# Patient Record
Sex: FEMALE | Race: WHITE | NOT HISPANIC OR LATINO | Employment: OTHER | URBAN - METROPOLITAN AREA
[De-identification: names, ages, dates, MRNs, and addresses within clinical notes are randomized per-mention and may not be internally consistent; named-entity substitution may affect disease eponyms.]

---

## 2018-02-26 ENCOUNTER — TELEPHONE (OUTPATIENT)
Dept: FAMILY MEDICINE | Facility: CLINIC | Age: 23
End: 2018-02-26

## 2018-02-26 NOTE — TELEPHONE ENCOUNTER
Pt rescheduled from RN to provider schedule as she is a new patient.  Will need TB placed and immunizations reviewed with possible titers drawn.    Ramila CAMPOS RN

## 2018-02-27 ENCOUNTER — OFFICE VISIT (OUTPATIENT)
Dept: FAMILY MEDICINE | Facility: CLINIC | Age: 23
End: 2018-02-27
Payer: COMMERCIAL

## 2018-02-27 VITALS
SYSTOLIC BLOOD PRESSURE: 103 MMHG | HEIGHT: 61 IN | OXYGEN SATURATION: 96 % | BODY MASS INDEX: 19.37 KG/M2 | HEART RATE: 100 BPM | DIASTOLIC BLOOD PRESSURE: 68 MMHG | TEMPERATURE: 98 F | WEIGHT: 102.6 LBS

## 2018-02-27 DIAGNOSIS — Z28.39 DELINQUENT IMMUNIZATION STATUS: ICD-10-CM

## 2018-02-27 DIAGNOSIS — Z11.1 SCREENING EXAMINATION FOR PULMONARY TUBERCULOSIS: Primary | ICD-10-CM

## 2018-02-27 DIAGNOSIS — F33.40 RECURRENT MAJOR DEPRESSIVE DISORDER, IN REMISSION (H): ICD-10-CM

## 2018-02-27 DIAGNOSIS — F41.9 ANXIETY: ICD-10-CM

## 2018-02-27 PROCEDURE — 36415 COLL VENOUS BLD VENIPUNCTURE: CPT | Performed by: NURSE PRACTITIONER

## 2018-02-27 PROCEDURE — 86480 TB TEST CELL IMMUN MEASURE: CPT | Performed by: NURSE PRACTITIONER

## 2018-02-27 PROCEDURE — 99202 OFFICE O/P NEW SF 15 MIN: CPT | Performed by: NURSE PRACTITIONER

## 2018-02-27 PROCEDURE — 86787 VARICELLA-ZOSTER ANTIBODY: CPT | Performed by: NURSE PRACTITIONER

## 2018-02-27 NOTE — MR AVS SNAPSHOT
After Visit Summary   2/27/2018    Rocío Catherine    MRN: [id number]           Patient Information     Date Of Birth          1995        Visit Information        Provider Department      2/27/2018 4:20 PM Yessy Lyn APRN CNP [facility]        Today's Diagnoses     Screening examination for pulmonary tuberculosis    -  1    Delinquent immunization status          Care Instructions    1. Labs today          Thank you for choosing [facility].  You may be receiving a survey in the mail from [employer] [employer] regarding your visit today.  Please take a few minutes to complete and return the survey to let us know how we are doing.      If you have questions or concerns, please contact us via Girls Guide To or you can contact your care team at [phone].    Our Clinic hours are:  Monday 6:40 am  to 7:00 pm  Tuesday -Friday 6:40 am to 5:00 pm    The [place] outpatient lab hours are:  Monday - Friday 6:10 am to 4:45 pm  Saturdays 7:00 am to 11:00 am  Appointments are required, call [phone]    If you have clinical questions after hours or would like to schedule an appointment,  call the clinic at [phone].          Follow-ups after your visit        Your next 10 appointments already scheduled     Feb 27, 2018  4:20 PM CST   SHORT with SNOW Mckeon CNP   [facility]    [address] [address] [phone]              Who to contact     If you have questions or need follow up information about today's clinic visit or your schedule please contact [facility] directly at [phone].  Normal or non-critical lab and imaging results will be communicated to you by MyChart, letter or phone within 4 business days after the clinic has received the results. If you do not hear from us within 7 days, please contact the clinic through [employer] or phone. If you have a critical or abnormal lab result, we will  "notify you by phone as soon as possible.  Submit refill requests through [employer] or call your pharmacy and they will forward the refill request to us. Please allow 3 business days for your refill to be completed.          Additional Information About Your Visit        MD [employer] Information     [employer] lets you send messages to your doctor, view your test results, renew your prescriptions, schedule appointments and more. To sign up, go to [email or web address] . Click on \"Log in\" on the left side of the screen, which will take you to the Welcome page. Then click on \"Sign up Now\" on the right side of the page.     You will be asked to enter the access code listed below, as well as some personal information. Please follow the directions to create your username and password.     Your access code is: [email or web address]  Expires: 2018  4:17 PM     Your access code will  in 90 days. If you need help or a new code, please call your [place] clinic or [phone].        Care EveryWhere ID     This is your Care EveryWhere ID. This could be used by other organizations to access your [place] medical records  [id number]        Your Vitals Were     Pulse Temperature Height Pulse Oximetry BMI (Body Mass Index)       100 98  F (36.7  C) (Tympanic) 5' 1\" (1.549 m) 96% 19.39 kg/m2        Blood Pressure from Last 3 Encounters:   18 103/68    Weight from Last 3 Encounters:   18 102 lb 9.6 oz (46.5 kg)              We Performed the Following     M Tuberculosis by Quantiferon     VARICELLA-ZOSTER IG, IM        Primary Care Provider Office Phone # Fax #    [facility] [phone] [address] [address]        Equal Access to Services     IRVIN CUMMINS : Hadii poncho hobbs Soboubacar, waaxda luqadaha, qaybta kaalmada adeegyada, amrita woods. So [facility] [phone].    ATENCIÓN: Si habla español, tiene a beltrán disposición servicios gratuitos de " asistencia lingüística. Jo al [phone].    We comply with applicable federal civil rights laws and [place] laws. We do not discriminate on the basis of race, color, national origin, age, disability, sex, sexual orientation, or gender identity.            Thank you!     Thank you for choosing [facility]  for your care. Our goal is always to provide you with excellent care. Hearing back from our patients is one way we can continue to improve our services. Please take a few minutes to complete the written survey that you may receive in the mail after your visit with us. Thank you!             Your Updated Medication List - Protect others around you: Learn how to safely use, store and throw away your medicines at www.disposemymeds.org.          This list is accurate as of 2/27/18  4:17 PM.  Always use your most recent med list.                   Brand Name Dispense Instructions for use Diagnosis    sertraline 50 MG tablet    ZOLOFT    30 tablet    Take 1 tablet (50 mg) by mouth daily

## 2018-02-27 NOTE — NURSING NOTE
"Initial /68 (BP Location: Left arm, Patient Position: Chair, Cuff Size: Adult Regular)  Pulse 100  Temp 98  F (36.7  C) (Tympanic)  Ht 5' 1\" (1.549 m)  Wt 102 lb 9.6 oz (46.5 kg)  SpO2 96%  BMI 19.39 kg/m2 Estimated body mass index is 19.39 kg/(m^2) as calculated from the following:    Height as of this encounter: 5' 1\" (1.549 m).    Weight as of this encounter: 102 lb 9.6 oz (46.5 kg). .    Malka Phillips    "

## 2018-02-27 NOTE — PATIENT INSTRUCTIONS
1. Labs today          Thank you for choosing [facility].  You may be receiving a survey in the mail from [employer] regarding your visit today.  Please take a few minutes to complete and return the survey to let us know how we are doing.      If you have questions or concerns, please contact us via [email or web address] or you can contact your care team at [phone].    Our Clinic hours are:  Monday 6:40 am  to 7:00 pm  Tuesday -Friday 6:40 am to 5:00 pm    The [place] outpatient lab hours are:  Monday - Friday 6:10 am to 4:45 pm  Saturdays 7:00 am to 11:00 am  Appointments are required, call [phone]    If you have clinical questions after hours or would like to schedule an appointment,  call the clinic at [phone].

## 2018-02-27 NOTE — PROGRESS NOTES
"  SUBJECTIVE:   Rocío Catherine is a 22 year old female who presents to clinic today for the following health issues:      New Patient/Transfer of Care- [facility]- medical chart was reviewed with patient-  History of depression/anxiety and Factor 5 mutation.   Depression stable on Zoloft.   Patient going to college- needs immunization record updated.     Needs TB testing and Titer to Chicken Pox    Problem list and histories reviewed & adjusted, as indicated.  Additional history: as documented    Patient Active Problem List   Diagnosis     Recurrent major depressive disorder, in remission (H)     Anxiety     Factor 5 Leiden mutation, heterozygous (H)     Past Surgical History:   Procedure Laterality Date     GYN SURGERY      c section     HEAD & NECK SURGERY      hemangioma       Social History   Substance Use Topics     Smoking status: Never Smoker     Smokeless tobacco: Not on file     Alcohol use Not on file     No family history on file.        Reviewed and updated as needed this visit by clinical staff  Allergies  Meds  Problems       Reviewed and updated as needed this visit by Provider  Meds  Problems         ROS:  Constitutional, HEENT, cardiovascular, pulmonary, GI, , musculoskeletal, neuro, skin, endocrine and psych systems are negative, except as otherwise noted.    OBJECTIVE:     /68 (BP Location: Left arm, Patient Position: Chair, Cuff Size: Adult Regular)  Pulse 100  Temp 98  F (36.7  C) (Tympanic)  Ht 5' 1\" (1.549 m)  Wt 102 lb 9.6 oz (46.5 kg)  SpO2 96%  BMI 19.39 kg/m2  Body mass index is 19.39 kg/(m^2).  GENERAL: healthy, alert and no distress  RESP: lungs clear to auscultation - no rales, rhonchi or wheezes  CV: regular rate and rhythm, normal S1 S2, no S3 or S4, no murmur, click or rub, no peripheral edema and peripheral pulses strong  MS: no gross musculoskeletal defects noted, no edema    Diagnostic Test Results:  none     ASSESSMENT/PLAN:       1. Screening examination for " pulmonary tuberculosis    - M Tuberculosis by Quantiferon    2. Delinquent immunization status    - VARICELLA-ZOSTER Virus Antibody IgG      3.  Recurrent major depressive disorder, in remission (H)  Comment: stable  Plan: continue Sertraline     4.  Anxiety  Comment: Stable  Plan: continue Sertraline             SNOW Mckeon [facility]

## 2018-02-28 LAB — VZV IGG SER QL IA: 4.1 AI (ref 0–0.8)

## 2018-03-01 LAB
M TB TUBERC IFN-G BLD QL: NEGATIVE
M TB TUBERC IFN-G/MITOGEN IGNF BLD: 0 IU/ML

## 2018-03-19 ENCOUNTER — MYC MEDICAL ADVICE (OUTPATIENT)
Dept: FAMILY MEDICINE | Facility: CLINIC | Age: 23
End: 2018-03-19

## 2018-03-19 ENCOUNTER — TELEPHONE (OUTPATIENT)
Dept: FAMILY MEDICINE | Facility: CLINIC | Age: 23
End: 2018-03-19

## 2018-03-19 NOTE — TELEPHONE ENCOUNTER
I called patient and advised she needs to be seen to be evaluated and transferred to appointment scheduling. Vidhya Gaytan RN

## 2018-03-19 NOTE — TELEPHONE ENCOUNTER
See My chart message and Please advise as these could certainly be side effects of Sertraline. Vidhya Gaytan RN

## 2018-03-19 NOTE — TELEPHONE ENCOUNTER
She sent a My chart message today regarding same as below and Provider responded:     Me   to Rocío Catherine           3/19/18 2:22 PM   Yessy Rapp CNP, has stated you need to be seen to be evaluated. These certainly can be side effects of the medication and you may need a different medication, but that would be decided when you are seen. Thank you, Vidhya Gaytan RN      This [employer] message has not been read.             3/19/18 2:14 PM   Yessy Lyn APRN CNP routed this conversation to Yessy Das APRN CNP        3/19/18 2:13 PM   Note      Recommend OV-                   3/19/18 1:09 PM   You routed this conversation to Yessy Lyn APRN CNP    Me        3/19/18 1:08 PM   Note      See My chart message and Please advise as these could certainly be side effects of Sertraline. Vidhya Gaytan RN

## 2018-03-19 NOTE — TELEPHONE ENCOUNTER
Reason for Call:  Other Medication Question    Detailed comments: She is taking Sertraline.  She was off the medication for about 3 weeks then went back on about 1 1/2 weeks ago.  Ever since going back on medication she has felt nausea, HA and not eating.  She is wondering if this is caused by the medication?  Please advise.    Phone Number Patient can be reached at: Home number on file [phone] (home)    Best Time: any    Can we leave a detailed message on this number? YES    Call taken on 3/19/2018 at 3:00 PM by Mayelin Blue

## 2018-03-20 ENCOUNTER — OFFICE VISIT (OUTPATIENT)
Dept: FAMILY MEDICINE | Facility: CLINIC | Age: 23
End: 2018-03-20
Payer: COMMERCIAL

## 2018-03-20 VITALS
HEART RATE: 75 BPM | OXYGEN SATURATION: 100 % | BODY MASS INDEX: 19.08 KG/M2 | SYSTOLIC BLOOD PRESSURE: 98 MMHG | TEMPERATURE: 97.3 F | WEIGHT: 101 LBS | DIASTOLIC BLOOD PRESSURE: 71 MMHG

## 2018-03-20 DIAGNOSIS — R51.9 ACUTE NONINTRACTABLE HEADACHE, UNSPECIFIED HEADACHE TYPE: ICD-10-CM

## 2018-03-20 DIAGNOSIS — R11.0 NAUSEA: Primary | ICD-10-CM

## 2018-03-20 DIAGNOSIS — F33.40 RECURRENT MAJOR DEPRESSIVE DISORDER, IN REMISSION (H): ICD-10-CM

## 2018-03-20 DIAGNOSIS — F41.9 ANXIETY: ICD-10-CM

## 2018-03-20 PROCEDURE — 99214 OFFICE O/P EST MOD 30 MIN: CPT | Performed by: NURSE PRACTITIONER

## 2018-03-20 RX ORDER — ONDANSETRON 4 MG/1
4 TABLET, FILM COATED ORAL EVERY 6 HOURS PRN
Qty: 10 TABLET | Refills: 0 | Status: SHIPPED | OUTPATIENT
Start: 2018-03-20 | End: 2018-09-30

## 2018-03-20 ASSESSMENT — ANXIETY QUESTIONNAIRES
6. BECOMING EASILY ANNOYED OR IRRITABLE: SEVERAL DAYS
7. FEELING AFRAID AS IF SOMETHING AWFUL MIGHT HAPPEN: NEARLY EVERY DAY
5. BEING SO RESTLESS THAT IT IS HARD TO SIT STILL: NOT AT ALL
3. WORRYING TOO MUCH ABOUT DIFFERENT THINGS: SEVERAL DAYS
1. FEELING NERVOUS, ANXIOUS, OR ON EDGE: NOT AT ALL
2. NOT BEING ABLE TO STOP OR CONTROL WORRYING: SEVERAL DAYS
GAD7 TOTAL SCORE: 7

## 2018-03-20 ASSESSMENT — PATIENT HEALTH QUESTIONNAIRE - PHQ9: 5. POOR APPETITE OR OVEREATING: SEVERAL DAYS

## 2018-03-20 NOTE — NURSING NOTE
"Initial BP 98/71 (BP Location: Left arm, Patient Position: Chair, Cuff Size: Adult Regular)  Pulse 75  Temp 97.3  F (36.3  C) (Tympanic)  Wt 101 lb (45.8 kg)  SpO2 100%  BMI 19.08 kg/m2 Estimated body mass index is 19.08 kg/(m^2) as calculated from the following:    Height as of 2/27/18: 5' 1\" (1.549 m).    Weight as of this encounter: 101 lb (45.8 kg). .    Malka Phillips    "

## 2018-03-20 NOTE — PROGRESS NOTES
SUBJECTIVE:   Rocío Catherine is a 22 year old female who presents to clinic today for the following health issues:      Medication Followup of Sertraline    Taking Medication as prescribed: yes    Side Effects:  Nausea/ headaches    Medication Helping Symptoms:  Helped SX's       Patient reports that since she restarted sertraline felt headache and nausea. Patient started the medication 3 weeks ago. Nausea and headaches are intermittent.    No vision changes- tylenol or Advil relieves the headache. No history of allergies or sinus congestion. Patient took home pregnancy test which was negative.     -------------------------------------    Problem list and histories reviewed & adjusted, as indicated.  Additional history: as documented    Patient Active Problem List   Diagnosis     Recurrent major depressive disorder, in remission (H)     Anxiety     Factor 5 Leiden mutation, heterozygous (H)     Past Surgical History:   Procedure Laterality Date     GYN SURGERY      c section     HEAD & NECK SURGERY      hemangioma       Social History   Substance Use Topics     Smoking status: Never Smoker     Smokeless tobacco: Not on file     Alcohol use Not on file     No family history on file.        Reviewed and updated as needed this visit by clinical staff       Reviewed and updated as needed this visit by Provider         ROS:  Constitutional, HEENT, cardiovascular, pulmonary, GI, , musculoskeletal, neuro, skin, endocrine and psych systems are negative, except as otherwise noted.    OBJECTIVE:     BP 98/71 (BP Location: Left arm, Patient Position: Chair, Cuff Size: Adult Regular)  Pulse 75  Temp 97.3  F (36.3  C) (Tympanic)  Wt 101 lb (45.8 kg)  SpO2 100%  BMI 19.08 kg/m2  Body mass index is 19.08 kg/(m^2).  GENERAL: healthy, alert and no distress  HEENT: PEARLA   RESP: lungs clear to auscultation - no rales, rhonchi or wheezes  CV: regular rate and rhythm, normal S1 S2, no S3 or S4, no murmur, click or rub, no  peripheral edema and peripheral pulses strong  ABDOMEN: soft, nontender, no hepatosplenomegaly, no masses and bowel sounds normal  MS: no gross musculoskeletal defects noted, no edema  NEURO: normal strength- normal finger-to-nose     Diagnostic Test Results:  none     ASSESSMENT/PLAN:         1. Nausea  Likely due to Sertraline- will stop Sertraline   - ondansetron (ZOFRAN) 4 MG tablet; Take 1 tablet (4 mg) by mouth every 6 hours as needed for nausea  Dispense: 10 tablet; Refill: 0    2. Anxiety  Stable on Sertraline however reports side effects therefore will stop sertraline-consider Citalopram starting if nausea and headache symptoms subside     3. Recurrent major depressive disorder, in remission (H)  Stable on Sertraline however reports side effects therefore will stop sertraline-consider Citalopram starting if nausea and headache symptoms subside     4. Acute non intractable headache  - intermittent headaches resolved with tylenol/NSAIDS-   ? Due to sertraline- will stop medication- will continue to monitor  - avoid NSAIDS due to Leiden mutation - ok to take tylenol         SNOW Mckeon [facility]

## 2018-03-20 NOTE — MR AVS SNAPSHOT
After Visit Summary   3/20/2018    Rocío Catherine    MRN: [id number]           Patient Information     Date Of Birth          1995        Visit Information        Provider Department      3/20/2018 3:40 PM Yessy Lyn APRN CNP [facility]        Care Instructions    1. Stop taking Sertraline              Follow-ups after your visit        Who to contact     If you have questions or need follow up information about today's clinic visit or your schedule please contact [facility] directly at [phone].  Normal or non-critical lab and imaging results will be communicated to you by copygramhart, letter or phone within 4 business days after the clinic has received the results. If you do not hear from us within 7 days, please contact the clinic through [employer] or phone. If you have a critical or abnormal lab result, we will notify you by phone as soon as possible.  Submit refill requests through [employer] or call your pharmacy and they will forward the refill request to us. Please allow 3 business days for your refill to be completed.          Additional Information About Your Visit        MyChart Information     [employer] gives you secure access to your electronic health record. If you see a primary care provider, you can also send messages to your care team and make appointments. If you have questions, please call your primary care clinic.  If you do not have a primary care provider, please call [phone] and they will assist you.        Care EveryWhere ID     This is your Care EveryWhere ID. This could be used by other organizations to access your [place] medical records  [id number]        Your Vitals Were     Pulse Temperature Pulse Oximetry BMI (Body Mass Index)          75 97.3  F (36.3  C) (Tympanic) 100% 19.08 kg/m2         Blood Pressure from Last 3 Encounters:   03/20/18 98/71   02/27/18 103/68    Weight from Last 3 Encounters:   03/20/18 101 lb (45.8 kg)    02/27/18 102 lb 9.6 oz (46.5 kg)              Today, you had the following     No orders found for display       Primary Care Provider Office Phone # Fax #    SNOW Mckeon [facility] [phone] [address] [address]        Equal Access to Services     IRVIN CUMMINS : Hadii aad ku hadasho Soomaali, waaxda luqadaha, qaybta kaalmada adeegyada, waxay idiin hayaan adeeg kharash la'aan . So [facility] [phone].    ATENCIÓN: Si habla español, tiene a beltrán disposición servicios gratuitos de asistencia lingüística. Llame al [phone].    We comply with applicable federal civil rights laws and [place] laws. We do not discriminate on the basis of race, color, national origin, age, disability, sex, sexual orientation, or gender identity.            Thank you!     Thank you for choosing [facility]  for your care. Our goal is always to provide you with excellent care. Hearing back from our patients is one way we can continue to improve our services. Please take a few minutes to complete the written survey that you may receive in the mail after your visit with us. Thank you!             Your Updated Medication List - Protect others around you: Learn how to safely use, store and throw away your medicines at www.disposemymeds.org.          This list is accurate as of 3/20/18  3:58 PM.  Always use your most recent med list.                   Brand Name Dispense Instructions for use Diagnosis    sertraline 50 MG tablet    ZOLOFT    30 tablet    Take 1 tablet (50 mg) by mouth daily

## 2018-03-21 ASSESSMENT — ANXIETY QUESTIONNAIRES: GAD7 TOTAL SCORE: 7

## 2018-03-21 ASSESSMENT — PATIENT HEALTH QUESTIONNAIRE - PHQ9: SUM OF ALL RESPONSES TO PHQ QUESTIONS 1-9: 8

## 2018-03-27 ENCOUNTER — APPOINTMENT (OUTPATIENT)
Dept: GENERAL RADIOLOGY | Facility: CLINIC | Age: 23
End: 2018-03-27
Attending: EMERGENCY MEDICINE
Payer: COMMERCIAL

## 2018-03-27 ENCOUNTER — HOSPITAL ENCOUNTER (EMERGENCY)
Facility: CLINIC | Age: 23
Discharge: HOME | End: 2018-03-27
Attending: EMERGENCY MEDICINE | Admitting: EMERGENCY MEDICINE
Payer: COMMERCIAL

## 2018-03-27 VITALS
RESPIRATION RATE: 20 BRPM | HEART RATE: 108 BPM | SYSTOLIC BLOOD PRESSURE: 101 MMHG | DIASTOLIC BLOOD PRESSURE: 72 MMHG | TEMPERATURE: 98.2 F | OXYGEN SATURATION: 97 %

## 2018-03-27 DIAGNOSIS — R10.13 ABDOMINAL PAIN, EPIGASTRIC: ICD-10-CM

## 2018-03-27 LAB
ALBUMIN SERPL-MCNC: 4.4 G/DL (ref 3.4–5)
ALP SERPL-CCNC: 67 U/L (ref 40–150)
ALT SERPL W P-5'-P-CCNC: 12 U/L (ref 0–50)
ANION GAP SERPL CALCULATED.3IONS-SCNC: 6 MMOL/L (ref 3–14)
AST SERPL W P-5'-P-CCNC: 18 U/L (ref 0–45)
BASOPHILS # BLD AUTO: 0 10E9/L (ref 0–0.2)
BASOPHILS NFR BLD AUTO: 0.3 %
BILIRUB SERPL-MCNC: 0.9 MG/DL (ref 0.2–1.3)
BUN SERPL-MCNC: 14 MG/DL (ref 7–30)
CALCIUM SERPL-MCNC: 9 MG/DL (ref 8.5–10.1)
CHLORIDE SERPL-SCNC: 105 MMOL/L (ref 94–109)
CO2 SERPL-SCNC: 25 MMOL/L (ref 20–32)
CREAT SERPL-MCNC: 0.76 MG/DL (ref 0.52–1.04)
DIFFERENTIAL METHOD BLD: NORMAL
EOSINOPHIL # BLD AUTO: 0.1 10E9/L (ref 0–0.7)
EOSINOPHIL NFR BLD AUTO: 1.1 %
ERYTHROCYTE [DISTWIDTH] IN BLOOD BY AUTOMATED COUNT: 11.4 % (ref 10–15)
GFR SERPL CREATININE-BSD FRML MDRD: >90 ML/MIN/1.7M2
GLUCOSE SERPL-MCNC: 91 MG/DL (ref 70–99)
HCG SERPL QL: NEGATIVE
HCT VFR BLD AUTO: 44.1 % (ref 35–47)
HGB BLD-MCNC: 15.5 G/DL (ref 11.7–15.7)
IMM GRANULOCYTES # BLD: 0 10E9/L (ref 0–0.4)
IMM GRANULOCYTES NFR BLD: 0.1 %
LIPASE SERPL-CCNC: 137 U/L (ref 73–393)
LYMPHOCYTES # BLD AUTO: 2.4 10E9/L (ref 0.8–5.3)
LYMPHOCYTES NFR BLD AUTO: 27.4 %
MCH RBC QN AUTO: 29.9 PG (ref 26.5–33)
MCHC RBC AUTO-ENTMCNC: 35.1 G/DL (ref 31.5–36.5)
MCV RBC AUTO: 85 FL (ref 78–100)
MONOCYTES # BLD AUTO: 0.7 10E9/L (ref 0–1.3)
MONOCYTES NFR BLD AUTO: 8.2 %
NEUTROPHILS # BLD AUTO: 5.5 10E9/L (ref 1.6–8.3)
NEUTROPHILS NFR BLD AUTO: 62.9 %
PLATELET # BLD AUTO: 238 10E9/L (ref 150–450)
POTASSIUM SERPL-SCNC: 4.2 MMOL/L (ref 3.4–5.3)
PROT SERPL-MCNC: 8 G/DL (ref 6.8–8.8)
RBC # BLD AUTO: 5.18 10E12/L (ref 3.8–5.2)
SODIUM SERPL-SCNC: 136 MMOL/L (ref 133–144)
TROPONIN I SERPL-MCNC: <0.015 UG/L (ref 0–0.04)
WBC # BLD AUTO: 8.8 10E9/L (ref 4–11)

## 2018-03-27 PROCEDURE — 25000125 ZZHC RX 250: Performed by: FAMILY MEDICINE

## 2018-03-27 PROCEDURE — 71046 X-RAY EXAM CHEST 2 VIEWS: CPT

## 2018-03-27 PROCEDURE — 96375 TX/PRO/DX INJ NEW DRUG ADDON: CPT | Performed by: EMERGENCY MEDICINE

## 2018-03-27 PROCEDURE — 84703 CHORIONIC GONADOTROPIN ASSAY: CPT | Performed by: EMERGENCY MEDICINE

## 2018-03-27 PROCEDURE — 99285 EMERGENCY DEPT VISIT HI MDM: CPT | Mod: 25 | Performed by: EMERGENCY MEDICINE

## 2018-03-27 PROCEDURE — 96374 THER/PROPH/DIAG INJ IV PUSH: CPT | Performed by: EMERGENCY MEDICINE

## 2018-03-27 PROCEDURE — 93010 ELECTROCARDIOGRAM REPORT: CPT | Mod: Z6 | Performed by: EMERGENCY MEDICINE

## 2018-03-27 PROCEDURE — 83690 ASSAY OF LIPASE: CPT | Performed by: EMERGENCY MEDICINE

## 2018-03-27 PROCEDURE — 85025 COMPLETE CBC W/AUTO DIFF WBC: CPT | Performed by: EMERGENCY MEDICINE

## 2018-03-27 PROCEDURE — 80053 COMPREHEN METABOLIC PANEL: CPT | Performed by: EMERGENCY MEDICINE

## 2018-03-27 PROCEDURE — 25000128 H RX IP 250 OP 636: Performed by: EMERGENCY MEDICINE

## 2018-03-27 PROCEDURE — 84484 ASSAY OF TROPONIN QUANT: CPT | Performed by: EMERGENCY MEDICINE

## 2018-03-27 PROCEDURE — 25000132 ZZH RX MED GY IP 250 OP 250 PS 637: Performed by: FAMILY MEDICINE

## 2018-03-27 PROCEDURE — 96361 HYDRATE IV INFUSION ADD-ON: CPT | Performed by: EMERGENCY MEDICINE

## 2018-03-27 RX ORDER — HYDROMORPHONE HYDROCHLORIDE 1 MG/ML
0.3 INJECTION, SOLUTION INTRAMUSCULAR; INTRAVENOUS; SUBCUTANEOUS ONCE
Status: COMPLETED | OUTPATIENT
Start: 2018-03-27 | End: 2018-03-27

## 2018-03-27 RX ORDER — PANTOPRAZOLE SODIUM 40 MG/1
40 TABLET, DELAYED RELEASE ORAL DAILY
Qty: 30 TABLET | Refills: 0 | Status: SHIPPED | OUTPATIENT
Start: 2018-03-27 | End: 2019-02-07

## 2018-03-27 RX ORDER — ONDANSETRON 2 MG/ML
4 INJECTION INTRAMUSCULAR; INTRAVENOUS ONCE
Status: COMPLETED | OUTPATIENT
Start: 2018-03-27 | End: 2018-03-27

## 2018-03-27 RX ADMIN — HYDROMORPHONE HYDROCHLORIDE 0.3 MG: 1 INJECTION, SOLUTION INTRAMUSCULAR; INTRAVENOUS; SUBCUTANEOUS at 09:59

## 2018-03-27 RX ADMIN — LIDOCAINE HYDROCHLORIDE 30 ML: 20 SOLUTION ORAL; TOPICAL at 09:19

## 2018-03-27 RX ADMIN — ONDANSETRON 4 MG: 2 INJECTION INTRAMUSCULAR; INTRAVENOUS at 09:57

## 2018-03-27 RX ADMIN — SODIUM CHLORIDE 1000 ML: 9 INJECTION, SOLUTION INTRAVENOUS at 09:57

## 2018-03-27 NOTE — ED PROVIDER NOTES
History     Chief Complaint   Patient presents with     Heartburn     HPI  Rocío Catherine is a 22 year old female with a history of factor V Leyden deficiency depression, cholecystectomy and  who presents to emergency department with her mother complaining of epigastric abdominal pain radiating to her back.  Patient states symptoms have been present since .  Pain began as stomachache  she took Tums without relief pain is now been radiating to her back into her chest causing significant nausea and back pain.  She states she took a shower this morning and had severe pain and could not move.  States she has been nauseated for the past 2 weeks and is stopped all her medications.  She does not think she has had a fever denies any chills denies any significant shortness of breath.  Has not had any lower abdominal pain has no urinary symptoms denies any lower back pain.  She has not had any focal numbness weakness any extremity.  She has not had any bowel or bladder dysfunction.  She currently rates her pain a 5 out of 10 and it is a sharp constant pain.    Problem List:    Patient Active Problem List    Diagnosis Date Noted     Recurrent major depressive disorder, in remission (H) 2018     Priority: Medium     Anxiety 2018     Priority: Medium     Factor 5 Leiden mutation, heterozygous (H) 2018     Priority: Medium        Past Medical History:    Past Medical History:   Diagnosis Date     Factor 5 Leiden mutation, heterozygous (H) 2018     Factor V Leiden (H)        Past Surgical History:    Past Surgical History:   Procedure Laterality Date     GYN SURGERY      c section     HEAD & NECK SURGERY      hemangioma       Family History:    No family history on file.    Social History:  Marital Status:   [2]  Social History   Substance Use Topics     Smoking status: Never Smoker     Smokeless tobacco: Not on file     Alcohol use Not on file        Medications:      citalopram (CELEXA)  20 MG tablet   ondansetron (ZOFRAN) 4 MG tablet         Review of Systems   Constitutional: Positive for appetite change. Negative for activity change, chills and fever.   HENT: Negative for congestion and trouble swallowing.    Respiratory: Negative for chest tightness and shortness of breath.    Cardiovascular: Negative for chest pain and palpitations.   Gastrointestinal: Positive for abdominal pain and nausea. Negative for blood in stool, constipation, diarrhea and vomiting.   Genitourinary: Negative for decreased urine volume and dysuria.   Musculoskeletal: Negative for back pain and neck pain.   Skin: Negative for rash.   Neurological: Negative for dizziness, weakness, light-headedness, numbness and headaches.   Psychiatric/Behavioral: Negative for confusion.       Physical Exam   BP: 112/82  Pulse: 108  Temp: 98.2  F (36.8  C)  Resp: 20  SpO2: 100 %      Physical Exam   Constitutional: She is oriented to person, place, and time. She appears well-developed and well-nourished. No distress.   HENT:   Head: Normocephalic.   Mouth/Throat: Oropharynx is clear and moist.   Posterior pharynx no erythema edema   Eyes: Conjunctivae are normal.   Neck: Normal range of motion. Neck supple.   Cardiovascular: Normal rate, regular rhythm, normal heart sounds and intact distal pulses.    No murmur heard.  Pulmonary/Chest: Effort normal and breath sounds normal. She has no wheezes.   Abdominal: Soft.   Nondistended tenderness to palpation in the upper abdomen.  No guarding no rebound bowel sounds slightly hyperactive.   Musculoskeletal: Normal range of motion. She exhibits no tenderness.   Neurological: She is alert and oriented to person, place, and time. She exhibits normal muscle tone.   Skin: Skin is warm and dry. No rash noted.   Psychiatric: She has a normal mood and affect.   Nursing note and vitals reviewed.      ED Course     ED Course     Procedures               EKG Interpretation:      Interpreted by Jas Nolasco  Drage  Rhythm: normal sinus   Rate: Normal  Axis: Normal  Ectopy: none  Conduction: shortened MA interval  ST Segments/ T Waves: No ST-T wave changes  Q Waves: none  Comparison to prior: No old EKG available    Clinical Impression: Normal sinus rhythm with shortened MA interval                Critical Care time:  none                 Results for orders placed or performed during the hospital encounter of 03/27/18   Chest XR,  PA & LAT    Narrative    XR CHEST 2 VW 3/27/2018 10:40 AM    HISTORY: Pain.    COMPARISON: None.      Impression    IMPRESSION: 2 views of the chest show no acute or active  cardiopulmonary disease.     AMMY VELÁSQUEZ MD     Labs Ordered and Resulted from Time of ED Arrival Up to the Time of Departure from the ED   CBC WITH PLATELETS DIFFERENTIAL   COMPREHENSIVE METABOLIC PANEL   TROPONIN I   LIPASE   HCG QUALITATIVE       Medications   lidocaine (viscous) (XYLOCAINE) 2 % 15 mL, alum & mag hydroxide-simethicone (MYLANTA ES/MAALOX  ES) 15 mL GI Cocktail (30 mLs Oral Given 3/27/18 0919)   0.9% sodium chloride BOLUS (0 mLs Intravenous Stopped 3/27/18 1136)   HYDROmorphone (PF) (DILAUDID) injection 0.3 mg (0.3 mg Intravenous Given 3/27/18 0959)   ondansetron (ZOFRAN) injection 4 mg (4 mg Intravenous Given 3/27/18 0957)       Assessments & Plan (with Medical Decision Making) records were reviewed.  Labs were obtained.  Patient was given Zofran and a GI cocktail with moderate improvement of symptoms.  Fluids were given to the patient.  Labs revealed a white count of 8.8 hemoglobin 15.5 platelet count 238.  Comprehensive metabolic panel without abnormality.  Troponin was negative.  Lipase was within normal limits.  Pregnancy test negative.  Due to the pain radiating up into the chest dated chest x-ray was obtained and was unremarkable.  Patient has had her gallbladder removed in the past .  I discussed findings with patient and mother.  Patient now has no significant abdominal pain.  She is feeling  better.  I discussed possible CT scan of the abdomen and pelvis but with normal labs and no pain at this time patient would like to hold off on this.  I am going to start the patient on Protonix and have her follow-up with her primary care.  Patient understands if symptoms worsen or new symptoms develop patient should return for recheck and possible CT scan of the abdomen and pelvis.  Mother and patient are in agreement this plan.     I have reviewed the nursing notes.    I have reviewed the findings, diagnosis, plan and need for follow up with the patient.       Discharge Medication List as of 3/27/2018 12:04 PM      START taking these medications    Details   pantoprazole (PROTONIX) 40 MG EC tablet Take 1 tablet (40 mg) by mouth daily for 30 doses, Disp-30 tablet, R-0, Local Print             Final diagnoses:   Abdominal pain, epigastric       3/27/2018   [facility] EMERGENCY DEPARTMENT     Jas Garcia MD  03/29/18 0895

## 2018-03-27 NOTE — ED AVS SNAPSHOT
[facility] Emergency Department    [address] [address]    Phone:  [phone]    Fax:  [phone]                                       Rocío Catherine   MRN: [id number]    Department:  [facility] Emergency Department   Date of Visit:  3/27/2018           After Visit Summary Signature Page     I have received my discharge instructions, and my questions have been answered. I have discussed any challenges I see with this plan with the nurse or doctor.    ..........................................................................................................................................  Patient/Patient Representative Signature      ..........................................................................................................................................  Patient Representative Print Name and Relationship to Patient    ..................................................               ................................................  Date                                            Time    ..........................................................................................................................................  Reviewed by Signature/Title    ...................................................              ..............................................  Date                                                            Time

## 2018-03-27 NOTE — ED AVS SNAPSHOT
[facility] Emergency Department    [address] [address]    Phone:  [phone]    Fax:  [phone]                                       Rocío Catherine   MRN: [id number]    Department:  [facility] Emergency Department   Date of Visit:  3/27/2018           Patient Information     Date Of Birth          1995        Your diagnoses for this visit were:     Abdominal pain, epigastric        You were seen by Jas Garcia MD.      Follow-up Information     Follow up with Yessy Lyn APRN CNP.    Specialty:  Nurse Practitioner    Why:  Later this week for recheck    Contact information:    [address] [place] [address]  [phone]          Follow up with [facility] Emergency Department.    Specialty:  EMERGENCY MEDICINE    Why:  If symptoms worsen    Contact information:    [address] [place] [address] [phone]    Additional information:    The medical center is located at   [address] [place] [address] (between [address]   [address] in [place], [place] north   of [place]).        Discharge Instructions       Return if symptoms worsen or new symptoms develop.  Follow-up with primary care physician next available.  Drink plenty of fluids.  Take Protonix as directed.  If no improvement of her symptoms or any worsening symptoms please return for recheck.  We discussed possible CT scan of the abdomen and pelvis at this time but decided to hold off on this at this time.  Epigastric Pain (Uncertain Cause)     Epigastric pain can be a sign of disease in the upper abdomen. Common causes include:    Acid reflux (stomach acid flowing up into the esophagus)    Gastritis (irritation of the stomach lining)    Peptic Ulcer Disease    Inflammation of the pancreas    Gallstone    Infection in the gallbladder  Pain may be dull or burning. It may spread upward to the chest or to the back. There may be other symptoms such as belching, bloating, cramps or  hunger pains. There may be weight loss or poor appetite, nausea or vomiting.  Since the diagnosis of your pain is not certain yet, further tests may sometimes be needed. Sometimes the doctor will treat you for the most likely condition to see if there is improvement before doing further tests.  Home care  Medicines    Antacids help neutralize the normal acids in your stomach. Examples are Maalox, Mylanta, Rolaids, and Tums. If you don t like the liquid, you can also try a chewable one. You may find one works better than another for you. Overuse can cause diarrhea or constipation.    Acid blockers (H2 blockers) decrease acid production. Examples are cimetidine (Tagamet), famotidine (Pepcid) and ranitidine (Zantac).    Acid inhibitors (PPIs) decrease acid production in a different way than the blockers. You may find they work better, but can take a little longer to take effect.  Examples are omeprazole (Prilosec), lansoprazole (Prevacid), pantoprazole (Protonix), rabeprazole (Aciphex), and esomeprazole (Nexium).    Take an antacid 30-60 minutes after eating and at bedtime, but not at the same time as an acid blocker.    Try not to take NSAIDs. Aspirin may also cause problems, but if taking it for your heart or other medical reasons, talk to your doctor before stopping it; you do not want to cause a worse problem, like a heart attack or stroke.  Diet    If certain foods seem to cause your spasm, try to avoid them.     Eat slowly and chew food well before swallowing. Symptoms of gastritis can be worsened by certain foods. Limit or avoid fatty, fried, and spicy foods, as well as coffee, chocolate, mint, and foods with high acid content such as tomatoes and citrus fruit and juices (orange, grapefruit, lemon).    Avoid alcohol, caffeine, and tobacco, which can delay healing and worsen your problem.    Try eating smaller meals with snacks in between  Follow-up care  Follow up with your healthcare provider or as  advised.  When to seek medical advice  Call your healthcare provider right away if any of the following occur:    Stomach pain worsens or moves to the right lower part of the abdomen    Chest pain appears, or if it worsens or spreads to the chest, back, neck, shoulder, or arm    Frequent vomiting (can t keep down liquids)    Blood in the stool or vomit (red or black color)    Feeling weak or dizzy, fainting, or having trouble breathing    Fever of 100.4 F (38 C) or higher, or as directed by your healthcare provider    Abdominal swelling  Date Last Reviewed: 9/25/2015 2000-2017 [employer]. [address] [place] [address]. All rights reserved. This information is not intended as a substitute for professional medical care. Always follow your healthcare professional's instructions.          24 Hour Appointment Hotline       To make an appointment at any [facility], call [phone] ([phone]). If you don't have a family doctor or clinic, we will help you find one. [facility] are conveniently located to serve the needs of you and your family.             Review of your medicines      START taking        Dose / Directions Last dose taken    pantoprazole 40 MG EC tablet   Commonly known as:  PROTONIX   Dose:  40 mg   Quantity:  30 tablet        Take 1 tablet (40 mg) by mouth daily for 30 doses   Refills:  0          Our records show that you are taking the medicines listed below. If these are incorrect, please call your family doctor or clinic.        Dose / Directions Last dose taken    citalopram 20 MG tablet   Commonly known as:  celeXA   Quantity:  30 tablet        Take 1/2 tablet (10 mg) for 1-2 weeks, then increase to 1 tablet orally daily   Refills:  1        ondansetron 4 MG tablet   Commonly known as:  ZOFRAN   Dose:  4 mg   Quantity:  10 tablet        Take 1 tablet (4 mg) by mouth every 6 hours as needed for nausea   Refills:  0                Prescriptions were  sent or printed at these locations (1 Prescription)                   [facility] [place] - [address]   [address] [place], [place] [address]    Telephone:  [phone]   Fax:  [phone]   Hours:                  Printed at Department/Unit printer (1 of 1)         pantoprazole (PROTONIX) 40 MG EC tablet                Procedures and tests performed during your visit     CBC with platelets, differential    Chest XR,  PA & LAT    Comprehensive metabolic panel    EKG 12-lead, tracing only    HCG qualitative pregnancy (blood)    Lipase    Troponin I      Orders Needing Specimen Collection     None      Pending Results     No orders found from 3/25/2018 to 3/28/2018.            Pending Culture Results     No orders found from 3/25/2018 to 3/28/2018.            Pending Results Instructions     If you had any lab results that were not finalized at the time of your Discharge, you can call the ED Lab Result RN at [phone]. You will be contacted by this team for any positive Lab results or changes in treatment. The nurses are available 7 days a week from 10A to 6:30P.  You can leave a message 24 hours per day and they will return your call.        Test Results From Your Hospital Stay        3/27/2018 10:11 AM      Component Results     Component Value Ref Range & Units Status    WBC 8.8 4.0 - 11.0 10e9/L Final    RBC Count 5.18 3.8 - 5.2 10e12/L Final    Hemoglobin 15.5 11.7 - 15.7 g/dL Final    Hematocrit 44.1 35.0 - 47.0 % Final    MCV 85 78 - 100 fl Final    MCH 29.9 26.5 - 33.0 pg Final    MCHC 35.1 31.5 - 36.5 g/dL Final    RDW 11.4 10.0 - 15.0 % Final    Platelet Count 238 150 - 450 10e9/L Final    Diff Method Automated Method  Final    % Neutrophils 62.9 % Final    % Lymphocytes 27.4 % Final    % Monocytes 8.2 % Final    % Eosinophils 1.1 % Final    % Basophils 0.3 % Final    % Immature Granulocytes 0.1 % Final    Absolute Neutrophil 5.5 1.6 - 8.3 10e9/L Final    Absolute Lymphocytes  2.4 0.8 - 5.3 10e9/L Final    Absolute Monocytes 0.7 0.0 - 1.3 10e9/L Final    Absolute Eosinophils 0.1 0.0 - 0.7 10e9/L Final    Absolute Basophils 0.0 0.0 - 0.2 10e9/L Final    Abs Immature Granulocytes 0.0 0 - 0.4 10e9/L Final         3/27/2018 10:50 AM      Component Results     Component Value Ref Range & Units Status    Sodium 136 133 - 144 mmol/L Final    Potassium 4.2 3.4 - 5.3 mmol/L Final    Chloride 105 94 - 109 mmol/L Final    Carbon Dioxide 25 20 - 32 mmol/L Final    Anion Gap 6 3 - 14 mmol/L Final    Glucose 91 70 - 99 mg/dL Final    Urea Nitrogen 14 7 - 30 mg/dL Final    Creatinine 0.76 0.52 - 1.04 mg/dL Final    GFR Estimate >90 >60 mL/min/1.7m2 Final    Non  GFR Calc    GFR Estimate If Black >90 >60 mL/min/1.7m2 Final    African American GFR Calc    Calcium 9.0 8.5 - 10.1 mg/dL Final    Bilirubin Total 0.9 0.2 - 1.3 mg/dL Final    Albumin 4.4 3.4 - 5.0 g/dL Final    Protein Total 8.0 6.8 - 8.8 g/dL Final    Alkaline Phosphatase 67 40 - 150 U/L Final    ALT 12 0 - 50 U/L Final    AST 18 0 - 45 U/L Final         3/27/2018 10:50 AM      Component Results     Component Value Ref Range & Units Status    Troponin I ES <0.015 0.000 - 0.045 ug/L Final    The 99th percentile for upper reference range is 0.045 ug/L.  Troponin values   in the range of 0.045 - 0.120 ug/L may be associated with risks of adverse   clinical events.           3/27/2018 10:50 AM      Component Results     Component Value Ref Range & Units Status    Lipase 137 73 - 393 U/L Final         3/27/2018 10:31 AM      Component Results     Component Value Ref Range & Units Status    HCG Qualitative Serum Negative NEG^Negative Final    This test is for screening purposes.  Results should be interpreted along with   the clinical picture.  Confirmation testing is available if warranted by   ordering [id number], HCG Quantitative Pregnancy.           3/27/2018 11:41 AM      Narrative     XR CHEST 2 VW 3/27/2018 10:40 AM    HISTORY:  Pain.    COMPARISON: None.        Impression     IMPRESSION: 2 views of the chest show no acute or active  cardiopulmonary disease.     AMMY VELÁSQUEZ MD                Thank you for choosing [facility]       Thank you for choosing [facility] for your care. Our goal is always to provide you with excellent care. Hearing back from our patients is one way we can continue to improve our services. Please take a few minutes to complete the written survey that you may receive in the mail after you visit with us. Thank you!        [employer] Information     [employer] gives you secure access to your electronic health record. If you see a primary care provider, you can also send messages to your care team and make appointments. If you have questions, please call your primary care clinic.  If you do not have a primary care provider, please call [phone] and they will assist you.        Care EveryWhere ID     This is your Care EveryWhere ID. This could be used by other organizations to access your [facility] medical records  [id number]        Equal Access to Services     IRVIN CUMMINS : Hadii poncho Valdez, rozina reynoso, ermelinda herbert, amrita woods. [facility] [phone].    ATENCIÓN: Si habla español, tiene a beltrán disposición servicios gratuitos de asistencia lingüística. Llame al [phone].    We comply with applicable federal civil rights laws and [place] laws. We do not discriminate on the basis of race, color, national origin, age, disability, sex, sexual orientation, or gender identity.            After Visit Summary       This is your record. Keep this with you and show to your community pharmacist(s) and doctor(s) at your next visit.

## 2018-03-27 NOTE — ED NOTES
"Pt c/o heartburn that radiates to back.  Pt has had nausea for past 2 weeks after stopping anxiety/depression meds.  Pt had heartburn on Sunday and today.  No relief with tums.  Pt has had \"heartburn pain\" before in the past but never radiating to the back.  No other symptoms.  No recent illness or fevers.  "

## 2018-03-27 NOTE — DISCHARGE INSTRUCTIONS
Return if symptoms worsen or new symptoms develop.  Follow-up with primary care physician next available.  Drink plenty of fluids.  Take Protonix as directed.  If no improvement of her symptoms or any worsening symptoms please return for recheck.  We discussed possible CT scan of the abdomen and pelvis at this time but decided to hold off on this at this time.  Epigastric Pain (Uncertain Cause)     Epigastric pain can be a sign of disease in the upper abdomen. Common causes include:    Acid reflux (stomach acid flowing up into the esophagus)    Gastritis (irritation of the stomach lining)    Peptic Ulcer Disease    Inflammation of the pancreas    Gallstone    Infection in the gallbladder  Pain may be dull or burning. It may spread upward to the chest or to the back. There may be other symptoms such as belching, bloating, cramps or hunger pains. There may be weight loss or poor appetite, nausea or vomiting.  Since the diagnosis of your pain is not certain yet, further tests may sometimes be needed. Sometimes the doctor will treat you for the most likely condition to see if there is improvement before doing further tests.  Home care  Medicines    Antacids help neutralize the normal acids in your stomach. Examples are Maalox, Mylanta, Rolaids, and Tums. If you don t like the liquid, you can also try a chewable one. You may find one works better than another for you. Overuse can cause diarrhea or constipation.    Acid blockers (H2 blockers) decrease acid production. Examples are cimetidine (Tagamet), famotidine (Pepcid) and ranitidine (Zantac).    Acid inhibitors (PPIs) decrease acid production in a different way than the blockers. You may find they work better, but can take a little longer to take effect.  Examples are omeprazole (Prilosec), lansoprazole (Prevacid), pantoprazole (Protonix), rabeprazole (Aciphex), and esomeprazole (Nexium).    Take an antacid 30-60 minutes after eating and at bedtime, but not at the  same time as an acid blocker.    Try not to take NSAIDs. Aspirin may also cause problems, but if taking it for your heart or other medical reasons, talk to your doctor before stopping it; you do not want to cause a worse problem, like a heart attack or stroke.  Diet    If certain foods seem to cause your spasm, try to avoid them.     Eat slowly and chew food well before swallowing. Symptoms of gastritis can be worsened by certain foods. Limit or avoid fatty, fried, and spicy foods, as well as coffee, chocolate, mint, and foods with high acid content such as tomatoes and citrus fruit and juices (orange, grapefruit, lemon).    Avoid alcohol, caffeine, and tobacco, which can delay healing and worsen your problem.    Try eating smaller meals with snacks in between  Follow-up care  Follow up with your healthcare provider or as advised.  When to seek medical advice  Call your healthcare provider right away if any of the following occur:    Stomach pain worsens or moves to the right lower part of the abdomen    Chest pain appears, or if it worsens or spreads to the chest, back, neck, shoulder, or arm    Frequent vomiting (can t keep down liquids)    Blood in the stool or vomit (red or black color)    Feeling weak or dizzy, fainting, or having trouble breathing    Fever of 100.4 F (38 C) or higher, or as directed by your healthcare provider    Abdominal swelling  Date Last Reviewed: 9/25/2015 2000-2017 [employer]. [address] [place] [address], [place] [address]. All rights reserved. This information is not intended as a substitute for professional medical care. Always follow your healthcare professional's instructions.

## 2018-03-29 ASSESSMENT — ENCOUNTER SYMPTOMS
BACK PAIN: 0
FEVER: 0
NECK PAIN: 0
LIGHT-HEADEDNESS: 0
TROUBLE SWALLOWING: 0
DIARRHEA: 0
ABDOMINAL PAIN: 1
VOMITING: 0
DIZZINESS: 0
NAUSEA: 1
SHORTNESS OF BREATH: 0
BLOOD IN STOOL: 0
CONSTIPATION: 0
ACTIVITY CHANGE: 0
PALPITATIONS: 0
CHILLS: 0
HEADACHES: 0
CHEST TIGHTNESS: 0
DYSURIA: 0
NUMBNESS: 0
WEAKNESS: 0
CONFUSION: 0
APPETITE CHANGE: 1

## 2018-05-16 ENCOUNTER — TELEPHONE (OUTPATIENT)
Dept: FAMILY MEDICINE | Facility: CLINIC | Age: 23
End: 2018-05-16

## 2018-05-16 ENCOUNTER — OFFICE VISIT (OUTPATIENT)
Dept: FAMILY MEDICINE | Facility: CLINIC | Age: 23
End: 2018-05-16
Payer: COMMERCIAL

## 2018-05-16 VITALS
TEMPERATURE: 99.2 F | BODY MASS INDEX: 18.5 KG/M2 | SYSTOLIC BLOOD PRESSURE: 100 MMHG | WEIGHT: 98 LBS | DIASTOLIC BLOOD PRESSURE: 78 MMHG | HEIGHT: 61 IN | HEART RATE: 84 BPM

## 2018-05-16 DIAGNOSIS — A08.4 VIRAL GASTROENTERITIS: Primary | ICD-10-CM

## 2018-05-16 PROCEDURE — 99213 OFFICE O/P EST LOW 20 MIN: CPT | Performed by: FAMILY MEDICINE

## 2018-05-16 RX ORDER — ONDANSETRON 4 MG/1
4 TABLET, FILM COATED ORAL EVERY 8 HOURS PRN
Qty: 18 TABLET | Refills: 0 | Status: SHIPPED | OUTPATIENT
Start: 2018-05-16 | End: 2018-07-03

## 2018-05-16 NOTE — TELEPHONE ENCOUNTER
Reason for Call:  Nausea, vomiting, and diarrhea     Detailed comments: patient is calling and stating that she has had the nausea, vomiting, and diarrhea for approx 1 week. Other family member had this but only for 1 day. I did ask her if she had eaten any yovanny lettuce, and she said no. Please call to advise.    Phone Number Patient can be reached at: Cell number on file:    Telephone Information:   Mobile [phone]       Best Time: any    Can we leave a detailed message on this number? YES   Jo Ann Grove  Cl[facility] [place] [facility]      Call taken on 5/16/2018 at 9:54 AM by Jo Ann Grove

## 2018-05-16 NOTE — PATIENT INSTRUCTIONS
"      Thank you for choosing [facility].  You may be receiving a survey in the mail from [employer] Guajardo regarding your visit today.  Please take a few minutes to complete and return the survey to let us know how we are doing.      If you have questions or concerns, please contact us via Piiku or you can contact your care team at [phone].    Our Clinic hours are:  Monday 6:40 am  to 7:00 pm  Tuesday -Friday 6:40 am to 5:00 pm    The [place] outpatient lab hours are:  Monday - Friday 6:10 am to 4:45 pm  Saturdays 7:00 am to 11:00 am  Appointments are required, call [phone]    If you have clinical questions after hours or would like to schedule an appointment,  call the clinic at [phone].  Viral Diarrhea (Adult)    Diarrhea caused by a virus is often called viral gastroenteritis. Many people call it the \"stomach flu,\" but it has nothing to do with influenza. The virus that causes diarrhea affects the stomach and intestinal tract and usually lasts from 2 to 7 days. Diarrhea is the passing of loose, watery stools 3 or more times a day.  Symptoms  Along with diarrhea, you may have these symptoms:    Abdominal pain and cramping    Nausea and vomiting    Loss of bowel control    Fever and chills    Bloody stools  The danger from repeated diarrhea is dehydration. Dehydration is the loss of too much water and other fluids from the body without taking in enough to replace what is lost.  Antibiotics are not effective in this illness, but there are a number of things you can do at home that will help.  Home care  Follow these home care measures:    If symptoms are severe, rest at home for the next 24 hours or until you are feeling better.    Wash your hands with soap and water or alcohol-based  to prevent the spread of infection. Wash your hands after touching anyone who is sick.    Wash your hands after using the toilet and before meals. Clean the toilet after each use.  Food preparation:    People " with diarrhea should not prepare food for others. When preparing foods, wash your hands after touching anyone who is sick.    Wash your hands after using cutting boards, countertops, and knives that have been in contact with raw food.    Keep uncooked meats away from cooked and ready-to-eat foods.  Medicines:    You may use acetaminophen or NSAIDS such as ibuprofen or naproxen to control fever unless another medicine was prescribed.  If you have chronic liver or kidney disease or ever had a stomach ulcer or gastrointestinal bleeding, talk with your healthcare provider before using these medicines. Aspirin should never be used in anyone under 18 years of age who is ill with a fever. It may cause severe liver damage. Don't use NSAID medicines if you are already taking one for another condition (like arthritis) or are on aspirin (such as for heart disease or after a stroke).    Anti-diarrhea medicine should be taken for this condition only if advised by your healthcare provider. Sometimes anti-diarrhea medicine can make your condition worse.  Diet:    Water and clear liquids are important so you do not get dehydrated. Drink small amounts at a time, do not guzzle it down. If you are very dehydrated, sports drinks aren't a good choice. They have too much sugar and not enough electrolytes. In this case, commercially available products called oral rehydration solutions are best.    Caffeine, tobacco, and alcohol can make the diarrhea, cramping, and pain worse.    Do not force yourself to eat, especially if you have cramping, vomiting, or diarrhea. Do not eat large amounts at a time, even if you are hungry. It may make you feel worse.    If you eat, avoid fatty, greasy, spicy, or fried foods.    No dairy products, as they can make diarrhea worse.  During the first 24 Hours (the first full day) follow the diet below:    Beverages: Water, clear liquids, soft drinks without caffeine; ginger ale, mineral water (plain or  flavored), decaffeinated tea and coffee.    Soups: Clear broth, consommé and bouillon    Desserts: Plain gelatin, popsicles and fruit juice bars  During the next 24 hours (the second day) you may add the following to the above if you have improved:    Hot cereal, plain toast, bread, rolls, crackers    Plain noodles, rice, mashed potatoes, chicken noodle or rice soup    Unsweetened canned fruit (avoid pineapple), bananas    Limit fat intake to less than 15 grams per day by avoiding margarine, butter, oils, mayonnaise, sauces, gravies, fried foods, peanut butter, meat, poultry and fish.    Limit fiber; avoid raw or cooked vegetables, fresh fruits (except bananas) and bran cereals.    Limit caffeine and chocolate. No spices or seasonings except salt.  During the next 24 hours    Gradually resume a normal diet, as you feel better and your symptoms improve.    If at any time the diarrhea or cramping gets worse, go back to the simpler diet (above) or to clear liquids.  Follow-up care  Follow up with your healthcare provider, or as advised. Call if you are not improving within 24 hours or if the diarrhea lasts more than one week. If a stool (diarrhea) sample was taken, you may call in 2 days (or as directed) for the results.  When to seek medical advice  Call your healthcare provider right away if any of the following occur:    Increasing abdominal pain or constant lower right abdominal pain    Continued vomiting (unable to keep liquids down)    Frequent diarrhea (more than 5 times a day)    Blood in vomit or stool (black or red color)    Reduced oral intake    Dark urine, reduced urine output    Weakness, dizziness    Drowsiness    Fever of 100.4 F (38 C) oral or higher, or as directed by your healthcare provider    New rash  Call 911  Call 911 if any of the following occur:    Trouble breathing    Confused    Severe drowsiness or trouble awakening    Fainting or loss of consciousness    Rapid heart  rate    Seizure    Stiff neck  Date Last Reviewed: 11/16/2015 2000-2017 [employer]. [address] [place] [address], [place] [address]. All rights reserved. This information is not intended as a substitute for professional medical care. Always follow your healthcare professional's instructions.

## 2018-05-16 NOTE — TELEPHONE ENCOUNTER
"Last vomited Monday 5/14  \"but last Wednesday night I was sick at night and felt fine the next morning. \"  \"it has been going around my house. \"    Diarrhea continues, once last week , and has had diarrhea today.   Nausea for a week.     Advised her to be seen, and she agreed.   Has not done a pregnancy test.   \"I don' t think I am but it is possible. \"  Made her an appt this morning.     Fina Boogie RNC        "

## 2018-05-16 NOTE — MR AVS SNAPSHOT
"              After Visit Summary   5/16/2018    Rocío Catherine    MRN: [id number]           Patient Information     Date Of Birth          1995        Visit Information        Provider Department      5/16/2018 11:20 AM Dionicio Liang MD [facility]        Today's Diagnoses     Viral gastroenteritis    -  1      Care Instructions          Thank you for choosing [facility].  You may be receiving a survey in the mail from [facility] regarding your visit today.  Please take a few minutes to complete and return the survey to let us know how we are doing.      If you have questions or concerns, please contact us via H[employer]Thing or you can contact your care team at [phone].    Our Clinic hours are:  Monday 6:40 am  to 7:00 pm  Tuesday -Friday 6:40 am to 5:00 pm    The [place] outpatient lab hours are:  Monday - Friday 6:10 am to 4:45 pm  Saturdays 7:00 am to 11:00 am  Appointments are required, call [phone]    If you have clinical questions after hours or would like to schedule an appointment,  call the clinic at [phone].  Viral Diarrhea (Adult)    Diarrhea caused by a virus is often called viral gastroenteritis. Many people call it the \"stomach flu,\" but it has nothing to do with influenza. The virus that causes diarrhea affects the stomach and intestinal tract and usually lasts from 2 to 7 days. Diarrhea is the passing of loose, watery stools 3 or more times a day.  Symptoms  Along with diarrhea, you may have these symptoms:    Abdominal pain and cramping    Nausea and vomiting    Loss of bowel control    Fever and chills    Bloody stools  The danger from repeated diarrhea is dehydration. Dehydration is the loss of too much water and other fluids from the body without taking in enough to replace what is lost.  Antibiotics are not effective in this illness, but there are a number of things you can do at home that will help.  Home care  Follow these home care measures:    If " symptoms are severe, rest at home for the next 24 hours or until you are feeling better.    Wash your hands with soap and water or alcohol-based  to prevent the spread of infection. Wash your hands after touching anyone who is sick.    Wash your hands after using the toilet and before meals. Clean the toilet after each use.  Food preparation:    People with diarrhea should not prepare food for others. When preparing foods, wash your hands after touching anyone who is sick.    Wash your hands after using cutting boards, countertops, and knives that have been in contact with raw food.    Keep uncooked meats away from cooked and ready-to-eat foods.  Medicines:    You may use acetaminophen or NSAIDS such as ibuprofen or naproxen to control fever unless another medicine was prescribed.  If you have chronic liver or kidney disease or ever had a stomach ulcer or gastrointestinal bleeding, talk with your healthcare provider before using these medicines. Aspirin should never be used in anyone under 18 years of age who is ill with a fever. It may cause severe liver damage. Don't use NSAID medicines if you are already taking one for another condition (like arthritis) or are on aspirin (such as for heart disease or after a stroke).    Anti-diarrhea medicine should be taken for this condition only if advised by your healthcare provider. Sometimes anti-diarrhea medicine can make your condition worse.  Diet:    Water and clear liquids are important so you do not get dehydrated. Drink small amounts at a time, do not guzzle it down. If you are very dehydrated, sports drinks aren't a good choice. They have too much sugar and not enough electrolytes. In this case, commercially available products called oral rehydration solutions are best.    Caffeine, tobacco, and alcohol can make the diarrhea, cramping, and pain worse.    Do not force yourself to eat, especially if you have cramping, vomiting, or diarrhea. Do not eat large  amounts at a time, even if you are hungry. It may make you feel worse.    If you eat, avoid fatty, greasy, spicy, or fried foods.    No dairy products, as they can make diarrhea worse.  During the first 24 Hours (the first full day) follow the diet below:    Beverages: Water, clear liquids, soft drinks without caffeine; ginger ale, mineral water (plain or flavored), decaffeinated tea and coffee.    Soups: Clear broth, consommé and bouillon    Desserts: Plain gelatin, popsicles and fruit juice bars  During the next 24 hours (the second day) you may add the following to the above if you have improved:    Hot cereal, plain toast, bread, rolls, crackers    Plain noodles, rice, mashed potatoes, chicken noodle or rice soup    Unsweetened canned fruit (avoid pineapple), bananas    Limit fat intake to less than 15 grams per day by avoiding margarine, butter, oils, mayonnaise, sauces, gravies, fried foods, peanut butter, meat, poultry and fish.    Limit fiber; avoid raw or cooked vegetables, fresh fruits (except bananas) and bran cereals.    Limit caffeine and chocolate. No spices or seasonings except salt.  During the next 24 hours    Gradually resume a normal diet, as you feel better and your symptoms improve.    If at any time the diarrhea or cramping gets worse, go back to the simpler diet (above) or to clear liquids.  Follow-up care  Follow up with your healthcare provider, or as advised. Call if you are not improving within 24 hours or if the diarrhea lasts more than one week. If a stool (diarrhea) sample was taken, you may call in 2 days (or as directed) for the results.  When to seek medical advice  Call your healthcare provider right away if any of the following occur:    Increasing abdominal pain or constant lower right abdominal pain    Continued vomiting (unable to keep liquids down)    Frequent diarrhea (more than 5 times a day)    Blood in vomit or stool (black or red color)    Reduced oral intake    Dark  urine, reduced urine output    Weakness, dizziness    Drowsiness    Fever of 100.4 F (38 C) oral or higher, or as directed by your healthcare provider    New rash  Call 911  Call 911 if any of the following occur:    Trouble breathing    Confused    Severe drowsiness or trouble awakening    Fainting or loss of consciousness    Rapid heart rate    Seizure    Stiff neck  Date Last Reviewed: 11/16/2015 2000-2017 [employer]. [address] [place] [address], [place] [address]. All rights reserved. This information is not intended as a substitute for professional medical care. Always follow your healthcare professional's instructions.                Follow-ups after your visit        Who to contact     If you have questions or need follow up information about today's clinic visit or your schedule please contact [facility] directly at [phone].  Normal or non-critical lab and imaging results will be communicated to you by Your [employer], letter or phone within 4 business days after the clinic has received the results. If you do not hear from us within 7 days, please contact the clinic through Your [employer] or phone. If you have a critical or abnormal lab result, we will notify you by phone as soon as possible.  Submit refill requests through [email or web address] or call your pharmacy and they will forward the refill request to us. Please allow 3 business days for your refill to be completed.          Additional Information About Your Visit        [email or web address] Information     [email or web address] gives you secure access to your electronic health record. If you see a primary care provider, you can also send messages to your care team and make appointments. If you have questions, please call your primary care clinic.  If you do not have a primary care provider, please call [phone] and they will assist you.        Care EveryWhere ID     This is your Care EveryWhere ID. This could be used by other organizations to access your [place]  "medical records  [id number]        Your Vitals Were     Pulse Temperature Height BMI (Body Mass Index)          84 99.2  F (37.3  C) (Tympanic) 5' 1\" (1.549 m) 18.52 kg/m2         Blood Pressure from Last 3 Encounters:   05/16/18 100/78   03/27/18 101/72   03/20/18 98/71    Weight from Last 3 Encounters:   05/16/18 98 lb (44.5 kg)   03/20/18 101 lb (45.8 kg)   02/27/18 102 lb 9.6 oz (46.5 kg)              Today, you had the following     No orders found for display         Today's Medication Changes          These changes are accurate as of 5/16/18 11:46 AM.  If you have any questions, ask your nurse or doctor.               These medicines have changed or have updated prescriptions.        Dose/Directions    * ondansetron 4 MG tablet   Commonly known as:  ZOFRAN   This may have changed:  Another medication with the same name was added. Make sure you understand how and when to take each.   Used for:  Nausea   Changed by:  Dionicio Liang MD        Dose:  4 mg   Take 1 tablet (4 mg) by mouth every 6 hours as needed for nausea   Quantity:  10 tablet   Refills:  0       * ondansetron 4 MG tablet   Commonly known as:  ZOFRAN   This may have changed:  You were already taking a medication with the same name, and this prescription was added. Make sure you understand how and when to take each.   Used for:  Viral gastroenteritis   Changed by:  Dionicio Liang MD        Dose:  4 mg   Take 1 tablet (4 mg) by mouth every 8 hours as needed   Quantity:  18 tablet   Refills:  0       * Notice:  This list has 2 medication(s) that are the same as other medications prescribed for you. Read the directions carefully, and ask your doctor or other care provider to review them with you.         Where to get your medicines      These medications were sent to [facility] [address] [place] [address]  [address] [facility] [address]     Phone:  [phone]     ondansetron 4 MG tablet          "       Primary Care Provider Office Phone # Fax #    SNOW Mckeon [facility] [phone] [address] [facility] [address]        Equal Access to Services     IRVIN CUMMINS : Hadii aad ku hadmannyo Soomaali, waaxda luqadaha, qaybta kaalmada adeegyada, amrita florenciain hayaaloy mooreguero ozunaedis woods. So [facility] [phone].    ATENCIÓN: Si habla español, tiene a beltrán disposición servicios gratuitos de asistencia lingüística. Llame al [phone].    We comply with applicable federal civil rights laws and [place] laws. We do not discriminate on the basis of race, color, national origin, age, disability, sex, sexual orientation, or gender identity.            Thank you!     Thank you for choosing [facility]  for your care. Our goal is always to provide you with excellent care. Hearing back from our patients is one way we can continue to improve our services. Please take a few minutes to complete the written survey that you may receive in the mail after your visit with us. Thank you!             Your Updated Medication List - Protect others around you: Learn how to safely use, store and throw away your medicines at www.disposemymeds.org.          This list is accurate as of 5/16/18 11:46 AM.  Always use your most recent med list.                   Brand Name Dispense Instructions for use Diagnosis    citalopram 20 MG tablet    celeXA    30 tablet    Take 1/2 tablet (10 mg) for 1-2 weeks, then increase to 1 tablet orally daily    Anxiety, Single current episode of major depressive disorder, unspecified depression episode severity       * ondansetron 4 MG tablet    ZOFRAN    10 tablet    Take 1 tablet (4 mg) by mouth every 6 hours as needed for nausea    Nausea       * ondansetron 4 MG tablet    ZOFRAN    18 tablet    Take 1 tablet (4 mg) by mouth every 8 hours as needed    Viral gastroenteritis       * Notice:  This list has 2 medication(s) that are the same as other medications prescribed  for you. Read the directions carefully, and ask your doctor or other care provider to review them with you.

## 2018-05-16 NOTE — PROGRESS NOTES
SUBJECTIVE:   Rocío Catherine is a 22 year old female who presents to clinic today for the following health issues:      Concern - Patient is vomiting nausea and abdominal pain diarrhea   Onset: off and on for 1 week increased on 513    Description:   Patient started with body ache about 1 week ago now has increased to nausea vomiting. Everyone has had it in the house but only has lasted about 1 day      Intensity: moderate    Progression of Symptoms:  worsening    Accompanying Signs & Symptoms:  None     Previous history of similar problem:   Gallbladder in June unsure if it is the way she eating     Precipitating factors:   Worsened by: any food or liquid     Alleviating factors:  Improved by: lying down not moving     Therapies Tried and outcome: tylenol on 5-14  Patient comes in for nausea and vomiting and diarrhea. Symptoms have been ongoing for about a week. Her household also had similar symptoms.     Problem list and histories reviewed & adjusted, as indicated.  Additional history: as documented    Patient Active Problem List   Diagnosis     Recurrent major depressive disorder, in remission (H)     Anxiety     Factor 5 Leiden mutation, heterozygous (H)     Past Surgical History:   Procedure Laterality Date     GYN SURGERY      c section     HEAD & NECK SURGERY      hemangioma       Social History   Substance Use Topics     Smoking status: Never Smoker     Smokeless tobacco: Never Used     Alcohol use Not on file     History reviewed. No pertinent family history.      Current Outpatient Prescriptions   Medication Sig Dispense Refill     citalopram (CELEXA) 20 MG tablet Take 1/2 tablet (10 mg) for 1-2 weeks, then increase to 1 tablet orally daily 30 tablet 1     ondansetron (ZOFRAN) 4 MG tablet Take 1 tablet (4 mg) by mouth every 8 hours as needed 18 tablet 0     ondansetron (ZOFRAN) 4 MG tablet Take 1 tablet (4 mg) by mouth every 6 hours as needed for nausea (Patient not taking: Reported on 5/16/2018) 10 tablet  "0     No Known Allergies  BP Readings from Last 3 Encounters:   05/16/18 100/78   03/27/18 101/72   03/20/18 98/71    Wt Readings from Last 3 Encounters:   05/16/18 98 lb (44.5 kg)   03/20/18 101 lb (45.8 kg)   02/27/18 102 lb 9.6 oz (46.5 kg)                  Labs reviewed in EPIC    Reviewed and updated as needed this visit by clinical staff  Tobacco  Allergies  Med Hx  Surg Hx  Fam Hx  Soc Hx      Reviewed and updated as needed this visit by Provider         ROS:  Constitutional, HEENT, cardiovascular, pulmonary, gi and gu systems are negative, except as otherwise noted.    OBJECTIVE:     /78 (BP Location: Left arm, Cuff Size: Adult Regular)  Pulse 84  Temp 99.2  F (37.3  C) (Tympanic)  Ht 5' 1\" (1.549 m)  Wt 98 lb (44.5 kg)  BMI 18.52 kg/m2  Body mass index is 18.52 kg/(m^2).  GENERAL: healthy, alert and no distress  EYES: Eyes grossly normal to inspection, PERRL and conjunctivae and sclerae normal  HENT: ear canals and TM's normal, nose and mouth without ulcers or lesions  NECK: no adenopathy, no asymmetry, masses, or scars and thyroid normal to palpation  RESP: lungs clear to auscultation - no rales, rhonchi or wheezes  CV: regular rate and rhythm, normal S1 S2, no S3 or S4, no murmur, click or rub, no peripheral edema and peripheral pulses strong  ABDOMEN: soft, nontender, no hepatosplenomegaly, no masses and bowel sounds normal  MS: no gross musculoskeletal defects noted, no edema    Diagnostic Test Results:  none     ASSESSMENT/PLAN:       1. Viral gastroenteritis  Recommend bland diet and increase in fluids.   - ondansetron (ZOFRAN) 4 MG tablet; Take 1 tablet (4 mg) by mouth every 8 hours as needed  Dispense: 18 tablet; Refill: 0    FUTURE APPOINTMENTS:       - Follow-up visit as needed.  Patient Instructions         Thank you for choosing [facility].  You may be receiving a survey in the mail from [employer] regarding your visit today.  Please take a few minutes to complete and " "return the survey to let us know how we are doing.      If you have questions or concerns, please contact us via [employer] or you can contact your care team at [phone].    Our Clinic hours are:  Monday 6:40 am  to 7:00 pm  Tuesday -Friday 6:40 am to 5:00 pm    The [place] outpatient lab hours are:  Monday - Friday 6:10 am to 4:45 pm  Saturdays 7:00 am to 11:00 am  Appointments are required, call [phone]    If you have clinical questions after hours or would like to schedule an appointment,  call the clinic at [phone].  Viral Diarrhea (Adult)    Diarrhea caused by a virus is often called viral gastroenteritis. Many people call it the \"stomach flu,\" but it has nothing to do with influenza. The virus that causes diarrhea affects the stomach and intestinal tract and usually lasts from 2 to 7 days. Diarrhea is the passing of loose, watery stools 3 or more times a day.  Symptoms  Along with diarrhea, you may have these symptoms:    Abdominal pain and cramping    Nausea and vomiting    Loss of bowel control    Fever and chills    Bloody stools  The danger from repeated diarrhea is dehydration. Dehydration is the loss of too much water and other fluids from the body without taking in enough to replace what is lost.  Antibiotics are not effective in this illness, but there are a number of things you can do at home that will help.  Home care  Follow these home care measures:    If symptoms are severe, rest at home for the next 24 hours or until you are feeling better.    Wash your hands with soap and water or alcohol-based  to prevent the spread of infection. Wash your hands after touching anyone who is sick.    Wash your hands after using the toilet and before meals. Clean the toilet after each use.  Food preparation:    People with diarrhea should not prepare food for others. When preparing foods, wash your hands after touching anyone who is sick.    Wash your hands after using cutting boards, " countertops, and knives that have been in contact with raw food.    Keep uncooked meats away from cooked and ready-to-eat foods.  Medicines:    You may use acetaminophen or NSAIDS such as ibuprofen or naproxen to control fever unless another medicine was prescribed.  If you have chronic liver or kidney disease or ever had a stomach ulcer or gastrointestinal bleeding, talk with your healthcare provider before using these medicines. Aspirin should never be used in anyone under 18 years of age who is ill with a fever. It may cause severe liver damage. Don't use NSAID medicines if you are already taking one for another condition (like arthritis) or are on aspirin (such as for heart disease or after a stroke).    Anti-diarrhea medicine should be taken for this condition only if advised by your healthcare provider. Sometimes anti-diarrhea medicine can make your condition worse.  Diet:    Water and clear liquids are important so you do not get dehydrated. Drink small amounts at a time, do not guzzle it down. If you are very dehydrated, sports drinks aren't a good choice. They have too much sugar and not enough electrolytes. In this case, commercially available products called oral rehydration solutions are best.    Caffeine, tobacco, and alcohol can make the diarrhea, cramping, and pain worse.    Do not force yourself to eat, especially if you have cramping, vomiting, or diarrhea. Do not eat large amounts at a time, even if you are hungry. It may make you feel worse.    If you eat, avoid fatty, greasy, spicy, or fried foods.    No dairy products, as they can make diarrhea worse.  During the first 24 Hours (the first full day) follow the diet below:    Beverages: Water, clear liquids, soft drinks without caffeine; ginger ale, mineral water (plain or flavored), decaffeinated tea and coffee.    Soups: Clear broth, consommé and bouillon    Desserts: Plain gelatin, popsicles and fruit juice bars  During the next 24 hours (the  second day) you may add the following to the above if you have improved:    Hot cereal, plain toast, bread, rolls, crackers    Plain noodles, rice, mashed potatoes, chicken noodle or rice soup    Unsweetened canned fruit (avoid pineapple), bananas    Limit fat intake to less than 15 grams per day by avoiding margarine, butter, oils, mayonnaise, sauces, gravies, fried foods, peanut butter, meat, poultry and fish.    Limit fiber; avoid raw or cooked vegetables, fresh fruits (except bananas) and bran cereals.    Limit caffeine and chocolate. No spices or seasonings except salt.  During the next 24 hours    Gradually resume a normal diet, as you feel better and your symptoms improve.    If at any time the diarrhea or cramping gets worse, go back to the simpler diet (above) or to clear liquids.  Follow-up care  Follow up with your healthcare provider, or as advised. Call if you are not improving within 24 hours or if the diarrhea lasts more than one week. If a stool (diarrhea) sample was taken, you may call in 2 days (or as directed) for the results.  When to seek medical advice  Call your healthcare provider right away if any of the following occur:    Increasing abdominal pain or constant lower right abdominal pain    Continued vomiting (unable to keep liquids down)    Frequent diarrhea (more than 5 times a day)    Blood in vomit or stool (black or red color)    Reduced oral intake    Dark urine, reduced urine output    Weakness, dizziness    Drowsiness    Fever of 100.4 F (38 C) oral or higher, or as directed by your healthcare provider    New rash  Call 911  Call 911 if any of the following occur:    Trouble breathing    Confused    Severe drowsiness or trouble awakening    Fainting or loss of consciousness    Rapid heart rate    Seizure    Stiff neck  Date Last Reviewed: 11/16/2015 2000-2017 [employer]. [address] [place] [address], [place] [address]. All rights reserved. This information is not intended  as a substitute for professional medical care. Always follow your healthcare professional's instructions.            Dionicio Liang MD  [facility]

## 2018-07-03 ENCOUNTER — OFFICE VISIT (OUTPATIENT)
Dept: FAMILY MEDICINE | Facility: CLINIC | Age: 23
End: 2018-07-03
Payer: COMMERCIAL

## 2018-07-03 VITALS
WEIGHT: 99.8 LBS | DIASTOLIC BLOOD PRESSURE: 62 MMHG | HEART RATE: 112 BPM | HEIGHT: 61 IN | OXYGEN SATURATION: 100 % | BODY MASS INDEX: 18.84 KG/M2 | TEMPERATURE: 97.2 F | SYSTOLIC BLOOD PRESSURE: 102 MMHG

## 2018-07-03 DIAGNOSIS — F33.9 EPISODE OF RECURRENT MAJOR DEPRESSIVE DISORDER, UNSPECIFIED DEPRESSION EPISODE SEVERITY (H): Primary | ICD-10-CM

## 2018-07-03 DIAGNOSIS — F41.9 ANXIETY: ICD-10-CM

## 2018-07-03 PROCEDURE — 99214 OFFICE O/P EST MOD 30 MIN: CPT | Performed by: NURSE PRACTITIONER

## 2018-07-03 RX ORDER — BUSPIRONE HYDROCHLORIDE 10 MG/1
10 TABLET ORAL 3 TIMES DAILY
Qty: 90 TABLET | Refills: 1 | Status: SHIPPED | OUTPATIENT
Start: 2018-07-03 | End: 2019-02-07

## 2018-07-03 ASSESSMENT — ANXIETY QUESTIONNAIRES
1. FEELING NERVOUS, ANXIOUS, OR ON EDGE: MORE THAN HALF THE DAYS
2. NOT BEING ABLE TO STOP OR CONTROL WORRYING: MORE THAN HALF THE DAYS
7. FEELING AFRAID AS IF SOMETHING AWFUL MIGHT HAPPEN: MORE THAN HALF THE DAYS
5. BEING SO RESTLESS THAT IT IS HARD TO SIT STILL: NOT AT ALL
IF YOU CHECKED OFF ANY PROBLEMS ON THIS QUESTIONNAIRE, HOW DIFFICULT HAVE THESE PROBLEMS MADE IT FOR YOU TO DO YOUR WORK, TAKE CARE OF THINGS AT HOME, OR GET ALONG WITH OTHER PEOPLE: SOMEWHAT DIFFICULT
GAD7 TOTAL SCORE: 13
6. BECOMING EASILY ANNOYED OR IRRITABLE: NEARLY EVERY DAY
3. WORRYING TOO MUCH ABOUT DIFFERENT THINGS: MORE THAN HALF THE DAYS

## 2018-07-03 ASSESSMENT — PATIENT HEALTH QUESTIONNAIRE - PHQ9: 5. POOR APPETITE OR OVEREATING: MORE THAN HALF THE DAYS

## 2018-07-03 NOTE — MR AVS SNAPSHOT
After Visit Summary   7/3/2018    Rocío Catherine    MRN: [id number]           Patient Information     Date Of Birth          1995        Visit Information        Provider Department      7/3/2018 10:00 AM Yessy Lyn APRN CNP [facility]        Today's Diagnoses     Episode of recurrent major depressive disorder, unspecified depression episode severity (H)    -  1    Anxiety          Care Instructions    1. Start taking Buspar as directed  2. Schedule counseling         Thank you for choosing [facility].  You may be receiving a survey in the mail from [employer] regarding your visit today.  Please take a few minutes to complete and return the survey to let us know how we are doing.      If you have questions or concerns, please contact us via [employer] or you can contact your care team at [phone].    Our Clinic hours are:  Monday 6:40 am  to 7:00 pm  Tuesday -Friday 6:40 am to 5:00 pm    The [place] outpatient lab hours are:  Monday - Friday 6:10 am to 4:45 pm  Saturdays 7:00 am to 11:00 am  Appointments are required, call [phone]    If you have clinical questions after hours or would like to schedule an appointment,  call the clinic at [phone].          Follow-ups after your visit        Who to contact     If you have questions or need follow up information about today's clinic visit or your schedule please contact [facility] directly at [phone].  Normal or non-critical lab and imaging results will be communicated to you by [employer], letter or phone within 4 business days after the clinic has received the results. If you do not hear from us within 7 days, please contact the clinic through [employer] or phone. If you have a critical or abnormal lab result, we will notify you by phone as soon as possible.  Submit refill requests through [employer] or call your pharmacy and they will forward the refill request to us. Please allow 3 business days for  "your refill to be completed.          Additional Information About Your Visit        [employer] Information     [employer] gives you secure access to your electronic health record. If you see a primary care provider, you can also send messages to your care team and make appointments. If you have questions, please call your primary care clinic.  If you do not have a primary care provider, please call [phone] and they will assist you.        Care EveryWhere ID     This is your Care EveryWhere ID. This could be used by other organizations to access your [place] [facility] medical records  [id number]        Your Vitals Were     Pulse Temperature Height Last Period Pulse Oximetry BMI (Body Mass Index)    112 97.2  F (36.2  C) (Tympanic) 5' 1\" (1.549 m) 06/28/2018 (Exact Date) 100% 18.86 kg/m2       Blood Pressure from Last 3 Encounters:   07/03/18 102/62   05/16/18 100/78   03/27/18 101/72    Weight from Last 3 Encounters:   07/03/18 99 lb 12.8 oz (45.3 kg)   05/16/18 98 lb (44.5 kg)   03/20/18 101 lb (45.8 kg)              We Performed the Following     DEPRESSION ACTION PLAN (DAP)          Today's Medication Changes          These changes are accurate as of 7/3/18 10:26 AM.  If you have any questions, ask your nurse or doctor.               Start taking these medicines.        Dose/Directions    busPIRone 10 MG tablet   Commonly known as:  BUSPAR   Used for:  Anxiety   Started by:  Yessy Lyn APRN CNP        Dose:  10 mg   Take 1 tablet (10 mg) by mouth 3 times daily   Quantity:  90 tablet   Refills:  1            Where to get your medicines      These medications were sent to [facility] [address]  [address] [address]     Phone:  [phone]     busPIRone 10 MG tablet                Primary Care Provider Office Phone # Fax #    SNOW Mckeon [phone] [address] [address]        Equal Access to Services     [facility] " GAAR : Hadii aad bruce faby Valdez, waaxda luqadaha, qaybta kayuriyda keon, waxelissa yash hayryan gironelvieedis woods. So [facility] [phone].    ATENCIÓN: Si habla español, tiene a beltrán disposición servicios gratuitos de asistencia lingüística. Llame al [phone].    We comply with applicable federal civil rights laws and [place] laws. We do not discriminate on the basis of race, color, national origin, age, disability, sex, sexual orientation, or gender identity.            Thank you!     Thank you for choosing [facility]  for your care. Our goal is always to provide you with excellent care. Hearing back from our patients is one way we can continue to improve our services. Please take a few minutes to complete the written survey that you may receive in the mail after your visit with us. Thank you!             Your Updated Medication List - Protect others around you: Learn how to safely use, store and throw away your medicines at www.disposemymeds.org.          This list is accurate as of 7/3/18 10:26 AM.  Always use your most recent med list.                   Brand Name Dispense Instructions for use Diagnosis    busPIRone 10 MG tablet    BUSPAR    90 tablet    Take 1 tablet (10 mg) by mouth 3 times daily    Anxiety       citalopram 20 MG tablet    celeXA    30 tablet    Take 1/2 tablet (10 mg) for 1-2 weeks, then increase to 1 tablet orally daily    Anxiety, Single current episode of major depressive disorder, unspecified depression episode severity       ondansetron 4 MG tablet    ZOFRAN    10 tablet    Take 1 tablet (4 mg) by mouth every 6 hours as needed for nausea    Nausea

## 2018-07-03 NOTE — LETTER
My Depression Action Plan  Name: Rocío Catherine   Date of Birth 1995  Date: 7/3/2018    My doctor: Yessy Lyn   My clinic: [facility]  [address] [id number] [phone]          GREEN    ZONE   Good Control    What it looks like:     Things are going generally well. You have normal up s and down s. You may even feel depressed from time to time, but bad moods usually last less than a day.   What you need to do:  1. Continue to care for yourself (see self care plan)  2. Check your depression survival kit and update it as needed  3. Follow your physician s recommendations including any medication.  4. Do not stop taking medication unless you consult with your physician first.           YELLOW         ZONE Getting Worse    What it looks like:     Depression is starting to interfere with your life.     It may be hard to get out of bed; you may be starting to isolate yourself from others.    Symptoms of depression are starting to last most all day and this has happened for several days.     You may have suicidal thoughts but they are not constant.   What you need to do:     1. Call your care team, your response to treatment will improve if you keep your care team informed of your progress. Yellow periods are signs an adjustment may need to be made.     2. Continue your self-care, even if you have to fake it!    3. Talk to someone in your support network    4. Open up your depression survival kit           RED    ZONE Medical Alert - Get Help    What it looks like:     Depression is seriously interfering with your life.     You may experience these or other symptoms: You can t get out of bed most days, can t work or engage in other necessary activities, you have trouble taking care of basic hygiene, or basic responsibilities, thoughts of suicide or death that will not go away, self-injurious behavior.     What you need to do:  1. Call your care team and request  a same-day appointment. If they are not available (weekends or after hours) call your local crisis line, emergency room or 911.            Depression Self Care Plan / Survival Kit    Self-Care for Depression  Here s the deal. Your body and mind are really not as separate as most people think.  What you do and think affects how you feel and how you feel influences what you do and think. This means if you do things that people who feel good do, it will help you feel better.  Sometimes this is all it takes.  There is also a place for medication and therapy depending on how severe your depression is, so be sure to consult with your medical provider and/ or Behavioral Health Consultant if your symptoms are worsening or not improving.     In order to better manage my stress, I will:    Exercise  Get some form of exercise, every day. This will help reduce pain and release endorphins, the  feel good  chemicals in your brain. This is almost as good as taking antidepressants!  This is not the same as joining a gym and then never going! (they count on that by the way ) It can be as simple as just going for a walk or doing some gardening, anything that will get you moving.      Hygiene   Maintain good hygiene (Get out of bed in the morning, Make your bed, Brush your teeth, Take a shower, and Get dressed like you were going to work, even if you are unemployed).  If your clothes don't fit try to get ones that do.    Diet  I will strive to eat foods that are good for me, drink plenty of water, and avoid excessive sugar, caffeine, alcohol, and other mood-altering substances.  Some foods that are helpful in depression are: complex carbohydrates, B vitamins, flaxseed, fish or fish oil, fresh fruits and vegetables.    Psychotherapy  I agree to participate in Individual Therapy (if recommended).    Medication  If prescribed medications, I agree to take them.  Missing doses can result in serious side effects.  I understand that drinking  alcohol, or other illicit drug use, may cause potential side effects.  I will not stop my medication abruptly without first discussing it with my provider.    Staying Connected With Others  I will stay in touch with my friends, family members, and my primary care provider/team.    Use your imagination  Be creative.  We all have a creative side; it doesn t matter if it s oil painting, sand castles, or mud pies! This will also kick up the endorphins.    Witness Beauty  (AKA stop and smell the roses) Take a look outside, even in mid-winter. Notice colors, textures. Watch the squirrels and birds.     Service to others  Be of service to others.  There is always someone else in need.  By helping others we can  get out of ourselves  and remember the really important things.  This also provides opportunities for practicing all the other parts of the program.    Humor  Laugh and be silly!  Adjust your TV habits for less news and crime-drama and more comedy.    Control your stress  Try breathing deep, massage therapy, biofeedback, and meditation. Find time to relax each day.     My support system    Clinic Contact:  Phone number:    Contact 1:  Phone number:    Contact 2:  Phone number:    Christianity/:  Phone number:    Therapist:  Phone number:    Local crisis center:    Phone number:    Other community support:  Phone number:

## 2018-07-03 NOTE — PROGRESS NOTES
SUBJECTIVE:   Rocío Catherine is a 22 year old female who presents to clinic today for the following health issues:      Depression and Anxiety Follow-Up    Status since last visit: Worsened - anxiety is worse-looking for a job- more anxiety  in social situations. She is currently seeing counselor in [place].     Other associated symptoms:None    Complicating factors:     Significant life event: Yes-  Completion of schooling and looking for a job     Current substance abuse: None    PHQ-9 3/20/2018   Total Score 8   Q9: Suicide Ideation Not at all     MORA-7 SCORE 3/20/2018   Total Score 7     In the past two weeks have you had thoughts of suicide or self-harm?  No.    Do you have concerns about your personal safety or the safety of others?   No  PHQ-9  English  PHQ-9   Any Language  MORA-7  Suicide Assessment Five-step Evaluation and Treatment (SAFE-T)    Amount of exercise or physical activity: 6-7 days/week for an average of greater than 60 minutes    Problems taking medications regularly: Yes,  problems remembering to take    Medication side effects: none    Diet: regular (no restrictions)        -------------------------------------    Problem list and histories reviewed & adjusted, as indicated.  Additional history: as documented    Patient Active Problem List   Diagnosis     Recurrent major depressive disorder, in remission (H)     Anxiety     Factor 5 Leiden mutation, heterozygous (H)     Past Surgical History:   Procedure Laterality Date     GYN SURGERY      c section     HEAD & NECK SURGERY      hemangioma       Social History   Substance Use Topics     Smoking status: Never Smoker     Smokeless tobacco: Never Used     Alcohol use Not on file     Family History   Problem Relation Age of Onset     Other - See Comments Father      factor V     Other - See Comments Paternal Grandfather      factor V           Reviewed and updated as needed this visit by clinical staff  Tobacco  Allergies  Meds  Med Hx  Surg  "Hx  Fam Hx  Soc Hx      Reviewed and updated as needed this visit by Provider         ROS:  Constitutional, HEENT, cardiovascular, pulmonary, GI, , musculoskeletal, neuro, skin, endocrine and psych systems are negative, except as otherwise noted.    OBJECTIVE:     /62  Pulse 112  Temp 97.2  F (36.2  C) (Tympanic)  Ht 5' 1\" (1.549 m)  Wt 99 lb 12.8 oz (45.3 kg)  LMP 06/28/2018 (Exact Date)  SpO2 100%  BMI 18.86 kg/m2  Body mass index is 18.86 kg/(m^2).  GENERAL: healthy, alert and no distress  MS: no gross musculoskeletal defects noted, no edema  PSYCH: mentation appears normal, affect normal/bright    Diagnostic Test Results:  none     ASSESSMENT/PLAN:       1. Episode of recurrent major depressive disorder, unspecified depression episode severity (H)  Stable- continue Citalopram at 20 mg daily   - DEPRESSION ACTION PLAN (DAP)    2. Anxiety  Increasing with social situations/job hunting  - patient has appointment with counselor today  - continue Citalopram   - start busPIRone (BUSPAR) 10 MG tablet; Take 1 tablet (10 mg) by mouth 3 times daily  Dispense: 90 tablet; Refill: 1    Follow up in 3 months    SNOW Mckeon [facility]  "

## 2018-07-03 NOTE — PATIENT INSTRUCTIONS
1. Start taking Buspar as directed  2. Schedule counseling         Thank you for choosing [facility].  You may be receiving a survey in the mail from [employer] regarding your visit today.  Please take a few minutes to complete and return the survey to let us know how we are doing.      If you have questions or concerns, please contact us via [employer] or you can contact your care team at [phone].    Our Clinic hours are:  Monday 6:40 am  to 7:00 pm  Tuesday -Friday 6:40 am to 5:00 pm    The [place] outpatient lab hours are:  Monday - Friday 6:10 am to 4:45 pm  Saturdays 7:00 am to 11:00 am  Appointments are required, call [phone]    If you have clinical questions after hours or would like to schedule an appointment,  call the clinic at [phone].

## 2018-07-04 ASSESSMENT — PATIENT HEALTH QUESTIONNAIRE - PHQ9: SUM OF ALL RESPONSES TO PHQ QUESTIONS 1-9: 8

## 2018-07-04 ASSESSMENT — ANXIETY QUESTIONNAIRES: GAD7 TOTAL SCORE: 13

## 2018-07-09 ENCOUNTER — MYC MEDICAL ADVICE (OUTPATIENT)
Dept: FAMILY MEDICINE | Facility: CLINIC | Age: 23
End: 2018-07-09

## 2018-08-21 ENCOUNTER — OFFICE VISIT (OUTPATIENT)
Dept: FAMILY MEDICINE | Facility: CLINIC | Age: 23
End: 2018-08-21
Payer: COMMERCIAL

## 2018-08-21 VITALS
HEIGHT: 61 IN | HEART RATE: 104 BPM | SYSTOLIC BLOOD PRESSURE: 102 MMHG | BODY MASS INDEX: 19.07 KG/M2 | DIASTOLIC BLOOD PRESSURE: 64 MMHG | OXYGEN SATURATION: 99 % | RESPIRATION RATE: 16 BRPM | WEIGHT: 101 LBS | TEMPERATURE: 98.2 F

## 2018-08-21 DIAGNOSIS — J01.00 ACUTE NON-RECURRENT MAXILLARY SINUSITIS: Primary | ICD-10-CM

## 2018-08-21 PROCEDURE — 99213 OFFICE O/P EST LOW 20 MIN: CPT | Performed by: NURSE PRACTITIONER

## 2018-08-21 NOTE — MR AVS SNAPSHOT
After Visit Summary   8/21/2018    Rocío Catherine    MRN: [id number]           Patient Information     Date Of Birth          1995        Visit Information        Provider Department      8/21/2018 11:20 AM Marlene Dewitt APRN CNP [facility]        Today's Diagnoses     Acute non-recurrent maxillary sinusitis    -  1      Care Instructions    Complete antibiotics as prescribed.  Continue Claritin-D daily  Add Flonase daily.            Thank you for choosing [facility].  You may be receiving a survey in the mail from [employer] regarding your visit today.  Please take a few minutes to complete and return the survey to let us know how we are doing.      If you have questions or concerns, please contact us via [employer] or you can contact your care team at [phone].    Our Clinic hours are:  Monday 6:40 am  to 7:00 pm  Tuesday -Friday 6:40 am to 5:00 pm    The [place] outpatient lab hours are:  Monday - Friday 6:10 am to 4:45 pm  Saturdays 7:00 am to 11:00 am  Appointments are required, call [phone]    If you have clinical questions after hours or would like to schedule an appointment,  call the clinic at [phone].            Follow-ups after your visit        Who to contact     If you have questions or need follow up information about today's clinic visit or your schedule please contact [facility] directly at [phone].  Normal or non-critical lab and imaging results will be communicated to you by [email or web address], letter or phone within 4 business days after the clinic has received the results. If you do not hear from us within 7 days, please contact the clinic through [employer] or phone. If you have a critical or abnormal lab result, we will notify you by phone as soon as possible.  Submit refill requests through [employer] or call your pharmacy and they will forward the refill request to us. Please allow 3 business days for your refill to be  "completed.          Additional Information About Your Visit        b3 biohart Information     SnapLayout gives you secure access to your electronic health record. If you see a primary care provider, you can also send messages to your care team and make appointments. If you have questions, please call your primary care clinic.  If you do not have a primary care provider, please call [phone] and they will assist you.        Care EveryWhere ID     This is your Care EveryWhere ID. This could be used by other organizations to access your [facility] medical records  [id number]        Your Vitals Were     Pulse Temperature Respirations Height Last Period Pulse Oximetry    104 98.2  F (36.8  C) (Tympanic) 16 5' 1\" (1.549 m) 07/23/2018 (Approximate) 99%    BMI (Body Mass Index)                   19.08 kg/m2            Blood Pressure from Last 3 Encounters:   08/21/18 102/64   07/03/18 102/62   05/16/18 100/78    Weight from Last 3 Encounters:   08/21/18 101 lb (45.8 kg)   07/03/18 99 lb 12.8 oz (45.3 kg)   05/16/18 98 lb (44.5 kg)              Today, you had the following     No orders found for display         Today's Medication Changes          These changes are accurate as of 8/21/18 11:42 AM.  If you have any questions, ask your nurse or doctor.               Start taking these medicines.        Dose/Directions    amoxicillin-clavulanate 875-125 MG per tablet   Commonly known as:  AUGMENTIN   Used for:  Acute non-recurrent maxillary sinusitis   Started by:  Marlene Dewitt APRN CNP        Dose:  1 tablet   Take 1 tablet by mouth 2 times daily   Quantity:  20 tablet   Refills:  0            Where to get your medicines      These medications were sent to [facility] [place], [place] - [address]  [address] [address]     Phone:  [phone]     amoxicillin-clavulanate 875-125 MG per tablet                Primary Care Provider Office Phone # Fax #    SNOW Mckeon " [phone]       [address] [address]        Equal Access to Services     IRVIN CUMMINS : Hadii aad ku hadalesha Valdez, wamarco ada luelvia, qaybta kaalmada keon, amrita florenciain hayaan oscarguero jimenez laCristoryan woods. So [place] [phone].    ATENCIÓN: Si habla español, tiene a beltrán disposición servicios gratuitos de asistencia lingüística. Llame al [phone].    We comply with applicable federal civil rights laws and [place] laws. We do not discriminate on the basis of race, color, national origin, age, disability, sex, sexual orientation, or gender identity.            Thank you!     Thank you for choosing [facility]  for your care. Our goal is always to provide you with excellent care. Hearing back from our patients is one way we can continue to improve our services. Please take a few minutes to complete the written survey that you may receive in the mail after your visit with us. Thank you!             Your Updated Medication List - Protect others around you: Learn how to safely use, store and throw away your medicines at www.disposemymeds.org.          This list is accurate as of 8/21/18 11:42 AM.  Always use your most recent med list.                   Brand Name Dispense Instructions for use Diagnosis    amoxicillin-clavulanate 875-125 MG per tablet    AUGMENTIN    20 tablet    Take 1 tablet by mouth 2 times daily    Acute non-recurrent maxillary sinusitis       busPIRone 10 MG tablet    BUSPAR    90 tablet    Take 1 tablet (10 mg) by mouth 3 times daily    Anxiety       citalopram 20 MG tablet    celeXA    30 tablet    Take 1/2 tablet (10 mg) for 1-2 weeks, then increase to 1 tablet orally daily    Anxiety, Single current episode of major depressive disorder, unspecified depression episode severity       ondansetron 4 MG tablet    ZOFRAN    10 tablet    Take 1 tablet (4 mg) by mouth every 6 hours as needed for nausea    Nausea

## 2018-08-21 NOTE — PROGRESS NOTES
"  SUBJECTIVE:   Rocío Catherine is a 22 year old female who presents to clinic today for the following health issues:    ENT Symptoms             Symptoms: cc Present Absent Comment   Fever/Chills   x    Fatigue  x     Muscle Aches   x    Eye Irritation  x     Sneezing  x     Nasal Scooter/Drg  x     Sinus Pressure/Pain  x     Loss of smell   x    Dental pain  x     Sore Throat   x    Swollen Glands   x    Ear Pain/Fullness  x     Cough   x    Wheeze   x    Chest Pain   x    Shortness of breath   x    Rash   x    Other  x  headache     Symptom duration:  1+ months   Symptom severity:  moderate   Treatments tried:  clariton   Contacts:  no known/ Pt. Has Hx of sinus problems     S/p sinus surgery on the left 2015        Problem list and histories reviewed & adjusted, as indicated.  Additional history: as documented    Reviewed and updated as needed this visit by clinical staff       Reviewed and updated as needed this visit by Provider         ROS:  Constitutional, HEENT, cardiovascular, pulmonary, gi and gu systems are negative, except as otherwise noted.    OBJECTIVE:     /64  Pulse 104  Temp 98.2  F (36.8  C) (Tympanic)  Resp 16  Ht 5' 1\" (1.549 m)  Wt 101 lb (45.8 kg)  LMP 07/23/2018 (Approximate)  SpO2 99%  BMI 19.08 kg/m2  Body mass index is 19.08 kg/(m^2).  GENERAL: healthy, alert and no distress  HENT: ear canals and TM's normal, nose and mouth without ulcers or lesions  NECK: no adenopathy, no asymmetry, masses, or scars and thyroid normal to palpation  RESP: lungs clear to auscultation - no rales, rhonchi or wheezes  CV: regular rate and rhythm, normal S1 S2, no S3 or S4, no murmur, click or rub, no peripheral edema and peripheral pulses strong      ASSESSMENT/PLAN:       ICD-10-CM    1. Acute non-recurrent maxillary sinusitis J01.00 amoxicillin-clavulanate (AUGMENTIN) 875-125 MG per tablet       Patient Instructions   Complete antibiotics as prescribed.  Continue Claritin-D daily  Add Flonase " daily.      The risks, benefits and treatment options of prescribed medications or other treatments have been discussed with the patient. The patient verbalized their understanding and should call or follow up if no improvement or if they develop further problems.    SNOW Rousseau [facility]

## 2018-08-21 NOTE — NURSING NOTE
"Initial /64  Pulse 104  Temp 98.2  F (36.8  C) (Tympanic)  Resp 16  Ht 5' 1\" (1.549 m)  Wt 101 lb (45.8 kg)  LMP 07/23/2018 (Approximate)  SpO2 99%  BMI 19.08 kg/m2 Estimated body mass index is 19.08 kg/(m^2) as calculated from the following:    Height as of this encounter: 5' 1\" (1.549 m).    Weight as of this encounter: 101 lb (45.8 kg). .      "

## 2018-08-21 NOTE — PATIENT INSTRUCTIONS
Complete antibiotics as prescribed.  Continue Claritin-D daily  Add Flonase daily.            Thank you for choosing [facility].  You may be receiving a survey in the mail from [facility] regarding your visit today.  Please take a few minutes to complete and return the survey to let us know how we are doing.      If you have questions or concerns, please contact us via [employer] or you can contact your care team at [phone].    Our Clinic hours are:  Monday 6:40 am  to 7:00 pm  Tuesday -Friday 6:40 am to 5:00 pm    The [place] outpatient lab hours are:  Monday - Friday 6:10 am to 4:45 pm  Saturdays 7:00 am to 11:00 am  Appointments are required, call [phone]    If you have clinical questions after hours or would like to schedule an appointment,  call the clinic at [phone].

## 2018-09-26 ENCOUNTER — OFFICE VISIT (OUTPATIENT)
Dept: FAMILY MEDICINE | Facility: CLINIC | Age: 23
End: 2018-09-26
Payer: COMMERCIAL

## 2018-09-26 VITALS
BODY MASS INDEX: 18.69 KG/M2 | OXYGEN SATURATION: 100 % | RESPIRATION RATE: 18 BRPM | SYSTOLIC BLOOD PRESSURE: 102 MMHG | WEIGHT: 99 LBS | DIASTOLIC BLOOD PRESSURE: 60 MMHG | HEIGHT: 61 IN | HEART RATE: 100 BPM

## 2018-09-26 DIAGNOSIS — Z23 NEED FOR PROPHYLACTIC VACCINATION AND INOCULATION AGAINST INFLUENZA: ICD-10-CM

## 2018-09-26 DIAGNOSIS — M54.9 UPPER BACK PAIN ON RIGHT SIDE: ICD-10-CM

## 2018-09-26 DIAGNOSIS — M54.2 CERVICALGIA: Primary | ICD-10-CM

## 2018-09-26 PROCEDURE — 90471 IMMUNIZATION ADMIN: CPT | Performed by: FAMILY MEDICINE

## 2018-09-26 PROCEDURE — 90686 IIV4 VACC NO PRSV 0.5 ML IM: CPT | Performed by: FAMILY MEDICINE

## 2018-09-26 PROCEDURE — 99213 OFFICE O/P EST LOW 20 MIN: CPT | Mod: 25 | Performed by: FAMILY MEDICINE

## 2018-09-26 RX ORDER — METHYLPREDNISOLONE 4 MG
TABLET, DOSE PACK ORAL
Qty: 21 TABLET | Refills: 0 | Status: SHIPPED | OUTPATIENT
Start: 2018-09-26 | End: 2019-02-07

## 2018-09-26 RX ORDER — CYCLOBENZAPRINE HCL 5 MG
5 TABLET ORAL
Qty: 42 TABLET | Refills: 0 | Status: SHIPPED | OUTPATIENT
Start: 2018-09-26 | End: 2019-02-07

## 2018-09-26 NOTE — MR AVS SNAPSHOT
After Visit Summary   9/26/2018    Rocío Catherine    MRN: [id number]           Patient Information     Date Of Birth          1995        Visit Information        Provider Department      9/26/2018 7:40 AM Dionicio Liang MD [facility]        Today's Diagnoses     Need for prophylactic vaccination and inoculation against influenza    -  1    Cervicalgia        Upper back pain on right side          Care Instructions          Thank you for choosing [facility].  You may be receiving a survey in the mail from [facility] regarding your visit today.  Please take a few minutes to complete and return the survey to let us know how we are doing.      If you have questions or concerns, please contact us via e(ye)BRAIN or you can contact your care team at [phone].    Our Clinic hours are:  Monday 6:40 am  to 7:00 pm  Tuesday -Friday 6:40 am to 5:00 pm    The [place] outpatient lab hours are:  Monday - Friday 6:10 am to 4:45 pm  Saturdays 7:00 am to 11:00 am  Appointments are required, call [phone]    If you have clinical questions after hours or would like to schedule an appointment,  call the clinic at [phone].  Reach and Hold Exercise       Do this exercise on your hands and knees. Keep your knees under your hips and your hands under your shoulders. Keep your spine in a neutral position (not arched or sagging). Keep your ears in line with your shoulders. Hold for a few seconds before starting the exercise:  1. Tighten your belly muscles and raise one arm straight in front of you, palm down. Hold for 5 seconds, then lower. Repeat 5 times.  2. Do the exercise again, this time lifting your arm to the side. Repeat 5 times.  3. Do the exercise again, this time lifting your arm backward, palm up. Repeat 5 times.  Switch sides and do each exercise with the other arm.  Date Last Reviewed: 11/1/2017 2000-2017 [employer]. [address], [place]  [address]. All rights reserved. This information is not intended as a substitute for professional medical care. Always follow your healthcare professional's instructions.        Shoulder and Upper Back Stretch  To start, stand tall with your ears, shoulders, and hips in line. Your feet should be slightly apart, positioned just under your hips. Focus your eyes directly in front of you.  this position for a few seconds before starting your exercise. This helps increase your awareness of proper posture.          Reach overhead and slightly back with both arms. Keep your shoulders and neck aligned and your elbows behind your shoulders:    With your palms facing the ceiling, turn your fingers inward.    Take a deep breath. Breathe out, and lower your elbows toward your buttocks. Hold for 5 seconds, then return to starting position.    Repeat 3 times.  Date Last Reviewed: 11/1/2017 2000-2017 [employer]. [address] [place] [address]. All rights reserved. This information is not intended as a substitute for professional medical care. Always follow your healthcare professional's instructions.                Follow-ups after your visit        Additional Services     PHYSICAL THERAPY REFERRAL       If you have not heard from the scheduling office within 2 business days, please call [phone] for all locations, with the exception of [place], please call [phone] and [facility], please call [phone].    Please be aware that coverage of these services is subject to the terms and limitations of your health insurance plan.  Call member services at your health plan with any benefit or coverage questions.                  Future tests that were ordered for you today     Open Future Orders        Priority Expected Expires Ordered    PHYSICAL THERAPY REFERRAL Routine  9/26/2019 9/26/2018            Who to contact     If you have questions or need follow up information about today's clinic  "visit or your schedule please contact [facility] directly at [phone].  Normal or non-critical lab and imaging results will be communicated to you by MyChart, letter or phone within 4 business days after the clinic has received the results. If you do not hear from us within 7 days, please contact the clinic through [employer] or phone. If you have a critical or abnormal lab result, we will notify you by phone as soon as possible.  Submit refill requests through [employer] or call your pharmacy and they will forward the refill request to us. Please allow 3 business days for your refill to be completed.          Additional Information About Your Visit        DocRunharApartment Adda Information     [employer]ym gives you secure access to your electronic health record. If you see a primary care provider, you can also send messages to your care team and make appointments. If you have questions, please call your primary care clinic.  If you do not have a primary care provider, please call [phone] and they will assist you.        Care EveryWhere ID     This is your Care EveryWhere ID. This could be used by other organizations to access your [place] medical records  [id number]        Your Vitals Were     Pulse Respirations Height Pulse Oximetry BMI (Body Mass Index)       100 18 5' 1\" (1.549 m) 100% 18.71 kg/m2        Blood Pressure from Last 3 Encounters:   09/26/18 102/60   08/21/18 102/64   07/03/18 102/62    Weight from Last 3 Encounters:   09/26/18 99 lb (44.9 kg)   08/21/18 101 lb (45.8 kg)   07/03/18 99 lb 12.8 oz (45.3 kg)              We Performed the Following     FLU VACCINE, SPLIT VIRUS, IM (QUADRIVALENT) [94262]- >3 YRS     Vaccine Administration, Initial [56390]          Today's Medication Changes          These changes are accurate as of 9/26/18  8:09 AM.  If you have any questions, ask your nurse or doctor.               Start taking these medicines.        Dose/Directions    cyclobenzaprine 5 MG tablet "   Commonly known as:  FLEXERIL   Used for:  Cervicalgia, Upper back pain on right side   Started by:  Dionicio Liang MD        Dose:  5 mg   Take 1 tablet (5 mg) by mouth nightly as needed for muscle spasms   Quantity:  42 tablet   Refills:  0       methylPREDNISolone 4 MG tablet   Commonly known as:  MEDROL DOSEPAK   Used for:  Cervicalgia, Upper back pain on right side   Started by:  Dionicio Liang MD        Follow package instructions   Quantity:  21 tablet   Refills:  0         Stop taking these medicines if you haven't already. Please contact your care team if you have questions.     amoxicillin-clavulanate 875-125 MG per tablet   Commonly known as:  AUGMENTIN   Stopped by:  Dionicio Liang MD                Where to get your medicines      These medications were sent to [facility] [address]  [address] [address]     Phone:  [phone]     cyclobenzaprine 5 MG tablet    methylPREDNISolone 4 MG tablet                Primary Care Provider Office Phone # Fax #    Yessy Ferrellt, APRN [facility] [phone]       [address] [address]        Equal Access to Services     IRVIN CUMMINS AH: Hadii poncho barrera hadasho Soomaali, waaxda luqadaha, qaybta kaalmada adeegyada, amrita woods. So [facility] [phone].    ATENCIÓN: Si habla español, tiene a beltrán disposición servicios gratuitos de asistencia lingüística. Jo al [phone].    We comply with applicable federal civil rights laws and [place] laws. We do not discriminate on the basis of race, color, national origin, age, disability, sex, sexual orientation, or gender identity.            Thank you!     Thank you for choosing [facility]  for your care. Our goal is always to provide you with excellent care. Hearing back from our patients is one way we can continue to improve our services. Please take a few minutes to  complete the written survey that you may receive in the mail after your visit with us. Thank you!             Your Updated Medication List - Protect others around you: Learn how to safely use, store and throw away your medicines at www.disposemymeds.org.          This list is accurate as of 9/26/18  8:09 AM.  Always use your most recent med list.                   Brand Name Dispense Instructions for use Diagnosis    busPIRone 10 MG tablet    BUSPAR    90 tablet    Take 1 tablet (10 mg) by mouth 3 times daily    Anxiety       citalopram 20 MG tablet    celeXA    30 tablet    Take 1/2 tablet (10 mg) for 1-2 weeks, then increase to 1 tablet orally daily    Anxiety, Single current episode of major depressive disorder, unspecified depression episode severity       cyclobenzaprine 5 MG tablet    FLEXERIL    42 tablet    Take 1 tablet (5 mg) by mouth nightly as needed for muscle spasms    Cervicalgia, Upper back pain on right side       methylPREDNISolone 4 MG tablet    MEDROL DOSEPAK    21 tablet    Follow package instructions    Cervicalgia, Upper back pain on right side       ondansetron 4 MG tablet    ZOFRAN    10 tablet    Take 1 tablet (4 mg) by mouth every 6 hours as needed for nausea    Nausea

## 2018-09-26 NOTE — PATIENT INSTRUCTIONS
Thank you for choosing [facility].  You may be receiving a survey in the mail from Pinky Guajardo regarding your visit today.  Please take a few minutes to complete and return the survey to let us know how we are doing.      If you have questions or concerns, please contact us via [email or web address] or you can contact your care team at [phone].    Our Clinic hours are:  Monday 6:40 am  to 7:00 pm  Tuesday -Friday 6:40 am to 5:00 pm    The [place] outpatient lab hours are:  Monday - Friday 6:10 am to 4:45 pm  Saturdays 7:00 am to 11:00 am  Appointments are required, call [phone]    If you have clinical questions after hours or would like to schedule an appointment,  call the clinic at [phone].  Reach and Hold Exercise       Do this exercise on your hands and knees. Keep your knees under your hips and your hands under your shoulders. Keep your spine in a neutral position (not arched or sagging). Keep your ears in line with your shoulders. Hold for a few seconds before starting the exercise:  1. Tighten your belly muscles and raise one arm straight in front of you, palm down. Hold for 5 seconds, then lower. Repeat 5 times.  2. Do the exercise again, this time lifting your arm to the side. Repeat 5 times.  3. Do the exercise again, this time lifting your arm backward, palm up. Repeat 5 times.  Switch sides and do each exercise with the other arm.  Date Last Reviewed: 11/1/2017 2000-2017 [employer]. [address] [place] [address], [place] [address]. All rights reserved. This information is not intended as a substitute for professional medical care. Always follow your healthcare professional's instructions.        Shoulder and Upper Back Stretch  To start, stand tall with your ears, shoulders, and hips in line. Your feet should be slightly apart, positioned just under your hips. Focus your eyes directly in front of you.  this position for a few seconds before starting your exercise. This  helps increase your awareness of proper posture.          Reach overhead and slightly back with both arms. Keep your shoulders and neck aligned and your elbows behind your shoulders:    With your palms facing the ceiling, turn your fingers inward.    Take a deep breath. Breathe out, and lower your elbows toward your buttocks. Hold for 5 seconds, then return to starting position.    Repeat 3 times.  Date Last Reviewed: 11/1/2017 2000-2017 [employer]. [address] [place] [address]. All rights reserved. This information is not intended as a substitute for professional medical care. Always follow your healthcare professional's instructions.

## 2018-09-26 NOTE — PROGRESS NOTES
SUBJECTIVE:   Rocío Catherine is a 22 year old female who presents to clinic today for the following health issues:      Joint Pain/  Chief Complaint   Patient presents with     Neck Pain     back and arm pain R side          Onset: 1 mo ago     Description:   Location: right shoulder and neck and arm   Character: Sharp arm and neck  Burning in back     Intensity: moderate, severe    Progression of Symptoms: worse    Accompanying Signs & Symptoms:  Other symptoms: radiation of pain to R leg , numbness and tingling in R foot     History:   Previous similar pain: no       Precipitating factors:   Trauma or overuse: YES- patient has twins 28 pounds carries both of them     Alleviating factors:  Improved by:  Ibuprofen only slight improvement     Therapies Tried and outcome: same       Patient is a 22 yr old female here for pain in her neck and upper back pain has been ongoing for a couple of weeks and appears to be worsening. She is having some numbness and tingling in her hands. Patient reports that she has twin boys and carrying them also causes more strain. No recent trauma.        Problem list and histories reviewed & adjusted, as indicated.  Additional history: as documented    Patient Active Problem List   Diagnosis     Recurrent major depressive disorder, in remission (H)     Anxiety     Factor 5 Leiden mutation, heterozygous (H)     Past Surgical History:   Procedure Laterality Date     GYN SURGERY      c section     HEAD & NECK SURGERY      hemangioma       Social History   Substance Use Topics     Smoking status: Never Smoker     Smokeless tobacco: Never Used     Alcohol use Not on file     Family History   Problem Relation Age of Onset     Other - See Comments Father      factor V     Other - See Comments Paternal Grandfather      factor V         Current Outpatient Prescriptions   Medication Sig Dispense Refill     busPIRone (BUSPAR) 10 MG tablet Take 1 tablet (10 mg) by mouth 3 times daily 90 tablet 1      "citalopram (CELEXA) 20 MG tablet Take 1/2 tablet (10 mg) for 1-2 weeks, then increase to 1 tablet orally daily 30 tablet 1     cyclobenzaprine (FLEXERIL) 5 MG tablet Take 1 tablet (5 mg) by mouth nightly as needed for muscle spasms 42 tablet 0     methylPREDNISolone (MEDROL DOSEPAK) 4 MG tablet Follow package instructions 21 tablet 0     ondansetron (ZOFRAN) 4 MG tablet Take 1 tablet (4 mg) by mouth every 6 hours as needed for nausea 10 tablet 0     No Known Allergies  BP Readings from Last 3 Encounters:   09/26/18 102/60   08/21/18 102/64   07/03/18 102/62    Wt Readings from Last 3 Encounters:   09/26/18 99 lb (44.9 kg)   08/21/18 101 lb (45.8 kg)   07/03/18 99 lb 12.8 oz (45.3 kg)                  Labs reviewed in EPIC    Reviewed and updated as needed this visit by clinical staff  Tobacco  Allergies  Meds  Med Hx  Surg Hx  Fam Hx  Soc Hx      Reviewed and updated as needed this visit by Provider         ROS:  Constitutional, HEENT, cardiovascular, pulmonary, gi and gu systems are negative, except as otherwise noted.    OBJECTIVE:     /60  Pulse 100  Resp 18  Ht 5' 1\" (1.549 m)  Wt 99 lb (44.9 kg)  SpO2 100%  BMI 18.71 kg/m2  Body mass index is 18.71 kg/(m^2).  GENERAL: healthy, alert and no distress  NECK: no adenopathy, no asymmetry, masses, or scars and thyroid normal to palpation  RESP: lungs clear to auscultation - no rales, rhonchi or wheezes  CV: regular rate and rhythm, normal S1 S2, no S3 or S4, no murmur, click or rub, no peripheral edema and peripheral pulses strong  ABDOMEN: soft, nontender, no hepatosplenomegaly, no masses and bowel sounds normal  MS: decreased range of motion neck  and tenderness to palpation neck muscles and upper back     Diagnostic Test Results:  none     ASSESSMENT/PLAN:   1. Cervicalgia  Patient asked to take medication as prescribed and also make an appointment for physical therapy   - methylPREDNISolone (MEDROL DOSEPAK) 4 MG tablet; Follow package " instructions  Dispense: 21 tablet; Refill: 0  - cyclobenzaprine (FLEXERIL) 5 MG tablet; Take 1 tablet (5 mg) by mouth nightly as needed for muscle spasms  Dispense: 42 tablet; Refill: 0  - PHYSICAL THERAPY REFERRAL; Future    2. Upper back pain on right side  - methylPREDNISolone (MEDROL DOSEPAK) 4 MG tablet; Follow package instructions  Dispense: 21 tablet; Refill: 0  - cyclobenzaprine (FLEXERIL) 5 MG tablet; Take 1 tablet (5 mg) by mouth nightly as needed for muscle spasms  Dispense: 42 tablet; Refill: 0  - PHYSICAL THERAPY REFERRAL; Future    3. Need for prophylactic vaccination and inoculation against influenza  - FLU VACCINE, SPLIT VIRUS, IM (QUADRIVALENT) [28972]- >3 YRS  - Vaccine Administration, Initial [84916]    FUTURE APPOINTMENTS:       - Follow-up visit as needed    Dionicio Liang MD  [facility]    Injectable Influenza Immunization Documentation    1.  Is the person to be vaccinated sick today?   No    2. Does the person to be vaccinated have an allergy to a component   of the vaccine?   No  Egg Allergy Algorithm Link    3. Has the person to be vaccinated ever had a serious reaction   to influenza vaccine in the past?   No    4. Has the person to be vaccinated ever had Guillain-Barré syndrome?   No    Form completed by

## 2018-09-30 ENCOUNTER — APPOINTMENT (OUTPATIENT)
Dept: GENERAL RADIOLOGY | Facility: CLINIC | Age: 23
End: 2018-09-30
Attending: EMERGENCY MEDICINE
Payer: COMMERCIAL

## 2018-09-30 ENCOUNTER — HOSPITAL ENCOUNTER (EMERGENCY)
Facility: CLINIC | Age: 23
Discharge: HOME | End: 2018-09-30
Attending: EMERGENCY MEDICINE | Admitting: EMERGENCY MEDICINE
Payer: COMMERCIAL

## 2018-09-30 VITALS
HEART RATE: 144 BPM | DIASTOLIC BLOOD PRESSURE: 73 MMHG | TEMPERATURE: 97.7 F | HEIGHT: 61 IN | WEIGHT: 100 LBS | OXYGEN SATURATION: 100 % | BODY MASS INDEX: 18.88 KG/M2 | SYSTOLIC BLOOD PRESSURE: 111 MMHG | RESPIRATION RATE: 21 BRPM

## 2018-09-30 DIAGNOSIS — R07.89 OTHER CHEST PAIN: ICD-10-CM

## 2018-09-30 DIAGNOSIS — S46.811A STRAIN OF TRAPEZIUS MUSCLE, RIGHT, INITIAL ENCOUNTER: ICD-10-CM

## 2018-09-30 DIAGNOSIS — R07.9 CHEST PAIN, UNSPECIFIED TYPE: ICD-10-CM

## 2018-09-30 LAB
ALBUMIN SERPL-MCNC: 4.1 G/DL (ref 3.4–5)
ALP SERPL-CCNC: 60 U/L (ref 40–150)
ALT SERPL W P-5'-P-CCNC: 11 U/L (ref 0–50)
ANION GAP SERPL CALCULATED.3IONS-SCNC: 8 MMOL/L (ref 3–14)
AST SERPL W P-5'-P-CCNC: 17 U/L (ref 0–45)
BASOPHILS # BLD AUTO: 0 10E9/L (ref 0–0.2)
BASOPHILS NFR BLD AUTO: 0.3 %
BILIRUB SERPL-MCNC: 0.4 MG/DL (ref 0.2–1.3)
BUN SERPL-MCNC: 18 MG/DL (ref 7–30)
CALCIUM SERPL-MCNC: 8.8 MG/DL (ref 8.5–10.1)
CHLORIDE SERPL-SCNC: 106 MMOL/L (ref 94–109)
CO2 SERPL-SCNC: 27 MMOL/L (ref 20–32)
CREAT SERPL-MCNC: 0.77 MG/DL (ref 0.52–1.04)
D DIMER PPP FEU-MCNC: <0.3 UG/ML FEU (ref 0–0.5)
DIFFERENTIAL METHOD BLD: NORMAL
EOSINOPHIL # BLD AUTO: 0.1 10E9/L (ref 0–0.7)
EOSINOPHIL NFR BLD AUTO: 1.1 %
ERYTHROCYTE [DISTWIDTH] IN BLOOD BY AUTOMATED COUNT: 10.9 % (ref 10–15)
GFR SERPL CREATININE-BSD FRML MDRD: >90 ML/MIN/1.7M2
GLUCOSE SERPL-MCNC: 124 MG/DL (ref 70–99)
HCT VFR BLD AUTO: 43.2 % (ref 35–47)
HGB BLD-MCNC: 14.6 G/DL (ref 11.7–15.7)
IMM GRANULOCYTES # BLD: 0 10E9/L (ref 0–0.4)
IMM GRANULOCYTES NFR BLD: 0.3 %
LYMPHOCYTES # BLD AUTO: 3.7 10E9/L (ref 0.8–5.3)
LYMPHOCYTES NFR BLD AUTO: 37.8 %
MCH RBC QN AUTO: 30.1 PG (ref 26.5–33)
MCHC RBC AUTO-ENTMCNC: 33.8 G/DL (ref 31.5–36.5)
MCV RBC AUTO: 89 FL (ref 78–100)
MONOCYTES # BLD AUTO: 0.9 10E9/L (ref 0–1.3)
MONOCYTES NFR BLD AUTO: 9.1 %
NEUTROPHILS # BLD AUTO: 5 10E9/L (ref 1.6–8.3)
NEUTROPHILS NFR BLD AUTO: 51.4 %
NRBC # BLD AUTO: 0 10*3/UL
NRBC BLD AUTO-RTO: 0 /100
PLATELET # BLD AUTO: 227 10E9/L (ref 150–450)
POTASSIUM SERPL-SCNC: 3.5 MMOL/L (ref 3.4–5.3)
PROT SERPL-MCNC: 7.7 G/DL (ref 6.8–8.8)
RBC # BLD AUTO: 4.85 10E12/L (ref 3.8–5.2)
SODIUM SERPL-SCNC: 141 MMOL/L (ref 133–144)
TROPONIN I SERPL-MCNC: <0.015 UG/L (ref 0–0.04)
TSH SERPL DL<=0.005 MIU/L-ACNC: 2.46 MU/L (ref 0.4–4)
WBC # BLD AUTO: 9.7 10E9/L (ref 4–11)

## 2018-09-30 PROCEDURE — 99285 EMERGENCY DEPT VISIT HI MDM: CPT | Mod: 25

## 2018-09-30 PROCEDURE — 80053 COMPREHEN METABOLIC PANEL: CPT | Performed by: EMERGENCY MEDICINE

## 2018-09-30 PROCEDURE — 93010 ELECTROCARDIOGRAM REPORT: CPT | Mod: Z6 | Performed by: EMERGENCY MEDICINE

## 2018-09-30 PROCEDURE — 71046 X-RAY EXAM CHEST 2 VIEWS: CPT

## 2018-09-30 PROCEDURE — 85025 COMPLETE CBC W/AUTO DIFF WBC: CPT | Performed by: EMERGENCY MEDICINE

## 2018-09-30 PROCEDURE — 99285 EMERGENCY DEPT VISIT HI MDM: CPT | Mod: 25 | Performed by: EMERGENCY MEDICINE

## 2018-09-30 PROCEDURE — 85379 FIBRIN DEGRADATION QUANT: CPT | Performed by: EMERGENCY MEDICINE

## 2018-09-30 PROCEDURE — 84484 ASSAY OF TROPONIN QUANT: CPT | Performed by: EMERGENCY MEDICINE

## 2018-09-30 PROCEDURE — 93005 ELECTROCARDIOGRAM TRACING: CPT

## 2018-09-30 PROCEDURE — 84443 ASSAY THYROID STIM HORMONE: CPT | Performed by: EMERGENCY MEDICINE

## 2018-09-30 RX ORDER — ACETAMINOPHEN 325 MG/1
975 TABLET ORAL EVERY 8 HOURS PRN
COMMUNITY
End: 2019-02-07

## 2018-09-30 NOTE — ED NOTES
"Patient was seen and treated for back strain about 4 days ago. No known injury, does carry around twin boys that each weight 26-28 pounds. Was started on muscle relaxants and prednisone. Chest pressure rating \"9/10\", racing heart and striking pain down left arm once last night have started. Has a history \"fluid around heart when pregnant with twins 2 years ago.\" Had follow up echo which patient reported was normal. Denies family history of heart disease. Otherwise healthy.   "

## 2018-09-30 NOTE — DISCHARGE INSTRUCTIONS
*CHEST PAIN, UNCERTAIN CAUSE    Based on your exam today, the exact cause of your chest pain is not certain. Your condition does not seem serious at this time, and your pain does not appear to be coming from your heart. However, sometimes the signs of a serious problem take more time to appear. Therefore, watch for the warning signs listed below.  HOME CARE:    1. Rest today and avoid strenuous activity.  2. Take any prescribed medicine as directed.  FOLLOW UP with your doctor in 1-3 days.   GET PROMPT MEDICAL ATTENTION if any of the following occur:    A change in the type of pain: if it feels different, becomes more severe, lasts longer, or begins to spread into your shoulder, arm, neck, jaw or back    Shortness of breath or increased pain with breathing    Weakness, dizziness, or fainting    Cough with blood or dark colored sputum (phlegm)    Fever over 101  F (38.3  C)    Swelling, pain or redness in one leg    6788-8003 [employer]. [address] [place] [address]. All rights reserved. This information is not intended as a substitute for professional medical care. Always follow your healthcare professional's instructions.  This information has been modified by your health care provider with permission from the publisher.

## 2018-09-30 NOTE — ED AVS SNAPSHOT
[facility] Emergency Department    [address] [address]    Phone:  [phone]    Fax:  [phone]                                       Rocío Catherine   MRN: [id number]    Department:  [facility] Emergency Department   Date of Visit:  9/30/2018           After Visit Summary Signature Page     I have received my discharge instructions, and my questions have been answered. I have discussed any challenges I see with this plan with the nurse or doctor.    ..........................................................................................................................................  Patient/Patient Representative Signature      ..........................................................................................................................................  Patient Representative Print Name and Relationship to Patient    ..................................................               ................................................  Date                                   Time    ..........................................................................................................................................  Reviewed by Signature/Title    ...................................................              ..............................................  Date                                               Time          22EPIC Rev 08/18

## 2018-09-30 NOTE — ED PROVIDER NOTES
History     Chief Complaint   Patient presents with     Chest Pain     since thursday mid sternal chest pain.      NEDA Catherine is a 22 year old female who presents with substernal chest discomfort described as a pressure or tightness, began 4 days ago without inciting trauma or strain.  She was seen same day in clinic for right shoulder upper back and neck pain, diagnosed with myofascial source, prescribed muscle relaxer and prednisone.  She is also noted over the past for 5 days her heart has been pounding, it gets worse with activity, she denies associated shortness of air or cough, she has had no fever, denies history of DVT/PE or risk factor for same, although she is heterozygous for factor V Leiden mutation.  She has history of what sounds like pericardial effusion during her pregnancy 2 years ago, etiology remains undetermined.  She has had some nausea without vomiting.  Denies abdominal pain, diarrhea, black or bloody stool, urinary symptoms.    Problem List:    Patient Active Problem List    Diagnosis Date Noted     Recurrent major depressive disorder, in remission (H) 02/27/2018     Priority: Medium     Anxiety 02/27/2018     Priority: Medium     Factor 5 Leiden mutation, heterozygous (H) 02/27/2018     Priority: Medium        Past Medical History:    Past Medical History:   Diagnosis Date     Factor 5 Leiden mutation, heterozygous (H) 2/27/2018     Factor V Leiden (H)        Past Surgical History:    Past Surgical History:   Procedure Laterality Date     GYN SURGERY      c section     HEAD & NECK SURGERY      hemangioma       Family History:    Family History   Problem Relation Age of Onset     Other - See Comments Father      factor V     Other - See Comments Paternal Grandfather      factor V       Social History:  Marital Status:   [2]  Social History   Substance Use Topics     Smoking status: Never Smoker     Smokeless tobacco: Never Used     Alcohol use Not on file        Medications:   "    acetaminophen (TYLENOL) 325 MG tablet   methylPREDNISolone (MEDROL DOSEPAK) 4 MG tablet   busPIRone (BUSPAR) 10 MG tablet   citalopram (CELEXA) 20 MG tablet   cyclobenzaprine (FLEXERIL) 5 MG tablet         Review of Systems  All other systems reviewed and are negative.    Physical Exam   BP: (!) 129/91  Pulse: 144  Heart Rate: 114  Temp: 97.7  F (36.5  C)  Resp: 20  Height: 154.9 cm (5' 1\")  Weight: 45.4 kg (100 lb)  SpO2: 100 %      Physical Exam  Nontoxic appearing no respiratory distress alert and oriented ×3  Head atraumatic normocephalic  Conjunctiva noninjected, oropharynx moist without lesions or erythema  No cervical adenopathy   Neck supple full active painless range of motion  Lungs clear to auscultation  Heart regular no murmur  Abdomen soft nontender bowel sounds positive no masses or HSM  Strength and sensation grossly intact throughout the extremities, gait and station normal  Speech is fluent, good eye contact, thought processes are rational  Lower extremities without swelling, redness or tenderness  Pedal pulses symmetrical and strong    ED Course     ED Course     Procedures  EKG time 1010, symptoms chest pressure, sinus tachycardia rate 108, axis intervals within normal, no acute ST-T wave changes, unchanged from previous, read by Dr. Efraín Davis       Critical Care time:  none               Results for orders placed or performed during the hospital encounter of 09/30/18 (from the past 24 hour(s))   CBC with platelets differential   Result Value Ref Range    WBC 9.7 4.0 - 11.0 10e9/L    RBC Count 4.85 3.8 - 5.2 10e12/L    Hemoglobin 14.6 11.7 - 15.7 g/dL    Hematocrit 43.2 35.0 - 47.0 %    MCV 89 78 - 100 fl    MCH 30.1 26.5 - 33.0 pg    MCHC 33.8 31.5 - 36.5 g/dL    RDW 10.9 10.0 - 15.0 %    Platelet Count 227 150 - 450 10e9/L    Diff Method Automated Method     % Neutrophils 51.4 %    % Lymphocytes 37.8 %    % Monocytes 9.1 %    % Eosinophils 1.1 %    % Basophils 0.3 %    % Immature " Granulocytes 0.3 %    Nucleated RBCs 0 0 /100    Absolute Neutrophil 5.0 1.6 - 8.3 10e9/L    Absolute Lymphocytes 3.7 0.8 - 5.3 10e9/L    Absolute Monocytes 0.9 0.0 - 1.3 10e9/L    Absolute Eosinophils 0.1 0.0 - 0.7 10e9/L    Absolute Basophils 0.0 0.0 - 0.2 10e9/L    Abs Immature Granulocytes 0.0 0 - 0.4 10e9/L    Absolute Nucleated RBC 0.0    Comprehensive metabolic panel   Result Value Ref Range    Sodium 141 133 - 144 mmol/L    Potassium 3.5 3.4 - 5.3 mmol/L    Chloride 106 94 - 109 mmol/L    Carbon Dioxide 27 20 - 32 mmol/L    Anion Gap 8 3 - 14 mmol/L    Glucose 124 (H) 70 - 99 mg/dL    Urea Nitrogen 18 7 - 30 mg/dL    Creatinine 0.77 0.52 - 1.04 mg/dL    GFR Estimate >90 >60 mL/min/1.7m2    GFR Estimate If Black >90 >60 mL/min/1.7m2    Calcium 8.8 8.5 - 10.1 mg/dL    Bilirubin Total 0.4 0.2 - 1.3 mg/dL    Albumin 4.1 3.4 - 5.0 g/dL    Protein Total 7.7 6.8 - 8.8 g/dL    Alkaline Phosphatase 60 40 - 150 U/L    ALT 11 0 - 50 U/L    AST 17 0 - 45 U/L   D dimer quantitative   Result Value Ref Range    D Dimer <0.3 0.0 - 0.50 ug/ml FEU   Troponin I   Result Value Ref Range    Troponin I ES <0.015 0.000 - 0.045 ug/L   TSH with free T4 reflex   Result Value Ref Range    TSH 2.46 0.40 - 4.00 mU/L   XR Chest 2 Views    Narrative    CHEST TWO VIEWS September 30, 2018 12:10 PM     HISTORY: Substernal chest pressure and palpitations/tachycardia.     COMPARISON: Two view chest x-ray 3/27/2016.    FINDINGS: The cardiac silhouette, pulmonary vasculature, lungs and  pleural spaces are within normal limits.      Impression    IMPRESSION: Clear lungs. The cardiac silhouette, pulmonary  vasculature, lungs and pleural spaces are within normal limits.    HEATHER ZAMORA MD       Medications - No data to display    Assessments & Plan (with Medical Decision Making)  22-year-old female presents with neck right shoulder pain substernal pain.  Usual differential considered including but not limited to acute coronary syndrome, pulmonary  embolism, pneumothorax, thoracic aortic dissection, pneumonia versus other.  She is caring around 25 pound plus twin babies which is likely exacerbating her right shoulder and neck pain.  She is currently on prednisone question causing some tachycardia, her heart rate initially 140s, 110s during stay.   ECG unremarkable no acute ischemic change, troponin, d-dimer, TSH, CBC, CMP all within normal limits chest x-ray by my read as well as radiology is negative for acute finding.    Etiology of discomfort remains undetermined, may represent radiation from right neck/shoulder, may be anxiety component, does not appear to represent GI.  Watchful waiting appropriate.  Plenty fluids.  Tylenol for discomfort, discussed stretching and strengthening trapezius.  Return here for fever, shortness of air or any other concern     I have reviewed the nursing notes.    I have reviewed the findings, diagnosis, plan and need for follow up with the patient.       Discharge Medication List as of 9/30/2018  1:17 PM          Final diagnoses:   Chest pain, unspecified type   Strain of trapezius muscle, right, initial encounter       9/30/2018   [facility] EMERGENCY DEPARTMENT     Efraín Davis MD  09/30/18 1600

## 2018-09-30 NOTE — ED AVS SNAPSHOT
[facility] Emergency Department    [address] [facility] [address]    Phone:  [phone]    Fax:  [phone]                                       Rocío Catherine   MRN: [id number]    Department:  [facility] Emergency Department   Date of Visit:  9/30/2018           Patient Information     Date Of Birth          1995        Your diagnoses for this visit were:     Chest pain, unspecified type     Strain of trapezius muscle, right, initial encounter        You were seen by Efraín Davis MD.      Follow-up Information     Follow up with Yessy yLn APRN CNP.    Specialty:  Nurse Practitioner    Contact information:    [address] [facility] [id number] [phone]          Discharge Instructions          *CHEST PAIN, UNCERTAIN CAUSE    Based on your exam today, the exact cause of your chest pain is not certain. Your condition does not seem serious at this time, and your pain does not appear to be coming from your heart. However, sometimes the signs of a serious problem take more time to appear. Therefore, watch for the warning signs listed below.  HOME CARE:    1. Rest today and avoid strenuous activity.  2. Take any prescribed medicine as directed.  FOLLOW UP with your doctor in 1-3 days.   GET PROMPT MEDICAL ATTENTION if any of the following occur:    A change in the type of pain: if it feels different, becomes more severe, lasts longer, or begins to spread into your shoulder, arm, neck, jaw or back    Shortness of breath or increased pain with breathing    Weakness, dizziness, or fainting    Cough with blood or dark colored sputum (phlegm)    Fever over 101  F (38.3  C)    Swelling, pain or redness in one leg    7478-7007 [employer]. [address] [place] [id number]. All rights reserved. This information is not intended as a substitute for professional medical care. Always follow your healthcare professional's instructions.  This information has been  modified by your health care provider with permission from the publisher.      24 Hour Appointment Hotline       To make an appointment at any [facility], call [phone] ([phone]). If you don't have a family doctor or clinic, we will help you find one. [facility] are conveniently located to serve the needs of you and your family.             Review of your medicines      Our records show that you are taking the medicines listed below. If these are incorrect, please call your family doctor or clinic.        Dose / Directions Last dose taken    acetaminophen 325 MG tablet   Commonly known as:  TYLENOL   Dose:  975 mg        Take 975 mg by mouth every 8 hours as needed for mild pain   Refills:  0        busPIRone 10 MG tablet   Commonly known as:  BUSPAR   Dose:  10 mg   Quantity:  90 tablet        Take 1 tablet (10 mg) by mouth 3 times daily   Refills:  1        citalopram 20 MG tablet   Commonly known as:  celeXA   Quantity:  30 tablet        Take 1/2 tablet (10 mg) for 1-2 weeks, then increase to 1 tablet orally daily   Refills:  1        cyclobenzaprine 5 MG tablet   Commonly known as:  FLEXERIL   Dose:  5 mg   Quantity:  42 tablet        Take 1 tablet (5 mg) by mouth nightly as needed for muscle spasms   Refills:  0        methylPREDNISolone 4 MG tablet   Commonly known as:  MEDROL DOSEPAK   Quantity:  21 tablet        Follow package instructions   Refills:  0                Procedures and tests performed during your visit     CBC with platelets differential    Comprehensive metabolic panel    D dimer quantitative    EKG 12-lead, tracing only    Peripheral IV catheter    TSH with free T4 reflex    Troponin I    XR Chest 2 Views      Orders Needing Specimen Collection     None      Pending Results     No orders found from 9/28/2018 to 10/1/2018.            Pending Culture Results     No orders found from 9/28/2018 to 10/1/2018.            Pending Results Instructions     If you had any lab  results that were not finalized at the time of your Discharge, you can call the ED Lab Result RN at [phone]. You will be contacted by this team for any positive Lab results or changes in treatment. The nurses are available 7 days a week from 10A to 6:30P.  You can leave a message 24 hours per day and they will return your call.        Test Results From Your Hospital Stay        9/30/2018 10:39 AM      Component Results     Component Value Ref Range & Units Status    WBC 9.7 4.0 - 11.0 10e9/L Final    RBC Count 4.85 3.8 - 5.2 10e12/L Final    Hemoglobin 14.6 11.7 - 15.7 g/dL Final    Hematocrit 43.2 35.0 - 47.0 % Final    MCV 89 78 - 100 fl Final    MCH 30.1 26.5 - 33.0 pg Final    MCHC 33.8 31.5 - 36.5 g/dL Final    RDW 10.9 10.0 - 15.0 % Final    Platelet Count 227 150 - 450 10e9/L Final    Diff Method Automated Method  Final    % Neutrophils 51.4 % Final    % Lymphocytes 37.8 % Final    % Monocytes 9.1 % Final    % Eosinophils 1.1 % Final    % Basophils 0.3 % Final    % Immature Granulocytes 0.3 % Final    Nucleated RBCs 0 0 /100 Final    Absolute Neutrophil 5.0 1.6 - 8.3 10e9/L Final    Absolute Lymphocytes 3.7 0.8 - 5.3 10e9/L Final    Absolute Monocytes 0.9 0.0 - 1.3 10e9/L Final    Absolute Eosinophils 0.1 0.0 - 0.7 10e9/L Final    Absolute Basophils 0.0 0.0 - 0.2 10e9/L Final    Abs Immature Granulocytes 0.0 0 - 0.4 10e9/L Final    Absolute Nucleated RBC 0.0  Final         9/30/2018 10:54 AM      Component Results     Component Value Ref Range & Units Status    Sodium 141 133 - 144 mmol/L Final    Potassium 3.5 3.4 - 5.3 mmol/L Final    Chloride 106 94 - 109 mmol/L Final    Carbon Dioxide 27 20 - 32 mmol/L Final    Anion Gap 8 3 - 14 mmol/L Final    Glucose 124 (H) 70 - 99 mg/dL Final    Urea Nitrogen 18 7 - 30 mg/dL Final    Creatinine 0.77 0.52 - 1.04 mg/dL Final    GFR Estimate >90 >60 mL/min/1.7m2 Final    Non  GFR Calc    GFR Estimate If Black >90 >60 mL/min/1.7m2 Final    African  American GFR Calc    Calcium 8.8 8.5 - 10.1 mg/dL Final    Bilirubin Total 0.4 0.2 - 1.3 mg/dL Final    Albumin 4.1 3.4 - 5.0 g/dL Final    Protein Total 7.7 6.8 - 8.8 g/dL Final    Alkaline Phosphatase 60 40 - 150 U/L Final    ALT 11 0 - 50 U/L Final    AST 17 0 - 45 U/L Final         9/30/2018 11:13 AM      Component Results     Component Value Ref Range & Units Status    D Dimer <0.3 0.0 - 0.50 ug/ml FEU Final    This D-dimer assay is intended for use in conjunction with a clinical pretest   probability assessment model to exclude pulmonary embolism (PE) and deep   venous thrombosis (DVT) in outpatients suspected of PE or DVT. The cut-off   value is 0.5 ug/mL FEU.           9/30/2018 11:16 AM      Component Results     Component Value Ref Range & Units Status    Troponin I ES <0.015 0.000 - 0.045 ug/L Final    The 99th percentile for upper reference range is 0.045 ug/L.  Troponin values   in the range of 0.045 - 0.120 ug/L may be associated with risks of adverse   clinical events.           9/30/2018 12:34 PM      Narrative     CHEST TWO VIEWS September 30, 2018 12:10 PM     HISTORY: Substernal chest pressure and palpitations/tachycardia.     COMPARISON: Two view chest x-ray 3/27/2016.    FINDINGS: The cardiac silhouette, pulmonary vasculature, lungs and  pleural spaces are within normal limits.        Impression     IMPRESSION: Clear lungs. The cardiac silhouette, pulmonary  vasculature, lungs and pleural spaces are within normal limits.    HEATHER ZAMORA MD         9/30/2018 12:52 PM      Component Results     Component Value Ref Range & Units Status    TSH 2.46 0.40 - 4.00 mU/L Final                Thank you for choosing [facility]       Thank you for choosing [facility] for your care. Our goal is always to provide you with excellent care. Hearing back from our patients is one way we can continue to improve our services. Please take a few minutes to complete the written survey that you may receive in the mail  after you visit with us. Thank you!        [facility] Information     [facility] gives you secure access to your electronic health record. If you see a primary care provider, you can also send messages to your care team and make appointments. If you have questions, please call your primary care clinic.  If you do not have a primary care provider, please call [phone] and they will assist you.        Care EveryWhere ID     This is your Care EveryWhere ID. This could be used by other organizations to access your [facility] medical records  [id number]        Equal Access to Services     IRVIN CUMMINS : Reyes Valdez, rozina reynoso, ermelinda herbert, amrita mackenzie . So [facility] [phone].    ATENCIÓN: Si habla español, tiene a beltrán disposición servicios gratuitos de asistencia lingüística. Aileename al [phone].    We comply with applicable federal civil rights laws and [place] laws. We do not discriminate on the basis of race, color, national origin, age, disability, sex, sexual orientation, or gender identity.            After Visit Summary       This is your record. Keep this with you and show to your community pharmacist(s) and doctor(s) at your next visit.

## 2018-09-30 NOTE — ED NOTES
Patient is back from imaging. Still having chest discomfort, unchanged from arrival. Patient does not appear to be in distress. Monitors applied. Call light within reach.

## 2019-01-05 ENCOUNTER — NURSE TRIAGE (OUTPATIENT)
Dept: NURSING | Facility: CLINIC | Age: 24
End: 2019-01-05

## 2019-01-05 NOTE — TELEPHONE ENCOUNTER
Since 31st a head cold started and yesterday could barely get out of bed and is very tired.  Denies cough.  Slight runny nose.  Headache on and off and pain in upper back in right shoulder and down arm.  Rocío has not been eating and is nauseated and exhausted.  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      Reason for Disposition    Nausea persists > 1 week    Additional Information    Negative: Shock suspected (e.g., cold/pale/clammy skin, too weak to stand, low BP, rapid pulse)    Negative: Sounds like a life-threatening emergency to the triager    Negative: Unable to walk, or can only walk with assistance (e.g., requires support)    Negative: Difficulty breathing    Negative: [1] Insulin-dependent diabetes (Type I) AND [2] glucose > 400 mg/dl (22 mmol/l)    Negative: [1] Drinking very little AND [2] dehydration suspected (e.g., no urine > 12 hours, very dry mouth, very lightheaded)    Negative: Patient sounds very sick or weak to the triager    Negative: Fever > 104 F (40 C)    Negative: [1] Fever > 101 F (38.3 C) AND [2] age > 60    Negative: [1] Fever > 101 F (38.3 C) AND [2] bedridden (e.g., nursing home patient, CVA, chronic illness, recovering from surgery)    Negative: [1] Fever > 100.5 F (38.1 C) AND [2] diabetes mellitus or weak immune system (e.g., HIV positive, cancer chemo, splenectomy, organ transplant, chronic steroids)    Negative: Taking any of the following medications: digoxin (Lanoxin), lithium, theophylline, phenytoin (Dilantin)    Negative: Yellowish color of the skin or white of the eye (i.e., jaundice)    Negative: Fever present > 3 days (72 hours)    Negative: Receiving cancer chemotherapy medication    Negative: Taking prescription medication that could cause nausea (e.g., narcotics/opiates, antibiotics, OCPs, many others)    Protocols used: NAUSEA-ADULT-

## 2019-01-13 ENCOUNTER — MYC MEDICAL ADVICE (OUTPATIENT)
Dept: FAMILY MEDICINE | Facility: CLINIC | Age: 24
End: 2019-01-13

## 2019-01-14 ENCOUNTER — MYC MEDICAL ADVICE (OUTPATIENT)
Dept: FAMILY MEDICINE | Facility: CLINIC | Age: 24
End: 2019-01-14

## 2019-01-14 NOTE — TELEPHONE ENCOUNTER
It is recommended that patient taper off of these medications during pregnancy . She can schedule an OV to discuss if she needs to

## 2019-01-14 NOTE — TELEPHONE ENCOUNTER
Mary,    Patient sent my chart message about her anxiety medications and if she can continue to take them as she is trying to get pregnant.  Patient is on buspar, celexa. Please advise. Michelle MEANS RN

## 2019-02-06 ENCOUNTER — HOSPITAL ENCOUNTER (EMERGENCY)
Facility: CLINIC | Age: 24
Discharge: HOME | End: 2019-02-07
Attending: FAMILY MEDICINE | Admitting: FAMILY MEDICINE
Payer: COMMERCIAL

## 2019-02-06 ENCOUNTER — NURSE TRIAGE (OUTPATIENT)
Dept: NURSING | Facility: CLINIC | Age: 24
End: 2019-02-06

## 2019-02-06 DIAGNOSIS — G43.809 OTHER MIGRAINE WITHOUT STATUS MIGRAINOSUS, NOT INTRACTABLE: ICD-10-CM

## 2019-02-06 DIAGNOSIS — G44.209 TENSION HEADACHE: ICD-10-CM

## 2019-02-06 DIAGNOSIS — D68.51 FACTOR 5 LEIDEN MUTATION, HETEROZYGOUS (H): ICD-10-CM

## 2019-02-06 LAB — HCG SERPL QL: NEGATIVE

## 2019-02-06 PROCEDURE — 96374 THER/PROPH/DIAG INJ IV PUSH: CPT

## 2019-02-06 PROCEDURE — 84703 CHORIONIC GONADOTROPIN ASSAY: CPT | Performed by: FAMILY MEDICINE

## 2019-02-06 PROCEDURE — 96376 TX/PRO/DX INJ SAME DRUG ADON: CPT

## 2019-02-06 PROCEDURE — 25000131 ZZH RX MED GY IP 250 OP 636 PS 637: Performed by: FAMILY MEDICINE

## 2019-02-06 PROCEDURE — 25000128 H RX IP 250 OP 636: Performed by: FAMILY MEDICINE

## 2019-02-06 PROCEDURE — 99284 EMERGENCY DEPT VISIT MOD MDM: CPT | Mod: Z6 | Performed by: FAMILY MEDICINE

## 2019-02-06 PROCEDURE — 96361 HYDRATE IV INFUSION ADD-ON: CPT

## 2019-02-06 PROCEDURE — 96375 TX/PRO/DX INJ NEW DRUG ADDON: CPT

## 2019-02-06 PROCEDURE — 99285 EMERGENCY DEPT VISIT HI MDM: CPT | Mod: 25

## 2019-02-06 RX ORDER — METOCLOPRAMIDE HYDROCHLORIDE 5 MG/ML
10 INJECTION INTRAMUSCULAR; INTRAVENOUS ONCE
Status: COMPLETED | OUTPATIENT
Start: 2019-02-06 | End: 2019-02-06

## 2019-02-06 RX ORDER — DEXAMETHASONE SODIUM PHOSPHATE 10 MG/ML
10 INJECTION, SOLUTION INTRAMUSCULAR; INTRAVENOUS ONCE
Status: COMPLETED | OUTPATIENT
Start: 2019-02-06 | End: 2019-02-06

## 2019-02-06 RX ORDER — KETOROLAC TROMETHAMINE 15 MG/ML
15 INJECTION, SOLUTION INTRAMUSCULAR; INTRAVENOUS ONCE
Status: COMPLETED | OUTPATIENT
Start: 2019-02-06 | End: 2019-02-06

## 2019-02-06 RX ORDER — DIPHENHYDRAMINE HYDROCHLORIDE 50 MG/ML
25 INJECTION INTRAMUSCULAR; INTRAVENOUS ONCE
Status: COMPLETED | OUTPATIENT
Start: 2019-02-06 | End: 2019-02-06

## 2019-02-06 RX ORDER — ONDANSETRON 4 MG/1
4 TABLET, ORALLY DISINTEGRATING ORAL ONCE
Status: COMPLETED | OUTPATIENT
Start: 2019-02-06 | End: 2019-02-06

## 2019-02-06 RX ORDER — SODIUM CHLORIDE, SODIUM LACTATE, POTASSIUM CHLORIDE, CALCIUM CHLORIDE 600; 310; 30; 20 MG/100ML; MG/100ML; MG/100ML; MG/100ML
1000 INJECTION, SOLUTION INTRAVENOUS CONTINUOUS
Status: DISCONTINUED | OUTPATIENT
Start: 2019-02-06 | End: 2019-02-07 | Stop reason: HOSPADM

## 2019-02-06 RX ADMIN — DEXAMETHASONE SODIUM PHOSPHATE 10 MG: 10 INJECTION, SOLUTION INTRAMUSCULAR; INTRAVENOUS at 22:25

## 2019-02-06 RX ADMIN — KETOROLAC TROMETHAMINE 15 MG: 15 INJECTION, SOLUTION INTRAMUSCULAR; INTRAVENOUS at 22:29

## 2019-02-06 RX ADMIN — KETOROLAC TROMETHAMINE 15 MG: 15 INJECTION, SOLUTION INTRAMUSCULAR; INTRAVENOUS at 23:52

## 2019-02-06 RX ADMIN — METOCLOPRAMIDE 10 MG: 5 INJECTION, SOLUTION INTRAMUSCULAR; INTRAVENOUS at 22:26

## 2019-02-06 RX ADMIN — DIPHENHYDRAMINE HYDROCHLORIDE 25 MG: 50 INJECTION, SOLUTION INTRAMUSCULAR; INTRAVENOUS at 22:25

## 2019-02-06 RX ADMIN — SODIUM CHLORIDE, POTASSIUM CHLORIDE, SODIUM LACTATE AND CALCIUM CHLORIDE 1000 ML: 600; 310; 30; 20 INJECTION, SOLUTION INTRAVENOUS at 22:28

## 2019-02-06 RX ADMIN — ONDANSETRON 4 MG: 4 TABLET, ORALLY DISINTEGRATING ORAL at 20:14

## 2019-02-06 ASSESSMENT — ENCOUNTER SYMPTOMS
COUGH: 0
FREQUENCY: 0
PHOTOPHOBIA: 1
SORE THROAT: 0
CHILLS: 0
WHEEZING: 0
ABDOMINAL PAIN: 0
CONSTIPATION: 0
BLOOD IN STOOL: 0
SINUS PRESSURE: 0
NAUSEA: 1
FEVER: 0
PALPITATIONS: 0
SHORTNESS OF BREATH: 0
DIAPHORESIS: 0
DYSURIA: 0
VOMITING: 0
DIARRHEA: 0
HEADACHES: 1

## 2019-02-06 ASSESSMENT — MIFFLIN-ST. JEOR: SCORE: 1154.58

## 2019-02-06 NOTE — ED AVS SNAPSHOT
[facility] Emergency Department  [address] [facility] [address]  Phone:  [phone]  Fax:  [phone]                                    Rocío Catherine   MRN: [id number]    Department:  [facility] Emergency Department   Date of Visit:  2/6/2019           After Visit Summary Signature Page    I have received my discharge instructions, and my questions have been answered. I have discussed any challenges I see with this plan with the nurse or doctor.    ..........................................................................................................................................  Patient/Patient Representative Signature      ..........................................................................................................................................  Patient Representative Print Name and Relationship to Patient    ..................................................               ................................................  Date                                   Time    ..........................................................................................................................................  Reviewed by Signature/Title    ...................................................              ..............................................  Date                                               Time          22EPIC Rev 08/18

## 2019-02-07 ENCOUNTER — APPOINTMENT (OUTPATIENT)
Dept: MRI IMAGING | Facility: CLINIC | Age: 24
End: 2019-02-07
Attending: EMERGENCY MEDICINE
Payer: COMMERCIAL

## 2019-02-07 ENCOUNTER — TELEPHONE (OUTPATIENT)
Dept: FAMILY MEDICINE | Facility: CLINIC | Age: 24
End: 2019-02-07

## 2019-02-07 ENCOUNTER — HOSPITAL ENCOUNTER (OUTPATIENT)
Facility: CLINIC | Age: 24
Setting detail: OBSERVATION
LOS: 1 days | Discharge: HOME | End: 2019-02-08
Attending: EMERGENCY MEDICINE
Payer: COMMERCIAL

## 2019-02-07 ENCOUNTER — APPOINTMENT (OUTPATIENT)
Dept: CT IMAGING | Facility: CLINIC | Age: 24
End: 2019-02-07
Attending: EMERGENCY MEDICINE
Payer: COMMERCIAL

## 2019-02-07 VITALS
RESPIRATION RATE: 16 BRPM | HEIGHT: 61 IN | DIASTOLIC BLOOD PRESSURE: 64 MMHG | BODY MASS INDEX: 19.45 KG/M2 | SYSTOLIC BLOOD PRESSURE: 98 MMHG | TEMPERATURE: 97.9 F | HEART RATE: 75 BPM | WEIGHT: 103 LBS | OXYGEN SATURATION: 98 %

## 2019-02-07 DIAGNOSIS — R00.0 SINUS TACHYCARDIA: ICD-10-CM

## 2019-02-07 DIAGNOSIS — R51.9 NONINTRACTABLE HEADACHE, UNSPECIFIED CHRONICITY PATTERN, UNSPECIFIED HEADACHE TYPE: ICD-10-CM

## 2019-02-07 PROBLEM — F33.40 RECURRENT MAJOR DEPRESSIVE DISORDER, IN REMISSION (H): Status: ACTIVE | Noted: 2018-02-27

## 2019-02-07 PROBLEM — F41.9 ANXIETY: Status: ACTIVE | Noted: 2018-02-27

## 2019-02-07 PROBLEM — D68.51 FACTOR 5 LEIDEN MUTATION, HETEROZYGOUS (H): Status: ACTIVE | Noted: 2018-02-27

## 2019-02-07 LAB
ALBUMIN UR-MCNC: NEGATIVE MG/DL
ANION GAP SERPL CALCULATED.3IONS-SCNC: 9 MMOL/L (ref 3–14)
APPEARANCE CSF: CLEAR
APPEARANCE UR: ABNORMAL
BACTERIA #/AREA URNS HPF: ABNORMAL /HPF
BASOPHILS # BLD AUTO: 0 10E9/L (ref 0–0.2)
BASOPHILS NFR BLD AUTO: 0.2 %
BILIRUB UR QL STRIP: NEGATIVE
BUN SERPL-MCNC: 17 MG/DL (ref 7–30)
CALCIUM SERPL-MCNC: 8.7 MG/DL (ref 8.5–10.1)
CHLORIDE SERPL-SCNC: 107 MMOL/L (ref 94–109)
CO2 SERPL-SCNC: 24 MMOL/L (ref 20–32)
COLOR CSF: COLORLESS
COLOR UR AUTO: YELLOW
CREAT SERPL-MCNC: 0.72 MG/DL (ref 0.52–1.04)
DIFFERENTIAL METHOD BLD: ABNORMAL
EOSINOPHIL # BLD AUTO: 0 10E9/L (ref 0–0.7)
EOSINOPHIL NFR BLD AUTO: 0 %
ERYTHROCYTE [DISTWIDTH] IN BLOOD BY AUTOMATED COUNT: 11.1 % (ref 10–15)
FLUAV+FLUBV AG SPEC QL: NEGATIVE
FLUAV+FLUBV AG SPEC QL: NEGATIVE
GFR SERPL CREATININE-BSD FRML MDRD: >90 ML/MIN/{1.73_M2}
GLUCOSE CSF-MCNC: 73 MG/DL (ref 40–70)
GLUCOSE SERPL-MCNC: 113 MG/DL (ref 70–99)
GLUCOSE UR STRIP-MCNC: NEGATIVE MG/DL
GRAM STN SPEC: NORMAL
HCT VFR BLD AUTO: 40.6 % (ref 35–47)
HGB BLD-MCNC: 13.9 G/DL (ref 11.7–15.7)
HGB UR QL STRIP: NEGATIVE
IMM GRANULOCYTES # BLD: 0.1 10E9/L (ref 0–0.4)
IMM GRANULOCYTES NFR BLD: 0.3 %
KETONES UR STRIP-MCNC: 20 MG/DL
LEUKOCYTE ESTERASE UR QL STRIP: NEGATIVE
LYMPHOCYTES # BLD AUTO: 1.6 10E9/L (ref 0.8–5.3)
LYMPHOCYTES NFR BLD AUTO: 11.2 %
MCH RBC QN AUTO: 30.4 PG (ref 26.5–33)
MCHC RBC AUTO-ENTMCNC: 34.2 G/DL (ref 31.5–36.5)
MCV RBC AUTO: 89 FL (ref 78–100)
MONOCYTES # BLD AUTO: 1 10E9/L (ref 0–1.3)
MONOCYTES NFR BLD AUTO: 6.5 %
MUCOUS THREADS #/AREA URNS LPF: PRESENT /LPF
NEUTROPHILS # BLD AUTO: 11.9 10E9/L (ref 1.6–8.3)
NEUTROPHILS NFR BLD AUTO: 81.8 %
NITRATE UR QL: NEGATIVE
NRBC # BLD AUTO: 0 10*3/UL
NRBC BLD AUTO-RTO: 0 /100
PH UR STRIP: 6 PH (ref 5–7)
PLATELET # BLD AUTO: 248 10E9/L (ref 150–450)
POTASSIUM SERPL-SCNC: 4 MMOL/L (ref 3.4–5.3)
PROT CSF-MCNC: 24 MG/DL (ref 15–60)
RBC # BLD AUTO: 4.57 10E12/L (ref 3.8–5.2)
RBC # CSF MANUAL: 0 /UL (ref 0–2)
RBC #/AREA URNS AUTO: 1 /HPF (ref 0–2)
SODIUM SERPL-SCNC: 140 MMOL/L (ref 133–144)
SOURCE: ABNORMAL
SP GR UR STRIP: 1.02 (ref 1–1.03)
SPECIMEN SOURCE: NORMAL
SPECIMEN SOURCE: NORMAL
SPECIMEN VOL CSF: 1 ML
SQUAMOUS #/AREA URNS AUTO: 7 /HPF (ref 0–1)
TSH SERPL DL<=0.005 MIU/L-ACNC: 0.6 MU/L (ref 0.4–4)
TUBE # CSF: 4 #
UROBILINOGEN UR STRIP-MCNC: 0 MG/DL (ref 0–2)
WBC # BLD AUTO: 14.6 10E9/L (ref 4–11)
WBC # CSF MANUAL: 0 /UL (ref 0–5)
WBC #/AREA URNS AUTO: 1 /HPF (ref 0–5)

## 2019-02-07 PROCEDURE — 87015 SPECIMEN INFECT AGNT CONCNTJ: CPT | Performed by: EMERGENCY MEDICINE

## 2019-02-07 PROCEDURE — 70553 MRI BRAIN STEM W/O & W/DYE: CPT

## 2019-02-07 PROCEDURE — 25000128 H RX IP 250 OP 636: Performed by: EMERGENCY MEDICINE

## 2019-02-07 PROCEDURE — 84157 ASSAY OF PROTEIN OTHER: CPT | Performed by: EMERGENCY MEDICINE

## 2019-02-07 PROCEDURE — 96376 TX/PRO/DX INJ SAME DRUG ADON: CPT

## 2019-02-07 PROCEDURE — 99285 EMERGENCY DEPT VISIT HI MDM: CPT | Mod: 25 | Performed by: EMERGENCY MEDICINE

## 2019-02-07 PROCEDURE — A9585 GADOBUTROL INJECTION: HCPCS | Performed by: EMERGENCY MEDICINE

## 2019-02-07 PROCEDURE — 87804 INFLUENZA ASSAY W/OPTIC: CPT | Performed by: EMERGENCY MEDICINE

## 2019-02-07 PROCEDURE — 96374 THER/PROPH/DIAG INJ IV PUSH: CPT

## 2019-02-07 PROCEDURE — 86140 C-REACTIVE PROTEIN: CPT | Performed by: PHYSICIAN ASSISTANT

## 2019-02-07 PROCEDURE — 99285 EMERGENCY DEPT VISIT HI MDM: CPT | Mod: 25

## 2019-02-07 PROCEDURE — 80048 BASIC METABOLIC PNL TOTAL CA: CPT | Performed by: EMERGENCY MEDICINE

## 2019-02-07 PROCEDURE — 84443 ASSAY THYROID STIM HORMONE: CPT | Performed by: EMERGENCY MEDICINE

## 2019-02-07 PROCEDURE — 85025 COMPLETE CBC W/AUTO DIFF WBC: CPT | Performed by: EMERGENCY MEDICINE

## 2019-02-07 PROCEDURE — 36415 COLL VENOUS BLD VENIPUNCTURE: CPT

## 2019-02-07 PROCEDURE — 96361 HYDRATE IV INFUSION ADD-ON: CPT

## 2019-02-07 PROCEDURE — 25000125 ZZHC RX 250: Performed by: EMERGENCY MEDICINE

## 2019-02-07 PROCEDURE — 71260 CT THORAX DX C+: CPT

## 2019-02-07 PROCEDURE — 87205 SMEAR GRAM STAIN: CPT | Performed by: EMERGENCY MEDICINE

## 2019-02-07 PROCEDURE — 25000125 ZZHC RX 250

## 2019-02-07 PROCEDURE — 96375 TX/PRO/DX INJ NEW DRUG ADDON: CPT

## 2019-02-07 PROCEDURE — 25000132 ZZH RX MED GY IP 250 OP 250 PS 637: Performed by: EMERGENCY MEDICINE

## 2019-02-07 PROCEDURE — 89050 BODY FLUID CELL COUNT: CPT | Performed by: EMERGENCY MEDICINE

## 2019-02-07 PROCEDURE — 81001 URINALYSIS AUTO W/SCOPE: CPT | Performed by: EMERGENCY MEDICINE

## 2019-02-07 PROCEDURE — 83605 ASSAY OF LACTIC ACID: CPT

## 2019-02-07 PROCEDURE — 62270 DX LMBR SPI PNXR: CPT

## 2019-02-07 PROCEDURE — 84484 ASSAY OF TROPONIN QUANT: CPT | Performed by: PHYSICIAN ASSISTANT

## 2019-02-07 PROCEDURE — 70546 MR ANGIOGRAPH HEAD W/O&W/DYE: CPT

## 2019-02-07 PROCEDURE — 99220 ZZC INITIAL OBSERVATION CARE,LEVL III: CPT | Performed by: PHYSICIAN ASSISTANT

## 2019-02-07 PROCEDURE — 82945 GLUCOSE OTHER FLUID: CPT | Performed by: EMERGENCY MEDICINE

## 2019-02-07 PROCEDURE — 25500064 ZZH RX 255 OP 636: Performed by: EMERGENCY MEDICINE

## 2019-02-07 PROCEDURE — 87070 CULTURE OTHR SPECIMN AEROBIC: CPT | Performed by: EMERGENCY MEDICINE

## 2019-02-07 RX ORDER — PROCHLORPERAZINE 25 MG
25 SUPPOSITORY, RECTAL RECTAL EVERY 12 HOURS PRN
Status: DISCONTINUED | OUTPATIENT
Start: 2019-02-07 | End: 2019-02-08 | Stop reason: HOSPADM

## 2019-02-07 RX ORDER — LIDOCAINE HYDROCHLORIDE 10 MG/ML
INJECTION, SOLUTION EPIDURAL; INFILTRATION; INTRACAUDAL; PERINEURAL
Status: COMPLETED
Start: 2019-02-07 | End: 2019-02-07

## 2019-02-07 RX ORDER — NALOXONE HYDROCHLORIDE 0.4 MG/ML
.1-.4 INJECTION, SOLUTION INTRAMUSCULAR; INTRAVENOUS; SUBCUTANEOUS
Status: DISCONTINUED | OUTPATIENT
Start: 2019-02-07 | End: 2019-02-08

## 2019-02-07 RX ORDER — HYDROMORPHONE HCL/0.9% NACL/PF 0.2MG/0.2
0.2 SYRINGE (ML) INTRAVENOUS
Status: DISCONTINUED | OUTPATIENT
Start: 2019-02-07 | End: 2019-02-08

## 2019-02-07 RX ORDER — ONDANSETRON 2 MG/ML
4 INJECTION INTRAMUSCULAR; INTRAVENOUS EVERY 6 HOURS PRN
Status: DISCONTINUED | OUTPATIENT
Start: 2019-02-07 | End: 2019-02-08 | Stop reason: HOSPADM

## 2019-02-07 RX ORDER — NALOXONE HYDROCHLORIDE 0.4 MG/ML
.1-.4 INJECTION, SOLUTION INTRAMUSCULAR; INTRAVENOUS; SUBCUTANEOUS
Status: DISCONTINUED | OUTPATIENT
Start: 2019-02-07 | End: 2019-02-08 | Stop reason: HOSPADM

## 2019-02-07 RX ORDER — ONDANSETRON 2 MG/ML
4 INJECTION INTRAMUSCULAR; INTRAVENOUS ONCE
Status: COMPLETED | OUTPATIENT
Start: 2019-02-07 | End: 2019-02-07

## 2019-02-07 RX ORDER — GADOBUTROL 604.72 MG/ML
5 INJECTION INTRAVENOUS ONCE
Status: COMPLETED | OUTPATIENT
Start: 2019-02-07 | End: 2019-02-07

## 2019-02-07 RX ORDER — SODIUM CHLORIDE 9 MG/ML
INJECTION, SOLUTION INTRAVENOUS CONTINUOUS
Status: DISCONTINUED | OUTPATIENT
Start: 2019-02-07 | End: 2019-02-07

## 2019-02-07 RX ORDER — ACETAMINOPHEN 325 MG/1
650 TABLET ORAL EVERY 4 HOURS PRN
Status: DISCONTINUED | OUTPATIENT
Start: 2019-02-07 | End: 2019-02-08 | Stop reason: HOSPADM

## 2019-02-07 RX ORDER — ACETAMINOPHEN 500 MG
1000 TABLET ORAL ONCE
Status: COMPLETED | OUTPATIENT
Start: 2019-02-07 | End: 2019-02-07

## 2019-02-07 RX ORDER — SODIUM CHLORIDE, SODIUM LACTATE, POTASSIUM CHLORIDE, CALCIUM CHLORIDE 600; 310; 30; 20 MG/100ML; MG/100ML; MG/100ML; MG/100ML
INJECTION, SOLUTION INTRAVENOUS CONTINUOUS
Status: DISCONTINUED | OUTPATIENT
Start: 2019-02-08 | End: 2019-02-08

## 2019-02-07 RX ORDER — IOPAMIDOL 755 MG/ML
58 INJECTION, SOLUTION INTRAVASCULAR ONCE
Status: COMPLETED | OUTPATIENT
Start: 2019-02-07 | End: 2019-02-07

## 2019-02-07 RX ORDER — IBUPROFEN 200 MG
400 TABLET ORAL EVERY 6 HOURS PRN
Status: ON HOLD | COMMUNITY
End: 2019-02-08

## 2019-02-07 RX ORDER — OXYCODONE HYDROCHLORIDE 5 MG/1
5-10 TABLET ORAL
Status: DISCONTINUED | OUTPATIENT
Start: 2019-02-07 | End: 2019-02-08

## 2019-02-07 RX ORDER — HYDROMORPHONE HYDROCHLORIDE 1 MG/ML
0.5 INJECTION, SOLUTION INTRAMUSCULAR; INTRAVENOUS; SUBCUTANEOUS ONCE
Status: COMPLETED | OUTPATIENT
Start: 2019-02-07 | End: 2019-02-07

## 2019-02-07 RX ORDER — KETOROLAC TROMETHAMINE 15 MG/ML
15 INJECTION, SOLUTION INTRAMUSCULAR; INTRAVENOUS ONCE
Status: COMPLETED | OUTPATIENT
Start: 2019-02-07 | End: 2019-02-07

## 2019-02-07 RX ORDER — AMOXICILLIN 250 MG
2 CAPSULE ORAL 2 TIMES DAILY PRN
Status: DISCONTINUED | OUTPATIENT
Start: 2019-02-07 | End: 2019-02-08 | Stop reason: HOSPADM

## 2019-02-07 RX ORDER — PROCHLORPERAZINE MALEATE 10 MG
10 TABLET ORAL EVERY 6 HOURS PRN
Status: DISCONTINUED | OUTPATIENT
Start: 2019-02-07 | End: 2019-02-08 | Stop reason: HOSPADM

## 2019-02-07 RX ORDER — IBUPROFEN 600 MG/1
600 TABLET, FILM COATED ORAL EVERY 6 HOURS PRN
Status: DISCONTINUED | OUTPATIENT
Start: 2019-02-07 | End: 2019-02-08 | Stop reason: HOSPADM

## 2019-02-07 RX ORDER — AMOXICILLIN 250 MG
1 CAPSULE ORAL 2 TIMES DAILY PRN
Status: DISCONTINUED | OUTPATIENT
Start: 2019-02-07 | End: 2019-02-08 | Stop reason: HOSPADM

## 2019-02-07 RX ORDER — ONDANSETRON 4 MG/1
4 TABLET, ORALLY DISINTEGRATING ORAL EVERY 6 HOURS PRN
Status: DISCONTINUED | OUTPATIENT
Start: 2019-02-07 | End: 2019-02-08 | Stop reason: HOSPADM

## 2019-02-07 RX ADMIN — SODIUM CHLORIDE 89 ML: 9 INJECTION, SOLUTION INTRAVENOUS at 22:05

## 2019-02-07 RX ADMIN — ONDANSETRON 4 MG: 2 INJECTION INTRAMUSCULAR; INTRAVENOUS at 14:22

## 2019-02-07 RX ADMIN — KETOROLAC TROMETHAMINE 15 MG: 15 INJECTION, SOLUTION INTRAMUSCULAR; INTRAVENOUS at 14:24

## 2019-02-07 RX ADMIN — ONDANSETRON 4 MG: 2 INJECTION INTRAMUSCULAR; INTRAVENOUS at 22:46

## 2019-02-07 RX ADMIN — GADOBUTROL 5 ML: 604.72 INJECTION INTRAVENOUS at 15:30

## 2019-02-07 RX ADMIN — Medication 0.5 MG: at 20:27

## 2019-02-07 RX ADMIN — SODIUM CHLORIDE 1000 ML: 9 INJECTION, SOLUTION INTRAVENOUS at 17:47

## 2019-02-07 RX ADMIN — LIDOCAINE HYDROCHLORIDE 50 MG: 10 INJECTION, SOLUTION EPIDURAL; INFILTRATION; INTRACAUDAL; PERINEURAL at 18:30

## 2019-02-07 RX ADMIN — SODIUM CHLORIDE: 9 INJECTION, SOLUTION INTRAVENOUS at 22:46

## 2019-02-07 RX ADMIN — ACETAMINOPHEN 1000 MG: 500 TABLET, FILM COATED ORAL at 17:47

## 2019-02-07 RX ADMIN — IOPAMIDOL 58 ML: 755 INJECTION, SOLUTION INTRAVENOUS at 22:05

## 2019-02-07 RX ADMIN — SODIUM CHLORIDE 1000 ML: 9 INJECTION, SOLUTION INTRAVENOUS at 19:45

## 2019-02-07 ASSESSMENT — MIFFLIN-ST. JEOR
SCORE: 1154.58
SCORE: 1171.38

## 2019-02-07 NOTE — ED PROVIDER NOTES
History     Chief Complaint   Patient presents with     Headache     HPI  Rocío Catherine is a 23 year old female who presents with a history of factor V Leiden heterozygous, no major history of headaches in the past but over the last week she has had initially some neck spasm developing a circumferential headache and sometimes periocular headache and now with nausea vomiting and light sensitivity.  She also had a head injury that occurred after the onset of her headache about -on Saturday when she struck the frontal forehead when they were checking of a camper van.  There is no major injury with this she had no loss of consciousness.  She is not on anticoagulants and has no bleeding disorders.  She had no drainage from the ears or nose.  No changes in speech or swallowing or weakness.    Headache persisted with moderate intensity throughout the last week.  No associated changes in speech or swallowing or weakness.    Regarding her factor V Leiden she is a heterozygote and has never had a clot related to this.  She has no significant sinus related pain in the region of her cavernous sinus.  We did discuss the potential for cavernous sinus thrombosis and venous thrombosis intracranially.    She denies fever or neck stiffness.  She is tried home ibuprofen but it does become less effective she was using it quite frequently.    Allergies:  No Known Allergies    Problem List:    Patient Active Problem List    Diagnosis Date Noted     Recurrent major depressive disorder, in remission (H) 02/27/2018     Priority: Medium     Anxiety 02/27/2018     Priority: Medium     Factor 5 Leiden mutation, heterozygous (H) 02/27/2018     Priority: Medium        Past Medical History:    Past Medical History:   Diagnosis Date     Factor 5 Leiden mutation, heterozygous (H) 2/27/2018     Factor V Leiden (H)        Past Surgical History:    Past Surgical History:   Procedure Laterality Date     GYN SURGERY      c section     HEAD & NECK SURGERY  "     hemangioma       Family History:    Family History   Problem Relation Age of Onset     Other - See Comments Father         factor V     Other - See Comments Paternal Grandfather         factor V       Social History:  Marital Status:   [2]  Social History     Tobacco Use     Smoking status: Never Smoker     Smokeless tobacco: Never Used   Substance Use Topics     Alcohol use: Not on file     Drug use: Not on file        Medications:      acetaminophen (TYLENOL) 325 MG tablet   busPIRone (BUSPAR) 10 MG tablet   citalopram (CELEXA) 20 MG tablet   cyclobenzaprine (FLEXERIL) 5 MG tablet   methylPREDNISolone (MEDROL DOSEPAK) 4 MG tablet         Review of Systems   Constitutional: Negative for chills, diaphoresis and fever.   HENT: Negative for ear pain, sinus pressure and sore throat.    Eyes: Positive for photophobia. Negative for visual disturbance.   Respiratory: Negative for cough, shortness of breath and wheezing.    Cardiovascular: Negative for chest pain and palpitations.   Gastrointestinal: Positive for nausea. Negative for abdominal pain, blood in stool, constipation, diarrhea and vomiting.   Genitourinary: Negative for dysuria, frequency and urgency.   Skin: Negative for rash.   Neurological: Positive for headaches.   All other systems reviewed and are negative.      Physical Exam   BP: (!) 137/93  Pulse: 101  Temp: 97.9  F (36.6  C)  Resp: 18  Height: 154.9 cm (5' 1\")  Weight: 46.7 kg (103 lb)  SpO2: 100 %      Physical Exam   Constitutional: She appears distressed.   HENT:   Head: Atraumatic.   Mouth/Throat: Oropharynx is clear and moist.   Eyes: Conjunctivae and EOM are normal. Pupils are equal, round, and reactive to light.   Neck: Neck supple.   Cardiovascular: Normal rate, regular rhythm, normal heart sounds and intact distal pulses. Exam reveals no friction rub.   No murmur heard.  Pulmonary/Chest: Effort normal and breath sounds normal. No stridor. No respiratory distress. She has no " wheezes.   Abdominal: Soft. Bowel sounds are normal. She exhibits no distension and no mass. There is no tenderness. There is no guarding.   Musculoskeletal: She exhibits no edema.   Neurological: No cranial nerve deficit or sensory deficit. She exhibits normal muscle tone. Coordination normal.   Skin: No rash noted. She is not diaphoretic. No pallor.       ED Course        Procedures               Critical Care time:  none               No results found for this or any previous visit (from the past 24 hour(s)).    Medications   lactated ringers BOLUS 1,000 mL (not administered)     Followed by   lactated ringers infusion (not administered)   ketorolac (TORADOL) injection 15 mg (not administered)   dexamethasone PF (DECADRON) injection 10 mg (not administered)   metoclopramide (REGLAN) injection 10 mg (not administered)   diphenhydrAMINE (BENADRYL) injection 25 mg (not administered)   ondansetron (ZOFRAN-ODT) ODT tab 4 mg (4 mg Oral Given 2/6/19 2014)       Assessments & Plan (with Medical Decision Making)     MDM: Rocío Catherine is a 23 year old female who presented with a history of factor V Leiden and no major history of headaches although she had them on and off in the past but for the last week has had a headache generating from the occiput and generalizing and tightening around the scalp and now with nausea and light sensitivity and with symptoms that have been persistent despite frequent ibuprofen at home.  Her presenting vital signs are reassuring and she was afebrile with a supple neck.  No signs of head trauma and her headache could not been thunderclap.  She had no weakness in the extremities normal cranial nerve exam sensation.  Her coordination was also intact as was her speech and normal orientation.  She responded well to headache management as above and headache could resolve at the point of her discharge.  We had discussed the use of Decadron to help prevent rebound headaches as her headaches have gone  on for the last week and she agrees to this.  She had no serious red flags on the exam or history but does have a factor V Leiden - heterozygous.  We did discuss the risk of thrombosis.  She has no obvious associated fever, eye changes, diplopia to suggest cavernous sinus thrombosis.  She is however at risk for venous sinus thrombosis, albeit relatively low risk.  I did not see indications for head imaging for venogram tonight but I did give her precautions that for persistent or progressive headache it should be considered.  Precautions are given for return.  Home management as discussed.    I have reviewed the nursing notes.    I have reviewed the findings, diagnosis, plan and need for follow up with the patient.           Final diagnoses:   Tension headache - maintain neck range of motion. consider massage,   Other migraine without status migrainosus, not intractable - I suspect you had a tension headache that generalized to migraine.  The decadron given today should help prevent recurrence.  tylenol is less likely to cause rebound headaches, although ibuprofen is more effective.   Factor 5 Leiden mutation, heterozygous (H) - if headaches persist or worsen, return and consider ct or mri venogram to eval for venous thrombosis.       2/6/2019   [facility] EMERGENCY DEPARTMENT     Efraín Tripp MD  02/07/19 [id number]

## 2019-02-07 NOTE — TELEPHONE ENCOUNTER
Reason for call:  Patient reporting a symptom Tension Headache    Symptom or request: Took one dose of the antibiotic at 11:30pm last night DrElvia Said it would help with head ach pt. Said she was able to sleep but woke up with a really back head ache again. Pt. Is stating her calf are hurting.    Duration (how long have symptoms been present): went to ED 2/6/19    Have you been treated for this before? Yes    Phone Number patient can be reached at:  Home number on file [phone] (home)    Best Time:  any    Can we leave a detailed message on this number:  YES    Call taken on 2/7/2019 at 10:07 AM by Mahi Guan

## 2019-02-07 NOTE — TELEPHONE ENCOUNTER
Rocío Catherine is a 23 year old female who calls with persisting headache that is no better than when she left the ED yesterday and now she has bilateral calf pain and tingling.  Calves feel numb.  Hurts to stand.        NURSING ASSESSMENT:  Description:  Per above.  Calf symptoms are new.  Steroids are not reducing the headache.  Onset/duration:  One week, seen in ED yesterday  Precip. factors:  Factor 5 Leiden  Associated symptoms:  None new with exception of bilateral calf symptoms  Denies breathing changes, denies shortness of breath, denies palpitations.  Improves/worsens symptoms:  Worsened calf pain with standing.  Pain scale (0-10)   5/10  LMP/preg/breast feeding:  NS  Last exam/Treatment:  Yesterday in ED  Allergies: No Known Allergies    MEDICATIONS:   Taking medication(s) as prescribed? Yes  Taking over the counter medication(s?) No  Any medication side effects? No significant side effects    Any barriers to taking medication(s) as prescribed?  No  Medication(s) improving/managing symptoms?  No  Medication reconciliation completed: Yes      NURSING PLAN: Nursing advice to patient return to ED with new symptoms of bilateral calf pain, tingling, and numbness in pt with factor 5 Leiden    RECOMMENDED DISPOSITION:  To ED,  Will comply with recommendation: Yes  If further questions/concerns or if symptoms do not improve, worsen or new symptoms develop, call your PCP or [facility] as soon as possible.      Guideline used:  Telephone Triage Protocols for Nurses, Fifth Edition, Grcae Brewer and ED note from yesterday advising return to ED if additional symptoms.    Leti Fernando RN

## 2019-02-07 NOTE — ED PROVIDER NOTES
History     Chief Complaint   Patient presents with     Headache     ha for 1 week seen last night, HA persists     HPI  Rocío Catherine is a 23 year old female who presents with global headache that began approximately 10 days ago without inciting trauma.  Periorbital and frontal, radiating from occiput.  No significant headache history.  No immediate family members with chronic headache/migraine.  Heterozygous for factor V Leiden mutation, no prior DVT or other clots.  No current antiplatelet or anticoagulation therapy.  LMP was at the end of last month on time.  Denies recent URI, fever, sore throat, cough, chest pain, shortness of air, abdominal pain.  She does have nausea without vomiting.  One diarrhea bowel movement last week otherwise formed brown stools.  Denies urinary symptoms.  Seen here yesterday described headache at that time is 10/10, better after meds, severity of 3/10 today.  Has not taken any OTC or prescription pain meds today.  Developed some incidental calf pain bilaterally over the past day, this is after a slip and fall yesterday without significant complaint of injury.    Allergies:  No Known Allergies    Problem List:    Patient Active Problem List    Diagnosis Date Noted     Recurrent major depressive disorder, in remission (H) 02/27/2018     Priority: Medium     Anxiety 02/27/2018     Priority: Medium     Factor 5 Leiden mutation, heterozygous (H) 02/27/2018     Priority: Medium        Past Medical History:    Past Medical History:   Diagnosis Date     Factor 5 Leiden mutation, heterozygous (H) 2/27/2018     Factor V Leiden (H)        Past Surgical History:    Past Surgical History:   Procedure Laterality Date     GYN SURGERY      c section     HEAD & NECK SURGERY      hemangioma       Family History:    Family History   Problem Relation Age of Onset     Other - See Comments Father         factor V     Other - See Comments Paternal Grandfather         factor V       Social History:  Marital  "Status:   [2]  Social History     Tobacco Use     Smoking status: Never Smoker     Smokeless tobacco: Never Used   Substance Use Topics     Alcohol use: Not on file     Drug use: Not on file        Medications:      ibuprofen (ADVIL/MOTRIN) 200 MG tablet         Review of Systems  All other systems reviewed and are negative.    Physical Exam   BP: 124/83  Pulse: 100  Heart Rate: 98  Temp: 98  F (36.7  C)  Resp: 16  Height: 154.9 cm (5' 1\")  Weight: 46.7 kg (103 lb)  SpO2: 100 %      Physical Exam  Nontoxic-appearing no respiratory distress alert and oriented x3.    Head atraumatic normocephalic    Cranial nerves; vision baseline fields intact, PERRL, EOMI, facial sensation intact to light touch, facial muscle tone intact and symmetrical, hearing grossly intact,swallowing without difficulty, voice baseline and normal, SCM  strength intact, tongue protrudes midline.  Palatal elevation symmetric    TM's unremarkable, EACs clear, oropharynx moist without lesions or erythema.    Neck supple full active painless range of motion    Lungs clear to auscultation no rales rhonchi or wheezes    Heart regular no murmur S3 or rub    Abdomen soft nontender bowel sounds positive no masses or HSM    Strength and sensation intact throughout the extremities, skin clear from rash or lesion.        ED Course        Procedures               Critical Care time:  none               Results for orders placed or performed during the hospital encounter of 02/07/19 (from the past 24 hour(s))   CBC with platelets, differential   Result Value Ref Range    WBC 14.6 (H) 4.0 - 11.0 10e9/L    RBC Count 4.57 3.8 - 5.2 10e12/L    Hemoglobin 13.9 11.7 - 15.7 g/dL    Hematocrit 40.6 35.0 - 47.0 %    MCV 89 78 - 100 fl    MCH 30.4 26.5 - 33.0 pg    MCHC 34.2 31.5 - 36.5 g/dL    RDW 11.1 10.0 - 15.0 %    Platelet Count 248 150 - 450 10e9/L    Diff Method Automated Method     % Neutrophils 81.8 %    % Lymphocytes 11.2 %    % Monocytes 6.5 %    % " Eosinophils 0.0 %    % Basophils 0.2 %    % Immature Granulocytes 0.3 %    Nucleated RBCs 0 0 /100    Absolute Neutrophil 11.9 (H) 1.6 - 8.3 10e9/L    Absolute Lymphocytes 1.6 0.8 - 5.3 10e9/L    Absolute Monocytes 1.0 0.0 - 1.3 10e9/L    Absolute Eosinophils 0.0 0.0 - 0.7 10e9/L    Absolute Basophils 0.0 0.0 - 0.2 10e9/L    Abs Immature Granulocytes 0.1 0 - 0.4 10e9/L    Absolute Nucleated RBC 0.0    Basic metabolic panel   Result Value Ref Range    Sodium 140 133 - 144 mmol/L    Potassium 4.0 3.4 - 5.3 mmol/L    Chloride 107 94 - 109 mmol/L    Carbon Dioxide 24 20 - 32 mmol/L    Anion Gap 9 3 - 14 mmol/L    Glucose 113 (H) 70 - 99 mg/dL    Urea Nitrogen 17 7 - 30 mg/dL    Creatinine 0.72 0.52 - 1.04 mg/dL    GFR Estimate >90 >60 mL/min/[1.73_m2]    GFR Estimate If Black >90 >60 mL/min/[1.73_m2]    Calcium 8.7 8.5 - 10.1 mg/dL   TSH with free T4 reflex   Result Value Ref Range    TSH 0.60 0.40 - 4.00 mU/L   MRV Brain wo & w Contrast    Narrative    MR VENOGRAM OF THE HEAD WITHOUT AND WITH CONTRAST  2/7/2019 3:30 PM     HISTORY: Dural venous sinus thrombosis suspected.    TECHNIQUE: 2D TOF and 2D phase contrast MR venogram without contrast  material. 3D TOF MR venogram with 5 mL Gadavist.    COMPARISON: None.    FINDINGS:  The superior sagittal sinus, internal cerebral veins, vein  of Ike, straight sinus, transverse sinuses, sigmoid sinuses, and  jugular bulbs are patent. Left transverse sinus is dominant. No  evidence of dural venous sinus thrombosis.      Impression    IMPRESSION: Normal MR venogram of the head. No evidence of dural  venous sinus thrombosis.    JESUS RAMSEY MD   MR Brain w/o & w Contrast    Narrative    MRI BRAIN WITHOUT AND WITH CONTRAST  2/7/2019 3:30 PM    HISTORY:  Headache, acute, severe, worst headache of life.     TECHNIQUE:  Multiplanar, multisequence MRI of the brain without and  with 5 mL Gadavist.    COMPARISON: None.    FINDINGS:  The cerebral hemispheres, brainstem, and  cerebellum  demonstrate normal morphology and signal. No evidence of ischemia,  hemorrhage, mass, mass effect, or hydrocephalus. No abnormal diffusion  restriction or susceptibility-related signal loss. The visualized  calvarium, skull base, and extra cranial soft tissues are  unremarkable. There is mild left anterior ethmoid and left maxillary  sinus mucosal thickening.      Impression    IMPRESSION:  Unremarkable MRI of the head with and without contrast.    JESUS RAMSEY MD   Gram stain   Result Value Ref Range    Specimen Description Cerebrospinal fluid     Gram Stain No organisms seen     Gram Stain       Gram stain result is preliminary and awaits review of Microbiology Staff.   Glucose CSF:   Result Value Ref Range    Glucose CSF 73 (H) 40 - 70 mg/dL   Protein total CSF:   Result Value Ref Range    Protein Total CSF 24 15 - 60 mg/dL   Cell count with differential CSF:   Result Value Ref Range    WBC CSF 0 0 - 5 /uL    RBC CSF 0 0 - 2 /uL    Tube Number 4 #    Volume 1 mL    Color CSF Colorless CLRL^Colorless    Appearance CSF Clear CLER^Clear   Influenza A/B antigen   Result Value Ref Range    Influenza A/B Agn Specimen Nasopharyngeal     Influenza A Negative NEG^Negative    Influenza B Negative NEG^Negative   UA with Microscopic   Result Value Ref Range    Color Urine Yellow     Appearance Urine Slightly Cloudy     Glucose Urine Negative NEG^Negative mg/dL    Bilirubin Urine Negative NEG^Negative    Ketones Urine 20 (A) NEG^Negative mg/dL    Specific Gravity Urine 1.019 1.003 - 1.035    Blood Urine Negative NEG^Negative    pH Urine 6.0 5.0 - 7.0 pH    Protein Albumin Urine Negative NEG^Negative mg/dL    Urobilinogen mg/dL 0.0 0.0 - 2.0 mg/dL    Nitrite Urine Negative NEG^Negative    Leukocyte Esterase Urine Negative NEG^Negative    Source Midstream Urine     WBC Urine 1 0 - 5 /HPF    RBC Urine 1 0 - 2 /HPF    Bacteria Urine Few (A) NEG^Negative /HPF    Squamous Epithelial /HPF Urine 7 (H) 0 - 1 /HPF     Mucous Urine Present (A) NEG^Negative /LPF   Chest CT - IV contrast only - PE protocol    Narrative    CT CHEST PULMONARY EMBOLISM WITH CONTRAST   2/7/2019 10:12 PM     HISTORY: Tachycardia, periscapular pain, factor V Leiden mutation  heterozygous.    TECHNIQUE:  58 mL Isovue-370. Radiation dose for this scan was reduced  using automated exposure control, adjustment of the mA and/or kV  according to patient size, or iterative reconstruction technique.    COMPARISON: None.    FINDINGS:  No CT evidence of pulmonary embolism or acute thoracic  aortic abnormality. No pneumothorax. No pleural or pericardial  effusion. No pulmonary nodule or mass. No thoracic or axillary  adenopathy. Visualized bones are unremarkable.      Impression    IMPRESSION: Normal CT of the chest. No evidence of pulmonary aneurysm.    JAVIER GARDUNO MD       Medications   ketorolac (TORADOL) injection 15 mg (15 mg Intravenous Given 2/7/19 1424)   ondansetron (ZOFRAN) injection 4 mg (4 mg Intravenous Given 2/7/19 1422)   gadobutrol (GADAVIST) injection 5 mL (5 mLs Intravenous Given 2/7/19 1530)   0.9% sodium chloride BOLUS (0 mLs Intravenous Stopped 2/7/19 1943)   acetaminophen (TYLENOL) tablet 1,000 mg (1,000 mg Oral Given 2/7/19 1747)   lidocaine (PF) (XYLOCAINE) 1 % injection (50 mg  Given 2/7/19 1830)   0.9% sodium chloride BOLUS (0 mLs Intravenous Stopped 2/7/19 2031)   HYDROmorphone (PF) (DILAUDID) injection 0.5 mg (0.5 mg Intravenous Given 2/7/19 2027)   iopamidol (ISOVUE-370) solution 58 mL (58 mLs Intravenous Given 2/7/19 2205)   Saline Flush (89 mLs Intravenous Given 2/7/19 2205)       Patient is reevaluated at 1800, headache is worsening, she has baseline sinus tachycardia on monitor at   100-120, when patient gets up from supine to sitting her heart rate goes to 150-170, she denies associated near syncope.  Her blood pressure remains 110/70 despite heart rate of 170.  Temp 99.2.    Given ongoing headache which is actually worse on  reevaluation, recommend LP to evaluate for possible meningitis, occult subarachnoid hemorrhage.  Risk benefits reviewed, consent written form    [facility] Procedure Note          Lumbar Puncture:      Time: 6:30 PM  Performed by: Efraín Davis MD  Authorized by: Efraín Davis MD    Indications: headache    Consent given by: Patient who states understanding of the procedure being performed after discussing the risks, benefits and alternatives.    Prior to the start of the procedure and with procedural staff participation, I verbally confirmed the patient s identity using two indicators, relevant allergies, that the procedure was appropriate and matched the consent or emergent situation, and that the correct equipment/implants were available. Immediately prior to starting the procedure I conducted the Time Out with the procedural staff and re-confirmed the patient s name, procedure, and site/side. (The Joint Commission universal protocol was followed.) No    Under sterile conditions the patient was positioned Sitting, bent forward. Betadine solution and sterile drapes were utilized.  Local anesthetic at the site: 2 ml of lidocaine 1% without epinephrine from the LP tray  A 22 G Pencil point spinal needle was inserted at the L 3-4 interspace.  Opening Pressurewas not checked.  A total of 6mL of clear and colorless spinal fluid was obtained and sent to the laboratory.   After the needle was removed, a bandaid and pressure were applied and the patient was instructed to stay horizontal until the results were back.    Complications:  None    Patient tolerance: Patient tolerated the procedure well with no immediate complications.    Patient complaining of low back pain at site of lumbar puncture, no swelling, or redness or bleeding.  1 dose Dilaudid 0.5 mg with good relief of discomfort.      Patient reevaluated after first liter normal saline, heart rate remains 140-150 going supine to sitting  Reevaluated at  2045, heart rate supine 110s, heart rate standing goes up to the mid 120s.  Blood pressure remains 110s.    Reevaluated at 2130, continues to have resting tachycardia in the 110-120 complaining of left periscapular pain described as sharp, began approximately 10 minutes after lumbar puncture.  CT scan chest with IV contrast ordered to evaluate for possible pulmonary embolism versus pneumonia.  Regardless of CT chest result will admit patient to telemetry for ongoing cardiac monitoring, and IV fluid.      Assessments & Plan (with Medical Decision Making)  Otherwise healthy 23-year-old female presents with global headache approximately 10 days.  No focal neurologic complaint or finding on exam.  Seen here yesterday, note reviewed in epic.  Heterozygous for factor V Leiden mutation.  Given ongoing headache, MR venogram and MRI brain obtained, both unremarkable per radiology read.  Mild elevation white count nonspecific.  No fever or other infectious complaint or finding.  Etiology of headache remains undetermined.  Lumbar puncture accomplished, significant only for mild elevation of glucose at 73.  Positional tachycardia is less significant after 2 L crystalloid.  Complaining of left parascapular pain as noted above, CT scan chest with IV contrast no evidence for pneumothorax, pneumonia, pulmonary embolism, pericardial effusion, cardiomegaly, read as normal by radiology.    Patient's resting tachycardia rate is dropped more toward the 110 range.  She continues to have elevation of heart rate to the 130 range went up and feels dizzy.  Patient will be admitted to telemetry, ongoing IV fluid, echocardiogram.  Reviewed with Yessy DOVER accepts patient for admission to hospitalist service.     I have reviewed the nursing notes.    I have reviewed the findings, diagnosis, plan and need for follow up with the patient.          Medication List      There are no discharge medications for this visit.         Final  diagnoses:   Nonintractable headache, unspecified chronicity pattern, unspecified headache type   Sinus tachycardia       2/7/2019   [facility] EMERGENCY DEPARTMENT     Efraín Davis MD  02/07/19 0828

## 2019-02-07 NOTE — ED NOTES
Pt was seen last night for HA, states pain is 4-5, feels the same severity as when she left the ED last night. No APAP or ibuprofen taken today, some photophobia. Had some calf pain this AM, none now. Some nausea, no vomiting, no fevers.

## 2019-02-07 NOTE — DISCHARGE INSTRUCTIONS
ICD-10-CM    1. Tension headache G44.209     maintain neck range of motion. consider massage,   2. Other migraine without status migrainosus, not intractable G43.809     I suspect you had a tension headache that generalized to migraine.  The decadron given today should help prevent recurrence.  tylenol is less likely to cause rebound headaches, although ibuprofen is more effective.   3. Factor 5 Leiden mutation, heterozygous (H) D68.51     if headaches persist or worsen, return and consider ct or mri venogram to eval for venous thrombosis.

## 2019-02-07 NOTE — ED NOTES
Headache and nausea for approximately 1 week.  Patient took Ibuprofen at 1810.  Guzman Post RN on 2/6/2019 at 8:47 PM

## 2019-02-07 NOTE — TELEPHONE ENCOUNTER
"\"I've been having a horrible headache on and off for the past week or so\". Caller is rating pain at 9/10.    Reason for Disposition    [1] SEVERE headache (e.g., excruciating) AND [2] \"worst headache\" of life    Additional Information    Negative: Difficult to awaken or acting confused  (e.g., disoriented, slurred speech)    Negative: [1] Weakness of the face, arm or leg on one side of the body AND [2] new onset    Negative: [1] Numbness of the face, arm or leg on one side of the body AND [2] new onset    Negative: [1] Loss of speech or garbled speech AND [2] new onset    Negative: Passed out (i.e., lost consciousness, collapsed and was not responding)    Negative: Sounds like a life-threatening emergency to the triager    Negative: Followed a head injury within last 3 days    Negative: Pregnant    Negative: Traumatic Brain Injury (TBI) is suspected    Negative: Unable to walk, or can only walk with assistance (e.g., requires support)    Negative: Stiff neck (can't touch chin to chest)    Negative: Severe pain in one eye    Negative: [1] Other family members (or roommates) with headaches AND [2] possibility of carbon monoxide exposure    Protocols used: HEADACHE-ADULT-    Sonia Donato RN  [facility]      "

## 2019-02-08 ENCOUNTER — APPOINTMENT (OUTPATIENT)
Dept: CARDIOLOGY | Facility: CLINIC | Age: 24
End: 2019-02-08
Attending: PHYSICIAN ASSISTANT
Payer: COMMERCIAL

## 2019-02-08 VITALS
BODY MASS INDEX: 20.15 KG/M2 | HEIGHT: 61 IN | OXYGEN SATURATION: 100 % | HEART RATE: 90 BPM | SYSTOLIC BLOOD PRESSURE: 97 MMHG | RESPIRATION RATE: 16 BRPM | DIASTOLIC BLOOD PRESSURE: 58 MMHG | TEMPERATURE: 97.5 F | WEIGHT: 106.7 LBS

## 2019-02-08 PROBLEM — I34.0 NON-RHEUMATIC MITRAL REGURGITATION: Status: ACTIVE | Noted: 2019-02-08

## 2019-02-08 PROBLEM — R00.0 SINUS TACHYCARDIA: Status: ACTIVE | Noted: 2019-02-08

## 2019-02-08 LAB
ANION GAP SERPL CALCULATED.3IONS-SCNC: 4 MMOL/L (ref 3–14)
BUN SERPL-MCNC: 15 MG/DL (ref 7–30)
CALCIUM SERPL-MCNC: 7.4 MG/DL (ref 8.5–10.1)
CHLORIDE SERPL-SCNC: 111 MMOL/L (ref 94–109)
CO2 SERPL-SCNC: 25 MMOL/L (ref 20–32)
CREAT SERPL-MCNC: 0.73 MG/DL (ref 0.52–1.04)
CRP SERPL-MCNC: <2.9 MG/L (ref 0–8)
ERYTHROCYTE [DISTWIDTH] IN BLOOD BY AUTOMATED COUNT: 11.3 % (ref 10–15)
GFR SERPL CREATININE-BSD FRML MDRD: >90 ML/MIN/{1.73_M2}
GLUCOSE SERPL-MCNC: 92 MG/DL (ref 70–99)
HCT VFR BLD AUTO: 33.2 % (ref 35–47)
HGB BLD-MCNC: 10.9 G/DL (ref 11.7–15.7)
LACTATE BLD-SCNC: 0.8 MMOL/L (ref 0.7–2)
MCH RBC QN AUTO: 29.9 PG (ref 26.5–33)
MCHC RBC AUTO-ENTMCNC: 32.8 G/DL (ref 31.5–36.5)
MCV RBC AUTO: 91 FL (ref 78–100)
PLATELET # BLD AUTO: 158 10E9/L (ref 150–450)
POTASSIUM SERPL-SCNC: 3.6 MMOL/L (ref 3.4–5.3)
RBC # BLD AUTO: 3.64 10E12/L (ref 3.8–5.2)
SODIUM SERPL-SCNC: 140 MMOL/L (ref 133–144)
TROPONIN I SERPL-MCNC: <0.015 UG/L (ref 0–0.04)
TROPONIN I SERPL-MCNC: <0.015 UG/L (ref 0–0.04)
WBC # BLD AUTO: 9.2 10E9/L (ref 4–11)

## 2019-02-08 PROCEDURE — 99217 ZZC OBSERVATION CARE DISCHARGE: CPT | Performed by: INTERNAL MEDICINE

## 2019-02-08 PROCEDURE — 80048 BASIC METABOLIC PNL TOTAL CA: CPT | Performed by: PHYSICIAN ASSISTANT

## 2019-02-08 PROCEDURE — 85027 COMPLETE CBC AUTOMATED: CPT | Performed by: PHYSICIAN ASSISTANT

## 2019-02-08 PROCEDURE — 25000132 ZZH RX MED GY IP 250 OP 250 PS 637: Performed by: PHYSICIAN ASSISTANT

## 2019-02-08 PROCEDURE — 96376 TX/PRO/DX INJ SAME DRUG ADON: CPT

## 2019-02-08 PROCEDURE — 93306 TTE W/DOPPLER COMPLETE: CPT

## 2019-02-08 PROCEDURE — 96372 THER/PROPH/DIAG INJ SC/IM: CPT

## 2019-02-08 PROCEDURE — 96361 HYDRATE IV INFUSION ADD-ON: CPT

## 2019-02-08 PROCEDURE — G0378 HOSPITAL OBSERVATION PER HR: HCPCS

## 2019-02-08 PROCEDURE — 36415 COLL VENOUS BLD VENIPUNCTURE: CPT | Performed by: PHYSICIAN ASSISTANT

## 2019-02-08 PROCEDURE — 25000128 H RX IP 250 OP 636: Performed by: INTERNAL MEDICINE

## 2019-02-08 PROCEDURE — 96375 TX/PRO/DX INJ NEW DRUG ADDON: CPT | Mod: 59

## 2019-02-08 PROCEDURE — 25000128 H RX IP 250 OP 636: Performed by: PHYSICIAN ASSISTANT

## 2019-02-08 PROCEDURE — 93306 TTE W/DOPPLER COMPLETE: CPT | Mod: 26 | Performed by: INTERNAL MEDICINE

## 2019-02-08 RX ORDER — IBUPROFEN 200 MG
400 TABLET ORAL EVERY 4 HOURS PRN
Qty: 100 TABLET | COMMUNITY
Start: 2019-02-08 | End: 2019-06-11

## 2019-02-08 RX ORDER — KETOROLAC TROMETHAMINE 30 MG/ML
30 INJECTION, SOLUTION INTRAMUSCULAR; INTRAVENOUS EVERY 6 HOURS PRN
Status: DISCONTINUED | OUTPATIENT
Start: 2019-02-08 | End: 2019-02-08 | Stop reason: HOSPADM

## 2019-02-08 RX ORDER — PROCHLORPERAZINE MALEATE 10 MG
10 TABLET ORAL EVERY 6 HOURS PRN
Qty: 10 TABLET | Refills: 0 | Status: SHIPPED | OUTPATIENT
Start: 2019-02-08 | End: 2019-04-25

## 2019-02-08 RX ORDER — SUMATRIPTAN 6 MG/.5ML
6 INJECTION, SOLUTION SUBCUTANEOUS ONCE
Status: COMPLETED | OUTPATIENT
Start: 2019-02-08 | End: 2019-02-08

## 2019-02-08 RX ADMIN — PROCHLORPERAZINE EDISYLATE 10 MG: 5 INJECTION INTRAMUSCULAR; INTRAVENOUS at 14:03

## 2019-02-08 RX ADMIN — OXYCODONE HYDROCHLORIDE 5 MG: 5 TABLET ORAL at 06:27

## 2019-02-08 RX ADMIN — OXYCODONE HYDROCHLORIDE 5 MG: 5 TABLET ORAL at 02:41

## 2019-02-08 RX ADMIN — SODIUM CHLORIDE, POTASSIUM CHLORIDE, SODIUM LACTATE AND CALCIUM CHLORIDE: 600; 310; 30; 20 INJECTION, SOLUTION INTRAVENOUS at 08:47

## 2019-02-08 RX ADMIN — SUMATRIPTAN SUCCINATE 6 MG: 6 INJECTION SUBCUTANEOUS at 11:58

## 2019-02-08 RX ADMIN — ONDANSETRON 4 MG: 2 INJECTION INTRAMUSCULAR; INTRAVENOUS at 12:15

## 2019-02-08 RX ADMIN — OXYCODONE HYDROCHLORIDE 5 MG: 5 TABLET ORAL at 08:47

## 2019-02-08 RX ADMIN — ACETAMINOPHEN 650 MG: 325 TABLET, FILM COATED ORAL at 08:47

## 2019-02-08 RX ADMIN — SODIUM CHLORIDE, POTASSIUM CHLORIDE, SODIUM LACTATE AND CALCIUM CHLORIDE: 600; 310; 30; 20 INJECTION, SOLUTION INTRAVENOUS at 00:29

## 2019-02-08 RX ADMIN — ACETAMINOPHEN 650 MG: 325 TABLET, FILM COATED ORAL at 00:29

## 2019-02-08 NOTE — PLAN OF CARE
"Dilaudid given in ED offered relief for migraine for a few hours. Headache returned, pain reported as 3/10. Oxycodone 5 mg given for left shoulder/arm pain and headache. Fluids infusing at 125 mL/hr. No nausea reported since arriving to floor. Sepsis protocol triggered, lactic drawn. Lactic: 0.8. /70 (BP Location: Right arm)   Pulse 110   Temp 98.1  F (36.7  C) (Oral)   Resp 14   Ht 1.549 m (5' 1\")   Wt 48.4 kg (106 lb 11.2 oz)   SpO2 100%   BMI 20.16 kg/m      "

## 2019-02-08 NOTE — PROGRESS NOTES
[facility] Medicine Progress Note  Date of Service: 02/08/2019     Assessment & Plan   Rcoío Catherine is a 23 year old female admitted on 2/7/2019. She has history of factor V Leiden deficiency. She presents with persistent headache over the past 10-14 days.    Headache    No prior headache history. 2 weeks of constant throbbing/aching frontal headache wrapping around to neck. Initially managed with acetaminophen/ibuprofen (400 mg twice daily) as outpatient. Worsened in past 2 days with associated nausea and mild photophobia. Presented to ED 2/6 for 12/10 pain, received IV steroids, benadryl and nausea medciations with some improvement. Returned the following morning with pain 9/10. Work up in ED included, normal MR/MRV brain, normal LP with pending culture/gram stain, negative flu swab. WBC 14.6. CRP , 2.9. Afebrile. Labs otherwise normal. No focal neurological deficits. Concerning etiologies ruled out with LP and neuroimaging. Appears to be consistent with tension-type headache vs migraine. Not really consistent with cluster HA.    After one night, not much improvement though narcotics help. Has been using oxycodone and some acetaminophen since one IV dose of hydromorphone briefly took it away. Has not tried sumatriptan. Has not had high-dose NSAID.    - trial subcutaneous sumatriptan    - if not effective, trial Toradol 30 mg IV  ADDENDUM: subcutaneous sumatriptan was painful injection, did reduce HA from 8 to 4 but with marked increase in nausea. Zofran given with some nausea improvement but not resolved.    - compazine 10 mg IV x 1 to see if helps with HA and nausea   - IV toradol if still HA after compazine  Discussed with RN.     Sinus Tachycardia, resolved    Initially 130s in ED and reportedly up to 170s with activity. Improved to 110s after fluids. ECG shows sinus tachycardia with nonspecific ST depression in V4-6. CT PE study negative. TSH normal. UA negative. Patient  "does report poor appetite and fluid intake recently. Possibly related to volume depletion. Had mild hypertension on admission 139/93. No adrenal nodules on CT. Does not have a pattern of paroxysmal symptoms to suggest catecholamine secreting tumor.    - if paroxysms of tachycardia, headache +/- sweating or hypertension, then would consider 24 hour urine collection for metanephrines and catecholamines    Upper thoracic/chest pain    Sharp pain starting near left scapula, wrapping around to anterior chest and down left arm. Started about 10 minutes following LP. Improved somewhat after IV dilaudid then returned. Not reproducible with palpation. Never has had this type of pain. Unclear etiology, differential includes pericarditis, musculoskeletal, pleurisy vs other. Low suspicion for ACS.     Echo shows no etiology for the pain. Resolving.    Trace mitral regurgitation    Seen incidentally on echocardiogram. Mild mitral valve thickening. Reading cardiologist recommended follow up echo in 2-3 years to reassess MR/MV.     Leukocytosis  WBC 14.6. Afebrile. No localizing infectious symptoms. Recent exposure to RSV in household, no respiratory symptoms. Resolved. Likely stress reaction.     Factor V Leiden Deficiency  No history of clots.       Diet: Regular adult diet. Stop IVF.   DVT Prophylaxis: Low Risk/Ambulatory with no VTE prophylaxis indicated  Gomez Catheter: not present  Code Status: Full code    Discussion: Needs better symptom control. Trial sumatriptan subcutaneous, possibly higher dose  IV Toradol and if improved then discharge home on oral medications. Would not continue narcotics.     Disposition: Anticipate discharge this afternoon     Attestation:  Total time: 45 minutes    Kemal Brasher MD  Hospital Medicine        Interval History   Headache a little better with oxycodone but still in pain. No nausea currently but no headache. Headache \"went away\" for short while with IV hydromorphone given once. Only 400 " mg ibuprofen twice daily plus acetaminophen at home. Has not tried imatrex. No prior headache history. Left sided chest shooting pain after LP which radiated to left upper arm is dissipating. No provocative features. No weakness. Some photophobia today. Feels right eye a little blurry.     Physical Exam   Temp:  [97.5  F (36.4  C)-98.1  F (36.7  C)] 97.5  F (36.4  C)  Pulse:  [] 90  Heart Rate:  [] 94  Resp:  [10-38] 16  BP: ()/(58-83) 97/58  SpO2:  [96 %-100 %] 100 %    Weights:   Vitals:    02/07/19 1352 02/07/19 2349   Weight: 46.7 kg (103 lb) 48.4 kg (106 lb 11.2 oz)    Body mass index is 20.16 kg/m .    Constitutional: alert and oriented, appears mild-moderately uncomfortable and tired, nontoxic, appropriate, cooperative  HEENT: eyes mild injected but otherwise clear, pupils symmetric  CV: Regular, no murmur  Respiratory: CTA bilaterally  GI: Soft, non-tender, bowel sounds active  Skin: Warm and dry  Musculoskeletal: good ROM left shoulder, some muscle tightness and tenderness posterior neck bilaterally and upper back consistent with tension    Data   Recent Labs   Lab 02/08/19  0508 02/07/19  2359 02/07/19  1421   WBC 9.2  --  14.6*   HGB 10.9*  --  13.9   MCV 91  --  89     --  248     --  140   POTASSIUM 3.6  --  4.0   CHLORIDE 111*  --  107   CO2 25  --  24   BUN 15  --  17   CR 0.73  --  0.72   ANIONGAP 4  --  9   LANA 7.4*  --  8.7   GLC 92  --  113*   TROPI  --  <0.015 <0.015       Recent Labs   Lab 02/08/19  0508 02/07/19  1421   GLC 92 113*        Unresulted Labs Ordered in the Past 30 Days of this Admission     Date and Time Order Name Status Description    2/7/2019 1741 CSF Culture Aerobic Bacterial Preliminary            Imaging:   Recent Results (from the past 24 hour(s))   MRV Brain wo & w Contrast    Narrative    MR VENOGRAM OF THE HEAD WITHOUT AND WITH CONTRAST  2/7/2019 3:30 PM     HISTORY: Dural venous sinus thrombosis suspected.    TECHNIQUE: 2D TOF and 2D phase  contrast MR venogram without contrast  material. 3D TOF MR venogram with 5 mL Gadavist.    COMPARISON: None.    FINDINGS:  The superior sagittal sinus, internal cerebral veins, vein  of Ike, straight sinus, transverse sinuses, sigmoid sinuses, and  jugular bulbs are patent. Left transverse sinus is dominant. No  evidence of dural venous sinus thrombosis.      Impression    IMPRESSION: Normal MR venogram of the head. No evidence of dural  venous sinus thrombosis.    JESUS RAMSEY MD   MR Brain w/o & w Contrast    Narrative    MRI BRAIN WITHOUT AND WITH CONTRAST  2/7/2019 3:30 PM    HISTORY:  Headache, acute, severe, worst headache of life.     TECHNIQUE:  Multiplanar, multisequence MRI of the brain without and  with 5 mL Gadavist.    COMPARISON: None.    FINDINGS:  The cerebral hemispheres, brainstem, and cerebellum  demonstrate normal morphology and signal. No evidence of ischemia,  hemorrhage, mass, mass effect, or hydrocephalus. No abnormal diffusion  restriction or susceptibility-related signal loss. The visualized  calvarium, skull base, and extra cranial soft tissues are  unremarkable. There is mild left anterior ethmoid and left maxillary  sinus mucosal thickening.      Impression    IMPRESSION:  Unremarkable MRI of the head with and without contrast.    JESUS RAMSEY MD   Chest CT - IV contrast only - PE protocol    Narrative    CT CHEST PULMONARY EMBOLISM WITH CONTRAST   2/7/2019 10:12 PM     HISTORY: Tachycardia, periscapular pain, factor V Leiden mutation  heterozygous.    TECHNIQUE:  58 mL Isovue-370. Radiation dose for this scan was reduced  using automated exposure control, adjustment of the mA and/or kV  according to patient size, or iterative reconstruction technique.    COMPARISON: None.    FINDINGS:  No CT evidence of pulmonary embolism or acute thoracic  aortic abnormality. No pneumothorax. No pleural or pericardial  effusion. No pulmonary nodule or mass. No thoracic or axillary  adenopathy.  Visualized bones are unremarkable.      Impression    IMPRESSION: Normal CT of the chest. No evidence of pulmonary aneurysm.    JAVIER GARDUNO MD      Echo - Interpretation Summary  Left ventricular systolic function is normal.  The visual ejection fraction is estimated at 60-65%.  The mitral valve leaflets are mildly thickened.  Redundant elongated chordae are noted.  There is trace mitral regurgitation.  Suggest fu echo in 2-3 years to reassess mitral regurgitation and mitral valve  if clinically appropriate. The study was technically adequate. There is no  comparison study available.    I reviewed all new labs and imaging results over the last 24 hours. I personally reviewed the abdominal CT image(s) showing normal adrenal glands bilaterally and discussed with radiologist.    Medications   Reviewed prn meds given      Kemal Brasher MD  [facility] Medicine

## 2019-02-08 NOTE — PLAN OF CARE
[facility] DISCHARGE NOTE    Patient discharged to home at 3:18 PM via wheel chair. Accompanied by  and staff. Discharge instructions reviewed with patient, opportunity offered to ask questions. Prescriptions sent to patients preferred pharmacy. All belongings sent with patient.    Laverne Slater

## 2019-02-08 NOTE — PROGRESS NOTES
"[facility] ADMISSION NOTE    Patient admitted to room 2308 at approximately 2345 via cart from emergency room. Patient was accompanied by transport tech and mother.     Verbal SBAR report received from HILARIO Pederson prior to patient arrival.     Patient ambulated to bed with stand-by assist. Patient alert and oriented X 3. Pain is controlled with current analgesics.  Medication(s) being used: acetaminophen and narcotic analgesics including hydromorphone (Dilaudid) and oxycodone. 0-10 Pain Scale: 3. Admission vital signs: Blood pressure 111/70, pulse 110, temperature 98.1  F (36.7  C), temperature source Oral, resp. rate 14, height 1.549 m (5' 1\"), weight 48.4 kg (106 lb 11.2 oz), SpO2 100 %. Patient was oriented to plan of care, call light, bed controls, tv, telephone, bathroom and visiting hours.     Risk Assessment    The following safety risks were identified during admission: none. Yellow risk band applied: NO.     Skin Initial Assessment    This writer admitted this patient and completed a full skin assessment and Ollie score in the Adult PCS flowsheet. Appropriate interventions initiated as needed.     Secondary skin check completed by HILARIO Mayberry.    Ollie Risk Assessment  Sensory Perception: 4-->no impairment  Moisture: 4-->rarely moist  Activity: 4-->walks frequently  Mobility: 4-->no limitation  Nutrition: 3-->adequate  Friction and Shear: 3-->no apparent problem  Ollie Score: 22    Margie Edgar    "

## 2019-02-08 NOTE — ED NOTES
"Patient has  Jena to Observation  order. Patient has been given the Observation brochure -  What does Observation mean to me.\"  Patient has been given the opportunity to ask questions about observation status and their plan of care.      Jeni Silva  "

## 2019-02-08 NOTE — DISCHARGE SUMMARY
[facility]  Discharge Summary  Hospital Medicine       Date of Admission:  2/7/2019  Date of Discharge:  2/8/2019  Discharging Provider: Kemal Brasher MD      Identification and Chief Compaint: Rocío Catherine is a 23 year old female who presented on 2/7/2019 with complaint of persistent headache over 10-14 days.    Discharge Diagnoses     Headache, possible migraine    Sinus tachycardia    Musculoskeletal chest pain    Mitral regurgitation, trace    Leucocytosis, reactive    Factor V Leiden deficiency     Follow-ups Needed After Discharge   Follow-up Appointments     Follow-up and recommended labs and tests       Follow up with primary care provider, Yessy Lyn, if having recurring symptoms.  The following labs/tests are recommended: follow up echocardiogram in 2-3 years to evaluate mitral valve.               Unresulted Labs Ordered in the Past 30 Days of this Admission     Date and Time Order Name Status Description    2/7/2019 1741 CSF Culture Aerobic Bacterial Preliminary       These results will be followed up by Dr. Brasher    Hospital Course      Rocío Catherine is a 23 year old female admitted on 2/7/2019. She has history of factor V Leiden deficiency. She presents with persistent headache over the past 10-14 days.    Headache    No prior headache history. 2 weeks of constant throbbing/aching frontal headache wrapping around to neck. Initially managed with acetaminophen/ibuprofen (400 mg twice daily) as outpatient. Worsened in past 2 days with associated nausea and mild photophobia. Presented to ED 2/6 for 12/10 pain, received IV steroids, benadryl and nausea medciations with some improvement. Returned the following morning with pain 9/10. Work up in ED included, normal MR/MRV brain, normal LP with pending culture/gram stain, negative flu swab. WBC 14.6. CRP , 2.9. Afebrile. Labs otherwise normal. No focal neurological deficits. Concerning etiologies ruled out with LP and  neuroimaging. Appears to be consistent with tension-type headache vs migraine. Not really consistent with cluster HA.    After one night, not much improvement though narcotics help. Has been using oxycodone and some acetaminophen since one IV dose of hydromorphone briefly took it away. Had not tried sumatriptan.     Received sumatriptan 6 mg subcutaneous and c/o severe burning at injection site and then severe nausea. Headache did improve from 8/10 to 4/10. Patient received another dose of ondansetron and nausea improved partially though still some nausea and headache. Then given compazine 10 mg IV and had complete resolution of headache and nausea. Felt a little tired but otherwise felt well enough for discharge home.     Possible migraine given partial response to sumatriptan and resolution with compazine. Recommended to use OTC ibuprofen 400 mg every 4 hours as needed for mild to moderate headache, and I verbally suggested she can try 600 mg every 6 hours or 800 mg every 8 hours if she wanted to. Would try oral compazine for nausea associated with headache or for moderate-severe headache.     Sinus Tachycardia, resolved    Initially 130s in ED and reportedly up to 170s with activity. Improved to 110s after fluids. ECG shows sinus tachycardia with nonspecific ST depression in V4-6. CT PE study negative. TSH normal. UA negative. Patient does report poor appetite and fluid intake recently. Possibly related to volume depletion. Had mild hypertension on admission 139/93. No adrenal nodules on CT. Does not have a pattern of paroxysmal symptoms to suggest catecholamine secreting tumor. If paroxysms of tachycardia, headache +/- sweating or hypertension, then would consider 24 hour urine collection for metanephrines and catecholamines, but very low suspicion at this point.     Upper thoracic/chest pain    Sharp pain starting near left scapula, wrapping around to anterior chest and down left arm. Started about 10 minutes  following LP. Improved somewhat after IV dilaudid then returned. Not reproducible with palpation. Never has had this type of pain. Unclear etiology, differential includes pericarditis, musculoskeletal, pleurisy vs other. Low suspicion for ACS.     Echo shows no etiology for the pain. Resolving. May be musculoskeletal. Does not need further follow up unless having ongoing episodes.     Trace mitral regurgitation    Seen incidentally on echocardiogram. Mild mitral valve thickening. Reading cardiologist recommended follow up echo in 2-3 years to reassess MR/MV.     Leukocytosis, stress reaction  WBC 14.6. Afebrile. No localizing infectious symptoms. Recent exposure to RSV in household, no respiratory symptoms. Resolved. Likely stress reaction.     Factor V Leiden Deficiency  No history of clots.    Consultations This Hospital Stay   None    Code Status   Full Code    The discharge plan was discussed with the patient, and she expressed understanding.     Time Spent on this Encounter   Total time on this discharge was > 60 minutes over 3 visits today.       Kemal Brasher MD  [facility]  ______________________________________________________________________    Physical Exam   Vital Signs: Temp: 97.5  F (36.4  C) Temp src: Oral BP: 97/58 Pulse: 90 Heart Rate: 94 Resp: 16 SpO2: 100 % O2 Device: None (Room air)    Weight: 106 lbs 11.24 oz  Constitutional: alert and oriented, tired but otherwise nontoxic and in NAD  See progress note for my exam earlier today.        Primary Care Physician   Yessy Cassidy Eboni  [address] [facility] [id number]     Discharge Disposition   Discharged to home  Condition at discharge: Good    Significant Results and Procedures   Results for orders placed or performed during the hospital encounter of 02/07/19   MR Brain w/o & w Contrast    Narrative    MRI BRAIN WITHOUT AND WITH CONTRAST  2/7/2019 3:30 PM    HISTORY:  Headache, acute, severe, worst headache of  life.     TECHNIQUE:  Multiplanar, multisequence MRI of the brain without and  with 5 mL Gadavist.    COMPARISON: None.    FINDINGS:  The cerebral hemispheres, brainstem, and cerebellum  demonstrate normal morphology and signal. No evidence of ischemia,  hemorrhage, mass, mass effect, or hydrocephalus. No abnormal diffusion  restriction or susceptibility-related signal loss. The visualized  calvarium, skull base, and extra cranial soft tissues are  unremarkable. There is mild left anterior ethmoid and left maxillary  sinus mucosal thickening.      Impression    IMPRESSION:  Unremarkable MRI of the head with and without contrast.    JESUS RAMSEY MD   MRV Brain wo & w Contrast    Narrative    MR VENOGRAM OF THE HEAD WITHOUT AND WITH CONTRAST  2/7/2019 3:30 PM     HISTORY: Dural venous sinus thrombosis suspected.    TECHNIQUE: 2D TOF and 2D phase contrast MR venogram without contrast  material. 3D TOF MR venogram with 5 mL Gadavist.    COMPARISON: None.    FINDINGS:  The superior sagittal sinus, internal cerebral veins, vein  of Ike, straight sinus, transverse sinuses, sigmoid sinuses, and  jugular bulbs are patent. Left transverse sinus is dominant. No  evidence of dural venous sinus thrombosis.      Impression    IMPRESSION: Normal MR venogram of the head. No evidence of dural  venous sinus thrombosis.    JESUS RAMSEY MD   Chest CT - IV contrast only - PE protocol    Narrative    CT CHEST PULMONARY EMBOLISM WITH CONTRAST   2/7/2019 10:12 PM     HISTORY: Tachycardia, periscapular pain, factor V Leiden mutation  heterozygous.    TECHNIQUE:  58 mL Isovue-370. Radiation dose for this scan was reduced  using automated exposure control, adjustment of the mA and/or kV  according to patient size, or iterative reconstruction technique.    COMPARISON: None.    FINDINGS:  No CT evidence of pulmonary embolism or acute thoracic  aortic abnormality. No pneumothorax. No pleural or pericardial  effusion. No pulmonary nodule  or mass. No thoracic or axillary  adenopathy. Visualized bones are unremarkable.      Impression    IMPRESSION: Normal CT of the chest. No evidence of pulmonary aneurysm.    JAVIER GARDUNO MD     Procedures    None    Echo - Interpretation Summary  Left ventricular systolic function is normal.  The visual ejection fraction is estimated at 60-65%.  The mitral valve leaflets are mildly thickened.  Redundant elongated chordae are noted.  There is trace mitral regurgitation.  Suggest fu echo in 2-3 years to reassess mitral regurgitation and mitral valve if clinically appropriate. The study was technically adequate.   There is no comparison study available.    Discharge Orders      Reason for your hospital stay    Headache, possible migraine     Follow-up and recommended labs and tests     Follow up with primary care provider, Yessy Lyn, if having recurring symptoms.  The following labs/tests are recommended: follow up echocardiogram in 2-3 years to evaluate mitral valve.     Activity    Your activity upon discharge: activity as tolerated     Diet    Follow this diet upon discharge:       Regular Diet Adult     Discharge Medications   Current Discharge Medication List      START taking these medications    Details   prochlorperazine (COMPAZINE) 10 MG tablet Take 1 tablet (10 mg) by mouth every 6 hours as needed for nausea or other (moderate to severe headache)  Qty: 10 tablet, Refills: 0    Associated Diagnoses: Nonintractable headache, unspecified chronicity pattern, unspecified headache type         CONTINUE these medications which have CHANGED    Details   ibuprofen (ADVIL/MOTRIN) 200 MG tablet Take 2 tablets (400 mg) by mouth every 4 hours as needed for mild pain or moderate pain  Qty: 100 tablet    Associated Diagnoses: Nonintractable headache, unspecified chronicity pattern, unspecified headache type           Allergies   No Known Allergies

## 2019-02-08 NOTE — H&P
[facility]    History and Physical - Hospitalist Service       Date of Admission:  2/7/2019    Assessment & Plan   Rocío Catherine is a 23 year old female admitted on 2/7/2019. She has history of factor V Leiden deficiency. She presents with persistent headache over the past 10 days.    Headache  2 weeks of constant throbbing/aching frontal headache wrapping around to neck. Initially managed with acetaminophen/ibuprofen as outpatient. Worsened in past 2 days with associated nausea and mild photophobia. Presented to ED yesterday for 12/10 pain, received IV steroids, benadryl and nausea medciations with some improvement. Returned this morning at 9/10. Work up in ED included, normal MR/MRV brain, normal LP with pending culture/gram stain, negative flu swab. WBC 14.6. Afebrile. Labs otherwise normal. No focal neurological deficits. Concerning etiologies ruled out with LP and neuroimaging. Appears to be consistent with tension-type headache with some possible component of migraine. Not really consistent with cluster HA.  - pain management: acetaminophen, ibuprofen prn, oxycodone prn, trial of sumatriptan prn  - order CRP  - continue to monitor    Sinus Tachycardia  Initially 130s in ED and reportedly up to 170s with activity. Improved to 110s after fluids. ECG shows sinus tachycardia with nonspecific ST depression in V4-6. CT PE study negative. TSH normal. UA negative. Patient does report poor appetite and fluid intake recently. Possibly related to volume depletion.  - order telemetry  - order echo  - continue IVF: LR at 125 ml/hr    Upper thoracic/chest pain  Sharp pain starting near left scapula, wrapping around to anterior chest and down left arm. Started about 10 minutes following LP. Improved somewhat after IV dilaudid then returned. Not reproducible with palpation. Never has had this type of pain. Unclear etiology, differential includes pericarditis, musculoskeletal, pleurisy vs other. Low  "suspicion for ACS.  - repeat ECG   - order troponin  - echo as above  - pain management as above, IV dilaudid for breakthrough pain    Leukocytosis  WBC 14.6. Afebrile. No localizing infectious symptoms. Recent exposure to RSV in household, no respiratory symptoms.   - AM CBC    Factor V Leiden Deficiency  No history of clots.       Diet: Regular adult diet  DVT Prophylaxis: Low Risk/Ambulatory with no VTE prophylaxis indicated  Gomez Catheter: not present  Code Status: Full code    Disposition Plan   Expected discharge: Today, recommended to prior living arrangement once work up complete and vital signs normalizing.  Entered: Yessy Fox PA-C 02/07/2019, 11:26 PM     The patient's care was discussed with the Patient. Supervising physician Dr. Leonardo Alfred, further evaluation to be performed by daytime rounding team.    Yessy Fox PA-C  [facility]  ______________________________________________________________________    Chief Complaint   Headache    History is obtained from the patient    History of Present Illness   Rocío Catherine is a 23 year old female who presents with 2 weeks of headache.    2 weeks ago developed a \"regular headache\", she had been using acetaminophen initially then added ibuprofen which somewhat helped her pain. Yesterday presented to the ED because the headache worsened to a \"12/10\" with photophobia. She received Toradol, benadryl, decadron and anti-emetics in ED which helped her symptoms. Located in her frontal lobe, wraps around to the back of her neck. Described as a throbbing pain, improves to an ache with acetaminophen/ibuprofen. Constant, never fully goes away. She does not usually get headaches, when she does they improve with acetaminophen. She has associated nausea without emesis. No fever or chills. This morning woke up and her headache returned, rated a 9/10. Improved in ED after Toradol and Dilaudid to a 2/10 currently.    10 minutes " following getting an LP in the ED she developed a sharp pain starting in near her shoulder blade which wraps around to the front of her chest and radiates down her left arm. Improved after dilaudid though returned shortly after. Has never had this type of pain before. At times she feels like her heart is racing. She did get a CT PE study in the ED which did not show any acute findings.    She has not had an localizing illness recently.She does have exposure to RSV in the household, her twin boys just got over this. She has not had any cough, congestion, rhinorrhea, shortness of breath.     Patient denies myalgias, lightheadedness, dizziness, abdominal pain, diarrhea, changes in urination (dysuria, changes in frequency/urgency), rash or wounds.    Review of Systems    The 10 point Review of Systems is negative other than noted in the HPI or here.     Past Medical History    I have reviewed this patient's medical history and updated it with pertinent information if needed.   Past Medical History:   Diagnosis Date     Factor 5 Leiden mutation, heterozygous (H) 2/27/2018     Factor V Leiden (H)      Past Surgical History   I have reviewed this patient's surgical history and updated it with pertinent information if needed.  Past Surgical History:   Procedure Laterality Date     GYN SURGERY      c section     HEAD & NECK SURGERY      hemangioma       Social History   I have reviewed this patient's social history and updated it with pertinent information if needed.  Social History     Tobacco Use     Smoking status: Never Smoker     Smokeless tobacco: Never Used   Substance Use Topics     Alcohol use: Yes     Comment: rare     Drug use: No     Family History   I have reviewed this patient's family history and updated it with pertinent information if needed.   Family History   Problem Relation Age of Onset     Other - See Comments Father         factor V     Other - See Comments Paternal Grandfather         factor V      Prior to Admission Medications   Prior to Admission Medications   Prescriptions Last Dose Informant Patient Reported? Taking?   ibuprofen (ADVIL/MOTRIN) 200 MG tablet 2/6/2019 at hs  Yes Yes   Sig: Take 400 mg by mouth every 6 hours as needed for mild pain      Facility-Administered Medications: None     Allergies   No Known Allergies    Physical Exam   Vital Signs: Temp: 98  F (36.7  C) Temp src: Oral BP: 109/78 Pulse: 113 Heart Rate: 112 Resp: 14 SpO2: 96 % O2 Device: None (Room air)    Weight: 103 lbs 0 oz    Constitutional: awake, alert, cooperative, no apparent distress, and appears stated age  Eyes: Lids and lashes normal, pupils equal, round and reactive to light, extra ocular muscles intact, sclera clear, conjunctiva normal  ENT: Normocephalic, without obvious abnormality, atraumatic, external ears without lesions, oral pharynx with moist mucous membranes, tonsils without erythema or exudates, gums normal and good dentition.  Hematologic / Lymphatic: no cervical lymphadenopathy and no supraclavicular lymphadenopathy  Respiratory: No increased work of breathing, good air exchange, clear to auscultation bilaterally, no crackles or wheezing  Cardiovascular: Normal apical impulse, fast rate and rhythm, normal S1 and S2, no S3 or S4, and no murmur noted  GI: normal bowel sounds, soft, non-distended, non-tender   Genitounirinary: Deferred  Skin: normal skin color, texture, turgor  Musculoskeletal: Normal bulk and tone. Moves all 4 extremities appropriately.   Neurologic: Awake, alert, oriented to name, place and time.  Cranial nerves II-XII are grossly intact.  Motor is 5 out of 5 bilaterally.  Normal finger to nose.  Sensory is intact.  Neuropsychiatric:  normal, calm and normal eye contact    Data   Data reviewed today: I reviewed all medications, new labs and imaging results over the last 24 hours. I personally reviewed the EKG tracing showing NSR with non-specific ST depression in V4-6..    Recent Labs    Lab 02/07/19  1421   WBC 14.6*   HGB 13.9   MCV 89         POTASSIUM 4.0   CHLORIDE 107   CO2 24   BUN 17   CR 0.72   ANIONGAP 9   LANA 8.7   *     Recent Results (from the past 24 hour(s))   MRV Brain wo & w Contrast    Narrative    MR VENOGRAM OF THE HEAD WITHOUT AND WITH CONTRAST  2/7/2019 3:30 PM     HISTORY: Dural venous sinus thrombosis suspected.    TECHNIQUE: 2D TOF and 2D phase contrast MR venogram without contrast  material. 3D TOF MR venogram with 5 mL Gadavist.    COMPARISON: None.    FINDINGS:  The superior sagittal sinus, internal cerebral veins, vein  of Ike, straight sinus, transverse sinuses, sigmoid sinuses, and  jugular bulbs are patent. Left transverse sinus is dominant. No  evidence of dural venous sinus thrombosis.      Impression    IMPRESSION: Normal MR venogram of the head. No evidence of dural  venous sinus thrombosis.    JESUS RAMSEY MD   MR Brain w/o & w Contrast    Narrative    MRI BRAIN WITHOUT AND WITH CONTRAST  2/7/2019 3:30 PM    HISTORY:  Headache, acute, severe, worst headache of life.     TECHNIQUE:  Multiplanar, multisequence MRI of the brain without and  with 5 mL Gadavist.    COMPARISON: None.    FINDINGS:  The cerebral hemispheres, brainstem, and cerebellum  demonstrate normal morphology and signal. No evidence of ischemia,  hemorrhage, mass, mass effect, or hydrocephalus. No abnormal diffusion  restriction or susceptibility-related signal loss. The visualized  calvarium, skull base, and extra cranial soft tissues are  unremarkable. There is mild left anterior ethmoid and left maxillary  sinus mucosal thickening.      Impression    IMPRESSION:  Unremarkable MRI of the head with and without contrast.    JESUS RAMSEY MD   Chest CT - IV contrast only - PE protocol    Narrative    CT CHEST PULMONARY EMBOLISM WITH CONTRAST   2/7/2019 10:12 PM     HISTORY: Tachycardia, periscapular pain, factor V Leiden mutation  heterozygous.    TECHNIQUE:  58 mL  Isovue-370. Radiation dose for this scan was reduced  using automated exposure control, adjustment of the mA and/or kV  according to patient size, or iterative reconstruction technique.    COMPARISON: None.    FINDINGS:  No CT evidence of pulmonary embolism or acute thoracic  aortic abnormality. No pneumothorax. No pleural or pericardial  effusion. No pulmonary nodule or mass. No thoracic or axillary  adenopathy. Visualized bones are unremarkable.      Impression    IMPRESSION: Normal CT of the chest. No evidence of pulmonary aneurysm.    JAVIER GARDUNO MD

## 2019-02-09 ENCOUNTER — HOSPITAL ENCOUNTER (EMERGENCY)
Facility: CLINIC | Age: 24
Discharge: HOME | End: 2019-02-09
Attending: FAMILY MEDICINE | Admitting: FAMILY MEDICINE
Payer: COMMERCIAL

## 2019-02-09 ENCOUNTER — NURSE TRIAGE (OUTPATIENT)
Dept: NURSING | Facility: CLINIC | Age: 24
End: 2019-02-09

## 2019-02-09 VITALS
RESPIRATION RATE: 16 BRPM | DIASTOLIC BLOOD PRESSURE: 78 MMHG | BODY MASS INDEX: 19.45 KG/M2 | HEART RATE: 105 BPM | OXYGEN SATURATION: 97 % | TEMPERATURE: 99.2 F | WEIGHT: 103 LBS | SYSTOLIC BLOOD PRESSURE: 118 MMHG | HEIGHT: 61 IN

## 2019-02-09 DIAGNOSIS — G43.901 STATUS MIGRAINOSUS: ICD-10-CM

## 2019-02-09 LAB
ALBUMIN SERPL-MCNC: 4.4 G/DL (ref 3.4–5)
ALBUMIN UR-MCNC: NEGATIVE MG/DL
ALP SERPL-CCNC: 54 U/L (ref 40–150)
ALT SERPL W P-5'-P-CCNC: 25 U/L (ref 0–50)
ANION GAP SERPL CALCULATED.3IONS-SCNC: 7 MMOL/L (ref 3–14)
APPEARANCE UR: CLEAR
APTT PPP: 29 SEC (ref 22–37)
AST SERPL W P-5'-P-CCNC: 21 U/L (ref 0–45)
BACTERIA #/AREA URNS HPF: ABNORMAL /HPF
BILIRUB SERPL-MCNC: 1 MG/DL (ref 0.2–1.3)
BILIRUB UR QL STRIP: NEGATIVE
BUN SERPL-MCNC: 8 MG/DL (ref 7–30)
CALCIUM SERPL-MCNC: 9.5 MG/DL (ref 8.5–10.1)
CHLORIDE SERPL-SCNC: 105 MMOL/L (ref 94–109)
CO2 SERPL-SCNC: 28 MMOL/L (ref 20–32)
COLOR UR AUTO: ABNORMAL
CREAT SERPL-MCNC: 0.67 MG/DL (ref 0.52–1.04)
CRP SERPL-MCNC: <2.9 MG/L (ref 0–8)
ERYTHROCYTE [SEDIMENTATION RATE] IN BLOOD BY WESTERGREN METHOD: 5 MM/H (ref 0–20)
GFR SERPL CREATININE-BSD FRML MDRD: >90 ML/MIN/{1.73_M2}
GLUCOSE SERPL-MCNC: 123 MG/DL (ref 70–99)
GLUCOSE UR STRIP-MCNC: NEGATIVE MG/DL
HGB UR QL STRIP: NEGATIVE
INR PPP: 0.99 (ref 0.86–1.14)
KETONES UR STRIP-MCNC: 10 MG/DL
LEUKOCYTE ESTERASE UR QL STRIP: NEGATIVE
MAGNESIUM SERPL-MCNC: 1.8 MG/DL (ref 1.6–2.3)
NITRATE UR QL: NEGATIVE
PH UR STRIP: 7.5 PH (ref 5–7)
POTASSIUM SERPL-SCNC: 3.5 MMOL/L (ref 3.4–5.3)
PROT SERPL-MCNC: 7.8 G/DL (ref 6.8–8.8)
RBC #/AREA URNS AUTO: <1 /HPF (ref 0–2)
SODIUM SERPL-SCNC: 140 MMOL/L (ref 133–144)
SOURCE: ABNORMAL
SP GR UR STRIP: 1.01 (ref 1–1.03)
SQUAMOUS #/AREA URNS AUTO: 1 /HPF (ref 0–1)
TSH SERPL DL<=0.005 MIU/L-ACNC: 0.9 MU/L (ref 0.4–4)
UROBILINOGEN UR STRIP-MCNC: NORMAL MG/DL (ref 0–2)
WBC #/AREA URNS AUTO: <1 /HPF (ref 0–5)

## 2019-02-09 PROCEDURE — 25000128 H RX IP 250 OP 636: Performed by: FAMILY MEDICINE

## 2019-02-09 PROCEDURE — 83735 ASSAY OF MAGNESIUM: CPT | Performed by: FAMILY MEDICINE

## 2019-02-09 PROCEDURE — 85610 PROTHROMBIN TIME: CPT | Performed by: FAMILY MEDICINE

## 2019-02-09 PROCEDURE — 96375 TX/PRO/DX INJ NEW DRUG ADDON: CPT | Performed by: FAMILY MEDICINE

## 2019-02-09 PROCEDURE — 96361 HYDRATE IV INFUSION ADD-ON: CPT | Performed by: FAMILY MEDICINE

## 2019-02-09 PROCEDURE — 80053 COMPREHEN METABOLIC PANEL: CPT | Performed by: FAMILY MEDICINE

## 2019-02-09 PROCEDURE — 86140 C-REACTIVE PROTEIN: CPT | Performed by: FAMILY MEDICINE

## 2019-02-09 PROCEDURE — 99285 EMERGENCY DEPT VISIT HI MDM: CPT | Mod: 25 | Performed by: FAMILY MEDICINE

## 2019-02-09 PROCEDURE — 25000125 ZZHC RX 250: Performed by: FAMILY MEDICINE

## 2019-02-09 PROCEDURE — 96376 TX/PRO/DX INJ SAME DRUG ADON: CPT | Performed by: FAMILY MEDICINE

## 2019-02-09 PROCEDURE — 85025 COMPLETE CBC W/AUTO DIFF WBC: CPT | Performed by: FAMILY MEDICINE

## 2019-02-09 PROCEDURE — 85730 THROMBOPLASTIN TIME PARTIAL: CPT | Performed by: FAMILY MEDICINE

## 2019-02-09 PROCEDURE — 84443 ASSAY THYROID STIM HORMONE: CPT | Performed by: FAMILY MEDICINE

## 2019-02-09 PROCEDURE — 99284 EMERGENCY DEPT VISIT MOD MDM: CPT | Mod: Z6 | Performed by: FAMILY MEDICINE

## 2019-02-09 PROCEDURE — 81001 URINALYSIS AUTO W/SCOPE: CPT | Performed by: FAMILY MEDICINE

## 2019-02-09 PROCEDURE — 85652 RBC SED RATE AUTOMATED: CPT | Performed by: FAMILY MEDICINE

## 2019-02-09 PROCEDURE — 25000132 ZZH RX MED GY IP 250 OP 250 PS 637: Performed by: FAMILY MEDICINE

## 2019-02-09 PROCEDURE — 96365 THER/PROPH/DIAG IV INF INIT: CPT | Performed by: FAMILY MEDICINE

## 2019-02-09 RX ORDER — HYDROMORPHONE HYDROCHLORIDE 1 MG/ML
0.3 INJECTION, SOLUTION INTRAMUSCULAR; INTRAVENOUS; SUBCUTANEOUS ONCE
Status: COMPLETED | OUTPATIENT
Start: 2019-02-09 | End: 2019-02-09

## 2019-02-09 RX ORDER — PREDNISONE 20 MG/1
TABLET ORAL
Qty: 10 TABLET | Refills: 0 | Status: SHIPPED | OUTPATIENT
Start: 2019-02-09 | End: 2019-02-22

## 2019-02-09 RX ORDER — SODIUM CHLORIDE 9 MG/ML
1000 INJECTION, SOLUTION INTRAVENOUS CONTINUOUS
Status: DISCONTINUED | OUTPATIENT
Start: 2019-02-09 | End: 2019-02-10 | Stop reason: HOSPADM

## 2019-02-09 RX ORDER — KETOROLAC TROMETHAMINE 15 MG/ML
15 INJECTION, SOLUTION INTRAMUSCULAR; INTRAVENOUS ONCE
Status: COMPLETED | OUTPATIENT
Start: 2019-02-09 | End: 2019-02-09

## 2019-02-09 RX ORDER — PREDNISONE 20 MG/1
40 TABLET ORAL ONCE
Status: COMPLETED | OUTPATIENT
Start: 2019-02-09 | End: 2019-02-09

## 2019-02-09 RX ORDER — METOCLOPRAMIDE HYDROCHLORIDE 5 MG/ML
5 INJECTION INTRAMUSCULAR; INTRAVENOUS ONCE
Status: COMPLETED | OUTPATIENT
Start: 2019-02-09 | End: 2019-02-09

## 2019-02-09 RX ORDER — DIPHENHYDRAMINE HYDROCHLORIDE 50 MG/ML
25 INJECTION INTRAMUSCULAR; INTRAVENOUS ONCE
Status: COMPLETED | OUTPATIENT
Start: 2019-02-09 | End: 2019-02-09

## 2019-02-09 RX ORDER — LIDOCAINE 40 MG/G
CREAM TOPICAL
Status: DISCONTINUED | OUTPATIENT
Start: 2019-02-09 | End: 2019-02-10 | Stop reason: HOSPADM

## 2019-02-09 RX ADMIN — DIPHENHYDRAMINE HYDROCHLORIDE 25 MG: 50 INJECTION INTRAMUSCULAR; INTRAVENOUS at 17:58

## 2019-02-09 RX ADMIN — SODIUM CHLORIDE 1000 ML: 9 INJECTION, SOLUTION INTRAVENOUS at 15:55

## 2019-02-09 RX ADMIN — METOCLOPRAMIDE 5 MG: 5 INJECTION, SOLUTION INTRAMUSCULAR; INTRAVENOUS at 17:58

## 2019-02-09 RX ADMIN — SODIUM CHLORIDE 1000 MG: 9 INJECTION, SOLUTION INTRAVENOUS at 19:45

## 2019-02-09 RX ADMIN — Medication 0.3 MG: at 15:56

## 2019-02-09 RX ADMIN — SODIUM CHLORIDE 1000 ML: 9 INJECTION, SOLUTION INTRAVENOUS at 17:55

## 2019-02-09 RX ADMIN — KETOROLAC TROMETHAMINE 15 MG: 15 INJECTION, SOLUTION INTRAMUSCULAR; INTRAVENOUS at 17:54

## 2019-02-09 RX ADMIN — AMITRIPTYLINE HYDROCHLORIDE 25 MG: 25 TABLET, FILM COATED ORAL at 22:09

## 2019-02-09 RX ADMIN — PREDNISONE 40 MG: 20 TABLET ORAL at 22:02

## 2019-02-09 RX ADMIN — METOCLOPRAMIDE 5 MG: 5 INJECTION, SOLUTION INTRAMUSCULAR; INTRAVENOUS at 15:55

## 2019-02-09 ASSESSMENT — ENCOUNTER SYMPTOMS
DYSURIA: 0
HEADACHES: 1
SHORTNESS OF BREATH: 0
BACK PAIN: 0
COLOR CHANGE: 0
SPEECH DIFFICULTY: 0
NAUSEA: 1
PALPITATIONS: 1
CONFUSION: 0
EYE REDNESS: 0
RHINORRHEA: 0
SEIZURES: 0
FACIAL SWELLING: 0
FATIGUE: 1
DIARRHEA: 0
DYSPHORIC MOOD: 1
DIFFICULTY URINATING: 0
SINUS PAIN: 0
ARTHRALGIAS: 0
ACTIVITY CHANGE: 0
ABDOMINAL PAIN: 0
TROUBLE SWALLOWING: 0
SINUS PRESSURE: 0
NUMBNESS: 0
VOMITING: 0
WEAKNESS: 0
DECREASED CONCENTRATION: 1
FEVER: 0
JOINT SWELLING: 0
NECK STIFFNESS: 0
BRUISES/BLEEDS EASILY: 0
APPETITE CHANGE: 1
NERVOUS/ANXIOUS: 1
CHILLS: 0

## 2019-02-09 ASSESSMENT — MIFFLIN-ST. JEOR: SCORE: 1154.58

## 2019-02-09 NOTE — TELEPHONE ENCOUNTER
Patient has had a migraine for 10-14 days, has been to the ER twice and was just sent home last night from her last visit. She states she has only had relief once while in the ER this last time, was unsure what med it was that helped. She was discharged on Compazine, took a dose 8:30 pm last night and 6:45 AM this morning with no improvement. Pain has been at 9 or 10 out of 10 on the pain scale and she now also has a new symptom of blurred vision in her right eye, which is also noted as a possible side effect of the compazine. Denies weakness or numbness on one side, confusion, speech changes, or passing out. Denies stiff neck, any impact to her walking.    Protocol and care advice reviewed.  Recommended ED based on protocol and that patient is doing worse than when she left the ER, she will see if she can get someone to be with her boys and possibly go to the [facility] ED to be seen.  Caller states understanding of the recommended disposition.   Advised to call back if further questions or concerns.    Reason for Disposition    Loss of vision or double vision (Exception: same as prior migraines)    Additional Information    Negative: [1] Weakness of the face, arm or leg on one side of the body AND [2] new onset    Negative: [1] Numbness of the face, arm or leg on one side of the body AND [2] new onset    Negative: Difficult to awaken or acting confused  (e.g., disoriented, slurred speech)    Negative: [1] Loss of speech or garbled speech AND [2] new onset    Negative: Passed out (i.e., lost consciousness, collapsed and was not responding)    Negative: Sounds like a life-threatening emergency to the triager    Negative: Unable to walk, or can only walk with assistance (e.g., requires support)    Negative: Stiff neck (can't touch chin to chest)    Negative: Severe pain in one eye    Negative: [1] Other family members (or roommates) with headaches AND [2] possibility of carbon monoxide exposure    Negative: [1] SEVERE  "headache (e.g., excruciating) AND [2] \"worst headache\" of life    Negative: [1] SEVERE headache AND [2] sudden-onset (i.e., reaching maximum intensity within seconds)    Negative: [1] SEVERE headache AND [2] fever    Protocols used: HEADACHE-ADULT-AH      "

## 2019-02-09 NOTE — ED PROVIDER NOTES
[place] EMERGENCY DEPARTMENT ([facility])  2/09/19    History     Chief Complaint   Patient presents with     Headache     The history is provided by the patient and medical records.     Rocío Catherine is a 23 year old female with a history of Factor V Leiden who presents to the Emergency Department for evaluation of a headache. Patient was recently seen and evaluated at [facility] ED on 2/6 and 2/7. She was admitted on 2/7 and had a MRV of the head with and without contrast, as well as MR brain with and without contrast; please see impression below. Patient was discharged with Compazine 10 mg by mouth every 6 hours.    Here, patient reports that she has had an ongoing headache for the last 14 days (1/26). She notes that she hit her head last Friday (2/1) and her headache began gradually worsening on Wednesday (2/6). She describes the headache as a constant throbbing/ache and states that is located in the front, going up to the top of the head and down to the occiput. She has had some double vision, as well as blurriness. Additionally, she has felt nauseous but denies vomiting.    Patient does not have a history of severe headaches. She states that previous headaches have been fairly minor and typically improve with Tylenol. Her current headache has not been improved with Tylenol or Compazine. She took Compazine at 2030 last night (2/8), as well as this morning without relief of symptoms. Patient denies fevers or chills. She has not recently traveled and does not have a history of DVT/PE.    MRI BRAIN WITHOUT AND WITH CONTRAST  2/7/2019 3:30 PM  IMPRESSION:  Unremarkable MRI of the head with and without contrast.    MR VENOGRAM OF THE HEAD WITHOUT AND WITH CONTRAST  2/7/2019 3:30 PM   IMPRESSION: Normal MR venogram of the head. No evidence of dural  venous sinus thrombosis.    Patient does note with increasing pain her heart rate goes up also.  She would like some pain currently.    Current  Facility-Administered Medications   Medication     lidocaine (LMX4) cream     lidocaine 1 % 0.1-1 mL     sodium chloride (PF) 0.9% PF flush 3 mL     sodium chloride (PF) 0.9% PF flush 3 mL     sodium chloride 0.9% infusion     Current Outpatient Medications   Medication     amitriptyline (ELAVIL) 25 MG tablet     predniSONE (DELTASONE) 20 MG tablet     prochlorperazine (COMPAZINE) 10 MG tablet     ibuprofen (ADVIL/MOTRIN) 200 MG tablet     Past Medical History:   Diagnosis Date     Factor 5 Leiden mutation, heterozygous (H) 2/27/2018     Factor V Leiden (H)      Non-rheumatic mitral regurgitation, trace 2/8/2019    Seen on echocardiogram 2/8/2019. Recommended for follow up echo in 2-3 years.        Past Surgical History:   Procedure Laterality Date     GYN SURGERY      c section     HEAD & NECK SURGERY      hemangioma       Family History   Problem Relation Age of Onset     Other - See Comments Father         factor V     Other - See Comments Paternal Grandfather         factor V       Social History     Tobacco Use     Smoking status: Never Smoker     Smokeless tobacco: Never Used   Substance Use Topics     Alcohol use: Yes     Comment: rare     No Known Allergies    I have reviewed the Medications, Allergies, Past Medical and Surgical History, and Social History in the Epic system.    Review of Systems   Constitutional: Positive for appetite change and fatigue. Negative for activity change, chills and fever.        Patient describes some ongoing nausea   HENT: Negative for congestion, ear pain, facial swelling, hearing loss, nosebleeds, rhinorrhea, sinus pressure, sinus pain, tinnitus and trouble swallowing.    Eyes: Positive for visual disturbance (nonspecific blurred vision). Negative for redness.   Respiratory: Negative for shortness of breath.    Cardiovascular: Positive for palpitations (hr increase). Negative for chest pain and leg swelling.   Gastrointestinal: Positive for nausea. Negative for abdominal  "pain, diarrhea and vomiting.   Genitourinary: Negative for difficulty urinating, dysuria and vaginal bleeding.   Musculoskeletal: Negative for arthralgias, back pain, gait problem, joint swelling and neck stiffness.   Skin: Negative for color change and rash.   Allergic/Immunologic: Negative for immunocompromised state.   Neurological: Positive for headaches. Negative for seizures, speech difficulty, weakness and numbness.   Hematological: Does not bruise/bleed easily.        Factor V Leiden deficiency without hx of clot   Psychiatric/Behavioral: Positive for decreased concentration and dysphoric mood. Negative for confusion. The patient is nervous/anxious.    All other systems reviewed and are negative.      Physical Exam   BP: 120/72  Pulse: 112  Heart Rate: 110  Temp: 99.2  F (37.3  C)  Resp: 16  Height: 154.9 cm (5' 1\")  Weight: 46.7 kg (103 lb)  SpO2: 100 %      Physical Exam   Constitutional: She is oriented to person, place, and time. She appears well-developed and well-nourished. She appears distressed.   Patient is still alert and oriented nontoxic here does interact.  Mother present also.  Is not actively retching does not appear to be markedly photophobic.   HENT:   Head: Normocephalic and atraumatic.   Eyes: Conjunctivae and EOM are normal. Pupils are equal, round, and reactive to light. No scleral icterus.   Neck: Normal range of motion. Neck supple.   Cardiovascular:   Sinus tachycardia 120s by EKG monitor   Pulmonary/Chest: No stridor. No respiratory distress. She has no wheezes.   Abdominal: She exhibits no distension and no mass. There is no tenderness. There is no guarding.   Musculoskeletal: She exhibits no edema, tenderness or deformity.   Neurological: She is alert and oriented to person, place, and time.   No acute gross neurological focal findings seen   Skin: Skin is warm and dry. Capillary refill takes less than 2 seconds. No rash noted. She is not diaphoretic. No erythema. No pallor. "   Psychiatric:   Mild anxiousness with flattened affect because of ongoing headache which is abnormal for the last 2 weeks   Nursing note and vitals reviewed.      ED Course   3:37 PM  The patient was seen and examined by Jesse Cantu MD in Room [facility].       Patient was evaluated in the ER laboratory testings were drawn.  Patient given liter normal saline bolus along with this initially 5 mg of Reglan IV and 3 mg of Dilaudid IV    Consultation with neurology who did see the patient here in the ER.  They reviewed the extensive workup including LP MRA MRV that was done the last few days at [facility].  At this point this discussed with attending staff and other examination feel more likely his symptoms are consistent still with status migraine.  Patient here in the ER will then be treated with another liter of fluid along with this placed on oxygen.  Patient did receive 15 mg of Toradol IV along with 5 mg of Reglan and 25 mg of Benadryl IV.    Headache was slightly improved.  Therefore recognitions per neurology would be to then treat with Depacon 1 g IV which she currently is receiving.  Some improvement is noted.    Patient feeling better. OK home.  Neuro recommends amitriptyline 25mg at hs   Pred 40mg daily for 5 days.  Patient given initial doses in the ER.    Follow up with neuro headache Dr. Oneill or Tonia.    Patient discharge with mom.      Procedures             Critical Care time:  none             Labs Ordered and Resulted from Time of ED Arrival Up to the Time of Departure from the ED   COMPREHENSIVE METABOLIC PANEL - Abnormal; Notable for the following components:       Result Value    Glucose 123 (*)     All other components within normal limits   ROUTINE UA WITH MICROSCOPIC REFLEX TO CULTURE - Abnormal; Notable for the following components:    Ketones Urine 10 (*)     pH Urine 7.5 (*)     Bacteria Urine Few (*)     All other components within normal limits   CBC WITH PLATELETS DIFFERENTIAL    ERYTHROCYTE SEDIMENTATION RATE AUTO   CRP INFLAMMATION   PARTIAL THROMBOPLASTIN TIME   INR   MAGNESIUM   TSH   PULSE OXIMETRY NURSING   PERIPHERAL IV CATHETER   CARDIAC CONTINUOUS MONITORING     Results for orders placed or performed during the hospital encounter of 02/09/19   CBC with platelets differential   Result Value Ref Range    WBC 10.4 4.0 - 11.0 10e9/L    RBC Count 4.83 3.8 - 5.2 10e12/L    Hemoglobin 14.6 11.7 - 15.7 g/dL    Hematocrit 42.6 35.0 - 47.0 %    MCV 88 78 - 100 fl    MCH 30.2 26.5 - 33.0 pg    MCHC 34.3 31.5 - 36.5 g/dL    RDW 11.2 10.0 - 15.0 %    Platelet Count 229 150 - 450 10e9/L    Diff Method Automated Method     % Neutrophils 74.9 %    % Lymphocytes 18.6 %    % Monocytes 5.0 %    % Eosinophils 0.7 %    % Basophils 0.5 %    % Immature Granulocytes 0.3 %    Nucleated RBCs 0 0 /100    Absolute Neutrophil 7.8 1.6 - 8.3 10e9/L    Absolute Lymphocytes 1.9 0.8 - 5.3 10e9/L    Absolute Monocytes 0.5 0.0 - 1.3 10e9/L    Absolute Eosinophils 0.1 0.0 - 0.7 10e9/L    Absolute Basophils 0.1 0.0 - 0.2 10e9/L    Abs Immature Granulocytes 0.0 0 - 0.4 10e9/L    Absolute Nucleated RBC 0.0     Platelet Estimate Confirming automated cell count    Erythrocyte sedimentation rate auto   Result Value Ref Range    Sed Rate 5 0 - 20 mm/h   CRP inflammation   Result Value Ref Range    CRP Inflammation <2.9 0.0 - 8.0 mg/L   Partial thromboplastin time   Result Value Ref Range    PTT 29 22 - 37 sec   INR   Result Value Ref Range    INR 0.99 0.86 - 1.14   Comprehensive metabolic panel   Result Value Ref Range    Sodium 140 133 - 144 mmol/L    Potassium 3.5 3.4 - 5.3 mmol/L    Chloride 105 94 - 109 mmol/L    Carbon Dioxide 28 20 - 32 mmol/L    Anion Gap 7 3 - 14 mmol/L    Glucose 123 (H) 70 - 99 mg/dL    Urea Nitrogen 8 7 - 30 mg/dL    Creatinine 0.67 0.52 - 1.04 mg/dL    GFR Estimate >90 >60 mL/min/[1.73_m2]    GFR Estimate If Black >90 >60 mL/min/[1.73_m2]    Calcium 9.5 8.5 - 10.1 mg/dL    Bilirubin Total 1.0  0.2 - 1.3 mg/dL    Albumin 4.4 3.4 - 5.0 g/dL    Protein Total 7.8 6.8 - 8.8 g/dL    Alkaline Phosphatase 54 40 - 150 U/L    ALT 25 0 - 50 U/L    AST 21 0 - 45 U/L   Magnesium   Result Value Ref Range    Magnesium 1.8 1.6 - 2.3 mg/dL   TSH   Result Value Ref Range    TSH 0.90 0.40 - 4.00 mU/L   UA with Microscopic reflex to Culture   Result Value Ref Range    Color Urine Light Yellow     Appearance Urine Clear     Glucose Urine Negative NEG^Negative mg/dL    Bilirubin Urine Negative NEG^Negative    Ketones Urine 10 (A) NEG^Negative mg/dL    Specific Gravity Urine 1.006 1.003 - 1.035    Blood Urine Negative NEG^Negative    pH Urine 7.5 (H) 5.0 - 7.0 pH    Protein Albumin Urine Negative NEG^Negative mg/dL    Urobilinogen mg/dL Normal 0.0 - 2.0 mg/dL    Nitrite Urine Negative NEG^Negative    Leukocyte Esterase Urine Negative NEG^Negative    Source Midstream Urine     WBC Urine <1 0 - 5 /HPF    RBC Urine <1 0 - 2 /HPF    Bacteria Urine Few (A) NEG^Negative /HPF    Squamous Epithelial /HPF Urine 1 0 - 1 /HPF         Consults  Neurology: Responded (02/09/19 0823)    Assessments & Plan (with Medical Decision Making)  23-year-old female who is not pregnant presents with 2-week headache.  Patient does not have history of this normally.  She was evaluated at [facility] yesterday after 1 day stay.  Patient extensive workup including that of MRA and MRV which were negative.  Spinal tap reported to be negative also.  Patient treated with different medications sent out on Compazine.  Patient notes headache is recurred and worse.  She presents the ER neurologically intact otherwise no fevers etc.  She has no nuchal rigidity no rash etc.  Patient was evaluated here in the ER by neurology also.  Recognitions this point we did do IV fluids for the patient along with this she did receive Reglan Toradol Benadryl oxygen slight improvement of headache patient then received 1 g of Depacon IV.  She is feeling better after  this.  Recommendations per neurology are to send the patient home taking amitriptyline 25 mg at bedtime prophylactically and then also 40 mg of prednisone for 5 days.  We did give her her first dose of these 2 medications in the ER prior to be discharged.  Patient to follow-up with either  or  who are neurology headache specialist.  Patient to return if any concerns is comfortable discharged with mother.    Should be noted also lab called and said he found one blast on patient's  CBC. Will send to pathology for review. Patient to follow up with pmd to check on final recommendations. Patient and mother informed.         I have reviewed the nursing notes.    I have reviewed the findings, diagnosis, plan and need for follow up with the patient.       Medication List      Started    amitriptyline 25 MG tablet  Commonly known as:  ELAVIL  25 mg, Oral, AT BEDTIME     predniSONE 20 MG tablet  Commonly known as:  DELTASONE  Take two tablets (= 40mg) each day for 5 (five) days            Final diagnoses:   Status migrainosus   IAnne, am serving as a trained medical scribe to document services personally performed by Jesse Cantu MD, based on the provider's statements to me.   IJesse MD, was physically present and have reviewed and verified the accuracy of this note documented by Anne Allen.    2/9/2019   [facility], [place], EMERGENCY DEPARTMENT     Jesse Cantu MD  02/09/19 2221

## 2019-02-09 NOTE — ED AVS SNAPSHOT
[facility], [place], Emergency Department  [address] [place] [id number]  Phone:  [phone]                                    Rocío Catherine   MRN: [id number]    Department:  [facility], Emergency Department   Date of Visit:  2/9/2019           After Visit Summary Signature Page    I have received my discharge instructions, and my questions have been answered. I have discussed any challenges I see with this plan with the nurse or doctor.    ..........................................................................................................................................  Patient/Patient Representative Signature      ..........................................................................................................................................  Patient Representative Print Name and Relationship to Patient    ..................................................               ................................................  Date                                   Time    ..........................................................................................................................................  Reviewed by Signature/Title    ...................................................              ..............................................  Date                                               Time          22EPIC Rev 08/18

## 2019-02-09 NOTE — CONSULTS
[facility]  Neurology  Consultation    Patient Name:  Rocío Catherine  MRN:  [id number]    :  1995  Date of Service:  2019  Primary care provider:  Yessy Lyn      Neurology consultation service was asked to see Rocío Catherine by Dr. Cantu to evaluate for headache.    History of Present Illness:   Ms Rocío Catherine is a 23 year old female with a PMHx of Factor V Leiden who presented to [facility] ED on 2018 for evaluation of headache. Rocío reports that she has had a progressive gradual onset of headache since approximately  - . She describes it as both a tension and sharp headache that is bitemporal and non-radiating to her neck or around her eyes. It is a 10/10 in severity and has been constant since it's onset. She had hit her head on  and the headache became much more severe on . It is associated with nausea but not vomiting as well as photophobia and phonophobia. It is also worsened when she lies on her side. It is not worse in the morning.      She denied any recent illnesses.  She denied recent history of fever, visual disturbances, numbness and tingling, weakness, abdominal pain, dysuria, or chest pain.    Of note, she presented to [facility] on 12/10 pain and received IV steroids, Benadryl, and nausea medications with some improvement.  She returned the following morning with 9 out of 10 pain.  She had workup in the ED which included a normal MRI/MRV brain, and normal lumbar puncture including Gram stain and culture, negative flu swab.  She had a white cell count of 14.6 and a CRP of 2.5.  She was afebrile and her labs were otherwise normal.  She had no focal neurological deficits.  Concerning etiologies were ruled out with an LP and neuroimaging and she was admitted under observation with an impression that her headaches were consistent with tension type headaches versus migraines.    She reported that during her observation stay she did not  "have much improvement of her headaches but reported that one IV dose of hydromorphone helped.  She received sumatriptan 6 mg subcutaneous and following that, complained of severe burning at the injection site and then severe nausea.  Her headache did improve from an 8 out of 10 to 4 out of 10.  She received another dose of Zofran and the nausea improved partially.  She was then given Compazine IV and had complete resolution of the headache and nausea.  She was then discharged home with recommendations to use ibuprofen 400 mg every 4 hours and oral Compazine for nausea with moderate to severe headache.    ROS  A 10-point ROS was performed as per HPI.      PMH  Past Medical History:   Diagnosis Date     Factor 5 Leiden mutation, heterozygous (H) 2/27/2018     Factor V Leiden (H)      Non-rheumatic mitral regurgitation, trace 2/8/2019    Seen on echocardiogram 2/8/2019. Recommended for follow up echo in 2-3 years.      Past Surgical History:   Procedure Laterality Date     GYN SURGERY      c section     HEAD & NECK SURGERY      hemangioma       Medications   -Ibuprofen 400 mg every 4 hours as needed  - Compazine 10 mg every 6 hours as needed  - Not on any oral contraceptive pills    Allergies  No Known Allergies    Social History  Used to work as at [employer].  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  Has 2 twin boys at home.  Denies any smoking, alcohol use, or illicit substance use.    Family History    Positive family history of migraines in father side of the family.    Physical Examination   Vitals: /84   Pulse 112   Temp 99.2  F (37.3  C) (Oral)   Resp 16   Ht 1.549 m (5' 1\")   Wt 46.7 kg (103 lb)   SpO2 100%   BMI 19.46 kg/m     On examination the patient was lying comfortably in bed in a darkened room and appeared uncomfortable and slightly anxious.  She is thin built.  Her cardiac examination revealed no abnormalities with a regular rate and rhythm and no murmurs rubs or gallops.  Her chest was clear to " auscultation bilaterally.  Her abdomen was soft and nondistended.     Her neurological examination is largely unremarkable.  She was alert and attentive speech was fluent and non-dysarthric.  Her pupils are equal and reactive to light.  I could not appreciate any evidence of papilledema on funduscopic exam.  Her eyes were conjugate and her face was symmetric she did note some decreased sensation in the V1 to V3 distributions on the right.  Shoulder shrug was strong and tongue and uvula were midline.  Tone was normal and she had intact strength in all 4 extremities.  No atrophies or twitches were appreciated.  Her reflexes were quite brisk at the biceps, brachioradialis, patellar's, and ankles bilaterally.  However Jv's sign was negative and toes were downgoing.  She did have 1 beat of clonus bilaterally.  She had intact sensation to light touch and pinprick in all 4 extremities.  And finger-nose-finger as well as heel-to-shin tests were intact.  I did not assess her gait.    Investigations   MRI BRAIN WITHOUT AND WITH CONTRAST  2/7/2019 3:30 PM  IMPRESSION:  Unremarkable MRI of the head with and without contrast.    MR VENOGRAM OF THE HEAD WITHOUT AND WITH CONTRAST 2/7/2019 3:30 PM   IMPRESSION: Normal MR venogram of the head. No evidence of dural venous sinus thrombosis.    Her CSF from [facility] showed a glucose level of 73, protein of 24, 0 white cells and 0 red cells, and a negative Gram stain and negative culture.  No documentation of opening pressure.    I reviewed her labs from today which include a routine UA, TSH, comprehensive metabolic panel, ESR, CRP, and a CBC which were all largely unremarkable.    Impression and recommendations  Ms. Rocío Catherine is a 23-year-old female with a history of factor V Leiden.  Over the past week this has been her third visit to the emergency department due to headache.  She had had an extensive evaluation at [facility] on 2/7/2019 including an MRI, MRV as  well as a lumbar puncture all of which were entirely within normal limits.  This would rule out concerning causes of headache such as a cerebral venous sinus thrombosis, intracerebral hemorrhage, subarachnoid hemorrhage.  In addition it is unlikely to represent an intracranial idiopathic hypertension given her thin built and no evidence of papilledema on funduscopy.  Given the duration of her symptoms and that she had had an MRV 2 days ago we did not think it was necessary to repeat an MRV.  Given the above workup, a nonfocal neurological exam, as well as response to sumatriptan suspect that this likely represents new onset migraines/status migrainosus, suspect a tension type headache would have resolved by now.  She has no autonomic features suggestive of a trigeminal autonomic cephalgia.    -Recommend IV fluids as well as antiemetics (Reglan or Compazine)  - Recommend IV Toradol 15 mg  - If no response to the above regimen can try 1 g of valproate IV  -If she does not respond to  Toradol or valproate can consider chlorpromazine 12.5 mg  (up to 3 doses 20 minutes apart) with premedication with benztropine 1 mg; alternatively can try 10 mg of IM Zyprexa    -Discharge with 40 mg of prednisone daily for a course of 5 days  -Start on amitriptyline 25 mg at bedtime  -We will need follow-up with a headache specialist (please schedule appointment at [facility] with either Dr. Stacy Oneill or Dr. Concepcion Randall)    Thank you for involving neurology in the care of Rocío Catherine.  Please do not hesitate to call with questions/concerns.    Patient discussed via phone with Dr. Vaughan.    Ashanti Gil  Neurology Resident, PGY2  Pager: [phone]    Attending physician: I discussed the care of this patient with the resident by telephone and developed the care plan as above collaboratively. Patient not seen or examined by me on this date.    Zachery Vaughan MD   of Neurology

## 2019-02-09 NOTE — ED TRIAGE NOTES
"Pt comes in with 2 weeks of headaches. Was seen at [facility] ED this past Wednesday and Thursday, admitted overnight on Thursday. Per pt, MRI of head was normal and cardiac workup revealed \"inappropriate tachycardia\" and a \"leaky valve\". Pt alert and oriented. Describes pain as pounding and dull. Some blurry vision, some n/v  "

## 2019-02-10 NOTE — DISCHARGE INSTRUCTIONS
"Home.  You were seen by neurology in the ER who recommended treating with medications in the ER.  Also to take then the prednisone and amitriptyline as directed starting tomorrow as you had the first dose today in the ER.  Call your MD to check on final comment on your blood count as they noted \"1\" immature cell. Unclear if that is significant but they send to the pathologist to make comments.  Return if any concerns.  Rest and fluids.  Take your compazine at home also.  Make a follow up appointment with [facility] neurology headache specialists either : Dr. Oneill or Dr. Randall.  Call for appointment.      "

## 2019-02-11 ENCOUNTER — TELEPHONE (OUTPATIENT)
Dept: FAMILY MEDICINE | Facility: CLINIC | Age: 24
End: 2019-02-11

## 2019-02-11 LAB
BASOPHILS # BLD AUTO: 0.1 10E9/L (ref 0–0.2)
BASOPHILS NFR BLD AUTO: 0.5 %
DIFFERENTIAL METHOD BLD: NORMAL
EOSINOPHIL # BLD AUTO: 0.1 10E9/L (ref 0–0.7)
EOSINOPHIL NFR BLD AUTO: 0.7 %
ERYTHROCYTE [DISTWIDTH] IN BLOOD BY AUTOMATED COUNT: 11.2 % (ref 10–15)
HCT VFR BLD AUTO: 42.6 % (ref 35–47)
HGB BLD-MCNC: 14.6 G/DL (ref 11.7–15.7)
IMM GRANULOCYTES # BLD: 0 10E9/L (ref 0–0.4)
IMM GRANULOCYTES NFR BLD: 0.3 %
LYMPHOCYTES # BLD AUTO: 1.9 10E9/L (ref 0.8–5.3)
LYMPHOCYTES NFR BLD AUTO: 18.6 %
MCH RBC QN AUTO: 30.2 PG (ref 26.5–33)
MCHC RBC AUTO-ENTMCNC: 34.3 G/DL (ref 31.5–36.5)
MCV RBC AUTO: 88 FL (ref 78–100)
MONOCYTES # BLD AUTO: 0.5 10E9/L (ref 0–1.3)
MONOCYTES NFR BLD AUTO: 5 %
NEUTROPHILS # BLD AUTO: 7.8 10E9/L (ref 1.6–8.3)
NEUTROPHILS NFR BLD AUTO: 74.9 %
NRBC # BLD AUTO: 0 10*3/UL
NRBC BLD AUTO-RTO: 0 /100
PLATELET # BLD AUTO: 229 10E9/L (ref 150–450)
PLATELET # BLD EST: NORMAL 10*3/UL
RBC # BLD AUTO: 4.83 10E12/L (ref 3.8–5.2)
WBC # BLD AUTO: 10.4 10E9/L (ref 4–11)

## 2019-02-12 LAB
BACTERIA SPEC CULT: NO GROWTH
SPECIMEN SOURCE: NORMAL

## 2019-02-12 NOTE — TELEPHONE ENCOUNTER
ED / Discharge Outreach Protocol    Patient Contact    Attempt # 1    Was call answered?  No.  Left message on voicemail with information to call me back.    Pt was treated for status migraine headache at the [facility] ED on 2/9/19   She was discharged with instructions to follow up with neurology headache specialists, Dr Oneill or Dr Randall.  Pathology lab pending, sent to [facility].  One trophoblast was noted.  Pt to follow up with PCP in one week also.    Leti Fernando RN

## 2019-02-13 ENCOUNTER — OFFICE VISIT (OUTPATIENT)
Dept: NEUROLOGY | Facility: CLINIC | Age: 24
End: 2019-02-13
Payer: COMMERCIAL

## 2019-02-13 VITALS
HEART RATE: 129 BPM | HEIGHT: 61 IN | BODY MASS INDEX: 19.8 KG/M2 | DIASTOLIC BLOOD PRESSURE: 77 MMHG | SYSTOLIC BLOOD PRESSURE: 113 MMHG | WEIGHT: 104.9 LBS | TEMPERATURE: 97.5 F | OXYGEN SATURATION: 99 % | RESPIRATION RATE: 16 BRPM

## 2019-02-13 DIAGNOSIS — G43.719 INTRACTABLE CHRONIC MIGRAINE WITHOUT AURA AND WITHOUT STATUS MIGRAINOSUS: Primary | ICD-10-CM

## 2019-02-13 PROBLEM — K80.00 CALCULUS OF GALLBLADDER WITH ACUTE CHOLECYSTITIS WITHOUT OBSTRUCTION: Status: ACTIVE | Noted: 2017-05-31

## 2019-02-13 RX ORDER — SUMATRIPTAN 100 MG/1
100 TABLET, FILM COATED ORAL
Qty: 18 TABLET | Refills: 3 | Status: SHIPPED | OUTPATIENT
Start: 2019-02-13 | End: 2019-07-03

## 2019-02-13 RX ORDER — NAPROXEN 500 MG/1
500 TABLET ORAL 2 TIMES DAILY PRN
Qty: 28 TABLET | Refills: 3 | Status: SHIPPED | OUTPATIENT
Start: 2019-02-13 | End: 2019-06-11

## 2019-02-13 RX ORDER — PROMETHAZINE HYDROCHLORIDE 25 MG/1
25 TABLET ORAL EVERY 6 HOURS PRN
Qty: 28 TABLET | Refills: 3 | Status: SHIPPED | OUTPATIENT
Start: 2019-02-13 | End: 2019-06-11

## 2019-02-13 ASSESSMENT — PATIENT HEALTH QUESTIONNAIRE - PHQ9: SUM OF ALL RESPONSES TO PHQ QUESTIONS 1-9: 0

## 2019-02-13 ASSESSMENT — PAIN SCALES - GENERAL: PAINLEVEL: MILD PAIN (2)

## 2019-02-13 ASSESSMENT — MIFFLIN-ST. JEOR: SCORE: 1163.2

## 2019-02-13 NOTE — NURSING NOTE
Chief Complaint   Patient presents with     Headache     [facility], ED F/U HEADACHES     Eduardo Lopez, EMT

## 2019-02-13 NOTE — TELEPHONE ENCOUNTER
ED / Discharge Outreach Protocol    Patient Contact    Attempt # 2    Was call answered?  No.  Left message on voicemail with information to call me back.Michelle MEANS RN

## 2019-02-13 NOTE — PROGRESS NOTES
[facility]   DEPARTMENT OF NEUROLOGY      Service Date: 02/13/2019      REASON FOR REFERRAL:  We were asked to see Ms. Rocío Catherine for evaluation of her headaches following an emergency room visit.     CHIEF COMPLAINT:  Headache.      HISTORY OF PRESENT ILLNESS:  Rocío Catherine is a very pleasant 23-year-old female with past medical history significant for depression, anxiety and factor V Leiden who presents to the Neurology Clinic for evaluation of her headaches.  The patient states that for most of her life, she would get approximately 1-2 headaches per month.  These were very mild to moderate in nature.  They would respond extremely well to Tylenol and be completely resolved within 1-2 hours.  They never significantly affected her life.  They then changed on approximately 01/23/2019.  She at that point started to get a daily morning headache.  She recalls having to take at least 2-3 Advil several times per day in order to control her headaches.  The location was on the top middle of her head and went down into her forehead.  She could then feel it into the top of her neck as well.  These were daily and constant in nature.  The pain was pounding in quality.  The severity was generally a 5-6/10; however, it did get up to 12/10, causing her to present to the emergency room.  There were no visual auras associated with this.  She did have blurry vision during one of her migrainous episodes in the ED.  She does have associated symptoms of nausea and some mild photophobia.  There were no triggers at the onset of this new daily headache.  There does not seem to be relationship to activity.        The patient presented to the ED and the concern was for a venous thrombosis given a history of factor V Leiden. An MRI/MRV was completed and showed no evidence of intracranial pathology. The focus was then shifted to symptomatic control. She was given several medications while in the emergency room. She specifically  remembers the compazine not helping; however, her headache did marli with the other medications.  She was discharged on a steroid burst and was started on amitriptyline at 25 mg each day at bedtime on 02/09/2018.  She has not experienced significant side effects on the amitriptyline and has been tolerating this medication quite well.        She is able to sleep approximately 8-9 hours per night and feels rested upon awakening.  She does endorse having anxiety.  She has 2 twin boys who are young, they were sick with RSV at the beginning of January, so she did have more stress related to this.  She denies any head trauma in the past, though she recalls looking for a camper with her  on 02/01 and ended up hitting her head on a fifth wheel hitch.  She had a minor cut on the top of her head but did not experience any change in her headaches and this clearly was 1-1/2 weeks after the onset of her new daily headaches.  She does tell us that she is currently in the process of trying to get pregnant though she is not currently pregnant.      PAST MEDICAL HISTORY:  Significant for factor V Leiden.      PAST SURGICAL HISTORY:  She did have sinus surgery in 2015.      SOCIAL HISTORY:  The patient currently resides in [place], [place].  She rarely uses alcohol.  She denies any other drug use.  She denies smoking.      PHYSICAL EXAMINATION:   VITAL SIGNS:  Blood pressure is 113/77, her pulse is 129.  Her temperature is 36.4.  Her BMI is 19.82.   GENERAL:  The patient is sitting in a chair in no acute distress.   HEENT:  Normocephalic, atraumatic.   CHEST:  Not in respiratory distress.   ABDOMEN:  Nondistended.   EXTREMITIES:  No edema noted bilaterally.   SKIN:  No abnormal bruising noted.   NEUROLOGIC:   MENTAL STATUS:  The patient is alert and oriented to person, place, time and situation.  Language is fluent with intact comprehension.     CRANIAL NERVES:  Extraocular motions are intact.  Pupils are equal, round,  reactive to light.  Full visual fields.  No facial asymmetry.  Facial sensation is intact.  Tongue protrudes at the midline.   MOTOR:  The patient has 5/5 strength throughout bilateral upper and lower extremities.  No abnormal posture tone abnormality or atrophy were noted.   REFLEXES:  The patient has 2+ and symmetric reflexes at the brachioradialis, biceps, triceps, patella and Achilles bilaterally.   SENSORY:  The patient has intact sensation to light touch and vibration throughout all 4 extremities.   COORDINATION:  The patient has intact finger-nose-finger and heel-to-shin.   GAIT:  Normal with stride length turn and arm swing.      INVESTIGATIONS:  Reviewed her MRI and MRV completed on 02/07/2019 during today's office visit.  These were both normal.      ASSESSMENT AND PLAN:  Ms. Rocío Catherine is a very pleasant 23-year-old female with past medical history significant for factor V Leiden, depression and anxiety who presents to Neurology Clinic for evaluation of her headaches.  Her headaches did change significantly on 01/23/2019 and have been almost constant in nature since this time.  She does not currently qualify for a diagnosis of a new daily persistent headache with chronic migraine without aura phenotype due to the duration being less than 3 months.  However, if this continues for 3 months, she would qualify for this diagnosis on ICHD-3.  Over the last several days, the patient has been doing better from a headache standpoint than the prior 2 weeks.    For prevention of headaches we discussed the following:  -Continue amitriptyline 25mg at bedtime that was started in the ED on 2/09/2019. Patient was tachycardic prior to the initiation of amitriptyline and continues to be tachycardic in clinic today. Therefore, we will defer increasing the medication at this time.  -Agents to consider if she were to fail amitriptyline would be propranolol; however, the patient does have a history of depression and is  currently off of her depression medications.  Another agent would be topiramate, though the patient is attempting to get pregnant, so we would avoid that medication in at least the near future.  Verapamil may be a reasonable next option to consider.  If she fails amitriptyline and verapamil, Botox can be considered if she were not pregnant.     For acute treatment of headache, we discussed a treatment strategy as described below:  -For acute treatment of mild headache, she may continue ibuprofen, tylenol. These medications not to exceed more than 14 days/month to avoid medication overuse.   - For acute treatment of moderate to severe headache, I recommend sumatriptan 100 mg taken at the onset of headache with a repeat dose in 2 hours if needed.  This should not exceed more than 9 days/month to avoid medication overuse.  -For nausea, or as a rescue medicine for headache, I recommend Promethazine 10 mg.  This should not exceed more than 9 days/month to avoid medication side effects.    The patient is currently considering pregnancy. If she does get pregnant, she should stop these medications and a follow up visit can be made to discuss headache treatment options during pregnancy, if needed.  At the current doses, these medications could be stopped immediately upon finding out she was pregnant.      The patient was seen and discussed with Dr. Concepcion Randall.      Yinka Menendez MD  Neurology Resident, PGY-3       I was present with the resident who participated in the service and in the documentation of the note. I have verified the history and personally performed the physical exam and medical decision making. I agree with the assessment and plan of care as documented in the note.     Concepcion Randall MD      D: 2019   T: 2019   MT: FERNANDO      Name:     AIDA WONG   MRN:      [id number]        Account:      [id number]   :      1995           Service Date: 2019      Document: [id number]

## 2019-02-13 NOTE — LETTER
2/13/2019       RE: Rocío Catherine  [address]  [place] [address]     Dear Colleague,    Thank you for referring your patient, Rocío Catherine, to the [facility] NEUROLOGY at [facility]. Please see a copy of my visit note below.    [facility]   DEPARTMENT OF NEUROLOGY      Service Date: 02/13/2019      REASON FOR REFERRAL:  We were asked to see Ms. Rocío Catherine for evaluation of her headaches following an emergency room visit.     CHIEF COMPLAINT:  Headache.      HISTORY OF PRESENT ILLNESS:  Rocío Catherine is a very pleasant 23-year-old female with past medical history significant for depression, anxiety and factor V Leiden who presents to the Neurology Clinic for evaluation of her headaches.  The patient states that for most of her life, she would get approximately 1-2 headaches per month.  These were very mild to moderate in nature.  They would respond extremely well to Tylenol and be completely resolved within 1-2 hours.  They never significantly affected her life.  They then changed on approximately 01/23/2019.  She at that point started to get a daily morning headache.  She recalls having to take at least 2-3 Advil several times per day in order to control her headaches.  The location was on the top middle of her head and went down into her forehead.  She could then feel it into the top of her neck as well.  These were daily and constant in nature.  The pain was pounding in quality.  The severity was generally a 5-6/10; however, it did get up to 12/10, causing her to present to the emergency room.  There were no visual auras associated with this.  She did have blurry vision during one of her migrainous episodes in the ED.  She does have associated symptoms of nausea and some mild photophobia.  There were no triggers at the onset of this new daily headache.  There does not seem to be relationship to activity.        The patient presented to the ED and the concern was for a  venous thrombosis given a history of factor V Leiden. An MRI/MRV was completed and showed no evidence of intracranial pathology. The focus was then shifted to symptomatic control. She was given several medications while in the emergency room. She specifically remembers the compazine not helping; however, her headache did marli with the other medications.  She was discharged on a steroid burst and was started on amitriptyline at 25 mg each day at bedtime on 02/09/2018.  She has not experienced significant side effects on the amitriptyline and has been tolerating this medication quite well.        She is able to sleep approximately 8-9 hours per night and feels rested upon awakening.  She does endorse having anxiety.  She has 2 twin boys who are young, they were sick with RSV at the beginning of January, so she did have more stress related to this.  She denies any head trauma in the past, though she recalls looking for a camper with her  on 02/01 and ended up hitting her head on a fifth wheel hitch.  She had a minor cut on the top of her head but did not experience any change in her headaches and this clearly was 1-1/2 weeks after the onset of her new daily headaches.  She does tell us that she is currently in the process of trying to get pregnant though she is not currently pregnant.      PAST MEDICAL HISTORY:  Significant for factor V Leiden.      PAST SURGICAL HISTORY:  She did have sinus surgery in 2015.      SOCIAL HISTORY:  The patient currently resides in [place], [place].  She rarely uses alcohol.  She denies any other drug use.  She denies smoking.      PHYSICAL EXAMINATION:   VITAL SIGNS:  Blood pressure is 113/77, her pulse is 129.  Her temperature is 36.4.  Her BMI is 19.82.   GENERAL:  The patient is sitting in a chair in no acute distress.   HEENT:  Normocephalic, atraumatic.   CHEST:  Not in respiratory distress.   ABDOMEN:  Nondistended.   EXTREMITIES:  No edema noted bilaterally.   SKIN:  No  abnormal bruising noted.   NEUROLOGIC:   MENTAL STATUS:  The patient is alert and oriented to person, place, time and situation.  Language is fluent with intact comprehension.     CRANIAL NERVES:  Extraocular motions are intact.  Pupils are equal, round, reactive to light.  Full visual fields.  No facial asymmetry.  Facial sensation is intact.  Tongue protrudes at the midline.   MOTOR:  The patient has 5/5 strength throughout bilateral upper and lower extremities.  No abnormal posture tone abnormality or atrophy were noted.   REFLEXES:  The patient has 2+ and symmetric reflexes at the brachioradialis, biceps, triceps, patella and Achilles bilaterally.   SENSORY:  The patient has intact sensation to light touch and vibration throughout all 4 extremities.   COORDINATION:  The patient has intact finger-nose-finger and heel-to-shin.   GAIT:  Normal with stride length turn and arm swing.      INVESTIGATIONS:  Reviewed her MRI and MRV completed on 02/07/2019 during today's office visit.  These were both normal.      ASSESSMENT AND PLAN:  Ms. Rocío Catherine is a very pleasant 23-year-old female with past medical history significant for factor V Leiden, depression and anxiety who presents to Neurology Clinic for evaluation of her headaches.  Her headaches did change significantly on 01/23/2019 and have been almost constant in nature since this time.  She does not currently qualify for a diagnosis of a new daily persistent headache with chronic migraine without aura phenotype due to the duration being less than 3 months.  However, if this continues for 3 months, she would qualify for this diagnosis on ICHD-3.  Over the last several days, the patient has been doing better from a headache standpoint than the prior 2 weeks.    For prevention of headaches we discussed the following:  -Continue amitriptyline 25mg at bedtime that was started in the ED on 2/09/2019. Patient was tachycardic prior to the initiation of amitriptyline and  continues to be tachycardic in clinic today. Therefore, we will defer increasing the medication at this time.  -Agents to consider if she were to fail amitriptyline would be propranolol; however, the patient does have a history of depression and is currently off of her depression medications.  Another agent would be topiramate, though the patient is attempting to get pregnant, so we would avoid that medication in at least the near future.  Verapamil may be a reasonable next option to consider.  If she fails amitriptyline and verapamil, Botox can be considered if she were not pregnant.     For acute treatment of headache, we discussed a treatment strategy as described below:  -For acute treatment of mild headache, she may continue ibuprofen, tylenol. These medications not to exceed more than 14 days/month to avoid medication overuse.   - For acute treatment of moderate to severe headache, I recommend sumatriptan 100 mg taken at the onset of headache with a repeat dose in 2 hours if needed.  This should not exceed more than 9 days/month to avoid medication overuse.  -For nausea, or as a rescue medicine for headache, I recommend Promethazine 10 mg.  This should not exceed more than 9 days/month to avoid medication side effects.    The patient is currently considering pregnancy. If she does get pregnant, she should stop these medications and a follow up visit can be made to discuss headache treatment options during pregnancy, if needed.  At the current doses, these medications could be stopped immediately upon finding out she was pregnant.      The patient was seen and discussed with Dr. Concepcion Randall.      Yinka Menendez MD  Neurology Resident, PGY-3     I was present with the resident who participated in the service and in the documentation of the note. I have verified the history and personally performed the physical exam and medical decision making. I agree with the assessment and plan of care as documented in the  note.     Concepcion Randall MD    D: 2019   T: 2019   MT: FERNANDO      Name:     AIDA WONG   MRN:      [id number]        Account:      [id number]   :      1995           Service Date: 2019      Document: [id number]

## 2019-02-14 NOTE — TELEPHONE ENCOUNTER
ED / Discharge Outreach Protocol    Patient Contact    Attempt # 3    Was call answered?  No.  Left message on voicemail with information to call me back.  Leti Fernando RN

## 2019-02-22 ENCOUNTER — TELEPHONE (OUTPATIENT)
Dept: FAMILY MEDICINE | Facility: CLINIC | Age: 24
End: 2019-02-22

## 2019-02-22 ENCOUNTER — HOSPITAL ENCOUNTER (EMERGENCY)
Facility: CLINIC | Age: 24
Discharge: HOME | End: 2019-02-22
Attending: FAMILY MEDICINE | Admitting: FAMILY MEDICINE
Payer: COMMERCIAL

## 2019-02-22 VITALS
DIASTOLIC BLOOD PRESSURE: 82 MMHG | HEART RATE: 101 BPM | WEIGHT: 103 LBS | HEIGHT: 61 IN | SYSTOLIC BLOOD PRESSURE: 115 MMHG | OXYGEN SATURATION: 99 % | RESPIRATION RATE: 16 BRPM | BODY MASS INDEX: 19.45 KG/M2 | TEMPERATURE: 98.2 F

## 2019-02-22 DIAGNOSIS — R00.2 PALPITATIONS: ICD-10-CM

## 2019-02-22 DIAGNOSIS — H93.11 TINNITUS, RIGHT: ICD-10-CM

## 2019-02-22 DIAGNOSIS — R20.2 NUMBNESS AND TINGLING IN LEFT ARM: ICD-10-CM

## 2019-02-22 DIAGNOSIS — G43.809 OTHER MIGRAINE WITHOUT STATUS MIGRAINOSUS, NOT INTRACTABLE: ICD-10-CM

## 2019-02-22 DIAGNOSIS — R20.0 NUMBNESS AND TINGLING IN LEFT ARM: ICD-10-CM

## 2019-02-22 DIAGNOSIS — R51.9 CHRONIC DAILY HEADACHE: ICD-10-CM

## 2019-02-22 DIAGNOSIS — M54.12 CERVICAL RADICULOPATHY: ICD-10-CM

## 2019-02-22 LAB
ANION GAP SERPL CALCULATED.3IONS-SCNC: 6 MMOL/L (ref 3–14)
BASOPHILS # BLD AUTO: 0.1 10E9/L (ref 0–0.2)
BASOPHILS NFR BLD AUTO: 0.7 %
BUN SERPL-MCNC: 14 MG/DL (ref 7–30)
CALCIUM SERPL-MCNC: 8.9 MG/DL (ref 8.5–10.1)
CHLORIDE SERPL-SCNC: 107 MMOL/L (ref 94–109)
CO2 SERPL-SCNC: 27 MMOL/L (ref 20–32)
CREAT SERPL-MCNC: 0.78 MG/DL (ref 0.52–1.04)
D DIMER PPP FEU-MCNC: 0.4 UG/ML FEU (ref 0–0.5)
DIFFERENTIAL METHOD BLD: NORMAL
EOSINOPHIL # BLD AUTO: 0.4 10E9/L (ref 0–0.7)
EOSINOPHIL NFR BLD AUTO: 3.6 %
ERYTHROCYTE [DISTWIDTH] IN BLOOD BY AUTOMATED COUNT: 11.4 % (ref 10–15)
GFR SERPL CREATININE-BSD FRML MDRD: >90 ML/MIN/{1.73_M2}
GLUCOSE SERPL-MCNC: 108 MG/DL (ref 70–99)
HCT VFR BLD AUTO: 42.2 % (ref 35–47)
HGB BLD-MCNC: 14.4 G/DL (ref 11.7–15.7)
IMM GRANULOCYTES # BLD: 0 10E9/L (ref 0–0.4)
IMM GRANULOCYTES NFR BLD: 0.4 %
LYMPHOCYTES # BLD AUTO: 2.5 10E9/L (ref 0.8–5.3)
LYMPHOCYTES NFR BLD AUTO: 24.4 %
MCH RBC QN AUTO: 30.4 PG (ref 26.5–33)
MCHC RBC AUTO-ENTMCNC: 34.1 G/DL (ref 31.5–36.5)
MCV RBC AUTO: 89 FL (ref 78–100)
MONOCYTES # BLD AUTO: 0.8 10E9/L (ref 0–1.3)
MONOCYTES NFR BLD AUTO: 8 %
NEUTROPHILS # BLD AUTO: 6.6 10E9/L (ref 1.6–8.3)
NEUTROPHILS NFR BLD AUTO: 62.9 %
NRBC # BLD AUTO: 0 10*3/UL
NRBC BLD AUTO-RTO: 0 /100
PLATELET # BLD AUTO: 238 10E9/L (ref 150–450)
POTASSIUM SERPL-SCNC: 4.2 MMOL/L (ref 3.4–5.3)
RBC # BLD AUTO: 4.74 10E12/L (ref 3.8–5.2)
SODIUM SERPL-SCNC: 140 MMOL/L (ref 133–144)
WBC # BLD AUTO: 10.4 10E9/L (ref 4–11)

## 2019-02-22 PROCEDURE — 99284 EMERGENCY DEPT VISIT MOD MDM: CPT | Mod: 25

## 2019-02-22 PROCEDURE — 99284 EMERGENCY DEPT VISIT MOD MDM: CPT | Mod: 25 | Performed by: FAMILY MEDICINE

## 2019-02-22 PROCEDURE — 80048 BASIC METABOLIC PNL TOTAL CA: CPT | Performed by: FAMILY MEDICINE

## 2019-02-22 PROCEDURE — 93010 ELECTROCARDIOGRAM REPORT: CPT | Mod: Z6 | Performed by: FAMILY MEDICINE

## 2019-02-22 PROCEDURE — 85025 COMPLETE CBC W/AUTO DIFF WBC: CPT | Performed by: FAMILY MEDICINE

## 2019-02-22 PROCEDURE — 85379 FIBRIN DEGRADATION QUANT: CPT | Performed by: FAMILY MEDICINE

## 2019-02-22 PROCEDURE — 93005 ELECTROCARDIOGRAM TRACING: CPT

## 2019-02-22 ASSESSMENT — ENCOUNTER SYMPTOMS
ABDOMINAL PAIN: 0
PALPITATIONS: 0
SHORTNESS OF BREATH: 0
WEAKNESS: 0
DIARRHEA: 0
DIAPHORESIS: 0
SINUS PRESSURE: 0
NAUSEA: 0
DYSURIA: 0
CONSTIPATION: 0
VOMITING: 0
FEVER: 0
BLOOD IN STOOL: 0
COUGH: 0
HEADACHES: 1
WHEEZING: 0
FREQUENCY: 0
NUMBNESS: 1
SORE THROAT: 0
CHILLS: 0
TREMORS: 0

## 2019-02-22 ASSESSMENT — MIFFLIN-ST. JEOR: SCORE: 1154.58

## 2019-02-22 NOTE — ED PROVIDER NOTES
History     Chief Complaint   Patient presents with     Palpitations     Numbness     HPI  Rocío Catherine is a 23 year old female who presents with a recent complicated history of factor V Leiden heteerozygous persistent migraines, tension headaches, chronic daily headaches with multiple recent evaluations in mid February in the last 2 weeks resulting in initial migraine management here in the emergency department but then with return visits resulting in MRI, MRA and MRV due to her persistent symptoms and evaluation for venous thrombosis and a history of factor V Leiden.  Also had a persistent sinus tachycardia and that resulted in a observation stay on telemetry with echocardiogram being performed and demonstrated a mitral valve regurgitation that is trace and no mitral valve regurg and no other changes on echo.  She was then discharged home and then later seen at [facility] for persistent headaches and set up with neurology who is since seen the patient and started her on regular use of Naprosyn, Imitrex, amitriptyline and was on a steroid taper.  Was also prescribed Phenergan but has not used that yet.  He has since completed the steroid taper but now has a sense of palpitations after being off the steroids.  She also has had tinnitus related likely to the Naprosyn use.  No hearing loss and no vertigo.  Finally she has had onset today at approximately 1600 hrs. with a sense of numbness and paresthesias at the region of the left jaw and neck.  This is associated with a midline neck pain - lower neck near C7 that radiates into the right arm with sense of numbness/paresthesias  isolated to the arm.  She notes that neck pain came on over the summer months off and on and was prescribed muscle relaxants without much relief.  At that time she had no left arm symptoms but had right shoulder pain that had radiated from the neck.  No new trauma to the area.  Associated weakness, changes in speech or swallowing,  incoordination.  No associated fever, upper respiratory symptoms.      Allergies:  No Known Allergies    Problem List:    Patient Active Problem List    Diagnosis Date Noted     Sinus tachycardia 02/08/2019     Priority: Medium     Non-rheumatic mitral regurgitation, trace 02/08/2019     Priority: Medium     Seen on echocardiogram 2/8/2019. Recommended for follow up echo in 2-3 years.        Headache 02/07/2019     Priority: Medium     Recurrent major depressive disorder, in remission (H) 02/27/2018     Priority: Medium     Anxiety 02/27/2018     Priority: Medium     Factor 5 Leiden mutation, heterozygous (H) 02/27/2018     Priority: Medium     Calculus of gallbladder with acute cholecystitis without obstruction 05/31/2017     Priority: Medium     Overview:   Added automatically from request for surgery [id number]       Anemia 08/28/2016     Priority: Medium     S/P dilatation and curettage 09/03/2015     Priority: Medium     Supervision of high-risk pregnancy 08/04/2015     Priority: Medium     Overview:   SO Carter  They have been together for 3 years  Rare alcohol use , but nothing since found out pregnant. Discussed no amount of alcohol is safe in pregnancy.  Rare smoking. Discussed no amount is safe in pregnancy.  Declined MBP   HX od depression PHQ 9 =4  Follow up with MD    Last Assessment & Plan:   Birth control options, rukhsana, Mirena, Copper IUD, or minipills       Family history of clotting disorder 02/12/2015     Priority: Medium     Overview:   Family history of clotting disorder father DVT +factor V        Dysthymia 01/09/2013     Priority: Medium        Past Medical History:    Past Medical History:   Diagnosis Date     Factor 5 Leiden mutation, heterozygous (H) 2/27/2018     Factor V Leiden (H)      Non-rheumatic mitral regurgitation, trace 2/8/2019       Past Surgical History:    Past Surgical History:   Procedure Laterality Date     GYN SURGERY      c section     HEAD & NECK SURGERY      hemangioma  "      Family History:    Family History   Problem Relation Age of Onset     Other - See Comments Father         factor V     Other - See Comments Paternal Grandfather         factor V       Social History:  Marital Status:   [2]  Social History     Tobacco Use     Smoking status: Never Smoker     Smokeless tobacco: Never Used   Substance Use Topics     Alcohol use: Yes     Comment: rare     Drug use: No        Medications:      amitriptyline (ELAVIL) 25 MG tablet   ibuprofen (ADVIL/MOTRIN) 200 MG tablet   naproxen (NAPROSYN) 500 MG tablet   prochlorperazine (COMPAZINE) 10 MG tablet   SUMAtriptan (IMITREX) 100 MG tablet         Review of Systems   Constitutional: Negative for chills, diaphoresis and fever.   HENT: Negative for ear pain, sinus pressure and sore throat.    Eyes: Negative for visual disturbance.   Respiratory: Negative for cough, shortness of breath and wheezing.    Cardiovascular: Negative for chest pain and palpitations.   Gastrointestinal: Negative for abdominal pain, blood in stool, constipation, diarrhea, nausea and vomiting.   Genitourinary: Negative for dysuria, frequency and urgency.   Skin: Negative for rash.   Neurological: Positive for numbness and headaches. Negative for tremors and weakness.   All other systems reviewed and are negative.      Physical Exam   BP: 132/86  Pulse: 93  Temp: 98.5  F (36.9  C)  Resp: 18  Height: 154.9 cm (5' 1\")  Weight: 46.7 kg (103 lb)  SpO2: 100 %      Physical Exam   Constitutional: She appears distressed.   HENT:   Head: Atraumatic.   Right Ear: External ear normal.   Left Ear: External ear normal.   Mouth/Throat: Oropharynx is clear and moist.   Eyes: Conjunctivae and EOM are normal. Pupils are equal, round, and reactive to light.   Neck: Normal range of motion. Neck supple.   Cardiovascular: Normal rate, regular rhythm and normal heart sounds. Exam reveals no friction rub.   No murmur heard.  Pulmonary/Chest: Effort normal and breath sounds normal. " No stridor. No respiratory distress. She has no wheezes. She has no rales.   Abdominal: Soft. Bowel sounds are normal. She exhibits no distension. There is no tenderness. There is no guarding.   Musculoskeletal: She exhibits no edema.   Neurological: She is alert. No cranial nerve deficit or sensory deficit. She exhibits normal muscle tone. Coordination normal.   Skin: No rash noted. She is not diaphoretic. No pallor.     There is midline tenderness to palpation over the C7-T1 region without deformity.  Spurling's test is positive to reproduce her arm symptoms on the left side.  Normal radial pulses.    Normal motor function .  DTRs in bilateral upper and lower extremities are symmetric.    ED Course        Procedures                  EKG Interpretation:      Interpreted by Efraín Tripp MD  EKG done at 1723 hrs. demonstrates a sinus rhythm at 98 bpm with a normal axis and no ST change.  Isolated T wave inversion in lead V2.  Normal R progression.  No Q waves.  Normal intervals.  Normal conduction.  No ectopy.  Impression sinus rhythm 98 bpm with T wave inversion in V2 alone, and compared to EKG done February 7 this represents no significant change.         Critical Care time:  none               Results for orders placed or performed during the hospital encounter of 02/22/19 (from the past 24 hour(s))   Basic metabolic panel   Result Value Ref Range    Sodium 140 133 - 144 mmol/L    Potassium 4.2 3.4 - 5.3 mmol/L    Chloride 107 94 - 109 mmol/L    Carbon Dioxide 27 20 - 32 mmol/L    Anion Gap 6 3 - 14 mmol/L    Glucose 108 (H) 70 - 99 mg/dL    Urea Nitrogen 14 7 - 30 mg/dL    Creatinine 0.78 0.52 - 1.04 mg/dL    GFR Estimate >90 >60 mL/min/[1.73_m2]    GFR Estimate If Black >90 >60 mL/min/[1.73_m2]    Calcium 8.9 8.5 - 10.1 mg/dL   CBC with platelets, differential   Result Value Ref Range    WBC 10.4 4.0 - 11.0 10e9/L    RBC Count 4.74 3.8 - 5.2 10e12/L    Hemoglobin 14.4 11.7 - 15.7 g/dL    Hematocrit 42.2 35.0  - 47.0 %    MCV 89 78 - 100 fl    MCH 30.4 26.5 - 33.0 pg    MCHC 34.1 31.5 - 36.5 g/dL    RDW 11.4 10.0 - 15.0 %    Platelet Count 238 150 - 450 10e9/L    Diff Method Automated Method     % Neutrophils 62.9 %    % Lymphocytes 24.4 %    % Monocytes 8.0 %    % Eosinophils 3.6 %    % Basophils 0.7 %    % Immature Granulocytes 0.4 %    Nucleated RBCs 0 0 /100    Absolute Neutrophil 6.6 1.6 - 8.3 10e9/L    Absolute Lymphocytes 2.5 0.8 - 5.3 10e9/L    Absolute Monocytes 0.8 0.0 - 1.3 10e9/L    Absolute Eosinophils 0.4 0.0 - 0.7 10e9/L    Absolute Basophils 0.1 0.0 - 0.2 10e9/L    Abs Immature Granulocytes 0.0 0 - 0.4 10e9/L    Absolute Nucleated RBC 0.0    D dimer quantitative   Result Value Ref Range    D Dimer 0.4 0.0 - 0.50 ug/ml FEU       Medications - No data to display    Assessments & Plan (with Medical Decision Making)     MDM: Rocío Catherine is a 23 year old female who presents with a recent complicated history of headaches that of fit with migraines, tension headaches, chronic daily headaches, and possible medication overuse headaches, with at least 3-4 hospital evaluations in the last couple of weeks.  She was initially treated with standard migraine medications, along with Decadron to prevent rebound, but due to her underlying factor V Leiden heterozygous status, she underwent MRI and MRV last week with findings that were reassuring.  She later was seen at the [place] for headaches and was set up later for migraine management and neurology, and was started on amitriptyline for prophylaxis, instead of propranolol due to her underlying depression, and was also started on Imitrex and promethazine, as well as Naprosyn.    She also had a sinus tachycardia last week that had no obvious underlying etiology including on telemetry monitoring.  She had an echocardiogram that showed a trace mitral regurgitation, but no mitral valve prolapse.  She is been experiencing palpitations on and off especially since she has been  off a prednisone taper that was started last week.    Today she returns with atypical symptoms over the left face, with a sense of paresthesias primarily localized to the left jaw and over the neck as well as posterior neck with these radiating into the left arm but not following a particular dermatomal nor specific nerve distribution.  She has been having ongoing neck pain with right arm symptoms since this past summer without trauma.    She had presented with only an hour of symptoms of these paresthesias, and had no associated other neurologic changes.  There is no new weakness changes in speech or swallowing, or incoordination.   She had no significant risk factors including no oral contraceptives, neck manipulation or head injury.  Her neurologic exam was reassuring and some of her symptoms could be reproduced with Spurling's test, compressing the cervical vertebrae and reproducing some of her symptoms in her jaw and her arm.    Given the somewhat atypical distribution into the jawline region I did speak with Dr. Lin in Stroke neuro [facility].  My suspicion for CVA was low, and she just had CNS imaging last week, and most of her symptoms could be explained by cervical disc disease, and I did not suspect benefit from CNS imaging.  He agreed that her symptoms could be explained by alternative diagnoses and that head imaging was not required.  I did discuss this with the patient and her  regarding the differential diagnosis which could certainly include a RT hemispheric CVA, but that alternative diagnoses are much more likely including cervical disc.  She is nontoxic and afebrile and I do not suspect CNS infection or disciitis.      She also had approximately 3 days of tinnitus in the right ear.  I do suspect this is due to the NSAIDs she is using and we discussed trying to limit these and see if this improves.  She has no associated changes on ear exam, and no hearing loss or vertigo.    We discussed  management of what I suspect may be triggering both the tension headaches, possibly migraine headaches and certainly now the arm symptoms on the left side which would be a cervical disc.  This does not appear to follow a single vertebral level as her arm symptoms would be most likely C6-7 region where she is most tender at the neck would involve higher levels.  We discussed MR imaging outpatient as well as physical therapy and spine and I have written consults for these consultants.    I given the patient precautions for return including for new or changing neurologic symptoms, fever, progressive headache.    We also discussed a medication strategy that could avoid the chronic daily headaches/analgesic overuse headaches.      I have reviewed the nursing notes.    I have reviewed the findings, diagnosis, plan and need for follow up with the patient.           Final diagnoses:   Palpitations - continue the monitor zio   Tinnitus, right - decrease use of naproxen . follow-up if this persists, jer with vertigo or hearing change   Chronic daily headache   Other migraine without status migrainosus, not intractable - continue imitrex for moderate to sever headache with light sensitivity and nauasea/.  follow-up clinic.   Cervical radiculopathy - I suspect this is the cause of your symptoms today. The jaw symptoms are less typical, but the neck and the arm numbness are likely related all to cervical disc.  given recent steroids willk not restart them,.  however would recommend spine follow-up and physical therapy ?traction.   Numbness and tingling in left arm, lower face, neck. - I suspect radiculopathy.  given the recent head imaging, I strongly doubt central cause.  return jer for any numbness inner thighs, foot drop, incontinence urine, stool, weakness, other neurologic changes       2/22/2019   [facility] EMERGENCY DEPARTMENT     Efraín Tripp MD  02/23/19 0052

## 2019-02-22 NOTE — ED TRIAGE NOTES
Pt has migraines, was on prednisone, now off it and has had palpitations since quitting. Pt also c/o left face numbness that started after taking her imitrex this afternoon. Pt states had an abnormal echo a few weeks ago.

## 2019-02-22 NOTE — TELEPHONE ENCOUNTER
Reason for Call:  Other     Detailed comments: Pt tood a Imiitrex and wanting to know the side effects.  Her jaw is sore, left side of face numb. Please advise    Phone Number Patient can be reached at: Home number on file [phone] (home)    Best Time: any    Can we leave a detailed message on this number? YES    Call taken on 2/22/2019 at 3:56 PM by Michelle Valdez

## 2019-02-22 NOTE — ED NOTES
States was here with a migraine. Went to the  of  and saw a nerologist. Put on some meds that she has taken for 1.5 weeks  Now having numbness to left side of face and arm. Denies chest pain

## 2019-02-22 NOTE — TELEPHONE ENCOUNTER
S-(situation): Patient states she started to have symptoms of fast heart rate, numbness on the face and hand.    B-(background): Patient has taken the medication in the past.    A-(assessment): Patient states she noticed in the last one hour she developed left sided numbness on her face and hand.  Patient states it feels like her heart is racing.  Patient states she took the Imitrex about 2 hours ago and now has jaw pain with the other symptoms. Patient does not have any difficulty speaking.      R-(recommendations): Advised patient to go to ER for immediate evaluation. Patient states she has an  that can take her.      Demetria COOPER RN

## 2019-02-23 NOTE — DISCHARGE INSTRUCTIONS
ICD-10-CM    1. Palpitations R00.2 D dimer quantitative    continue the monitor zio   2. Tinnitus, right H93.11     decrease use of naproxen . follow-up if this persists, jer with vertigo or hearing change   3. Chronic daily headache R51    4. Other migraine without status migrainosus, not intractable G43.809     continue imitrex for moderate to sever headache with light sensitivity and nauasea/.  follow-up clinic.   5. Cervical radiculopathy M54.12     I suspect this is the cause of your symptoms today. The jaw symptoms are less typical, but the neck and the arm numbness are likely related all to cervical disc.  given recent steroids willk not restart them,.  however would recommend spine follow-up and physical therapy ?traction.   6. Numbness and tingling in left arm, lower face, neck. R20.0     R20.2     I suspect radiculopathy.  given the recent head imaging, I strongly doubt central cause.  return jer for any numbness inner thighs, foot drop, incontinence urine, stool, weakness, other neurologic changes

## 2019-03-01 ENCOUNTER — TRANSFERRED RECORDS (OUTPATIENT)
Dept: HEALTH INFORMATION MANAGEMENT | Facility: CLINIC | Age: 24
End: 2019-03-01

## 2019-03-05 ENCOUNTER — HOSPITAL ENCOUNTER (OUTPATIENT)
Dept: MRI IMAGING | Facility: CLINIC | Age: 24
Discharge: HOME | End: 2019-03-05
Attending: NURSE PRACTITIONER | Admitting: NURSE PRACTITIONER
Payer: COMMERCIAL

## 2019-03-05 DIAGNOSIS — M54.2 NECK PAIN: ICD-10-CM

## 2019-03-05 DIAGNOSIS — M54.10 RADICULOPATHY: ICD-10-CM

## 2019-03-05 PROCEDURE — 72141 MRI NECK SPINE W/O DYE: CPT

## 2019-03-11 ENCOUNTER — TRANSFERRED RECORDS (OUTPATIENT)
Dept: HEALTH INFORMATION MANAGEMENT | Facility: CLINIC | Age: 24
End: 2019-03-11

## 2019-04-08 ENCOUNTER — OFFICE VISIT (OUTPATIENT)
Dept: NEUROLOGY | Facility: CLINIC | Age: 24
End: 2019-04-08
Payer: COMMERCIAL

## 2019-04-08 VITALS
BODY MASS INDEX: 20.37 KG/M2 | HEIGHT: 61 IN | OXYGEN SATURATION: 100 % | HEART RATE: 115 BPM | TEMPERATURE: 97.9 F | SYSTOLIC BLOOD PRESSURE: 116 MMHG | WEIGHT: 107.9 LBS | DIASTOLIC BLOOD PRESSURE: 78 MMHG

## 2019-04-08 DIAGNOSIS — G43.719 INTRACTABLE CHRONIC MIGRAINE WITHOUT AURA AND WITHOUT STATUS MIGRAINOSUS: ICD-10-CM

## 2019-04-08 DIAGNOSIS — M54.2 NECK PAIN: Primary | ICD-10-CM

## 2019-04-08 ASSESSMENT — MIFFLIN-ST. JEOR: SCORE: 1176.81

## 2019-04-08 ASSESSMENT — ENCOUNTER SYMPTOMS
HYPERTENSION: 0
HEADACHES: 1
LEG PAIN: 0
SLEEP DISTURBANCES DUE TO BREATHING: 0
NECK PAIN: 1
LIGHT-HEADEDNESS: 0
SPEECH CHANGE: 0
EXERCISE INTOLERANCE: 0
PALPITATIONS: 1
DISTURBANCES IN COORDINATION: 0
SYNCOPE: 0
ARTHRALGIAS: 0
PARALYSIS: 0
MUSCLE CRAMPS: 0
WEAKNESS: 0
LOSS OF CONSCIOUSNESS: 0
HYPOTENSION: 0
TINGLING: 0
MYALGIAS: 0
ORTHOPNEA: 0
JOINT SWELLING: 0
MUSCLE WEAKNESS: 0
SEIZURES: 0
NUMBNESS: 1
MEMORY LOSS: 0
TREMORS: 0
BACK PAIN: 1
DIZZINESS: 0

## 2019-04-08 ASSESSMENT — PAIN SCALES - GENERAL: PAINLEVEL: MILD PAIN (2)

## 2019-04-08 NOTE — PROGRESS NOTES
"[facility]  Neurology Progress Note    Subjective:    Ms. Catherine returns to the neurology clinic for follow-up of new daily persistent headache with chronic migraine without aura phenotype.  Since I last saw her, she reports that her headaches have been generally doing well, with about 1 more mild headache per week, which is easily treated with acetaminophen.  Over the last 1 week, however, her headaches have increased in frequency to every other day, which is atypical for her lately.  She reports that sumatriptan 100 mg is helpful when she needs it.  She will typically start with acetaminophen and move onto naproxen or sumatriptan if it gets more severe.    For headache prevention, she continues to take amitriptyline 25 mg nightly.  Her pulse continues to be elevated somewhat, so we have elected not to increase the dose.  If her current headache frequency does not improve, we previously discussed topiramate or verapamil as other option she could try.    She did have one ER visits since her last appointment, for left arm numbness, which was thought to be attributed to her cervical spine.  She underwent an x-ray as well as a MRI of the cervical spine, which was unrevealing for structural causes of her symptoms.  She does tell me that she frequently has neck pain, which she attributes to lifting up her to 2-1/2-year-old children, as when she is on vacation without them, this pain goes away.    Objective:    Vitals: /78 (BP Location: Left arm, Patient Position: Sitting, Cuff Size: Adult Regular)   Pulse 115   Temp 97.9  F (36.6  C) (Oral)   Ht 1.549 m (5' 1\")   Wt 48.9 kg (107 lb 14.4 oz)   SpO2 100%   BMI 20.39 kg/m    General: Cooperative, NAD  Head: Atraumatic  Neck: Normal range of motion  Cardiac: no lower extremity edema  Neurologic:  Mental Status: Fully alert, attentive and oriented. Speech clear and fluent.   Cranial Nerves: Facial movements " symmetric.   Motor: No abnormal movements.  Moving all extremities.    Station/Gait: Normal casual gait.     Pertinent Investigations:    MRI of the cervical spine -2 minimal disc bulges in the cervical spine, without abnormal cord signal or signs of compression.    Assessment/Plan:   Rocío Catherine is a 23-year-old woman who returns to clinic for follow-up of new daily persistent headache with chronic migraine without aura.  Her headaches have remained well controlled with amitriptyline 25 mg nightly, outside of the last 1 week in which they have increased to every other day.    Her symptomatic treatment plan includes acetaminophen, naproxen, sumatriptan as needed, which remain effective.  For prevention, she will continue amitriptyline 25 mg nightly, and contact me if she would desire to switch to verapamil.  If she calls to switch, I would put through a prescription for 80 mg daily for 1 week, titrating up by 80 mg each week to a goal dose of 80 mg 2 or 3 times per day.  Potential side effects were discussed today.    For her neck pain, we discussed other conservative treatment measures that she could try, including physical therapy.  She would like to pursue this, and I placed a referral for her today.  She would like to participate in physical therapy at the [facility] location.  If further treatment for this is needed, I will refer her on to our physical medicine and rehabilitation colleagues for consideration of trigger point injections or other treatments that may be helpful for her.    I will plan to see her back in 3 months to monitor her progress.    Concepcion Randall MD  Neurology   Pager [phone]

## 2019-04-08 NOTE — NURSING NOTE
Chief Complaint   Patient presents with     RECHECK     [facility] RETURN 2 MONTH F/U HEADACHE       Mansi Freire LPN

## 2019-04-08 NOTE — LETTER
"4/8/2019       RE: Rocío Catherine  [address]  [facility] [address]     Dear Colleague,    Thank you for referring your patient, Rocío Catherine, to the [facility] NEUROLOGY at [facility]. Please see a copy of my visit note below.    [facility]    Clinics and [facility]  Neurology Progress Note    Subjective:    Ms. Catherine returns to the neurology clinic for follow-up of new daily persistent headache with chronic migraine without aura phenotype.  Since I last saw her, she reports that her headaches have been generally doing well, with about 1 more mild headache per week, which is easily treated with acetaminophen.  Over the last 1 week, however, her headaches have increased in frequency to every other day, which is atypical for her lately.  She reports that sumatriptan 100 mg is helpful when she needs it.  She will typically start with acetaminophen and move onto naproxen or sumatriptan if it gets more severe.    For headache prevention, she continues to take amitriptyline 25 mg nightly.  Her pulse continues to be elevated somewhat, so we have elected not to increase the dose.  If her current headache frequency does not improve, we previously discussed topiramate or verapamil as other option she could try.    She did have one ER visits since her last appointment, for left arm numbness, which was thought to be attributed to her cervical spine.  She underwent an x-ray as well as a MRI of the cervical spine, which was unrevealing for structural causes of her symptoms.  She does tell me that she frequently has neck pain, which she attributes to lifting up her to 2-1/2-year-old children, as when she is on vacation without them, this pain goes away.    Objective:    Vitals: /78 (BP Location: Left arm, Patient Position: Sitting, Cuff Size: Adult Regular)   Pulse 115   Temp 97.9  F (36.6  C) (Oral)   Ht 1.549 m (5' 1\")   Wt 48.9 kg (107 lb 14.4 oz)   " SpO2 100%   BMI 20.39 kg/m     General: Cooperative, NAD  Head: Atraumatic  Neck: Normal range of motion  Cardiac: no lower extremity edema  Neurologic:  Mental Status: Fully alert, attentive and oriented. Speech clear and fluent.   Cranial Nerves: Facial movements symmetric.   Motor: No abnormal movements.  Moving all extremities.    Station/Gait: Normal casual gait.     Pertinent Investigations:    MRI of the cervical spine -2 minimal disc bulges in the cervical spine, without abnormal cord signal or signs of compression.    Assessment/Plan:   Rocío Catherine is a 23-year-old woman who returns to clinic for follow-up of new daily persistent headache with chronic migraine without aura.  Her headaches have remained well controlled with amitriptyline 25 mg nightly, outside of the last 1 week in which they have increased to every other day.    Her symptomatic treatment plan includes acetaminophen, naproxen, sumatriptan as needed, which remain effective.  For prevention, she will continue amitriptyline 25 mg nightly, and contact me if she would desire to switch to verapamil.  If she calls to switch, I would put through a prescription for 80 mg daily for 1 week, titrating up by 80 mg each week to a goal dose of 80 mg 2 or 3 times per day.  Potential side effects were discussed today.    For her neck pain, we discussed other conservative treatment measures that she could try, including physical therapy.  She would like to pursue this, and I placed a referral for her today.  She would like to participate in physical therapy at the [facility] location.  If further treatment for this is needed, I will refer her on to our physical medicine and rehabilitation colleagues for consideration of trigger point injections or other treatments that may be helpful for her.    I will plan to see her back in 3 months to monitor her progress.    Concepcion Randall MD  Neurology   Pager [phone]

## 2019-04-23 ENCOUNTER — MYC MEDICAL ADVICE (OUTPATIENT)
Dept: FAMILY MEDICINE | Facility: CLINIC | Age: 24
End: 2019-04-23

## 2019-04-23 NOTE — TELEPHONE ENCOUNTER
Tried to call her to discuss/ triage, and got voicemail.   Left message on answering machine for patient to call back or check her mychart.   Sending mychart note now.   Fina Boogie RNC

## 2019-04-25 ENCOUNTER — OFFICE VISIT (OUTPATIENT)
Dept: FAMILY MEDICINE | Facility: CLINIC | Age: 24
End: 2019-04-25
Payer: COMMERCIAL

## 2019-04-25 VITALS
HEART RATE: 94 BPM | TEMPERATURE: 97.1 F | RESPIRATION RATE: 13 BRPM | DIASTOLIC BLOOD PRESSURE: 64 MMHG | BODY MASS INDEX: 20.01 KG/M2 | WEIGHT: 106 LBS | HEIGHT: 61 IN | OXYGEN SATURATION: 96 % | SYSTOLIC BLOOD PRESSURE: 110 MMHG

## 2019-04-25 DIAGNOSIS — N92.6 MISSED PERIOD: Primary | ICD-10-CM

## 2019-04-25 DIAGNOSIS — R11.0 NAUSEA: ICD-10-CM

## 2019-04-25 LAB — HCG UR QL: NEGATIVE

## 2019-04-25 PROCEDURE — 81025 URINE PREGNANCY TEST: CPT | Performed by: NURSE PRACTITIONER

## 2019-04-25 PROCEDURE — 99213 OFFICE O/P EST LOW 20 MIN: CPT | Performed by: NURSE PRACTITIONER

## 2019-04-25 RX ORDER — ONDANSETRON 4 MG/1
4 TABLET, FILM COATED ORAL EVERY 8 HOURS PRN
Qty: 18 TABLET | Refills: 0 | Status: SHIPPED | OUTPATIENT
Start: 2019-04-25 | End: 2019-07-03

## 2019-04-25 ASSESSMENT — MIFFLIN-ST. JEOR: SCORE: 1168.19

## 2019-04-25 NOTE — PROGRESS NOTES
"  SUBJECTIVE:   Rocío Catherine is a 23 year old female who presents to clinic today for the following   health issues:      Concern - Nausea for 6 days, patient reports a \"light period 4/20/19\" that lasted for one day. Took UPT one day ago and it was negative. Denies fever, diarrhea X 3 - no blood or mucus in the stool. No vomiting. No fatigue. Denies urinary symptoms. Patient is trying to get pregnant. Reports heartburn that has been new for her. History of cholecystectomy     Onset: 6 days    Description:   Nausea    Intensity: mild, worse in the evenings    Progression of Symptoms:  intermittent    Accompanying Signs & Symptoms:  Had episodes of diarrhea for 3 days. Reports normal stools for the last 2 days    Previous history of similar problem:   none    Precipitating factors:   Worsened by: nothing    Alleviating factors:  Improved by: nothing    Therapies Tried and outcome: none    -------------------------------------    Additional history: as documented    Reviewed  and updated as needed this visit by clinical staff  Meds         Reviewed and updated as needed this visit by Provider         Patient Active Problem List   Diagnosis     Recurrent major depressive disorder, in remission (H)     Anxiety     Factor 5 Leiden mutation, heterozygous (H)     Headache     Sinus tachycardia     Non-rheumatic mitral regurgitation, trace     Anemia     Calculus of gallbladder with acute cholecystitis without obstruction     Dysthymia     Family history of clotting disorder     S/P dilatation and curettage     Supervision of high-risk pregnancy     Past Surgical History:   Procedure Laterality Date     GYN SURGERY      c section     HEAD & NECK SURGERY      hemangioma       Social History     Tobacco Use     Smoking status: Never Smoker     Smokeless tobacco: Never Used   Substance Use Topics     Alcohol use: Yes     Comment: rare     Family History   Problem Relation Age of Onset     Other - See Comments Father         " "factor V     Other - See Comments Paternal Grandfather         factor V           ROS:  Constitutional, HEENT, cardiovascular, pulmonary, GI, , musculoskeletal, neuro, skin, endocrine and psych systems are negative, except as otherwise noted.    OBJECTIVE:     /64 (BP Location: Left arm)   Pulse 94   Temp 97.1  F (36.2  C) (Tympanic)   Resp 13   Ht 1.549 m (5' 1\")   Wt 48.1 kg (106 lb)   SpO2 96%   BMI 20.03 kg/m    Body mass index is 20.03 kg/m .  GENERAL: healthy, alert and no distress  RESP: lungs clear to auscultation - no rales, rhonchi or wheezes  CV: regular rate and rhythm, normal S1 S2, no S3 or S4, no murmur, click or rub, no peripheral edema and peripheral pulses strong  ABDOMEN: soft, nontender, no hepatosplenomegaly, no masses and bowel sounds normal  MS: no gross musculoskeletal defects noted, no edema    Diagnostic Test Results:  none     ASSESSMENT/PLAN:       1. Nausea  ? Due to reflux symptoms vs pregnancy vs viral   - HCG Qual, Urine ([id number])- Negative   - ondansetron (ZOFRAN) 4 MG tablet; Take 1 tablet (4 mg) by mouth every 8 hours as needed for nausea  Dispense: 18 tablet; Refill: 0  - urine HCG negative so encouraged patient to try Omeprazole 20 mg daily   - discussed BRAT diet     2. Missed period    - HCG Qual, Urine ([id number])        NSOW Mckeon [facility] PR[facility]      "

## 2019-04-25 NOTE — PATIENT INSTRUCTIONS
Thank you for choosing [facility].  You may be receiving an email and/or telephone survey request from [facility] Customer Experience regarding your visit today.  Please take a few minutes to respond to the survey to let us know how we are doing.      If you have questions or concerns, please contact us via [employer] or you can contact your care team at [phone].    Our Clinic hours are:  Monday 6:40 am  to 7:00 pm  Tuesday -Friday 6:40 am to 5:00 pm    The [place] outpatient lab hours are:  Monday - Friday 6:10 am to 4:45 pm  Saturdays 7:00 am to 11:00 am  Appointments are required, call [phone]    If you have clinical questions after hours or would like to schedule an appointment,  call the clinic at [phone].

## 2019-05-03 ENCOUNTER — HEALTH MAINTENANCE LETTER (OUTPATIENT)
Age: 24
End: 2019-05-03

## 2019-06-11 ENCOUNTER — OFFICE VISIT (OUTPATIENT)
Dept: FAMILY MEDICINE | Facility: CLINIC | Age: 24
End: 2019-06-11
Payer: COMMERCIAL

## 2019-06-11 VITALS
WEIGHT: 106.4 LBS | TEMPERATURE: 98.4 F | BODY MASS INDEX: 20.1 KG/M2 | HEART RATE: 134 BPM | DIASTOLIC BLOOD PRESSURE: 64 MMHG | OXYGEN SATURATION: 99 % | RESPIRATION RATE: 16 BRPM | SYSTOLIC BLOOD PRESSURE: 112 MMHG

## 2019-06-11 DIAGNOSIS — R10.9 RIGHT FLANK PAIN: ICD-10-CM

## 2019-06-11 DIAGNOSIS — G43.709 CHRONIC MIGRAINE WITHOUT AURA WITHOUT STATUS MIGRAINOSUS, NOT INTRACTABLE: ICD-10-CM

## 2019-06-11 DIAGNOSIS — R10.2 PELVIC PAIN IN FEMALE: ICD-10-CM

## 2019-06-11 DIAGNOSIS — M62.838 NECK MUSCLE SPASM: Primary | ICD-10-CM

## 2019-06-11 PROCEDURE — 99214 OFFICE O/P EST MOD 30 MIN: CPT | Performed by: FAMILY MEDICINE

## 2019-06-11 NOTE — PROGRESS NOTES
Subjective     Rocío Catherine is a 23 year old female who presents to clinic today for the following health issues:    HPI   Right side pain. Possible pinched nerve.     Patient has been in before. She went in for her migraines and found out in February that two vertebras in her neck are very close together and there could possibly be a pinched nerve. Patient's pain started in her right should and upper right rib area and under right breast. Patient states over time the pain has started going throughout her right side. Now she has pain in her right lower quadrant that goes to her right lower back. Patient states her rt shoulder/ breast pain is an achy pain.   Feels sore to move the arm/shoulder up or rotating it backwards.    In the past was seen and tried muscle relaxers without relief.  Tried naproxen in the past but only short use due to Factor V, uses tylenol for headache.   Saw spine specialist with two disks close but not pinching or causing nerve pain.   Saw neurology for migraines headache, taking amitriptyline daily  And if needed imitrex.    Her RLQ is a sharp pain off and on on week ago and the rt lower back is always achy now. Patient has hx of ovarian cysts.  Certain movements do bring this pain on.  Now sitting more for work than in the past.     Patient's last menstrual period was 05/24/2019 (exact date).    BP Readings from Last 3 Encounters:   06/11/19 112/64   04/25/19 110/64   04/08/19 116/78    Wt Readings from Last 3 Encounters:   06/11/19 48.3 kg (106 lb 6.4 oz)   04/25/19 48.1 kg (106 lb)   04/08/19 48.9 kg (107 lb 14.4 oz)                 Reviewed and updated as needed this visit by Provider         Review of Systems   ROS COMP: Constitutional, HEENT, cardiovascular, pulmonary, gi and gu systems are negative, except as otherwise noted.      Objective    /64   Pulse 134   Temp 98.4  F (36.9  C) (Tympanic)   Resp 16   Wt 48.3 kg (106 lb 6.4 oz)   LMP 05/24/2019 (Exact Date)   SpO2 99%    BMI 20.10 kg/m    Body mass index is 20.1 kg/m .  Physical Exam   GENERAL APPEARANCE: healthy, alert and no distress  NECK: no adenopathy, no asymmetry, masses, or scars and thyroid normal to palpation  ABDOMEN: soft, nontender, without hepatosplenomegaly or masses, bowel sounds normal and lower right quadrant a little tender.  No rebound or guarding.  MS: Tight tender right SCM and trapezius. Normal shoulder Range of motion and neck Range of motion with tight slow movement of left lear to shoulder.  Normal strength and sensation down the right arm.  CHEST: slight tender costochondral upper chest on right.  Lower lateral rib tenderness with pressure.    Diagnostic Test Results:  Labs reviewed in Epic        Assessment & Plan     Rocío was seen today for flank pain.    Diagnoses and all orders for this visit:    Neck muscle spasm: discussed heat, TENS, Physical therapy, plan Physical therapy  -tylenol if needed.  -     PHYSICAL THERAPY REFERRAL; Future    Chronic migraine without aura without status migrainosus, not intractable  -     PHYSICAL THERAPY REFERRAL; Future    Right flank pain  -     PHYSICAL THERAPY REFERRAL; Future    Pain in joint, pelvic region and thigh, right: likely ovulation pain or ovarian cyst, if not improving then plan US of right ovary.  -     US Pelvic Limited; Future           Patient Instructions   If the pain continues in the RLQ/groin then plan ultrasound to check [phone]    Physical therapy will call you to schedule.  Home TENS unit (often times they try these out in Physical therapy). They are sold over the counter like Icy Hot brand or on Amazon.  The muscles like the SCM and trapezius are tight and Physical therapy and TENS will help loosen these muscles and help increase strength of other muscles to take the pressure off the tight muscle. I'm hopeful this will all help with the chronic headaches too.    Heating pad to help muscle relax.          Return if symptoms worsen or  fail to improve.    Harish French MD  [facility]

## 2019-06-11 NOTE — PATIENT INSTRUCTIONS
If the pain continues in the RLQ/groin then plan ultrasound to check [phone]    Physical therapy will call you to schedule.  Home TENS unit (often times they try these out in Physical therapy). They are sold over the counter like Icy Hot brand or on [employer].  The muscles like the SCM and trapezius are tight and Physical therapy and TENS will help loosen these muscles and help increase strength of other muscles to take the pressure off the tight muscle. I'm hopeful this will all help with the chronic headaches too.    Heating pad to help muscle relax.

## 2019-06-20 ENCOUNTER — HOSPITAL ENCOUNTER (OUTPATIENT)
Dept: ULTRASOUND IMAGING | Facility: CLINIC | Age: 24
Discharge: HOME | End: 2019-06-20
Attending: FAMILY MEDICINE | Admitting: FAMILY MEDICINE
Payer: COMMERCIAL

## 2019-06-20 DIAGNOSIS — R10.2 PELVIC PAIN IN FEMALE: ICD-10-CM

## 2019-06-20 PROCEDURE — 76830 TRANSVAGINAL US NON-OB: CPT

## 2019-06-21 ENCOUNTER — MYC MEDICAL ADVICE (OUTPATIENT)
Dept: FAMILY MEDICINE | Facility: CLINIC | Age: 24
End: 2019-06-21

## 2019-06-21 NOTE — TELEPHONE ENCOUNTER
Patient returns our call and the results are read to her again.  It is her choice of f/u with Yessy Lyn or OB/GYN.  Patient chooses to keep appt with Mary on Tuesday. Michelle MEANS RN

## 2019-06-24 ENCOUNTER — HOSPITAL ENCOUNTER (OUTPATIENT)
Dept: PHYSICAL THERAPY | Facility: CLINIC | Age: 24
Setting detail: THERAPIES SERIES
End: 2019-06-24
Attending: FAMILY MEDICINE
Payer: COMMERCIAL

## 2019-06-24 DIAGNOSIS — G43.709 CHRONIC MIGRAINE WITHOUT AURA WITHOUT STATUS MIGRAINOSUS, NOT INTRACTABLE: ICD-10-CM

## 2019-06-24 DIAGNOSIS — M62.838 NECK MUSCLE SPASM: ICD-10-CM

## 2019-06-24 DIAGNOSIS — R10.9 RIGHT FLANK PAIN: ICD-10-CM

## 2019-06-24 PROCEDURE — 97162 PT EVAL MOD COMPLEX 30 MIN: CPT | Mod: GP | Performed by: PHYSICAL THERAPIST

## 2019-06-24 PROCEDURE — 97012 MECHANICAL TRACTION THERAPY: CPT | Mod: 59,GP | Performed by: PHYSICAL THERAPIST

## 2019-06-24 PROCEDURE — 97140 MANUAL THERAPY 1/> REGIONS: CPT | Mod: GP | Performed by: PHYSICAL THERAPIST

## 2019-06-24 NOTE — PROGRESS NOTES
06/24/19 1000   General Information   Type of Visit Initial OP Ortho PT Evaluation   Start of Care Date 06/24/19   Referring Physician Harish French    Patient/Family Goals Statement Decrease HA's,  2 1/12 year olds w/o Pain. Reach w/o Pain to a higher shelf.    Orders Evaluate and Treat   Date of Order 06/11/19   Certification Required? No   Medical Diagnosis neck Mm spasms, Chronic Migraine w/o aura, R Flank Pain   Surgical/Medical history reviewed   (Mental illness, Neck tumor R, Sinus Surgery, Fx L Wrist )   Precautions/Limitations no known precautions/limitations   Special Instructions Suggested ergonomic assessment, was using a postural correction brace which helped and suggested that she get another since she lost it.    General Information Comments HA meds from neurologists.    Body Part(s)   Body Part(s) Cervical Spine   Presentation and Etiology   Pertinent history of current problem (include personal factors and/or comorbidities that impact the POC) Started last summer, was having pain in upper back, lower neck into R arm and across chest. Tried Mm relaxors which didn't help. Went into ER in  Feb 2019 d/t HA's. They thought the MRI showed a disc that wasn't pushing on anything.    Impairments A. Pain;N. Headaches   Functional Limitations perform desired leisure / sports activities   Symptom Location P/Pressure in mid neck that contributes to the HA's, Get Pain into R arm once/while dull achey to R elbow. Having some Pain across R chest.  R Hand dominant.    How/Where did it occur From insidious onset   Onset date of current episode/exacerbation 06/11/19  (MD Order visit)   Chronicity Chronic   Pain rating (0-10 point scale)   (5/10 )   Pain quality C. Aching;G. Cramping   Frequency of pain/symptoms B. Intermittent   Pain/symptoms are: The same all the time   Pain/symptoms exacerbated by C. Lifting;G. Certain positions   Pain/symptoms eased by A. Sitting;D. Nothing   Progression of symptoms since  "onset: Unchanged   Current / Previous Interventions   Diagnostic Tests: MRI   MRI Results Results   MRI results minimal Disc bulge from C4 to C6.    Prior Level of Function   Functional Level Prior Comment Independent w/ ADL's    Current Level of Function   Current Community Support Family/friend caregiver   Patient role/employment history A. Employed   Employment Comments Health Unit Coordinator   Living environment [facility]   Fall Risk Screen   Fall screen completed by PT   Have you fallen 2 or more times in the past year? No   Have you fallen and had an injury in the past year? No   Timed Up and Go score (seconds) Walks quickly into dept and no balance issues.    Is patient a fall risk? No   Abuse Screen (yes response referral indicated)   Feels Unsafe at Home or Work/School no   Feels Threatened by Someone no   Does Anyone Try to Keep You From Having Contact with Others or Doing Things Outside Your Home? no   Physical Signs of Abuse Present no   System Outcome Measures   Outcome Measures   (SPADI 16.15)   Functional Scales   Functional Scales OPTIMAL   Optimal (1=able to do without difficulty, 2=able to do with little difficulty, 3=able to do with moderate difficulty, 4=able to do with much difficulty, 5=unable to do, 9=NA) Activity 1;Activity 2;Activity 3   Activity 1 comment HA's once week w/ pain 8-9/10    Activity 2 comment Sometimes pain w/ picking up kids.    Activity 3 comment Reach into cupboard 3/10 and 3/10 pain.   Cervical Spine   Observation No acute distress.    Integumentary  1\" Horiz scar in upper cervical spine.    Posture Head forward, sway back.    Cervical Flexion ROM 1 1/4\" Stretch between shoulder blades    Cervical Extension ROM 90% w/ R neck soreness.     Cervical Right Side Bending ROM 35 w/ pull L UTrap   Cervical Left Side Bending ROM 35 w/ tightness and pain.    Cervical Right Rotation ROM 63   Cervical Left Rotation ROM 64    Shoulder AROM Screen Flex and ABD gets P on top of " shoulder, IR felt tighter than L.    Shoulder Abd (C5) Strength 5- B    Shoulder ER (C5, C6) Strength 5 B   Shoulder IR (C5, C6) Strength 5 B    Elbow Flexion (C5, C6) Strength 5B   Wrist Extension (C6) Strength R 5-, L 5    Wrist Flexion (C7) Strength 5 B   Thumb Abd (C8) Strength 5B    5th Finger Add (T1) Strength 5 B    Upper Trapezius Flexibility Normal B.    Levator Scapula Flexibility R Levator tight    Scalene Flexibility R Tighter than left.    Cervical Distraction Test Presssure w/ FLex get Pressure in R neck.    Cervical Rotation/Lateral Flexion Test R 1st Rib tight.    Neer Impingement Test Post Shoulder Pain w/ Flex and Abd    Elder Impingement Test Post shoulder pain.    Palpation L Lev, R Mid Paraspinals, Tender R Supra and Infraspinatus.    Dermatome/Sensory Testing R Thumb area   Biceps Reflexes Normal    Brachioradialis Reflexes Normal    Triceps Reflexes Normal    Planned Therapy Interventions   Planned Therapy Interventions Comment Develop HEP, STM, S&S, Jt mobs, MET   Planned Modality Interventions   Planned Modality Interventions Comments C.Tx, US. Iontophoresis for shoulder.    Clinical Impression   Criteria for Skilled Therapeutic Interventions Met yes, treatment indicated   PT Diagnosis Possible TOS, MF Pain, Disc Bulge. R Shoulder impingement.    Influenced by the following impairments Pain, HA's    Functional limitations due to impairments Picking up kids, WHITTEN's once/week.    Clinical Presentation Evolving/Changing   Clinical Presentation Rationale SPADI, Hx of small disc bulge, Neck tumor surgery, Sinus surgery, SPADI. MF Palpation.    Clinical Decision Making (Complexity) Moderate complexity   Therapy Frequency 2 times/Week   Predicted Duration of Therapy Intervention (days/wks) 5 weeks, decreasing frequency as appropriate.    Risk & Benefits of therapy have been explained Yes   Patient, Family & other staff in agreement with plan of care Yes   Clinical Impression Comments Possible TOS,  MF Pain, Disc Bulge. R Shoulder impingement.    Education Assessment   Preferred Learning Style Listening;Demonstration;Pictures/video   Barriers to Learning No barriers   ORTHO GOALS   PT Ortho Eval Goals 1;2;3;4   Ortho Goal 1   Goal Identifier 1   Goal Description STG: Pt will be able to  her children once/ while w/o pain.    Target Date 07/29/19   Ortho Goal 2   Goal Identifier 2   Goal Description STG: Pt will be able to report HA's only once/ every couple weeks.    Target Date 07/29/19   Ortho Goal 3   Goal Identifier 3   Goal Description STG: Pt will be able to Reach into cupboard 1/10 and no pain.    Target Date 07/29/19   Ortho Goal 4   Goal Identifier 4   Goal Description LTG: Pt will be able to demonstrate a HEP and self cares she can use to manage her neck and Ha's.    Target Date 08/05/19   Total Evaluation Time   PT Emeterio, Moderate Complexity Minutes (73644) 35   Kristie Patel, PT, MTC (#[id number])  [facility]           [phone]  Fax          [phone]  Appt #      [phone]

## 2019-06-25 ENCOUNTER — OFFICE VISIT (OUTPATIENT)
Dept: FAMILY MEDICINE | Facility: CLINIC | Age: 24
End: 2019-06-25
Payer: COMMERCIAL

## 2019-06-25 VITALS
WEIGHT: 108 LBS | BODY MASS INDEX: 20.41 KG/M2 | DIASTOLIC BLOOD PRESSURE: 66 MMHG | OXYGEN SATURATION: 100 % | TEMPERATURE: 99.2 F | HEART RATE: 137 BPM | SYSTOLIC BLOOD PRESSURE: 110 MMHG

## 2019-06-25 DIAGNOSIS — N83.201 CYST OF RIGHT OVARY: ICD-10-CM

## 2019-06-25 DIAGNOSIS — K59.00 CONSTIPATION, UNSPECIFIED CONSTIPATION TYPE: ICD-10-CM

## 2019-06-25 DIAGNOSIS — R10.31 RLQ ABDOMINAL PAIN: Primary | ICD-10-CM

## 2019-06-25 PROCEDURE — 99214 OFFICE O/P EST MOD 30 MIN: CPT | Performed by: NURSE PRACTITIONER

## 2019-06-25 NOTE — PROGRESS NOTES
Subjective     Rocío Catherine is a 23 year old female who presents to clinic today for the following health issues:    HPI   Follow up on Pelvic US done on 19 for pelvic pain. (Saw SATINDER French). Patient states that she still has intermittent pain RLQ. Previous history of  2.5 yrs ago. States that pain started a couple of weeks ago. Certain positions are painful. No history of hernia. No bulges noted. + nausea - intermittent. LMP- 19. Last MB 2 days ago. History of constipation- uses Miralax prn.       Pelvic ultrasound on 19:    The uterus measured 7.4 x 4.6 x 5.1 cm with an endometrial thickness  of 1.8 cm. There may be a 0.4 cm echogenic focus within the  endometrium. The myometrium is grossly unremarkable. Within the right  ovary there was a 2.1 cm complex region. The left ovary appeared  within normal limits. There was no free pelvic fluid.                                                                      IMPRESSION:    1. Prominent 1.8 cm endometrium with a possible endometrial 0.4 cm  echogenic focus.  2. Right ovarian 2.1 cm complex cyst, most likely a collapsing or  hemorrhagic cyst.     -------------------------------------    Patient Active Problem List   Diagnosis     Recurrent major depressive disorder, in remission (H)     Anxiety     Factor 5 Leiden mutation, heterozygous (H)     Headache     Sinus tachycardia     Non-rheumatic mitral regurgitation, trace     Anemia     Calculus of gallbladder with acute cholecystitis without obstruction     Dysthymia     Family history of clotting disorder     S/P dilatation and curettage     Supervision of high-risk pregnancy     Chronic migraine without aura without status migrainosus, not intractable     Past Surgical History:   Procedure Laterality Date     GYN SURGERY      c section     HEAD & NECK SURGERY      hemangioma       Social History     Tobacco Use     Smoking status: Never Smoker     Smokeless tobacco: Never Used   Substance Use  Topics     Alcohol use: Yes     Comment: rare     Family History   Problem Relation Age of Onset     Other - See Comments Father         factor V     Other - See Comments Paternal Grandfather         factor V           -------------------------------------  Reviewed and updated as needed this visit by Provider         Review of Systems   ROS COMP: Constitutional, HEENT, cardiovascular, pulmonary, GI, , musculoskeletal, neuro, skin, endocrine and psych systems are negative, except as otherwise noted.      Objective    There were no vitals taken for this visit.  There is no height or weight on file to calculate BMI.  Physical Exam   GENERAL: healthy, alert and no distress  RESP: lungs clear to auscultation - no rales, rhonchi or wheezes  CV: regular rate and rhythm, normal S1 S2, no S3 or S4, no murmur, click or rub, no peripheral edema and peripheral pulses strong  ABDOMEN: soft, nontender, no hepatosplenomegaly, no masses and bowel sounds normal  MS: no gross musculoskeletal defects noted, no edema    Diagnostic Test Results:  Labs reviewed in Epic        Assessment & Plan     1. RLQ abdominal pain  Non tender on exam- reviewed pelvic ultrasound- ? Pain related to ovarian cyst vs appendix vs hernia. Pelvic ultrasound showed cyst.   - CT Abdomen Pelvis w Contrast; Future- schedule if pain worsens or does not improve.     2. Constipation, unspecified constipation type  Recommend Colace or miralax daily    3. Cyst of right ovary  Recommend NSAIDS- should resolve on own  Recommend follow up with GYN prn             No follow-ups on file.    SNOW Mckeon CNP  [facility]

## 2019-06-25 NOTE — PATIENT INSTRUCTIONS
Thank you for choosing [facility].  You may be receiving an email and/or telephone survey request from [facility] Customer Experience regarding your visit today.  Please take a few minutes to respond to the survey to let us know how we are doing.      If you have questions or concerns, please contact us via [employer] or you can contact your care team at [phone].    Our Clinic hours are:  Monday 6:40 am  to 7:00 pm  Tuesday -Friday 6:40 am to 5:00 pm    The [place] outpatient lab hours are:  Monday - Friday 6:10 am to 4:45 pm  Saturdays 7:00 am to 11:00 am  Appointments are required, call [phone]    If you have clinical questions after hours or would like to schedule an appointment,  call the clinic at [phone].

## 2019-06-28 ENCOUNTER — TELEPHONE (OUTPATIENT)
Dept: FAMILY MEDICINE | Facility: CLINIC | Age: 24
End: 2019-06-28

## 2019-06-28 ENCOUNTER — NURSE TRIAGE (OUTPATIENT)
Dept: NURSING | Facility: CLINIC | Age: 24
End: 2019-06-28

## 2019-06-28 ENCOUNTER — MYC MEDICAL ADVICE (OUTPATIENT)
Dept: FAMILY MEDICINE | Facility: CLINIC | Age: 24
End: 2019-06-28

## 2019-06-28 DIAGNOSIS — N92.6 MENSTRUAL PERIOD LATE: Primary | ICD-10-CM

## 2019-06-28 NOTE — TELEPHONE ENCOUNTER
Clinic Action Needed:  Yes, callback  FNA Triage Call  Presenting Problem:    Rocío is calling about ultrasound and now is five days late on menstruation.  Rocío is wondering if their are any signs of pregnancy on the ultrasound.  Rocío is requesting to speak with MARSHALL Lyn.  Please phone Rocío on Monday, July 1st at [phone].      Routed to:  RN Pool    Please be sure to close this encounter once this patient's issue/question has been addressed.    Jo Ann Gomez RN/[facility]

## 2019-06-28 NOTE — TELEPHONE ENCOUNTER
Rocío is calling about ultrasound and now is five days late on menstruation.  Rocío is wondering if their are any signs of pregnancy on the ultrasound.  Rocío is requesting to speak with MARSHALL Lyn.  Please phone Rocío on Monday, July 1st at [phone].

## 2019-07-01 ENCOUNTER — MYC MEDICAL ADVICE (OUTPATIENT)
Dept: FAMILY MEDICINE | Facility: CLINIC | Age: 24
End: 2019-07-01

## 2019-07-01 NOTE — TELEPHONE ENCOUNTER
Yessy Lyn,   Please see note below, patient is requesting to speak to you, please advise.    HILARIO Antonio

## 2019-07-01 NOTE — TELEPHONE ENCOUNTER
Order placed for HCG quantitative per written order below from provider.     Home pregnancy test positive today. Does patient still need to come in for a blood test?

## 2019-07-02 ENCOUNTER — MYC MEDICAL ADVICE (OUTPATIENT)
Dept: FAMILY MEDICINE | Facility: CLINIC | Age: 24
End: 2019-07-02

## 2019-07-02 DIAGNOSIS — R11.0 NAUSEA: Primary | ICD-10-CM

## 2019-07-02 RX ORDER — ONDANSETRON 4 MG/1
4 TABLET, FILM COATED ORAL EVERY 8 HOURS PRN
Qty: 20 TABLET | Refills: 0 | Status: SHIPPED | OUTPATIENT
Start: 2019-07-02 | End: 2019-07-23

## 2019-07-03 ENCOUNTER — APPOINTMENT (OUTPATIENT)
Dept: OBGYN | Facility: CLINIC | Age: 24
End: 2019-07-03
Payer: COMMERCIAL

## 2019-07-03 ENCOUNTER — PRENATAL OFFICE VISIT (OUTPATIENT)
Dept: OBGYN | Facility: CLINIC | Age: 24
End: 2019-07-03
Payer: COMMERCIAL

## 2019-07-03 DIAGNOSIS — Z34.81 PRENATAL CARE, SUBSEQUENT PREGNANCY IN FIRST TRIMESTER: ICD-10-CM

## 2019-07-03 PROBLEM — Z34.80 PRENATAL CARE, SUBSEQUENT PREGNANCY: Status: ACTIVE | Noted: 2019-07-03

## 2019-07-03 PROCEDURE — 99207 ZZC NO BILLABLE SERVICE THIS VISIT: CPT | Performed by: OBSTETRICS & GYNECOLOGY

## 2019-07-09 ENCOUNTER — TELEPHONE (OUTPATIENT)
Dept: OBGYN | Facility: CLINIC | Age: 24
End: 2019-07-09

## 2019-07-09 NOTE — TELEPHONE ENCOUNTER
Return call to pt.  Unable to reach.  Left message for pt to return call to clinic.    Claritin is safe in pregnancy.  Use per package directions.    Judith Whalen   [facility]  RN

## 2019-07-09 NOTE — TELEPHONE ENCOUNTER
Reason for call:  Patient reporting a symptom    Symptom or request: Pt calling  10:45.   Wants to know if Claritin is safe to take in pregnancy    Duration (how long have symptoms been present):     Have you been treated for this before? No    Additional comments:     Phone Number patient can be reached at:  Home number on file [phone] (home)    Best Time:  any    Can we leave a detailed message on this number:  YES    Call taken on 7/9/2019 at 3:00 PM by Alie Barahona

## 2019-07-23 ENCOUNTER — PRENATAL OFFICE VISIT (OUTPATIENT)
Dept: OBGYN | Facility: CLINIC | Age: 24
End: 2019-07-23
Payer: COMMERCIAL

## 2019-07-23 VITALS
SYSTOLIC BLOOD PRESSURE: 124 MMHG | TEMPERATURE: 97.8 F | WEIGHT: 109 LBS | DIASTOLIC BLOOD PRESSURE: 83 MMHG | BODY MASS INDEX: 20.6 KG/M2 | HEART RATE: 106 BPM

## 2019-07-23 DIAGNOSIS — Z34.81 PRENATAL CARE, SUBSEQUENT PREGNANCY IN FIRST TRIMESTER: Primary | ICD-10-CM

## 2019-07-23 DIAGNOSIS — R11.0 NAUSEA: ICD-10-CM

## 2019-07-23 LAB
ABO + RH BLD: NORMAL
ABO + RH BLD: NORMAL
ALBUMIN UR-MCNC: NEGATIVE MG/DL
APPEARANCE UR: CLEAR
BILIRUB UR QL STRIP: NEGATIVE
BLD GP AB SCN SERPL QL: NORMAL
BLOOD BANK CMNT PATIENT-IMP: NORMAL
COLOR UR AUTO: YELLOW
ERYTHROCYTE [DISTWIDTH] IN BLOOD BY AUTOMATED COUNT: 11.7 % (ref 10–15)
GLUCOSE UR STRIP-MCNC: NEGATIVE MG/DL
HBV SURFACE AG SERPL QL IA: NONREACTIVE
HCT VFR BLD AUTO: 39 % (ref 35–47)
HGB BLD-MCNC: 13.5 G/DL (ref 11.7–15.7)
HGB UR QL STRIP: NEGATIVE
HIV 1+2 AB+HIV1 P24 AG SERPL QL IA: NONREACTIVE
KETONES UR STRIP-MCNC: NEGATIVE MG/DL
LEUKOCYTE ESTERASE UR QL STRIP: NEGATIVE
MCH RBC QN AUTO: 30.5 PG (ref 26.5–33)
MCHC RBC AUTO-ENTMCNC: 34.6 G/DL (ref 31.5–36.5)
MCV RBC AUTO: 88 FL (ref 78–100)
NITRATE UR QL: NEGATIVE
PH UR STRIP: 5.5 PH (ref 5–7)
PLATELET # BLD AUTO: 213 10E9/L (ref 150–450)
RBC # BLD AUTO: 4.43 10E12/L (ref 3.8–5.2)
SOURCE: NORMAL
SP GR UR STRIP: <=1.005 (ref 1–1.03)
SPECIMEN EXP DATE BLD: NORMAL
UROBILINOGEN UR STRIP-ACNC: 0.2 EU/DL (ref 0.2–1)
WBC # BLD AUTO: 13.5 10E9/L (ref 4–11)

## 2019-07-23 PROCEDURE — 87491 CHLMYD TRACH DNA AMP PROBE: CPT | Performed by: OBSTETRICS & GYNECOLOGY

## 2019-07-23 PROCEDURE — 86901 BLOOD TYPING SEROLOGIC RH(D): CPT | Performed by: OBSTETRICS & GYNECOLOGY

## 2019-07-23 PROCEDURE — 36415 COLL VENOUS BLD VENIPUNCTURE: CPT | Performed by: OBSTETRICS & GYNECOLOGY

## 2019-07-23 PROCEDURE — 87591 N.GONORRHOEAE DNA AMP PROB: CPT | Performed by: OBSTETRICS & GYNECOLOGY

## 2019-07-23 PROCEDURE — G0145 SCR C/V CYTO,THINLAYER,RESCR: HCPCS | Performed by: OBSTETRICS & GYNECOLOGY

## 2019-07-23 PROCEDURE — 86850 RBC ANTIBODY SCREEN: CPT | Performed by: OBSTETRICS & GYNECOLOGY

## 2019-07-23 PROCEDURE — 86780 TREPONEMA PALLIDUM: CPT | Performed by: OBSTETRICS & GYNECOLOGY

## 2019-07-23 PROCEDURE — 87086 URINE CULTURE/COLONY COUNT: CPT | Performed by: OBSTETRICS & GYNECOLOGY

## 2019-07-23 PROCEDURE — 86900 BLOOD TYPING SEROLOGIC ABO: CPT | Performed by: OBSTETRICS & GYNECOLOGY

## 2019-07-23 PROCEDURE — 87389 HIV-1 AG W/HIV-1&-2 AB AG IA: CPT | Performed by: OBSTETRICS & GYNECOLOGY

## 2019-07-23 PROCEDURE — 85027 COMPLETE CBC AUTOMATED: CPT | Performed by: OBSTETRICS & GYNECOLOGY

## 2019-07-23 PROCEDURE — 87340 HEPATITIS B SURFACE AG IA: CPT | Performed by: OBSTETRICS & GYNECOLOGY

## 2019-07-23 PROCEDURE — 76817 TRANSVAGINAL US OBSTETRIC: CPT | Performed by: OBSTETRICS & GYNECOLOGY

## 2019-07-23 PROCEDURE — 99207 ZZC FIRST OB VISIT: CPT | Performed by: OBSTETRICS & GYNECOLOGY

## 2019-07-23 PROCEDURE — 86762 RUBELLA ANTIBODY: CPT | Performed by: OBSTETRICS & GYNECOLOGY

## 2019-07-23 PROCEDURE — 81003 URINALYSIS AUTO W/O SCOPE: CPT | Performed by: OBSTETRICS & GYNECOLOGY

## 2019-07-23 RX ORDER — PRENATAL VIT/IRON FUM/FOLIC AC 27MG-0.8MG
1 TABLET ORAL DAILY
COMMUNITY
End: 2020-05-19

## 2019-07-23 RX ORDER — ONDANSETRON 4 MG/1
4 TABLET, FILM COATED ORAL EVERY 8 HOURS PRN
Qty: 30 TABLET | Refills: 1 | Status: SHIPPED | OUTPATIENT
Start: 2019-07-23 | End: 2019-08-16

## 2019-07-23 RX ORDER — ONDANSETRON 4 MG/1
4 TABLET, FILM COATED ORAL EVERY 8 HOURS PRN
Qty: 20 TABLET | Refills: 0 | Status: SHIPPED | OUTPATIENT
Start: 2019-07-23 | End: 2019-07-23

## 2019-07-23 NOTE — NURSING NOTE
"Initial BP (!) 150/95 (BP Location: Left arm, Patient Position: Chair, Cuff Size: Adult Regular)   Pulse 112   Temp 97.8  F (36.6  C) (Tympanic)   Wt 49.4 kg (109 lb)   LMP 05/24/2019 (Exact Date)   BMI 20.60 kg/m   Estimated body mass index is 20.6 kg/m  as calculated from the following:    Height as of 4/25/19: 1.549 m (5' 1\").    Weight as of this encounter: 49.4 kg (109 lb). .    Gabrielle Chaves    "

## 2019-07-23 NOTE — PROGRESS NOTES
Rocío is a 23 year old  @  8w4d Patient's last menstrual period was 2019 (exact date).  Here for new ob visit.      Several complaints:   1) Reports nausea but well controlled on Zofran PO; requests refill  2) Migraine HA: acknowledges a hx of chronic migraine headaches that she was diagnosed with in 2019; reports Tylenol 1000 mg PO dose relieves the headaches  3) Reports palpitaiton that occur occasionally. Had echocardiogram done in 2019 which showed trace mitral regurgitation but otherwise normal ejection fraction.   4) Constipation     Denies vaginal bleeding and cramping.       ROS: Ten point review of systems was reviewed and negative except the above.  OBhx: C-sec x 1 (twin gestation; mono-di twins); postpartum course complicated by anemia that required blood transfusion.   Gyne: Denies hx of STIs and abnormal Pap smears; last Pap NIL as of 2016    Past Medical History:   Diagnosis Date     Chickenpox      Factor 5 Leiden mutation, heterozygous (H) 2018     Factor V Leiden (H)      History of frequent urinary tract infections      Non-rheumatic mitral regurgitation, trace 2019    Seen on echocardiogram 2019. Recommended for follow up echo in 2-3 years.      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 Recommended for follow up echo in 2-3 years.        Headache 2019     Priority: Medium      Recurrent major depressive disorder, in remission (H) 02/27/2018     Priority: Medium     Anxiety 02/27/2018     Priority: Medium     Factor 5 Leiden mutation, heterozygous (H) 02/27/2018     Priority: Medium     Calculus of gallbladder with acute cholecystitis without obstruction 05/31/2017     Priority: Medium     Overview:   Added automatically from request for surgery [id number]       Anemia 08/28/2016     Priority: Medium     S/P dilatation and curettage 09/03/2015     Priority: Medium     Supervision of high-risk pregnancy 08/04/2015     Priority: Medium     Overview:   SO Carter  They have been together for 3 years  Rare alcohol use , but nothing since found out pregnant. Discussed no amount of alcohol is safe in pregnancy.  Rare smoking. Discussed no amount is safe in pregnancy.  Declined MBP   HX od depression PHQ 9 =4  Follow up with MD    Last Assessment & Plan:   Birth control options, rukhsana, Mirena, Copper IUD, or minipills       Family history of clotting disorder 02/12/2015     Priority: Medium     Overview:   Family history of clotting disorder father DVT +factor V        Dysthymia 01/09/2013     Priority: Medium      No Known Allergies    Current Outpatient Medications on File Prior to Visit:  ondansetron (ZOFRAN) 4 MG tablet Take 1 tablet (4 mg) by mouth every 8 hours as needed for nausea     No current facility-administered medications on file prior to visit.     Past Medical History of Father of Baby:   No significant medical history    Physical Exam: BP (!) 150/95 (BP Location: Left arm, Patient Position: Chair, Cuff Size: Adult Regular)   Pulse 112   Temp 97.8  F (36.6  C) (Tympanic)   Wt 49.4 kg (109 lb)   LMP 05/24/2019 (Exact Date)   BMI 20.60 kg/m     /83 (BP Location: Left arm, Patient Position: Chair, Cuff Size: Adult Regular)   Pulse 106   Temp 97.8  F (36.6  C) (Tympanic)   Wt 49.4 kg (109 lb)   LMP 05/24/2019 (Exact Date)   BMI 20.60 kg/m    General: Well developed, well  nourished female  Skin: No lesions, Warm, moist and No cyanosis or pallor  HEENT: Atraumatic, normocephalic  Neck: Supple,no adenopathy,thyroid normal  Chest: Clear to auscultation  Heart: Regular rate, rhythm  Breasts: deferred   Abdomen: Soft, flat, non-tender   Extremities: No cyanosis, clubbing, warm and well perfused and No edema  Neurological: Mental Status Normal and Station and Gait Normal   Perineum: Normal   Vulva: Normal genitalia and Bartholin's, Urethra, Cheval's normal  Vagina: Normal mucosa, no discharge  Cervix: Nulliparous, closed, mobile,  no discharge  Uterus: 8 weeks, Normal shape, position and consistency   Adnexa: No masses, nodularity, tenderness  Rectum: deferred   Bony Pelvis: Adequate     Transvaginal ultrasound was performed.  A single live intrauterine pregnancy was seen.  CRL = 2.15 cm consistent with 9 weeks, 0 days.  Fetal heart motion was visualized at 186 bpm                EDC by LMP: 2020   EDC by sono:  2020  Final EDC: 2020    A/P 23 year old  at  8w4d by LMP c/w 9w0d US    1. Discussed physician coverage, tertiary support, diet, exercise, weight gain, schedule of visits, routine and indicated ultrasounds, and childbirth education.    2. Options for  testing for chromosomal and birth defects were discussed with the patient including nuchal lucency/blood marker testing in the first trimester and quad screening and/or Level 2 ultrasound in the second trimester.  We discussed that these are screening tests and not diagnostic tests and that false positives and negatives are a distinct possibility.  We discussed that follow up diagnostic testing would include chorionic villus sampling or amniocentesis depending on gestational age. Couple declines.     3. Factor V Leiden heterozygote  -- low risk thrombophilia with family (first degree relative) who had a VTE (father had a VTE); according to ACOG guideline, surveillance without anti-coagulation or  prophylactic anticoagulation during the antepartum period recommended and prophylactic anticoagulation is recommended in the postpartum period.   -- patient reports no anticoagulation given in her last pregnancy during the antepartum period and so elects to defer prophylactic anticoagulation for the antepartum period of this pregnancy    4. Hx of   -- operative report reviewed (low transverse  section confirmed)   -- discussed TOLAC vs repeat  section with associated benefits and risks for both options; patient elects for repeat  section     5. Prenatal labs, pap, GC, Chlamydia    6. Prenatal Vitamins encouraged. Zofran refilled for control of nausea. Encouraged Tylenol PO use for management of headaches. Will continue to monitor palpitations; if it persists then consideration for cardiology referral     7. RTC in 4 weeks. SAB precautions reviewed    Yolande Benson MD  [facility]

## 2019-07-24 LAB
BACTERIA SPEC CULT: NO GROWTH
C TRACH DNA SPEC QL NAA+PROBE: NEGATIVE
Lab: NORMAL
N GONORRHOEA DNA SPEC QL NAA+PROBE: NEGATIVE
RUBV IGG SERPL IA-ACNC: 27 IU/ML
SPECIMEN SOURCE: NORMAL
T PALLIDUM AB SER QL: NONREACTIVE

## 2019-07-25 LAB
COPATH REPORT: NORMAL
PAP: NORMAL

## 2019-07-25 NOTE — RESULT ENCOUNTER NOTE
Normal new OB prenatal labs. Pap smear pending.   Will review at next follow-up visit.     Yolande Benson MD  [facility]

## 2019-08-02 ENCOUNTER — OFFICE VISIT (OUTPATIENT)
Dept: FAMILY MEDICINE | Facility: CLINIC | Age: 24
End: 2019-08-02
Payer: COMMERCIAL

## 2019-08-02 VITALS
SYSTOLIC BLOOD PRESSURE: 108 MMHG | HEIGHT: 61 IN | WEIGHT: 108.9 LBS | HEART RATE: 103 BPM | DIASTOLIC BLOOD PRESSURE: 60 MMHG | OXYGEN SATURATION: 100 % | TEMPERATURE: 98.8 F | BODY MASS INDEX: 20.56 KG/M2

## 2019-08-02 DIAGNOSIS — J01.00 ACUTE NON-RECURRENT MAXILLARY SINUSITIS: Primary | ICD-10-CM

## 2019-08-02 PROCEDURE — 99213 OFFICE O/P EST LOW 20 MIN: CPT | Performed by: NURSE PRACTITIONER

## 2019-08-02 RX ORDER — AMOXICILLIN 875 MG
875 TABLET ORAL 2 TIMES DAILY
Qty: 14 TABLET | Refills: 0 | Status: SHIPPED | OUTPATIENT
Start: 2019-08-02 | End: 2019-08-19

## 2019-08-02 ASSESSMENT — MIFFLIN-ST. JEOR: SCORE: 1181.35

## 2019-08-02 NOTE — PATIENT INSTRUCTIONS
Thank you for choosing [facility].  You may be receiving an email and/or telephone survey request from [employer] includes the [facility] Customer Experience regarding your visit today.  Please take a few minutes to respond to the survey to let us know how we are doing.      If you have questions or concerns, please contact us via [employer] or you can contact your care team at [phone].    Our Clinic hours are:  Monday 6:40 am  to 7:00 pm  Tuesday -Friday 6:40 am to 5:00 pm    The [place] outpatient lab hours are:  Monday - Friday 6:10 am to 4:45 pm  Saturdays 7:00 am to 11:00 am  Appointments are required, call [phone]    If you have clinical questions after hours or would like to schedule an appointment,  call the clinic at [phone].

## 2019-08-02 NOTE — PROGRESS NOTES
"Subjective     Rocío Catherine is a 23 year old female who presents to clinic today for the following health issues:    HPI   ENT Symptoms             Symptoms: cc Present Absent Comment   Fever/Chills  x  One day two weeks ago   Fatigue       Muscle Aches       Eye Irritation       Sneezing       Nasal Scooter/Drg x x     Sinus Pressure/Pain x x     Loss of smell   x    Dental pain   x    Sore Throat   x    Swollen Glands       Ear Pain/Fullness   x    Cough   x    Wheeze   x    Chest Pain   x    Shortness of breath   x    Rash       Other  x  headache     Symptom duration:  since June on and off  The past 10 days have been bad  Has history of sinus problems- had surgery to clear out her [id number]   Symptom severity:  moderate   Treatments tried:  claritin - no help   Contacts:  unknown     10 weeks pregnant                Reviewed and updated as needed this visit by Provider  Tobacco  Allergies  Meds  Problems  Med Hx  Surg Hx  Fam Hx         Review of Systems   ROS COMP: Constitutional, HEENT, cardiovascular, pulmonary, gi and gu systems are negative, except as otherwise noted.      Objective    /60 (BP Location: Right arm)   Pulse 103   Temp 98.8  F (37.1  C) (Tympanic)   Ht 1.549 m (5' 1\")   Wt 49.4 kg (108 lb 14.4 oz)   LMP 05/24/2019 (Exact Date)   SpO2 100%   BMI 20.58 kg/m    Body mass index is 20.58 kg/m .  Physical Exam   GENERAL: healthy, alert and no distress  HENT: ear canals and TM's normal, nose and mouth without ulcers or lesions  NECK: no adenopathy, no asymmetry, masses, or scars and thyroid normal to palpation  RESP: lungs clear to auscultation - no rales, rhonchi or wheezes  CV: regular rate and rhythm, normal S1 S2, no S3 or S4, no murmur, click or rub, no peripheral edema and peripheral pulses strong            Assessment & Plan       ICD-10-CM    1. Acute non-recurrent maxillary sinusitis J01.00 amoxicillin (AMOXIL) 875 MG tablet BID for 7 days     Handout given for medications " that are safe to take in pregnancy.        Return in about 2 weeks (around 8/16/2019), or if symptoms worsen or fail to improve.    SNOW Ramos CNP  [facility]

## 2019-08-17 ENCOUNTER — TELEPHONE (OUTPATIENT)
Dept: FAMILY MEDICINE | Facility: CLINIC | Age: 24
End: 2019-08-17

## 2019-08-17 DIAGNOSIS — J01.00 ACUTE NON-RECURRENT MAXILLARY SINUSITIS: ICD-10-CM

## 2019-08-17 DIAGNOSIS — R11.0 NAUSEA: ICD-10-CM

## 2019-08-17 RX ORDER — ONDANSETRON 4 MG/1
4 TABLET, FILM COATED ORAL EVERY 6 HOURS PRN
Qty: 30 TABLET | Refills: 3 | Status: CANCELLED | OUTPATIENT
Start: 2019-08-17

## 2019-08-19 ENCOUNTER — PRENATAL OFFICE VISIT (OUTPATIENT)
Dept: OBGYN | Facility: CLINIC | Age: 24
End: 2019-08-19
Payer: COMMERCIAL

## 2019-08-19 VITALS
TEMPERATURE: 97.5 F | WEIGHT: 110 LBS | DIASTOLIC BLOOD PRESSURE: 72 MMHG | HEART RATE: 111 BPM | BODY MASS INDEX: 20.78 KG/M2 | SYSTOLIC BLOOD PRESSURE: 124 MMHG

## 2019-08-19 DIAGNOSIS — Z34.81 PRENATAL CARE, SUBSEQUENT PREGNANCY IN FIRST TRIMESTER: Primary | ICD-10-CM

## 2019-08-19 PROCEDURE — 99207 ZZC PRENATAL VISIT: CPT | Performed by: OBSTETRICS & GYNECOLOGY

## 2019-08-19 NOTE — NURSING NOTE
"Initial /72 (BP Location: Right arm, Patient Position: Chair, Cuff Size: Adult Regular)   Pulse 111   Temp 97.5  F (36.4  C) (Tympanic)   Wt 49.9 kg (110 lb)   LMP 05/24/2019 (Exact Date)   Breastfeeding? No   BMI 20.78 kg/m   Estimated body mass index is 20.78 kg/m  as calculated from the following:    Height as of 8/2/19: 1.549 m (5' 1\").    Weight as of this encounter: 49.9 kg (110 lb). .    Gabrielle Chaves    "

## 2019-08-19 NOTE — TELEPHONE ENCOUNTER
"Requested Prescriptions   Pending Prescriptions Disp Refills     ondansetron (ZOFRAN) 4 MG tablet 30 tablet 3     Sig: Take 1 tablet (4 mg) by mouth every 6 hours as needed for nausea   Last Written Prescription Date:  8/16/19  Last Fill Quantity: 30 tab,  # refills: 3   Last office visit: 8/2/2019 with prescribing provider:  TYRONE Dewitt   Future Office Visit:   Next 5 appointments (look out 90 days)    Aug 19, 2019  8:30 AM CDT  ESTABLISHED PR[facility] Yolande Benson MD  [facility] [address] [address]  [phone]   Sep 17, 2019  9:00 AM CDT  ESTABLISHED PRENATAL with Yolande Benson MD  [facility] [address] [address]  [phone]              Antivertigo/Antiemetic Agents Passed - 8/17/2019  9:15 AM        Passed - Recent (12 mo) or future (30 days) visit within the authorizing provider's specialty     Patient had office visit in the last 12 months or has a visit in the next 30 days with authorizing provider or within the authorizing provider's specialty.  See \"Patient Info\" tab in inbasket, or \"Choose Columns\" in Meds & Orders section of the refill encounter.              Passed - Medication is active on med list        Passed - Patient is 18 years of age or older          "

## 2019-09-17 ENCOUNTER — AMBULATORY - HEALTHEAST (OUTPATIENT)
Dept: MATERNAL FETAL MEDICINE | Facility: HOSPITAL | Age: 24
End: 2019-09-17

## 2019-09-17 ENCOUNTER — PRENATAL OFFICE VISIT (OUTPATIENT)
Dept: OBGYN | Facility: CLINIC | Age: 24
End: 2019-09-17
Payer: COMMERCIAL

## 2019-09-17 VITALS
DIASTOLIC BLOOD PRESSURE: 70 MMHG | TEMPERATURE: 98.4 F | HEIGHT: 61 IN | HEART RATE: 114 BPM | WEIGHT: 112 LBS | SYSTOLIC BLOOD PRESSURE: 112 MMHG | RESPIRATION RATE: 16 BRPM | BODY MASS INDEX: 21.14 KG/M2

## 2019-09-17 DIAGNOSIS — I34.0 NON-RHEUMATIC MITRAL REGURGITATION: ICD-10-CM

## 2019-09-17 DIAGNOSIS — Z34.82 PRENATAL CARE, SUBSEQUENT PREGNANCY IN SECOND TRIMESTER: Primary | ICD-10-CM

## 2019-09-17 DIAGNOSIS — Z23 NEED FOR PROPHYLACTIC VACCINATION AND INOCULATION AGAINST INFLUENZA: ICD-10-CM

## 2019-09-17 DIAGNOSIS — O26.90 PREGNANCY, ANTEPARTUM, COMPLICATIONS: ICD-10-CM

## 2019-09-17 PROCEDURE — 90686 IIV4 VACC NO PRSV 0.5 ML IM: CPT | Performed by: OBSTETRICS & GYNECOLOGY

## 2019-09-17 PROCEDURE — 90471 IMMUNIZATION ADMIN: CPT | Performed by: OBSTETRICS & GYNECOLOGY

## 2019-09-17 PROCEDURE — 99207 ZZC PRENATAL VISIT: CPT | Performed by: OBSTETRICS & GYNECOLOGY

## 2019-09-17 ASSESSMENT — MIFFLIN-ST. JEOR: SCORE: 1195.41

## 2019-09-17 NOTE — NURSING NOTE
"Initial /70 (BP Location: Left arm, Patient Position: Chair, Cuff Size: Adult Small)   Pulse 114   Temp 98.4  F (36.9  C) (Tympanic)   Resp 16   Ht 1.549 m (5' 1\")   Wt 50.8 kg (112 lb)   LMP 05/24/2019 (Exact Date)   BMI 21.16 kg/m   Estimated body mass index is 21.16 kg/m  as calculated from the following:    Height as of this encounter: 1.549 m (5' 1\").    Weight as of this encounter: 50.8 kg (112 lb). .      "

## 2019-09-17 NOTE — PROGRESS NOTES
"Doing well.   Reports already experiencing low pelvic pains and low back pains; she also points out a history of pubic bone separation and is concerned that this would recur in this pregnancy.   Patient is also wondering if it okay to take Sudafed for URI symptoms.   Denies VB, cramping.   /70 (BP Location: Left arm, Patient Position: Chair, Cuff Size: Adult Small)   Pulse 114   Temp 98.4  F (36.9  C) (Tympanic)   Resp 16   Ht 1.549 m (5' 1\")   Wt 50.8 kg (112 lb)   LMP 2019 (Exact Date)   BMI 21.16 kg/m    General Appearance: NAD  Abdomen: Gravid, NT  Refer to flow sheet above.   A/P: 23 year old  at 16w4d  -- discussed conservative intervention such as maternity belt to address likely MSK/pregnancy burden  -- recommended patient to avoid Sudafed and discussed other OTC medications.   -- given hx of mitral valve regurgitation; recommend cardiology consultation to provide any additional recommendations related to her pregnancy  -- ordered MFM fetal anatomy US given maternal cardiac condition  -- SAB precautions reviewed  RTC in 4 weeks      Yolande Benson MD  [facility]            "

## 2019-09-23 ENCOUNTER — MYC MEDICAL ADVICE (OUTPATIENT)
Dept: OBGYN | Facility: CLINIC | Age: 24
End: 2019-09-23

## 2019-09-24 ENCOUNTER — PRENATAL OFFICE VISIT (OUTPATIENT)
Dept: OBGYN | Facility: CLINIC | Age: 24
End: 2019-09-24
Payer: COMMERCIAL

## 2019-09-24 VITALS
WEIGHT: 114 LBS | TEMPERATURE: 96.4 F | SYSTOLIC BLOOD PRESSURE: 109 MMHG | HEART RATE: 88 BPM | BODY MASS INDEX: 21.54 KG/M2 | DIASTOLIC BLOOD PRESSURE: 74 MMHG

## 2019-09-24 DIAGNOSIS — Z34.82 PRENATAL CARE, SUBSEQUENT PREGNANCY IN SECOND TRIMESTER: Primary | ICD-10-CM

## 2019-09-24 PROCEDURE — 99207 ZZC PRENATAL VISIT: CPT | Performed by: OBSTETRICS & GYNECOLOGY

## 2019-09-24 NOTE — TELEPHONE ENCOUNTER
Patient being seen in clinic today 9/24/19 with Dr. Lanier at 11:15am will discuss at visit.  Maddi Bourne CMA

## 2019-09-24 NOTE — NURSING NOTE
"Initial /74 (BP Location: Right arm, Patient Position: Chair, Cuff Size: Adult Regular)   Pulse 88   Temp 96.4  F (35.8  C) (Tympanic)   Wt 51.7 kg (114 lb)   LMP 05/24/2019 (Exact Date)   Breastfeeding? No   BMI 21.54 kg/m   Estimated body mass index is 21.54 kg/m  as calculated from the following:    Height as of 9/17/19: 1.549 m (5' 1\").    Weight as of this encounter: 51.7 kg (114 lb). .      Gabrielle Chaves MA    "

## 2019-09-24 NOTE — PROGRESS NOTES
Doing well.   Concerns about lack of fetal movement for the last 2 days and came in to make sure that everything was okay.   Denies VB, cramping.   /74 (BP Location: Right arm, Patient Position: Chair, Cuff Size: Adult Regular)   Pulse 88   Temp 96.4  F (35.8  C) (Tympanic)   Wt 51.7 kg (114 lb)   LMP 2019 (Exact Date)   Breastfeeding? No   BMI 21.54 kg/m    General Appearance: NAD  Abdomen: Gravid, NT   Refer to flow sheet above.   A/P: 23 year old  at 17w4d  -- reassurance provided  -- SAB precautions reviewed  RTC in 4 weeks    Yolande Benson MD  [facility]

## 2019-10-02 ENCOUNTER — AMBULATORY - HEALTHEAST (OUTPATIENT)
Dept: MATERNAL FETAL MEDICINE | Facility: HOSPITAL | Age: 24
End: 2019-10-02

## 2019-10-07 ENCOUNTER — RECORDS - HEALTHEAST (OUTPATIENT)
Dept: ULTRASOUND IMAGING | Facility: HOSPITAL | Age: 24
End: 2019-10-07

## 2019-10-07 ENCOUNTER — RECORDS - HEALTHEAST (OUTPATIENT)
Dept: ADMINISTRATIVE | Facility: OTHER | Age: 24
End: 2019-10-07

## 2019-10-07 ENCOUNTER — OFFICE VISIT - HEALTHEAST (OUTPATIENT)
Dept: MATERNAL FETAL MEDICINE | Facility: HOSPITAL | Age: 24
End: 2019-10-07

## 2019-10-07 DIAGNOSIS — O26.90 PREGNANCY RELATED CONDITIONS, UNSPECIFIED, UNSPECIFIED TRIMESTER: ICD-10-CM

## 2019-10-07 DIAGNOSIS — Z36.89 ENCOUNTER FOR FETAL ANATOMIC SURVEY: ICD-10-CM

## 2019-10-09 ENCOUNTER — MYC MEDICAL ADVICE (OUTPATIENT)
Dept: OBGYN | Facility: CLINIC | Age: 24
End: 2019-10-09

## 2019-10-10 ENCOUNTER — APPOINTMENT (OUTPATIENT)
Dept: ULTRASOUND IMAGING | Facility: CLINIC | Age: 24
End: 2019-10-10
Attending: NURSE PRACTITIONER
Payer: COMMERCIAL

## 2019-10-10 ENCOUNTER — HOSPITAL ENCOUNTER (EMERGENCY)
Facility: CLINIC | Age: 24
Discharge: HOME | End: 2019-10-10
Attending: NURSE PRACTITIONER | Admitting: NURSE PRACTITIONER
Payer: COMMERCIAL

## 2019-10-10 ENCOUNTER — APPOINTMENT (OUTPATIENT)
Dept: CT IMAGING | Facility: CLINIC | Age: 24
End: 2019-10-10
Attending: NURSE PRACTITIONER
Payer: COMMERCIAL

## 2019-10-10 VITALS
TEMPERATURE: 98 F | OXYGEN SATURATION: 98 % | WEIGHT: 115 LBS | BODY MASS INDEX: 21.73 KG/M2 | SYSTOLIC BLOOD PRESSURE: 116 MMHG | DIASTOLIC BLOOD PRESSURE: 66 MMHG | HEART RATE: 99 BPM | RESPIRATION RATE: 14 BRPM

## 2019-10-10 DIAGNOSIS — R06.02 SHORTNESS OF BREATH: ICD-10-CM

## 2019-10-10 DIAGNOSIS — R07.9 CHEST PAIN: ICD-10-CM

## 2019-10-10 LAB
ALBUMIN UR-MCNC: NEGATIVE MG/DL
AMORPH CRY #/AREA URNS HPF: ABNORMAL /HPF
ANION GAP SERPL CALCULATED.3IONS-SCNC: 9 MMOL/L (ref 3–14)
APPEARANCE UR: ABNORMAL
BASOPHILS # BLD AUTO: 0.1 10E9/L (ref 0–0.2)
BASOPHILS NFR BLD AUTO: 0.3 %
BILIRUB UR QL STRIP: NEGATIVE
BUN SERPL-MCNC: 9 MG/DL (ref 7–30)
CALCIUM SERPL-MCNC: 8.9 MG/DL (ref 8.5–10.1)
CHLORIDE SERPL-SCNC: 107 MMOL/L (ref 94–109)
CO2 SERPL-SCNC: 22 MMOL/L (ref 20–32)
COLOR UR AUTO: YELLOW
CREAT SERPL-MCNC: 0.54 MG/DL (ref 0.52–1.04)
D DIMER PPP FEU-MCNC: 1.7 UG/ML FEU (ref 0–0.5)
DIFFERENTIAL METHOD BLD: ABNORMAL
EOSINOPHIL # BLD AUTO: 0.3 10E9/L (ref 0–0.7)
EOSINOPHIL NFR BLD AUTO: 1.9 %
ERYTHROCYTE [DISTWIDTH] IN BLOOD BY AUTOMATED COUNT: 12.6 % (ref 10–15)
GFR SERPL CREATININE-BSD FRML MDRD: >90 ML/MIN/{1.73_M2}
GLUCOSE SERPL-MCNC: 92 MG/DL (ref 70–99)
GLUCOSE UR STRIP-MCNC: NEGATIVE MG/DL
HCT VFR BLD AUTO: 37.6 % (ref 35–47)
HGB BLD-MCNC: 12.6 G/DL (ref 11.7–15.7)
HGB UR QL STRIP: NEGATIVE
IMM GRANULOCYTES # BLD: 0.1 10E9/L (ref 0–0.4)
IMM GRANULOCYTES NFR BLD: 0.6 %
KETONES UR STRIP-MCNC: 20 MG/DL
LEUKOCYTE ESTERASE UR QL STRIP: NEGATIVE
LYMPHOCYTES # BLD AUTO: 2.1 10E9/L (ref 0.8–5.3)
LYMPHOCYTES NFR BLD AUTO: 14.6 %
MCH RBC QN AUTO: 30.7 PG (ref 26.5–33)
MCHC RBC AUTO-ENTMCNC: 33.5 G/DL (ref 31.5–36.5)
MCV RBC AUTO: 92 FL (ref 78–100)
MONOCYTES # BLD AUTO: 0.9 10E9/L (ref 0–1.3)
MONOCYTES NFR BLD AUTO: 6.2 %
MUCOUS THREADS #/AREA URNS LPF: PRESENT /LPF
NEUTROPHILS # BLD AUTO: 10.9 10E9/L (ref 1.6–8.3)
NEUTROPHILS NFR BLD AUTO: 76.4 %
NITRATE UR QL: NEGATIVE
NRBC # BLD AUTO: 0 10*3/UL
NRBC BLD AUTO-RTO: 0 /100
NT-PROBNP SERPL-MCNC: 159 PG/ML (ref 0–450)
PH UR STRIP: 7 PH (ref 5–7)
PLATELET # BLD AUTO: 219 10E9/L (ref 150–450)
POTASSIUM SERPL-SCNC: 3.8 MMOL/L (ref 3.4–5.3)
RBC # BLD AUTO: 4.11 10E12/L (ref 3.8–5.2)
RBC #/AREA URNS AUTO: 0 /HPF (ref 0–2)
SODIUM SERPL-SCNC: 138 MMOL/L (ref 133–144)
SOURCE: ABNORMAL
SP GR UR STRIP: 1.01 (ref 1–1.03)
SQUAMOUS #/AREA URNS AUTO: <1 /HPF (ref 0–1)
TROPONIN I SERPL-MCNC: <0.015 UG/L (ref 0–0.04)
UROBILINOGEN UR STRIP-MCNC: 0 MG/DL (ref 0–2)
WBC # BLD AUTO: 14.3 10E9/L (ref 4–11)
WBC #/AREA URNS AUTO: 1 /HPF (ref 0–5)

## 2019-10-10 PROCEDURE — 99285 EMERGENCY DEPT VISIT HI MDM: CPT | Mod: 25

## 2019-10-10 PROCEDURE — 85379 FIBRIN DEGRADATION QUANT: CPT | Performed by: NURSE PRACTITIONER

## 2019-10-10 PROCEDURE — 83880 ASSAY OF NATRIURETIC PEPTIDE: CPT | Performed by: NURSE PRACTITIONER

## 2019-10-10 PROCEDURE — 85025 COMPLETE CBC W/AUTO DIFF WBC: CPT | Performed by: EMERGENCY MEDICINE

## 2019-10-10 PROCEDURE — 84484 ASSAY OF TROPONIN QUANT: CPT | Performed by: EMERGENCY MEDICINE

## 2019-10-10 PROCEDURE — 71275 CT ANGIOGRAPHY CHEST: CPT

## 2019-10-10 PROCEDURE — 93010 ELECTROCARDIOGRAM REPORT: CPT | Mod: Z6 | Performed by: EMERGENCY MEDICINE

## 2019-10-10 PROCEDURE — 81001 URINALYSIS AUTO W/SCOPE: CPT | Performed by: EMERGENCY MEDICINE

## 2019-10-10 PROCEDURE — 25000125 ZZHC RX 250: Performed by: NURSE PRACTITIONER

## 2019-10-10 PROCEDURE — 25000128 H RX IP 250 OP 636: Performed by: NURSE PRACTITIONER

## 2019-10-10 PROCEDURE — 93971 EXTREMITY STUDY: CPT

## 2019-10-10 PROCEDURE — 80048 BASIC METABOLIC PNL TOTAL CA: CPT | Performed by: EMERGENCY MEDICINE

## 2019-10-10 PROCEDURE — 93005 ELECTROCARDIOGRAM TRACING: CPT

## 2019-10-10 PROCEDURE — 81003 URINALYSIS AUTO W/O SCOPE: CPT | Performed by: EMERGENCY MEDICINE

## 2019-10-10 PROCEDURE — 99285 EMERGENCY DEPT VISIT HI MDM: CPT | Mod: 25 | Performed by: NURSE PRACTITIONER

## 2019-10-10 RX ORDER — IOPAMIDOL 755 MG/ML
63 INJECTION, SOLUTION INTRAVASCULAR ONCE
Status: COMPLETED | OUTPATIENT
Start: 2019-10-10 | End: 2019-10-10

## 2019-10-10 RX ADMIN — IOPAMIDOL 63 ML: 755 INJECTION, SOLUTION INTRAVENOUS at 17:59

## 2019-10-10 RX ADMIN — SODIUM CHLORIDE 92 ML: 9 INJECTION, SOLUTION INTRAVENOUS at 17:59

## 2019-10-10 NOTE — DISCHARGE INSTRUCTIONS
Follow-up with cardiology as scheduled.  Follow-up with OB/GYN as planned.  Return to the emergency department for fevers, increased pain, increased difficulty breathing, vomiting, or worse in any way.

## 2019-10-10 NOTE — ED PROVIDER NOTES
History     Chief Complaint   Patient presents with     Chest Pain     CP and SOA for 3 days constant for last 24 hours. 20 weeks preg. Pt states with her last pregnancy she had a pericardial effusion, factor 5     Shortness of Breath     HPI  Rocío Catherine is a 23 year old female, 20 weeks pregnant with history Factor V mutation (heterozygous) and migraines who presents to the emergency department for evaluation of chest pain and shortness of breath.  Symptoms started 3 days ago, but worse in the last 24 hours.  Reports chest feels tight and like she cannot get a deep breath.  Chest pain located in the right side and into her right upper back.  Denies fever or chills.  Denies cough or congestion.  Denies nausea or vomiting.  Patient reports a history of pericardial effusion during her prior pregnancy, which resolved.  Patient had a echocardiogram done 2/2019 for shortness of breath which revealed normal EF, normal systolic function, and mild mitral valve regurgitation.  Patient received anticoagulation with Lovenox after her last delivery.  Patient has no prior history of blood clots.    Allergies:  No Known Allergies    Problem List:    Patient Active Problem List    Diagnosis Date Noted     Prenatal care, subsequent pregnancy 07/03/2019     Priority: Medium     Chronic migraine without aura without status migrainosus, not intractable 06/18/2019     Priority: Medium     Sinus tachycardia 02/08/2019     Priority: Medium     Non-rheumatic mitral regurgitation, trace 02/08/2019     Priority: Medium     Seen on echocardiogram 2/8/2019. Recommended for follow up echo in 2-3 years.        Headache 02/07/2019     Priority: Medium     Recurrent major depressive disorder, in remission (H) 02/27/2018     Priority: Medium     Anxiety 02/27/2018     Priority: Medium     Factor 5 Leiden mutation, heterozygous (H) 02/27/2018     Priority: Medium     Calculus of gallbladder with acute cholecystitis without obstruction 05/31/2017      Priority: Medium     Overview:   Added automatically from request for surgery [id number]       Anemia 08/28/2016     Priority: Medium     S/P dilatation and curettage 09/03/2015     Priority: Medium     Supervision of high-risk pregnancy 08/04/2015     Priority: Medium     Overview:   ABILIO Machado  They have been together for 3 years  Rare alcohol use , but nothing since found out pregnant. Discussed no amount of alcohol is safe in pregnancy.  Rare smoking. Discussed no amount is safe in pregnancy.  Declined MBP   HX od depression PHQ 9 =4  Follow up with MD    Last Assessment & Plan:   Birth control options, rukhsana, Mirena, Copper IUD, or minipills       Family history of clotting disorder 02/12/2015     Priority: Medium     Overview:   Family history of clotting disorder father DVT +factor V        Dysthymia 01/09/2013     Priority: Medium        Past Medical History:    Past Medical History:   Diagnosis Date     Chickenpox      Factor 5 Leiden mutation, heterozygous (H) 2/27/2018     Factor V Leiden (H)      History of frequent urinary tract infections      Non-rheumatic mitral regurgitation, trace 2/8/2019     Postpartum depression        Past Surgical History:    Past Surgical History:   Procedure Laterality Date     GYN SURGERY      c section     HEAD & NECK SURGERY      hemangioma on neck at age 2     LAPAROSCOPIC CHOLECYSTECTOMY         Family History:    Family History   Problem Relation Age of Onset     Stomach Problem Mother         ulcer     Bleeding Disorder Father         factor V     Ovarian Cancer Maternal Grandmother      Thyroid Cancer Paternal Grandmother      Alzheimer Disease Paternal Grandfather      Bleeding Disorder Paternal Grandfather         factor V       Social History:  Marital Status:   [2]  Social History     Tobacco Use     Smoking status: Never Smoker     Smokeless tobacco: Never Used   Substance Use Topics     Alcohol use: Yes     Comment:  rare-quit with pregnancy     Drug use:  No        Medications:    ondansetron (ZOFRAN) 4 MG tablet  Prenatal Vit-Fe Fumarate-FA (PRENATAL MULTIVITAMIN W/IRON) 27-0.8 MG tablet          Review of Systems   Constitutional: Negative for appetite change, chills, fatigue and fever.   HENT: Negative for congestion.    Respiratory: Positive for chest tightness and shortness of breath. Negative for cough.    Cardiovascular: Positive for chest pain. Negative for leg swelling.   Gastrointestinal: Negative for abdominal pain, nausea and vomiting.   Genitourinary: Negative.    Musculoskeletal: Positive for back pain.   Skin: Negative for rash.   Neurological: Negative for dizziness, light-headedness and headaches.   All other systems reviewed and are negative.      Physical Exam   BP: 133/79  Pulse: 86  Heart Rate: 84  Temp: 98  F (36.7  C)  Resp: 16  Weight: 52.2 kg (115 lb)  SpO2: 100 %      Physical Exam  Constitutional:       General: She is not in acute distress.     Appearance: She is well-developed. She is not ill-appearing.   HENT:      Head: Normocephalic and atraumatic.      Right Ear: External ear normal.      Left Ear: External ear normal.      Nose: Nose normal.      Mouth/Throat:      Pharynx: No oropharyngeal exudate.   Eyes:      Conjunctiva/sclera: Conjunctivae normal.   Cardiovascular:      Rate and Rhythm: Normal rate and regular rhythm.      Heart sounds: Normal heart sounds. No murmur.   Pulmonary:      Effort: Pulmonary effort is normal. No tachypnea or respiratory distress.      Breath sounds: Normal breath sounds.   Abdominal:      General: Bowel sounds are normal. There is no distension.      Palpations: Abdomen is soft.      Tenderness: There is no tenderness.   Musculoskeletal: Normal range of motion.      Right lower leg: She exhibits no tenderness. No edema.   Skin:     General: Skin is warm and dry.      Findings: No rash.   Neurological:      Mental Status: She is alert and oriented to person, place, and time.         ED Course         Procedures               EKG Interpretation:      Interpreted by Dr. Efraín Davis  Time reviewed: 1445  Symptoms at time of EKG: chest pain   Rhythm: normal sinus   Rate: normal  Axis: normal  Ectopy: none  Conduction: normal  ST Segments/ T Waves: No ST-T wave changes  Q Waves: none  Comparison to prior: Unchanged from 2/22/2019    Clinical Impression: normal EKG         Results for orders placed or performed during the hospital encounter of 10/10/19 (from the past 24 hour(s))   CBC with platelets, differential   Result Value Ref Range    WBC 14.3 (H) 4.0 - 11.0 10e9/L    RBC Count 4.11 3.8 - 5.2 10e12/L    Hemoglobin 12.6 11.7 - 15.7 g/dL    Hematocrit 37.6 35.0 - 47.0 %    MCV 92 78 - 100 fl    MCH 30.7 26.5 - 33.0 pg    MCHC 33.5 31.5 - 36.5 g/dL    RDW 12.6 10.0 - 15.0 %    Platelet Count 219 150 - 450 10e9/L    Diff Method Automated Method     % Neutrophils 76.4 %    % Lymphocytes 14.6 %    % Monocytes 6.2 %    % Eosinophils 1.9 %    % Basophils 0.3 %    % Immature Granulocytes 0.6 %    Nucleated RBCs 0 0 /100    Absolute Neutrophil 10.9 (H) 1.6 - 8.3 10e9/L    Absolute Lymphocytes 2.1 0.8 - 5.3 10e9/L    Absolute Monocytes 0.9 0.0 - 1.3 10e9/L    Absolute Eosinophils 0.3 0.0 - 0.7 10e9/L    Absolute Basophils 0.1 0.0 - 0.2 10e9/L    Abs Immature Granulocytes 0.1 0 - 0.4 10e9/L    Absolute Nucleated RBC 0.0    Basic metabolic panel   Result Value Ref Range    Sodium 138 133 - 144 mmol/L    Potassium 3.8 3.4 - 5.3 mmol/L    Chloride 107 94 - 109 mmol/L    Carbon Dioxide 22 20 - 32 mmol/L    Anion Gap 9 3 - 14 mmol/L    Glucose 92 70 - 99 mg/dL    Urea Nitrogen 9 7 - 30 mg/dL    Creatinine 0.54 0.52 - 1.04 mg/dL    GFR Estimate >90 >60 mL/min/[1.73_m2]    GFR Estimate If Black >90 >60 mL/min/[1.73_m2]    Calcium 8.9 8.5 - 10.1 mg/dL   Troponin I   Result Value Ref Range    Troponin I ES <0.015 0.000 - 0.045 ug/L   UA with Microscopic   Result Value Ref Range    Color Urine Yellow     Appearance Urine Slightly  Cloudy     Glucose Urine Negative NEG^Negative mg/dL    Bilirubin Urine Negative NEG^Negative    Ketones Urine 20 (A) NEG^Negative mg/dL    Specific Gravity Urine 1.013 1.003 - 1.035    Blood Urine Negative NEG^Negative    pH Urine 7.0 5.0 - 7.0 pH    Protein Albumin Urine Negative NEG^Negative mg/dL    Urobilinogen mg/dL 0.0 0.0 - 2.0 mg/dL    Nitrite Urine Negative NEG^Negative    Leukocyte Esterase Urine Negative NEG^Negative    Source Midstream Urine     WBC Urine 1 0 - 5 /HPF    RBC Urine 0 0 - 2 /HPF    Squamous Epithelial /HPF Urine <1 0 - 1 /HPF    Mucous Urine Present (A) NEG^Negative /LPF    Amorphous Crystals Few (A) NEG^Negative /HPF   D dimer quantitative   Result Value Ref Range    D Dimer 1.7 (H) 0.0 - 0.50 ug/ml FEU   Nt probnp inpatient (BNP)   Result Value Ref Range    N-Terminal Pro BNP Inpatient 159 0 - 450 pg/mL   US Lower Ext Venous Duplex Limited Bilat    Narrative    ULTRASOUND LOWER EXTREMITY VENOUS DUPLEX LIMITED BILATERAL    10/10/2019 4:46 PM     HISTORY: Short of breath, Factor V, 20 weeks pregnant. Rule out DVT.    COMPARISON: None available    FINDINGS: Gray-scale, color and Doppler spectral analysis ultrasound  was performed of the bilateral lower extremities. Compression and  augmentation imaging was performed.    There is no evidence for deep venous thrombosis. Left peroneal vein  limited in evaluation within the mid calf.      Impression    IMPRESSION: No evidence for DVT within the bilateral lower  extremities.    ANGEL LOW MD   CT Chest Pulmonary Embolism w Contrast    Narrative    CT CHEST PULMONARY EMBOLISM WITH CONTRAST  10/10/2019 6:07 PM    HISTORY: Short of breath. Elevated D-dimer.    TECHNIQUE: Scans obtained from the apices through the diaphragm with  IV contrast. 63 ml Iso ermias 370 IV injected. Radiation dose for this  scan was reduced using automated exposure control, adjustment of the  mA and/or kV according to patient size, or iterative  reconstruction  technique.    COMPARISON:  2/7/2019    FINDINGS:  Vascular: No acute thoracic aortic abnormality. No evidence for  pulmonary embolism.    Lungs and pleura: No pleural effusion or pneumothorax is seen. Mild  dependent subsegmental atelectasis seen in the lower lobes. No  suspicious pulmonary nodule or mass is present.    Lymph nodes: No enlarged lymph nodes.    Additional findings: The visualized portions of the thyroid gland are  unremarkable. Heart size is normal. No pericardial effusion is seen.  Visualized portions of the upper abdomen are unremarkable. No  suspicious osseous lesions seen.      Impression    IMPRESSION: No pulmonary embolism seen to the subsegmental level.    ANGEL LOW MD       Medications   iopamidol (ISOVUE-370) solution 63 mL (63 mLs Intravenous Given 10/10/19 1759)   sodium chloride 0.9 % bag 500mL for CT scan flush use (92 mLs As instructed Given 10/10/19 1759)       Assessments & Plan (with Medical Decision Making)   23 year old female, 20 weeks pregnant with history Factor V mutation (heterozygous) and migraines who presents to the emergency department for evaluation of chest pain and shortness of breath.   On exam patient appears in no acute distress.  Alert and oriented.  Pleasant.  Lung sounds CTA.  No tachypnea.  No tachycardia.  Normotensive.  No hypoxia.  No significant tenderness with palpation to the chest wall or abdomen.  WBC is elevated at 14.3 which can be normal in pregnancy.  Electrolytes are normal.  Kidney function tests are normal.  LFTs are normal.  UA reveals 20 ketones but no evidence of infection, and negative for protein.  BNP is normal, and exam reveals no evidence for heart failure or fluid overload.  Troponin is negative.  EKG reveals normal sinus rhythm.  I have low suspicion for ACS or cardiac cause for her symptoms.  D-dimer is elevated at 1.7.  The normal D-dimer parameters for second trimester pregnancy is <0.75.  I have low suspicion for  PE; however, given her elevated d-dimer, symptoms, and history of factor V this is not been completely ruled out.  I discussed with patient the lab and urine findings.  We discussed possibility of PE and proceeding to CT.  She is in her second trimester and it is relatively safe to proceed with IV contrast.  Patient was agreeable.  Chest CT reveals no evidence for pulmonary embolism. Mild dependent subsegmental atelectasis in the lower lobes. Heart size is normal.  No pericardial effusion is seen.  The visualized portions of the upper abdomen were unremarkable.  No other abnormalities.  I discussed the CT findings with patient and reassurance was provided. I have low suspicion that her history of mild mitral valve regurgitation is causing her symptoms but I have considered this. Patient has a follow-up with Cardiology at the end of the month.   Plan as follows:  Follow-up with cardiology as scheduled.  Follow-up with OB/GYN as planned.  Return to the emergency department for fevers, increased pain, increased difficulty breathing, vomiting, or worse in any way.    I have reviewed the nursing notes.    I have reviewed the findings, diagnosis, plan and need for follow up with the patient.      Discharge Medication List as of 10/10/2019  6:23 PM          Final diagnoses:   Shortness of breath   Chest pain       10/10/2019   [facility] EMERGENCY DEPARTMENT     Isatu Stanton, SNOW CNP  10/11/19 0002

## 2019-10-10 NOTE — ED NOTES
"Pt here with chest pain and shortness of breath, started a few days ago and getting worse. Chest pain is right sided and radiating to her back, worse with activity. Pt is 20 weeks pregnant, had a pericardial effusion with her last pregnancy 3 years ago that resolved. Recently diagnosed with \"a leaky valve\". Hx of factor V.   "

## 2019-10-10 NOTE — ED AVS SNAPSHOT
[facility] Emergency Department  [address] [address]  Phone:  [phone]  Fax:  [phone]                                    Rocío Catherine   MRN: [id number]    Department:  [facility] Emergency Department   Date of Visit:  10/10/2019           After Visit Summary Signature Page    I have received my discharge instructions, and my questions have been answered. I have discussed any challenges I see with this plan with the nurse or doctor.    ..........................................................................................................................................  Patient/Patient Representative Signature      ..........................................................................................................................................  Patient Representative Print Name and Relationship to Patient    ..................................................               ................................................  Date                                   Time    ..........................................................................................................................................  Reviewed by Signature/Title    ...................................................              ..............................................  Date                                               Time          22EPIC Rev 08/18

## 2019-10-11 ASSESSMENT — ENCOUNTER SYMPTOMS
BACK PAIN: 1
VOMITING: 0
FEVER: 0
ABDOMINAL PAIN: 0
CHEST TIGHTNESS: 1
DIZZINESS: 0
NAUSEA: 0
SHORTNESS OF BREATH: 1
LIGHT-HEADEDNESS: 0
COUGH: 0
CHILLS: 0
APPETITE CHANGE: 0
HEADACHES: 0
FATIGUE: 0

## 2019-10-14 ENCOUNTER — HOSPITAL ENCOUNTER (OUTPATIENT)
Dept: ULTRASOUND IMAGING | Facility: CLINIC | Age: 24
Discharge: HOME | End: 2019-10-14
Attending: OBSTETRICS & GYNECOLOGY | Admitting: OBSTETRICS & GYNECOLOGY
Payer: COMMERCIAL

## 2019-10-14 ENCOUNTER — PRENATAL OFFICE VISIT (OUTPATIENT)
Dept: OBGYN | Facility: CLINIC | Age: 24
End: 2019-10-14
Payer: COMMERCIAL

## 2019-10-14 VITALS
DIASTOLIC BLOOD PRESSURE: 65 MMHG | OXYGEN SATURATION: 98 % | TEMPERATURE: 96.5 F | HEART RATE: 88 BPM | WEIGHT: 119 LBS | BODY MASS INDEX: 22.48 KG/M2 | SYSTOLIC BLOOD PRESSURE: 118 MMHG

## 2019-10-14 DIAGNOSIS — Z34.82 PRENATAL CARE, SUBSEQUENT PREGNANCY IN SECOND TRIMESTER: Primary | ICD-10-CM

## 2019-10-14 DIAGNOSIS — Z34.82 PRENATAL CARE, SUBSEQUENT PREGNANCY IN SECOND TRIMESTER: ICD-10-CM

## 2019-10-14 PROCEDURE — 99207 ZZC PRENATAL VISIT: CPT | Performed by: OBSTETRICS & GYNECOLOGY

## 2019-10-14 PROCEDURE — 76805 OB US >/= 14 WKS SNGL FETUS: CPT

## 2019-10-14 NOTE — RESULT ENCOUNTER NOTE
Reviewed normal fetal anatomy US findings with patient today at her prenatal clinic appointment.     Yolande Benson MD  [facility]

## 2019-10-14 NOTE — NURSING NOTE
"Initial /65 (BP Location: Left arm, Patient Position: Chair, Cuff Size: Adult Regular)   Pulse 88   Temp 96.5  F (35.8  C) (Tympanic)   Wt 54 kg (119 lb)   LMP 05/24/2019 (Exact Date)   SpO2 98%   Breastfeeding? No   BMI 22.48 kg/m   Estimated body mass index is 22.48 kg/m  as calculated from the following:    Height as of 9/17/19: 1.549 m (5' 1\").    Weight as of this encounter: 54 kg (119 lb). .    Gabrielle Chaves MA    "

## 2019-10-14 NOTE — PROGRESS NOTES
Doing well.   She had called about shortness of breath and chest pain and was advised to seek emergent medical attention given her history of factor V leiden heterozygote status and hx of mitral valve regurgitation on a recent echocardiogram. She's also had a history of pericardial effusion with her last pregnancy. ER evaluation ruled her out for DVT, pulmonary emboli and pericardial effusion on respective imaging modalities.   Today, she reports that her symptoms remain the same particularly when she is seated and resting for awhile is when the symptoms occur. She states that symptoms are chest pressure and shortness of breath. She does also acknowledge that the symptoms are present if she over exerts herself and are more severe than her symptoms at rest.    She just had a fetal anatomy US done today.   Denies VB, contractions, LOF  +FM  /65 (BP Location: Left arm, Patient Position: Chair, Cuff Size: Adult Regular)   Pulse 88   Temp 96.5  F (35.8  C) (Tympanic)   Wt 54 kg (119 lb)   LMP 2019 (Exact Date)   SpO2 98%   Breastfeeding? No   BMI 22.48 kg/m    General Appearance: NAD  Abdomen: Gravid, NT  Refer to flow sheet above.   A/P: 23 year old  at 20w3d  -- reviewed her normal fetal anatomy US that was done today  -- hx of mitral valve regurgitation on recent maternal echo, hx of pericardial effusion with her last pregnancy: has Cardiology consult upcoming; s/p MFM consultation and they have provided no further recommendations  -- factor V leiden heterozygote without personal hx of VTE but 1st degree relative with VTE: according to ACOG recommendations, anticoagulation not necessary antepartum, but ppx anticoagulation indicated in the postpartum period  -- hx of  section: plan for repeat  section at 39w  -- PTL precautions reviewed  RTC in 4 weeks    Yolande Benson MD  [facility]

## 2019-10-21 ENCOUNTER — MYC MEDICAL ADVICE (OUTPATIENT)
Dept: OBGYN | Facility: CLINIC | Age: 24
End: 2019-10-21

## 2019-10-30 ENCOUNTER — OFFICE VISIT (OUTPATIENT)
Dept: CARDIOLOGY | Facility: CLINIC | Age: 24
End: 2019-10-30
Attending: OBSTETRICS & GYNECOLOGY
Payer: COMMERCIAL

## 2019-10-30 VITALS
OXYGEN SATURATION: 100 % | WEIGHT: 123 LBS | SYSTOLIC BLOOD PRESSURE: 103 MMHG | DIASTOLIC BLOOD PRESSURE: 58 MMHG | HEART RATE: 96 BPM | BODY MASS INDEX: 23.24 KG/M2

## 2019-10-30 DIAGNOSIS — I34.0 NON-RHEUMATIC MITRAL REGURGITATION: ICD-10-CM

## 2019-10-30 DIAGNOSIS — Z34.82 PRENATAL CARE, SUBSEQUENT PREGNANCY IN SECOND TRIMESTER: ICD-10-CM

## 2019-10-30 DIAGNOSIS — R00.2 PALPITATIONS: Primary | ICD-10-CM

## 2019-10-30 PROCEDURE — 99204 OFFICE O/P NEW MOD 45 MIN: CPT | Performed by: INTERNAL MEDICINE

## 2019-10-30 NOTE — PROGRESS NOTES
CARDIOLOGY CONSULTATION:    Please see dictated note    PAST MEDICAL HISTORY:  Past Medical History:   Diagnosis Date     Chickenpox      Factor 5 Leiden mutation, heterozygous (H) 2/27/2018     Factor V Leiden (H)      History of frequent urinary tract infections      Non-rheumatic mitral regurgitation, trace 2/8/2019    Seen on echocardiogram 2/8/2019. Recommended for follow up echo in 2-3 years.      Postpartum depression     mild       CURRENT MEDICATIONS:  Current Outpatient Medications   Medication Sig Dispense Refill     ondansetron (ZOFRAN) 4 MG tablet Take 1 tablet (4 mg) by mouth every 6 hours as needed for nausea 30 tablet 3     Prenatal Vit-Fe Fumarate-FA (PRENATAL MULTIVITAMIN W/IRON) 27-0.8 MG tablet Take 1 tablet by mouth daily         PAST SURGICAL HISTORY:  Past Surgical History:   Procedure Laterality Date     GYN SURGERY      c section     HEAD & NECK SURGERY      hemangioma on neck at age 2     LAPAROSCOPIC CHOLECYSTECTOMY         ALLERGIES  Patient has no known allergies.    FAMILY HX:  Family History   Problem Relation Age of Onset     Stomach Problem Mother         ulcer     Bleeding Disorder Father         factor V     Ovarian Cancer Maternal Grandmother      Thyroid Cancer Paternal Grandmother      Alzheimer Disease Paternal Grandfather      Bleeding Disorder Paternal Grandfather         factor V       SOCIAL HX:  Social History     Socioeconomic History     Marital status:      Spouse name: None     Number of children: None     Years of education: None     Highest education level: None   Occupational History     None   Social Needs     Financial resource strain: None     Food insecurity:     Worry: None     Inability: None     Transportation needs:     Medical: None     Non-medical: None   Tobacco Use     Smoking status: Never Smoker     Smokeless tobacco: Never Used   Substance and Sexual Activity     Alcohol use: Yes     Comment:  rare-quit with pregnancy     Drug use: No      Sexual activity: Yes     Partners: Male     Birth control/protection: Condom   Lifestyle     Physical activity:     Days per week: None     Minutes per session: None     Stress: None   Relationships     Social connections:     Talks on phone: None     Gets together: None     Attends Bahai service: None     Active member of club or organization: None     Attends meetings of clubs or organizations: None     Relationship status: None     Intimate partner violence:     Fear of current or ex partner: None     Emotionally abused: None     Physically abused: None     Forced sexual activity: None   Other Topics Concern     Parent/sibling w/ CABG, MI or angioplasty before 65F 55M? No   Social History Narrative    Lives in [place] with her , twin boys, parents and brother.       ROS:  Constitutional: No fever, chills, or sweats. No weight gain/loss.   ENT: No visual disturbance, ear ache, epistaxis, sore throat.   Allergies/Immunologic: Negative.   Respiratory: No cough, hemoptysis.   Cardiovascular: As per HPI.   GI: No nausea, vomiting, hematemesis, melena, or hematochezia.   : No urinary frequency, dysuria, or hematuria.   Integument: Negative.   Psychiatric: Negative.   Neuro: Negative.   Endocrinology: Negative.   Musculoskeletal: No myalgia.    VITAL SIGNS:  /58   Pulse 96   Wt 55.8 kg (123 lb)   LMP 05/24/2019 (Exact Date)   SpO2 100%   BMI 23.24 kg/m    Body mass index is 23.24 kg/m .  Wt Readings from Last 2 Encounters:   10/30/19 55.8 kg (123 lb)   10/14/19 54 kg (119 lb)       PHYSICAL EXAM  Rocío Catherine IS A 23 year old female.in no acute distress.  HEENT: Unremarkable.  Neck: JVP normal.  Carotids +4/4 bilaterally without bruits.  Lungs: CTA.  Cor: RRR. Normal S1 and S2.  No murmur, rub, or gallop.  PMI in Lf 5th ICS.  Abd: Soft, nontender, nondistended.  NABS.  No pulsatile mass.  Extremities: No C/C/E.  Pulses +4/4 symmetric in upper and lower extremities.  Neuro: Grossly  intact.    LABS    Lab Results   Component Value Date    WBC 14.3 10/10/2019     Lab Results   Component Value Date    RBC 4.11 10/10/2019     Lab Results   Component Value Date    HGB 12.6 10/10/2019     Lab Results   Component Value Date    HCT 37.6 10/10/2019     No components found for: MCT  Lab Results   Component Value Date    MCV 92 10/10/2019     Lab Results   Component Value Date    MCH 30.7 10/10/2019     Lab Results   Component Value Date    MCHC 33.5 10/10/2019     Lab Results   Component Value Date    RDW 12.6 10/10/2019     Lab Results   Component Value Date     10/10/2019      Recent Labs   Lab Test 10/10/19  1437 02/22/19  1750    140   POTASSIUM 3.8 4.2   CHLORIDE 107 107   CO2 22 27   ANIONGAP 9 6   GLC 92 108*   BUN 9 14   CR 0.54 0.78   LANA 8.9 8.9     ESDRAS Mesa MD

## 2019-10-30 NOTE — LETTER
10/30/2019      Yessy Lyn, APRN CNP  [address] [facility] [address]      RE: Rocío Catherine       Dear Colleague,    I had the pleasure of seeing Rocío Catherine in the [facility].    Service Date: 10/30/2019      HISTORY OF PRESENT ILLNESS:  Ms. Catherine is a pleasant, 23-year-old woman whom I am seeing today for palpitations.  She is currently 22 weeks 5 days pregnant with her second pregnancy, but third child.  Her first 2 were identical twins with that pregnancy complicated by a reported small pericardial effusion, and she did end up with a C section, but not for cardiovascular reasons.  One of her twin boys was found to have an interruption of his esophagus and required surgery.  There is no family history of congenital heart disease or early coronary artery disease.      Ms. Catherine has been noting palpitations on this pregnancy more so than she had in the past.  They do not last more than a couple minutes at a time.  She reports her heart rate is over 100 when they occur, and she will have a pressure in her chest.  She has not had a monitor.  They are happening daily at this time.  She was recently seen in the emergency department.  I did do a CT scan in the setting of heterozygous factor V Leiden, and that was negative for any PE.  Her last echo was in February of this year.  Her ejection fraction was normal at 60%-65%.  Her mitral valve was functioning well with trace MR.  There was reported thickening and maybe abnormal attachment of her mitral valve, although I have reviewed those images personally, and I do not see anything that is significantly concerning.  She had a normal RVSP.  No evidence of pulmonary hypertension.  With her history of the small pericardial effusion, she has been sent to Cardiology with these new symptoms.  She is otherwise doing well.  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   No other concerning symptoms.  Her blood pressure has been well controlled.  She has no history of arrhythmia in the past.  EKG shows sinus rhythm, rate of 86 beats per minute and no concerning issues.      IMPRESSION, REPORT, PLAN:   1.  Palpitations.   2.  Twenty-two weeks 5 days pregnant with her second pregnancy, third child, with due date 2020 and planned C section 1 week prior.     3.  History of a small pericardial effusion with her first pregnancy.   4.  Factor V Leiden heterozygous with no history of blood clot.      DISCUSSION:  It was a pleasure being involved in the care of Ms. Catherine.  I discussed her history, I reviewed her echo from February, and I have also reviewed a report from 2016 at the outside facility, where they reported a small pericardial effusion.  I could not find any other echoes in the system.  We discussed that with her palpitations, we should do a Holter monitor.  Since she is having daily symptoms, we can do a 48 hour to try to capture what these are.  We discussed that it is normal for the heart rate to increase with pregnancy, but we also want to make sure that she does not have any arrhythmias as well.  We will also repeat an echo given the history of pericardial effusion with her first pregnancy, which was of unclear etiology, and then plan to see her back and make further recommendations at that time.  At this point, I do not hear any concerning murmurs on examination, and I would anticipate she would be able to deliver here at [facility] as she wants, but again we will make certain depending on the additional testing as outlined.        It was a pleasure to see her today.  Please do not hesitate to contact me with any questions or concerns.         MICHELLE WONG MD             D: 10/30/2019   T: 10/30/2019   MT: rom      Name:     AIDA CATHERINE   MRN:      [id number]        Account:      [id number]   :      1995           Service Date: 10/30/2019       Document: [id number]         Outpatient Encounter Medications as of 10/30/2019   Medication Sig Dispense Refill     ondansetron (ZOFRAN) 4 MG tablet Take 1 tablet (4 mg) by mouth every 6 hours as needed for nausea 30 tablet 3     Prenatal Vit-Fe Fumarate-FA (PRENATAL MULTIVITAMIN W/IRON) 27-0.8 MG tablet Take 1 tablet by mouth daily       No facility-administered encounter medications on file as of 10/30/2019.        Again, thank you for allowing me to participate in the care of your patient.      Sincerely,    Jose D Mesa MD     [facility]    cc:   Yolande Benson MD  [address] [place] [address]

## 2019-10-30 NOTE — PROGRESS NOTES
Service Date: 10/30/2019      HISTORY OF PRESENT ILLNESS:  Ms. Catherine is a pleasant, 23-year-old woman whom I am seeing today for palpitations.  She is currently 22 weeks 5 days pregnant with her second pregnancy, but third child.  Her first 2 were identical twins with that pregnancy complicated by a reported small pericardial effusion, and she did end up with a C section, but not for cardiovascular reasons.  One of her twin boys was found to have an interruption of his esophagus and required surgery.  There is no family history of congenital heart disease or early coronary artery disease.      Ms. Catherine has been noting palpitations on this pregnancy more so than she had in the past.  They do not last more than a couple minutes at a time.  She reports her heart rate is over 100 when they occur, and she will have a pressure in her chest.  She has not had a monitor.  They are happening daily at this time.  She was recently seen in the emergency department.  I did do a CT scan in the setting of heterozygous factor V Leiden, and that was negative for any PE.  Her last echo was in February of this year.  Her ejection fraction was normal at 60%-65%.  Her mitral valve was functioning well with trace MR.  There was reported thickening and maybe abnormal attachment of her mitral valve, although I have reviewed those images personally, and I do not see anything that is significantly concerning.  She had a normal RVSP.  No evidence of pulmonary hypertension.  With her history of the small pericardial effusion, she has been sent to Cardiology with these new symptoms.  She is otherwise doing well.  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  No other concerning symptoms.  Her blood pressure has been well controlled.  She has no history of arrhythmia in the past.  EKG shows sinus rhythm, rate of 86 beats per minute and no concerning issues.       IMPRESSION, REPORT, PLAN:   1.  Palpitations.   2.  Twenty-two weeks 5 days pregnant with her second pregnancy, third child, with due date 2020 and planned C section 1 week prior.     3.  History of a small pericardial effusion with her first pregnancy.   4.  Factor V Leiden heterozygous with no history of blood clot.      DISCUSSION:  It was a pleasure being involved in the care of Ms. Catherine.  I discussed her history, I reviewed her echo from February, and I have also reviewed a report from  at the outside facility, where they reported a small pericardial effusion.  I could not find any other echoes in the system.  We discussed that with her palpitations, we should do a Holter monitor.  Since she is having daily symptoms, we can do a 48 hour to try to capture what these are.  We discussed that it is normal for the heart rate to increase with pregnancy, but we also want to make sure that she does not have any arrhythmias as well.  We will also repeat an echo given the history of pericardial effusion with her first pregnancy, which was of unclear etiology, and then plan to see her back and make further recommendations at that time.  At this point, I do not hear any concerning murmurs on examination, and I would anticipate she would be able to deliver here at [facility] as she wants, but again we will make certain depending on the additional testing as outlined.        It was a pleasure to see her today.  Please do not hesitate to contact me with any questions or concerns.         MICHELLE WONG MD             D: 10/30/2019   T: 10/30/2019   MT: rom      Name:     AIDA CATHERINE   MRN:      [id number]        Account:      [id number]   :      1995           Service Date: 10/30/2019      Document: [id number]

## 2019-10-30 NOTE — LETTER
10/30/2019    Yessy Lyn, APRN CNP  [address] [facility] [address]    RE: Rocío Catherine       Dear Colleague,    I had the pleasure of seeing Rocío Catherine in the [facility].    CARDIOLOGY CONSULTATION:    Please see dictated note    PAST MEDICAL HISTORY:  Past Medical History:   Diagnosis Date     Chickenpox      Factor 5 Leiden mutation, heterozygous (H) 2/27/2018     Factor V Leiden (H)      History of frequent urinary tract infections      Non-rheumatic mitral regurgitation, trace 2/8/2019    Seen on echocardiogram 2/8/2019. Recommended for follow up echo in 2-3 years.      Postpartum depression     mild       CURRENT MEDICATIONS:  Current Outpatient Medications   Medication Sig Dispense Refill     ondansetron (ZOFRAN) 4 MG tablet Take 1 tablet (4 mg) by mouth every 6 hours as needed for nausea 30 tablet 3     Prenatal Vit-Fe Fumarate-FA (PRENATAL MULTIVITAMIN W/IRON) 27-0.8 MG tablet Take 1 tablet by mouth daily         PAST SURGICAL HISTORY:  Past Surgical History:   Procedure Laterality Date     GYN SURGERY      c section     HEAD & NECK SURGERY      hemangioma on neck at age 2     LAPAROSCOPIC CHOLECYSTECTOMY         ALLERGIES  Patient has no known allergies.    FAMILY HX:  Family History   Problem Relation Age of Onset     Stomach Problem Mother         ulcer     Bleeding Disorder Father         factor V     Ovarian Cancer Maternal Grandmother      Thyroid Cancer Paternal Grandmother      Alzheimer Disease Paternal Grandfather      Bleeding Disorder Paternal Grandfather         factor V       SOCIAL HX:  Social History     Socioeconomic History     Marital status:      Spouse name: None     Number of children: None     Years of education: None     Highest education level: None   Occupational History     None   Social Needs     Financial resource strain: None     Food insecurity:     Worry: None     Inability: None     Transportation needs:     Medical: None      Non-medical: None   Tobacco Use     Smoking status: Never Smoker     Smokeless tobacco: Never Used   Substance and Sexual Activity     Alcohol use: Yes     Comment:  rare-quit with pregnancy     Drug use: No     Sexual activity: Yes     Partners: Male     Birth control/protection: Condom   Lifestyle     Physical activity:     Days per week: None     Minutes per session: None     Stress: None   Relationships     Social connections:     Talks on phone: None     Gets together: None     Attends Mandaen service: None     Active member of club or organization: None     Attends meetings of clubs or organizations: None     Relationship status: None     Intimate partner violence:     Fear of current or ex partner: None     Emotionally abused: None     Physically abused: None     Forced sexual activity: None   Other Topics Concern     Parent/sibling w/ CABG, MI or angioplasty before 65F 55M? No   Social History Narrative    Lives in [place] with her , twin boys, parents and brother.       ROS:  Constitutional: No fever, chills, or sweats. No weight gain/loss.   ENT: No visual disturbance, ear ache, epistaxis, sore throat.   Allergies/Immunologic: Negative.   Respiratory: No cough, hemoptysis.   Cardiovascular: As per HPI.   GI: No nausea, vomiting, hematemesis, melena, or hematochezia.   : No urinary frequency, dysuria, or hematuria.   Integument: Negative.   Psychiatric: Negative.   Neuro: Negative.   Endocrinology: Negative.   Musculoskeletal: No myalgia.    VITAL SIGNS:  /58   Pulse 96   Wt 55.8 kg (123 lb)   LMP 05/24/2019 (Exact Date)   SpO2 100%   BMI 23.24 kg/m     Body mass index is 23.24 kg/m .  Wt Readings from Last 2 Encounters:   10/30/19 55.8 kg (123 lb)   10/14/19 54 kg (119 lb)       PHYSICAL EXAM  Rocío Catherine IS A 23 year old female.in no acute distress.  HEENT: Unremarkable.  Neck: JVP normal.  Carotids +4/4 bilaterally without bruits.  Lungs: CTA.  Cor: RRR. Normal S1 and S2.  No murmur,  rub, or gallop.  PMI in Lf 5th ICS.  Abd: Soft, nontender, nondistended.  NABS.  No pulsatile mass.  Extremities: No C/C/E.  Pulses +4/4 symmetric in upper and lower extremities.  Neuro: Grossly intact.    LABS    Lab Results   Component Value Date    WBC 14.3 10/10/2019     Lab Results   Component Value Date    RBC 4.11 10/10/2019     Lab Results   Component Value Date    HGB 12.6 10/10/2019     Lab Results   Component Value Date    HCT 37.6 10/10/2019     No components found for: MCT  Lab Results   Component Value Date    MCV 92 10/10/2019     Lab Results   Component Value Date    MCH 30.7 10/10/2019     Lab Results   Component Value Date    MCHC 33.5 10/10/2019     Lab Results   Component Value Date    RDW 12.6 10/10/2019     Lab Results   Component Value Date     10/10/2019      Recent Labs   Lab Test 10/10/19  1437 02/22/19  1750    140   POTASSIUM 3.8 4.2   CHLORIDE 107 107   CO2 22 27   ANIONGAP 9 6   GLC 92 108*   BUN 9 14   CR 0.54 0.78   LANA 8.9 8.9     ESDRAS Mesa MD     Service Date: 10/30/2019      HISTORY OF PRESENT ILLNESS:  Ms. Catherine is a pleasant, 23-year-old woman whom I am seeing today for palpitations.  She is currently 22 weeks 5 days pregnant with her second pregnancy, but third child.  Her first 2 were identical twins with that pregnancy complicated by a reported small pericardial effusion, and she did end up with a C section, but not for cardiovascular reasons.  One of her twin boys was found to have an interruption of his esophagus and required surgery.  There is no family history of congenital heart disease or early coronary artery disease.      Ms. Catherine has been noting palpitations on this pregnancy more so than she had in the past.  They do not last more than a couple minutes at a time.  She reports her heart rate is over 100 when they occur, and she will have a pressure in her chest.  She has not had a monitor.  They are happening daily at this time.  She was recently  seen in the emergency department.  I did do a CT scan in the setting of heterozygous factor V Leiden, and that was negative for any PE.  Her last echo was in February of this year.  Her ejection fraction was normal at 60%-65%.  Her mitral valve was functioning well with trace MR.  There was reported thickening and maybe abnormal attachment of her mitral valve, although I have reviewed those images personally, and I do not see anything that is significantly concerning.  She had a normal RVSP.  No evidence of pulmonary hypertension.  With her history of the small pericardial effusion, she has been sent to Cardiology with these new symptoms.  She is otherwise doing well.  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  No other concerning symptoms.  Her blood pressure has been well controlled.  She has no history of arrhythmia in the past.  EKG shows sinus rhythm, rate of 86 beats per minute and no concerning issues.      IMPRESSION, REPORT, PLAN:   1.  Palpitations.   2.  Twenty-two weeks 5 days pregnant with her second pregnancy, third child, with due date 02/28/2020 and planned C section 1 week prior.     3.  History of a small pericardial effusion with her first pregnancy.   4.  Factor V Leiden heterozygous with no history of blood clot.      DISCUSSION:  It was a pleasure being involved in the care of Ms. Catherine.  I discussed her history, I reviewed her echo from February, and I have also reviewed a report from 2016 at the outside facility, where they reported a small pericardial effusion.  I could not find any other echoes in the system.  We discussed that with her palpitations, we should do a Holter monitor.  Since she is having daily symptoms, we can do a 48 hour to try to capture what these are.  We discussed that it is normal for the heart rate to increase with pregnancy, but we also want to make sure that she does not have any  arrhythmias as well.  We will also repeat an echo given the history of pericardial effusion with her first pregnancy, which was of unclear etiology, and then plan to see her back and make further recommendations at that time.  At this point, I do not hear any concerning murmurs on examination, and I would anticipate she would be able to deliver here at [facility] as she wants, but again we will make certain depending on the additional testing as outlined.        It was a pleasure to see her today.  Please do not hesitate to contact me with any questions or concerns.         JOSE D MESA MD             D: 10/30/2019   T: 10/30/2019   MT: rom      Name:     AIDA WONG   MRN:      [id number]        Account:      [id number]   :      1995           Service Date: 10/30/2019      Document: [id number]       Thank you for allowing me to participate in the care of your patient.      Sincerely,     Jose D Mesa MD     [facility]    cc:   Yolande Benson MD  [address] [place] [address]

## 2019-10-30 NOTE — PATIENT INSTRUCTIONS
"[facility]~[address]~[place], [place]~[address]  Thank you for your  Heart Care visit today. If you have questions regarding your visit, please contact your cardiology RN, Mayelin Headley, at [phone].    Please arrive 15 minutes early to all cardiology appointments.    To schedule a future appointment, we kindly ask that you call cardiology scheduling at [phone] three months prior to requested revisit date.  [facility] cardiology clinic is staffed with \"Advance Practice Providers\". These are our cardiology Physician Assistants and Nurse Practitioners.   Please call cardiology scheduling if you feel you need clinical evaluation with them at any time for any cardiac reason.     Reminder:  For your safety, we ask that you bring in your current medication(s) or an updated list of your medications with you to EACH office visit. Include the medication name, dose of pill on bottle and how you are taking it. Include over-the-counter medications or supplements. Your provider will review this at each visit and plan your care based on your current information.   ~~~~~~~~~~~~~~~~~~~~~~~~~~~~~~~~~~~~~~~  \"[facility]\" [place] telephone numbers for reference:  Cardiology Scheduling~[phone]  Diagnostic Imaging Scheduling~[phone]  Lab Scheduling~[phone]  Anticoagulation Clinic~[phone]  Cardiac Rehabilitation~[phone]  CORE Clinic RN's~[phone] (at [facility])  Congential Team [phone] (at [facility])  Cardiology Clinic RN's~[phone] (Mayelin Headley, RN)  ~~~~~~~~~~~~~~~~~~~~~~~~~~~~~~~~~~~~~~~~        "

## 2019-11-01 ENCOUNTER — PRENATAL OFFICE VISIT (OUTPATIENT)
Dept: OBGYN | Facility: CLINIC | Age: 24
End: 2019-11-01
Payer: COMMERCIAL

## 2019-11-01 VITALS
TEMPERATURE: 96.6 F | HEART RATE: 88 BPM | WEIGHT: 123 LBS | SYSTOLIC BLOOD PRESSURE: 109 MMHG | DIASTOLIC BLOOD PRESSURE: 69 MMHG | BODY MASS INDEX: 23.24 KG/M2

## 2019-11-01 DIAGNOSIS — G43.909 MIGRAINE WITHOUT STATUS MIGRAINOSUS, NOT INTRACTABLE, UNSPECIFIED MIGRAINE TYPE: ICD-10-CM

## 2019-11-01 DIAGNOSIS — Z34.82 PRENATAL CARE, SUBSEQUENT PREGNANCY IN SECOND TRIMESTER: Primary | ICD-10-CM

## 2019-11-01 LAB — GLUCOSE SERPL-MCNC: 113 MG/DL (ref 70–99)

## 2019-11-01 PROCEDURE — 36415 COLL VENOUS BLD VENIPUNCTURE: CPT | Performed by: OBSTETRICS & GYNECOLOGY

## 2019-11-01 PROCEDURE — 82947 ASSAY GLUCOSE BLOOD QUANT: CPT | Performed by: OBSTETRICS & GYNECOLOGY

## 2019-11-01 PROCEDURE — 99207 ZZC PRENATAL VISIT: CPT | Performed by: OBSTETRICS & GYNECOLOGY

## 2019-11-01 RX ORDER — METOCLOPRAMIDE 10 MG/1
10 TABLET ORAL
Qty: 60 TABLET | Refills: 0 | Status: SHIPPED | OUTPATIENT
Start: 2019-11-01 | End: 2019-12-10

## 2019-11-01 NOTE — NURSING NOTE
"Initial /69 (BP Location: Right arm, Patient Position: Chair, Cuff Size: Adult Regular)   Pulse 88   Temp 96.6  F (35.9  C) (Tympanic)   Wt 55.8 kg (123 lb)   LMP 05/24/2019 (Exact Date)   Breastfeeding? No   BMI 23.24 kg/m   Estimated body mass index is 23.24 kg/m  as calculated from the following:    Height as of 9/17/19: 1.549 m (5' 1\").    Weight as of this encounter: 55.8 kg (123 lb). .    Gabrielle Chaves MA    "

## 2019-11-06 ENCOUNTER — HOSPITAL ENCOUNTER (OUTPATIENT)
Dept: CARDIOLOGY | Facility: CLINIC | Age: 24
Discharge: HOME | End: 2019-11-06
Attending: INTERNAL MEDICINE | Admitting: INTERNAL MEDICINE
Payer: COMMERCIAL

## 2019-11-06 DIAGNOSIS — I34.0 NON-RHEUMATIC MITRAL REGURGITATION: ICD-10-CM

## 2019-11-06 DIAGNOSIS — Z34.82 PRENATAL CARE, SUBSEQUENT PREGNANCY IN SECOND TRIMESTER: ICD-10-CM

## 2019-11-06 PROCEDURE — 93227 XTRNL ECG REC<48 HR R&I: CPT | Performed by: INTERNAL MEDICINE

## 2019-11-06 PROCEDURE — 93226 XTRNL ECG REC<48 HR SCAN A/R: CPT

## 2019-11-11 ENCOUNTER — HOSPITAL ENCOUNTER (OUTPATIENT)
Dept: CARDIOLOGY | Facility: CLINIC | Age: 24
Discharge: HOME | End: 2019-11-11
Attending: INTERNAL MEDICINE | Admitting: INTERNAL MEDICINE
Payer: COMMERCIAL

## 2019-11-11 ENCOUNTER — PRENATAL OFFICE VISIT (OUTPATIENT)
Dept: OBGYN | Facility: CLINIC | Age: 24
End: 2019-11-11
Payer: COMMERCIAL

## 2019-11-11 VITALS
TEMPERATURE: 95.1 F | SYSTOLIC BLOOD PRESSURE: 106 MMHG | HEART RATE: 90 BPM | WEIGHT: 125 LBS | DIASTOLIC BLOOD PRESSURE: 68 MMHG | BODY MASS INDEX: 23.62 KG/M2

## 2019-11-11 DIAGNOSIS — Z34.82 PRENATAL CARE, SUBSEQUENT PREGNANCY IN SECOND TRIMESTER: ICD-10-CM

## 2019-11-11 DIAGNOSIS — Z34.82 PRENATAL CARE, SUBSEQUENT PREGNANCY IN SECOND TRIMESTER: Primary | ICD-10-CM

## 2019-11-11 DIAGNOSIS — I34.0 NON-RHEUMATIC MITRAL REGURGITATION: ICD-10-CM

## 2019-11-11 LAB
ERYTHROCYTE [DISTWIDTH] IN BLOOD BY AUTOMATED COUNT: 11.8 % (ref 10–15)
GLUCOSE 1H P 50 G GLC PO SERPL-MCNC: 122 MG/DL (ref 60–129)
HCT VFR BLD AUTO: 34.5 % (ref 35–47)
HGB BLD-MCNC: 11.4 G/DL (ref 11.7–15.7)
MCH RBC QN AUTO: 30.7 PG (ref 26.5–33)
MCHC RBC AUTO-ENTMCNC: 33 G/DL (ref 31.5–36.5)
MCV RBC AUTO: 93 FL (ref 78–100)
PLATELET # BLD AUTO: 230 10E9/L (ref 150–450)
RBC # BLD AUTO: 3.71 10E12/L (ref 3.8–5.2)
WBC # BLD AUTO: 11.5 10E9/L (ref 4–11)

## 2019-11-11 PROCEDURE — 93306 TTE W/DOPPLER COMPLETE: CPT

## 2019-11-11 PROCEDURE — 82950 GLUCOSE TEST: CPT | Performed by: OBSTETRICS & GYNECOLOGY

## 2019-11-11 PROCEDURE — 86780 TREPONEMA PALLIDUM: CPT | Performed by: OBSTETRICS & GYNECOLOGY

## 2019-11-11 PROCEDURE — 36415 COLL VENOUS BLD VENIPUNCTURE: CPT | Performed by: OBSTETRICS & GYNECOLOGY

## 2019-11-11 PROCEDURE — 99207 ZZC PRENATAL VISIT: CPT | Performed by: OBSTETRICS & GYNECOLOGY

## 2019-11-11 PROCEDURE — 85027 COMPLETE CBC AUTOMATED: CPT | Performed by: OBSTETRICS & GYNECOLOGY

## 2019-11-11 PROCEDURE — 93306 TTE W/DOPPLER COMPLETE: CPT | Mod: 26 | Performed by: INTERNAL MEDICINE

## 2019-11-11 NOTE — PROGRESS NOTES
Doing well.   No complaints, today  Denies VB, ctx, LOF. +FM  /68 (BP Location: Right arm, Patient Position: Chair, Cuff Size: Adult Regular)   Pulse 90   Temp 95.1  F (35.1  C) (Tympanic)   Wt 56.7 kg (125 lb)   LMP 2019 (Exact Date)   Breastfeeding? No   BMI 23.62 kg/m    General Appearance: NAD  Abdomen: Gravid, NT  Refer to flow sheet above.   A/P: 23 year old  at 24w3d  -- 1 hr GCT, CBC and syphilis testing today  -- PTL precautions reviewed  RTC in 4 weeks    Yolande Benson MD  [facility]

## 2019-11-11 NOTE — NURSING NOTE
"Initial /68 (BP Location: Right arm, Patient Position: Chair, Cuff Size: Adult Regular)   Pulse 90   Temp 95.1  F (35.1  C) (Tympanic)   Wt 56.7 kg (125 lb)   LMP 05/24/2019 (Exact Date)   Breastfeeding? No   BMI 23.62 kg/m   Estimated body mass index is 23.62 kg/m  as calculated from the following:    Height as of 9/17/19: 1.549 m (5' 1\").    Weight as of this encounter: 56.7 kg (125 lb). .    Gabrielle Chaves MA    "

## 2019-11-11 NOTE — RESULT ENCOUNTER NOTE
Results noted: principle rhythm sinus  with avg HR 95; 1 PVC; 6 isolated SVEs and 1 couplet; patient logged 8 events during the recording period indicating symptoms of heart racing and chest discomfort--rhythm at these times was sinus with HR ranging  bpm without associated ectopy. To be discussed at  with Lu Jaime NP on 11/27/19.

## 2019-11-12 LAB — T PALLIDUM AB SER QL: NONREACTIVE

## 2019-11-12 NOTE — RESULT ENCOUNTER NOTE
Results noted: EF 60-65%; no significant valve disease; no significant change from previous per reader. To be discussed at OV with Lu Jaime NP on 11/27/19. Pt notified of results via her [employer] account

## 2019-11-22 ENCOUNTER — MYC MEDICAL ADVICE (OUTPATIENT)
Dept: OBGYN | Facility: CLINIC | Age: 24
End: 2019-11-22

## 2019-11-22 NOTE — LETTER
[facility]  [address]  [facility] [address]  Phone: [phone]      December 2, 2019      Rocío Catherine  [address] [address]        To whom it may concern:    Rocío Catherine is a patient that sees us for her prenatal/pregnancy care.   Due to increasing physical burdens related to her pregnancy as she is advancing in gestational age, please permit her not to work 8 hour shifts for the duration of the pregnancy. Also, since she is entering her last trimester in which her physical burdens such as unrelenting pelvic pain that is intolerable, could her last shift be on December 27th 2019.     Thank you for your understanding and consideration.     Sincerely,        Yolande Benson MD  [facility]

## 2019-11-27 ENCOUNTER — OFFICE VISIT (OUTPATIENT)
Dept: CARDIOLOGY | Facility: CLINIC | Age: 24
End: 2019-11-27
Attending: INTERNAL MEDICINE
Payer: COMMERCIAL

## 2019-11-27 VITALS
SYSTOLIC BLOOD PRESSURE: 103 MMHG | BODY MASS INDEX: 23.81 KG/M2 | DIASTOLIC BLOOD PRESSURE: 63 MMHG | OXYGEN SATURATION: 100 % | HEART RATE: 93 BPM | WEIGHT: 126 LBS

## 2019-11-27 DIAGNOSIS — M54.50 LOW BACK PAIN DURING PREGNANCY IN SECOND TRIMESTER: Primary | ICD-10-CM

## 2019-11-27 DIAGNOSIS — O26.892 LOW BACK PAIN DURING PREGNANCY IN SECOND TRIMESTER: Primary | ICD-10-CM

## 2019-11-27 DIAGNOSIS — R00.2 PALPITATIONS: Primary | ICD-10-CM

## 2019-11-27 PROCEDURE — 99212 OFFICE O/P EST SF 10 MIN: CPT | Performed by: NURSE PRACTITIONER

## 2019-11-27 RX ORDER — CYCLOBENZAPRINE HCL 5 MG
5-10 TABLET ORAL 3 TIMES DAILY PRN
Qty: 30 TABLET | Refills: 1 | Status: ON HOLD | OUTPATIENT
Start: 2019-11-27 | End: 2020-02-22

## 2019-11-27 NOTE — LETTER
11/27/2019    Yessy Lyn, APRN CNP  [address] [facility] [address]    RE: Rocío Catherine       Dear Colleague,    I had the pleasure of seeing Rocío Catherine in the [facility].    Cardiology Clinic Progress Note  Rocío Catherine MRN# [id number]   YOB: 1995 Age: 24 year old      Reason For Visit: Follow-up testing    Primary Cardiologist:   Dr. Mesa           History of Presenting Illness:      Rocío Catherine is a pleasant 24 year old patient with a past cardiac history significant for palpitations and prior pericardial effusion. Past medical history significant for Factor V Leiden with no history of blood clot.    Pt was last seen by Dr. Mesa in October 2019.  She was 22 weeks and 5 days pregnant with her second pregnancy but third child.  Her first pregnancy was identical twin and was complicated by a small pericardial effusion.  She reported palpitations not lasting more than couple minutes, but heart rates 100s when they occurred.  They were occurring daily.  Echocardiogram from February 2019 showed EF 60 to 65% with trace MR.  The report noted thickening with possible abnormal attachment of her mitral valve, but when Dr. Mesa reviewed the images this was not noted to be of any significant concern.  She reported no prior arrhythmias and blood pressures were well controlled.  EKG showed sinus rhythm with ventricular rate 86.    Pt presents today for testing follow-up.  48-hour Holter monitor 11/6/2019 showing sinus rhythm with average heart rate 95, minimum 61, maximum 151, 1 PVC, rare PACs and 8 events with symptoms of heart racing and chest discomfort correlated with sinus rhythm with heart rates between 84 and 112 bpm, not associated with ectopy.  Echocardiogram 11/11/2019 showing EF 60 to 65%, RV normal size and function, no significant valvular abnormalities, compared to February 2019 there are no significant changes.  These results were reviewed with her  today.    Since she was last seen, her palpitations have been stable. I reviewed lifestyle modification with decreasing caffeine and stress.  She currently drinks 1 cup of coffee per day.  She also reports some lightheadedness with soft blood pressures.  I encouraged her to stay well-hydrated. Patient reports no chest pain, shortness of breath, PND, orthopnea, presyncope, syncope, edema, heart racing.    Current Cardiac Medications   none                   Assessment and Plan:       Plan  1. Work on decreasing caffeine and stress     Follow-up:  See cardiology as needed      1. Palpitations    Holter monitor showing symptoms correlating with sinus rhythm    d/t pregnancy     lifestyle modification      2. H/o pericardial effusion    Small effusion noted with 1st pregnancy (twins)    Unknown etiology    No effusion noted this pregnancy          Thank you for allowing me to participate in this delightful patient's care.      This note was completed in part using Dragon voice recognition software. Although reviewed after completion, some word and grammatical errors may occur.    Lu Hess, APRN CNP, APRN, CNP           Data:   All laboratory data reviewed        HPI and Plan:   See dictation    No orders of the defined types were placed in this encounter.      No orders of the defined types were placed in this encounter.      There are no discontinued medications.      Encounter Diagnosis   Name Primary?     Palpitations Yes       CURRENT MEDICATIONS:  Current Outpatient Medications   Medication Sig Dispense Refill     metoclopramide (REGLAN) 10 MG tablet Take 1 tablet (10 mg) by mouth 4 times daily (before meals and nightly) 60 tablet 0     ondansetron (ZOFRAN) 4 MG tablet Take 1 tablet (4 mg) by mouth every 6 hours as needed for nausea 30 tablet 3     Prenatal Vit-Fe Fumarate-FA (PRENATAL MULTIVITAMIN W/IRON) 27-0.8 MG tablet Take 1 tablet by mouth daily       cyclobenzaprine (FLEXERIL) 5 MG  tablet Take 1-2 tablets (5-10 mg) by mouth 3 times daily as needed for muscle spasms (Patient not taking: Reported on 11/27/2019) 30 tablet 1       ALLERGIES   No Known Allergies    PAST MEDICAL HISTORY:  Past Medical History:   Diagnosis Date     Chickenpox      Factor 5 Leiden mutation, heterozygous (H) 2/27/2018     Factor V Leiden (H)      History of frequent urinary tract infections      Non-rheumatic mitral regurgitation, trace 2/8/2019    Seen on echocardiogram 2/8/2019. Recommended for follow up echo in 2-3 years.      Postpartum depression     mild       PAST SURGICAL HISTORY:  Past Surgical History:   Procedure Laterality Date     GYN SURGERY      c section     HEAD & NECK SURGERY      hemangioma on neck at age 2     LAPAROSCOPIC CHOLECYSTECTOMY         FAMILY HISTORY:  Family History   Problem Relation Age of Onset     Stomach Problem Mother         ulcer     Bleeding Disorder Father         factor V     Ovarian Cancer Maternal Grandmother      Thyroid Cancer Paternal Grandmother      Alzheimer Disease Paternal Grandfather      Bleeding Disorder Paternal Grandfather         factor V       SOCIAL HISTORY:  Social History     Socioeconomic History     Marital status:      Spouse name: None     Number of children: None     Years of education: None     Highest education level: None   Occupational History     None   Social Needs     Financial resource strain: None     Food insecurity:     Worry: None     Inability: None     Transportation needs:     Medical: None     Non-medical: None   Tobacco Use     Smoking status: Never Smoker     Smokeless tobacco: Never Used   Substance and Sexual Activity     Alcohol use: Yes     Comment:  rare-quit with pregnancy     Drug use: No     Sexual activity: Yes     Partners: Male     Birth control/protection: Condom   Lifestyle     Physical activity:     Days per week: None     Minutes per session: None     Stress: None   Relationships     Social connections:     Talks  on phone: None     Gets together: None     Attends Ep[employer] service: None     Active member of club or organization: None     Attends meetings of clubs or organizations: None     Relationship status: None     Intimate partner violence:     Fear of current or ex partner: None     Emotionally abused: None     Physically abused: None     Forced sexual activity: None   Other Topics Concern     Parent/sibling w/ CABG, MI or angioplasty before 65F 55M? No   Social History Narrative    Lives in [place] with her , twin boys, parents and brother.       Review of Systems:  Skin:  Negative       Eyes:  Negative      ENT:  Negative      Respiratory:  Positive for shortness of breath     Cardiovascular:    chest pain;Positive for;palpitations;fatigue;lightheadedness;dizziness    Gastroenterology: Positive for heartburn    Genitourinary:  Negative      Musculoskeletal:  Negative      Neurologic:  Positive for headaches;numbness or tingling of hands;numbness or tingling of feet    Psychiatric:  Positive for anxiety;depression    Heme/Lymph/Imm:  Negative      Endocrine:  Negative        Physical Exam:  Vitals: /63 (BP Location: Right arm, Patient Position: Sitting, Cuff Size: Adult Regular)   Pulse 93   Wt 57.2 kg (126 lb)   LMP 05/24/2019 (Exact Date)   SpO2 100%   BMI 23.81 kg/m       Constitutional:  cooperative, alert and oriented, well developed, well nourished, in no acute distress        Skin:  warm and dry to the touch          Head:  normocephalic        Eyes:  sclera white        Lymph:      ENT:  no pallor or cyanosis        Neck:  no stiffness        Respiratory:  clear to auscultation;normal symmetry         Cardiac: regular rhythm;normal S1 and S2                pulses full and equal                                        GI:      pregnant     Extremities and Muscular Skeletal:  no edema              Neurological:  no gross motor deficits;affect appropriate        Psych:  Alert and Oriented x 3             Thank you for allowing me to participate in the care of your patient.    Sincerely,     SNOW Chao [facility]

## 2019-11-27 NOTE — PATIENT INSTRUCTIONS
Medication Changes:  None     Recommendations:  1. Heart testing looks great  2. Work on decreasing caffeine and stress     Follow-up:  See cardiology as needed     Cardiology Scheduling~[phone]  Cardiology Clinic RNs~[phone] (Rita Headley RN and Mayelin Carter RN)

## 2019-11-27 NOTE — PROGRESS NOTES
Cardiology Clinic Progress Note  Rocío Catherine MRN# [id number]   YOB: 1995 Age: 24 year old      Reason For Visit: Follow-up testing    Primary Cardiologist:   Dr. Mesa           History of Presenting Illness:      Rocío Catherine is a pleasant 24 year old patient with a past cardiac history significant for palpitations and prior pericardial effusion. Past medical history significant for Factor V Leiden with no history of blood clot.    Pt was last seen by Dr. Mesa in October 2019.  She was 22 weeks and 5 days pregnant with her second pregnancy but third child.  Her first pregnancy was identical twin and was complicated by a small pericardial effusion.  She reported palpitations not lasting more than couple minutes, but heart rates 100s when they occurred.  They were occurring daily.  Echocardiogram from February 2019 showed EF 60 to 65% with trace MR.  The report noted thickening with possible abnormal attachment of her mitral valve, but when Dr. Mesa reviewed the images this was not noted to be of any significant concern.  She reported no prior arrhythmias and blood pressures were well controlled.  EKG showed sinus rhythm with ventricular rate 86.    Pt presents today for testing follow-up.  48-hour Holter monitor 11/6/2019 showing sinus rhythm with average heart rate 95, minimum 61, maximum 151, 1 PVC, rare PACs and 8 events with symptoms of heart racing and chest discomfort correlated with sinus rhythm with heart rates between 84 and 112 bpm, not associated with ectopy.  Echocardiogram 11/11/2019 showing EF 60 to 65%, RV normal size and function, no significant valvular abnormalities, compared to February 2019 there are no significant changes.  These results were reviewed with her today.    Since she was last seen, her palpitations have been stable. I reviewed lifestyle modification with decreasing caffeine and stress.  She currently drinks 1 cup of coffee per day.  She also reports some  lightheadedness with soft blood pressures.  I encouraged her to stay well-hydrated. Patient reports no chest pain, shortness of breath, PND, orthopnea, presyncope, syncope, edema, heart racing.    Current Cardiac Medications   none                   Assessment and Plan:       Plan  1. Work on decreasing caffeine and stress     Follow-up:  See cardiology as needed      1. Palpitations    Holter monitor showing symptoms correlating with sinus rhythm    d/t pregnancy     lifestyle modification      2. H/o pericardial effusion    Small effusion noted with 1st pregnancy (twins)    Unknown etiology    No effusion noted this pregnancy          Thank you for allowing me to participate in this delightful patient's care.      This note was completed in part using Dragon voice recognition software. Although reviewed after completion, some word and grammatical errors may occur.    Lu Hess, APRN CNP, APRN, CNP           Data:   All laboratory data reviewed        HPI and Plan:   See dictation    No orders of the defined types were placed in this encounter.      No orders of the defined types were placed in this encounter.      There are no discontinued medications.      Encounter Diagnosis   Name Primary?     Palpitations Yes       CURRENT MEDICATIONS:  Current Outpatient Medications   Medication Sig Dispense Refill     metoclopramide (REGLAN) 10 MG tablet Take 1 tablet (10 mg) by mouth 4 times daily (before meals and nightly) 60 tablet 0     ondansetron (ZOFRAN) 4 MG tablet Take 1 tablet (4 mg) by mouth every 6 hours as needed for nausea 30 tablet 3     Prenatal Vit-Fe Fumarate-FA (PRENATAL MULTIVITAMIN W/IRON) 27-0.8 MG tablet Take 1 tablet by mouth daily       cyclobenzaprine (FLEXERIL) 5 MG tablet Take 1-2 tablets (5-10 mg) by mouth 3 times daily as needed for muscle spasms (Patient not taking: Reported on 11/27/2019) 30 tablet 1       ALLERGIES   No Known Allergies    PAST MEDICAL HISTORY:  Past  Medical History:   Diagnosis Date     Chickenpox      Factor 5 Leiden mutation, heterozygous (H) 2/27/2018     Factor V Leiden (H)      History of frequent urinary tract infections      Non-rheumatic mitral regurgitation, trace 2/8/2019    Seen on echocardiogram 2/8/2019. Recommended for follow up echo in 2-3 years.      Postpartum depression     mild       PAST SURGICAL HISTORY:  Past Surgical History:   Procedure Laterality Date     GYN SURGERY      c section     HEAD & NECK SURGERY      hemangioma on neck at age 2     LAPAROSCOPIC CHOLECYSTECTOMY         FAMILY HISTORY:  Family History   Problem Relation Age of Onset     Stomach Problem Mother         ulcer     Bleeding Disorder Father         factor V     Ovarian Cancer Maternal Grandmother      Thyroid Cancer Paternal Grandmother      Alzheimer Disease Paternal Grandfather      Bleeding Disorder Paternal Grandfather         factor V       SOCIAL HISTORY:  Social History     Socioeconomic History     Marital status:      Spouse name: None     Number of children: None     Years of education: None     Highest education level: None   Occupational History     None   Social Needs     Financial resource strain: None     Food insecurity:     Worry: None     Inability: None     Transportation needs:     Medical: None     Non-medical: None   Tobacco Use     Smoking status: Never Smoker     Smokeless tobacco: Never Used   Substance and Sexual Activity     Alcohol use: Yes     Comment:  rare-quit with pregnancy     Drug use: No     Sexual activity: Yes     Partners: Male     Birth control/protection: Condom   Lifestyle     Physical activity:     Days per week: None     Minutes per session: None     Stress: None   Relationships     Social connections:     Talks on phone: None     Gets together: None     Attends Druze service: None     Active member of club or organization: None     Attends meetings of clubs or organizations: None     Relationship status: None      Intimate partner violence:     Fear of current or ex partner: None     Emotionally abused: None     Physically abused: None     Forced sexual activity: None   Other Topics Concern     Parent/sibling w/ CABG, MI or angioplasty before 65F 55M? No   Social History Narrative    Lives in [place] with her , twin boys, parents and brother.       Review of Systems:  Skin:  Negative       Eyes:  Negative      ENT:  Negative      Respiratory:  Positive for shortness of breath     Cardiovascular:    chest pain;Positive for;palpitations;fatigue;lightheadedness;dizziness    Gastroenterology: Positive for heartburn    Genitourinary:  Negative      Musculoskeletal:  Negative      Neurologic:  Positive for headaches;numbness or tingling of hands;numbness or tingling of feet    Psychiatric:  Positive for anxiety;depression    Heme/Lymph/Imm:  Negative      Endocrine:  Negative        Physical Exam:  Vitals: /63 (BP Location: Right arm, Patient Position: Sitting, Cuff Size: Adult Regular)   Pulse 93   Wt 57.2 kg (126 lb)   LMP 05/24/2019 (Exact Date)   SpO2 100%   BMI 23.81 kg/m      Constitutional:  cooperative, alert and oriented, well developed, well nourished, in no acute distress        Skin:  warm and dry to the touch          Head:  normocephalic        Eyes:  sclera white        Lymph:      ENT:  no pallor or cyanosis        Neck:  no stiffness        Respiratory:  clear to auscultation;normal symmetry         Cardiac: regular rhythm;normal S1 and S2                pulses full and equal                                        GI:      pregnant     Extremities and Muscular Skeletal:  no edema              Neurological:  no gross motor deficits;affect appropriate        Psych:  Alert and Oriented x 3        CC  Jose D Mesa MD  [id number]  [place] [id number]

## 2019-11-29 NOTE — ADDENDUM NOTE
Encounter addended by: Kristie Patel, PT on: 11/29/2019 12:24 PM   Actions taken: Clinical Note Signed, Episode resolved

## 2019-11-29 NOTE — PROGRESS NOTES
"Outpatient Physical Therapy Discharge Note     Patient: Rocío Catherine  : 1995    Beginning/End Dates of Reporting Period:  19 to 19 when initially seen. Discharge written on 2019. Total # of Rx sessions: 1    Referring Provider: Harish French     Therapy Diagnosis: Neck Mm spasms, Chronic Migraines w/ Aura, R Flank Pain.      Client Self Report: Pain/pressure inmid neck that contributes to the HA's, Get Pain in R arm once/ while-dull achey to R elbow. having some pain across R chest. P Scale: 5/10     Objective Measurements:  Seen 1x only, not reassessed.   Objective Measure: SPADI   Details: 16.15    Objective Measure: CROM   Details: Flex 1 /\" w/ stretch between sh blades, 90% w/ R neck soreneess, SB R 35 w/L Trap pull, SB L 35 w/ tightness and pain. Rot R 63, L 64  .   Objective Measure: MMT  Details: 5- B Sh Abd, Wrist Ext R5-,     Objective Measure: Mechanical   Details: R 1st Rib elevated, R Lev and Scalene tighter.R SCM larger.     Objective Measure: Spec tests:   Details: Impingement NEER's, K-H Positive,      Goals:  Goal Identifier 1   Goal Description STG: Pt will be able to  her children once/ while w/o pain.    Target Date 19   Date Met      Progress:     Goal Identifier 2   Goal Description STG: Pt will be able to report HA's only once/ every couple weeks.    Target Date 19   Date Met      Progress:     Goal Identifier 3   Goal Description STG: Pt will be able to Reach into cupboard 1/10 and no pain.    Target Date 19   Date Met      Progress:     Goal Identifier 4   Goal Description LTG: Pt will be able to demonstrate a HEP and self cares she can use to manage her neck and Ha's.    Target Date 19   Date Met      Progress:     Progress Toward Goals:   Not assessed this period.    Plan:  Discharge from therapy.    Discharge:    Reason for Discharge: Patient has failed to schedule further appointments.    Equipment Issued:      Discharge Plan: Patient " to continue home program.  Pt to follow up w/MD as appropriate.   Kristie Patel, PT, [id number])  [facility]           [phone]  Fax          [phone]  Appt #      [phone]

## 2019-12-02 ENCOUNTER — PRENATAL OFFICE VISIT (OUTPATIENT)
Dept: OBGYN | Facility: CLINIC | Age: 24
End: 2019-12-02
Payer: COMMERCIAL

## 2019-12-02 VITALS
HEART RATE: 93 BPM | TEMPERATURE: 97.6 F | SYSTOLIC BLOOD PRESSURE: 131 MMHG | BODY MASS INDEX: 24.37 KG/M2 | DIASTOLIC BLOOD PRESSURE: 82 MMHG | WEIGHT: 129 LBS

## 2019-12-02 DIAGNOSIS — Z34.82 PRENATAL CARE, SUBSEQUENT PREGNANCY IN SECOND TRIMESTER: Primary | ICD-10-CM

## 2019-12-02 LAB
ALBUMIN UR-MCNC: NEGATIVE MG/DL
APPEARANCE UR: CLEAR
BILIRUB UR QL STRIP: NEGATIVE
COLOR UR AUTO: YELLOW
GLUCOSE UR STRIP-MCNC: NEGATIVE MG/DL
HGB UR QL STRIP: NEGATIVE
KETONES UR STRIP-MCNC: NEGATIVE MG/DL
LEUKOCYTE ESTERASE UR QL STRIP: NEGATIVE
NITRATE UR QL: NEGATIVE
PH UR STRIP: 8 PH (ref 5–7)
SOURCE: ABNORMAL
SP GR UR STRIP: 1.01 (ref 1–1.03)
UROBILINOGEN UR STRIP-ACNC: 0.2 EU/DL (ref 0.2–1)

## 2019-12-02 PROCEDURE — 99207 ZZC PRENATAL VISIT: CPT | Performed by: OBSTETRICS & GYNECOLOGY

## 2019-12-02 PROCEDURE — 87086 URINE CULTURE/COLONY COUNT: CPT | Performed by: OBSTETRICS & GYNECOLOGY

## 2019-12-02 PROCEDURE — 81003 URINALYSIS AUTO W/O SCOPE: CPT | Performed by: OBSTETRICS & GYNECOLOGY

## 2019-12-02 NOTE — NURSING NOTE
"Initial /82 (BP Location: Left arm, Patient Position: Chair, Cuff Size: Adult Regular)   Pulse 93   Temp 97.6  F (36.4  C) (Tympanic)   Wt 58.5 kg (129 lb)   LMP 05/24/2019 (Exact Date)   Breastfeeding No   BMI 24.37 kg/m   Estimated body mass index is 24.37 kg/m  as calculated from the following:    Height as of 9/17/19: 1.549 m (5' 1\").    Weight as of this encounter: 58.5 kg (129 lb). .    Gabrielle Chaves MA    "

## 2019-12-02 NOTE — PROGRESS NOTES
Doing well.   States that her pelvic pain extends to the inside of her left groin which makes it difficult to be in a seated position for long periods of time. She has to adjust her seating position to shift most of her weight of her body to her right buttocks. She also can not stay standing for long periods of time.   She would like to be checked for a UTI although she feels that her above symptoms are not likely the result of a UTI but nevertheless she would still like to be checked for it.   She denies dysuria.   Denies VB, ctx, LOF. +FM  /82 (BP Location: Left arm, Patient Position: Chair, Cuff Size: Adult Regular)   Pulse 93   Temp 97.6  F (36.4  C) (Tympanic)   Wt 58.5 kg (129 lb)   LMP 2019 (Exact Date)   Breastfeeding No   BMI 24.37 kg/m    General Appearance: NAD  Abdomen: Gravid, NT  Refer to flow sheet above.   A/P: 24 year old  at 27w3d  -- UA, urine culture collected to assess for UTI  -- concerns above addressed, likely MSK/pregnancy burden related symptoms; letter already generated for her this morning regarding limitation on her work duties  -- PTL precautions reviewed  RTC in 2 weeks    Yolande Benson MD  [facility]

## 2019-12-02 NOTE — TELEPHONE ENCOUNTER
LM to see where we should send letter for pt.  Await return call.    -Alie CHIN Jun  [facility]

## 2019-12-03 ENCOUNTER — ALLIED HEALTH/NURSE VISIT (OUTPATIENT)
Dept: OBGYN | Facility: CLINIC | Age: 24
End: 2019-12-03
Payer: COMMERCIAL

## 2019-12-03 DIAGNOSIS — O36.8120 DECREASED FETAL MOVEMENTS IN SECOND TRIMESTER, SINGLE OR UNSPECIFIED FETUS: Primary | ICD-10-CM

## 2019-12-03 LAB
BACTERIA SPEC CULT: NORMAL
Lab: NORMAL
SPECIMEN SOURCE: NORMAL

## 2019-12-03 PROCEDURE — 59025 FETAL NON-STRESS TEST: CPT

## 2019-12-03 NOTE — PROGRESS NOTES
Seen for decreased fetal movement.   Reactive NST with appropriate 10 by 10 accelerations for her gestational age.     Yolande Benson MD  [facility]

## 2019-12-03 NOTE — NURSING NOTE
Patient was in for non stress test for decreased fetal movement.  reviewed NST and it was reactive patient will keep appointment next week 12/10 in clinic.   Patient is having some pain on lower left side she will use tylenol and warm compresses and address this next week if not doing better. Ana Rios, CMA

## 2019-12-10 ENCOUNTER — PRENATAL OFFICE VISIT (OUTPATIENT)
Dept: OBGYN | Facility: CLINIC | Age: 24
End: 2019-12-10
Payer: COMMERCIAL

## 2019-12-10 VITALS
SYSTOLIC BLOOD PRESSURE: 132 MMHG | HEART RATE: 99 BPM | BODY MASS INDEX: 24.94 KG/M2 | WEIGHT: 132 LBS | DIASTOLIC BLOOD PRESSURE: 79 MMHG | TEMPERATURE: 98 F

## 2019-12-10 DIAGNOSIS — Z34.83 PRENATAL CARE, SUBSEQUENT PREGNANCY IN THIRD TRIMESTER: Primary | ICD-10-CM

## 2019-12-10 DIAGNOSIS — Q89.9 CONGENITAL ANOMALY: ICD-10-CM

## 2019-12-10 PROCEDURE — 99207 ZZC PRENATAL VISIT: CPT | Performed by: OBSTETRICS & GYNECOLOGY

## 2019-12-10 PROCEDURE — 90471 IMMUNIZATION ADMIN: CPT | Performed by: OBSTETRICS & GYNECOLOGY

## 2019-12-10 PROCEDURE — 90715 TDAP VACCINE 7 YRS/> IM: CPT | Performed by: OBSTETRICS & GYNECOLOGY

## 2019-12-10 NOTE — PROGRESS NOTES
Doing well.   Did not know why she did not mention it earlier in the pregnancy, but she states that her son (one of her twin boys) at 2 days old was diagnosed with esophageal atresia and T-fistula that required repair; this condition was not known about while she was pregnant, but in retrospective it explained why that child had polyhydramnios.  She went to the chiropractor this past week and plans to follow-up for further cares to see if it will improve her hip and pubic bone pain.    Denies VB, ctx, LOF. +FM  /79 (BP Location: Right arm, Patient Position: Chair, Cuff Size: Adult Regular)   Pulse 99   Temp 98  F (36.7  C) (Tympanic)   Wt 59.9 kg (132 lb)   LMP 2019 (Exact Date)   Breastfeeding No   BMI 24.94 kg/m    General Appearance: NAD  Abdomen: Gravid, NT  Refer to flow sheet above.   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

## 2019-12-10 NOTE — NURSING NOTE
"Initial /79 (BP Location: Right arm, Patient Position: Chair, Cuff Size: Adult Regular)   Pulse 99   Temp 98  F (36.7  C) (Tympanic)   Wt 59.9 kg (132 lb)   LMP 05/24/2019 (Exact Date)   Breastfeeding No   BMI 24.94 kg/m   Estimated body mass index is 24.94 kg/m  as calculated from the following:    Height as of 9/17/19: 1.549 m (5' 1\").    Weight as of this encounter: 59.9 kg (132 lb). .    Gabrielle Chaves MA    "

## 2019-12-13 ENCOUNTER — AMBULATORY - HEALTHEAST (OUTPATIENT)
Dept: MATERNAL FETAL MEDICINE | Facility: HOSPITAL | Age: 24
End: 2019-12-13

## 2019-12-13 DIAGNOSIS — O26.90 PREGNANCY, ANTEPARTUM, COMPLICATIONS: ICD-10-CM

## 2019-12-19 ENCOUNTER — RECORDS - HEALTHEAST (OUTPATIENT)
Dept: ULTRASOUND IMAGING | Facility: HOSPITAL | Age: 24
End: 2019-12-19

## 2019-12-19 ENCOUNTER — RECORDS - HEALTHEAST (OUTPATIENT)
Dept: ADMINISTRATIVE | Facility: OTHER | Age: 24
End: 2019-12-19

## 2019-12-19 ENCOUNTER — OFFICE VISIT - HEALTHEAST (OUTPATIENT)
Dept: MATERNAL FETAL MEDICINE | Facility: HOSPITAL | Age: 24
End: 2019-12-19

## 2019-12-19 DIAGNOSIS — O35.8XX0 FAMILY OR MATERNAL HISTORIC RISK OF CONGENITAL ANOMALY, ANTEPARTUM, SINGLE OR UNSPECIFIED FETUS: ICD-10-CM

## 2019-12-19 DIAGNOSIS — O26.90 PREGNANCY RELATED CONDITIONS, UNSPECIFIED, UNSPECIFIED TRIMESTER: ICD-10-CM

## 2019-12-23 ENCOUNTER — PRENATAL OFFICE VISIT (OUTPATIENT)
Dept: OBGYN | Facility: CLINIC | Age: 24
End: 2019-12-23
Payer: COMMERCIAL

## 2019-12-23 VITALS
DIASTOLIC BLOOD PRESSURE: 67 MMHG | BODY MASS INDEX: 25.32 KG/M2 | HEART RATE: 117 BPM | WEIGHT: 134 LBS | TEMPERATURE: 96.3 F | SYSTOLIC BLOOD PRESSURE: 100 MMHG

## 2019-12-23 DIAGNOSIS — Z34.83 PRENATAL CARE, SUBSEQUENT PREGNANCY IN THIRD TRIMESTER: Primary | ICD-10-CM

## 2019-12-23 PROBLEM — R51.9 HEADACHE: Status: RESOLVED | Noted: 2019-02-07 | Resolved: 2019-12-23

## 2019-12-23 PROCEDURE — 99207 ZZC PRENATAL VISIT: CPT | Performed by: OBSTETRICS & GYNECOLOGY

## 2019-12-23 NOTE — NURSING NOTE
"Initial /67 (BP Location: Right arm, Patient Position: Chair, Cuff Size: Adult Regular)   Pulse 117   Temp 96.3  F (35.7  C) (Tympanic)   Wt 60.8 kg (134 lb)   LMP 05/24/2019 (Exact Date)   Breastfeeding No   BMI 25.32 kg/m   Estimated body mass index is 25.32 kg/m  as calculated from the following:    Height as of 9/17/19: 1.549 m (5' 1\").    Weight as of this encounter: 60.8 kg (134 lb). .    Gabrielle Chaves MA    "

## 2019-12-23 NOTE — PROGRESS NOTES
Doing well.   Has paperwork on behalf of her  that needs to be filled for his time off of work after baby is born.   Reports buttocks pressure when she walks  Denies VB, ctx, LOF. +FM  /67 (BP Location: Right arm, Patient Position: Chair, Cuff Size: Adult Regular)   Pulse 117   Temp 96.3  F (35.7  C) (Tympanic)   Wt 60.8 kg (134 lb)   LMP 2019 (Exact Date)   Breastfeeding No   BMI 25.32 kg/m    General Appearance: NAD  Abdomen: Gravid, NT  Refer to flow sheet above.   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

## 2019-12-28 ENCOUNTER — HOSPITAL ENCOUNTER (OUTPATIENT)
Facility: CLINIC | Age: 24
Discharge: HOME | End: 2019-12-28
Attending: OBSTETRICS & GYNECOLOGY | Admitting: OBSTETRICS & GYNECOLOGY
Payer: COMMERCIAL

## 2019-12-28 VITALS
HEART RATE: 78 BPM | RESPIRATION RATE: 16 BRPM | SYSTOLIC BLOOD PRESSURE: 112 MMHG | TEMPERATURE: 98 F | DIASTOLIC BLOOD PRESSURE: 69 MMHG

## 2019-12-28 LAB
ALBUMIN UR-MCNC: NEGATIVE MG/DL
APPEARANCE UR: CLEAR
BILIRUB UR QL STRIP: NEGATIVE
COLOR UR AUTO: COLORLESS
FIBRONECTIN FETAL VAG QL: NEGATIVE
GLUCOSE UR STRIP-MCNC: NEGATIVE MG/DL
HGB UR QL STRIP: NEGATIVE
KETONES UR STRIP-MCNC: NEGATIVE MG/DL
LEUKOCYTE ESTERASE UR QL STRIP: NEGATIVE
NITRATE UR QL: NEGATIVE
PH UR STRIP: 7 PH (ref 5–7)
RBC #/AREA URNS AUTO: <1 /HPF (ref 0–2)
SOURCE: ABNORMAL
SP GR UR STRIP: 1 (ref 1–1.03)
SPECIMEN SOURCE: NORMAL
SQUAMOUS #/AREA URNS AUTO: <1 /HPF (ref 0–1)
UROBILINOGEN UR STRIP-MCNC: 0 MG/DL (ref 0–2)
WBC #/AREA URNS AUTO: <1 /HPF (ref 0–5)
WET PREP SPEC: NORMAL

## 2019-12-28 PROCEDURE — 59025 FETAL NON-STRESS TEST: CPT

## 2019-12-28 PROCEDURE — 59025 FETAL NON-STRESS TEST: CPT | Mod: 26 | Performed by: OBSTETRICS & GYNECOLOGY

## 2019-12-28 PROCEDURE — 87210 SMEAR WET MOUNT SALINE/INK: CPT | Performed by: OBSTETRICS & GYNECOLOGY

## 2019-12-28 PROCEDURE — 82731 ASSAY OF FETAL FIBRONECTIN: CPT | Performed by: OBSTETRICS & GYNECOLOGY

## 2019-12-28 PROCEDURE — G0463 HOSPITAL OUTPT CLINIC VISIT: HCPCS

## 2019-12-28 PROCEDURE — 81001 URINALYSIS AUTO W/SCOPE: CPT | Performed by: OBSTETRICS & GYNECOLOGY

## 2019-12-28 NOTE — PROGRESS NOTES
at 31w1d to [facility] after calling with report of low backache and occasional cramping.  UA obtained, EFM applied. POC reviewed with patient. , cat. 1.  Mild irregular ctx noted monitor, patient reports mild cramping.  PO fluids encouraged.

## 2019-12-28 NOTE — DISCHARGE INSTRUCTIONS
Discharge Instructions for Undelivered Patients  [facility] [phone]    Diet:    Drink 8 to 12 glasses of water every day.    You may eat meals and snacks as before    Activity:    If you are experiencing  labor, rest the pelvic area. No sex. Do not stimulate breasts or nipples.    Rest often as needed.    Count fetal kicks every day. (See handout.)    Call your doctor if your baby is moving less than usual.    Medicines:    My care team has reviewed my medicines with me.    My care team has given me a list of my medicines.    My care team has prescribed a new medicine. They have either sent it home with me or ordered it from the pharmacy.    Call your provider if you notice:    Swelling in your face or increased swelling in your hands or legs.    Headaches that are not relieved by Tylenol (acetaminophen).    Changes in your vision (blurring; seeing spots or stars).    Nausea (sick to your stomach) and vomiting (throwing up).    Weight gain of 5 pounds per week.    Heartburn that doesn't go away.    Signs of bladder infection: pain when you urinate (use the toilet), needing to go more often or more urgently.    The bag of medina (membranes) breaks, or you notice leaking in your underwear.    Bright red blood in your underwear.    Abdominal (lower belly) or stomach pain.    For first baby: Contractions (tightenings) less than 5 minutes apart for one hour or more.    For Second (plus) baby: Contractions (tightenings) less than 10 minutes apart and getting stronger.    Increase or change in vaginal discharge (note the color and amount).    Other: Call [facility] with changes or concerns over the weekend.  Follow up with your provider as acheduled.

## 2019-12-28 NOTE — PROGRESS NOTES
UA, wet prep and FFN negative.  SVE closed.  Patient monitored, drank 4 glasses water and ate lunch. , cat 1.  Uterine activity on monitor mild irritability, patient still feeling occasional mild cramping.  Repeat SVE no change. Order received for discharge.  Discharge instructions and warning signs reviewed with verbalized understanding, copy given.  Patient discharge per w/chair to door.

## 2020-01-04 ENCOUNTER — HOSPITAL ENCOUNTER (OUTPATIENT)
Facility: CLINIC | Age: 25
Discharge: HOME | End: 2020-01-04
Attending: OBSTETRICS & GYNECOLOGY | Admitting: OBSTETRICS & GYNECOLOGY
Payer: COMMERCIAL

## 2020-01-04 ENCOUNTER — TELEPHONE (OUTPATIENT)
Dept: OBGYN | Facility: CLINIC | Age: 25
End: 2020-01-04

## 2020-01-04 VITALS
DIASTOLIC BLOOD PRESSURE: 71 MMHG | RESPIRATION RATE: 18 BRPM | BODY MASS INDEX: 25.32 KG/M2 | WEIGHT: 134 LBS | HEART RATE: 90 BPM | SYSTOLIC BLOOD PRESSURE: 125 MMHG | TEMPERATURE: 97.4 F

## 2020-01-04 PROBLEM — K80.00 CALCULUS OF GALLBLADDER WITH ACUTE CHOLECYSTITIS WITHOUT OBSTRUCTION: Status: RESOLVED | Noted: 2017-05-31 | Resolved: 2020-01-04

## 2020-01-04 PROBLEM — Z34.80 PRENATAL CARE, SUBSEQUENT PREGNANCY: Status: RESOLVED | Noted: 2019-07-03 | Resolved: 2020-01-04

## 2020-01-04 PROBLEM — Z36.89 ENCOUNTER FOR TRIAGE IN PREGNANT PATIENT: Status: ACTIVE | Noted: 2019-12-28

## 2020-01-04 LAB
ALBUMIN UR-MCNC: NEGATIVE MG/DL
APPEARANCE UR: CLEAR
BILIRUB UR QL STRIP: NEGATIVE
COLOR UR AUTO: COLORLESS
FIBRONECTIN FETAL VAG QL: NEGATIVE
GLUCOSE UR STRIP-MCNC: NEGATIVE MG/DL
HGB UR QL STRIP: NEGATIVE
KETONES UR STRIP-MCNC: NEGATIVE MG/DL
LEUKOCYTE ESTERASE UR QL STRIP: NEGATIVE
NITRATE UR QL: NEGATIVE
PH UR STRIP: 7 PH (ref 5–7)
RBC #/AREA URNS AUTO: <1 /HPF (ref 0–2)
RUPTURE OF FETAL MEMBRANES BY ROM PLUS: NEGATIVE
SOURCE: ABNORMAL
SP GR UR STRIP: 1 (ref 1–1.03)
SQUAMOUS #/AREA URNS AUTO: <1 /HPF (ref 0–1)
UROBILINOGEN UR STRIP-MCNC: 0 MG/DL (ref 0–2)
WBC #/AREA URNS AUTO: 0 /HPF (ref 0–5)

## 2020-01-04 PROCEDURE — 59025 FETAL NON-STRESS TEST: CPT | Mod: 26 | Performed by: OBSTETRICS & GYNECOLOGY

## 2020-01-04 PROCEDURE — 81001 URINALYSIS AUTO W/SCOPE: CPT | Performed by: OBSTETRICS & GYNECOLOGY

## 2020-01-04 PROCEDURE — 84112 EVAL AMNIOTIC FLUID PROTEIN: CPT | Performed by: OBSTETRICS & GYNECOLOGY

## 2020-01-04 PROCEDURE — 82731 ASSAY OF FETAL FIBRONECTIN: CPT | Performed by: OBSTETRICS & GYNECOLOGY

## 2020-01-04 PROCEDURE — G0463 HOSPITAL OUTPT CLINIC VISIT: HCPCS | Mod: 25

## 2020-01-04 PROCEDURE — 59025 FETAL NON-STRESS TEST: CPT

## 2020-01-05 NOTE — PROGRESS NOTES
cramping, backache and vaginal dischargeSATINDER Munoz:Patient presents due to  cramping, backache and vaginal discharge.  B:32w1d   Allergies: Patient has no known allergies.  A:A:moderate variablility, + accels, no decels, Category I  Occasional contractions with irritability noted  Visualized closed with spec exam    Dr. SATINDER Dee in department and orders received.  Plan includes; Encourage po fluids and Labs . Reviewed with patient and she agrees with plan.        Prenatal Breastfeeding Education Toolkit provided for patient to review,helping her to make an informed decision on a feeding choice for her baby. Patient accepted. Questions answered.    Oriented to room and call light.

## 2020-01-05 NOTE — PROVIDER NOTIFICATION
01/04/20 2147   Provider Notification   Provider Name/Title Dr. Dee   Method of Notification In Department   Request Evaluate - Remote   Notification Reason Labor Status;Uterine Activity;Lab/Diagnostic Study   Dr. Dee updated on on neg FFN, Negative ROM, spec exam of closed cervix and UA results.  OK to discharge home

## 2020-01-05 NOTE — DISCHARGE INSTRUCTIONS
Discharge Instructions for Undelivered Patients  [facility] [phone]    Diet:    Drink 8 to 12 glasses of water every day.    You may eat meals and snacks as before    Activity:    Rest often as needed.    Count fetal kicks every day. (See handout.)    Call your doctor if your baby is moving less than usual.    Medicines:    My care team has reviewed my medicines with me.    My care team has given me a list of my medicines.    My care team has prescribed a new medicine. They have either sent it home with me or ordered it from the pharmacy.    Call your provider if you notice:    Swelling in your face or increased swelling in your hands or legs.    Headaches that are not relieved by Tylenol (acetaminophen).    Changes in your vision (blurring; seeing spots or stars).    Nausea (sick to your stomach) and vomiting (throwing up).    Weight gain of 5 pounds per week.    Heartburn that doesn't go away.    Signs of bladder infection: pain when you urinate (use the toilet), needing to go more often or more urgently.    The bag of medina (membranes) breaks, or you notice leaking in your underwear.    Bright red blood in your underwear.    Abdominal (lower belly) or stomach pain.    For Second (plus) baby: Contractions (tightenings) less than 10 minutes apart and getting stronger.    Increase or change in vaginal discharge (note the color and amount).    Other: keep next appointment  Follow up with your provider as acheduled.

## 2020-01-05 NOTE — PROGRESS NOTES
S: Discharge from triage  A: A:moderate variablility, + accels, no decels, Category I    Strip reviewed by Becky RHOADES RN.  not examined, closed on spec exam  Admission on 01/04/2020   Component Date Value     Color Urine 01/04/2020 Colorless      Appearance Urine 01/04/2020 Clear      Glucose Urine 01/04/2020 Negative      Bilirubin Urine 01/04/2020 Negative      Ketones Urine 01/04/2020 Negative      Specific Gravity Urine 01/04/2020 1.002*     Blood Urine 01/04/2020 Negative      pH Urine 01/04/2020 7.0      Protein Albumin Urine 01/04/2020 Negative      Urobilinogen mg/dL 01/04/2020 0.0      Nitrite Urine 01/04/2020 Negative      Leukocyte Esterase Urine 01/04/2020 Negative      Source 01/04/2020 Midstream Urine      WBC Urine 01/04/2020 0      RBC Urine 01/04/2020 <1      Squamous Epithelial /HPF* 01/04/2020 <1      Fetal Fibronectin 01/04/2020 Negative      Rupture of Fetal Membran* 01/04/2020 Negative      Dr. SATINDER Dee informed of above and discharge order received.   R: Plan includes: Labor instuctions given Fetal kick count instructions given.  Patient instructed to report any recurrence of above concerns to her primary care provider during clinic hours or [facility] at any other time. Patient verbalized understanding of After Visit Summary, education and agreement with plan. Agrees to call for any problems, questions or concerns.  Discharged undelivered via ambulatory  in stable condition with all belongings. Accompanied by her mother .

## 2020-01-06 ENCOUNTER — PREP FOR PROCEDURE (OUTPATIENT)
Dept: OBGYN | Facility: CLINIC | Age: 25
End: 2020-01-06

## 2020-01-06 ENCOUNTER — TELEPHONE (OUTPATIENT)
Dept: OBGYN | Facility: CLINIC | Age: 25
End: 2020-01-06

## 2020-01-06 ENCOUNTER — PRENATAL OFFICE VISIT (OUTPATIENT)
Dept: OBGYN | Facility: CLINIC | Age: 25
End: 2020-01-06
Payer: COMMERCIAL

## 2020-01-06 VITALS
DIASTOLIC BLOOD PRESSURE: 66 MMHG | TEMPERATURE: 96.6 F | HEART RATE: 97 BPM | WEIGHT: 137 LBS | BODY MASS INDEX: 25.89 KG/M2 | SYSTOLIC BLOOD PRESSURE: 110 MMHG

## 2020-01-06 DIAGNOSIS — Z98.891 HISTORY OF CESAREAN DELIVERY: Primary | ICD-10-CM

## 2020-01-06 DIAGNOSIS — Z34.93 PRENATAL CARE, THIRD TRIMESTER: Primary | ICD-10-CM

## 2020-01-06 PROCEDURE — 99207 ZZC PRENATAL VISIT: CPT | Performed by: OBSTETRICS & GYNECOLOGY

## 2020-01-06 RX ORDER — CEFAZOLIN SODIUM 2 G/100ML
2 INJECTION, SOLUTION INTRAVENOUS
Status: CANCELLED | OUTPATIENT
Start: 2020-01-06

## 2020-01-06 RX ORDER — ACETAMINOPHEN 325 MG/1
975 TABLET ORAL ONCE
Status: CANCELLED | OUTPATIENT
Start: 2020-01-06 | End: 2020-01-06

## 2020-01-06 RX ORDER — CEFAZOLIN SODIUM 1 G/50ML
1 INJECTION, SOLUTION INTRAVENOUS SEE ADMIN INSTRUCTIONS
Status: CANCELLED | OUTPATIENT
Start: 2020-01-06

## 2020-01-06 RX ORDER — CITRIC ACID/SODIUM CITRATE 334-500MG
30 SOLUTION, ORAL ORAL
Status: CANCELLED | OUTPATIENT
Start: 2020-01-06

## 2020-01-06 RX ORDER — SODIUM CHLORIDE, SODIUM LACTATE, POTASSIUM CHLORIDE, CALCIUM CHLORIDE 600; 310; 30; 20 MG/100ML; MG/100ML; MG/100ML; MG/100ML
INJECTION, SOLUTION INTRAVENOUS CONTINUOUS
Status: CANCELLED | OUTPATIENT
Start: 2020-01-06

## 2020-01-06 NOTE — TELEPHONE ENCOUNTER
"Rocío Florin  [id number]    You are now scheduled for surgery at [facility].  Below are the details for your surgery.  Please read the \"Preparing for Your Surgery\" instructions and let us know if you have any questions.    Type of surgery:  SECTION (N/A)   Surgeon:  Grace Dee MD  Location of surgery: [facility] OR    Date of surgery: 20    Time: 7:30am   Arrival Time: 6:00am    Time can change, to be confirmed a couple of days prior by pre-op surgery nurse.    Pre-Op Appt Date: Patient to schedule with a PCP or Family Practice Provider within 30 days to the surgery.  Post-Op Appt Date: To be determined by provider     Packet sent out: Yes  At next appt.  Pre-cert/Authorization completed:  TBD by Financial Securing Office.   MA Sterilization/Hysterectomy Acknowledgment Consent signed: Not Applicable    [facility]  [phone]    Fax: [phone]  Same Day Surgery [phone]  Fax: [phone]  [facility] [phone]      "

## 2020-01-06 NOTE — PROGRESS NOTES
PNC:    S: Doing well. +FM, No LOF, VB, ctx.     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 Dee for     Lu Santillan MD   2020 8:50 AM

## 2020-01-06 NOTE — NURSING NOTE
"Initial /66 (BP Location: Right arm, Patient Position: Chair, Cuff Size: Adult Regular)   Pulse 97   Temp 96.6  F (35.9  C) (Tympanic)   Wt 62.1 kg (137 lb)   LMP 05/24/2019 (Exact Date)   Breastfeeding No   BMI 25.89 kg/m   Estimated body mass index is 25.89 kg/m  as calculated from the following:    Height as of 9/17/19: 1.549 m (5' 1\").    Weight as of this encounter: 62.1 kg (137 lb). .    Gabrielle Chaves MA    "

## 2020-01-10 ENCOUNTER — NURSE TRIAGE (OUTPATIENT)
Dept: NURSING | Facility: CLINIC | Age: 25
End: 2020-01-10

## 2020-01-11 NOTE — TELEPHONE ENCOUNTER
Rocío's mother in law works at a  and one of the children in her  was diagnosed with Fifth's Disease today. Rocío herself was not in direct contact with the child and was not in the  setting. She last saw her mother in law on Wednesday. Her mother in law is not exhibiting any symptoms. Rocío plans to send message to provider to notify them of possible exposure. Advised Rocío to call back if her mother in law exhibits symptoms. Advised to call FNA back with any futher questions or concerns.    Vanessa Vail, RN, BSN  [facility]    Reason for Disposition    Health Information question, no triage required and triager able to answer question    Additional Information    Negative: [1] Caller is not with the adult (patient) AND [2] reporting urgent symptoms    Negative: Lab result questions    Negative: Medication questions    Negative: Caller can't be reached by phone    Negative: Caller has already spoken to PCP or another triager    Negative: RN needs further essential information from caller in order to complete triage    Negative: Requesting regular office appointment    Negative: [1] Caller requesting NON-URGENT health information AND [2] PCP's office is the best resource    Protocols used: INFORMATION ONLY CALL-A-

## 2020-01-14 DIAGNOSIS — Z20.828 PARVOVIRUS EXPOSURE: ICD-10-CM

## 2020-01-14 DIAGNOSIS — Z20.828 PARVOVIRUS EXPOSURE: Primary | ICD-10-CM

## 2020-01-14 PROCEDURE — 86747 PARVOVIRUS ANTIBODY: CPT | Mod: 90 | Performed by: OBSTETRICS & GYNECOLOGY

## 2020-01-14 PROCEDURE — 36415 COLL VENOUS BLD VENIPUNCTURE: CPT | Performed by: OBSTETRICS & GYNECOLOGY

## 2020-01-14 PROCEDURE — 99000 SPECIMEN HANDLING OFFICE-LAB: CPT | Performed by: OBSTETRICS & GYNECOLOGY

## 2020-01-14 PROCEDURE — 86747 PARVOVIRUS ANTIBODY: CPT | Mod: 59 | Performed by: OBSTETRICS & GYNECOLOGY

## 2020-01-15 LAB
B19V IGG SER IA-ACNC: 0.31 IV
B19V IGM SER IA-ACNC: 0.18 IV

## 2020-01-16 ENCOUNTER — NURSE TRIAGE (OUTPATIENT)
Dept: NURSING | Facility: CLINIC | Age: 25
End: 2020-01-16

## 2020-01-16 NOTE — TELEPHONE ENCOUNTER
"Patient calling stating she received a My Chart message on a \"positive\" for parvovirus.  Reviewed results with patient dated 1/14/20 Parvovirus B19 value .31. Negative- no significant level of detectable parvovirus.     Patient reviewed her My Chart during call and found she had read the interpretive result for other values.    Caller verbalized understanding. Denies further questions.    Stacy Paiz RN  [facility]      Reason for Disposition    Health Information question, no triage required and triager able to answer question    Additional Information    Negative: [1] Caller is not with the adult (patient) AND [2] reporting urgent symptoms    Negative: Lab result questions    Negative: Medication questions    Negative: Caller can't be reached by phone    Negative: Caller has already spoken to PCP or another triager    Negative: RN needs further essential information from caller in order to complete triage    Negative: Requesting regular office appointment    Negative: [1] Caller requesting NON-URGENT health information AND [2] PCP's office is the best resource    Protocols used: INFORMATION ONLY CALL-A-      "

## 2020-01-20 ENCOUNTER — PRENATAL OFFICE VISIT (OUTPATIENT)
Dept: OBGYN | Facility: CLINIC | Age: 25
End: 2020-01-20
Payer: COMMERCIAL

## 2020-01-20 VITALS
DIASTOLIC BLOOD PRESSURE: 84 MMHG | TEMPERATURE: 96.6 F | WEIGHT: 144 LBS | SYSTOLIC BLOOD PRESSURE: 127 MMHG | HEART RATE: 115 BPM | BODY MASS INDEX: 27.21 KG/M2

## 2020-01-20 DIAGNOSIS — Z34.93 PRENATAL CARE, THIRD TRIMESTER: Primary | ICD-10-CM

## 2020-01-20 DIAGNOSIS — Z20.828 PARVOVIRUS EXPOSURE: ICD-10-CM

## 2020-01-20 DIAGNOSIS — O99.013 ANEMIA DURING PREGNANCY IN THIRD TRIMESTER: Primary | ICD-10-CM

## 2020-01-20 LAB — HGB BLD-MCNC: 10.3 G/DL (ref 11.7–15.7)

## 2020-01-20 PROCEDURE — 87798 DETECT AGENT NOS DNA AMP: CPT | Mod: 90 | Performed by: OBSTETRICS & GYNECOLOGY

## 2020-01-20 PROCEDURE — 36415 COLL VENOUS BLD VENIPUNCTURE: CPT | Performed by: OBSTETRICS & GYNECOLOGY

## 2020-01-20 PROCEDURE — 99207 ZZC PRENATAL VISIT: CPT | Performed by: OBSTETRICS & GYNECOLOGY

## 2020-01-20 PROCEDURE — 85018 HEMOGLOBIN: CPT | Performed by: OBSTETRICS & GYNECOLOGY

## 2020-01-20 PROCEDURE — 99000 SPECIMEN HANDLING OFFICE-LAB: CPT | Performed by: OBSTETRICS & GYNECOLOGY

## 2020-01-20 RX ORDER — FERROUS SULFATE 325(65) MG
325 TABLET, DELAYED RELEASE (ENTERIC COATED) ORAL DAILY
Qty: 60 TABLET | Refills: 2 | Status: ON HOLD | OUTPATIENT
Start: 2020-01-20 | End: 2020-02-24

## 2020-01-20 NOTE — PROGRESS NOTES
PNC:     S: Doing well. +FM, No LOF, VB, ctx. Has been feeling more fatigued, wonders if she is anemic. Recent exposure to parvovirus. Mother in law has been well but she was exposed at work.     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 Per UpToDate, recommendation for DNA testing given IgG neg, ord'd today     Repeat CS scheduled with Dr. Dee for  at 0730     Lu Santillan MD   2020 10:06 AM

## 2020-01-20 NOTE — NURSING NOTE
"Initial /84 (BP Location: Left arm, Patient Position: Chair, Cuff Size: Adult Regular)   Pulse 115   Temp 96.6  F (35.9  C) (Tympanic)   Wt 65.3 kg (144 lb)   LMP 05/24/2019 (Exact Date)   Breastfeeding No   BMI 27.21 kg/m   Estimated body mass index is 27.21 kg/m  as calculated from the following:    Height as of 9/17/19: 1.549 m (5' 1\").    Weight as of this encounter: 65.3 kg (144 lb). .    Gabrielle Chaves MA    "

## 2020-01-22 ENCOUNTER — MYC MEDICAL ADVICE (OUTPATIENT)
Dept: OBGYN | Facility: CLINIC | Age: 25
End: 2020-01-22

## 2020-01-22 ENCOUNTER — HOSPITAL ENCOUNTER (OUTPATIENT)
Facility: CLINIC | Age: 25
LOS: 1 days | Discharge: HOME | End: 2020-01-22
Attending: OBSTETRICS & GYNECOLOGY | Admitting: OBSTETRICS & GYNECOLOGY
Payer: COMMERCIAL

## 2020-01-22 VITALS — SYSTOLIC BLOOD PRESSURE: 117 MMHG | TEMPERATURE: 99.4 F | DIASTOLIC BLOOD PRESSURE: 69 MMHG

## 2020-01-22 DIAGNOSIS — Z98.891 HISTORY OF CESAREAN DELIVERY: ICD-10-CM

## 2020-01-22 PROBLEM — Z34.80 SUPERVISION OF OTHER NORMAL PREGNANCY: Status: ACTIVE | Noted: 2020-01-22

## 2020-01-22 LAB
ALBUMIN UR-MCNC: NEGATIVE MG/DL
APPEARANCE UR: CLEAR
B19V DNA SER QL NAA+PROBE: NOT DETECTED
BILIRUB UR QL STRIP: NEGATIVE
COLOR UR AUTO: YELLOW
GLUCOSE UR STRIP-MCNC: NEGATIVE MG/DL
HGB UR QL STRIP: NEGATIVE
KETONES UR STRIP-MCNC: NEGATIVE MG/DL
LEUKOCYTE ESTERASE UR QL STRIP: ABNORMAL
MUCOUS THREADS #/AREA URNS LPF: PRESENT /LPF
NITRATE UR QL: NEGATIVE
PH UR STRIP: 8 PH (ref 5–7)
RBC #/AREA URNS AUTO: <1 /HPF (ref 0–2)
SOURCE: ABNORMAL
SP GR UR STRIP: 1.01 (ref 1–1.03)
SPECIMEN SOURCE: NORMAL
SQUAMOUS #/AREA URNS AUTO: 6 /HPF (ref 0–1)
UROBILINOGEN UR STRIP-MCNC: 0 MG/DL (ref 0–2)
WBC #/AREA URNS AUTO: 1 /HPF (ref 0–5)

## 2020-01-22 PROCEDURE — G0463 HOSPITAL OUTPT CLINIC VISIT: HCPCS

## 2020-01-22 PROCEDURE — G0463 HOSPITAL OUTPT CLINIC VISIT: HCPCS | Mod: 25

## 2020-01-22 PROCEDURE — 81001 URINALYSIS AUTO W/SCOPE: CPT | Performed by: OBSTETRICS & GYNECOLOGY

## 2020-01-22 PROCEDURE — 59025 FETAL NON-STRESS TEST: CPT

## 2020-01-22 PROCEDURE — 40000809 ZZH STATISTIC NO DOCUMENTATION TO SUPPORT CHARGE

## 2020-01-22 NOTE — TELEPHONE ENCOUNTER
Call to patient to further triage.  Unable to reach.  Left message for patient to return call to clinic.    Will also send a MyChart message.    Judith Whalen   [facility]  RN

## 2020-01-22 NOTE — DISCHARGE INSTRUCTIONS
If you have any questions or concerns after returning home, call your regular doctor or [facility] at [phone].        Important things to know after discharge:    Drink 8-10 glasses of juice or water each day.    May take bath or shower.    Rest on your side--left side is best.    Eat lightly--soups, Jell-O, etc.  No spicy foods.    Activity:--walking is good during early labor but rest when you are tired.    CALL YOUR DOCTOR IF:    Your bag of medina (membranes) break or you notice leaking in your underwear.     Bright red blood leaking from your vagina.    For first baby: contractions (tightenings) less than 5 minutes apart for one hour or more.    For second (plus) baby: contractions (tightenings) less than 10 minutes apart and getting stronger.    Other:     Follow up with your DOCTOR: as scheduled    I have read these instructions and I understand the above information.

## 2020-01-22 NOTE — PROGRESS NOTES
"Patient arrived at 1435 following a triage phone call to clinic RN.  She was advise to come to [facility] for evaluation of decreased fetal movement.  Placed on fetal monitor for NST.  Initial temp. Was 99.1.  Patient denies recent or current illness for self and family.  Patient reports a 4 day history of decreased fetal movement.  This became \"significant\" about 24 hours ago. Fetal movement heard on monitor but patient states she does not feel.  Imaging shows that placenta is anterior.  Will complete NST and get UA results and notify MD.  "

## 2020-01-22 NOTE — TELEPHONE ENCOUNTER
"Patient calling back.    Patient reports she has noticed decreased fetal movement since last night. Patient also experiencing some cramping that is new. Patient denies bleeding or leaking of fluid. Patient reports she ate lunch at 1245 and has only felt the baby 2 times since.. patient has been lying down on the couch with her boys watching a movie. Patient also reports that 3 year old was lying next to her and \" threw his head back right into my belly.\" patient reports this happened 5 minutes ago and area on belly is now very tender.     Patient advised to [facility] ([phone]) for further evaluation of decreased fetal movement. Peggy RICH notified of patient arrival with ETA of 15 min,    [facility] in agreement and [facility]    Judith Whalen   [facility]  RN      "

## 2020-01-22 NOTE — PROGRESS NOTES
NST is reactive.  Reviewed strip with Mayra Retana RN.  Patient has been feeling frequent fetal movement since being placed on monitor.

## 2020-01-25 ENCOUNTER — APPOINTMENT (OUTPATIENT)
Dept: CT IMAGING | Facility: CLINIC | Age: 25
End: 2020-01-25
Attending: FAMILY MEDICINE
Payer: COMMERCIAL

## 2020-01-25 ENCOUNTER — NURSE TRIAGE (OUTPATIENT)
Dept: NURSING | Facility: CLINIC | Age: 25
End: 2020-01-25

## 2020-01-25 ENCOUNTER — HOSPITAL ENCOUNTER (EMERGENCY)
Facility: CLINIC | Age: 25
Discharge: HOME | End: 2020-01-25
Attending: FAMILY MEDICINE | Admitting: FAMILY MEDICINE
Payer: COMMERCIAL

## 2020-01-25 VITALS
RESPIRATION RATE: 22 BRPM | TEMPERATURE: 98.5 F | OXYGEN SATURATION: 100 % | SYSTOLIC BLOOD PRESSURE: 105 MMHG | HEART RATE: 84 BPM | DIASTOLIC BLOOD PRESSURE: 67 MMHG

## 2020-01-25 DIAGNOSIS — O99.013 ANEMIA DURING PREGNANCY IN THIRD TRIMESTER: ICD-10-CM

## 2020-01-25 DIAGNOSIS — R06.02 SOB (SHORTNESS OF BREATH): ICD-10-CM

## 2020-01-25 LAB
ALBUMIN SERPL-MCNC: 2.8 G/DL (ref 3.4–5)
ALBUMIN UR-MCNC: NEGATIVE MG/DL
ALP SERPL-CCNC: 95 U/L (ref 40–150)
ALT SERPL W P-5'-P-CCNC: 13 U/L (ref 0–50)
ANION GAP SERPL CALCULATED.3IONS-SCNC: 8 MMOL/L (ref 3–14)
APPEARANCE UR: CLEAR
AST SERPL W P-5'-P-CCNC: 13 U/L (ref 0–45)
BASOPHILS # BLD AUTO: 0 10E9/L (ref 0–0.2)
BASOPHILS NFR BLD AUTO: 0.3 %
BILIRUB SERPL-MCNC: 0.3 MG/DL (ref 0.2–1.3)
BILIRUB UR QL STRIP: NEGATIVE
BUN SERPL-MCNC: 8 MG/DL (ref 7–30)
CALCIUM SERPL-MCNC: 8.6 MG/DL (ref 8.5–10.1)
CHLORIDE SERPL-SCNC: 109 MMOL/L (ref 94–109)
CO2 SERPL-SCNC: 22 MMOL/L (ref 20–32)
COLOR UR AUTO: ABNORMAL
CREAT SERPL-MCNC: 0.6 MG/DL (ref 0.52–1.04)
D DIMER PPP FEU-MCNC: 2.4 UG/ML FEU (ref 0–0.5)
DIFFERENTIAL METHOD BLD: ABNORMAL
EOSINOPHIL # BLD AUTO: 0.1 10E9/L (ref 0–0.7)
EOSINOPHIL NFR BLD AUTO: 1 %
ERYTHROCYTE [DISTWIDTH] IN BLOOD BY AUTOMATED COUNT: 12.9 % (ref 10–15)
GFR SERPL CREATININE-BSD FRML MDRD: >90 ML/MIN/{1.73_M2}
GLUCOSE SERPL-MCNC: 88 MG/DL (ref 70–99)
GLUCOSE UR STRIP-MCNC: NEGATIVE MG/DL
HCT VFR BLD AUTO: 34.1 % (ref 35–47)
HGB BLD-MCNC: 10.6 G/DL (ref 11.7–15.7)
HGB UR QL STRIP: NEGATIVE
IMM GRANULOCYTES # BLD: 0.2 10E9/L (ref 0–0.4)
IMM GRANULOCYTES NFR BLD: 1.5 %
KETONES UR STRIP-MCNC: NEGATIVE MG/DL
LEUKOCYTE ESTERASE UR QL STRIP: NEGATIVE
LYMPHOCYTES # BLD AUTO: 1.7 10E9/L (ref 0.8–5.3)
LYMPHOCYTES NFR BLD AUTO: 14.2 %
MCH RBC QN AUTO: 27.5 PG (ref 26.5–33)
MCHC RBC AUTO-ENTMCNC: 31.1 G/DL (ref 31.5–36.5)
MCV RBC AUTO: 88 FL (ref 78–100)
MONOCYTES # BLD AUTO: 1.1 10E9/L (ref 0–1.3)
MONOCYTES NFR BLD AUTO: 9.1 %
NEUTROPHILS # BLD AUTO: 8.8 10E9/L (ref 1.6–8.3)
NEUTROPHILS NFR BLD AUTO: 73.9 %
NITRATE UR QL: NEGATIVE
NRBC # BLD AUTO: 0 10*3/UL
NRBC BLD AUTO-RTO: 0 /100
PH UR STRIP: 7 PH (ref 5–7)
PLATELET # BLD AUTO: 249 10E9/L (ref 150–450)
POTASSIUM SERPL-SCNC: 3.6 MMOL/L (ref 3.4–5.3)
PROT SERPL-MCNC: 7 G/DL (ref 6.8–8.8)
RBC # BLD AUTO: 3.86 10E12/L (ref 3.8–5.2)
SODIUM SERPL-SCNC: 139 MMOL/L (ref 133–144)
SOURCE: ABNORMAL
SP GR UR STRIP: 1 (ref 1–1.03)
UROBILINOGEN UR STRIP-MCNC: 0 MG/DL (ref 0–2)
WBC # BLD AUTO: 12 10E9/L (ref 4–11)

## 2020-01-25 PROCEDURE — 99285 EMERGENCY DEPT VISIT HI MDM: CPT | Mod: 25 | Performed by: FAMILY MEDICINE

## 2020-01-25 PROCEDURE — 25000128 H RX IP 250 OP 636: Performed by: FAMILY MEDICINE

## 2020-01-25 PROCEDURE — 81003 URINALYSIS AUTO W/O SCOPE: CPT | Performed by: FAMILY MEDICINE

## 2020-01-25 PROCEDURE — 99284 EMERGENCY DEPT VISIT MOD MDM: CPT | Mod: Z6 | Performed by: FAMILY MEDICINE

## 2020-01-25 PROCEDURE — 80053 COMPREHEN METABOLIC PANEL: CPT | Performed by: FAMILY MEDICINE

## 2020-01-25 PROCEDURE — 85025 COMPLETE CBC W/AUTO DIFF WBC: CPT | Performed by: FAMILY MEDICINE

## 2020-01-25 PROCEDURE — 25000125 ZZHC RX 250: Performed by: FAMILY MEDICINE

## 2020-01-25 PROCEDURE — 71275 CT ANGIOGRAPHY CHEST: CPT

## 2020-01-25 PROCEDURE — 85379 FIBRIN DEGRADATION QUANT: CPT | Performed by: FAMILY MEDICINE

## 2020-01-25 RX ORDER — IOPAMIDOL 755 MG/ML
67 INJECTION, SOLUTION INTRAVASCULAR ONCE
Status: COMPLETED | OUTPATIENT
Start: 2020-01-25 | End: 2020-01-25

## 2020-01-25 RX ADMIN — SODIUM CHLORIDE 95 ML: 9 INJECTION, SOLUTION INTRAVENOUS at 13:29

## 2020-01-25 RX ADMIN — IOPAMIDOL 67 ML: 755 INJECTION, SOLUTION INTRAVENOUS at 13:29

## 2020-01-25 ASSESSMENT — ENCOUNTER SYMPTOMS
COUGH: 0
CHEST TIGHTNESS: 1
NAUSEA: 0
HEADACHES: 0
SHORTNESS OF BREATH: 1
SORE THROAT: 0
FEVER: 0
RHINORRHEA: 0
CHILLS: 0
PALPITATIONS: 1
VOMITING: 0
DIARRHEA: 0

## 2020-01-25 NOTE — ED AVS SNAPSHOT
[facility] Emergency Department  [address] [address]  Phone:  [phone]  Fax:  [phone]                                    Rocío Catherine   MRN: [id number]    Department:  [facility] Emergency Department   Date of Visit:  1/25/2020           After Visit Summary Signature Page    I have received my discharge instructions, and my questions have been answered. I have discussed any challenges I see with this plan with the nurse or doctor.    ..........................................................................................................................................  Patient/Patient Representative Signature      ..........................................................................................................................................  Patient Representative Print Name and Relationship to Patient    ..................................................               ................................................  Date                                   Time    ..........................................................................................................................................  Reviewed by Signature/Title    ...................................................              ..............................................  Date                                               Time          22EPIC Rev 08/18

## 2020-01-25 NOTE — ED TRIAGE NOTES
Pt here with shortness of breath that started on Thursday. Pt is 35 weeks pregnant and just started iron pills on Tuesday due to low Hgb. Pt called her OBGYN and they called the L&D and they told her to be seen down in the ED.

## 2020-01-25 NOTE — ED NOTES
Shortness of breath at rest and with activity and weakness that started on Thursday.  Patient just started taking iron pills on Tuesday for low iron.  Patient is 35 weeks.  Patient denies cramping.  Patient normally has some cramping every day.

## 2020-01-25 NOTE — PROGRESS NOTES
Was asked to monitor FHT's for Rocío who is 35 weeks  here for SOB and tachycardia.  Placed on EFM.  Baseline 140.  Several accelerations to 160+.  No decels seen.  Category 1.  Reactive and verified by Leti DEWITT RN.  Pt reports occasional contraction.  Had 1 contraction and some uterine irritability.  Denies pain.  No LOF  Or VB.  +FM.  Reported to Guzman RICH

## 2020-01-25 NOTE — ED PROVIDER NOTES
History     Chief Complaint   Patient presents with     Shortness of Breath     HPI  Rocío Catherine is a 24 year old female who presents with some shortness of breath.  She is currently at 35 weeks gestation.  She was started on iron 3 days ago and the following day is when her symptoms began.  She describes feeling somewhat short of breath at rest.  This will happen intermittently.  She has some associated tachycardia with this.  And again this is an intermittent issue.  It does not seem to be triggered by exertion.  Of note is that she has history of high risk pregnancy.  Her last pregnancy 3-1/2 years ago was a twin gestation.  She also discovered that she has factor V Leiden deficiency.  And has been on Lovenox postoperatively.  She is never had any DVTs or PEs that she is aware of.  She was seen here in October for similar complaint and at that time underwent work-up including a CT angiogram of the chest and bilateral lower extremity ultrasounds.  This was primarily triggered by elevated d-dimer.  She also notes that with her last pregnancy she ended up having some kind of pericardial effusion.    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 Recommended for follow up echo in 2-3 years.        Recurrent major depressive disorder, in remission (H) 2018     Priority: Medium     Anxiety 2018     Priority: Medium     Factor 5 Leiden mutation, heterozygous (H) 2018     Priority: Medium     Anemia 2016     Priority: Medium     Supervision of high-risk pregnancy 2015     Priority: Medium     Overview:   ABILIO Machado.  They have been together for 3 years  Hx : plan repeat  Fhx DVT: plan PP lovenox       Family history of clotting disorder 2015     Priority: Medium     Overview:   Family history of clotting disorder father DVT +factor V        Dysthymia 2013     Priority: Medium        Past Medical History:    Past Medical History:   Diagnosis Date     Calculus of gallbladder with acute cholecystitis without obstruction 2017     Chickenpox      Factor 5 Leiden mutation, heterozygous (H) 2018     Factor V Leiden (H)      History of frequent urinary tract infections      Non-rheumatic mitral regurgitation, trace 2019     Postpartum depression        Past Surgical History:    Past Surgical History:   Procedure Laterality Date     GYN SURGERY      c section     HEAD & NECK SURGERY      hemangioma on neck at age 2     LAPAROSCOPIC CHOLECYSTECTOMY         Family History:    Family History   Problem Relation Age of Onset     Stomach Problem Mother         ulcer     Bleeding Disorder Father         factor V     Ovarian Cancer Maternal Grandmother      Thyroid Cancer Paternal Grandmother      Alzheimer Disease Paternal Grandfather      Bleeding Disorder Paternal Grandfather         factor V       Social History:  Marital Status:   [2]  Social History     Tobacco Use     Smoking status: Never Smoker     Smokeless tobacco: Never Used   Substance Use Topics     Alcohol use: Yes     Comment:  rare-quit with pregnancy     Drug use: No        Medications:    cyclobenzaprine (FLEXERIL) 5 MG  tablet  ferrous sulfate (FE TABS) 325 (65 Fe) MG EC tablet  ondansetron (ZOFRAN) 4 MG tablet  Prenatal Vit-Fe Fumarate-FA (PRENATAL MULTIVITAMIN W/IRON) 27-0.8 MG tablet          Review of Systems   Constitutional: Negative for chills and fever.   HENT: Negative for congestion, ear pain, rhinorrhea and sore throat.    Respiratory: Positive for chest tightness and shortness of breath. Negative for cough.    Cardiovascular: Positive for palpitations. Negative for chest pain and leg swelling.   Gastrointestinal: Negative for diarrhea, nausea and vomiting.   Neurological: Negative for headaches.       Physical Exam   BP: 116/75  Heart Rate: 109  Temp: 98.5  F (36.9  C)  Resp: 22  SpO2: 100 %      Physical Exam  Vitals signs and nursing note reviewed.   Constitutional:       General: She is not in acute distress.     Appearance: She is well-developed. She is not diaphoretic.   HENT:      Head: Normocephalic and atraumatic.      Right Ear: Tympanic membrane and external ear normal.      Left Ear: Tympanic membrane and external ear normal.   Eyes:      Pupils: Pupils are equal, round, and reactive to light.   Neck:      Musculoskeletal: Normal range of motion and neck supple.   Cardiovascular:      Rate and Rhythm: Normal rate and regular rhythm.      Heart sounds: No murmur. No friction rub.   Pulmonary:      Effort: Pulmonary effort is normal. No respiratory distress.      Breath sounds: Normal breath sounds. No decreased breath sounds or wheezing.   Abdominal:      Comments: gravid   Musculoskeletal: Normal range of motion.      Right lower leg: She exhibits no tenderness. No edema.      Left lower leg: She exhibits no tenderness. No edema.   Lymphadenopathy:      Cervical: No cervical adenopathy.   Skin:     General: Skin is warm and dry.   Neurological:      Mental Status: She is alert.      Cranial Nerves: No cranial nerve deficit.         ED Course        Procedures               Critical Care time:  none                Results for orders placed or performed during the hospital encounter of 01/25/20 (from the past 24 hour(s))   CBC with platelets, differential   Result Value Ref Range    WBC 12.0 (H) 4.0 - 11.0 10e9/L    RBC Count 3.86 3.8 - 5.2 10e12/L    Hemoglobin 10.6 (L) 11.7 - 15.7 g/dL    Hematocrit 34.1 (L) 35.0 - 47.0 %    MCV 88 78 - 100 fl    MCH 27.5 26.5 - 33.0 pg    MCHC 31.1 (L) 31.5 - 36.5 g/dL    RDW 12.9 10.0 - 15.0 %    Platelet Count 249 150 - 450 10e9/L    Diff Method Automated Method     % Neutrophils 73.9 %    % Lymphocytes 14.2 %    % Monocytes 9.1 %    % Eosinophils 1.0 %    % Basophils 0.3 %    % Immature Granulocytes 1.5 %    Nucleated RBCs 0 0 /100    Absolute Neutrophil 8.8 (H) 1.6 - 8.3 10e9/L    Absolute Lymphocytes 1.7 0.8 - 5.3 10e9/L    Absolute Monocytes 1.1 0.0 - 1.3 10e9/L    Absolute Eosinophils 0.1 0.0 - 0.7 10e9/L    Absolute Basophils 0.0 0.0 - 0.2 10e9/L    Abs Immature Granulocytes 0.2 0 - 0.4 10e9/L    Absolute Nucleated RBC 0.0    Comprehensive metabolic panel   Result Value Ref Range    Sodium 139 133 - 144 mmol/L    Potassium 3.6 3.4 - 5.3 mmol/L    Chloride 109 94 - 109 mmol/L    Carbon Dioxide 22 20 - 32 mmol/L    Anion Gap 8 3 - 14 mmol/L    Glucose 88 70 - 99 mg/dL    Urea Nitrogen 8 7 - 30 mg/dL    Creatinine 0.60 0.52 - 1.04 mg/dL    GFR Estimate >90 >60 mL/min/[1.73_m2]    GFR Estimate If Black >90 >60 mL/min/[1.73_m2]    Calcium 8.6 8.5 - 10.1 mg/dL    Bilirubin Total 0.3 0.2 - 1.3 mg/dL    Albumin 2.8 (L) 3.4 - 5.0 g/dL    Protein Total 7.0 6.8 - 8.8 g/dL    Alkaline Phosphatase 95 40 - 150 U/L    ALT 13 0 - 50 U/L    AST 13 0 - 45 U/L   UA reflex to Microscopic   Result Value Ref Range    Color Urine Straw     Appearance Urine Clear     Glucose Urine Negative NEG^Negative mg/dL    Bilirubin Urine Negative NEG^Negative    Ketones Urine Negative NEG^Negative mg/dL    Specific Gravity Urine 1.002 (L) 1.003 - 1.035    Blood Urine Negative NEG^Negative    pH Urine 7.0 5.0 -  7.0 pH    Protein Albumin Urine Negative NEG^Negative mg/dL    Urobilinogen mg/dL 0.0 0.0 - 2.0 mg/dL    Nitrite Urine Negative NEG^Negative    Leukocyte Esterase Urine Negative NEG^Negative    Source Midstream Urine    D dimer quantitative   Result Value Ref Range    D Dimer 2.4 (H) 0.0 - 0.50 ug/ml FEU   CT Chest Pulmonary Embolism w Contrast    Narrative    CT CHEST PULMONARY EMBOLISM WITH CONTRAST 1/25/2020 1:30 PM     HISTORY: Dyspnea with elevated D-dimer.     COMPARISON: CT chest 10/10/2019.    TECHNIQUE: Thin section axial images are performed from the thoracic  inlet to the lung bases utilizing 67 mL of Isovue 370 IV contrast  without adverse event. Coronal reformatted images are also generated.  Radiation dose for this scan was reduced using automated exposure  control, adjustment of the mA and/or kV according to patient size, or  iterative reconstruction technique.    FINDINGS:     Chest: The lungs are clear. No infiltrate, consolidation or mass.  Airways appear patent. No pleural or pericardial fluid. Heart is  normal in size. Esophagus is unremarkable. Thyroid gland appears  normal in size. No enlarged lymph nodes. No evidence of pulmonary  embolism. Thoracic aorta is unremarkable. Limited images upper abdomen  demonstrate cholecystectomy changes.      Impression    IMPRESSION:  1. No evidence of pulmonary embolism. Thoracic aorta is unremarkable.  2. Lungs are clear. No enlarged lymph nodes.    DELON WATTS MD       Medications   iopamidol (ISOVUE-370) solution 67 mL (67 mLs Intravenous Given 1/25/20 1329)   sodium chloride 0.9 % bag 500mL for CT scan flush use (95 mLs Intravenous Given 1/25/20 1329)       Assessments & Plan (with Medical Decision Making)     I have reviewed the nursing notes.    I have reviewed the findings, diagnosis, plan and need for follow up with the patient.      Findings as noted above.  Given that she has no evidence of PE and that the timing of her symptoms were proximate to  starting a new tablet of iron which she is never taken before it is plausible that she was having some type of reaction to that particular formulation.    I think she is safe to be discharged home.  She will hold off on taking those iron tablets until she feels normal again and at that time we will consider whether to take another one is a challenge or not take it at all.  She does have follow-up scheduled next week.  I have asked her to recheck back here if things get worse over the weekend.    New Prescriptions    No medications on file       Final diagnoses:   Anemia during pregnancy in third trimester   SOB (shortness of breath)       1/25/2020   [facility] EMERGENCY DEPARTMENT     Brown Talley MD  01/25/20 [id number]

## 2020-01-30 ENCOUNTER — PRENATAL OFFICE VISIT (OUTPATIENT)
Dept: OBGYN | Facility: CLINIC | Age: 25
End: 2020-01-30
Payer: COMMERCIAL

## 2020-01-30 VITALS
TEMPERATURE: 97.3 F | SYSTOLIC BLOOD PRESSURE: 110 MMHG | WEIGHT: 144 LBS | HEART RATE: 89 BPM | DIASTOLIC BLOOD PRESSURE: 60 MMHG | BODY MASS INDEX: 27.21 KG/M2

## 2020-01-30 DIAGNOSIS — Z34.93 PRENATAL CARE, THIRD TRIMESTER: Primary | ICD-10-CM

## 2020-01-30 PROCEDURE — 87653 STREP B DNA AMP PROBE: CPT | Performed by: OBSTETRICS & GYNECOLOGY

## 2020-01-30 PROCEDURE — 99207 ZZC PRENATAL VISIT: CPT | Performed by: OBSTETRICS & GYNECOLOGY

## 2020-01-30 NOTE — NURSING NOTE
"Initial /60 (BP Location: Right arm, Patient Position: Chair, Cuff Size: Adult Regular)   Pulse 89   Temp 97.3  F (36.3  C) (Tympanic)   Wt 65.3 kg (144 lb)   LMP 05/24/2019 (Exact Date)   Breastfeeding No   BMI 27.21 kg/m   Estimated body mass index is 27.21 kg/m  as calculated from the following:    Height as of 9/17/19: 1.549 m (5' 1\").    Weight as of this encounter: 65.3 kg (144 lb). .    Gabrielle Chaves MA    "

## 2020-01-30 NOTE — PROGRESS NOTES
PNC:     S: Doing well. +FM, No LOF, VB, ctx. SOB from ED visit improved.      O:  /60 (BP Location: Right arm, Patient Position: Chair, Cuff Size: Adult Regular)   Pulse 89   Temp 97.3  F (36.3  C) (Tympanic)   Wt 65.3 kg (144 lb)   LMP 2019 (Exact Date)   Breastfeeding No   BMI 27.21 kg/m       General Appearance: NAD  Abdomen: Gravid, NT     FH 38  Doptones: 150s     A/P: 24 year old  at 35w6d   -- hx of congenital anomaly involving her previous pregnancy (esophageal atresia/TE fistula requiring repair): MFM anatomy US from  was normal; no further follow-up US recommended   -- factor V Leiden hetero (1st degree relative with VTE): ppx lovenox in postpartum (refer to 1st OB visit for further details)   -- mitral valve regurgitation seen initially on 2019 and repeat echo is the same; s/p Holter monitoring for chest tightness and palpitations which returned normal  -- previous  section: desires repeat  -- PTL precautions reviewed  -- s/p TDAP, flu  -- Parvovirus exposure-- IgG and IgM negative. DNA testing also neg  - GBS collected today  -- S >D, growth US ord'd     Repeat CS scheduled with Dr. Dee for  at 0730     uL Santillan MD  2020 8:55 AM

## 2020-01-31 LAB
GP B STREP DNA SPEC QL NAA+PROBE: NEGATIVE
SPECIMEN SOURCE: NORMAL

## 2020-02-01 ENCOUNTER — HOSPITAL ENCOUNTER (OUTPATIENT)
Facility: CLINIC | Age: 25
LOS: 1 days | Discharge: HOME | End: 2020-02-01
Attending: OBSTETRICS & GYNECOLOGY | Admitting: OBSTETRICS & GYNECOLOGY
Payer: COMMERCIAL

## 2020-02-01 ENCOUNTER — NURSE TRIAGE (OUTPATIENT)
Dept: NURSING | Facility: CLINIC | Age: 25
End: 2020-02-01

## 2020-02-01 VITALS
SYSTOLIC BLOOD PRESSURE: 119 MMHG | DIASTOLIC BLOOD PRESSURE: 75 MMHG | HEART RATE: 108 BPM | RESPIRATION RATE: 18 BRPM | TEMPERATURE: 98.1 F

## 2020-02-01 DIAGNOSIS — Z34.80 SUPERVISION OF OTHER NORMAL PREGNANCY: Primary | ICD-10-CM

## 2020-02-01 DIAGNOSIS — M54.50 BILATERAL LOW BACK PAIN WITHOUT SCIATICA, UNSPECIFIED CHRONICITY: ICD-10-CM

## 2020-02-01 PROBLEM — Z34.00 SUPERVISION OF NORMAL FIRST PREGNANCY: Status: ACTIVE | Noted: 2020-02-01

## 2020-02-01 NOTE — DISCHARGE INSTRUCTIONS
PT referral has been placed by Dr. Amaro.  Keep your next scheduled prenatal/imaging appointments.  If you have questions/concerns, call [phone]

## 2020-02-01 NOTE — TELEPHONE ENCOUNTER
Patient states she is 36w and would like to speak with a provider.  [facility] informed that on call providers are not paged to patients and will page on call provider with her concern and call her back with any recommendations.  Patient states she started having abdominal cramping and back yesterday that interred with sleep.  Doesn't believe it is contractions as it is constant.  Denies bleeding or leaking of fluid.  [facility] paged on call provider, Dr. JF Amaro, via page  at 8:48AM who tried to connect via his cell number.  Left message to call [facility] at [phone].  Dr. Amaro returned call and recommended patient go to L&D.  [facility] contacted patient with above recommendation.    Additional Information    Negative: Passed out (i.e., lost consciousness, collapsed and was not responding)    Negative: Shock suspected (e.g., cold/pale/clammy skin, too weak to stand, low BP, rapid pulse)    Negative: Difficult to awaken or acting confused (e.g., disoriented, slurred speech)    Negative: [1] SEVERE abdominal pain (e.g., excruciating) AND [2] constant AND [3] present > 1 hour    Negative: SEVERE vaginal bleeding (e.g., continuous red blood from vagina, or large blood clots)    Negative: Sounds like a life-threatening emergency to the triager    Negative: Followed an abdomen (stomach) injury    Negative: [1] Having contractions or other symptoms of labor (such as vaginal pressure) AND [2] >= 37 weeks pregnant (i.e., term pregnancy)    Negative: [1] Having contractions or other symptoms of labor (such as vaginal pressure) AND [2] < 37 weeks pregnant (i.e., )    Negative: [1] Abdominal pain AND [2] pregnant < 20 weeks    Protocols used: PREGNANCY - ABDOMINAL PAIN GREATER THAN 20 WEEKS EGA-A-

## 2020-02-01 NOTE — PROGRESS NOTES
Patient has been experiencing bilateral lower back/hip pain for 24+ hours.  This is described as constant and achy.  Has lower abdominal pain cramping only with ambulation.  Does feel as if fetus has shifted to lower in her pelvis.  Plan of care included fetal/maternal monitoring.  Will discharge with plan for patient to be evaluated on an outpatient basis with Physical Therapy per Dr. Amaro.  Cirilo CHUNGT.  Now contractions noted.

## 2020-02-04 ENCOUNTER — HOSPITAL ENCOUNTER (OUTPATIENT)
Dept: ULTRASOUND IMAGING | Facility: CLINIC | Age: 25
Discharge: HOME | End: 2020-02-04
Attending: OBSTETRICS & GYNECOLOGY | Admitting: OBSTETRICS & GYNECOLOGY
Payer: COMMERCIAL

## 2020-02-04 DIAGNOSIS — Z34.93 PRENATAL CARE, THIRD TRIMESTER: ICD-10-CM

## 2020-02-04 PROCEDURE — 76816 OB US FOLLOW-UP PER FETUS: CPT

## 2020-02-06 ENCOUNTER — PRENATAL OFFICE VISIT (OUTPATIENT)
Dept: OBGYN | Facility: CLINIC | Age: 25
End: 2020-02-06
Payer: COMMERCIAL

## 2020-02-06 VITALS
HEART RATE: 88 BPM | WEIGHT: 143 LBS | DIASTOLIC BLOOD PRESSURE: 66 MMHG | TEMPERATURE: 96.3 F | SYSTOLIC BLOOD PRESSURE: 113 MMHG | BODY MASS INDEX: 27.02 KG/M2

## 2020-02-06 DIAGNOSIS — Z34.93 PRENATAL CARE IN THIRD TRIMESTER: Primary | ICD-10-CM

## 2020-02-06 PROCEDURE — 99207 ZZC PRENATAL VISIT: CPT | Performed by: OBSTETRICS & GYNECOLOGY

## 2020-02-06 NOTE — PROGRESS NOTES
PNC:     S: Doing well. +FM, No LOF, VB, ctx.      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 DNA testing also neg  - GBS collected today  -- S >D, growth US WNL     Repeat CS scheduled with Dr. Dee for  at 0730    Lu Santillan MD  2020 8:37 AM

## 2020-02-06 NOTE — NURSING NOTE
"Initial /66 (BP Location: Right arm, Patient Position: Chair, Cuff Size: Adult Regular)   Pulse 88   Temp 96.3  F (35.7  C) (Tympanic)   Wt 64.9 kg (143 lb)   LMP 05/24/2019 (Exact Date)   Breastfeeding No   BMI 27.02 kg/m   Estimated body mass index is 27.02 kg/m  as calculated from the following:    Height as of 9/17/19: 1.549 m (5' 1\").    Weight as of this encounter: 64.9 kg (143 lb). .    Gabrielle Chaves MA    "

## 2020-02-13 ENCOUNTER — PRENATAL OFFICE VISIT (OUTPATIENT)
Dept: OBGYN | Facility: CLINIC | Age: 25
End: 2020-02-13
Payer: COMMERCIAL

## 2020-02-13 VITALS
TEMPERATURE: 98 F | HEART RATE: 97 BPM | BODY MASS INDEX: 27.59 KG/M2 | WEIGHT: 146 LBS | DIASTOLIC BLOOD PRESSURE: 71 MMHG | SYSTOLIC BLOOD PRESSURE: 120 MMHG

## 2020-02-13 DIAGNOSIS — Z34.93 PRENATAL CARE, THIRD TRIMESTER: Primary | ICD-10-CM

## 2020-02-13 PROCEDURE — 99207 ZZC PRENATAL VISIT: CPT | Performed by: OBSTETRICS & GYNECOLOGY

## 2020-02-13 NOTE — PROGRESS NOTES
PNC:     S: Doing well. +FM, No LOF, VB, ctx.   Occ [place].     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 DNA testing also neg  - GBS collected   -- S >D, growth US WNL    Return in 1 week     Repeat CS scheduled with Dr. Dee for  at 0730     Lu Santillan MD   2020 2:23 PM

## 2020-02-13 NOTE — NURSING NOTE
"Initial /71 (BP Location: Right arm, Patient Position: Chair, Cuff Size: Adult Regular)   Pulse 97   Temp 98  F (36.7  C) (Tympanic)   Wt 66.2 kg (146 lb)   LMP 05/24/2019 (Exact Date)   Breastfeeding No   BMI 27.59 kg/m   Estimated body mass index is 27.59 kg/m  as calculated from the following:    Height as of 9/17/19: 1.549 m (5' 1\").    Weight as of this encounter: 66.2 kg (146 lb). .    Gabrielle Chaves MA    "

## 2020-02-18 ENCOUNTER — ANESTHESIA EVENT (OUTPATIENT)
Dept: SURGERY | Facility: CLINIC | Age: 25
End: 2020-02-18
Payer: COMMERCIAL

## 2020-02-18 ENCOUNTER — TELEPHONE (OUTPATIENT)
Dept: OBGYN | Facility: CLINIC | Age: 25
End: 2020-02-18

## 2020-02-18 NOTE — ANESTHESIA PREPROCEDURE EVALUATION
Anesthesia Pre-Procedure Evaluation    Patient: Rocío Catherine   MRN: [id number] : 1995          Preoperative Diagnosis: History of  delivery [Z98.891]    Procedure(s):   SECTION    Past Medical History:   Diagnosis Date     Calculus of gallbladder with acute cholecystitis without obstruction 2017    Overview:  Added automatically from request for surgery [id number]     Chickenpox      Factor 5 Leiden mutation, heterozygous (H) 2018     Factor V Leiden (H)      History of frequent urinary tract infections      Non-rheumatic mitral regurgitation, trace 2019    Seen on echocardiogram 2019. Recommended for follow up echo in 2-3 years.      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 2     LAPAROSCOPIC CHOLECYSTECTOMY         Anesthesia Evaluation     . Pt has had prior anesthetic. Type: General    History of anesthetic complications          ROS/MED HX    ENT/Pulmonary:       Neurologic:     (+)migraines,     Cardiovascular:  - neg cardiovascular ROS   (+) ----. : . . . :. Irregular Heartbeat/Palpitations, valvular problems/murmurs type: MR . congenital heart disease Previous cardiac testing Echodate:19results:Interpretation Summary     Left ventricular systolic function is normal. LVEF is estimated at 60-65%.  The right ventricle is normal in structure, function and size.  No significant valvular abnormalities.     This study was compared to a prior TTE from 2019. There has been no  significant change.  _____________________________________________________________________________  __        Left Ventricle  The left ventricle is normal in size. There is normal left ventricular wall  thickness. Left ventricular systolic function is normal. The visual ejection  fraction is estimated at 60-65%. Left ventricular diastolic function is  normal. Normal  left ventricular wall motion.     Right Ventricle  The right ventricle is normal in structure, function and size.     Atria  Normal left atrial size. Right atrial size is normal.     Mitral Valve  The mitral valve leaflets appear normal. There is no evidence of stenosis,  fluttering, or prolapse.        Tricuspid Valve  The tricuspid valve is not well visualized, but is grossly normal. Right  ventricular systolic pressure could not be approximated due to inadequate  tricuspid regurgitation.     Aortic Valve  The aortic valve is normal in structure and function.     Pulmonic Valve  The pulmonic valve is not well seen, but is grossly normal.     Vessels  The aortic root is normal size. Normal size ascending aorta. The inferior vena  cava was normal in size with preserved respiratory variability.     Pericardium  There is no pericardial effusion.date: results:ECG reviewed date:10/10/19 results:Sinus Rhythm   WITHIN NORMAL LIMITS date: results:          METS/Exercise Tolerance:     Hematologic:     (+) Anemia, Other Hematologic Disorder-Factor V Leiden      Musculoskeletal:  - neg musculoskeletal ROS       GI/Hepatic:  - neg GI/hepatic ROS       Renal/Genitourinary: Comment: Frequent UTI's        Endo:  - neg endo ROS       Psychiatric:     (+) psychiatric history depression and anxiety      Infectious Disease:  - neg infectious disease ROS       Malignancy:      - no malignancy   Other:    (+) Possibly pregnant                         Physical Exam  Normal systems: cardiovascular, pulmonary and dental    Airway   Mallampati: II  TM distance: >3 FB  Neck ROM: full    Dental     Cardiovascular       Pulmonary             Lab Results   Component Value Date    WBC 12.0 (H) 01/25/2020    HGB 10.6 (L) 01/25/2020    HCT 34.1 (L) 01/25/2020     01/25/2020    CRP <2.9 02/09/2019    SED 5 02/09/2019     01/25/2020    POTASSIUM 3.6 01/25/2020    CHLORIDE 109 01/25/2020    CO2 22 01/25/2020    BUN 8 01/25/2020    CR  "0.60 01/25/2020    GLC 88 01/25/2020    LANA 8.6 01/25/2020    MAG 1.8 02/09/2019    ALBUMIN 2.8 (L) 01/25/2020    PROTTOTAL 7.0 01/25/2020    ALT 13 01/25/2020    AST 13 01/25/2020    ALKPHOS 95 01/25/2020    BILITOTAL 0.3 01/25/2020    LIPASE 137 03/27/2018    PTT 29 02/09/2019    INR 0.99 02/09/2019    TSH 0.90 02/09/2019    HCG Negative 04/25/2019    HCGS Negative 02/06/2019       Preop Vitals  BP Readings from Last 3 Encounters:   02/13/20 120/71   02/06/20 113/66   02/01/20 119/75    Pulse Readings from Last 3 Encounters:   02/13/20 97   02/06/20 88   02/01/20 108      Resp Readings from Last 3 Encounters:   02/01/20 18   01/25/20 22   01/04/20 18    SpO2 Readings from Last 3 Encounters:   01/25/20 100%   11/27/19 100%   10/30/19 100%      Temp Readings from Last 1 Encounters:   02/13/20 36.7  C (98  F) (Tympanic)    Ht Readings from Last 1 Encounters:   09/17/19 1.549 m (5' 1\")      Wt Readings from Last 1 Encounters:   02/13/20 66.2 kg (146 lb)    Estimated body mass index is 27.59 kg/m  as calculated from the following:    Height as of 9/17/19: 1.549 m (5' 1\").    Weight as of 2/13/20: 66.2 kg (146 lb).       Anesthesia Plan      History & Physical Review  History and physical reviewed and following examination; no interval change.    ASA Status:  2 .    NPO Status:  > 8 hours    Plan for General and Spinal          Postoperative Care  Postoperative pain management:  IV analgesics and Oral pain medications.      Consents  Anesthetic plan, risks, benefits and alternatives discussed with:  Patient..                 SNOW Kirkland CRNA  "

## 2020-02-18 NOTE — TELEPHONE ENCOUNTER
Pt was called to verify time/date of her repeat  as well as review pre-op teaching.  Pt was left a message to call the birthplace.

## 2020-02-20 ENCOUNTER — PRENATAL OFFICE VISIT (OUTPATIENT)
Dept: OBGYN | Facility: CLINIC | Age: 25
End: 2020-02-20
Payer: COMMERCIAL

## 2020-02-20 VITALS
HEART RATE: 103 BPM | HEIGHT: 61 IN | BODY MASS INDEX: 27.62 KG/M2 | DIASTOLIC BLOOD PRESSURE: 61 MMHG | SYSTOLIC BLOOD PRESSURE: 99 MMHG | TEMPERATURE: 97.7 F | WEIGHT: 146.3 LBS | RESPIRATION RATE: 16 BRPM

## 2020-02-20 DIAGNOSIS — Z34.93 PRENATAL CARE, THIRD TRIMESTER: Primary | ICD-10-CM

## 2020-02-20 PROCEDURE — 99207 ZZC PRENATAL VISIT: CPT | Performed by: OBSTETRICS & GYNECOLOGY

## 2020-02-20 ASSESSMENT — MIFFLIN-ST. JEOR: SCORE: 1345.99

## 2020-02-20 NOTE — H&P (VIEW-ONLY)
".PNC:     S: Doing well. +FM, No LOF, VB, ctx.   Occ [place] [facility]. Has had some hip pain, trying to take it easy at home.     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 DNA testing also neg  - GBS collected   -- S >D, growth US WNL        Repeat CS scheduled with Dr. Dee for  (tomorrow) at 0730     Lu Santillan MD   2020 9:09 AM     [facility]  [address] [address]  [phone]  Dept: [phone]    PRE-OP EVALUATION:  Today's date: 2020    Rocío Catherine (: 1995) presents for pre-operative evaluation assessment as requested by Dr. Dee.  She requires evaluation and anesthesia risk assessment prior to undergoing surgery/procedure for treatment of  Pregnancy, hx of CS .    Proposed Surgery/ Procedure: repeat CS  Date of Surgery/ Procedure: 2020   Time of Surgery/ Procedure: 0730  Hospital/Surgical Facility: [facility]  Primary Physician: Yessy Lyn  Type of " Anesthesia Anticipated: spinal    Patient has a Health Care Directive or Living Will:  NO    1. 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  2. NO - Do you ever have any pain or discomfort in your chest?  3. NO - Do you have a history of  Heart Failure?  4. NO - Are you troubled by shortness of breath when: walking on the level, up a slight hill or at night?  5. NO - Do you currently have a cold, bronchitis or other respiratory infection?  6. NO - Do you have a cough, shortness of breath or wheezing?  7. NO - Do you sometimes get pains in the calves of your legs when you walk?  8. Yes, Dad does, pt. Also has factor V- no personal hx - Do you or anyone in your family have previous history of blood clots?  9. Personal Hx of Factor V - Do you or does anyone in your family have a serious bleeding problem such as prolonged bleeding following surgeries or cuts?  10. Is currently on iron pills - Have you ever had problems with anemia or been told to take iron pills?  11. NO - Have you had any abnormal blood loss such as black, tarry or bloody stools, or abnormal vaginal bleeding?  12. Yes, after last  - Have you ever had a blood transfusion?  13. NO - Have you or any of your relatives ever had problems with anesthesia?  14. NO - Do you have sleep apnea, excessive snoring or daytime drowsiness?  15. NO - Do you have any prosthetic heart valves?  16. NO - Do you have prosthetic joints?  17. Yes - Is there any chance that you may be pregnant?      HPI:     HPI related to upcoming procedure: Rocío Catherine is a 24 year old  at 38w6d who plans repeat CS. Doing well. +FM. No LOF, VB, ctx.      See problem list for active medical problems.  Problems all longstanding and stable, except as noted/documented.  See ROS for pertinent symptoms related to these conditions.      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 Recommended for follow up echo in 2-3 years.        Recurrent major depressive disorder, in remission (H) 2018     Priority: Medium     Anxiety 2018     Priority: Medium     Factor 5 Leiden mutation, heterozygous (H) 2018     Priority: Medium     Anemia 2016     Priority: Medium     Supervision of high-risk pregnancy 2015     Priority: Medium     Overview:   ABILIO Carter.  They have been together for 3 years  Hx : plan repeat  Fhx DVT: plan PP lovenox       Family history of clotting disorder 2015     Priority: Medium     Overview:   Family history of clotting disorder father DVT +factor V        Dysthymia 2013     Priority: Medium      Past Medical History:   Diagnosis Date     Calculus of gallbladder with acute cholecystitis without obstruction 2017    Overview:  Added automatically from request for surgery [id number]     Chickenpox      Factor 5 Leiden mutation, heterozygous (H) 2018     Factor V Leiden (H)      History of frequent urinary tract infections      Non-rheumatic mitral regurgitation, trace 2019    Seen  "on echocardiogram 2/8/2019. Recommended for follow up echo in 2-3 years.      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 2     LAPAROSCOPIC CHOLECYSTECTOMY       Current Outpatient Medications   Medication Sig Dispense Refill     ferrous sulfate (FE TABS) 325 (65 Fe) MG EC tablet Take 1 tablet (325 mg) by mouth daily 60 tablet 2     ondansetron (ZOFRAN) 4 MG tablet Take 1 tablet (4 mg) by mouth every 6 hours as needed for nausea 30 tablet 3     Prenatal Vit-Fe Fumarate-FA (PRENATAL MULTIVITAMIN W/IRON) 27-0.8 MG tablet Take 1 tablet by mouth daily       cyclobenzaprine (FLEXERIL) 5 MG tablet Take 1-2 tablets (5-10 mg) by mouth 3 times daily as needed for muscle spasms (Patient not taking: Reported on 2/20/2020) 30 tablet 1     OTC products: PNV, tylenol except as noted above    No Known Allergies   Latex Allergy: NO    Social History     Tobacco Use     Smoking status: Never Smoker     Smokeless tobacco: Never Used   Substance Use Topics     Alcohol use: Yes     Comment:  rare-quit with pregnancy     History   Drug Use No       REVIEW OF SYSTEMS:   Constitutional, neuro, ENT, endocrine, pulmonary, cardiac, gastrointestinal, genitourinary, musculoskeletal, integument and psychiatric systems are negative, except as otherwise noted.    EXAM:   BP 99/61 (BP Location: Right arm, Patient Position: Chair, Cuff Size: Adult Regular)   Pulse 103   Temp 97.7  F (36.5  C) (Tympanic)   Resp 16   Ht 1.549 m (5' 1\")   Wt 66.4 kg (146 lb 4.8 oz)   LMP 05/24/2019 (Exact Date)   BMI 27.64 kg/m      GENERAL APPEARANCE: healthy, alert and no distress     EYES: EOMI, PERRL     HENT: ear canals and TM's normal and nose and mouth without ulcers or lesions     NECK: no adenopathy, no asymmetry, masses, or scars and thyroid normal to palpation     RESP: lungs clear to auscultation - no rales, rhonchi or " wheezes     CV: regular rates and rhythm, normal S1 S2, no S3 or S4 and no murmur, click or rub     ABDOMEN:  soft, nontender, no HSM or masses and bowel sounds normal     MS: extremities normal- no gross deformities noted, no evidence of inflammation in joints, FROM in all extremities.     SKIN: no suspicious lesions or rashes     NEURO: Normal strength and tone, sensory exam grossly normal, mentation intact and speech normal     PSYCH: mentation appears normal. and affect normal/bright     LYMPHATICS: No cervical adenopathy    DIAGNOSTICS:   No labs or EKG required for low risk surgery (cataract, skin procedure, breast biopsy, etc)-- plan for admit CBC, T&S, RPR    Recent Labs   Lab Test 01/25/20  1136 01/20/20  0914 11/11/19  0950 10/10/19  1437  02/09/19  1549   HGB 10.6* 10.3* 11.4* 12.6   < > 14.6     --  230 219   < > 229   INR  --   --   --   --   --  0.99     --   --  138   < > 140   POTASSIUM 3.6  --   --  3.8   < > 3.5   CR 0.60  --   --  0.54   < > 0.67    < > = values in this interval not displayed.     IMPRESSION:   Reason for surgery/procedure: pregnancy at 39w with hx of CS, desires repeat  Diagnosis/reason for consult: hx of CS, desires repeat    The proposed surgical procedure is considered INTERMEDIATE risk.    REVISED CARDIAC RISK INDEX  The patient has the following serious cardiovascular risks for perioperative complications such as (MI, PE, VFib and 3  AV Block):  No serious cardiac risks  INTERPRETATION: 0 risks: Class I (very low risk - 0.4% complication rate)    The patient has the following additional risks for perioperative complications:  No identified additional risks      ICD-10-CM    1. Prenatal care, third trimester Z34.93        RECOMMENDATIONS:       Anemia  Anemia and does not require treatment prior to surgery.  Monitor Hemoglobin postoperatively.      --Patient is on no chronic medications    APPROVAL GIVEN to proceed with proposed procedure, without further  diagnostic evaluation       Signed Electronically by: Lu Santillan MD    Copy of this evaluation report is provided to requesting physician.    [place] Preop Guidelines    Revised Cardiac Risk Index

## 2020-02-20 NOTE — PROGRESS NOTES
".PNC:     S: Doing well. +FM, No LOF, VB, ctx.   Occ [place] [facility]. Has had some hip pain, trying to take it easy at home.     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 DNA testing also neg  - GBS collected   -- S >D, growth US WNL        Repeat CS scheduled with Dr. Dee for  (tomorrow) at 0730     Lu Santillan MD   2020 9:09 AM     [facility]  [address] [address]  [phone]  Dept: [phone]    PRE-OP EVALUATION:  Today's date: 2020    Rocío Catherine (: 1995) presents for pre-operative evaluation assessment as requested by Dr. Dee.  She requires evaluation and anesthesia risk assessment prior to undergoing surgery/procedure for treatment of  Pregnancy, hx of CS .    Proposed Surgery/ Procedure: repeat CS  Date of Surgery/ Procedure: 2020   Time of Surgery/ Procedure: 0730  [facility]urgical [facility]: [facility]  Primary Physician: Yessy Lyn  Type of " Anesthesia Anticipated: spinal    Patient has a Health Care Directive or Living Will:  NO    1. 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  2. NO - Do you ever have any pain or discomfort in your chest?  3. NO - Do you have a history of  Heart Failure?  4. NO - Are you troubled by shortness of breath when: walking on the level, up a slight hill or at night?  5. NO - Do you currently have a cold, bronchitis or other respiratory infection?  6. NO - Do you have a cough, shortness of breath or wheezing?  7. NO - Do you sometimes get pains in the calves of your legs when you walk?  8. Yes, Dad does, pt. Also has factor V- no personal hx - Do you or anyone in your family have previous history of blood clots?  9. Personal Hx of Factor V - Do you or does anyone in your family have a serious bleeding problem such as prolonged bleeding following surgeries or cuts?  10. Is currently on iron pills - Have you ever had problems with anemia or been told to take iron pills?  11. NO - Have you had any abnormal blood loss such as black, tarry or bloody stools, or abnormal vaginal bleeding?  12. Yes, after last  - Have you ever had a blood transfusion?  13. NO - Have you or any of your relatives ever had problems with anesthesia?  14. NO - Do you have sleep apnea, excessive snoring or daytime drowsiness?  15. NO - Do you have any prosthetic heart valves?  16. NO - Do you have prosthetic joints?  17. Yes - Is there any chance that you may be pregnant?      HPI:     HPI related to upcoming procedure: Rocío Catherine is a 24 year old  at 38w6d who plans repeat CS. Doing well. +FM. No LOF, VB, ctx.      See problem list for active medical problems.  Problems all longstanding and stable, except as noted/documented.  See ROS for pertinent symptoms related to these conditions.      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 Recommended for follow up echo in 2-3 years.        Recurrent major depressive disorder, in remission (H) 2018     Priority: Medium     Anxiety 2018     Priority: Medium     Factor 5 Leiden mutation, heterozygous (H) 2018     Priority: Medium     Anemia 2016     Priority: Medium     Supervision of high-risk pregnancy 2015     Priority: Medium     Overview:   ABILIO Carter.  They have been together for 3 years  Hx : plan repeat  Fhx DVT: plan PP lovenox       Family history of clotting disorder 2015     Priority: Medium     Overview:   Family history of clotting disorder father DVT +factor V        Dysthymia 2013     Priority: Medium      Past Medical History:   Diagnosis Date     Calculus of gallbladder with acute cholecystitis without obstruction 2017    Overview:  Added automatically from request for surgery [id number]     Chickenpox      Factor 5 Leiden mutation, heterozygous (H) 2018     Factor V Leiden (H)      History of frequent urinary tract infections      Non-rheumatic mitral regurgitation, trace 2019    Seen  "on echocardiogram 2/8/2019. Recommended for follow up echo in 2-3 years.      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 2     LAPAROSCOPIC CHOLECYSTECTOMY       Current Outpatient Medications   Medication Sig Dispense Refill     ferrous sulfate (FE TABS) 325 (65 Fe) MG EC tablet Take 1 tablet (325 mg) by mouth daily 60 tablet 2     ondansetron (ZOFRAN) 4 MG tablet Take 1 tablet (4 mg) by mouth every 6 hours as needed for nausea 30 tablet 3     Prenatal Vit-Fe Fumarate-FA (PRENATAL MULTIVITAMIN W/IRON) 27-0.8 MG tablet Take 1 tablet by mouth daily       cyclobenzaprine (FLEXERIL) 5 MG tablet Take 1-2 tablets (5-10 mg) by mouth 3 times daily as needed for muscle spasms (Patient not taking: Reported on 2/20/2020) 30 tablet 1     OTC products: PNV, tylenol except as noted above    No Known Allergies   Latex Allergy: NO    Social History     Tobacco Use     Smoking status: Never Smoker     Smokeless tobacco: Never Used   Substance Use Topics     Alcohol use: Yes     Comment:  rare-quit with pregnancy     History   Drug Use No       REVIEW OF SYSTEMS:   Constitutional, neuro, ENT, endocrine, pulmonary, cardiac, gastrointestinal, genitourinary, musculoskeletal, integument and psychiatric systems are negative, except as otherwise noted.    EXAM:   BP 99/61 (BP Location: Right arm, Patient Position: Chair, Cuff Size: Adult Regular)   Pulse 103   Temp 97.7  F (36.5  C) (Tympanic)   Resp 16   Ht 1.549 m (5' 1\")   Wt 66.4 kg (146 lb 4.8 oz)   LMP 05/24/2019 (Exact Date)   BMI 27.64 kg/m      GENERAL APPEARANCE: healthy, alert and no distress     EYES: EOMI, PERRL     HENT: ear canals and TM's normal and nose and mouth without ulcers or lesions     NECK: no adenopathy, no asymmetry, masses, or scars and thyroid normal to palpation     RESP: lungs clear to auscultation - no rales, rhonchi or " wheezes     CV: regular rates and rhythm, normal S1 S2, no S3 or S4 and no murmur, click or rub     ABDOMEN:  soft, nontender, no HSM or masses and bowel sounds normal     MS: extremities normal- no gross deformities noted, no evidence of inflammation in joints, FROM in all extremities.     SKIN: no suspicious lesions or rashes     NEURO: Normal strength and tone, sensory exam grossly normal, mentation intact and speech normal     PSYCH: mentation appears normal. and affect normal/bright     LYMPHATICS: No cervical adenopathy    DIAGNOSTICS:   No labs or EKG required for low risk surgery (cataract, skin procedure, breast biopsy, etc)-- plan for admit CBC, T&S, RPR    Recent Labs   Lab Test 01/25/20  1136 01/20/20  0914 11/11/19  0950 10/10/19  1437  02/09/19  1549   HGB 10.6* 10.3* 11.4* 12.6   < > 14.6     --  230 219   < > 229   INR  --   --   --   --   --  0.99     --   --  138   < > 140   POTASSIUM 3.6  --   --  3.8   < > 3.5   CR 0.60  --   --  0.54   < > 0.67    < > = values in this interval not displayed.     IMPRESSION:   Reason for surgery/procedure: pregnancy at 39w with hx of CS, desires repeat  Diagnosis/reason for consult: hx of CS, desires repeat    The proposed surgical procedure is considered INTERMEDIATE risk.    REVISED CARDIAC RISK INDEX  The patient has the following serious cardiovascular risks for perioperative complications such as (MI, PE, VFib and 3  AV Block):  No serious cardiac risks  INTERPRETATION: 0 risks: Class I (very low risk - 0.4% complication rate)    The patient has the following additional risks for perioperative complications:  No identified additional risks      ICD-10-CM    1. Prenatal care, third trimester Z34.93        RECOMMENDATIONS:       Anemia  Anemia and does not require treatment prior to surgery.  Monitor Hemoglobin postoperatively.      --Patient is on no chronic medications    APPROVAL GIVEN to proceed with proposed procedure, without further  diagnostic evaluation       Signed Electronically by: Lu Santillan MD    Copy of this evaluation report is provided to requesting physician.    [place] Preop Guidelines    Revised Cardiac Risk Index

## 2020-02-21 ENCOUNTER — ANESTHESIA (OUTPATIENT)
Dept: SURGERY | Facility: CLINIC | Age: 25
End: 2020-02-21
Payer: COMMERCIAL

## 2020-02-21 ENCOUNTER — HOSPITAL ENCOUNTER (INPATIENT)
Facility: CLINIC | Age: 25
LOS: 3 days | Discharge: HOME | End: 2020-02-24
Attending: OBSTETRICS & GYNECOLOGY | Admitting: OBSTETRICS & GYNECOLOGY
Payer: COMMERCIAL

## 2020-02-21 DIAGNOSIS — Z98.891 HISTORY OF CESAREAN DELIVERY: ICD-10-CM

## 2020-02-21 DIAGNOSIS — Z98.891 S/P C-SECTION: Primary | ICD-10-CM

## 2020-02-21 LAB
ABO + RH BLD: NORMAL
ABO + RH BLD: NORMAL
BLD GP AB SCN SERPL QL: NORMAL
BLOOD BANK CMNT PATIENT-IMP: NORMAL
CREAT SERPL-MCNC: 0.5 MG/DL (ref 0.52–1.04)
GFR SERPL CREATININE-BSD FRML MDRD: >90 ML/MIN/{1.73_M2}
HGB BLD-MCNC: 11 G/DL (ref 11.7–15.7)
SPECIMEN EXP DATE BLD: NORMAL
T PALLIDUM AB SER QL: NONREACTIVE

## 2020-02-21 PROCEDURE — 37000009 ZZH ANESTHESIA TECHNICAL FEE, EACH ADDTL 15 MIN: Performed by: OBSTETRICS & GYNECOLOGY

## 2020-02-21 PROCEDURE — 86780 TREPONEMA PALLIDUM: CPT | Performed by: OBSTETRICS & GYNECOLOGY

## 2020-02-21 PROCEDURE — 25000125 ZZHC RX 250: Performed by: NURSE ANESTHETIST, CERTIFIED REGISTERED

## 2020-02-21 PROCEDURE — 36000058 ZZH SURGERY LEVEL 3 EA 15 ADDTL MIN: Performed by: OBSTETRICS & GYNECOLOGY

## 2020-02-21 PROCEDURE — 25000128 H RX IP 250 OP 636: Performed by: NURSE ANESTHETIST, CERTIFIED REGISTERED

## 2020-02-21 PROCEDURE — 25800030 ZZH RX IP 258 OP 636: Performed by: OBSTETRICS & GYNECOLOGY

## 2020-02-21 PROCEDURE — 71000013 ZZH RECOVERY PHASE 1 LEVEL 1 EA ADDTL HR: Performed by: OBSTETRICS & GYNECOLOGY

## 2020-02-21 PROCEDURE — 85018 HEMOGLOBIN: CPT | Performed by: OBSTETRICS & GYNECOLOGY

## 2020-02-21 PROCEDURE — 25000128 H RX IP 250 OP 636: Performed by: OBSTETRICS & GYNECOLOGY

## 2020-02-21 PROCEDURE — 86901 BLOOD TYPING SEROLOGIC RH(D): CPT | Performed by: OBSTETRICS & GYNECOLOGY

## 2020-02-21 PROCEDURE — 12000000 ZZH R&B MED SURG/OB

## 2020-02-21 PROCEDURE — 25000132 ZZH RX MED GY IP 250 OP 250 PS 637

## 2020-02-21 PROCEDURE — 36415 COLL VENOUS BLD VENIPUNCTURE: CPT | Performed by: OBSTETRICS & GYNECOLOGY

## 2020-02-21 PROCEDURE — 86900 BLOOD TYPING SEROLOGIC ABO: CPT | Performed by: OBSTETRICS & GYNECOLOGY

## 2020-02-21 PROCEDURE — 86850 RBC ANTIBODY SCREEN: CPT | Performed by: OBSTETRICS & GYNECOLOGY

## 2020-02-21 PROCEDURE — 27110028 ZZH OR GENERAL SUPPLY NON-STERILE: Performed by: OBSTETRICS & GYNECOLOGY

## 2020-02-21 PROCEDURE — 71000012 ZZH RECOVERY PHASE 1 LEVEL 1 FIRST HR: Performed by: OBSTETRICS & GYNECOLOGY

## 2020-02-21 PROCEDURE — 25000132 ZZH RX MED GY IP 250 OP 250 PS 637: Performed by: OBSTETRICS & GYNECOLOGY

## 2020-02-21 PROCEDURE — 36000056 ZZH SURGERY LEVEL 3 1ST 30 MIN: Performed by: OBSTETRICS & GYNECOLOGY

## 2020-02-21 PROCEDURE — 82565 ASSAY OF CREATININE: CPT | Performed by: OBSTETRICS & GYNECOLOGY

## 2020-02-21 PROCEDURE — 59514 CESAREAN DELIVERY ONLY: CPT | Mod: AS | Performed by: PHYSICIAN ASSISTANT

## 2020-02-21 PROCEDURE — 59510 CESAREAN DELIVERY: CPT | Performed by: OBSTETRICS & GYNECOLOGY

## 2020-02-21 PROCEDURE — 27210794 ZZH OR GENERAL SUPPLY STERILE: Performed by: OBSTETRICS & GYNECOLOGY

## 2020-02-21 PROCEDURE — 25800030 ZZH RX IP 258 OP 636: Performed by: NURSE ANESTHETIST, CERTIFIED REGISTERED

## 2020-02-21 PROCEDURE — 25000125 ZZHC RX 250: Performed by: OBSTETRICS & GYNECOLOGY

## 2020-02-21 PROCEDURE — 37000008 ZZH ANESTHESIA TECHNICAL FEE, 1ST 30 MIN: Performed by: OBSTETRICS & GYNECOLOGY

## 2020-02-21 RX ORDER — ONDANSETRON 2 MG/ML
4 INJECTION INTRAMUSCULAR; INTRAVENOUS EVERY 30 MIN PRN
Status: DISCONTINUED | OUTPATIENT
Start: 2020-02-21 | End: 2020-02-24 | Stop reason: HOSPADM

## 2020-02-21 RX ORDER — OXYTOCIN 10 [USP'U]/ML
10 INJECTION, SOLUTION INTRAMUSCULAR; INTRAVENOUS
Status: DISCONTINUED | OUTPATIENT
Start: 2020-02-21 | End: 2020-02-24 | Stop reason: HOSPADM

## 2020-02-21 RX ORDER — FENTANYL CITRATE 50 UG/ML
25-50 INJECTION, SOLUTION INTRAMUSCULAR; INTRAVENOUS
Status: DISCONTINUED | OUTPATIENT
Start: 2020-02-21 | End: 2020-02-24 | Stop reason: HOSPADM

## 2020-02-21 RX ORDER — ONDANSETRON 4 MG/1
4 TABLET, ORALLY DISINTEGRATING ORAL EVERY 30 MIN PRN
Status: DISCONTINUED | OUTPATIENT
Start: 2020-02-21 | End: 2020-02-24 | Stop reason: HOSPADM

## 2020-02-21 RX ORDER — OXYTOCIN/0.9 % SODIUM CHLORIDE 30/500 ML
100 PLASTIC BAG, INJECTION (ML) INTRAVENOUS CONTINUOUS
Status: DISCONTINUED | OUTPATIENT
Start: 2020-02-21 | End: 2020-02-24 | Stop reason: HOSPADM

## 2020-02-21 RX ORDER — NALOXONE HYDROCHLORIDE 0.4 MG/ML
.1-.4 INJECTION, SOLUTION INTRAMUSCULAR; INTRAVENOUS; SUBCUTANEOUS
Status: DISCONTINUED | OUTPATIENT
Start: 2020-02-21 | End: 2020-02-21

## 2020-02-21 RX ORDER — DIPHENHYDRAMINE HYDROCHLORIDE 50 MG/ML
25 INJECTION INTRAMUSCULAR; INTRAVENOUS EVERY 6 HOURS PRN
Status: DISCONTINUED | OUTPATIENT
Start: 2020-02-21 | End: 2020-02-24 | Stop reason: HOSPADM

## 2020-02-21 RX ORDER — MORPHINE SULFATE 4 MG/ML
INJECTION, SOLUTION INTRAMUSCULAR; INTRAVENOUS PRN
Status: DISCONTINUED | OUTPATIENT
Start: 2020-02-21 | End: 2020-02-21

## 2020-02-21 RX ORDER — OXYCODONE HYDROCHLORIDE 5 MG/1
5-10 TABLET ORAL
Status: DISCONTINUED | OUTPATIENT
Start: 2020-02-21 | End: 2020-02-24 | Stop reason: HOSPADM

## 2020-02-21 RX ORDER — ONDANSETRON 2 MG/ML
4 INJECTION INTRAMUSCULAR; INTRAVENOUS EVERY 6 HOURS PRN
Status: DISCONTINUED | OUTPATIENT
Start: 2020-02-21 | End: 2020-02-24 | Stop reason: HOSPADM

## 2020-02-21 RX ORDER — SODIUM CHLORIDE, SODIUM LACTATE, POTASSIUM CHLORIDE, CALCIUM CHLORIDE 600; 310; 30; 20 MG/100ML; MG/100ML; MG/100ML; MG/100ML
INJECTION, SOLUTION INTRAVENOUS CONTINUOUS
Status: DISCONTINUED | OUTPATIENT
Start: 2020-02-21 | End: 2020-02-21

## 2020-02-21 RX ORDER — KETOROLAC TROMETHAMINE 30 MG/ML
30 INJECTION, SOLUTION INTRAMUSCULAR; INTRAVENOUS EVERY 6 HOURS
Status: COMPLETED | OUTPATIENT
Start: 2020-02-21 | End: 2020-02-22

## 2020-02-21 RX ORDER — DEXTROSE, SODIUM CHLORIDE, SODIUM LACTATE, POTASSIUM CHLORIDE, AND CALCIUM CHLORIDE 5; .6; .31; .03; .02 G/100ML; G/100ML; G/100ML; G/100ML; G/100ML
INJECTION, SOLUTION INTRAVENOUS CONTINUOUS
Status: DISCONTINUED | OUTPATIENT
Start: 2020-02-21 | End: 2020-02-24 | Stop reason: HOSPADM

## 2020-02-21 RX ORDER — CEFAZOLIN SODIUM 1 G/50ML
1 INJECTION, SOLUTION INTRAVENOUS SEE ADMIN INSTRUCTIONS
Status: DISCONTINUED | OUTPATIENT
Start: 2020-02-21 | End: 2020-02-21

## 2020-02-21 RX ORDER — HYDROMORPHONE HYDROCHLORIDE 1 MG/ML
.3-.5 INJECTION, SOLUTION INTRAMUSCULAR; INTRAVENOUS; SUBCUTANEOUS EVERY 30 MIN PRN
Status: DISCONTINUED | OUTPATIENT
Start: 2020-02-21 | End: 2020-02-24 | Stop reason: HOSPADM

## 2020-02-21 RX ORDER — KETOROLAC TROMETHAMINE 30 MG/ML
30 INJECTION, SOLUTION INTRAMUSCULAR; INTRAVENOUS
Status: DISCONTINUED | OUTPATIENT
Start: 2020-02-21 | End: 2020-02-24 | Stop reason: HOSPADM

## 2020-02-21 RX ORDER — CITRIC ACID/SODIUM CITRATE 334-500MG
30 SOLUTION, ORAL ORAL
Status: COMPLETED | OUTPATIENT
Start: 2020-02-21 | End: 2020-02-21

## 2020-02-21 RX ORDER — HYDROXYZINE HYDROCHLORIDE 50 MG/1
50 TABLET, FILM COATED ORAL EVERY 6 HOURS PRN
Status: DISCONTINUED | OUTPATIENT
Start: 2020-02-21 | End: 2020-02-24 | Stop reason: HOSPADM

## 2020-02-21 RX ORDER — LIDOCAINE 40 MG/G
CREAM TOPICAL
Status: DISCONTINUED | OUTPATIENT
Start: 2020-02-21 | End: 2020-02-24 | Stop reason: HOSPADM

## 2020-02-21 RX ORDER — SCOLOPAMINE TRANSDERMAL SYSTEM 1 MG/1
1 PATCH, EXTENDED RELEASE TRANSDERMAL ONCE
Status: COMPLETED | OUTPATIENT
Start: 2020-02-21 | End: 2020-02-22

## 2020-02-21 RX ORDER — SIMETHICONE 80 MG
80 TABLET,CHEWABLE ORAL 4 TIMES DAILY PRN
Status: DISCONTINUED | OUTPATIENT
Start: 2020-02-21 | End: 2020-02-24 | Stop reason: HOSPADM

## 2020-02-21 RX ORDER — LANOLIN 100 %
OINTMENT (GRAM) TOPICAL
Status: DISCONTINUED | OUTPATIENT
Start: 2020-02-21 | End: 2020-02-24 | Stop reason: HOSPADM

## 2020-02-21 RX ORDER — ONDANSETRON 2 MG/ML
INJECTION INTRAMUSCULAR; INTRAVENOUS PRN
Status: DISCONTINUED | OUTPATIENT
Start: 2020-02-21 | End: 2020-02-21

## 2020-02-21 RX ORDER — PRENATAL VIT/IRON FUM/FOLIC AC 27MG-0.8MG
1 TABLET ORAL DAILY
Status: DISCONTINUED | OUTPATIENT
Start: 2020-02-21 | End: 2020-02-24 | Stop reason: HOSPADM

## 2020-02-21 RX ORDER — OXYTOCIN/0.9 % SODIUM CHLORIDE 30/500 ML
340 PLASTIC BAG, INJECTION (ML) INTRAVENOUS CONTINUOUS PRN
Status: DISCONTINUED | OUTPATIENT
Start: 2020-02-21 | End: 2020-02-24 | Stop reason: HOSPADM

## 2020-02-21 RX ORDER — HYDROCORTISONE 2.5 %
CREAM (GRAM) TOPICAL 3 TIMES DAILY PRN
Status: DISCONTINUED | OUTPATIENT
Start: 2020-02-21 | End: 2020-02-24 | Stop reason: HOSPADM

## 2020-02-21 RX ORDER — ALBUTEROL SULFATE 0.83 MG/ML
2.5 SOLUTION RESPIRATORY (INHALATION) EVERY 4 HOURS PRN
Status: DISCONTINUED | OUTPATIENT
Start: 2020-02-21 | End: 2020-02-24 | Stop reason: HOSPADM

## 2020-02-21 RX ORDER — ACETAMINOPHEN 325 MG/1
650 TABLET ORAL EVERY 4 HOURS PRN
Status: DISCONTINUED | OUTPATIENT
Start: 2020-02-24 | End: 2020-02-24 | Stop reason: HOSPADM

## 2020-02-21 RX ORDER — OXYTOCIN/0.9 % SODIUM CHLORIDE 30/500 ML
PLASTIC BAG, INJECTION (ML) INTRAVENOUS CONTINUOUS PRN
Status: DISCONTINUED | OUTPATIENT
Start: 2020-02-21 | End: 2020-02-21

## 2020-02-21 RX ORDER — BUPIVACAINE HYDROCHLORIDE 7.5 MG/ML
INJECTION, SOLUTION INTRASPINAL PRN
Status: DISCONTINUED | OUTPATIENT
Start: 2020-02-21 | End: 2020-02-21

## 2020-02-21 RX ORDER — BISACODYL 10 MG
10 SUPPOSITORY, RECTAL RECTAL DAILY PRN
Status: DISCONTINUED | OUTPATIENT
Start: 2020-02-23 | End: 2020-02-24 | Stop reason: HOSPADM

## 2020-02-21 RX ORDER — CEFAZOLIN SODIUM 2 G/100ML
2 INJECTION, SOLUTION INTRAVENOUS
Status: COMPLETED | OUTPATIENT
Start: 2020-02-21 | End: 2020-02-21

## 2020-02-21 RX ORDER — AMOXICILLIN 250 MG
1 CAPSULE ORAL 2 TIMES DAILY PRN
Status: DISCONTINUED | OUTPATIENT
Start: 2020-02-21 | End: 2020-02-24 | Stop reason: HOSPADM

## 2020-02-21 RX ORDER — LIDOCAINE 40 MG/G
CREAM TOPICAL
Status: DISCONTINUED | OUTPATIENT
Start: 2020-02-21 | End: 2020-02-21

## 2020-02-21 RX ORDER — AMOXICILLIN 250 MG
2 CAPSULE ORAL 2 TIMES DAILY PRN
Status: DISCONTINUED | OUTPATIENT
Start: 2020-02-21 | End: 2020-02-24 | Stop reason: HOSPADM

## 2020-02-21 RX ORDER — ACETAMINOPHEN 325 MG/1
975 TABLET ORAL EVERY 8 HOURS
Status: DISPENSED | OUTPATIENT
Start: 2020-02-21 | End: 2020-02-24

## 2020-02-21 RX ORDER — NALBUPHINE HYDROCHLORIDE 10 MG/ML
2.5-5 INJECTION, SOLUTION INTRAMUSCULAR; INTRAVENOUS; SUBCUTANEOUS EVERY 6 HOURS PRN
Status: DISCONTINUED | OUTPATIENT
Start: 2020-02-21 | End: 2020-02-21 | Stop reason: RX

## 2020-02-21 RX ORDER — MAGNESIUM HYDROXIDE 1200 MG/15ML
LIQUID ORAL PRN
Status: DISCONTINUED | OUTPATIENT
Start: 2020-02-21 | End: 2020-02-24 | Stop reason: HOSPADM

## 2020-02-21 RX ORDER — DIPHENHYDRAMINE HCL 25 MG
25 CAPSULE ORAL EVERY 6 HOURS PRN
Status: DISCONTINUED | OUTPATIENT
Start: 2020-02-21 | End: 2020-02-24 | Stop reason: HOSPADM

## 2020-02-21 RX ORDER — HYDROXYZINE HYDROCHLORIDE 25 MG/1
25 TABLET, FILM COATED ORAL EVERY 6 HOURS PRN
Status: DISCONTINUED | OUTPATIENT
Start: 2020-02-21 | End: 2020-02-24 | Stop reason: HOSPADM

## 2020-02-21 RX ORDER — ONDANSETRON 2 MG/ML
4 INJECTION INTRAMUSCULAR; INTRAVENOUS EVERY 4 HOURS PRN
Status: DISCONTINUED | OUTPATIENT
Start: 2020-02-21 | End: 2020-02-21 | Stop reason: DRUGHIGH

## 2020-02-21 RX ORDER — IBUPROFEN 800 MG/1
800 TABLET, FILM COATED ORAL EVERY 6 HOURS PRN
Status: DISCONTINUED | OUTPATIENT
Start: 2020-02-21 | End: 2020-02-24 | Stop reason: HOSPADM

## 2020-02-21 RX ORDER — HYDROMORPHONE HYDROCHLORIDE 1 MG/ML
.3-.5 INJECTION, SOLUTION INTRAMUSCULAR; INTRAVENOUS; SUBCUTANEOUS EVERY 5 MIN PRN
Status: DISCONTINUED | OUTPATIENT
Start: 2020-02-21 | End: 2020-02-24 | Stop reason: HOSPADM

## 2020-02-21 RX ORDER — ACETAMINOPHEN 325 MG/1
975 TABLET ORAL ONCE
Status: COMPLETED | OUTPATIENT
Start: 2020-02-21 | End: 2020-02-21

## 2020-02-21 RX ORDER — SODIUM CHLORIDE, SODIUM LACTATE, POTASSIUM CHLORIDE, CALCIUM CHLORIDE 600; 310; 30; 20 MG/100ML; MG/100ML; MG/100ML; MG/100ML
INJECTION, SOLUTION INTRAVENOUS CONTINUOUS
Status: DISCONTINUED | OUTPATIENT
Start: 2020-02-21 | End: 2020-02-24 | Stop reason: HOSPADM

## 2020-02-21 RX ORDER — METOPROLOL TARTRATE 1 MG/ML
1-2 INJECTION, SOLUTION INTRAVENOUS EVERY 5 MIN PRN
Status: DISCONTINUED | OUTPATIENT
Start: 2020-02-21 | End: 2020-02-24 | Stop reason: HOSPADM

## 2020-02-21 RX ORDER — NALOXONE HYDROCHLORIDE 0.4 MG/ML
.1-.4 INJECTION, SOLUTION INTRAMUSCULAR; INTRAVENOUS; SUBCUTANEOUS
Status: DISCONTINUED | OUTPATIENT
Start: 2020-02-21 | End: 2020-02-24 | Stop reason: HOSPADM

## 2020-02-21 RX ORDER — FENTANYL CITRATE 50 UG/ML
INJECTION, SOLUTION INTRAMUSCULAR; INTRAVENOUS PRN
Status: DISCONTINUED | OUTPATIENT
Start: 2020-02-21 | End: 2020-02-21

## 2020-02-21 RX ORDER — MISOPROSTOL 200 UG/1
400 TABLET ORAL
Status: DISCONTINUED | OUTPATIENT
Start: 2020-02-21 | End: 2020-02-24 | Stop reason: HOSPADM

## 2020-02-21 RX ORDER — CITRIC ACID/SODIUM CITRATE 334-500MG
SOLUTION, ORAL ORAL
Status: COMPLETED
Start: 2020-02-21 | End: 2020-02-21

## 2020-02-21 RX ADMIN — OXYCODONE HYDROCHLORIDE 10 MG: 5 TABLET ORAL at 18:02

## 2020-02-21 RX ADMIN — SCOPALAMINE 1 PATCH: 1 PATCH, EXTENDED RELEASE TRANSDERMAL at 09:49

## 2020-02-21 RX ADMIN — SODIUM CHLORIDE, POTASSIUM CHLORIDE, SODIUM LACTATE AND CALCIUM CHLORIDE: 600; 310; 30; 20 INJECTION, SOLUTION INTRAVENOUS at 07:50

## 2020-02-21 RX ADMIN — SENNOSIDES AND DOCUSATE SODIUM 1 TABLET: 8.6; 5 TABLET ORAL at 21:08

## 2020-02-21 RX ADMIN — SODIUM CHLORIDE, POTASSIUM CHLORIDE, SODIUM LACTATE AND CALCIUM CHLORIDE: 600; 310; 30; 20 INJECTION, SOLUTION INTRAVENOUS at 06:36

## 2020-02-21 RX ADMIN — PHENYLEPHRINE HYDROCHLORIDE 100 MCG: 10 INJECTION INTRAVENOUS at 08:19

## 2020-02-21 RX ADMIN — OXYCODONE HYDROCHLORIDE 10 MG: 5 TABLET ORAL at 14:59

## 2020-02-21 RX ADMIN — PHENYLEPHRINE HYDROCHLORIDE 100 MCG: 10 INJECTION INTRAVENOUS at 08:32

## 2020-02-21 RX ADMIN — PHENYLEPHRINE HYDROCHLORIDE 100 MCG: 10 INJECTION INTRAVENOUS at 07:52

## 2020-02-21 RX ADMIN — Medication 30 ML: at 07:02

## 2020-02-21 RX ADMIN — PHENYLEPHRINE HYDROCHLORIDE 100 MCG: 10 INJECTION INTRAVENOUS at 07:54

## 2020-02-21 RX ADMIN — SODIUM CHLORIDE, POTASSIUM CHLORIDE, SODIUM LACTATE AND CALCIUM CHLORIDE 1000 ML: 600; 310; 30; 20 INJECTION, SOLUTION INTRAVENOUS at 06:46

## 2020-02-21 RX ADMIN — MORPHINE SULFATE 2 MCG: 4 INJECTION, SOLUTION INTRAMUSCULAR; INTRAVENOUS at 07:46

## 2020-02-21 RX ADMIN — KETOROLAC TROMETHAMINE 30 MG: 30 INJECTION, SOLUTION INTRAMUSCULAR at 09:48

## 2020-02-21 RX ADMIN — SODIUM CHLORIDE, SODIUM LACTATE, POTASSIUM CHLORIDE, CALCIUM CHLORIDE AND DEXTROSE MONOHYDRATE: 5; 600; 310; 30; 20 INJECTION, SOLUTION INTRAVENOUS at 12:43

## 2020-02-21 RX ADMIN — ACETAMINOPHEN 975 MG: 325 TABLET, FILM COATED ORAL at 07:03

## 2020-02-21 RX ADMIN — ACETAMINOPHEN 975 MG: 325 TABLET, FILM COATED ORAL at 15:41

## 2020-02-21 RX ADMIN — KETOROLAC TROMETHAMINE 30 MG: 30 INJECTION, SOLUTION INTRAMUSCULAR at 21:09

## 2020-02-21 RX ADMIN — KETOROLAC TROMETHAMINE 30 MG: 30 INJECTION, SOLUTION INTRAMUSCULAR at 15:42

## 2020-02-21 RX ADMIN — ONDANSETRON 4 MG: 2 INJECTION INTRAMUSCULAR; INTRAVENOUS at 08:24

## 2020-02-21 RX ADMIN — PHENYLEPHRINE HYDROCHLORIDE 100 MCG: 10 INJECTION INTRAVENOUS at 08:25

## 2020-02-21 RX ADMIN — OXYCODONE HYDROCHLORIDE 5 MG: 5 TABLET ORAL at 10:35

## 2020-02-21 RX ADMIN — PHENYLEPHRINE HYDROCHLORIDE 100 MCG: 10 INJECTION INTRAVENOUS at 08:16

## 2020-02-21 RX ADMIN — PHENYLEPHRINE HYDROCHLORIDE 100 MCG: 10 INJECTION INTRAVENOUS at 08:09

## 2020-02-21 RX ADMIN — OXYCODONE HYDROCHLORIDE 5 MG: 5 TABLET ORAL at 11:25

## 2020-02-21 RX ADMIN — OXYCODONE HYDROCHLORIDE 10 MG: 5 TABLET ORAL at 21:08

## 2020-02-21 RX ADMIN — FENTANYL CITRATE 25 MCG: 50 INJECTION, SOLUTION INTRAMUSCULAR; INTRAVENOUS at 07:46

## 2020-02-21 RX ADMIN — BUPIVACAINE HYDROCHLORIDE IN DEXTROSE 1.4 ML: 7.5 INJECTION, SOLUTION SUBARACHNOID at 07:46

## 2020-02-21 RX ADMIN — Medication 340 ML/HR: at 08:07

## 2020-02-21 RX ADMIN — SODIUM CITRATE AND CITRIC ACID MONOHYDRATE 30 ML: 500; 334 SOLUTION ORAL at 07:02

## 2020-02-21 RX ADMIN — CEFAZOLIN SODIUM 2 G: 2 INJECTION, SOLUTION INTRAVENOUS at 07:33

## 2020-02-21 NOTE — PROGRESS NOTES
Mother and baby transferred to postpartum room at 0845 via bed and infant skin to skin with mom for post  section phase 1 recovery period. Patient oriented to room. Mother and baby bonding well and in stable condition upon transfer.

## 2020-02-21 NOTE — ANESTHESIA POSTPROCEDURE EVALUATION
Patient: Rocío Catherine    Procedure(s):   SECTION    Diagnosis:History of  delivery [Z98.891]  Diagnosis Additional Information: No value filed.    Anesthesia Type:  General, Spinal    Note:  Anesthesia Post Evaluation    Patient location during evaluation: Bedside  Patient participation: Able to fully participate in evaluation  Level of consciousness: awake and alert  Pain management: adequate  Airway patency: patent  Cardiovascular status: stable  Respiratory status: room air and spontaneous ventilation  Hydration status: stable  PONV: none     Anesthetic complications: None          Last vitals:  Vitals:    20 0620   BP: 124/78   Pulse: 91   Resp: 18   Temp: 36.7  C (98  F)   SpO2: 99%         Electronically Signed By: SNOW Lynne CRNA  2020  8:47 AM

## 2020-02-21 NOTE — OP NOTE
Op Note    Preop Dx: 1. 39w0d intrauterine pregnancy , 2. Prior  section    Postop DX: 1. 39w0d intrauterine pregnancy , 2. Prior  section    Procedure: repeat low transverse  section     Surgeon: Grace Dee M.D.     Assist: Long Patrick (A surgical assistant was required for this surgery for his experience with retraction, achievement of hemostasis, and wound closure)       Anesthesia: spinal    FRA Score: 2    EBL: 935 cc    IVF: see anesthesia records    UOP: see anesthesia records    Complications: none    Findings: viable infant female in vertex presentation weighing 7 lb 2oz with Apgars of  9 at 1 minute and 9 at 5 minutes.  Clear amniotic fluid.  Placenta with 3 vessel cord.  Normal uterus, tubes and ovaries.    Indications: Rocío Catherine is a 24 year old  3 Para 1012 who [facility] 39 and 0/7 weeks for repeat  section due to prior .  Patient understood risks and benefits including risks of bleeding, infection and damage to surrounding structures.  Patient agreed to proceed.     Procedure: Patient was brought to the operating room and spinal anesthesia was found to be adequate.  She was prepped and draped in the usual sterile fashion in the dorsal supine position with a leftward tilt.  A pfannensteil incision was created in the usual fashion.  An Ace-O retractor was placed in the incision and the lower uterine segment well visualized.  The lower uterine segment was incised sharply and transversely with the scalpel.  Clear amniotic fluid was noted.  The infant vertex was brought out of the uterine incision without difficulty.  The rest of the infant was delivered without difficulty.  The cord was clamped and cut.  The infant was handed off to the waiting nurses.  Cord blood was obtained.  The placenta was expressed intact with a 3 vessel cord.  The uterus was exteriorized and cleared of all clot and debris.  The uterine incision was  repaired with 0-Vicryl in a running fashion followed by a second layer of 0-Monocryl in a imbricated fashion.  Excellent hemostasis was noted.  The uterus was returned to the abdomen.  The gutters were cleared of all clot and debris.  Reinspection of the incision revealed excellent hemostasis.  The Ace-O was removed.  The peritoneum was repaired with 2-0 vicryl in a running fashion.   The fascia was repaired with 0-Vicryl in a running fashion.   The subcutaneous tissue was repaired with 3-0 Plain in a running fashion.   The skin was closed with 4-0 V-Rey-90 in a subcuticular fashion.   The incision was dressed with Secure Seal skin adhesive.      Patient tolerated procedure well.  Sponge, lap and needle counts are correct.  I was present for the entire procedure.  Patient was taken to her recovery room in stable condition.  Carola-operative antibiotics were given.  A surgical assistant was required for this surgery for his experience with retraction, achievement of hemostasis, and wound closure.

## 2020-02-21 NOTE — L&D DELIVERY NOTE
"Delivery Summary    Rocío Catherine MRN# [id number]   Age: 24 year old YOB: 1995     ASSESSMENT & PLAN: Rocío Catherine is a 24 year old  @ 39w0d presented for repeat .        viable infant female in vertex presentation weighing 7 lb 2oz with Apgars of  9 at 1 minute and 9 at 5 minutes.  Clear amniotic fluid.  Placenta with 3 vessel cord.  Normal uterus, tubes and ovaries.    Mother and infant stable.    Grace Dee M.D.        Delivery/Placenta Date and Time    Delivery Date:  20 Delivery Time:   8:06 AM      Apgars    Living status:  Living   1 Minute 5 Minute 10 Minute 15 Minute 20 Minute   Skin color: 1  1       Heart rate: 2  2       Reflex irritability: 2  2       Muscle tone: 2  2       Respiratory effort: 2  2       Total: 9  9       Apgars assigned by:  JASKARAN ALTAMIRANO     Cord    Vessels:  3 Vessels Complications:  None   Cord Blood Disposition:  Lab Gases Sent?:  No       Resuscitation    Methods:  None  [place] Care at Delivery:  Doptone 142 RMQ immediately following spinal anesthesia.      Measurements    Weight:  7 lb 2 oz Length:  1' 7\"   Head circumference:  34.3 cm       Skin to Skin and Feeding Plan    Skin to skin initiation date/time: 1841    Skin to skin with:  Mother  Skin to skin end date/time:     How do you plan to feed your baby:  Breastfeeding     Labor Events and Shoulder Dystocia    Fetal Tracing Prior to Delivery:  Category 1  Shoulder dystocia present?:  Neg     Delivery (Maternal) (Provider to Complete) ([id number])    Episiotomy:  None     Blood Loss  Mother: Rocío Catherine #[id number]   Start of Mother's Information    IO Blood Loss  20 0759 - 20 0845    Total Surgical QBL Blood Loss (mL) Hospital Encounter 935 mL    Total  935 mL         End of Mother's Information  Mother: Rocío Catherine #[id number]         Delivery - Provider to Complete ([id number])    Delivering clinician:  Grace Dee MD  Attempted Delivery Types " (Choose all that apply):  Other  Delivery Type (Choose the 1 that will go to the Birth History):  , Low Transverse   Other personnel:   Provider Role   Kayli Keane APRN CNP Nurse Practitioner   Peggy Vasquez, HILARIO Delivery Nurse   Monica Schilling APRN CRNA Certified Registered Nurse Anesthesiologist   Caroline Florez Denise A, RN Scrub Nurse         Placenta    Immediate Cord Clamping:  Done  Removal:  Manual Removal  Disposition:  Hospital disposal     Anesthesia    Method:  Spinal          Presentation and Position    Presentation:  Vertex          Grace Dee MD

## 2020-02-21 NOTE — BRIEF OP NOTE
[facility] Brief Operative Note    Pre-operative diagnosis: History of  delivery [Z98.891]   Post-operative diagnosis s/p      Procedure: Procedure(s):   SECTION   Surgeon(s): Surgeon(s) and Role:     * Grace Dee MD - Primary     * Long Patrick PA-C - Assisting   Estimated blood loss: 935 cc    Specimens: none   Findings: viable infant female in vertex presentation weighing 7 lb 2oz with Apgars of  9 at 1 minute and 9 at 5 minutes.  Clear amniotic fluid.  Placenta with 3 vessel cord.  Normal uterus, tubes and ovaries.

## 2020-02-21 NOTE — PLAN OF CARE
Patient and  here for scheduled  section . Patient reports occasional contractions Oriented to room. Verified patient's identification. EFM strip initiated.Fetus active per patient. IV started without difficulty and fluids infusing. History and plan of care reviewed.SCD's applied bilaterally.Brandon cloth applied to abdomen, Bicitra given, and Antibiotic iv initiated . Patient has been NPO since  @6350.Dr. TYRONE Dee     has not been here to see patient. Monica Schilling CRNA has been here to see patient.

## 2020-02-21 NOTE — ANESTHESIA CARE TRANSFER NOTE
Patient: Rocío Catherine    Procedure(s):   SECTION    Diagnosis: History of  delivery [Z98.891]  Diagnosis Additional Information: No value filed.    Anesthesia Type:   General, Spinal     Note:  Airway :Room Air  Patient transferred to:Phase II  Handoff Report: Identifed the Patient, Identified the Reponsible Provider, Reviewed the pertinent medical history, Discussed the surgical course, Reviewed Intra-OP anesthesia mangement and issues during anesthesia, Set expectations for post-procedure period and Allowed opportunity for questions and acknowledgement of understanding      Vitals: (Last set prior to Anesthesia Care Transfer)    CRNA VITALS  2020 0810 - 2020 0846      2020             Pulse:  92    SpO2:  99 %                Electronically Signed By: SNOW Lynne CRNA  2020  8:46 AM

## 2020-02-21 NOTE — OR NURSING
The surgical QBL charted the amniotic fluid was underestemated. The QBL should have bee  400 ml less than stated.

## 2020-02-21 NOTE — ANESTHESIA PROCEDURE NOTES
Peripheral nerve/Neuraxial procedure note : intrathecal  Pre-Procedure    Location: OB    Procedure Times:2/21/2020 7:43 AM and 2/21/2020 7:46 AM  Pre-Anesthestic Checklist: patient identified, IV checked, site marked, risks and benefits discussed, informed consent, monitors and equipment checked, pre-op evaluation and at physician/surgeon's request    Timeout  Correct Patient: Yes   Correct Procedure: Yes   Correct Site: Yes   Correct Laterality: N/A   Correct Position: Yes   Site Marked: N/A   .   Procedure Documentation  ASA 2  Diagnosis:repeat C/S.    Procedure: intrathecal, .   Patient Position:sitting Insertion Site:L3-4  (midline approach)     Patient Prep/Sterile Barriers; mask, sterile gloves, povidone-iodine 7.5% surgical scrub, patient draped.  .  Needle:  Spinal Needle (gauge): 27  # of attempts: 1 and  # of redirects:  1 Introducer used .        Assessment/Narrative  Paresthesias: No.  .  .  clear CSF fluid removed . Time Injected: 07:46  Sensory Level: T5 Comments:  VAS pain score prior to injection:    VAS pain score after injection:    FHR stable, pt. Tolerated well.

## 2020-02-22 ENCOUNTER — APPOINTMENT (OUTPATIENT)
Dept: CT IMAGING | Facility: CLINIC | Age: 25
End: 2020-02-22
Attending: OBSTETRICS & GYNECOLOGY
Payer: COMMERCIAL

## 2020-02-22 LAB — HGB BLD-MCNC: 9.3 G/DL (ref 11.7–15.7)

## 2020-02-22 PROCEDURE — 85018 HEMOGLOBIN: CPT | Performed by: OBSTETRICS & GYNECOLOGY

## 2020-02-22 PROCEDURE — 74177 CT ABD & PELVIS W/CONTRAST: CPT

## 2020-02-22 PROCEDURE — 36415 COLL VENOUS BLD VENIPUNCTURE: CPT | Performed by: OBSTETRICS & GYNECOLOGY

## 2020-02-22 PROCEDURE — 12000000 ZZH R&B MED SURG/OB

## 2020-02-22 PROCEDURE — 25000132 ZZH RX MED GY IP 250 OP 250 PS 637: Performed by: NURSE ANESTHETIST, CERTIFIED REGISTERED

## 2020-02-22 PROCEDURE — 25000125 ZZHC RX 250: Performed by: OBSTETRICS & GYNECOLOGY

## 2020-02-22 PROCEDURE — 25000128 H RX IP 250 OP 636: Performed by: OBSTETRICS & GYNECOLOGY

## 2020-02-22 PROCEDURE — 25000132 ZZH RX MED GY IP 250 OP 250 PS 637: Performed by: OBSTETRICS & GYNECOLOGY

## 2020-02-22 RX ORDER — GABAPENTIN 300 MG/1
300 CAPSULE ORAL 3 TIMES DAILY PRN
Status: DISCONTINUED | OUTPATIENT
Start: 2020-02-22 | End: 2020-02-24 | Stop reason: HOSPADM

## 2020-02-22 RX ORDER — IOPAMIDOL 755 MG/ML
70 INJECTION, SOLUTION INTRAVASCULAR ONCE
Status: COMPLETED | OUTPATIENT
Start: 2020-02-22 | End: 2020-02-22

## 2020-02-22 RX ADMIN — ACETAMINOPHEN 975 MG: 325 TABLET, FILM COATED ORAL at 17:00

## 2020-02-22 RX ADMIN — OXYCODONE HYDROCHLORIDE 10 MG: 5 TABLET ORAL at 14:04

## 2020-02-22 RX ADMIN — GABAPENTIN 300 MG: 300 CAPSULE ORAL at 16:59

## 2020-02-22 RX ADMIN — IBUPROFEN 800 MG: 800 TABLET ORAL at 08:47

## 2020-02-22 RX ADMIN — SENNOSIDES AND DOCUSATE SODIUM 1 TABLET: 8.6; 5 TABLET ORAL at 15:00

## 2020-02-22 RX ADMIN — OXYCODONE HYDROCHLORIDE 10 MG: 5 TABLET ORAL at 11:00

## 2020-02-22 RX ADMIN — SODIUM CHLORIDE 58 ML: 9 INJECTION, SOLUTION INTRAVENOUS at 15:26

## 2020-02-22 RX ADMIN — IOPAMIDOL 70 ML: 755 INJECTION, SOLUTION INTRAVENOUS at 15:25

## 2020-02-22 RX ADMIN — ACETAMINOPHEN 975 MG: 325 TABLET, FILM COATED ORAL at 01:01

## 2020-02-22 RX ADMIN — OXYCODONE HYDROCHLORIDE 5 MG: 5 TABLET ORAL at 20:18

## 2020-02-22 RX ADMIN — OXYCODONE HYDROCHLORIDE 10 MG: 5 TABLET ORAL at 22:39

## 2020-02-22 RX ADMIN — ACETAMINOPHEN 975 MG: 325 TABLET, FILM COATED ORAL at 08:46

## 2020-02-22 RX ADMIN — OXYCODONE HYDROCHLORIDE 10 MG: 5 TABLET ORAL at 01:01

## 2020-02-22 RX ADMIN — PRENATAL VITAMINS-IRON FUMARATE 27 MG IRON-FOLIC ACID 0.8 MG TABLET 1 TABLET: at 08:46

## 2020-02-22 RX ADMIN — OXYCODONE HYDROCHLORIDE 10 MG: 5 TABLET ORAL at 07:05

## 2020-02-22 RX ADMIN — OXYCODONE HYDROCHLORIDE 5 MG: 5 TABLET ORAL at 04:12

## 2020-02-22 RX ADMIN — OXYCODONE HYDROCHLORIDE 10 MG: 5 TABLET ORAL at 17:00

## 2020-02-22 RX ADMIN — HYDROXYZINE HYDROCHLORIDE 50 MG: 50 TABLET, FILM COATED ORAL at 22:39

## 2020-02-22 RX ADMIN — KETOROLAC TROMETHAMINE 30 MG: 30 INJECTION, SOLUTION INTRAMUSCULAR at 03:13

## 2020-02-22 RX ADMIN — ENOXAPARIN SODIUM 40 MG: 40 INJECTION SUBCUTANEOUS at 08:46

## 2020-02-22 RX ADMIN — IBUPROFEN 800 MG: 800 TABLET ORAL at 16:11

## 2020-02-22 NOTE — PROGRESS NOTES
PPD # 1    S: pain moderately controlled.  Awaiting ambulation and voiding.  Tolerating oral intake  O: /55   Pulse 75   Temp 98.1  F (36.7  C) (Oral)   Resp 18   Wt 64.9 kg (143 lb)   LMP 2019 (Exact Date)   SpO2 100%   Breastfeeding Unknown   BMI 27.02 kg/m     NAD  Abd: soft, nontender, fundus firm  Inc: clean and intact  Ext: calves nontender    Hemoglobin   Date Value Ref Range Status   2020 9.3 (L) 11.7 - 15.7 g/dL Final     Pre-op hgb 11    A/P PPD # 1 s/p low transverse  section     Routine PP care.    Grace Dee M.D.

## 2020-02-22 NOTE — PROGRESS NOTES
Pt stated she has severe pain on her left side of her incision when she steps on her left foot. She rates her pain a 30/10 with walking. Pt stated it only hurts when she is walking.  Pt has taken tyl, ibu, and oxy.  Pt was offered dilaudid, but she declined. Pt requested RN to call Dr. Dee and ask her if she knows what that would be from.  MD stated to offer pt to get a CT scan of her abd/pelvis and if that is neg, then to call CRNA to evaluate for a possible tap block.  Pt is in agreement with the plan.  Magaly MASTERS was informed of the plan.

## 2020-02-22 NOTE — PLAN OF CARE
Patient has done well on day of surgery.  Vaginal bleeding has been minimal.  VSS.  IV converted to SL.  Oral pain medication is providing adequeate relief of pain.

## 2020-02-22 NOTE — PROGRESS NOTES
Pt's pain is a 2/10 and was given tylenol and ibuprofen.  Pt is aware she can have oxy at 10 if needed.

## 2020-02-22 NOTE — PLAN OF CARE
Problem: Adjustment to Role Transition (Postpartum  Delivery)  Goal: Successful Maternal Role Transition  2020 by ROSALEE ELIZONDO  Outcome: Improving  Note:   Bonding well with .  Demonstrates ability to appropriately identify and manage newborns needs     Problem: Bleeding (Postpartum  Delivery)  Goal: Hemostasis  2020 by ROSALEE ELIZONDO  Outcome: Improving  Note:   Small rubra lochia noted.  No clots passed.      Problem: Infection (Postpartum  Delivery)  Goal: Absence of Infection Signs/Symptoms  2020 by ROSALEE ELIZONDO  Outcome: Improving  Note:   No signs or symptoms of infection. VSS. Incision clean without drainage.      Problem: Pain (Postpartum  Delivery)  Goal: Acceptable Pain Control  2020 by ROSALEE ELIZONDO  Outcome: Improving  Note:   Pt states pain is well controlled with current pain medication regimen.  Taking Toradol, tylenol and oxycodone as she is able.      Problem: Postoperative Nausea and Vomiting (Postpartum  Delivery)  Goal: Nausea and Vomiting Relief  2020 by ROSALEE ELIZONDO  Outcome: Completed  Note:   Pt denies nausea and/or vomiting.

## 2020-02-22 NOTE — PLAN OF CARE
VSS except soft BPs but asymptomatic and stable.  Pain 2/10 with oxy 10 mg every 3 hours, Toradol every 6 hours, and scheduled Tylenol, however, rated pain up to 9/10 with activity- sat patient at edge of bed/dangled.  Incision CDI, PCDs on, jaramillo catheter with good UOP, appetite and fluid intake adequate.

## 2020-02-22 NOTE — PLAN OF CARE
Pt assessment wnl, vss.  Pt still has intense left sided abdominal pain that slightly radiates to the right with standing up.  Pain is relieved when lying down.  CT done per MD order,  CRNA and MD called with results/findings.    CRNA will see patient this evening and discuss possible pain management plan/techniques.    Additional medication order form MD obtained.     Plan: Continue to monitor and assess.

## 2020-02-23 PROCEDURE — 25000128 H RX IP 250 OP 636: Performed by: OBSTETRICS & GYNECOLOGY

## 2020-02-23 PROCEDURE — 12000000 ZZH R&B MED SURG/OB

## 2020-02-23 PROCEDURE — 25000132 ZZH RX MED GY IP 250 OP 250 PS 637: Performed by: OBSTETRICS & GYNECOLOGY

## 2020-02-23 PROCEDURE — 25000132 ZZH RX MED GY IP 250 OP 250 PS 637: Performed by: NURSE ANESTHETIST, CERTIFIED REGISTERED

## 2020-02-23 RX ADMIN — IBUPROFEN 800 MG: 800 TABLET ORAL at 09:23

## 2020-02-23 RX ADMIN — SENNOSIDES AND DOCUSATE SODIUM 1 TABLET: 8.6; 5 TABLET ORAL at 21:16

## 2020-02-23 RX ADMIN — IBUPROFEN 800 MG: 800 TABLET ORAL at 02:52

## 2020-02-23 RX ADMIN — PRENATAL VITAMINS-IRON FUMARATE 27 MG IRON-FOLIC ACID 0.8 MG TABLET 1 TABLET: at 08:06

## 2020-02-23 RX ADMIN — OXYCODONE HYDROCHLORIDE 10 MG: 5 TABLET ORAL at 15:50

## 2020-02-23 RX ADMIN — IBUPROFEN 800 MG: 800 TABLET ORAL at 22:26

## 2020-02-23 RX ADMIN — GABAPENTIN 300 MG: 300 CAPSULE ORAL at 21:15

## 2020-02-23 RX ADMIN — OXYCODONE HYDROCHLORIDE 10 MG: 5 TABLET ORAL at 11:25

## 2020-02-23 RX ADMIN — HYDROXYZINE HYDROCHLORIDE 50 MG: 50 TABLET, FILM COATED ORAL at 21:15

## 2020-02-23 RX ADMIN — GABAPENTIN 300 MG: 300 CAPSULE ORAL at 02:58

## 2020-02-23 RX ADMIN — ACETAMINOPHEN 975 MG: 325 TABLET, FILM COATED ORAL at 10:46

## 2020-02-23 RX ADMIN — ACETAMINOPHEN 975 MG: 325 TABLET, FILM COATED ORAL at 02:52

## 2020-02-23 RX ADMIN — ENOXAPARIN SODIUM 40 MG: 40 INJECTION SUBCUTANEOUS at 08:07

## 2020-02-23 RX ADMIN — OXYCODONE HYDROCHLORIDE 10 MG: 5 TABLET ORAL at 03:00

## 2020-02-23 RX ADMIN — OXYCODONE HYDROCHLORIDE 10 MG: 5 TABLET ORAL at 08:06

## 2020-02-23 RX ADMIN — GABAPENTIN 300 MG: 300 CAPSULE ORAL at 11:25

## 2020-02-23 RX ADMIN — IBUPROFEN 800 MG: 800 TABLET ORAL at 15:50

## 2020-02-23 RX ADMIN — ACETAMINOPHEN 975 MG: 325 TABLET, FILM COATED ORAL at 21:15

## 2020-02-23 RX ADMIN — SIMETHICONE CHEW TAB 80 MG 80 MG: 80 TABLET ORAL at 09:23

## 2020-02-23 RX ADMIN — SENNOSIDES AND DOCUSATE SODIUM 1 TABLET: 8.6; 5 TABLET ORAL at 08:06

## 2020-02-23 NOTE — PLAN OF CARE
Pt assessment wnl, ex, significant pain on left side when standing, moving or changing positions.  Pt able to ambulate short distances but then pain becomes unbearable and pt returns to bed where pain quickly lessens to tolerable level.  Pt taking oral pain medication     Plan: Continue to monitor and assess, encourage ambulation

## 2020-02-23 NOTE — PLAN OF CARE
Data: Vital signs within normal limits. Postpartum checks within normal limits - see flow record. Patient eating and drinking normally. Patient able to empty bladder independently. . Patient instructed to call for assist when up..   No apparent signs of infection. Incision healing well. Patient Is performing self cares and Is able to care for infant. Positive attachment behaviors are observed with infant. Support persons are present.  Action:  Pain plan was discussed. Patient will request pain med when she is ready for it. Patient was medicated during the shift for pain. See MAR.Patient education done about breastfeeding and pain management/plan. See flow record.  Response:   Patient reassessed within 1 hour after each medication for pain. Patient stated that pain had improved. Patient stated that she was comfortable. .   Plan: Anticipate discharge on 2/24/20.

## 2020-02-23 NOTE — PROGRESS NOTES
Dr. Berumen discussed possibly  getting a Tap block with the pt.  Kasie MASTERS was called and will come and discuss the procedure with the pt.  Kasie MASTERS came up to see the pt and stated she does not recommend the procedure this far out from surgery.  She encouraged the pt to get up and walk more.

## 2020-02-23 NOTE — CONSULTS
Reason for Referral: RN Care Coordinator consult for postpartum assessment due to score of 12 on the EPDS    Marital Status:     Children: 3    Children Living with Patient: Yes    Education: technical    Occupation: unemployed    ESTABLISHED PROVIDERS    Psychiatrist: No   Provider Name: None   Therapist: Yes   Provider Name:Mindy Craig.  The patient has been seeing Mindy Craig since age 11.  Her last visit was one year ago     Other Providers:  (, CADI worker, group home, guardian/conservator etc)  No     Mental Health Complaints: No complaints at this time.  The patient has a history of depression and anxiety.    Suicide: Evidence of Ideation: No    History of Suicide Attempt: No    Homicide: Evidence of ideation: No    History of commitment: No    History of psychiatric hospitalization: No    General/Affect: Appropriate/mood-congruent    Mood: normal affect    Presenting Problem: RN Care Coordinator met with pt this morning to introduce self/role, perform assessment, and discuss resources. RN has also reviewed pt records. Pt recently delivered a baby girl on 2/21/20. RN consult for postpartum assessment due to score of 12 on the EPDS.      Assessment:  RN Care Coordinator inquired about any past mental health history, pt shared she has a history of depression/anxiety.  Currently she is not experiencing depression or anxiety. Pt has no experience with postpartum depressions/anxiety.  RN encouraged pt to alert her MD of any concerns at her follow-up appointment. Pt will be provided with resources in her DC instructions.     Plan: Pt feels prepared for discharge and has no additional questions/concerns. Parents are supportive of one another, bonding well with baby, no concerns. No further SW follow-up indicated. Pt and baby to discharge today with above mentioned resources.      Shawanda Reyna, RN Care Coordinator          
Additional Progress Note...

## 2020-02-23 NOTE — PROGRESS NOTES
[facility] Daily Postpartum Note    S: Ms. Catherine is feeling ok. She continues to have quite a bit more pain than with her previous  delivery which has made ambulation difficult. Pain has improved today. She points to her left pubis mons as the greatest source of her pain, as well as left-sided rib pain. States that pain is worse with lifting her leg for ambulation. She tolerating a regular diet without nausea or vomiting. Passing flatus. Lochia is scant. Breastfeeding without questions or concerns.    O:   VS:   Patient Vitals for the past 24 hrs:   BP Temp Temp src Pulse Resp SpO2   20 0805 111/61 97.5  F (36.4  C) Oral -- 18 99 %   20 2335 101/63 97.7  F (36.5  C) Oral 86 16 --   20 1546 123/69 98.5  F (36.9  C) Oral 86 16 100 %     General: resting in bed, in NAD  CV: Regular rate, warm and well perfused  Resp: breathing comfortably on room air   Abdomen: soft, appropriately tender, nondistended, mild edema at the mons pubis with a small area of ecchymosis  Fundus firm 2cm below the umbilicus  Extremities: non-tender, non-edematous     Recent Labs   Lab 20  0615 20  0645   HGB 9.3* 11.0*       A/P: Ms. Catheirne is a 24 year old now P3, POD #2 s/p repeat c/s.      Pt continues to be very concerned/anxious that she is having more pain than her previous c/s. I offered reassurance that her VS are stable, her blood counts are appropriate, her CT scan and physical exam are as expected for POD#2. I did encourage a TAPS block today at aide with her pain management.   Fam Hx of VTE: so plan made for ppx lovenox pp    Discharge once pain management improved, likely POD#3-4.    Anushka Berumen MD, MD  [facility]   2020 9:44 AM

## 2020-02-24 VITALS
SYSTOLIC BLOOD PRESSURE: 116 MMHG | RESPIRATION RATE: 16 BRPM | WEIGHT: 141 LBS | HEART RATE: 99 BPM | TEMPERATURE: 98 F | OXYGEN SATURATION: 99 % | BODY MASS INDEX: 26.64 KG/M2 | DIASTOLIC BLOOD PRESSURE: 71 MMHG

## 2020-02-24 LAB — PLATELET # BLD AUTO: 190 10E9/L (ref 150–450)

## 2020-02-24 PROCEDURE — 40000274 ZZH STATISTIC RCP CONSULT EA 30 MIN

## 2020-02-24 PROCEDURE — 25000132 ZZH RX MED GY IP 250 OP 250 PS 637: Performed by: OBSTETRICS & GYNECOLOGY

## 2020-02-24 PROCEDURE — 36415 COLL VENOUS BLD VENIPUNCTURE: CPT | Performed by: OBSTETRICS & GYNECOLOGY

## 2020-02-24 PROCEDURE — 85049 AUTOMATED PLATELET COUNT: CPT | Performed by: OBSTETRICS & GYNECOLOGY

## 2020-02-24 PROCEDURE — 25000128 H RX IP 250 OP 636: Performed by: OBSTETRICS & GYNECOLOGY

## 2020-02-24 RX ORDER — IBUPROFEN 800 MG/1
800 TABLET, FILM COATED ORAL EVERY 6 HOURS PRN
Qty: 30 TABLET | Refills: 1 | Status: SHIPPED | OUTPATIENT
Start: 2020-02-24 | End: 2020-03-12

## 2020-02-24 RX ORDER — AMOXICILLIN 250 MG
1 CAPSULE ORAL 2 TIMES DAILY PRN
Qty: 30 TABLET | Refills: 1 | Status: SHIPPED | OUTPATIENT
Start: 2020-02-24 | End: 2020-03-12

## 2020-02-24 RX ORDER — OXYCODONE HYDROCHLORIDE 5 MG/1
5-10 TABLET ORAL
Qty: 30 TABLET | Refills: 0 | Status: SHIPPED | OUTPATIENT
Start: 2020-02-24 | End: 2020-03-12

## 2020-02-24 RX ADMIN — OXYCODONE HYDROCHLORIDE 5 MG: 5 TABLET ORAL at 09:20

## 2020-02-24 RX ADMIN — IBUPROFEN 800 MG: 800 TABLET ORAL at 16:03

## 2020-02-24 RX ADMIN — IBUPROFEN 800 MG: 800 TABLET ORAL at 08:17

## 2020-02-24 RX ADMIN — OXYCODONE HYDROCHLORIDE 5 MG: 5 TABLET ORAL at 05:48

## 2020-02-24 RX ADMIN — ENOXAPARIN SODIUM 40 MG: 40 INJECTION SUBCUTANEOUS at 08:44

## 2020-02-24 RX ADMIN — GABAPENTIN 300 MG: 300 CAPSULE ORAL at 05:16

## 2020-02-24 RX ADMIN — PRENATAL VITAMINS-IRON FUMARATE 27 MG IRON-FOLIC ACID 0.8 MG TABLET 1 TABLET: at 08:43

## 2020-02-24 RX ADMIN — GABAPENTIN 300 MG: 300 CAPSULE ORAL at 13:21

## 2020-02-24 RX ADMIN — ACETAMINOPHEN 650 MG: 325 TABLET, FILM COATED ORAL at 13:18

## 2020-02-24 RX ADMIN — OXYCODONE HYDROCHLORIDE 5 MG: 5 TABLET ORAL at 13:18

## 2020-02-24 RX ADMIN — ACETAMINOPHEN 975 MG: 325 TABLET, FILM COATED ORAL at 05:15

## 2020-02-24 NOTE — PLAN OF CARE
Data: Vital signs within normal limits. Postpartum checks within normal limits - see flow record. Patient eating and drinking normally. Patient able to empty bladder independently. . Patient ambulating independently..   No apparent signs of infection. Incision healing well. Patient Is performing self cares and Is able to care for infant. Positive attachment behaviors are observed with infant. Support persons are present.  Action:  Pain plan was discussed. Patient will request pain med when she is ready for it. Patient was medicated during the shift for pain. See MAR.Patient education done about breastfeeding, postpartum cares, and pain management/plan. See flow record.  Response:   Patient reassessed within 1 hour after each medication for pain. Patient stated that pain had improved. Patient stated that she was comfortable. .   Plan: Anticipate discharge on 2/24/20.

## 2020-02-24 NOTE — LACTATION NOTE
This note was copied from a baby's chart.  Infant feeding every 2-3 hours.  Mother needs some assistance for latch.   Infant has very small bite width for mothers firm breasts.   Mother has expressed milk at compression of breast. It appears as though the mothers milk is coming in.

## 2020-02-24 NOTE — DISCHARGE SUMMARY
[facility]  Delivery Discharge Summary    Admit date: 2020  Discharge date: 2020     Admit Dx:   - 24 year old y/o  at 39w0d  - History of  section, desiring repeat  - fam hx of VTE    Discharge Dx:  - Same as above    Procedures:  - repeat low transverse  section  - Spinal analgesia    Admit HPI:  Rocío Catherine is a 24 year old  3 Para 1012 who [facility] 39 and 0/7 weeks for repeat  section due to prior .  Patient understood risks and benefits including risks of bleeding, infection and damage to surrounding structures.  Patient agreed to proceed.  Please see her admit H&P for full details of her PMH, PSH, Meds, Allergies and exam on admit.   Uncomplicated procedure.  ml.     Her postoperative course was complicated by left-sided groin pain, that was managed expectantly. On POD#3, she was meeting all of her postpartum goals and deemed stable for discharge. She was voiding without difficulty, tolerating a regular diet without nausea and vomiting, her pain was well controlled on oral pain medicines and her lochia was appropriate. Her hemoglobin after delivery was 9.3. Her Rh status was POS and Rhogam was not indicated. At the time of discharge, she was breast feeding her infant.    Physical exam on the day of discharge:  Vitals:    20 0005 20 0817 20 0920 20 1500   BP: 112/63 117/60  116/71   Pulse: 76 93  99   Resp: 16 16  16   Temp: 97.5  F (36.4  C) 97.9  F (36.6  C)  98  F (36.7  C)   TempSrc: Oral Oral  Oral   SpO2:       Weight:   64 kg (141 lb)        General: sitting up, alert and cooperative  Abd: soft, non-distended, non-tender. Fundus firm, nontender, well below umbilicus.   Incision clean/dry/intact with dermabond in place.  Extremities: calves nontender, no edema of lower extremities bilaterally    Lab Results   Component Value Date    HGB 9.3 2020    HGB 11.0  02/21/2020     Blood type:   Lab Results   Component Value Date    RH Pos 02/21/2020       Discharge/Disposition:  Rocío Catherine was discharged to home in stable condition with the following instructions/medications:  1) Call for temperature > 100.4, foul smelling vaginal discharge, bleeding > 1 pad per hour x 2 hrs, pain not controlled by oral pain meds, severe constipation or severe nausea or vomiting.  2) She received contraceptive counseling.  3) She was instructed to follow-up with her primary OB in 6 weeks for a routine postpartum visit in 6 wks.   4) She was instructed to continue her PNV on discharge if she wished to breast feed her infant.  5) She was discharged home with the following medications:      Review of your medicines      START taking      Dose / Directions   enoxaparin ANTICOAGULANT 40 MG/0.4ML syringe  Commonly known as:  LOVENOX      Dose:  40 mg  Start taking on:  February 25, 2020  Inject 0.4 mLs (40 mg) Subcutaneous every 24 hours  Quantity:  16 mL  Refills:  0     ibuprofen 800 MG tablet  Commonly known as:  ADVIL/MOTRIN      Dose:  800 mg  Take 1 tablet (800 mg) by mouth every 6 hours as needed for other (cramping)  Quantity:  30 tablet  Refills:  1     oxyCODONE 5 MG tablet  Commonly known as:  ROXICODONE      Dose:  5-10 mg  Take 1-2 tablets (5-10 mg) by mouth every 3 hours as needed for moderate to severe pain  Quantity:  30 tablet  Refills:  0     senna-docusate 8.6-50 MG tablet  Commonly known as:  SENOKOT-S/PERICOLACE      Dose:  1 tablet  Take 1 tablet by mouth 2 times daily as needed for constipation  Quantity:  30 tablet  Refills:  1        CONTINUE these medicines which have NOT CHANGED      Dose / Directions   prenatal multivitamin w/iron 27-0.8 MG tablet      Dose:  1 tablet  Take 1 tablet by mouth daily  Refills:  0        STOP taking    ferrous sulfate 325 (65 Fe) MG EC tablet  Commonly known as:  FE TABS        ondansetron 4 MG tablet  Commonly known as:  ZOFRAN               Where to get your medicines      These medications were sent to [facility] - [place], [place] - [address]  [address] [address]    Phone:  [phone]     enoxaparin ANTICOAGULANT 40 MG/0.4ML syringe    ibuprofen 800 MG tablet    oxyCODONE 5 MG tablet    senna-docusate 8.6-50 MG tablet         Anushka Berumen MD  [facility]

## 2020-02-24 NOTE — DISCHARGE INSTRUCTIONS
Postop  Birth Instructions    Activity       Do not lift more than 10 pounds for 6 weeks after surgery.  Ask family and friends for help when you need it.    No driving until you have stopped taking your pain medications (usually two weeks after surgery).    No heavy exercise or activity for 6 weeks.  Don't do anything that will put a strain on your surgery site.    Don't strain when using the toilet.  Your care team may prescribe a stool softener if you have problems with your bowel movements.     To care for your incision:       Keep the incision clean and dry.    Do not soak your incision in water. No swimming or hot tubs until it has fully healed. You may soak in the bathtub if the water level is below your incision.    Do not use peroxide, gel, cream, lotion, or ointment on your incision.    Adjust your clothes to avoid pressure on your surgery site (check the elastic in your underwear for example).     You may see a small amount of clear or pink drainage and this is normal.  Check with your health care provider:       If the drainage increases or has an odor.    If the incision reddens, you have swelling, or develop a rash.    If you have increased pain and the medicine we prescribed doesn't help.    If you have a fever above 100.4 F (38 C) with or without chills when placing thermometer under your tongue.   The area around your incision (surgery wound), will feel numb.  This is normal. The numbness should go away in less than a year.     Keep your hands clean:  Always wash your hands before touching your incision (surgery wound). This helps reduce your risk of infection. If your hands aren't dirty, you may use an alcohol hand-rub to clean your hands. Keep your nails clean and short.    Call your healthcare provider if you have any of these symptoms:       You soak a sanitary pad with blood within 1 hour, or you see blood clots larger than a golf ball.    Bleeding that lasts more than 6  weeks.    Vaginal discharge that smells bad.    Severe pain, cramping or tenderness in your lower belly area.    A need to urinate more frequently (use the toilet more often), more urgently (use the toilet very quickly), or it burns when you urinate.    Nausea and vomiting.    Redness, swelling or pain around a vein in your leg.    Problems breastfeeding or a red or painful area on your breast.    Chest pain and cough or are gasping for air.    Problems with coping with sadness, anxiety or depression. If you have concerns about hurting yourself or the baby, call your provider immediately.      You have questions or concerns after you return home.              Behavioral/Mental Health Resources  [place], [place], [place], Greg, Leticia, Richard Curry, Anish and [facility]    **PATIENTS SHOULD CALL/CHECK WITH THEIR INSURANCE FIRST **    Mental Health Crisis Numbers:  [place] after hours Crisis Line      [phone]     Options For Deaf + Hard of Hearing    1-757.330.2778    Text [place] to 200882      24/7 Crisis Texting line    Trans Lifeline  (Fight the epidemic of trans suicide)  1-137.237.3739    https://www.translifeline.org/    The MARILYNN HelpLine can be reached Monday through Friday, 10 am-6 pm, ET.  6-631-335-MARILYNN (9315) or info@marilynn.org    National Suicide Prevention LifeLine       Call the National Suicide Prevention Lifeline at 5-265-998-TALK (7052) to be connected to a counselor at a crisis center in your area if you, a family member, or friend are experiencing thoughts of suicide. The call is FREE, confidential, and always available.     *Fast-Tracker is an online mental health/chemical dependency resource site. Mental health providers, care coordinators and consumers can go to www.fast-trackermn.org  to search for community mental health providers and information with a real-time, searchable directory of mental health resources and their availability within [place]*    [employer]  Services:  [facility]   [phone]  [facility]   [phone]  [facility]       [phone]  [facility]        SAME AS ABOVE  [facility]       SAME AS ABOVE  [facility]      SAME AS ABOVE  [facility]      SAME AS ABOVE  [place]:      [phone] or [phone]  Other Counties:    [place]-  Adults   [phone]   Children [phone]   [place]-  Adults  [phone]  Children [phone]     Pregnancy & Post-partum Support     Helpline [phone]  [place] [employer] [place] Helpline NEW 3-2019    [phone]       Chemical Health Services:    Medicare & Chemical Health Assessments:  [facility]   [address], [address]   [place], [place]   [phone]      [facility]    [address], [place]  [phone]  [facility]          [address] [place], [place]  [phone]    Rule 25 and/or Chemical Health Assessment:  If a person has insurance, s/he must contact their insurance companies Behavioral Health division and follow recommendations for a chemical health assessment and then follow the recommendations of the .    If a person does NOT have insurance, they must get a Rule 25 (state funded chemical health assessment).  They can contact their [facility]'s Chemical Health Unit to discover who their [facility] networks to conduct the assessment.    Chemical Dependency Assessment:   - [facility]: [phone]   - Behavioral Health Providers, can help find a provider near you:  [phone]      Counseling/psychotherapy:  - [facility] - [place],/[place]/ [facility]/[place] /other locations: [phone]    -  [facility]: [phone]    - Behavioral Healthcare Providers- Can help find a provider near you:  [phone]    - [facility] Services-[place]:  [phone]  - [facility]  Counseling-[place]; [phone]  - [facility]- [place]:  [phone]  - [facility]- [place]:  [phone]  - [facility]-[place]: [phone]  - [facility]- [place]/[facility]/other locations:  [phone]  - [facility]- [facility]/[place]:  [phone]  - [facility] - [place]:  [phone]  - [facility] - [place]:  [phone]    - [facility]- [place]-based in [place]:  [phone]  - [phone]  - [facility]- [place]: [phone]  - [facility], [facility]-[place]: [phone]  - [facility]- [place] [phone]  - [facility] counseling located in [place] (not affiliated with [place]): [phone]  - [facility]- [place]:  [phone]/[place]: [phone] and other locations.  - [facility]- [place]: [phone]  - [facility]-[place]: [phone]  - [facility]- [place]:  [phone]  -   - [facility]- [place]: [phone]    - [facility]- [place]: [phone]  -   - Walk in counseling center (Free counseling services)- [facility]: [phone]     Psychiatry/ Mental Health Medication management:  - [facility]- [place],/[place]/[facility]/ [place]/ multiple locations: [phone]  - Behavioral Healthcare Providers- Can help find a provider near you, if you have preferred one or [facility] they can identify who is in network and assist with scheduling an appointment:  [phone]  - [facility]: [place]/[facility] locations : [phone]  - [facility] - Collaborative model  with PCP involvement:  [phone]  (requires PCP referral specifically)  - [facility]- [place]:  [phone]  - [facility]- [place]: [phone] [place]:  [phone]/[place]:  [phone]/ [place]:  [phone]  - [facility]- [place]:   [phone]  - [facility]- [place], [phone]  - [facility] - Medical students who rotate yearly:  [phone]    County Numbers  - [facility]: [phone]  - [facility]: [phone]  - [facility]: [phone]  - [facility] : [phone]  - [facility]: [phone]  - [facility]: [phone]  - [facility]: [phone]  - [facility]: [phone]  - [facility]: [phone]  - [facility]: [phone]    **Please note that this list does not include all agencies that provide services**    Care Transitions Team at [facility] [phone]

## 2020-02-24 NOTE — PROGRESS NOTES
This pt c/o pain in the lower left abdomin below her incision.  Pt has difficulty walking fully upright.  Pt does not feel that it is incisional.  Pt has walked to bathroom herself with stand by assistance.  This RN has encouraged the pt to lay bed flat and stretch tissue and incisional area. Pt stated that it hurt to much to lay flat.

## 2020-03-11 ENCOUNTER — HEALTH MAINTENANCE LETTER (OUTPATIENT)
Age: 25
End: 2020-03-11

## 2020-03-12 ENCOUNTER — HOSPITAL ENCOUNTER (EMERGENCY)
Facility: CLINIC | Age: 25
Discharge: HOME | End: 2020-03-12
Attending: FAMILY MEDICINE | Admitting: FAMILY MEDICINE
Payer: COMMERCIAL

## 2020-03-12 ENCOUNTER — APPOINTMENT (OUTPATIENT)
Dept: ULTRASOUND IMAGING | Facility: CLINIC | Age: 25
End: 2020-03-12
Attending: FAMILY MEDICINE
Payer: COMMERCIAL

## 2020-03-12 ENCOUNTER — ANCILLARY PROCEDURE (OUTPATIENT)
Dept: GENERAL RADIOLOGY | Facility: CLINIC | Age: 25
End: 2020-03-12
Attending: NURSE PRACTITIONER
Payer: COMMERCIAL

## 2020-03-12 ENCOUNTER — OFFICE VISIT (OUTPATIENT)
Dept: FAMILY MEDICINE | Facility: CLINIC | Age: 25
End: 2020-03-12
Payer: COMMERCIAL

## 2020-03-12 ENCOUNTER — HOSPITAL ENCOUNTER (OUTPATIENT)
Dept: CT IMAGING | Facility: CLINIC | Age: 25
Discharge: HOME | End: 2020-03-12
Attending: NURSE PRACTITIONER | Admitting: NURSE PRACTITIONER
Payer: COMMERCIAL

## 2020-03-12 VITALS
DIASTOLIC BLOOD PRESSURE: 64 MMHG | SYSTOLIC BLOOD PRESSURE: 98 MMHG | RESPIRATION RATE: 11 BRPM | TEMPERATURE: 98.5 F | OXYGEN SATURATION: 98 % | WEIGHT: 122.8 LBS | BODY MASS INDEX: 23.2 KG/M2 | HEART RATE: 107 BPM

## 2020-03-12 VITALS
BODY MASS INDEX: 23.24 KG/M2 | TEMPERATURE: 98.6 F | OXYGEN SATURATION: 98 % | SYSTOLIC BLOOD PRESSURE: 126 MMHG | WEIGHT: 123 LBS | RESPIRATION RATE: 20 BRPM | HEART RATE: 86 BPM | DIASTOLIC BLOOD PRESSURE: 89 MMHG

## 2020-03-12 DIAGNOSIS — D68.51 FACTOR 5 LEIDEN MUTATION, HETEROZYGOUS (H): ICD-10-CM

## 2020-03-12 DIAGNOSIS — R07.89 CHEST DISCOMFORT: ICD-10-CM

## 2020-03-12 DIAGNOSIS — R79.89 ELEVATED D-DIMER: ICD-10-CM

## 2020-03-12 DIAGNOSIS — R79.89 ELEVATED D-DIMER: Primary | ICD-10-CM

## 2020-03-12 DIAGNOSIS — R11.0 NAUSEA: Primary | ICD-10-CM

## 2020-03-12 DIAGNOSIS — I26.99 ACUTE PULMONARY EMBOLISM, UNSPECIFIED PULMONARY EMBOLISM TYPE, UNSPECIFIED WHETHER ACUTE COR PULMONALE PRESENT (H): ICD-10-CM

## 2020-03-12 DIAGNOSIS — R53.83 OTHER FATIGUE: ICD-10-CM

## 2020-03-12 PROBLEM — G43.709 CHRONIC MIGRAINE WITHOUT AURA WITHOUT STATUS MIGRAINOSUS, NOT INTRACTABLE: Status: ACTIVE | Noted: 2019-06-18

## 2020-03-12 PROBLEM — R00.2 PALPITATIONS: Status: ACTIVE | Noted: 2019-11-27

## 2020-03-12 LAB
ALBUMIN SERPL-MCNC: 3.6 G/DL (ref 3.4–5)
ALP SERPL-CCNC: 85 U/L (ref 40–150)
ALT SERPL W P-5'-P-CCNC: 14 U/L (ref 0–50)
AMYLASE SERPL-CCNC: 47 U/L (ref 30–110)
ANION GAP SERPL CALCULATED.3IONS-SCNC: 5 MMOL/L (ref 3–14)
ANION GAP SERPL CALCULATED.3IONS-SCNC: 7 MMOL/L (ref 3–14)
APTT PPP: 34 SEC (ref 22–37)
AST SERPL W P-5'-P-CCNC: 16 U/L (ref 0–45)
BASOPHILS # BLD AUTO: 0.1 10E9/L (ref 0–0.2)
BASOPHILS NFR BLD AUTO: 0.7 %
BILIRUB SERPL-MCNC: 0.8 MG/DL (ref 0.2–1.3)
BUN SERPL-MCNC: 17 MG/DL (ref 7–30)
BUN SERPL-MCNC: 18 MG/DL (ref 7–30)
CALCIUM SERPL-MCNC: 8.9 MG/DL (ref 8.5–10.1)
CALCIUM SERPL-MCNC: 9 MG/DL (ref 8.5–10.1)
CHLORIDE SERPL-SCNC: 104 MMOL/L (ref 94–109)
CHLORIDE SERPL-SCNC: 109 MMOL/L (ref 94–109)
CO2 SERPL-SCNC: 24 MMOL/L (ref 20–32)
CO2 SERPL-SCNC: 28 MMOL/L (ref 20–32)
CREAT SERPL-MCNC: 0.76 MG/DL (ref 0.52–1.04)
CREAT SERPL-MCNC: 0.82 MG/DL (ref 0.52–1.04)
D DIMER PPP FEU-MCNC: 2.2 UG/ML FEU (ref 0–0.5)
DIFFERENTIAL METHOD BLD: ABNORMAL
EOSINOPHIL # BLD AUTO: 0.2 10E9/L (ref 0–0.7)
EOSINOPHIL NFR BLD AUTO: 2.7 %
ERYTHROCYTE [DISTWIDTH] IN BLOOD BY AUTOMATED COUNT: 14.6 % (ref 10–15)
ERYTHROCYTE [DISTWIDTH] IN BLOOD BY AUTOMATED COUNT: 14.9 % (ref 10–15)
GFR SERPL CREATININE-BSD FRML MDRD: >90 ML/MIN/{1.73_M2}
GFR SERPL CREATININE-BSD FRML MDRD: >90 ML/MIN/{1.73_M2}
GLUCOSE SERPL-MCNC: 81 MG/DL (ref 70–99)
GLUCOSE SERPL-MCNC: 87 MG/DL (ref 70–99)
HCT VFR BLD AUTO: 40.5 % (ref 35–47)
HCT VFR BLD AUTO: 42.5 % (ref 35–47)
HGB BLD-MCNC: 12.5 G/DL (ref 11.7–15.7)
HGB BLD-MCNC: 13 G/DL (ref 11.7–15.7)
IMM GRANULOCYTES # BLD: 0 10E9/L (ref 0–0.4)
IMM GRANULOCYTES NFR BLD: 0.3 %
INR PPP: 1.08 (ref 0.86–1.14)
LIPASE SERPL-CCNC: 104 U/L (ref 73–393)
LYMPHOCYTES # BLD AUTO: 2.2 10E9/L (ref 0.8–5.3)
LYMPHOCYTES NFR BLD AUTO: 28.8 %
MCH RBC QN AUTO: 26.1 PG (ref 26.5–33)
MCH RBC QN AUTO: 26.3 PG (ref 26.5–33)
MCHC RBC AUTO-ENTMCNC: 30.6 G/DL (ref 31.5–36.5)
MCHC RBC AUTO-ENTMCNC: 30.9 G/DL (ref 31.5–36.5)
MCV RBC AUTO: 85 FL (ref 78–100)
MCV RBC AUTO: 86 FL (ref 78–100)
MONOCYTES # BLD AUTO: 0.6 10E9/L (ref 0–1.3)
MONOCYTES NFR BLD AUTO: 8.1 %
NEUTROPHILS # BLD AUTO: 4.6 10E9/L (ref 1.6–8.3)
NEUTROPHILS NFR BLD AUTO: 59.4 %
NRBC # BLD AUTO: 0 10*3/UL
NRBC BLD AUTO-RTO: 0 /100
PLATELET # BLD AUTO: 316 10E9/L (ref 150–450)
PLATELET # BLD AUTO: 319 10E9/L (ref 150–450)
POTASSIUM SERPL-SCNC: 3.7 MMOL/L (ref 3.4–5.3)
POTASSIUM SERPL-SCNC: 4.2 MMOL/L (ref 3.4–5.3)
PROT SERPL-MCNC: 7.5 G/DL (ref 6.8–8.8)
RADIOLOGIST FLAGS: ABNORMAL
RBC # BLD AUTO: 4.79 10E12/L (ref 3.8–5.2)
RBC # BLD AUTO: 4.94 10E12/L (ref 3.8–5.2)
SODIUM SERPL-SCNC: 137 MMOL/L (ref 133–144)
SODIUM SERPL-SCNC: 140 MMOL/L (ref 133–144)
WBC # BLD AUTO: 7.7 10E9/L (ref 4–11)
WBC # BLD AUTO: 8.5 10E9/L (ref 4–11)

## 2020-03-12 PROCEDURE — 80053 COMPREHEN METABOLIC PANEL: CPT | Performed by: NURSE PRACTITIONER

## 2020-03-12 PROCEDURE — 85610 PROTHROMBIN TIME: CPT | Performed by: FAMILY MEDICINE

## 2020-03-12 PROCEDURE — 96361 HYDRATE IV INFUSION ADD-ON: CPT

## 2020-03-12 PROCEDURE — 71046 X-RAY EXAM CHEST 2 VIEWS: CPT

## 2020-03-12 PROCEDURE — 85379 FIBRIN DEGRADATION QUANT: CPT | Performed by: NURSE PRACTITIONER

## 2020-03-12 PROCEDURE — 96372 THER/PROPH/DIAG INJ SC/IM: CPT | Mod: 59

## 2020-03-12 PROCEDURE — 80048 BASIC METABOLIC PNL TOTAL CA: CPT | Performed by: FAMILY MEDICINE

## 2020-03-12 PROCEDURE — 85027 COMPLETE CBC AUTOMATED: CPT | Performed by: NURSE PRACTITIONER

## 2020-03-12 PROCEDURE — 25800030 ZZH RX IP 258 OP 636: Performed by: FAMILY MEDICINE

## 2020-03-12 PROCEDURE — 36415 COLL VENOUS BLD VENIPUNCTURE: CPT | Performed by: NURSE PRACTITIONER

## 2020-03-12 PROCEDURE — 99284 EMERGENCY DEPT VISIT MOD MDM: CPT | Mod: Z6 | Performed by: FAMILY MEDICINE

## 2020-03-12 PROCEDURE — 25000132 ZZH RX MED GY IP 250 OP 250 PS 637: Performed by: FAMILY MEDICINE

## 2020-03-12 PROCEDURE — 25000125 ZZHC RX 250: Performed by: NURSE PRACTITIONER

## 2020-03-12 PROCEDURE — 25000128 H RX IP 250 OP 636: Performed by: FAMILY MEDICINE

## 2020-03-12 PROCEDURE — 82150 ASSAY OF AMYLASE: CPT | Performed by: NURSE PRACTITIONER

## 2020-03-12 PROCEDURE — 71275 CT ANGIOGRAPHY CHEST: CPT

## 2020-03-12 PROCEDURE — 83690 ASSAY OF LIPASE: CPT | Performed by: NURSE PRACTITIONER

## 2020-03-12 PROCEDURE — 99215 OFFICE O/P EST HI 40 MIN: CPT | Performed by: NURSE PRACTITIONER

## 2020-03-12 PROCEDURE — 99284 EMERGENCY DEPT VISIT MOD MDM: CPT | Mod: 25

## 2020-03-12 PROCEDURE — 85730 THROMBOPLASTIN TIME PARTIAL: CPT | Performed by: FAMILY MEDICINE

## 2020-03-12 PROCEDURE — 93970 EXTREMITY STUDY: CPT

## 2020-03-12 PROCEDURE — 96360 HYDRATION IV INFUSION INIT: CPT

## 2020-03-12 PROCEDURE — 85025 COMPLETE CBC W/AUTO DIFF WBC: CPT | Performed by: FAMILY MEDICINE

## 2020-03-12 PROCEDURE — 25000128 H RX IP 250 OP 636: Performed by: NURSE PRACTITIONER

## 2020-03-12 RX ORDER — IOPAMIDOL 755 MG/ML
64 INJECTION, SOLUTION INTRAVASCULAR ONCE
Status: COMPLETED | OUTPATIENT
Start: 2020-03-12 | End: 2020-03-12

## 2020-03-12 RX ORDER — SODIUM CHLORIDE, SODIUM LACTATE, POTASSIUM CHLORIDE, CALCIUM CHLORIDE 600; 310; 30; 20 MG/100ML; MG/100ML; MG/100ML; MG/100ML
1000 INJECTION, SOLUTION INTRAVENOUS CONTINUOUS
Status: DISCONTINUED | OUTPATIENT
Start: 2020-03-12 | End: 2020-03-12 | Stop reason: HOSPADM

## 2020-03-12 RX ORDER — ONDANSETRON 4 MG/1
4 TABLET, ORALLY DISINTEGRATING ORAL EVERY 6 HOURS PRN
Status: CANCELLED | OUTPATIENT
Start: 2020-03-12

## 2020-03-12 RX ORDER — ONDANSETRON 2 MG/ML
4 INJECTION INTRAMUSCULAR; INTRAVENOUS EVERY 6 HOURS PRN
Status: CANCELLED | OUTPATIENT
Start: 2020-03-12

## 2020-03-12 RX ORDER — WARFARIN SODIUM 5 MG/1
5 TABLET ORAL
Status: COMPLETED | OUTPATIENT
Start: 2020-03-12 | End: 2020-03-12

## 2020-03-12 RX ORDER — WARFARIN SODIUM 5 MG/1
5 TABLET ORAL DAILY
Qty: 30 TABLET | Refills: 0 | Status: SHIPPED | OUTPATIENT
Start: 2020-03-12 | End: 2020-03-19

## 2020-03-12 RX ORDER — ACETAMINOPHEN 500 MG
1000 TABLET ORAL ONCE
Status: COMPLETED | OUTPATIENT
Start: 2020-03-12 | End: 2020-03-12

## 2020-03-12 RX ADMIN — ACETAMINOPHEN 1000 MG: 500 TABLET, FILM COATED ORAL at 17:37

## 2020-03-12 RX ADMIN — ENOXAPARIN SODIUM 60 MG: 60 INJECTION SUBCUTANEOUS at 16:08

## 2020-03-12 RX ADMIN — IOPAMIDOL 64 ML: 755 INJECTION, SOLUTION INTRAVENOUS at 12:10

## 2020-03-12 RX ADMIN — SODIUM CHLORIDE 93 ML: 9 INJECTION, SOLUTION INTRAVENOUS at 12:10

## 2020-03-12 RX ADMIN — WARFARIN SODIUM 5 MG: 5 TABLET ORAL at 18:03

## 2020-03-12 RX ADMIN — SODIUM CHLORIDE, POTASSIUM CHLORIDE, SODIUM LACTATE AND CALCIUM CHLORIDE 1000 ML: 600; 310; 30; 20 INJECTION, SOLUTION INTRAVENOUS at 15:36

## 2020-03-12 NOTE — ED PROVIDER NOTES
History     Chief Complaint   Patient presents with     Shortness of Breath     blod clots in the lungs     HPI  Rocío Catherine is a 24 year old female, past medical history is significant for dysthymia, history of heterozygous factor V Leyden mutation, anemia, anxiety, depression, nonrheumatic mitral regurgitation, chronic migraine, recent  section delivery on 2020 now with concerns of pulmonary embolism on CT obtained by outside provider.  History obtained from the patient and I reviewed the CT report myself which is duplicated below in the bulk of this note.  The patient states that she had some diarrhea nausea upon returning home after her  section and was supposed to be on Lovenox.  She only took it for about 2 or 3 days after delivery and is now 3 weeks postpartum.  She began with mid to upper back discomfort slightly worse with breathing and exertion as well some anterior chest tightness with exertion about 4 5 days prior to presentation to her primary care provider's office today.  Chest x-ray was reportedly negative d-dimer was strongly positive and she was sent for CT PE study.      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 Recommended for follow up echo in 2-3 years.        Recurrent major depressive disorder, in remission (H) 2018     Priority: Medium     Anxiety 2018     Priority: Medium     Factor 5 Leiden mutation, heterozygous (H) 2018     Priority: Medium     Anemia 2016     Priority: Medium     Supervision of high-risk pregnancy 2015     Priority: Medium     Overview:   ABILIO Machado.  They have been together for 3 years  Hx : plan repeat  Fhx DVT: plan PP lovenox       Family history of clotting disorder 2015     Priority: Medium     Overview:   Family history of clotting disorder father DVT +factor V        Dysthymia 2013     Priority: Medium        Past Medical History:    Past Medical History:   Diagnosis Date     Calculus of gallbladder with acute cholecystitis without obstruction 2017     Chickenpox      Factor 5 Leiden mutation, heterozygous (H) 2018     Factor V Leiden (H)      History of frequent urinary tract infections      Non-rheumatic mitral regurgitation, trace 2019     Postpartum depression        Past Surgical History:    Past Surgical History:   Procedure Laterality Date      SECTION N/A 2020    Procedure:  SECTION;  Surgeon: Grace Dee MD;  Location: [facility] OR     GYN SURGERY      c section     HEAD & NECK SURGERY      hemangioma on neck at age 2     LAPAROSCOPIC CHOLECYSTECTOMY       SINUS FRONTAL OPEN OBLITERATION             Family History:    Family History   Problem Relation Age of Onset     Stomach Problem Mother         ulcer     Bleeding Disorder Father         factor V     Ovarian Cancer Maternal Grandmother      Thyroid Cancer Paternal Grandmother      Alzheimer Disease Paternal Grandfather      Bleeding Disorder Paternal Grandfather         factor V       Social  History:  Marital Status:   [2]  Social History     Tobacco Use     Smoking status: Never Smoker     Smokeless tobacco: Never Used   Substance Use Topics     Alcohol use: Yes     Comment:  rare-quit with pregnancy     Drug use: No        Medications:    enoxaparin ANTICOAGULANT (LOVENOX) 40 MG/0.4ML syringe  ibuprofen (ADVIL/MOTRIN) 800 MG tablet  oxyCODONE (ROXICODONE) 5 MG tablet  Prenatal Vit-Fe Fumarate-FA (PRENATAL MULTIVITAMIN W/IRON) 27-0.8 MG tablet  senna-docusate (SENOKOT-S/PERICOLACE) 8.6-50 MG tablet          Review of Systems   All other systems reviewed and are negative.      Physical Exam   BP: (!) 128/92  Pulse: 90  Heart Rate: 100  Temp: 98.6  F (37  C)  Resp: 20  Weight: 55.8 kg (123 lb)  SpO2: 99 %      Physical Exam  Vitals signs and nursing note reviewed.   Constitutional:       Appearance: She is well-developed and normal weight.   HENT:      Head: Normocephalic and atraumatic.      Mouth/Throat:      Mouth: Mucous membranes are moist.      Pharynx: Oropharynx is clear.   Eyes:      Extraocular Movements: Extraocular movements intact.      Pupils: Pupils are equal, round, and reactive to light.   Neck:      Musculoskeletal: Normal range of motion and neck supple.   Cardiovascular:      Rate and Rhythm: Normal rate and regular rhythm.   Pulmonary:      Effort: Pulmonary effort is normal.      Breath sounds: Normal breath sounds.   Abdominal:      General: Bowel sounds are normal.      Palpations: Abdomen is soft.   Musculoskeletal: Normal range of motion.   Skin:     General: Skin is warm and dry.      Capillary Refill: Capillary refill takes less than 2 seconds.   Neurological:      General: No focal deficit present.      Mental Status: She is alert.   Psychiatric:         Mood and Affect: Mood normal.         Behavior: Behavior normal.         ED Course        Procedures               Critical Care time:  none               Results for orders placed or performed during the hospital  encounter of 03/12/20 (from the past 24 hour(s))   CT Chest Pulmonary Embolism w Contrast   Result Value Ref Range    Radiologist flags Pulmonary embolism (AA)     Narrative    CT CHEST PULMONARY EMBOLISM WITH CONTRAST 3/12/2020 12:21 PM    CLINICAL HISTORY:  Chest discomfort, elevated D dimer. Post partum  three weeks. Patient stopped Lovenox. Factor 5 Leiden mutation,  heterozygous (H).    TECHNIQUE: CT angiogram chest during arterial phase injection IV  contrast. 2D and 3D MIP reconstructions were performed by the CT  technologist. Dose reduction techniques were used.     CONTRAST: 64 mL Isovue 370.    COMPARISON: CT chest 1/25/2020.    FINDINGS:  ANGIOGRAM CHEST: Positive bilateral moderate pulmonary embolism burden  identified leading to the bilateral lower lobes. Thoracic aorta is  negative for dissection.     LUNGS AND PLEURA: No effusion. No acute airspace disease.    MEDIASTINUM/AXILLAE: No other acute abnormality.    UPPER ABDOMEN: No acute abnormality.    MUSCULOSKELETAL: Normal.      Impression    IMPRESSION:  1.  Moderate bilateral pulmonary embolism leading to the bilateral  lower lobes.  2.  No other acute abnormality.    [Critical Result: Pulmonary embolism]    Finding was identified on 3/12/2020 12:51 PM.     Dr. Lyn was contacted by me on 3/12/2020 12:53 PM and verbalized  understanding of the critical result.      3:08 PM  I reviewed the CT scan in the room with the patient and I have placed orders for labs.  I recommended admission to the patient's and anticoagulation either in the form of IV heparin or subcu Lovenox.  I plan to discuss the patient with on-call OB/GYN.  I spoke with Dr. Grace Dee regarding this patient and she felt that either Lovenox or heparin would be acceptable and compatible with breast-feeding as well.  I will speak with our hospitalist and admit to their service.  3:37 PM  I discussed the patient with the on-call hospitalist Dr. Todd who agreed to admit the patient  to observation status to his service.  He felt that either heparin or the Kim would be equally acceptable.  After discussion with the patient she felt that she would be comfortable with Lovenox despite her previous concerns about nausea and diarrhea.      Medications   lactated ringers BOLUS 1,000 mL (has no administration in time range)     Followed by   lactated ringers infusion (has no administration in time range)       Assessments & Plan (with Medical Decision Making)   24-year-old female 3 weeks postpartum who presents originally to her primary care provider's office with concerns of back pain, associated symptoms include some chest discomfort and tightness with ambulation and possibly some shortness of air.  Unsurprisingly her chest x-ray was acutely negative and her d-dimer was elevated.  She was sent for CT of the chest which documents presence of bilateral PE.  At rest the patient displayed no hemodynamic instability i.e. no hypotension or tachycardia, no tachypnea and no room air hypoxia.  Historically the patient developed symptoms with exertion/ambulation this was not repeated in the emergency department.  Lab diagnostics were obtained, OB/GYN consulted, the patient will be admitted to the hospital service and transition orders are placed by myself in the emergency department the Lovenox was started in the emergency department.      Disclaimer: This note consists of symbols derived from keyboarding, dictation and/or voice recognition software. As a result, there may be errors in the script that have gone undetected. Please consider this when interpreting information found in this chart.    Addenda:  After the above discussion with hospitalist and placement of transition orders the hospitalist recommended that we obtain ultrasound of the patient's legs and if negative felt very comfortable in recommending disposition of the patient to home on Lovenox and Coumadin with plans of follow-up in  anticoagulation clinic.  The patient's ultrasound was performed as requested and is acutely negative for clot.  I discussed disposition to home with her and the follow-up plan with anticoagulation clinic.  The patient verbalized understanding will be dispositioned to home.    I have reviewed the nursing notes.    I have reviewed the findings, diagnosis, plan and need for follow up with the patient.          New Prescriptions    No medications on file       Final diagnoses:   Acute pulmonary embolism, unspecified pulmonary embolism type, unspecified whether acute cor pulmonale present (H)       3/12/2020   [facility] [place] EMERGENCY DEPARTMENT     Jesse Farnsworth MD  03/12/20 9538       Jesse Farnsworth MD  03/12/20 7069

## 2020-03-12 NOTE — ED NOTES
"Pt states for last several weeks she has had diarrhea x2 a day but now getting better. Pt went to MD today for symptom and also c/o upper back pain.  According to pt CT shows \"2 blood clots.\"  Had  3 weeks ago.  Factor 5 but pt stopped taking lovenox \"because I didn't feel good.\"  Pt states soa with activity but noticed increased back pain more than anything when active.  "

## 2020-03-12 NOTE — ED AVS SNAPSHOT
[facility] Emergency Department  [address] [address]  Phone:  [phone]  Fax:  [phone]                                    Rocío Catherine   MRN: [id number]    Department:  [facility] Emergency Department   Date of Visit:  3/12/2020           After Visit Summary Signature Page    I have received my discharge instructions, and my questions have been answered. I have discussed any challenges I see with this plan with the nurse or doctor.    ..........................................................................................................................................  Patient/Patient Representative Signature      ..........................................................................................................................................  Patient Representative Print Name and Relationship to Patient    ..................................................               ................................................  Date                                   Time    ..........................................................................................................................................  Reviewed by Signature/Title    ...................................................              ..............................................  Date                                               Time          22EPIC Rev 08/18

## 2020-03-12 NOTE — ED TRIAGE NOTES
Pt here with her mom and was dx with blood clots on the lungs. Pt just had twins by  and has factor 5 Leiden and has been off her thinners.

## 2020-03-12 NOTE — PROGRESS NOTES
Subjective     Rocío Catherine is a 24 year old female who presents to clinic today for the following health issues:    HPI   Concern - Diarrhea, malaise, decreased appetite. Patient is S/P C section on 2/21/20.   Onset: 2 weeks    Description:   Diarrhea has improved, but is having nausea and upper left sided back pain and center of back, and pressure in chest.  Patient states that her diarrhea has resolved. She started Lovenox due to factor 5 def. After delivery of baby- since that time she quit taking Lovenox 3 weeks ago-She feelt like lovenox caused her to have nausea/no appetite and diarrhea. Since stopping Lovenox her appetite has improved and diarrhea has stopped. She reports chest pressure/ left sided back pain , constant pain/pain increases with activity, minimal shortness of breath. Denies any respiratory illnesses. No wheezing, no fevers. History of cholecystectomy and C- section.  No leg pain or edema.      Intensity: moderate    Progression of Symptoms:  Improving some    Accompanying Signs & Symptoms:  Nausea and back pain.    Previous history of similar problem:   none    Precipitating factors:   Worsened by: walking    Alleviating factors:  Improved by: nothing    Therapies Tried and outcome: none    factor V Leiden hetero (1st degree relative with VTE): ppx lovenox in postpartum.      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)      Encounter for triage in pregnant patient     History of   delivery     Supervision of other normal pregnancy     Supervision of normal first pregnancy     S/P      Past Surgical History:   Procedure Laterality Date      SECTION N/A 2020    Procedure:  SECTION;  Surgeon: Grace Dee MD;  Location: [facility] OR     GYN SURGERY      c section     HEAD & NECK SURGERY      hemangioma on neck at age 2     LAPAROSCOPIC CHOLECYSTECTOMY       SINUS FRONTAL OPEN OBLITERATION             Social History     Tobacco Use     Smoking status: Never Smoker     Smokeless tobacco: Never Used   Substance Use Topics     Alcohol use: Yes     Comment:  rare-quit with pregnancy     Family History   Problem Relation Age of Onset     Stomach Problem Mother         ulcer     Bleeding Disorder Father         factor V     Ovarian Cancer Maternal Grandmother      Thyroid Cancer Paternal Grandmother      Alzheimer Disease Paternal Grandfather      Bleeding Disorder Paternal Grandfather         factor V           Medication Followup of Lovenox- stopped due to possible side effects and felt better after stopping    Taking Medication as prescribed: NO    Side Effects:  Possible nausea    Medication Helping Symptoms:       Reviewed and updated as needed this visit by Provider         Review of Systems   ROS COMP: Constitutional, HEENT, cardiovascular, pulmonary, GI, , musculoskeletal, neuro, skin, endocrine and psych systems are negative, except as otherwise noted.      Objective    LMP 2019 (Exact Date)   There is no height or weight on file to calculate BMI.  Physical Exam   GENERAL: healthy, alert, no distress and pale  RESP: lungs clear to auscultation - no rales, rhonchi or wheezes  CV: regular rate and rhythm, normal S1 S2, no S3 or S4, no murmur, click or rub, no peripheral edema and peripheral pulses strong  ABDOMEN: soft, nontender, no hepatosplenomegaly, no masses and bowel sounds normal  MS: no gross musculoskeletal defects noted, no  "edema    Diagnostic Test Results:  Labs reviewed in Epic        Assessment & Plan     1. Nausea  ? Viral - unlikely pancreatitis but will check- history of cholecystectomy   - CBC with platelets  - Comprehensive metabolic panel  - Lipase  - Amylase    2. Other fatigue  ? Anemia   - CBC with platelets  - Comprehensive metabolic panel    3. Chest discomfort  ? MSK vs PE  - D dimer, quantitative  - XR Chest 2 Views; Future    4. Factor 5 Leiden mutation, heterozygous (H)  Patient was prescribed Lovenox post partum but stopped taking after 3-4 days due to possible side effects. Patient has been off of medication for nearly 3 weeks.   Call placed to her OB- Dr. Santillan to discuss plan of Lovenox- have not heard back at this time        BMI:   Estimated body mass index is 23.2 kg/m  as calculated from the following:    Height as of 2/20/20: 1.549 m (5' 1\").    Weight as of this encounter: 55.7 kg (122 lb 12.8 oz).         No follow-ups on file.     Addendum: Patient has elevated D- dimer will send patient for STAT CT chest to r/u PE    10:08: addendum: discussed with Dr. Santillan regarding patient's Lovenox - recommendations to continue Lovenox for total of 6 weeks- will encourage patient to restart medication.     Addendum: 12:57  Received call from Radiologist- patient has bilateral PE's based on CT scan. Patient was notified at home and verbalized that she will have a  bring her to our ER. ER aware of patient.     SNOW Mckeon CNP  [facility]      "

## 2020-03-12 NOTE — PHARMACY
Patient was prescribed Lovenox 0.4ml every 24 hours. Patient states she read effects online and felt she was experiencing those side effects. Without provider guidance, patient stopped medications. Patient stopped medication on 2/28/2020.    Medication list updated bedside with patient.

## 2020-03-12 NOTE — DISCHARGE INSTRUCTIONS
Lovenox 60 mg subcu every 12 hours until discontinued by provider and therapeutic on Coumadin.  Coumadin 5 mg p.o. daily.  INR check on Sunday (3/15/2020)  Return to the emergency department if worse changes or new symptoms in the interim  Follow-up in anticoagulation clinic.

## 2020-03-12 NOTE — PATIENT INSTRUCTIONS
Thank you for choosing [facility].  You may be receiving an email and/or telephone survey request from [facility] [employer] regarding your visit today.  Please take a few minutes to respond to the survey to let us know how we are doing.      If you have questions or concerns, please contact us via [facility] or you can contact your care team at [phone].    Our Clinic hours are:  Monday 6:40 am  to 7:00 pm  Tuesday -Friday 6:40 am to 5:00 pm    The [place] outpatient lab hours are:  Monday - Friday 6:10 am to 4:45 pm  Saturdays 7:00 am to 11:00 am  Appointments are required, call [phone]    If you have clinical questions after hours or would like to schedule an appointment,  call the clinic at [phone].

## 2020-03-13 ENCOUNTER — TELEPHONE (OUTPATIENT)
Dept: FAMILY MEDICINE | Facility: CLINIC | Age: 25
End: 2020-03-13

## 2020-03-13 DIAGNOSIS — D68.51 FACTOR 5 LEIDEN MUTATION, HETEROZYGOUS (H): Primary | ICD-10-CM

## 2020-03-13 DIAGNOSIS — I26.99 OTHER ACUTE PULMONARY EMBOLISM WITHOUT ACUTE COR PULMONALE (H): ICD-10-CM

## 2020-03-13 NOTE — TELEPHONE ENCOUNTER
Writer left a message for patient to call back. This is a new PE patient for [place]. She needs to set up a new consult for 3-. Patient advised to call [phone] ([facility]) to get this scheduled.

## 2020-03-13 NOTE — TELEPHONE ENCOUNTER
Hello. This patient was diagnosed with a PE yesterday and started on lovenox/warfarin. We will be managing her and need an INR referral from you.    The referral is pended. Please clarify the duration of therapy as what is in there from the hospitalist is end date of 3-?    Thank you,  Shelby Samaniego, RN, BSN, PHN  Anticoagulation Clinic

## 2020-03-16 ENCOUNTER — ANTICOAGULATION THERAPY VISIT (OUTPATIENT)
Dept: ANTICOAGULATION | Facility: CLINIC | Age: 25
End: 2020-03-16
Payer: COMMERCIAL

## 2020-03-16 DIAGNOSIS — I26.99 ACUTE PULMONARY EMBOLISM, UNSPECIFIED PULMONARY EMBOLISM TYPE, UNSPECIFIED WHETHER ACUTE COR PULMONALE PRESENT (H): ICD-10-CM

## 2020-03-16 DIAGNOSIS — D68.51 FACTOR 5 LEIDEN MUTATION, HETEROZYGOUS (H): ICD-10-CM

## 2020-03-16 DIAGNOSIS — I26.99 OTHER ACUTE PULMONARY EMBOLISM WITHOUT ACUTE COR PULMONALE (H): ICD-10-CM

## 2020-03-16 LAB — INR POINT OF CARE: 1.2 (ref 0.86–1.14)

## 2020-03-16 PROCEDURE — 36416 COLLJ CAPILLARY BLOOD SPEC: CPT

## 2020-03-16 PROCEDURE — 99207 ZZC NO CHARGE NURSE ONLY: CPT

## 2020-03-16 PROCEDURE — 85610 PROTHROMBIN TIME: CPT | Mod: QW

## 2020-03-16 NOTE — TELEPHONE ENCOUNTER
I signed order and please let patient know that I would like her to schedule appointment with Hematology to follow up on her blood clot-  order was placed

## 2020-03-16 NOTE — PROGRESS NOTES
ANTICOAGULATION INITIAL CLINIC VISIT    Patient Name:  Rocío Catherine  Date:  3/16/2020  Referred by: Yessy ADAMSON CNP  Contact Type:  Face to Face    SUBJECTIVE:  Coumadin education was completed today.  Topics covered include:  -Introduction to coumadin  -Proper Administration  -INR Testing  -Sign/Symptoms of Bleeding  -Signs/Symptoms of Clot Formation or Stroke  -Dietary Intake of Vitamin K  -Drug Interactions  -Anticoagulation Identification (bracelet, necklace or wallet card)  -Future Surgery  -Effects of Alcohol, Tobacco, and Exercise on Coumadin    Coumadin Education Booklet and Coumadin Identification Wallet Card were given to the patient.        Patient Findings     Positives:   Change in medications (initiation of therapy, bridging with Lovenox)    Comments:   Patient here for new patient consult. She has Factor V heterozygous and was on Lovenox (prophylactic dose) after her repeat , but she decided to stop taking it after she went home as she was having issues with diarrhea and nausea. She states with her first pregnancy she did not have to do any anticoagulation and did not have any issues. Patient was seen in ED for PE and was restarted on Lovenox (therapeutic) dose and also started on warfarin 5 mg daily which she started on 3/12/20. INR today is only 1.2. Per protocol on day 4 of therapy can increase dose 25-50%. Will adjust dose to 7.5 mg daily, recheck INR on Friday. Patient notified that she is recommended to make an appt with Hematology and that her PCP placed a referral for this. Patient does not smoke or use tobacco. She is breastfeeding. Patient made aware that per protocol her INR needs to be 2.3 or greater to be able to stop her Lovenox injections.         Clinical Outcomes     Comments:   Patient here for new patient consult. She has Factor V heterozygous and was on Lovenox (prophylactic dose) after her repeat , but she decided to stop taking it after she went home as she  was having issues with diarrhea and nausea. She states with her first pregnancy she did not have to do any anticoagulation and did not have any issues. Patient was seen in ED for PE and was restarted on Lovenox (therapeutic) dose and also started on warfarin 5 mg daily which she started on 3/12/20. INR today is only 1.2. Per protocol on day 4 of therapy can increase dose 25-50%. Will adjust dose to 7.5 mg daily, recheck INR on Friday. Patient notified that she is recommended to make an appt with Hematology and that her PCP placed a referral for this. Patient does not smoke or use tobacco. She is breastfeeding. Patient made aware that per protocol her INR needs to be 2.3 or greater to be able to stop her Lovenox injections.           OBJECTIVE    INR Protime   Date Value Ref Range Status   2020 1.2 (A) 0.86 - 1.14 Final       ASSESSMENT / PLAN  INR assessment SUB    Recheck INR In: 3 DAYS    INR Location Clinic      Anticoagulation Summary  As of 3/16/2020    INR goal:   2.0-3.0   TTR:   --   INR used for dosin.2! (3/16/2020)   Warfarin maintenance plan:   No maintenance plan   Full warfarin instructions:   3/16: 7.5 mg; 3/17: 7.5 mg; 3/18: 7.5 mg   Plan last modified:   Sarah Felipe RN (3/16/2020)   Next INR check:   3/19/2020   Target end date:   2020    Indications    Acute pulmonary embolism  unspecified pulmonary embolism type  unspecified whether acute cor pulmonale present (H) [I26.99]  Factor 5 Leiden mutation  heterozygous (H) [D68.51]  Other acute pulmonary embolism without acute cor pulmonale (H) [I26.99]             Anticoagulation Episode Summary     INR check location:       Preferred lab:       Send INR reminders to:   JENNIFER KOENIG    Comments:   * hematology referral placed. Pt is breastfeeding      Anticoagulation Care Providers     Provider Role Specialty Phone number    Jesse Farnsworth MD Referring Family Practice [phone]    Yessy Lyn APRN CNP  Referring Nurse Practitioner [phone]            See the Encounter Report to view Anticoagulation Flowsheet and Dosing Calendar (Go to Encounters tab in chart review, and find the Anticoagulation Therapy Visit)    Dosage adjustment made based on physician directed care plan.    Sarah Felipe RN

## 2020-03-17 ENCOUNTER — MYC MEDICAL ADVICE (OUTPATIENT)
Dept: FAMILY MEDICINE | Facility: CLINIC | Age: 25
End: 2020-03-17

## 2020-03-17 NOTE — TELEPHONE ENCOUNTER
Yes it is important as you had 2 blood clots in your lungs-  you can see if they will do a telephone visit with you

## 2020-03-19 ENCOUNTER — ANTICOAGULATION THERAPY VISIT (OUTPATIENT)
Dept: ANTICOAGULATION | Facility: CLINIC | Age: 25
End: 2020-03-19
Payer: COMMERCIAL

## 2020-03-19 DIAGNOSIS — D68.51 FACTOR 5 LEIDEN MUTATION, HETEROZYGOUS (H): ICD-10-CM

## 2020-03-19 DIAGNOSIS — I26.99 OTHER ACUTE PULMONARY EMBOLISM WITHOUT ACUTE COR PULMONALE (H): ICD-10-CM

## 2020-03-19 DIAGNOSIS — I26.99 ACUTE PULMONARY EMBOLISM, UNSPECIFIED PULMONARY EMBOLISM TYPE, UNSPECIFIED WHETHER ACUTE COR PULMONALE PRESENT (H): ICD-10-CM

## 2020-03-19 LAB — INR POINT OF CARE: 1.6 (ref 0.86–1.14)

## 2020-03-19 PROCEDURE — 85610 PROTHROMBIN TIME: CPT | Mod: QW

## 2020-03-19 PROCEDURE — 36416 COLLJ CAPILLARY BLOOD SPEC: CPT

## 2020-03-19 PROCEDURE — 99207 ZZC NO CHARGE NURSE ONLY: CPT

## 2020-03-19 RX ORDER — WARFARIN SODIUM 5 MG/1
TABLET ORAL
Qty: 30 TABLET | Refills: 0 | COMMUNITY
Start: 2020-03-19 | End: 2020-04-03

## 2020-03-19 NOTE — PROGRESS NOTES
ANTICOAGULATION FOLLOW-UP CLINIC VISIT    Patient Name:  Rocío Catherine  Date:  3/19/2020  Contact Type:  Face to Face    SUBJECTIVE:  Patient Findings     Comments:   Looking at the average of the last 4 days, patient has had 48.75mg estimated weekly average. Will continue on 7.5mg daily, increasing the weekly dose to 52.5mg by Monday (~7.7% increase).    Patient will continue on Lovenox injections. No issues with bleeding or unusual bruising noted.         Clinical Outcomes     Negatives:   Major bleeding event, Thromboembolic event, Anticoagulation-related hospital admission, Anticoagulation-related ED visit, Anticoagulation-related fatality    Comments:   Looking at the average of the last 4 days, patient has had 48.75mg estimated weekly average. Will continue on 7.5mg daily, increasing the weekly dose to 52.5mg by Monday (~7.7% increase).    Patient will continue on Lovenox injections. No issues with bleeding or unusual bruising noted.            OBJECTIVE    INR Protime   Date Value Ref Range Status   2020 1.6 (A) 0.86 - 1.14 Final       ASSESSMENT / PLAN  No question data found.  Anticoagulation Summary  As of 3/19/2020    INR goal:   2.0-3.0   TTR:   --   INR used for dosin.6! (3/19/2020)   Warfarin maintenance plan:   No maintenance plan   Full warfarin instructions:   3/19: 7.5 mg; 3/20: 7.5 mg; 3/21: 7.5 mg; 3/22: 7.5 mg   Plan last modified:   Sarah Felipe RN (3/16/2020)   Next INR check:   3/23/2020   Target end date:   2020    Indications    Acute pulmonary embolism  unspecified pulmonary embolism type  unspecified whether acute cor pulmonale present (H) [I26.99]  Factor 5 Leiden mutation  heterozygous (H) [D68.51]  Other acute pulmonary embolism without acute cor pulmonale (H) [I26.99]             Anticoagulation Episode Summary     INR check location:       Preferred lab:       Send INR reminders to:   JENNIFER KOENIG    Comments:   * hematology referral placed. Pt is  breastfeeding      Anticoagulation Care Providers     Provider Role Specialty Phone number    Jesse Farnsworth MD Referring Family Practice [phone]    Yessy Lyn APRN CNP Referring Nurse Practitioner [phone]            See the Encounter Report to view Anticoagulation Flowsheet and Dosing Calendar (Go to Encounters tab in chart review, and find the Anticoagulation Therapy Visit)        Mansi Wilson RN [facility]

## 2020-03-20 DIAGNOSIS — D68.51 FACTOR V LEIDEN MUTATION (H): ICD-10-CM

## 2020-03-20 DIAGNOSIS — I26.99 PULMONARY EMBOLISM (H): Primary | ICD-10-CM

## 2020-03-23 ENCOUNTER — ANTICOAGULATION THERAPY VISIT (OUTPATIENT)
Dept: ANTICOAGULATION | Facility: CLINIC | Age: 25
End: 2020-03-23
Payer: COMMERCIAL

## 2020-03-23 DIAGNOSIS — D68.51 FACTOR 5 LEIDEN MUTATION, HETEROZYGOUS (H): ICD-10-CM

## 2020-03-23 DIAGNOSIS — I26.99 OTHER ACUTE PULMONARY EMBOLISM WITHOUT ACUTE COR PULMONALE (H): ICD-10-CM

## 2020-03-23 DIAGNOSIS — I26.99 ACUTE PULMONARY EMBOLISM, UNSPECIFIED PULMONARY EMBOLISM TYPE, UNSPECIFIED WHETHER ACUTE COR PULMONALE PRESENT (H): ICD-10-CM

## 2020-03-23 LAB — INR POINT OF CARE: 2.1 (ref 0.86–1.14)

## 2020-03-23 PROCEDURE — 36416 COLLJ CAPILLARY BLOOD SPEC: CPT

## 2020-03-23 PROCEDURE — 99207 ZZC NO CHARGE NURSE ONLY: CPT

## 2020-03-23 PROCEDURE — 85610 PROTHROMBIN TIME: CPT | Mod: QW

## 2020-03-23 NOTE — PROGRESS NOTES
ANTICOAGULATION FOLLOW-UP CLINIC VISIT    Patient Name:  Rocío Catherine  Date:  3/23/2020  Contact Type:  Face to Face    SUBJECTIVE:  Patient Findings     Positives:   Change in medications (continues to bridge with Lovenox)    Comments:   Patient reports no changes in medication, activity, or diet. Patient reports no changes in health. Patient reports has taken warfarin as instructed. Patient reports no increased bruising or bleeding and no signs or symptoms of a blood clot. Patient continues to bridge with Lovenox. Per protocol, she either needs INR 2.3 or greater to stop injections, or another INR in range 24 hours apart. Patient agrees to return to lab for POCT INR tomorrow morning at 9:30 so she can hopefully stop the injections. Will continue 7.5 mg daily.         Clinical Outcomes     Comments:   Patient reports no changes in medication, activity, or diet. Patient reports no changes in health. Patient reports has taken warfarin as instructed. Patient reports no increased bruising or bleeding and no signs or symptoms of a blood clot. Patient continues to bridge with Lovenox. Per protocol, she either needs INR 2.3 or greater to stop injections, or another INR in range 24 hours apart. Patient agrees to return to lab for POCT INR tomorrow morning at 9:30 so she can hopefully stop the injections. Will continue 7.5 mg daily.            OBJECTIVE    INR Protime   Date Value Ref Range Status   2020 2.1 (A) 0.86 - 1.14 Final       ASSESSMENT / PLAN  INR assessment THER    Recheck INR In: 1 DAY    INR Location Clinic      Anticoagulation Summary  As of 3/23/2020    INR goal:   2.0-3.0   TTR:   80.0 % (1 d)   INR used for dosin.1 (3/23/2020)   Warfarin maintenance plan:   7.5 mg (5 mg x 1.5) every day   Full warfarin instructions:   3/23: 7.5 mg; 3/24: 7.5 mg; 3/25: 7.5 mg; Otherwise 7.5 mg every day   Weekly warfarin total:   52.5 mg   Plan last modified:   Sarah Felipe RN (3/23/2020)   Next INR check:    3/24/2020   Priority:   Critical   Target end date:   9/16/2020    Indications    Acute pulmonary embolism  unspecified pulmonary embolism type  unspecified whether acute cor pulmonale present (H) [I26.99]  Factor 5 Leiden mutation  heterozygous (H) [D68.51]  Other acute pulmonary embolism without acute cor pulmonale (H) [I26.99]             Anticoagulation Episode Summary     INR check location:       Preferred lab:       Send INR reminders to:   JENNIFER KOENIG    Comments:   * hematology referral placed. Pt is breastfeeding      Anticoagulation Care Providers     Provider Role Specialty Phone number    Jesse Farnsworth MD Referring Family Practice [phone]    Yessy Lyn APRN CNP Referring Nurse Practitioner [phone]            See the Encounter Report to view Anticoagulation Flowsheet and Dosing Calendar (Go to Encounters tab in chart review, and find the Anticoagulation Therapy Visit)      Sarah Felipe RN

## 2020-03-24 ENCOUNTER — MEDICAL CORRESPONDENCE (OUTPATIENT)
Dept: HEALTH INFORMATION MANAGEMENT | Facility: CLINIC | Age: 25
End: 2020-03-24

## 2020-03-24 ENCOUNTER — ANTICOAGULATION THERAPY VISIT (OUTPATIENT)
Dept: ANTICOAGULATION | Facility: CLINIC | Age: 25
End: 2020-03-24

## 2020-03-24 DIAGNOSIS — I26.99 ACUTE PULMONARY EMBOLISM, UNSPECIFIED PULMONARY EMBOLISM TYPE, UNSPECIFIED WHETHER ACUTE COR PULMONALE PRESENT (H): ICD-10-CM

## 2020-03-24 DIAGNOSIS — I26.99 PULMONARY EMBOLISM (H): ICD-10-CM

## 2020-03-24 DIAGNOSIS — I26.99 OTHER ACUTE PULMONARY EMBOLISM WITHOUT ACUTE COR PULMONALE (H): ICD-10-CM

## 2020-03-24 DIAGNOSIS — D68.51 FACTOR 5 LEIDEN MUTATION, HETEROZYGOUS (H): ICD-10-CM

## 2020-03-24 DIAGNOSIS — D68.51 FACTOR V LEIDEN MUTATION (H): ICD-10-CM

## 2020-03-24 LAB
CAPILLARY BLOOD COLLECTION: NORMAL
INR PPP: 2.2 (ref 0.86–1.14)

## 2020-03-24 PROCEDURE — 99207 ZZC NO CHARGE NURSE ONLY: CPT

## 2020-03-24 PROCEDURE — 36416 COLLJ CAPILLARY BLOOD SPEC: CPT | Performed by: NURSE PRACTITIONER

## 2020-03-24 PROCEDURE — 85610 PROTHROMBIN TIME: CPT | Performed by: NURSE PRACTITIONER

## 2020-03-24 NOTE — PROGRESS NOTES
.  ANTICOAGULATION FOLLOW-UP CLINIC VISIT    Patient Name:  Rocío Catherine  Date:  3/24/2020  Contact Type:  Telephone/ writer left a detailed message for patient    SUBJECTIVE:  Patient Findings     Comments:   Writer had seen patient in the clinic yesterday and during that visit discussed the plan in detail with the patient for her INR today. Writer left a message for patient that since her INR is within range today she is okay to stop her lovenox injections as she has had 2 INRs 24 hours apart that are therapeutic. Advised patient to continue her warfarin dose of 7.5 mg daily and keep her scheduled appointment on Thursday at the lab. If INR remains within range patient can consider extending her visits to weekly. Asked that patient return call to [facility] with any questions or concerns.         Clinical Outcomes     Comments:   Writer had seen patient in the clinic yesterday and during that visit discussed the plan in detail with the patient for her INR today. Writer left a message for patient that since her INR is within range today she is okay to stop her lovenox injections as she has had 2 INRs 24 hours apart that are therapeutic. Advised patient to continue her warfarin dose of 7.5 mg daily and keep her scheduled appointment on Thursday at the lab. If INR remains within range patient can consider extending her visits to weekly. Asked that patient return call to [facility] with any questions or concerns.            OBJECTIVE    INR   Date Value Ref Range Status   2020 2.20 (H) 0.86 - 1.14 Final     Comment:     This test is intended for monitoring Coumadin therapy.  Results are not   accurate in patients with prolonged INR due to factor deficiency.         ASSESSMENT / PLAN  INR assessment THER    Recheck INR In: 2 DAYS    INR Location Outside lab      Anticoagulation Summary  As of 3/24/2020    INR goal:   2.0-3.0   TTR:   91.6 % (2 d)   INR used for dosin.20 (3/24/2020)   Warfarin maintenance plan:   7.5 mg (5  mg x 1.5) every day   Full warfarin instructions:   3/24: 7.5 mg; 3/25: 7.5 mg; Otherwise 7.5 mg every day   Weekly warfarin total:   52.5 mg   No change documented:   Sarah Felipe RN   Plan last modified:   Sarah Felipe RN (3/23/2020)   Next INR check:   3/26/2020   Priority:   Critical   Target end date:   9/16/2020    Indications    Acute pulmonary embolism  unspecified pulmonary embolism type  unspecified whether acute cor pulmonale present (H) [I26.99]  Factor 5 Leiden mutation  heterozygous (H) [D68.51]  Other acute pulmonary embolism without acute cor pulmonale (H) [I26.99]             Anticoagulation Episode Summary     INR check location:       Preferred lab:       Send INR reminders to:   JENNIFER KOENIG    Comments:   * hematology referral placed. Pt is breastfeeding      Anticoagulation Care Providers     Provider Role Specialty Phone number    Jesse Farnsworth MD Referring Family Practice [phone]    EboniYessy APRN CNP Referring Nurse Practitioner [phone]            See the Encounter Report to view Anticoagulation Flowsheet and Dosing Calendar (Go to Encounters tab in chart review, and find the Anticoagulation Therapy Visit)      Sarah Felipe, HILARIO

## 2020-03-26 ENCOUNTER — ANTICOAGULATION THERAPY VISIT (OUTPATIENT)
Dept: ANTICOAGULATION | Facility: CLINIC | Age: 25
End: 2020-03-26

## 2020-03-26 DIAGNOSIS — I26.99 OTHER ACUTE PULMONARY EMBOLISM WITHOUT ACUTE COR PULMONALE (H): ICD-10-CM

## 2020-03-26 DIAGNOSIS — D68.51 FACTOR V LEIDEN MUTATION (H): ICD-10-CM

## 2020-03-26 DIAGNOSIS — I26.99 PULMONARY EMBOLISM (H): ICD-10-CM

## 2020-03-26 DIAGNOSIS — D68.51 FACTOR 5 LEIDEN MUTATION, HETEROZYGOUS (H): ICD-10-CM

## 2020-03-26 DIAGNOSIS — I26.99 ACUTE PULMONARY EMBOLISM, UNSPECIFIED PULMONARY EMBOLISM TYPE, UNSPECIFIED WHETHER ACUTE COR PULMONALE PRESENT (H): ICD-10-CM

## 2020-03-26 LAB
CAPILLARY BLOOD COLLECTION: NORMAL
INR PPP: 2.4 (ref 0.86–1.14)

## 2020-03-26 PROCEDURE — 85610 PROTHROMBIN TIME: CPT | Performed by: NURSE PRACTITIONER

## 2020-03-26 PROCEDURE — 36416 COLLJ CAPILLARY BLOOD SPEC: CPT | Performed by: NURSE PRACTITIONER

## 2020-03-26 PROCEDURE — 99207 ZZC NO CHARGE NURSE ONLY: CPT

## 2020-03-26 NOTE — PROGRESS NOTES
ANTICOAGULATION FOLLOW-UP CLINIC VISIT    Patient Name:  Rocío Catherine  Date:  3/26/2020  Contact Type:  Telephone    SUBJECTIVE:  Patient Findings     Comments:   Patient confirmed there have been no changes, she has taken 7.5mg daily (this is the dose patient has taken the last 10 days). Will have patient continue this same warfarin dose and recheck again in 8 days when patient is already here for another appointment. Due to COVID-19, writer does not want to have patient return on Thursday as well as Friday. Discussed with patient reasons to return sooner to the clinic.         Clinical Outcomes     Negatives:   Major bleeding event, Thromboembolic event, Anticoagulation-related hospital admission, Anticoagulation-related ED visit, Anticoagulation-related fatality    Comments:   Patient confirmed there have been no changes, she has taken 7.5mg daily (this is the dose patient has taken the last 10 days). Will have patient continue this same warfarin dose and recheck again in 8 days when patient is already here for another appointment. Due to COVID-19, writer does not want to have patient return on Thursday as well as Friday. Discussed with patient reasons to return sooner to the clinic.            OBJECTIVE    INR   Date Value Ref Range Status   2020 2.40 (H) 0.86 - 1.14 Final     Comment:     This test is intended for monitoring Coumadin therapy.  Results are not   accurate in patients with prolonged INR due to factor deficiency.         ASSESSMENT / PLAN  No question data found.  Anticoagulation Summary  As of 3/26/2020    INR goal:   2.0-3.0   TTR:   95.5 % (4 d)   INR used for dosin.40 (3/26/2020)   Warfarin maintenance plan:   7.5 mg (5 mg x 1.5) every day   Full warfarin instructions:   7.5 mg every day   Weekly warfarin total:   52.5 mg   No change documented:   Mansi Wilson RN   Plan last modified:   Sarah Felipe RN (3/23/2020)   Next INR check:   4/3/2020   Priority:   Critical    Target end date:   9/16/2020    Indications    Acute pulmonary embolism  unspecified pulmonary embolism type  unspecified whether acute cor pulmonale present (H) [I26.99]  Factor 5 Leiden mutation  heterozygous (H) [D68.51]  Other acute pulmonary embolism without acute cor pulmonale (H) [I26.99]             Anticoagulation Episode Summary     INR check location:       Preferred lab:       Send INR reminders to:   JENNIFER [place]    Comments:   * hematology referral placed. Pt is breastfeeding      Anticoagulation Care Providers     Provider Role Specialty Phone number    Jesse Farnsworth MD Referring Family Practice [phone]    Yessy Lyn APRN CNP Referring Nurse Practitioner [phone]            See the Encounter Report to view Anticoagulation Flowsheet and Dosing Calendar (Go to Encounters tab in chart review, and find the Anticoagulation Therapy Visit)        HILARIO Terry           --------------------------------------------------------------------------  Anticoagulation Management    Unable to reach Rocío today.    Today's INR result of 2.4 is therapeutic (goal INR of 2.0-3.0).  Result received from: Clinic Lab    Follow up required to confirm warfarin dose taken and assess for changes    No dosing instructions provided.    Tentative plan pending assessment: continue 7.5mg daily, recheck the INR in 1 week.    Anticoagulation clinic to follow up    Eder ALVAREZ RN, CACP    Transfer return call to:  [phone]

## 2020-04-03 ENCOUNTER — ANTICOAGULATION THERAPY VISIT (OUTPATIENT)
Dept: ANTICOAGULATION | Facility: CLINIC | Age: 25
End: 2020-04-03

## 2020-04-03 ENCOUNTER — TELEPHONE (OUTPATIENT)
Dept: ANTICOAGULATION | Facility: CLINIC | Age: 25
End: 2020-04-03

## 2020-04-03 ENCOUNTER — VIRTUAL VISIT (OUTPATIENT)
Dept: OBGYN | Facility: CLINIC | Age: 25
End: 2020-04-03
Payer: COMMERCIAL

## 2020-04-03 DIAGNOSIS — D68.51 FACTOR 5 LEIDEN MUTATION, HETEROZYGOUS (H): ICD-10-CM

## 2020-04-03 DIAGNOSIS — I26.99 ACUTE PULMONARY EMBOLISM (H): Primary | ICD-10-CM

## 2020-04-03 DIAGNOSIS — I26.99 PULMONARY EMBOLISM (H): Primary | ICD-10-CM

## 2020-04-03 DIAGNOSIS — D68.51 FACTOR V LEIDEN MUTATION (H): ICD-10-CM

## 2020-04-03 DIAGNOSIS — I26.99 PULMONARY EMBOLISM (H): ICD-10-CM

## 2020-04-03 PROBLEM — Z98.891 HISTORY OF CESAREAN DELIVERY: Status: RESOLVED | Noted: 2020-01-04 | Resolved: 2020-04-03

## 2020-04-03 PROBLEM — Q89.9 CONGENITAL ANOMALY: Status: RESOLVED | Noted: 2019-12-10 | Resolved: 2020-04-03

## 2020-04-03 PROBLEM — Z34.80 SUPERVISION OF OTHER NORMAL PREGNANCY: Status: RESOLVED | Noted: 2020-01-22 | Resolved: 2020-04-03

## 2020-04-03 PROBLEM — Z34.00 SUPERVISION OF NORMAL FIRST PREGNANCY: Status: RESOLVED | Noted: 2020-02-01 | Resolved: 2020-04-03

## 2020-04-03 PROBLEM — Z36.89 ENCOUNTER FOR TRIAGE IN PREGNANT PATIENT: Status: RESOLVED | Noted: 2019-12-28 | Resolved: 2020-04-03

## 2020-04-03 LAB
CAPILLARY BLOOD COLLECTION: NORMAL
INR PPP: 1.6 (ref 0.86–1.14)

## 2020-04-03 PROCEDURE — 85610 PROTHROMBIN TIME: CPT | Performed by: NURSE PRACTITIONER

## 2020-04-03 PROCEDURE — 99207 ZZC NO CHARGE NURSE ONLY: CPT

## 2020-04-03 PROCEDURE — 99207 ZZC POST PARTUM EXAM: CPT | Mod: TEL | Performed by: OBSTETRICS & GYNECOLOGY

## 2020-04-03 PROCEDURE — 36416 COLLJ CAPILLARY BLOOD SPEC: CPT | Performed by: NURSE PRACTITIONER

## 2020-04-03 RX ORDER — WARFARIN SODIUM 5 MG/1
TABLET ORAL
Qty: 35 TABLET | Refills: 0 | Status: SHIPPED | OUTPATIENT
Start: 2020-04-03 | End: 2020-04-06

## 2020-04-03 ASSESSMENT — ANXIETY QUESTIONNAIRES
2. NOT BEING ABLE TO STOP OR CONTROL WORRYING: NOT AT ALL
IF YOU CHECKED OFF ANY PROBLEMS ON THIS QUESTIONNAIRE, HOW DIFFICULT HAVE THESE PROBLEMS MADE IT FOR YOU TO DO YOUR WORK, TAKE CARE OF THINGS AT HOME, OR GET ALONG WITH OTHER PEOPLE: NOT DIFFICULT AT ALL
6. BECOMING EASILY ANNOYED OR IRRITABLE: SEVERAL DAYS
5. BEING SO RESTLESS THAT IT IS HARD TO SIT STILL: NOT AT ALL
GAD7 TOTAL SCORE: 2
7. FEELING AFRAID AS IF SOMETHING AWFUL MIGHT HAPPEN: SEVERAL DAYS
3. WORRYING TOO MUCH ABOUT DIFFERENT THINGS: NOT AT ALL
1. FEELING NERVOUS, ANXIOUS, OR ON EDGE: NOT AT ALL

## 2020-04-03 ASSESSMENT — PATIENT HEALTH QUESTIONNAIRE - PHQ9
5. POOR APPETITE OR OVEREATING: NOT AT ALL
SUM OF ALL RESPONSES TO PHQ QUESTIONS 1-9: 1

## 2020-04-03 NOTE — ADDENDUM NOTE
Addended by: JAVIER DE LOS SANTSO on: 4/3/2020 11:11 AM     Modules accepted: Level of Service, SmartSet

## 2020-04-03 NOTE — TELEPHONE ENCOUNTER
04/03/20    Patient's INR dropped to 1.60 today. She denies missing any doses. Patient does state her back pain has returned. Writer increased the patient's dose of Coumadin through the weekend. The patient has 0.4 mg Lovenox injections on hand. Writer instructed her to resume Lovenox daily. Previous dose was 0.6 mg of Lovenox BID. Writer scheduled the patient to have an INR check on Monday.    Would you prefer the patient take Lovenox 0.6 mg BID or is the 0.4 mg Lovenox daily acceptable? Writer did increase the Coumadin dose for the weekend.    Tim Randall, RN, BSN, PHN  [facility]   [phone]

## 2020-04-03 NOTE — PROGRESS NOTES
"Rocío Catherine is a 24 year old female who is being evaluated via a billable telephone visit.      The patient has been notified of following:     \"This telephone visit will be conducted via a call between you and your physician/provider. We have found that certain health care needs can be provided without the need for a physical exam.  This service lets us provide the care you need with a short phone conversation.  If a prescription is necessary we can send it directly to your pharmacy.  If lab work is needed we can place an order for that and you can then stop by our lab to have the test done at a later time.    If during the course of the call the physician/provider feels a telephone visit is not appropriate, you will not be charged for this service.\"     Patient has given verbal consent for Telephone visit?  Yes    Rocío Catherine complains of    Chief Complaint   Patient presents with     Post Partum Exam       I have reviewed and updated the patient's Past Medical History, Social History, Family History and Medication List.    ALLERGIES  Patient has no known allergies.    Additional provider notes:    Rocío is here for a 6-week postpartum checkup.    She had a Repeat  of a liveborn baby girl, weight 7 pounds 2 oz.  The delivery was  complicated by postpartum PE 3 weeks postpartum.  Since delivery, she has been breast feeding.  She has not had a normal menses.  She has not had intercourse.  Patient screened for postpartum depression and complaints are NEGATIVE. Screening has also been completed for intimate partner violence. She would like to discuss contraception.    Her last pap was  and was Normal    PHQ-9 ([employer]) 4/3/2020   1.  Little interest or pleasure in doing things 0   2.  Feeling down, depressed, or hopeless 0   3.  Trouble falling or staying asleep, or sleeping too much 0   4.  Feeling tired or having little energy 1   5.  Poor appetite or overeating 0   6.  Feeling bad about yourself 0   7.  " Trouble concentrating 0   8.  Moving slowly or restless 0   9.  Suicidal or self-harm thoughts 0   PHQ-9 Total Score 1   Difficulty at work, home, or with people Not difficult at all       A/P  Routine Postpartum    1. Contraception: condoms.  Discussed avoidance of combined hormonal contraception.  Discussed progestins were still an option and IUD was still an option.  Patient will see how menses are after they resume and will let me know if she wants Mirena instead.   2. Annual due 2021    Grace Dee M.D.     Phone call duration: 15 minutes

## 2020-04-03 NOTE — TELEPHONE ENCOUNTER
04/03/20.    Sounds good. I will put in a new prescription and inform the patient.    Tim Randall RN, BSN, PHN  [facility]   [phone]

## 2020-04-03 NOTE — PROGRESS NOTES
04/03/20    Left VM to return call to [place].    Tim Randall, RN, BSN, PHN  [facility]   [phone]

## 2020-04-03 NOTE — TELEPHONE ENCOUNTER
Since her pulmonary embolism was recent (3/12/20) she should be on therapeutic and not prophylactic dose of lovenox.

## 2020-04-03 NOTE — PROGRESS NOTES
04/03/20    Per CNP, patient should do therapeutic Lovenox. Writer reordered the 0.6mg Lovenox Prescription. Patient will pick them up today.    Tim Randall RN, BSN, PHN  [facility]   [phone]        ANTICOAGULATION FOLLOW-UP CLINIC VISIT    Patient Name:  Rocío Ctaherine  Date:  4/3/2020  Contact Type:  Telephone    SUBJECTIVE:  Patient Findings     Comments:   No changes in medications, activity, health, or diet noted. No bleeding or increased bruising noted. Took warfarin as prescribed.  Writer increased the patient's dose to 10 mg today and tomorrow, then 7.5 mg on Sunday.   Patient will resume Lovenox 0.4 mg daily. Writer sent a TE to the [facility] to verify.  Recheck in 3 days.   Patient verbalizes understanding and agrees to plan. No further questions or concerns.  Patient does have back pain similar to when she was dx with the PE. Patient aware to seek medical attention if pain increases or an additional concerns arise.            Clinical Outcomes     Negatives:   Major bleeding event, Thromboembolic event, Anticoagulation-related hospital admission, Anticoagulation-related ED visit, Anticoagulation-related fatality    Comments:   No changes in medications, activity, health, or diet noted. No bleeding or increased bruising noted. Took warfarin as prescribed.  Writer increased the patient's dose to 10 mg today and tomorrow, then 7.5 mg on Sunday.   Patient will resume Lovenox 0.4 mg daily. Writer sent a TE to the [facility] to verify.  Recheck in 3 days.   Patient verbalizes understanding and agrees to plan. No further questions or concerns.  Patient does have back pain similar to when she was dx with the PE. Patient aware to seek medical attention if pain increases or an additional concerns arise.               OBJECTIVE    INR   Date Value Ref Range Status   04/03/2020 1.60 (H) 0.86 - 1.14 Final     Comment:     This test is intended for monitoring Coumadin therapy.  Results are not   accurate in  patients with prolonged INR due to factor deficiency.         ASSESSMENT / PLAN  INR assessment SUB    Recheck INR In: 3 DAYS    INR Location Outside lab      Anticoagulation Summary  As of 4/3/2020    INR goal:   2.0-3.0   TTR:   66.3 % (1.7 wk)   INR used for dosin.60! (4/3/2020)   Warfarin maintenance plan:   7.5 mg (5 mg x 1.5) every day   Full warfarin instructions:   4/3: 10 mg; : 10 mg; Otherwise 7.5 mg every day   Weekly warfarin total:   52.5 mg   Plan last modified:   Sarah Felipe RN (3/23/2020)   Next INR check:   2020   Priority:   Critical   Target end date:   2020    Indications    Acute pulmonary embolism  unspecified pulmonary embolism type  unspecified whether acute cor pulmonale present (H) (Resolved) [I26.99]  Factor 5 Leiden mutation  heterozygous (H) [D68.51]  Other acute pulmonary embolism without acute cor pulmonale (H) (Resolved) [I26.99]             Anticoagulation Episode Summary     INR check location:       Preferred lab:       Send INR reminders to:   JENNIFER KOENIG    Comments:   * hematology referral placed. Pt is breastfeeding      Anticoagulation Care Providers     Provider Role Specialty Phone number    Jesse Farnsworth MD Referring Family Practice [phone]    Yessy Lyn APRN CNP Referring Nurse Practitioner [phone]            See the Encounter Report to view Anticoagulation Flowsheet and Dosing Calendar (Go to Encounters tab in chart review, and find the Anticoagulation Therapy Visit)        Tim Randall RN

## 2020-04-04 ASSESSMENT — ANXIETY QUESTIONNAIRES: GAD7 TOTAL SCORE: 2

## 2020-04-06 ENCOUNTER — ANTICOAGULATION THERAPY VISIT (OUTPATIENT)
Dept: ANTICOAGULATION | Facility: CLINIC | Age: 25
End: 2020-04-06

## 2020-04-06 DIAGNOSIS — D68.51 FACTOR 5 LEIDEN MUTATION, HETEROZYGOUS (H): ICD-10-CM

## 2020-04-06 DIAGNOSIS — I26.99 PULMONARY EMBOLISM (H): ICD-10-CM

## 2020-04-06 DIAGNOSIS — I26.99 ACUTE PULMONARY EMBOLISM (H): ICD-10-CM

## 2020-04-06 DIAGNOSIS — D68.51 FACTOR V LEIDEN MUTATION (H): ICD-10-CM

## 2020-04-06 LAB — INR PPP: 2.6 (ref 0.86–1.14)

## 2020-04-06 PROCEDURE — 85610 PROTHROMBIN TIME: CPT | Performed by: NURSE PRACTITIONER

## 2020-04-06 PROCEDURE — 36415 COLL VENOUS BLD VENIPUNCTURE: CPT | Performed by: NURSE PRACTITIONER

## 2020-04-06 PROCEDURE — 99207 ZZC NO CHARGE NURSE ONLY: CPT

## 2020-04-06 RX ORDER — WARFARIN SODIUM 5 MG/1
TABLET ORAL
Qty: 140 TABLET | Refills: 0
Start: 2020-04-06 | End: 2020-04-27

## 2020-04-06 NOTE — PROGRESS NOTES
ANTICOAGULATION FOLLOW-UP CLINIC VISIT    Patient Name:  Rocío Catherine  Date:  2020  Contact Type:  Telephone    SUBJECTIVE:  Patient Findings     Positives:   Extra doses (took increased dosing as directed), Change in medications (bridging with Lovenox)    Comments:   Patient confirms she took increased dosing as directed by [facility], and bridged with Lovenox over the weekend. She states her back pain is improved. Writer adjusted maintenance dose to 57.5 mg weekly, recheck INR in one week. Patient aware she is okay to stop the Lovenox injections as her INR is 2.60 today. Patient denies concerns of bleeding, increased bruising or signs/symptoms of a blood clot. Patient to call [facility] with any changes or concerns. Patient verbalized understanding of all instructions, denies questions or concerns at this time.             Clinical Outcomes     Comments:   Patient confirms she took increased dosing as directed by [facility], and bridged with Lovenox over the weekend. She states her back pain is improved. Writer adjusted maintenance dose to 57.5 mg weekly, recheck INR in one week. Patient aware she is okay to stop the Lovenox injections as her INR is 2.60 today. Patient denies concerns of bleeding, increased bruising or signs/symptoms of a blood clot. Patient to call [facility] with any changes or concerns. Patient verbalized understanding of all instructions, denies questions or concerns at this time.                OBJECTIVE    INR   Date Value Ref Range Status   2020 2.60 (H) 0.86 - 1.14 Final     Comment:     This test is intended for monitoring Coumadin therapy.  Results are not   accurate in patients with prolonged INR due to factor deficiency.         ASSESSMENT / PLAN  INR assessment THER    Recheck INR In: 1 WEEK    INR Location Outside lab      Anticoagulation Summary  As of 2020    INR goal:   2.0-3.0   TTR:   65.0 % (2.1 wk)   INR used for dosin.60 (2020)   Warfarin maintenance plan:   10 mg (5 mg x 2) every  Tue, Sat; 7.5 mg (5 mg x 1.5) all other days   Full warfarin instructions:   4/7: 10 mg; 4/11: 10 mg; Otherwise 10 mg every Tue, Sat; 7.5 mg all other days   Weekly warfarin total:   57.5 mg   Plan last modified:   Sarah Felipe RN (4/6/2020)   Next INR check:   4/13/2020   Priority:   High   Target end date:   9/16/2020    Indications    Acute pulmonary embolism  unspecified pulmonary embolism type  unspecified whether acute cor pulmonale present (H) (Resolved) [I26.99]  Factor 5 Leiden mutation  heterozygous (H) [D68.51]  Other acute pulmonary embolism without acute cor pulmonale (H) (Resolved) [I26.99]             Anticoagulation Episode Summary     INR check location:       Preferred lab:       Send INR reminders to:   JENNIFER KOENIG    Comments:   * hematology referral placed. Pt is breastfeeding      Anticoagulation Care Providers     Provider Role Specialty Phone number    Jesse Farnsworth MD Referring Family Practice [phone]    EboniYessy APRN CNP Referring Nurse Practitioner [phone]            See the Encounter Report to view Anticoagulation Flowsheet and Dosing Calendar (Go to Encounters tab in chart review, and find the Anticoagulation Therapy Visit)      Sarah Felipe, RN

## 2020-04-07 ENCOUNTER — MYC MEDICAL ADVICE (OUTPATIENT)
Dept: FAMILY MEDICINE | Facility: CLINIC | Age: 25
End: 2020-04-07

## 2020-04-07 ENCOUNTER — HOSPITAL ENCOUNTER (EMERGENCY)
Facility: CLINIC | Age: 25
Discharge: HOME | End: 2020-04-07
Attending: NURSE PRACTITIONER | Admitting: NURSE PRACTITIONER
Payer: COMMERCIAL

## 2020-04-07 ENCOUNTER — APPOINTMENT (OUTPATIENT)
Dept: CT IMAGING | Facility: CLINIC | Age: 25
End: 2020-04-07
Attending: NURSE PRACTITIONER
Payer: COMMERCIAL

## 2020-04-07 VITALS
RESPIRATION RATE: 18 BRPM | WEIGHT: 118 LBS | OXYGEN SATURATION: 99 % | TEMPERATURE: 98.6 F | DIASTOLIC BLOOD PRESSURE: 78 MMHG | BODY MASS INDEX: 22.3 KG/M2 | SYSTOLIC BLOOD PRESSURE: 113 MMHG

## 2020-04-07 DIAGNOSIS — R42 DIZZINESS: ICD-10-CM

## 2020-04-07 DIAGNOSIS — R07.9 CHEST PAIN: ICD-10-CM

## 2020-04-07 LAB
ALBUMIN SERPL-MCNC: 4.2 G/DL (ref 3.4–5)
ALP SERPL-CCNC: 91 U/L (ref 40–150)
ALT SERPL W P-5'-P-CCNC: 35 U/L (ref 0–50)
ANION GAP SERPL CALCULATED.3IONS-SCNC: 6 MMOL/L (ref 3–14)
AST SERPL W P-5'-P-CCNC: 38 U/L (ref 0–45)
BASOPHILS # BLD AUTO: 0 10E9/L (ref 0–0.2)
BASOPHILS NFR BLD AUTO: 0.5 %
BILIRUB SERPL-MCNC: 0.3 MG/DL (ref 0.2–1.3)
BUN SERPL-MCNC: 12 MG/DL (ref 7–30)
CALCIUM SERPL-MCNC: 9 MG/DL (ref 8.5–10.1)
CHLORIDE SERPL-SCNC: 108 MMOL/L (ref 94–109)
CO2 SERPL-SCNC: 26 MMOL/L (ref 20–32)
CREAT SERPL-MCNC: 0.78 MG/DL (ref 0.52–1.04)
DIFFERENTIAL METHOD BLD: ABNORMAL
EOSINOPHIL # BLD AUTO: 0.3 10E9/L (ref 0–0.7)
EOSINOPHIL NFR BLD AUTO: 2.8 %
ERYTHROCYTE [DISTWIDTH] IN BLOOD BY AUTOMATED COUNT: 14.2 % (ref 10–15)
GFR SERPL CREATININE-BSD FRML MDRD: >90 ML/MIN/{1.73_M2}
GLUCOSE SERPL-MCNC: 83 MG/DL (ref 70–99)
HCT VFR BLD AUTO: 41 % (ref 35–47)
HGB BLD-MCNC: 12.9 G/DL (ref 11.7–15.7)
IMM GRANULOCYTES # BLD: 0 10E9/L (ref 0–0.4)
IMM GRANULOCYTES NFR BLD: 0.2 %
INR PPP: 1.74 (ref 0.86–1.14)
LYMPHOCYTES # BLD AUTO: 2.6 10E9/L (ref 0.8–5.3)
LYMPHOCYTES NFR BLD AUTO: 29.4 %
MCH RBC QN AUTO: 26 PG (ref 26.5–33)
MCHC RBC AUTO-ENTMCNC: 31.5 G/DL (ref 31.5–36.5)
MCV RBC AUTO: 83 FL (ref 78–100)
MONOCYTES # BLD AUTO: 0.7 10E9/L (ref 0–1.3)
MONOCYTES NFR BLD AUTO: 7.6 %
NEUTROPHILS # BLD AUTO: 5.3 10E9/L (ref 1.6–8.3)
NEUTROPHILS NFR BLD AUTO: 59.5 %
NRBC # BLD AUTO: 0 10*3/UL
NRBC BLD AUTO-RTO: 0 /100
PLATELET # BLD AUTO: 260 10E9/L (ref 150–450)
POTASSIUM SERPL-SCNC: 3.8 MMOL/L (ref 3.4–5.3)
PROT SERPL-MCNC: 7.6 G/DL (ref 6.8–8.8)
RBC # BLD AUTO: 4.96 10E12/L (ref 3.8–5.2)
SODIUM SERPL-SCNC: 140 MMOL/L (ref 133–144)
TROPONIN I SERPL-MCNC: <0.015 UG/L (ref 0–0.04)
WBC # BLD AUTO: 8.8 10E9/L (ref 4–11)

## 2020-04-07 PROCEDURE — 85025 COMPLETE CBC W/AUTO DIFF WBC: CPT | Performed by: NURSE PRACTITIONER

## 2020-04-07 PROCEDURE — 25000132 ZZH RX MED GY IP 250 OP 250 PS 637: Performed by: NURSE PRACTITIONER

## 2020-04-07 PROCEDURE — 93005 ELECTROCARDIOGRAM TRACING: CPT | Performed by: NURSE PRACTITIONER

## 2020-04-07 PROCEDURE — 85610 PROTHROMBIN TIME: CPT | Performed by: NURSE PRACTITIONER

## 2020-04-07 PROCEDURE — 99284 EMERGENCY DEPT VISIT MOD MDM: CPT | Mod: 25 | Performed by: NURSE PRACTITIONER

## 2020-04-07 PROCEDURE — 25000128 H RX IP 250 OP 636: Performed by: NURSE PRACTITIONER

## 2020-04-07 PROCEDURE — 84484 ASSAY OF TROPONIN QUANT: CPT | Performed by: NURSE PRACTITIONER

## 2020-04-07 PROCEDURE — 96372 THER/PROPH/DIAG INJ SC/IM: CPT | Performed by: NURSE PRACTITIONER

## 2020-04-07 PROCEDURE — 70450 CT HEAD/BRAIN W/O DYE: CPT

## 2020-04-07 PROCEDURE — 93010 ELECTROCARDIOGRAM REPORT: CPT | Mod: Z6 | Performed by: EMERGENCY MEDICINE

## 2020-04-07 PROCEDURE — 99285 EMERGENCY DEPT VISIT HI MDM: CPT | Mod: 25 | Performed by: NURSE PRACTITIONER

## 2020-04-07 PROCEDURE — 80053 COMPREHEN METABOLIC PANEL: CPT | Performed by: NURSE PRACTITIONER

## 2020-04-07 RX ORDER — WARFARIN SODIUM 5 MG/1
10 TABLET ORAL ONCE
Status: COMPLETED | OUTPATIENT
Start: 2020-04-07 | End: 2020-04-07

## 2020-04-07 RX ADMIN — WARFARIN SODIUM 10 MG: 5 TABLET ORAL at 19:45

## 2020-04-07 RX ADMIN — ENOXAPARIN SODIUM 50 MG: 60 INJECTION SUBCUTANEOUS at 19:46

## 2020-04-07 ASSESSMENT — ENCOUNTER SYMPTOMS
PALPITATIONS: 0
FATIGUE: 0
NUMBNESS: 0
LIGHT-HEADEDNESS: 1
DIZZINESS: 1
FEVER: 0
HEADACHES: 1
GASTROINTESTINAL NEGATIVE: 1
RESPIRATORY NEGATIVE: 1
MUSCULOSKELETAL NEGATIVE: 1
WEAKNESS: 0
CHILLS: 0

## 2020-04-07 NOTE — TELEPHONE ENCOUNTER
Tried to call her to triage, and got her voicemail.   Left message on answering machine for patient to call back.  Sent her a mychart note also, with some triage questions and recommendations as well as the number to call and speak with an RN.     Fina ISLAS

## 2020-04-07 NOTE — TELEPHONE ENCOUNTER
I huddled with Yessy Lyn, MARSHALL.  Yessy advised that pt be evaluated in ED today.    Pt informed.     Pt updates that she has not made her appt with hematology yet because she was told it may be very difficult to get an appt at this time due to COVID precautions.    Leti Fernando RN

## 2020-04-07 NOTE — ED TRIAGE NOTES
Pt had a recent  and has been having issues with clots. Pt was placed on coumadin and now is dizzy.

## 2020-04-07 NOTE — TELEPHONE ENCOUNTER
Routed to provider.    Pt called back.    S:  She was reporting persistent dizziness since starting warfarin on 3/12/20.    Pt started warfarin for an acute PE.   Warfarin was chosen because she is breastfeeding.  Pt delivered by  20.  She is 6 1/2 weeks post-partum    Pt did not call until now because she thought the dizziness would get better but it is not getting better.  Sometimes she feels like she will pass out.    B:  Pt has known factor V.    This is third baby.   has been home for 6 weeks. He returns to work tomorrow.    A:  Persisting dizziness on warfarin for acute PE diagnosed on 3/12/20.  Pt is 6 1/2 weeks post-partum.    R:  Routed to provider for further instructions ASAP.    Leti Fernando RN

## 2020-04-07 NOTE — ED NOTES
Dizziness x 1 week. No fainting/syncope. Minor headache consistently, bad migraine on -took tylenol. Chronic migraine diagnosis about 1 year ago. Recent diagnosis of pulmonary emboli,  6 weeks ago. On coumadin-has not taken today.

## 2020-04-07 NOTE — ED AVS SNAPSHOT
[facility]  [address] [address]  Phone:  [phone]  Fax:  [phone]                                    Rocío Catherine   MRN: [id number]    Department:  [facility]   Date of Visit:  4/7/2020           After Visit Summary Signature Page    I have received my discharge instructions, and my questions have been answered. I have discussed any challenges I see with this plan with the nurse or doctor.    ..........................................................................................................................................  Patient/Patient Representative Signature      ..........................................................................................................................................  Patient Representative Print Name and Relationship to Patient    ..................................................               ................................................  Date                                   Time    ..........................................................................................................................................  Reviewed by Signature/Title    ...................................................              ..............................................  Date                                               Time          22EPIC Rev 08/18

## 2020-04-08 ENCOUNTER — TELEPHONE (OUTPATIENT)
Dept: ANTICOAGULATION | Facility: CLINIC | Age: 25
End: 2020-04-08

## 2020-04-08 DIAGNOSIS — D68.51 FACTOR 5 LEIDEN MUTATION, HETEROZYGOUS (H): ICD-10-CM

## 2020-04-08 NOTE — DISCHARGE INSTRUCTIONS
Call INR clinic tomorrow regarding today's INR of 1.74. Given dose of Lovenox and Coumadin 10 mg here today.  Return for any worsening symptoms as discussed.

## 2020-04-08 NOTE — TELEPHONE ENCOUNTER
ANTICOAGULATION  MANAGEMENT: Discharge Review    Rocío Catherine chart reviewed for anticoagulation continuity of care    Emergency room visit on 4/7/20 for Dizziness.    Discharge disposition: Home    Results:    Recent Labs   Lab Test 04/07/20  1833   INR 1.74*       Anticoagulation inpatient management:     home regimen continued    Anticoagulation discharge instructions:     Warfarin dosing: home regimen continued   Bridging: Yes   INR goal change: No      Medication changes affecting anticoagulation: No    Additional factors affecting anticoagulation: No    Plan     Patient will take 10 mg today and tomorrow. Resume Lovenox injections. Recheck the INR on 4/10/20.    Spoke with Rcoío    Anticoagulation Calendar updated    Tim Randall RN

## 2020-04-08 NOTE — ED PROVIDER NOTES
History     Chief Complaint   Patient presents with     Dizziness     HPI  Rocío Catherine is a 24 year old female with history of Factor V Leiden mutation (herozygous), non-rheumatic mitral regurgitation, and anemia who presents to the emergency department for dizziness. Symptoms have been ongoing since she started Coumadin on 3/12/2020 for PE (needed to use Coumadin because she is breastfeeding). Describes dizziness as intermittent lightheaded and hard to focus. No change with standing, and feels dizzy with sitting or driving. Intermittent headaches, and did have migraine headache 2 days ago. She is currently breastfeeding. Denies fever, cough, or shortness of breath. Reports chest pain since she was diagnosed with the PE, unchanged.  Her INR has been erratic, and is likely due to her inconsistency in taking her coumadin.  Needed to start back on Lovenox but last Lovenox was 2 days ago. On coumadin 10 mg / and 7.5 mg all other days.  However, she admits to forgetting to take Coumadin yesterday and has not taken dose today.      Allergies:  No Known Allergies    Problem List:    Patient Active Problem List    Diagnosis Date Noted     Acute pulmonary embolism (H) 2020     Priority: Medium     S/P  2020     Priority: Medium     Palpitations 2019     Priority: Medium     Chronic migraine without aura without status migrainosus, not intractable 2019     Priority: Medium     Sinus tachycardia 2019     Priority: Medium     Non-rheumatic mitral regurgitation, trace 2019     Priority: Medium     Seen on echocardiogram 2019. Recommended for follow up echo in 2-3 years.        Recurrent major depressive disorder, in remission (H) 2018     Priority: Medium     Anxiety 2018     Priority: Medium     Factor 5 Leiden mutation, heterozygous (H) 2018     Priority: Medium     Anemia 2016     Priority: Medium     Dysthymia 2013     Priority: Medium         Past Medical History:    Past Medical History:   Diagnosis Date     Calculus of gallbladder with acute cholecystitis without obstruction 2017     Chickenpox      Factor 5 Leiden mutation, heterozygous (H) 2018     Factor V Leiden (H)      History of frequent urinary tract infections      Non-rheumatic mitral regurgitation, trace 2019     Postpartum depression      Pulmonary emboli (H) 2020       Past Surgical History:    Past Surgical History:   Procedure Laterality Date      SECTION N/A 2020    Procedure:  SECTION;  Surgeon: Grace Dee MD;  Location: [facility] OR      SECTION      c section     HEAD & NECK SURGERY      hemangioma on neck at age 2     LAPAROSCOPIC CHOLECYSTECTOMY       SINUS FRONTAL OPEN OBLITERATION             Family History:    Family History   Problem Relation Age of Onset     Stomach Problem Mother         ulcer     Bleeding Disorder Father         factor V     Ovarian Cancer Maternal Grandmother      Thyroid Cancer Paternal Grandmother      Alzheimer Disease Paternal Grandfather      Bleeding Disorder Paternal Grandfather         factor V       Social History:  Marital Status:   [2]  Social History     Tobacco Use     Smoking status: Never Smoker     Smokeless tobacco: Never Used   Substance Use Topics     Alcohol use: Yes     Comment:  rare-quit with pregnancy     Drug use: No        Medications:    enoxaparin ANTICOAGULANT (LOVENOX) 60 MG/0.6ML syringe  Prenatal Vit-Fe Fumarate-FA (PRENATAL MULTIVITAMIN W/IRON) 27-0.8 MG tablet  warfarin ANTICOAGULANT (COUMADIN) 5 MG tablet          Review of Systems   Constitutional: Negative for chills, fatigue and fever.   HENT: Negative.    Respiratory: Negative.    Cardiovascular: Positive for chest pain. Negative for palpitations and leg swelling.   Gastrointestinal: Negative.    Genitourinary: Negative.    Musculoskeletal: Negative.    Skin: Negative.    Neurological: Positive for  dizziness, light-headedness and headaches. Negative for syncope, weakness and numbness.       Physical Exam   BP: (!) 144/83  Heart Rate: 97  Temp: 98.6  F (37  C)  Resp: 16  Weight: 53.5 kg (118 lb)  SpO2: 100 %      Physical Exam  Constitutional:       General: She is not in acute distress.     Appearance: Normal appearance. She is not ill-appearing.   HENT:      Head: Normocephalic and atraumatic.   Cardiovascular:      Rate and Rhythm: Normal rate and regular rhythm.   Pulmonary:      Effort: Pulmonary effort is normal.      Breath sounds: Normal breath sounds.   Abdominal:      General: Abdomen is flat. There is no distension.      Palpations: Abdomen is soft.      Tenderness: There is no abdominal tenderness.   Musculoskeletal: Normal range of motion.   Skin:     General: Skin is warm and dry.   Neurological:      General: No focal deficit present.      Mental Status: She is alert and oriented to person, place, and time.         ED Course        Procedures               EKG Interpretation:      Interpreted by Dr. Efraín Davis  Time reviewed: 1820  Symptoms at time of EKG: chest pain   Rhythm: sinus   Rate: normal  Axis: normal  Ectopy: none  Conduction: normal  ST Segments/ T Waves: No ST-T wave changes  Q Waves: none  Comparison to prior: Unchanged from 10/10/2019    Clinical Impression: normal EKG           Results for orders placed or performed during the hospital encounter of 04/07/20 (from the past 24 hour(s))   CBC with platelets differential   Result Value Ref Range    WBC 8.8 4.0 - 11.0 10e9/L    RBC Count 4.96 3.8 - 5.2 10e12/L    Hemoglobin 12.9 11.7 - 15.7 g/dL    Hematocrit 41.0 35.0 - 47.0 %    MCV 83 78 - 100 fl    MCH 26.0 (L) 26.5 - 33.0 pg    MCHC 31.5 31.5 - 36.5 g/dL    RDW 14.2 10.0 - 15.0 %    Platelet Count 260 150 - 450 10e9/L    Diff Method Automated Method     % Neutrophils 59.5 %    % Lymphocytes 29.4 %    % Monocytes 7.6 %    % Eosinophils 2.8 %    % Basophils 0.5 %    % Immature  Granulocytes 0.2 %    Nucleated RBCs 0 0 /100    Absolute Neutrophil 5.3 1.6 - 8.3 10e9/L    Absolute Lymphocytes 2.6 0.8 - 5.3 10e9/L    Absolute Monocytes 0.7 0.0 - 1.3 10e9/L    Absolute Eosinophils 0.3 0.0 - 0.7 10e9/L    Absolute Basophils 0.0 0.0 - 0.2 10e9/L    Abs Immature Granulocytes 0.0 0 - 0.4 10e9/L    Absolute Nucleated RBC 0.0    Comprehensive metabolic panel   Result Value Ref Range    Sodium 140 133 - 144 mmol/L    Potassium 3.8 3.4 - 5.3 mmol/L    Chloride 108 94 - 109 mmol/L    Carbon Dioxide 26 20 - 32 mmol/L    Anion Gap 6 3 - 14 mmol/L    Glucose 83 70 - 99 mg/dL    Urea Nitrogen 12 7 - 30 mg/dL    Creatinine 0.78 0.52 - 1.04 mg/dL    GFR Estimate >90 >60 mL/min/[1.73_m2]    GFR Estimate If Black >90 >60 mL/min/[1.73_m2]    Calcium 9.0 8.5 - 10.1 mg/dL    Bilirubin Total 0.3 0.2 - 1.3 mg/dL    Albumin 4.2 3.4 - 5.0 g/dL    Protein Total 7.6 6.8 - 8.8 g/dL    Alkaline Phosphatase 91 40 - 150 U/L    ALT 35 0 - 50 U/L    AST 38 0 - 45 U/L   INR   Result Value Ref Range    INR 1.74 (H) 0.86 - 1.14   Troponin I   Result Value Ref Range    Troponin I ES <0.015 0.000 - 0.045 ug/L   CT Head w/o Contrast    Narrative    CT OF THE HEAD WITHOUT CONTRAST 4/7/2020 7:16 PM     COMPARISON: Brain MR 2/7/2019    HISTORY: Headache, dizziness    TECHNIQUE: Axial CT images of the head from the skull base to the  vertex were acquired without IV contrast.    FINDINGS: The ventricles and basal cisterns are within normal limits  in configuration. There is no midline shift. There are no extra-axial  fluid collections. Gray-white differentiation is well maintained.     No intracranial hemorrhage, mass or recent infarct.    The visualized paranasal sinuses are well-aerated. There is no  mastoiditis. There are no fractures of the visualized bones.       Impression    IMPRESSION: Normal head CT.       Radiation dose for this scan was reduced using automated exposure  control, adjustment of the mA and/or kV according to  patient size, or  iterative reconstruction technique    HEATHER ZAMORA MD       Medications   enoxaparin ANTICOAGULANT (LOVENOX) injection 50 mg (50 mg Subcutaneous Given 4/7/20 1946)   warfarin ANTICOAGULANT (COUMADIN) tablet 10 mg (10 mg Oral Given 4/7/20 1945)       Assessments & Plan (with Medical Decision Making)   24 year old female with history of factor V  Leiden who developed a PE on 3/12/2020 post-partum after she stopped taking prophylactic Lovenox.  Due to breast-feeding she was started on Coumadin and Lovenox to bridge.  Patient has had erratic INR readings since starting on the Coumadin, likely due to her not consistently taking the medication.  Presents to the emergency department with complaints of dizziness since starting on the Coumadin.  She reports having a migraine headache 2 days ago and has been having daily headaches.  No syncope.  She also reports chest pain, that is present since she was diagnosed with PE.  No change in her chest pain.  On exam patient is alert and oriented.  Does not appear distressed.  Normotensive.  Afebrile.  No tachycardia.  Lungs are CTA.  No hypoxia.  EKG is normal.  Patient's INR is not therapeutic at 1.74.  The remainder of her labs including troponin are normal.  Head CT was obtained given her headache and dizziness and is negative for acute abnormality, specifically no evidence of intracranial bleeding.  Unclear the cause for her dizziness, but I did discuss with her that potentially is because because of having a PE.  I discussed with her the importance taking the Coumadin as prescribed as a PE can be life-threatening.  I insulted with the pharmacist today regarding her low INR and need for Lovenox.  Patient was given a dose of Lovenox here and normal Coumadin dose.  Patient was instructed to call the INR clinic in the morning regarding how to proceed with her Coumadin dosing, whether she needs to continue Lovenox, and when to get her INR rechecked.  Patient  instructed to return to the emergency department for worsening chest pain, headache, bleeding, short of breath, syncope, or any other new symptoms of concern.      I have reviewed the nursing notes.    I have reviewed the findings, diagnosis, plan and need for follow up with the patient.      Discharge Medication List as of 4/7/2020  7:55 PM          Final diagnoses:   Dizziness   Chest pain       4/7/2020   [facility] EMERGENCY DEPARTMENT     Isatu Stanton APRN CNP  04/07/20 1103

## 2020-04-10 ENCOUNTER — ANTICOAGULATION THERAPY VISIT (OUTPATIENT)
Dept: ANTICOAGULATION | Facility: CLINIC | Age: 25
End: 2020-04-10

## 2020-04-10 DIAGNOSIS — D68.51 FACTOR 5 LEIDEN MUTATION, HETEROZYGOUS (H): ICD-10-CM

## 2020-04-10 DIAGNOSIS — D68.51 FACTOR V LEIDEN MUTATION (H): ICD-10-CM

## 2020-04-10 DIAGNOSIS — I26.99 PULMONARY EMBOLISM (H): ICD-10-CM

## 2020-04-10 LAB
CAPILLARY BLOOD COLLECTION: NORMAL
INR PPP: 2.5 (ref 0.86–1.14)

## 2020-04-10 PROCEDURE — 85610 PROTHROMBIN TIME: CPT | Performed by: NURSE PRACTITIONER

## 2020-04-10 PROCEDURE — 99207 ZZC NO CHARGE NURSE ONLY: CPT

## 2020-04-10 PROCEDURE — 36416 COLLJ CAPILLARY BLOOD SPEC: CPT | Performed by: NURSE PRACTITIONER

## 2020-04-10 NOTE — PROGRESS NOTES
ANTICOAGULATION FOLLOW-UP CLINIC VISIT    Patient Name:  Rocío Catherine  Date:  4/10/2020  Contact Type:  Telephone    SUBJECTIVE:  Patient Findings     Comments:   Patient verified she took warfarin as directed the last 2 days. She can stop lovenox at this time. Looking at recent dosing history and INR pattern, writer feels it would be better to do more 10 mg days versus 7.5 mg for now. She has a new PE and it would be safer for INR to be at upper end of range, versus dropping too low. The maintenance dose in calendar may need to be updated at her next INR, depending on what that result is. Recheck is in 3 days.        Clinical Outcomes     Comments:   Patient verified she took warfarin as directed the last 2 days. She can stop lovenox at this time. Looking at recent dosing history and INR pattern, writer feels it would be better to do more 10 mg days versus 7.5 mg for now. She has a new PE and it would be safer for INR to be at upper end of range, versus dropping too low. The maintenance dose in calendar may need to be updated at her next INR, depending on what that result is. Recheck is in 3 days.           OBJECTIVE    INR   Date Value Ref Range Status   04/10/2020 2.50 (H) 0.86 - 1.14 Final     Comment:     This test is intended for monitoring Coumadin therapy.  Results are not   accurate in patients with prolonged INR due to factor deficiency.         ASSESSMENT / PLAN  INR assessment THER    Recheck INR In: 3 DAYS    INR Location Clinic      Anticoagulation Summary  As of 4/10/2020    INR goal:   2.0-3.0   TTR:   65.5 % (2.7 wk)   INR used for dosin.50 (4/10/2020)   Warfarin maintenance plan:   10 mg (5 mg x 2) every Tue, Sat; 7.5 mg (5 mg x 1.5) all other days   Full warfarin instructions:   : 10 mg; : 10 mg; Otherwise 10 mg every Tue, Sat; 7.5 mg all other days   Weekly warfarin total:   57.5 mg   Plan last modified:   Sarah Felipe RN (2020)   Next INR check:   2020   Priority:    High   Target end date:   9/16/2020    Indications    Acute pulmonary embolism  unspecified pulmonary embolism type  unspecified whether acute cor pulmonale present (H) (Resolved) [I26.99]  Factor 5 Leiden mutation  heterozygous (H) [D68.51]  Other acute pulmonary embolism without acute cor pulmonale (H) (Resolved) [I26.99]             Anticoagulation Episode Summary     INR check location:       Preferred lab:       Send INR reminders to:   JENNIFER KOENIG    Comments:   * hematology referral placed. Pt is breastfeeding. Okay to leave detailed message on phone      Anticoagulation Care Providers     Provider Role Specialty Phone number    Jesse Farnsworth MD Referring Family Practice [phone]    EboniYessy APRN CNP Referring Nurse Practitioner [phone]            See the Encounter Report to view Anticoagulation Flowsheet and Dosing Calendar (Go to Encounters tab in chart review, and find the Anticoagulation Therapy Visit)        Shelby Samaniego RN

## 2020-04-13 ENCOUNTER — ANTICOAGULATION THERAPY VISIT (OUTPATIENT)
Dept: ANTICOAGULATION | Facility: CLINIC | Age: 25
End: 2020-04-13

## 2020-04-13 DIAGNOSIS — D68.51 FACTOR 5 LEIDEN MUTATION, HETEROZYGOUS (H): ICD-10-CM

## 2020-04-13 DIAGNOSIS — I26.99 PULMONARY EMBOLISM (H): ICD-10-CM

## 2020-04-13 DIAGNOSIS — D68.51 FACTOR V LEIDEN MUTATION (H): ICD-10-CM

## 2020-04-13 LAB
CAPILLARY BLOOD COLLECTION: NORMAL
INR PPP: 2.7 (ref 0.86–1.14)

## 2020-04-13 PROCEDURE — 36416 COLLJ CAPILLARY BLOOD SPEC: CPT | Performed by: NURSE PRACTITIONER

## 2020-04-13 PROCEDURE — 99207 ZZC NO CHARGE NURSE ONLY: CPT

## 2020-04-13 PROCEDURE — 85610 PROTHROMBIN TIME: CPT | Performed by: NURSE PRACTITIONER

## 2020-04-13 NOTE — PROGRESS NOTES
ANTICOAGULATION FOLLOW-UP CLINIC VISIT    Patient Name:  Rocío Catherine  Date:  4/13/2020  Contact Type:  Telephone/ left detailed voicemail / mychart message sent to patient    SUBJECTIVE:  Patient Findings     Comments:   No changes noted in Epic.     INR today was therapeutic. Patient still as the hold dose within the last 7 days, included in her weekly dose (57.5mg). By Friday patient will have had 60mg in the prior 7 days (~4% increase, although the missed dose is likely not having much of an effect on her INR today). Depending on the INR result on Friday, ACC may need to adjust the maintenance dose.     Writer left a detailed voicemail for patient with dosing instructions. Writer also requested for patient to call with any concerns / changes / missed doses. A mychart message was additionally sent with detailed information for her to review.           Clinical Outcomes     Comments:   No changes noted in Epic.     INR today was therapeutic. Patient still as the hold dose within the last 7 days, included in her weekly dose (57.5mg). By Friday patient will have had 60mg in the prior 7 days (~4% increase, although the missed dose is likely not having much of an effect on her INR today). Depending on the INR result on Friday, ACC may need to adjust the maintenance dose.     Writer left a detailed voicemail for patient with dosing instructions. Writer also requested for patient to call with any concerns / changes / missed doses. A mychart message was additionally sent with detailed information for her to review.              OBJECTIVE    INR   Date Value Ref Range Status   04/13/2020 2.70 (H) 0.86 - 1.14 Final     Comment:     This test is intended for monitoring Coumadin therapy.  Results are not   accurate in patients with prolonged INR due to factor deficiency.         ASSESSMENT / PLAN  No question data found.  Anticoagulation Summary  As of 4/13/2020    INR goal:   2.0-3.0   TTR:   69.9 % (3.1 wk)   INR used for  dosin.70 (2020)   Warfarin maintenance plan:   10 mg (5 mg x 2) every Tue, Sat; 7.5 mg (5 mg x 1.5) all other days   Full warfarin instructions:   10 mg every Tue, Sat; 7.5 mg all other days   Weekly warfarin total:   57.5 mg   No change documented:   Mansi Wilson RN   Plan last modified:   Sarah Felipe RN (2020)   Next INR check:   2020   Priority:   High   Target end date:   2020    Indications    Acute pulmonary embolism  unspecified pulmonary embolism type  unspecified whether acute cor pulmonale present (H) (Resolved) [I26.99]  Factor 5 Leiden mutation  heterozygous (H) [D68.51]  Other acute pulmonary embolism without acute cor pulmonale (H) (Resolved) [I26.99]             Anticoagulation Episode Summary     INR check location:       Preferred lab:       Send INR reminders to:   JENNIFER KOENIG    Comments:   * hematology referral placed. Pt is breastfeeding. Okay to leave detailed message on phone      Anticoagulation Care Providers     Provider Role Specialty Phone number    Jesse Farnsworth MD Referring Family Practice [phone]    Yessy Lyn APRN CNP Referring Nurse Practitioner [phone]            See the Encounter Report to view Anticoagulation Flowsheet and Dosing Calendar (Go to Encounters tab in chart review, and find the Anticoagulation Therapy Visit)        Mansi Wilson RN [facility]

## 2020-04-17 ENCOUNTER — ANTICOAGULATION THERAPY VISIT (OUTPATIENT)
Dept: ANTICOAGULATION | Facility: CLINIC | Age: 25
End: 2020-04-17

## 2020-04-17 DIAGNOSIS — I26.99 PULMONARY EMBOLISM (H): ICD-10-CM

## 2020-04-17 DIAGNOSIS — D68.51 FACTOR 5 LEIDEN MUTATION, HETEROZYGOUS (H): ICD-10-CM

## 2020-04-17 DIAGNOSIS — D68.51 FACTOR V LEIDEN MUTATION (H): ICD-10-CM

## 2020-04-17 LAB
CAPILLARY BLOOD COLLECTION: NORMAL
INR PPP: 3 (ref 0.86–1.14)

## 2020-04-17 PROCEDURE — 36416 COLLJ CAPILLARY BLOOD SPEC: CPT | Performed by: NURSE PRACTITIONER

## 2020-04-17 PROCEDURE — 99207 ZZC NO CHARGE NURSE ONLY: CPT

## 2020-04-17 PROCEDURE — 85610 PROTHROMBIN TIME: CPT | Performed by: NURSE PRACTITIONER

## 2020-04-17 NOTE — PROGRESS NOTES
ANTICOAGULATION FOLLOW-UP CLINIC VISIT    Patient Name:  Rocío Catherine  Date:  4/17/2020  Contact Type:  Reppler message sent to patient    SUBJECTIVE:  Patient Findings     Comments:   Rei-Frontiert message sent to patient, her response did not include any changes.     Discussed warfarin dosing with Kerline Augustin. Will plan to continue with the same maintenance dose of 10mg Sat,Tues; 7.5mg all other days. If her INR is on the low end of her goal range by Monday patient may need to increase to 10mg MWF, 7.5mg all other days.         Clinical Outcomes     Negatives:   Major bleeding event, Thromboembolic event, Anticoagulation-related hospital admission, Anticoagulation-related ED visit, Anticoagulation-related fatality    Comments:   Rei-Frontiert message sent to patient, her response did not include any changes.     Discussed warfarin dosing with Kerline Augustin. Will plan to continue with the same maintenance dose of 10mg Sat,Tues; 7.5mg all other days. If her INR is on the low end of her goal range by Monday patient may need to increase to 10mg MWF, 7.5mg all other days.            OBJECTIVE    INR   Date Value Ref Range Status   04/17/2020 3.00 (H) 0.86 - 1.14 Final     Comment:     This test is intended for monitoring Coumadin therapy.  Results are not   accurate in patients with prolonged INR due to factor deficiency.         ASSESSMENT / PLAN  No question data found.  Anticoagulation Summary  As of 4/17/2020    INR goal:   2.0-3.0   TTR:   74.5 % (3.7 wk)   INR used for dosing:   3.00 (4/17/2020)   Warfarin maintenance plan:   10 mg (5 mg x 2) every Tue, Sat; 7.5 mg (5 mg x 1.5) all other days   Full warfarin instructions:   10 mg every Tue, Sat; 7.5 mg all other days   Weekly warfarin total:   57.5 mg   No change documented:   Mansi Wilson RN   Plan last modified:   Sarah Felipe RN (4/6/2020)   Next INR check:   4/20/2020   Priority:   High   Target end date:   9/16/2020    Indications    Acute  pulmonary embolism  unspecified pulmonary embolism type  unspecified whether acute cor pulmonale present (H) (Resolved) [I26.99]  Factor 5 Leiden mutation  heterozygous (H) [D68.51]  Other acute pulmonary embolism without acute cor pulmonale (H) (Resolved) [I26.99]             Anticoagulation Episode Summary     INR check location:       Preferred lab:       Send INR reminders to:   PATCHAITANYA [place]    Comments:   * hematology referral placed. Pt is breastfeeding. SEND MYCHART (she usually responds immediately) Okay to leave detailed message on phone      Anticoagulation Care Providers     Provider Role Specialty Phone number    Jesse Farnsworth MD Referring Family Practice [phone]    EboniYessy APRN CNP Referring Nurse Practitioner [phone]            See the Encounter Report to view Anticoagulation Flowsheet and Dosing Calendar (Go to Encounters tab in chart review, and find the Anticoagulation Therapy Visit)        Mansi Wilson RN CACP

## 2020-04-19 ENCOUNTER — APPOINTMENT (OUTPATIENT)
Dept: ULTRASOUND IMAGING | Facility: CLINIC | Age: 25
End: 2020-04-19
Attending: EMERGENCY MEDICINE
Payer: COMMERCIAL

## 2020-04-19 ENCOUNTER — HOSPITAL ENCOUNTER (EMERGENCY)
Facility: CLINIC | Age: 25
Discharge: HOME | End: 2020-04-19
Attending: EMERGENCY MEDICINE | Admitting: EMERGENCY MEDICINE
Payer: COMMERCIAL

## 2020-04-19 VITALS
RESPIRATION RATE: 20 BRPM | WEIGHT: 117 LBS | HEIGHT: 61 IN | TEMPERATURE: 98.6 F | DIASTOLIC BLOOD PRESSURE: 79 MMHG | OXYGEN SATURATION: 99 % | HEART RATE: 107 BPM | BODY MASS INDEX: 22.09 KG/M2 | SYSTOLIC BLOOD PRESSURE: 114 MMHG

## 2020-04-19 DIAGNOSIS — M79.651 PAIN OF RIGHT THIGH: ICD-10-CM

## 2020-04-19 LAB — INR PPP: 2.17 (ref 0.86–1.14)

## 2020-04-19 PROCEDURE — 93971 EXTREMITY STUDY: CPT | Mod: RT

## 2020-04-19 PROCEDURE — 85610 PROTHROMBIN TIME: CPT | Performed by: EMERGENCY MEDICINE

## 2020-04-19 PROCEDURE — 99284 EMERGENCY DEPT VISIT MOD MDM: CPT | Mod: 25 | Performed by: EMERGENCY MEDICINE

## 2020-04-19 PROCEDURE — 99284 EMERGENCY DEPT VISIT MOD MDM: CPT | Mod: Z6 | Performed by: EMERGENCY MEDICINE

## 2020-04-19 PROCEDURE — 99284 EMERGENCY DEPT VISIT MOD MDM: CPT | Mod: 25

## 2020-04-19 ASSESSMENT — MIFFLIN-ST. JEOR: SCORE: 1213.09

## 2020-04-19 NOTE — ED NOTES
Right upper leg pain that started today.  Patient has pulmonary embolisms since March is is currently on coumadin.  Patient has Factor 5.

## 2020-04-19 NOTE — ED TRIAGE NOTES
Pt is having R thigh pain that radiates down leg, pt is 8 weeks postpartum, was diagnosed with PE's postpartum, is on coumadin now. Hx factor V hx, denies breathing problems.

## 2020-04-19 NOTE — ED AVS SNAPSHOT
[facility]  [address] [address]  Phone:  [phone]  Fax:  [phone]                                    Rocío Catherine   MRN: [id number]    Department:  [facility]   Date of Visit:  4/19/2020           After Visit Summary Signature Page    I have received my discharge instructions, and my questions have been answered. I have discussed any challenges I see with this plan with the nurse or doctor.    ..........................................................................................................................................  Patient/Patient Representative Signature      ..........................................................................................................................................  Patient Representative Print Name and Relationship to Patient    ..................................................               ................................................  Date                                   Time    ..........................................................................................................................................  Reviewed by Signature/Title    ...................................................              ..............................................  Date                                               Time          22EPIC Rev 08/18

## 2020-04-20 ENCOUNTER — DOCUMENTATION ONLY (OUTPATIENT)
Dept: FAMILY MEDICINE | Facility: CLINIC | Age: 25
End: 2020-04-20

## 2020-04-20 DIAGNOSIS — D68.51 FACTOR 5 LEIDEN MUTATION, HETEROZYGOUS (H): ICD-10-CM

## 2020-04-20 NOTE — DISCHARGE INSTRUCTIONS
Return if symptoms worsen or new symptoms develop.  Follow-up with primary care physician next available.  Take Tylenol for pain.  Continue to monitor your INR level.  If increased pain swelling numbness weakness or other symptoms present please return for further evaluation and care.

## 2020-04-20 NOTE — PROGRESS NOTES
ANTICOAGULATION  MANAGEMENT: Discharge Review    Rocío Catherine chart reviewed for anticoagulation continuity of care    Emergency room visit on 4-19 for leg pain.    Discharge disposition: Home    Results:    Recent Labs   Lab Test 04/19/20  1904 04/17/20  1035 04/13/20  1038   INR 2.17* 3.00* 2.70*       Anticoagulation inpatient management:     not applicable     Anticoagulation discharge instructions:     Warfarin dosing: home regimen continued   Bridging: No   INR goal change: No      Medication changes affecting anticoagulation: No    Additional factors affecting anticoagulation: US negative for DVT    Plan     Patient contacted and INR rescheduled for 4-. Dosing instructions given to patient.    Anticoagulation Calendar updated    Shelby Samaniego RN

## 2020-04-22 ENCOUNTER — HOSPITAL ENCOUNTER (EMERGENCY)
Facility: CLINIC | Age: 25
Discharge: HOME | End: 2020-04-22
Attending: FAMILY MEDICINE | Admitting: FAMILY MEDICINE
Payer: COMMERCIAL

## 2020-04-22 ENCOUNTER — DOCUMENTATION ONLY (OUTPATIENT)
Dept: FAMILY MEDICINE | Facility: CLINIC | Age: 25
End: 2020-04-22

## 2020-04-22 ENCOUNTER — APPOINTMENT (OUTPATIENT)
Dept: CT IMAGING | Facility: CLINIC | Age: 25
End: 2020-04-22
Attending: FAMILY MEDICINE
Payer: COMMERCIAL

## 2020-04-22 VITALS
HEART RATE: 99 BPM | WEIGHT: 117 LBS | DIASTOLIC BLOOD PRESSURE: 81 MMHG | TEMPERATURE: 98.3 F | OXYGEN SATURATION: 100 % | RESPIRATION RATE: 22 BRPM | SYSTOLIC BLOOD PRESSURE: 118 MMHG | BODY MASS INDEX: 22.11 KG/M2

## 2020-04-22 DIAGNOSIS — D68.51 FACTOR 5 LEIDEN MUTATION, HETEROZYGOUS (H): ICD-10-CM

## 2020-04-22 DIAGNOSIS — R07.89 CHEST PAIN, NON-CARDIAC: ICD-10-CM

## 2020-04-22 DIAGNOSIS — G44.209 TENSION HEADACHE: ICD-10-CM

## 2020-04-22 LAB
ALBUMIN SERPL-MCNC: 4.3 G/DL (ref 3.4–5)
ALP SERPL-CCNC: 97 U/L (ref 40–150)
ALT SERPL W P-5'-P-CCNC: 36 U/L (ref 0–50)
ANION GAP SERPL CALCULATED.3IONS-SCNC: 8 MMOL/L (ref 3–14)
AST SERPL W P-5'-P-CCNC: 29 U/L (ref 0–45)
BASOPHILS # BLD AUTO: 0.1 10E9/L (ref 0–0.2)
BASOPHILS NFR BLD AUTO: 0.8 %
BILIRUB SERPL-MCNC: 0.4 MG/DL (ref 0.2–1.3)
BUN SERPL-MCNC: 15 MG/DL (ref 7–30)
CALCIUM SERPL-MCNC: 8.8 MG/DL (ref 8.5–10.1)
CHLORIDE SERPL-SCNC: 108 MMOL/L (ref 94–109)
CO2 SERPL-SCNC: 23 MMOL/L (ref 20–32)
CREAT SERPL-MCNC: 0.82 MG/DL (ref 0.52–1.04)
D DIMER PPP FEU-MCNC: 0.5 UG/ML FEU (ref 0–0.5)
DIFFERENTIAL METHOD BLD: ABNORMAL
EOSINOPHIL # BLD AUTO: 0.2 10E9/L (ref 0–0.7)
EOSINOPHIL NFR BLD AUTO: 2.7 %
ERYTHROCYTE [DISTWIDTH] IN BLOOD BY AUTOMATED COUNT: 14.2 % (ref 10–15)
GFR SERPL CREATININE-BSD FRML MDRD: >90 ML/MIN/{1.73_M2}
GLUCOSE SERPL-MCNC: 87 MG/DL (ref 70–99)
HCG SERPL QL: NEGATIVE
HCT VFR BLD AUTO: 41.9 % (ref 35–47)
HGB BLD-MCNC: 13.2 G/DL (ref 11.7–15.7)
IMM GRANULOCYTES # BLD: 0 10E9/L (ref 0–0.4)
IMM GRANULOCYTES NFR BLD: 0.1 %
INR PPP: 2.39 (ref 0.86–1.14)
LYMPHOCYTES # BLD AUTO: 2.7 10E9/L (ref 0.8–5.3)
LYMPHOCYTES NFR BLD AUTO: 35.6 %
MCH RBC QN AUTO: 26.2 PG (ref 26.5–33)
MCHC RBC AUTO-ENTMCNC: 31.5 G/DL (ref 31.5–36.5)
MCV RBC AUTO: 83 FL (ref 78–100)
MONOCYTES # BLD AUTO: 0.6 10E9/L (ref 0–1.3)
MONOCYTES NFR BLD AUTO: 8 %
NEUTROPHILS # BLD AUTO: 4 10E9/L (ref 1.6–8.3)
NEUTROPHILS NFR BLD AUTO: 52.8 %
NRBC # BLD AUTO: 0 10*3/UL
NRBC BLD AUTO-RTO: 0 /100
PLATELET # BLD AUTO: 283 10E9/L (ref 150–450)
POTASSIUM SERPL-SCNC: 3.6 MMOL/L (ref 3.4–5.3)
PROT SERPL-MCNC: 7.8 G/DL (ref 6.8–8.8)
RBC # BLD AUTO: 5.03 10E12/L (ref 3.8–5.2)
SODIUM SERPL-SCNC: 139 MMOL/L (ref 133–144)
TROPONIN I SERPL-MCNC: <0.015 UG/L (ref 0–0.04)
WBC # BLD AUTO: 7.5 10E9/L (ref 4–11)

## 2020-04-22 PROCEDURE — 84484 ASSAY OF TROPONIN QUANT: CPT | Performed by: FAMILY MEDICINE

## 2020-04-22 PROCEDURE — 93005 ELECTROCARDIOGRAM TRACING: CPT | Performed by: FAMILY MEDICINE

## 2020-04-22 PROCEDURE — 25000128 H RX IP 250 OP 636: Performed by: FAMILY MEDICINE

## 2020-04-22 PROCEDURE — 84703 CHORIONIC GONADOTROPIN ASSAY: CPT | Performed by: FAMILY MEDICINE

## 2020-04-22 PROCEDURE — 85379 FIBRIN DEGRADATION QUANT: CPT | Performed by: FAMILY MEDICINE

## 2020-04-22 PROCEDURE — 25000125 ZZHC RX 250: Performed by: FAMILY MEDICINE

## 2020-04-22 PROCEDURE — 99285 EMERGENCY DEPT VISIT HI MDM: CPT | Mod: 25 | Performed by: FAMILY MEDICINE

## 2020-04-22 PROCEDURE — 80053 COMPREHEN METABOLIC PANEL: CPT | Performed by: FAMILY MEDICINE

## 2020-04-22 PROCEDURE — 71275 CT ANGIOGRAPHY CHEST: CPT

## 2020-04-22 PROCEDURE — 85025 COMPLETE CBC W/AUTO DIFF WBC: CPT | Performed by: FAMILY MEDICINE

## 2020-04-22 PROCEDURE — 93010 ELECTROCARDIOGRAM REPORT: CPT | Mod: Z6 | Performed by: FAMILY MEDICINE

## 2020-04-22 PROCEDURE — 85610 PROTHROMBIN TIME: CPT | Performed by: FAMILY MEDICINE

## 2020-04-22 RX ORDER — IOPAMIDOL 755 MG/ML
92 INJECTION, SOLUTION INTRAVASCULAR ONCE
Status: COMPLETED | OUTPATIENT
Start: 2020-04-22 | End: 2020-04-22

## 2020-04-22 RX ADMIN — IOPAMIDOL 92 ML: 755 INJECTION, SOLUTION INTRAVENOUS at 13:55

## 2020-04-22 RX ADMIN — SODIUM CHLORIDE 64 ML: 9 INJECTION, SOLUTION INTRAVENOUS at 13:55

## 2020-04-22 ASSESSMENT — ENCOUNTER SYMPTOMS
DIAPHORESIS: 0
FEVER: 0
CHILLS: 0
NAUSEA: 0
ABDOMINAL PAIN: 0
VOMITING: 0
BLOOD IN STOOL: 0
SORE THROAT: 0
SINUS PRESSURE: 0
COUGH: 0
FREQUENCY: 0
HEADACHES: 0
SHORTNESS OF BREATH: 0
WHEEZING: 0
DYSURIA: 0
DIARRHEA: 0
CONSTIPATION: 0
PALPITATIONS: 0

## 2020-04-22 NOTE — ED PROVIDER NOTES
History     Chief Complaint   Patient presents with     Leg Pain     hx  factor V, dx with PE's      HPI  Rocío Catherine is a 24 year old female with history of factor V Leyden deficiency with recently diagnosed PE in March on Coumadin who presents the emergency department complaining of leg pain.  Patient states the leg began hurting earlier today.  It is located in the right upper thigh region mostly laterally but some medial tenderness.  She denies any trauma.  She describes it as an aching pain.  She rates it a 2 out of 10.  She has not had a fever or chills and denies any significant shortness of breath at this time she has not had chest pain.  She is on Coumadin and most recent level was 3.0.  She denies any numbness or weakness in the extremities.  She has not had fevers or chills.    Allergies:  No Known Allergies    Problem List:    Patient Active Problem List    Diagnosis Date Noted     Acute pulmonary embolism (H) 2020     Priority: Medium     S/P  2020     Priority: Medium     Palpitations 2019     Priority: Medium     Chronic migraine without aura without status migrainosus, not intractable 2019     Priority: Medium     Sinus tachycardia 2019     Priority: Medium     Non-rheumatic mitral regurgitation, trace 2019     Priority: Medium     Seen on echocardiogram 2019. Recommended for follow up echo in 2-3 years.        Recurrent major depressive disorder, in remission (H) 2018     Priority: Medium     Anxiety 2018     Priority: Medium     Factor 5 Leiden mutation, heterozygous (H) 2018     Priority: Medium     Anemia 2016     Priority: Medium     Dysthymia 2013     Priority: Medium        Past Medical History:    Past Medical History:   Diagnosis Date     Calculus of gallbladder with acute cholecystitis without obstruction 2017     Chickenpox      Factor 5 Leiden mutation, heterozygous (H) 2018     Factor V  "Leiden (H)      History of frequent urinary tract infections      Non-rheumatic mitral regurgitation, trace 2019     Postpartum depression      Pulmonary emboli (H) 2020       Past Surgical History:    Past Surgical History:   Procedure Laterality Date      SECTION N/A 2020    Procedure:  SECTION;  Surgeon: Grace Dee MD;  Location: [facility] OR      SECTION      c section     HEAD & NECK SURGERY      hemangioma on neck at age 2     LAPAROSCOPIC CHOLECYSTECTOMY       SINUS FRONTAL OPEN OBLITERATION             Family History:    Family History   Problem Relation Age of Onset     Stomach Problem Mother         ulcer     Bleeding Disorder Father         factor V     Ovarian Cancer Maternal Grandmother      Thyroid Cancer Paternal Grandmother      Alzheimer Disease Paternal Grandfather      Bleeding Disorder Paternal Grandfather         factor V       Social History:  Marital Status:   [2]  Social History     Tobacco Use     Smoking status: Never Smoker     Smokeless tobacco: Never Used   Substance Use Topics     Alcohol use: Yes     Comment:  rare-quit with pregnancy     Drug use: No        Medications:    Prenatal Vit-Fe Fumarate-FA (PRENATAL MULTIVITAMIN W/IRON) 27-0.8 MG tablet  warfarin ANTICOAGULANT (COUMADIN) 5 MG tablet          Review of Systems  As per HPI.  Physical Exam   BP: 114/79  Pulse: 107  Temp: 98.6  F (37  C)  Resp: 20  Height: 154.9 cm (5' 1\")  Weight: 53.1 kg (117 lb)  SpO2: 99 %      Physical Exam  Vitals signs and nursing note reviewed.   Constitutional:       General: She is not in acute distress.     Appearance: Normal appearance. She is not ill-appearing, toxic-appearing or diaphoretic.   HENT:      Head: Normocephalic.      Nose: Nose normal.      Mouth/Throat:      Mouth: Mucous membranes are moist.      Pharynx: Oropharynx is clear.   Eyes:      Conjunctiva/sclera: Conjunctivae normal.   Neck:      Musculoskeletal: Normal range of motion " and neck supple.   Cardiovascular:      Rate and Rhythm: Normal rate and regular rhythm.      Heart sounds: Normal heart sounds. No murmur.   Pulmonary:      Effort: Pulmonary effort is normal.      Breath sounds: Normal breath sounds. No wheezing, rhonchi or rales.   Musculoskeletal:      Comments: Moving all extremities well.  There is tenderness to palpation of the anterior and lateral mid thigh region.  No swelling or erythema is noted.  There is no calf tenderness pulses sensation symmetrical.   Skin:     General: Skin is warm and dry.      Findings: No rash.   Neurological:      General: No focal deficit present.      Mental Status: She is alert.   Psychiatric:         Mood and Affect: Mood normal.         ED Course        Procedures               Critical Care time:  none               Results for orders placed or performed during the hospital encounter of 04/19/20   US Lower Extremity Venous Duplex Right    Narrative    ULTRASOUND VENOUS LOWER EXTREMITY UNILATERAL RIGHT  4/19/2020 7:05 PM     HISTORY: Right lower extremity pain in a patient with history of  pulmonary embolism.    COMPARISON: 3/12/2020    TECHNIQUE: Ultrasound gray scale, Color Doppler flow, and spectral  Doppler waveform analysis performed.    FINDINGS: Right lower extremity is negative for deep venous  thrombosis. The right common femoral, superficial femoral, popliteal  and posterior tibial veins are patent and fully compressible and  demonstrate normal venous Doppler flow. The visualized greater  saphenous vein is negative for thrombus.      Impression    IMPRESSION: No DVT demonstrated.    CURT L BEHRNS, MD             Assessments & Plan (with Medical Decision Making) records were reviewed.  Due to patient's history a venous Doppler ultrasound was obtained of the right lower extremity.  INR was checked and was 2.17.  Venous Doppler ultrasound revealed no evidence of DVT.  Findings were discussed in detail with the patient this appears to  be pain in the leg of unknown cause.  Her INR level seems to have been therapeutic and no evidence of DVT is present.  She will return if symptoms worsen or new symptoms develop and follow-up with primary care next available.     I have reviewed the nursing notes.    I have reviewed the findings, diagnosis, plan and need for follow up with the patient.       Discharge Medication List as of 4/19/2020  8:10 PM          Final diagnoses:   Pain of right thigh       4/19/2020   [facility] EMERGENCY DEPARTMENT     Jas Garcia MD  04/22/20 1007

## 2020-04-22 NOTE — ED AVS SNAPSHOT
[facility]  [address] [address]  Phone:  [phone]  Fax:  [phone]                                    Rocío Catherine   MRN: [id number]    Department:  [facility]   Date of Visit:  4/22/2020           After Visit Summary Signature Page    I have received my discharge instructions, and my questions have been answered. I have discussed any challenges I see with this plan with the nurse or doctor.    ..........................................................................................................................................  Patient/Patient Representative Signature      ..........................................................................................................................................  Patient Representative Print Name and Relationship to Patient    ..................................................               ................................................  Date                                   Time    ..........................................................................................................................................  Reviewed by Signature/Title    ...................................................              ..............................................  Date                                               Time          22EPIC Rev 08/18

## 2020-04-22 NOTE — ED NOTES
Left sided chest pain that radiates to back since yesterday that radiates down left arm.  Patient has known PE.  Was seen in the ER on Sunday for DVT rule out.  Patient denies nausea/vomiting.  Patient has factor 5.

## 2020-04-22 NOTE — PROGRESS NOTES
ANTICOAGULATION  MANAGEMENT: Discharge Review    Rocío Catherine chart reviewed for anticoagulation continuity of care    Emergency room visit on 4-22 for non cardiac chest pain.    Discharge disposition: Home    Results:    Recent Labs   Lab Test 04/22/20  1258   INR 2.39*       Anticoagulation inpatient management:     not applicable     Anticoagulation discharge instructions:     Warfarin dosing: home regimen continued   Bridging: No   INR goal change: No      Medication changes affecting anticoagulation: No    Additional factors affecting anticoagulation: No    Plan     No adjustment to anticoagulation plan needed    Patient not contacted. She has an INR already scheduled for 4-24-20.    Anticoagulation Calendar updated    Shelby Samaniego RN

## 2020-04-22 NOTE — ED PROVIDER NOTES
History     Chief Complaint   Patient presents with     Chest Pain     Had confirmed PE in March.  Still c/o left chest pain      HPI  Rocío Catherine is a 24 year old female who presents with a history of   factor V Leiden, pulmonary embolism in March and has persistent left chest pain.  She has been on warfarin and had developed leg pain right upper thigh mostly laterally without trauma and was seen for this on 2020.  She had 2 out of 10 pain on her presentation INR was most recently 3.0.  She underwent a ultrasound for DVT at her evaluation 3 days ago and continued to demonstrate no venous thromboembolism.  She returns today for left chest pain.  Patient also has a history of echocardiogram 2019 demonstrating LV ejection fraction 60 to 65% with normal valves and function.  This is been done I believe due to a history of nonrheumatic mitral regurgitation.    Notes that she developed left-sided chest pain without radiation that is new from from her prior pain that had been in her left back from her original PE.  This occurred over the last couple of days.  Mild, pleuritic component to this.  No shortness of breath.  No nausea vomiting or sweats.  Nonexertional.  No associated rash.  No trauma to the area.  She does lift her children has 1  and 3 children at home.       Allergies:  No Known Allergies    Problem List:    Patient Active Problem List    Diagnosis Date Noted     Acute pulmonary embolism (H) 2020     Priority: Medium     S/P  2020     Priority: Medium     Palpitations 2019     Priority: Medium     Chronic migraine without aura without status migrainosus, not intractable 2019     Priority: Medium     Sinus tachycardia 2019     Priority: Medium     Non-rheumatic mitral regurgitation, trace 2019     Priority: Medium     Seen on echocardiogram 2019. Recommended for follow up echo in 2-3 years.        Recurrent major depressive disorder, in remission  (H) 2018     Priority: Medium     Anxiety 2018     Priority: Medium     Factor 5 Leiden mutation, heterozygous (H) 2018     Priority: Medium     Anemia 2016     Priority: Medium     Dysthymia 2013     Priority: Medium        Past Medical History:    Past Medical History:   Diagnosis Date     Calculus of gallbladder with acute cholecystitis without obstruction 2017     Chickenpox      Factor 5 Leiden mutation, heterozygous (H) 2018     Factor V Leiden (H)      History of frequent urinary tract infections      Non-rheumatic mitral regurgitation, trace 2019     Postpartum depression      Pulmonary emboli (H) 2020       Past Surgical History:    Past Surgical History:   Procedure Laterality Date      SECTION N/A 2020    Procedure:  SECTION;  Surgeon: Grace Dee MD;  Location: [facility] OR      SECTION      c section     HEAD & NECK SURGERY      hemangioma on neck at age 2     LAPAROSCOPIC CHOLECYSTECTOMY       SINUS FRONTAL OPEN OBLITERATION             Family History:    Family History   Problem Relation Age of Onset     Stomach Problem Mother         ulcer     Bleeding Disorder Father         factor V     Ovarian Cancer Maternal Grandmother      Thyroid Cancer Paternal Grandmother      Alzheimer Disease Paternal Grandfather      Bleeding Disorder Paternal Grandfather         factor V       Social History:  Marital Status:   [2]  Social History     Tobacco Use     Smoking status: Never Smoker     Smokeless tobacco: Never Used   Substance Use Topics     Alcohol use: Yes     Comment:  rare-quit with pregnancy     Drug use: No        Medications:    Prenatal Vit-Fe Fumarate-FA (PRENATAL MULTIVITAMIN W/IRON) 27-0.8 MG tablet  warfarin ANTICOAGULANT (COUMADIN) 5 MG tablet          Review of Systems   Constitutional: Negative for chills, diaphoresis and fever.   HENT: Negative for ear pain, sinus pressure and sore throat.    Eyes:  Negative for visual disturbance.   Respiratory: Negative for cough, shortness of breath and wheezing.    Cardiovascular: Positive for chest pain. Negative for palpitations.   Gastrointestinal: Negative for abdominal pain, blood in stool, constipation, diarrhea, nausea and vomiting.   Genitourinary: Negative for dysuria, frequency and urgency.   Skin: Negative for rash.   Neurological: Negative for headaches.   All other systems reviewed and are negative.      Physical Exam   BP: (!) 147/100  Pulse: 92  Temp: 98.3  F (36.8  C)  Resp: 18  Weight: 53.1 kg (117 lb)  SpO2: 100 %      Physical Exam  Constitutional:       General: She is in acute distress.      Appearance: She is not ill-appearing or toxic-appearing.   HENT:      Head: Atraumatic.      Nose: Nose normal. No congestion or rhinorrhea.      Mouth/Throat:      Mouth: Mucous membranes are moist.   Eyes:      Conjunctiva/sclera: Conjunctivae normal.   Neck:      Musculoskeletal: Neck supple.   Cardiovascular:      Rate and Rhythm: Normal rate and regular rhythm.      Heart sounds: No murmur. No friction rub. No gallop.    Pulmonary:      Effort: Pulmonary effort is normal. No respiratory distress.      Breath sounds: Normal breath sounds. No stridor. No wheezing or rhonchi.   Chest:      Chest wall: No tenderness.   Abdominal:      General: Abdomen is flat. Bowel sounds are normal. There is no distension.      Tenderness: There is no abdominal tenderness. There is no guarding.   Musculoskeletal:      Right lower leg: No edema.      Left lower leg: No edema.   Skin:     Coloration: Skin is not pale.      Findings: No rash.   Neurological:      General: No focal deficit present.      Mental Status: She is alert.         ED Course        Procedures                 EKG Interpretation:      Interpreted by Efraín Tripp MD  EKG done at 1300 hrs. demonstrates a sinus rhythm at 95 bpm with a normal axis and no ST change.  No T wave changes.  Normal R progression and  no Q waves.  Normal intervals.  Normal conduction.  No ectopy.  Impression sinus rhythm at 95 bpm and no change from prior EKG from the last month.           Critical Care time:  none               Results for orders placed or performed during the hospital encounter of 04/22/20 (from the past 24 hour(s))   CBC with platelets, differential   Result Value Ref Range    WBC 7.5 4.0 - 11.0 10e9/L    RBC Count 5.03 3.8 - 5.2 10e12/L    Hemoglobin 13.2 11.7 - 15.7 g/dL    Hematocrit 41.9 35.0 - 47.0 %    MCV 83 78 - 100 fl    MCH 26.2 (L) 26.5 - 33.0 pg    MCHC 31.5 31.5 - 36.5 g/dL    RDW 14.2 10.0 - 15.0 %    Platelet Count 283 150 - 450 10e9/L    Diff Method Automated Method     % Neutrophils 52.8 %    % Lymphocytes 35.6 %    % Monocytes 8.0 %    % Eosinophils 2.7 %    % Basophils 0.8 %    % Immature Granulocytes 0.1 %    Nucleated RBCs 0 0 /100    Absolute Neutrophil 4.0 1.6 - 8.3 10e9/L    Absolute Lymphocytes 2.7 0.8 - 5.3 10e9/L    Absolute Monocytes 0.6 0.0 - 1.3 10e9/L    Absolute Eosinophils 0.2 0.0 - 0.7 10e9/L    Absolute Basophils 0.1 0.0 - 0.2 10e9/L    Abs Immature Granulocytes 0.0 0 - 0.4 10e9/L    Absolute Nucleated RBC 0.0    Comprehensive metabolic panel   Result Value Ref Range    Sodium 139 133 - 144 mmol/L    Potassium 3.6 3.4 - 5.3 mmol/L    Chloride 108 94 - 109 mmol/L    Carbon Dioxide 23 20 - 32 mmol/L    Anion Gap 8 3 - 14 mmol/L    Glucose 87 70 - 99 mg/dL    Urea Nitrogen 15 7 - 30 mg/dL    Creatinine 0.82 0.52 - 1.04 mg/dL    GFR Estimate >90 >60 mL/min/[1.73_m2]    GFR Estimate If Black >90 >60 mL/min/[1.73_m2]    Calcium 8.8 8.5 - 10.1 mg/dL    Bilirubin Total 0.4 0.2 - 1.3 mg/dL    Albumin 4.3 3.4 - 5.0 g/dL    Protein Total 7.8 6.8 - 8.8 g/dL    Alkaline Phosphatase 97 40 - 150 U/L    ALT 36 0 - 50 U/L    AST 29 0 - 45 U/L   Troponin I   Result Value Ref Range    Troponin I ES <0.015 0.000 - 0.045 ug/L   HCG qualitative pregnancy (blood)   Result Value Ref Range    HCG Qualitative Serum  "Negative NEG^Negative       Medications - No data to display    Assessments & Plan (with Medical Decision Making)     MDM: Rocío Catherine is a 24 year old female who presents with factor V Leiden and a history of  section in February who developed a pulmonary embolism in March and presented here 3 days ago with leg pain and had negative ultrasound.  Has been on warfarin since her diagnosis, chosen for her lactating.   Onset of left-sided chest pain the last couple of days not associated other cardiopulmonary symptoms.  Presenting vital signs with normal heart rates blood pressure oxygenation.  Examination reassuring and nontender in this region.  Differential diagnosis certainly would include pulmonary embolism but could also be pericarditis, myocarditis, referred pain from the upper abdomen, early shingles, chest wall pain, pneumonia.  Will obtain repeat CT chest.    CT chest is negative.  D-dimer was performed also to test to see if this would offer a marker in this patient to use for further testing for chest pain.  It is normal at this time.  INR is also therapeutic.  Discussed results.  Precautions given for return.  Additional management as below.    I have reviewed the nursing notes.    I have reviewed the findings, diagnosis, plan and need for follow up with the patient.       New Prescriptions    No medications on file       Final diagnoses:   Chest pain, non-cardiac - no signs of recurrent PE.  D-dime done today could be used as marker for future as to whether to obtain repeat CT in chest pain. INR is therapeutic -= stay on warfarin. other causes of chest pain include constipation, chest wall from lifting (lat dorsi muscle), early shingles (watch for rash in this area). other testing reassuring.   Tension headache - Recommend range of motion neck.  consider PT.  Consider \"heal your own neck\", by Sheryl.  return immed for new or different headache or any head injury as warfarin can result in " intracranial bleeding.       4/22/2020   [facility] EMERGENCY DEPARTMENT     Efraín Tripp MD  04/22/20 [id number]

## 2020-04-24 ENCOUNTER — MYC MEDICAL ADVICE (OUTPATIENT)
Dept: FAMILY MEDICINE | Facility: CLINIC | Age: 25
End: 2020-04-24

## 2020-04-24 ENCOUNTER — MEDICAL CORRESPONDENCE (OUTPATIENT)
Dept: HEALTH INFORMATION MANAGEMENT | Facility: CLINIC | Age: 25
End: 2020-04-24

## 2020-04-24 ENCOUNTER — VIRTUAL VISIT (OUTPATIENT)
Dept: FAMILY MEDICINE | Facility: CLINIC | Age: 25
End: 2020-04-24
Payer: COMMERCIAL

## 2020-04-24 ENCOUNTER — ANTICOAGULATION THERAPY VISIT (OUTPATIENT)
Dept: FAMILY MEDICINE | Facility: CLINIC | Age: 25
End: 2020-04-24

## 2020-04-24 DIAGNOSIS — I26.99 PULMONARY EMBOLISM (H): ICD-10-CM

## 2020-04-24 DIAGNOSIS — D68.51 FACTOR V LEIDEN MUTATION (H): ICD-10-CM

## 2020-04-24 DIAGNOSIS — R30.0 DYSURIA: Primary | ICD-10-CM

## 2020-04-24 DIAGNOSIS — D68.51 FACTOR 5 LEIDEN MUTATION, HETEROZYGOUS (H): ICD-10-CM

## 2020-04-24 LAB
CAPILLARY BLOOD COLLECTION: NORMAL
INR PPP: 3 (ref 0.86–1.14)

## 2020-04-24 PROCEDURE — 99207 ZZC NO CHARGE NURSE ONLY: CPT | Performed by: NURSE PRACTITIONER

## 2020-04-24 PROCEDURE — 99213 OFFICE O/P EST LOW 20 MIN: CPT | Mod: 95 | Performed by: INTERNAL MEDICINE

## 2020-04-24 PROCEDURE — 85610 PROTHROMBIN TIME: CPT | Performed by: NURSE PRACTITIONER

## 2020-04-24 PROCEDURE — 36416 COLLJ CAPILLARY BLOOD SPEC: CPT | Performed by: NURSE PRACTITIONER

## 2020-04-24 RX ORDER — NITROFURANTOIN 25; 75 MG/1; MG/1
100 CAPSULE ORAL 2 TIMES DAILY
Qty: 10 CAPSULE | Refills: 0 | Status: SHIPPED | OUTPATIENT
Start: 2020-04-24 | End: 2020-05-19

## 2020-04-24 NOTE — PROGRESS NOTES
ANTICOAGULATION FOLLOW-UP CLINIC VISIT    Patient Name:  Rocío Catherine  Date:  4/24/2020  Contact Type:  telephone    SUBJECTIVE:  Patient Findings     Positives:   Change in health (has UTI), Change in medications (starting macrobid for UTI)    Comments:     Assessed for S/S bleeding, clotting, medication, diet, health, activity and alcohol changes          Clinical Outcomes     Negatives:   Major bleeding event, Thromboembolic event, Anticoagulation-related hospital admission, Anticoagulation-related ED visit, Anticoagulation-related fatality    Comments:     Assessed for S/S bleeding, clotting, medication, diet, health, activity and alcohol changes             OBJECTIVE    INR   Date Value Ref Range Status   04/24/2020 3.00 (H) 0.86 - 1.14 Final     Comment:     This test is intended for monitoring Coumadin therapy.  Results are not   accurate in patients with prolonged INR due to factor deficiency.         ASSESSMENT / PLAN  INR assessment THER    Recheck INR In: 2 WEEKS    INR Location Clinic      Anticoagulation Summary  As of 4/24/2020    INR goal:   2.0-3.0   TTR:   79.8 % (1.1 mo)   INR used for dosing:   3.00 (4/24/2020)   Warfarin maintenance plan:   10 mg (5 mg x 2) every Mon, Wed, Fri; 7.5 mg (5 mg x 1.5) all other days   Full warfarin instructions:   10 mg every Mon, Wed, Fri; 7.5 mg all other days   Weekly warfarin total:   60 mg   Plan last modified:   Shelby Samaniego RN (4/20/2020)   Next INR check:   5/8/2020   Priority:   High   Target end date:   9/16/2020    Indications    Acute pulmonary embolism  unspecified pulmonary embolism type  unspecified whether acute cor pulmonale present (H) (Resolved) [I26.99]  Factor 5 Leiden mutation  heterozygous (H) [D68.51]  Other acute pulmonary embolism without acute cor pulmonale (H) (Resolved) [I26.99]             Anticoagulation Episode Summary     INR check location:       Preferred lab:       Send INR reminders to:   JENNIFER KOENIG    Comments:   *  hematology referral placed. Pt is breastfeeding. SEND MYCHART (she usually responds immediately) Okay to leave detailed message on phone      Anticoagulation Care Providers     Provider Role Specialty Phone number    Jesse Farnsworth MD Referring Family Practice [phone]    Yessy Lyn APRN CNP Referring Nurse Practitioner [phone]            See the Encounter Report to view Anticoagulation Flowsheet and Dosing Calendar (Go to Encounters tab in chart review, and find the Anticoagulation Therapy Visit)        Crystal Rodriguez RN

## 2020-04-24 NOTE — PROGRESS NOTES
"Rocío Catherine is a 24 year old female who is being evaluated via a billable video visit.      The patient has been notified of following:     \"This video visit will be conducted via a call between you and your physician/provider. We have found that certain health care needs can be provided without the need for an in-person physical exam.  This service lets us provide the care you need with a video conversation.  If a prescription is necessary we can send it directly to your pharmacy.  If lab work is needed we can place an order for that and you can then stop by our lab to have the test done at a later time.    Video visits are billed at different rates depending on your insurance coverage.  Please reach out to your insurance provider with any questions.    If during the course of the call the physician/provider feels a video visit is not appropriate, you will not be charged for this service.\"    Patient has given verbal consent for Video visit? {YES-NO  Default Yes:4444::\"Yes\"}    How would you like to obtain your AVS? {AVS Preference:026986}    Patient would like the video invitation sent by: {video visit invitation:927579}    Will anyone else be joining your video visit? {:602136}    {If patient encounters technical issues they should call [phone] :268181}    Subjective     Rocío Catherine is a 24 year old female who presents to clinic today for the following health issues:    HPI  {SUPERLIST (Optional):597862}  {PEDS Chronic and Acute Problems (Optional):838287}     Video Start Time: {video visit start/end time for provider to select:132318}    {additonal problems for provider to add (Optional):997051}    {HIST REVIEW/ LINKS 2 (Optional):277988}    Reviewed and updated as needed this visit by Provider         Review of Systems   {ROS COMP (Optional):790166}      Objective    There were no vitals taken for this visit.  Estimated body mass index is 22.11 kg/m  as calculated from the following:    Height as of 4/19/20: " "1.549 m (5' 1\").    Weight as of 4/22/20: 53.1 kg (117 lb).  Physical Exam     {video visit exam brief selected:794626::\"GENERAL: healthy, alert and no distress\",\"EYES: Eyes grossly normal to inspection, conjunctivae and sclerae normal\",\"RESP: no audible wheeze, cough, or visible cyanosis.  No visible retractions or increased work of breathing.  Able to speak fully in complete sentences.\",\"NEURO: Cranial nerves grossly intact, mentation intact and speech normal\",\"PSYCH: mentation appears normal, affect normal/bright, judgement and insight intact, normal speech and appearance well-groomed\"}      {Diagnostic Test Results (Optional):980476::\"Diagnostic Test Results:\",\"Labs reviewed in Epic\"}        {PROVIDER CHARTING PREFERENCE:441772}      Video-Visit Details    Type of service:  Video Visit    Video End Time:{video visit start/end time for provider to select:566403}    Originating Location (pt. Location): {video visit patient location:917405::\"Home\"}    Distant Location (provider location):  [facility]     Mode of Communication:  Video Conference via [employer]    No follow-ups on file.       {signature options:588547}        "

## 2020-04-24 NOTE — PROGRESS NOTES
"Rocío Catherine is a 24 year old female who is being evaluated via a billable video visit.      The patient has been notified of following:     \"This video visit will be conducted via a call between you and your physician/provider. We have found that certain health care needs can be provided without the need for an in-person physical exam.  This service lets us provide the care you need with a video conversation.  If a prescription is necessary we can send it directly to your pharmacy.  If lab work is needed we can place an order for that and you can then stop by our lab to have the test done at a later time.    Video visits are billed at different rates depending on your insurance coverage.  Please reach out to your insurance provider with any questions.    If during the course of the call the physician/provider feels a video visit is not appropriate, you will not be charged for this service.\"    Patient has given verbal consent for Video visit? Yes    How would you like to obtain your AVS? Miriam    Patient would like the video invitation sent by: Text to cell phone: [phone]    Will anyone else be joining your video visit? No      Subjective     Rocío Catherine is a 24 year old female who presents to clinic today for the following health issues:    HPI  UTI - Female  Duration of complaint: x middle of the night last night, started 4am when she got up to pump breastmilk, infant is over 2 months old  Description:   Painful urination (Dysuria): YES- burning           Frequency: YES  Blood in urine (Hematuria): no   Delay in urine (Hesitency): YES  Intensity: mild, moderate  Progression of Symptoms:  worsening  Accompanying Signs & Symptoms:  Fever/chills: no   Flank pain no   Nausea and vomiting: no   Any vaginal symptoms: none  Abdominal/Pelvic Pain: no   History:   History of frequent UTI's: YES- last one over a year ago  History of kidney stones: no   Sexually Active: YES  Possibility of pregnancy: No  Precipitating " factors:   Therapies Tried and outcome:  Increase fluid intake      Chest pain slightly better since ER visit on        Video Start Time: 8:21 AM    Patient Active Problem List   Diagnosis     Recurrent major depressive disorder, in remission (H)     Anxiety     Factor 5 Leiden mutation, heterozygous (H)     Sinus tachycardia     Non-rheumatic mitral regurgitation, trace     Anemia     Dysthymia     Chronic migraine without aura without status migrainosus, not intractable     Palpitations     S/P      Acute pulmonary embolism (H)     Past Surgical History:   Procedure Laterality Date      SECTION N/A 2020    Procedure:  SECTION;  Surgeon: Grace Dee MD;  Location: [facility] OR      SECTION      c section     HEAD & NECK SURGERY      hemangioma on neck at age 2     LAPAROSCOPIC CHOLECYSTECTOMY       SINUS FRONTAL OPEN OBLITERATION             Social History     Tobacco Use     Smoking status: Never Smoker     Smokeless tobacco: Never Used   Substance Use Topics     Alcohol use: Yes     Comment:  rare-quit with pregnancy     Family History   Problem Relation Age of Onset     Stomach Problem Mother         ulcer     Bleeding Disorder Father         factor V     Ovarian Cancer Maternal Grandmother      Thyroid Cancer Paternal Grandmother      Alzheimer Disease Paternal Grandfather      Bleeding Disorder Paternal Grandfather         factor V         Current Outpatient Medications   Medication Sig Dispense Refill     warfarin ANTICOAGULANT (COUMADIN) 5 MG tablet Take 10 mg every Tue, Fri; 7.5 mg all other days or as directed by the Anticoagulation Clinic. 140 tablet 0     Prenatal Vit-Fe Fumarate-FA (PRENATAL MULTIVITAMIN W/IRON) 27-0.8 MG tablet Take 1 tablet by mouth daily       No Known Allergies    Reviewed and updated as needed this visit by Provider         Review of Systems   ROS COMP: Constitutional and gu systems are negative, except as otherwise noted.     "  Objective    There were no vitals taken for this visit.  Estimated body mass index is 22.11 kg/m  as calculated from the following:    Height as of 4/19/20: 1.549 m (5' 1\").    Weight as of 4/22/20: 53.1 kg (117 lb).  Physical Exam     GENERAL: healthy, alert and no distress  EYES: Eyes grossly normal to inspection, conjunctivae and sclerae normal  RESP: no audible wheeze, cough, or visible cyanosis.  No visible retractions or increased work of breathing.  Able to speak fully in complete sentences.  NEURO: Cranial nerves grossly intact, mentation intact and speech normal  PSYCH: mentation appears normal, affect normal/bright, judgement and insight intact, normal speech and appearance well-groomed            Assessment & Plan     1. Dysuria    Symptoms suggestive of UTI.  GIven the COVID pandemic, we will defer U/A to avoid having her come into lab.  Will start empiric treatment with Macrobid.  If not improving in a few days, advised her to call and we'd consider getting the U/A.  Nitrofurantoin should be okay with her breastfeeding since infant is over 1 month old.     - nitroFURantoin macrocrystal-monohydrate (MACROBID) 100 MG capsule; Take 1 capsule (100 mg) by mouth 2 times daily for 5 days  Dispense: 10 capsule; Refill: 0       Return in about 3 days (around 4/27/2020), or if symptoms worsen or fail to improve.    Alberto Luke MD  [facility]      Video-Visit Details    Type of service:  Video Visit    Video End Time:8:27 AM    Originating Location (pt. Location): Home    Distant Location (provider location):  [facility]    Mode of Communication:  Video Conference via [employer]    Return in about 1 week (around 5/1/2020), or if symptoms worsen or fail to improve.       Alberto Luke MD      "

## 2020-04-28 ENCOUNTER — VIRTUAL VISIT (OUTPATIENT)
Dept: FAMILY MEDICINE | Facility: CLINIC | Age: 25
End: 2020-04-28
Payer: COMMERCIAL

## 2020-04-28 ENCOUNTER — DOCUMENTATION ONLY (OUTPATIENT)
Dept: FAMILY MEDICINE | Facility: CLINIC | Age: 25
End: 2020-04-28

## 2020-04-28 DIAGNOSIS — R30.0 DYSURIA: ICD-10-CM

## 2020-04-28 DIAGNOSIS — N89.8 VAGINAL DISCHARGE: Primary | ICD-10-CM

## 2020-04-28 DIAGNOSIS — B96.89 BACTERIAL VAGINITIS: Primary | ICD-10-CM

## 2020-04-28 DIAGNOSIS — N89.8 VAGINAL DISCHARGE: ICD-10-CM

## 2020-04-28 DIAGNOSIS — N76.0 BACTERIAL VAGINITIS: Primary | ICD-10-CM

## 2020-04-28 LAB
ALBUMIN UR-MCNC: NEGATIVE MG/DL
APPEARANCE UR: CLEAR
BILIRUB UR QL STRIP: NEGATIVE
COLOR UR AUTO: YELLOW
GLUCOSE UR STRIP-MCNC: NEGATIVE MG/DL
HGB UR QL STRIP: NEGATIVE
KETONES UR STRIP-MCNC: NEGATIVE MG/DL
LEUKOCYTE ESTERASE UR QL STRIP: NEGATIVE
NITRATE UR QL: NEGATIVE
PH UR STRIP: 6.5 PH (ref 5–7)
SOURCE: NORMAL
SP GR UR STRIP: <=1.005 (ref 1–1.03)
SPECIMEN SOURCE: ABNORMAL
UROBILINOGEN UR STRIP-ACNC: 0.2 EU/DL (ref 0.2–1)
WET PREP SPEC: ABNORMAL

## 2020-04-28 PROCEDURE — 87591 N.GONORRHOEAE DNA AMP PROB: CPT | Performed by: NURSE PRACTITIONER

## 2020-04-28 PROCEDURE — 81003 URINALYSIS AUTO W/O SCOPE: CPT | Performed by: NURSE PRACTITIONER

## 2020-04-28 PROCEDURE — 87210 SMEAR WET MOUNT SALINE/INK: CPT | Performed by: NURSE PRACTITIONER

## 2020-04-28 PROCEDURE — 87491 CHLMYD TRACH DNA AMP PROBE: CPT | Performed by: NURSE PRACTITIONER

## 2020-04-28 PROCEDURE — 99213 OFFICE O/P EST LOW 20 MIN: CPT | Mod: GT | Performed by: NURSE PRACTITIONER

## 2020-04-28 RX ORDER — METRONIDAZOLE 7.5 MG/G
GEL TOPICAL 2 TIMES DAILY
Qty: 45 G | Refills: 0 | Status: SHIPPED | OUTPATIENT
Start: 2020-04-28 | End: 2020-05-19

## 2020-04-28 NOTE — PROGRESS NOTES
Patient came in for UA and Wet Prep.  There was an order for Urine but not for Wet Prep.  Please Order ASAP!    Thanks Lab

## 2020-04-28 NOTE — PROGRESS NOTES
"Rocío Catherine is a 24 year old female who is being evaluated via a billable video visit.      The patient has been notified of following:     \"This video visit will be conducted via a call between you and your physician/provider. We have found that certain health care needs can be provided without the need for an in-person physical exam.  This service lets us provide the care you need with a video conversation.  If a prescription is necessary we can send it directly to your pharmacy.  If lab work is needed we can place an order for that and you can then stop by our lab to have the test done at a later time.    Video visits are billed at different rates depending on your insurance coverage.  Please reach out to your insurance provider with any questions.    If during the course of the call the physician/provider feels a video visit is not appropriate, you will not be charged for this service.\"    Patient has given verbal consent for Video visit? Yes    How would you like to obtain your AVS? Miriam    Patient would like the video invitation sent by: Text to cell phone: [phone]    Will anyone else be joining your video visit? No      Subjective      Rocío Catherine is a 24 year old female who presents to clinic today for the following health issues:    HPI  Vaginal Symptoms  Onset: yesterday     Description:Patient woke up with brownish bloody discharge yesterday she is breast feeding has not has a mensual cycle since baby was born, Patient was also treated for a UTI on Friday was given Macrobid   Vaginal Discharge: bloody brownish   Itching (Pruritis): no   Burning sensation:  no   Odor: no     Accompanying Signs & Symptoms:  Pain with Urination: no   Abdominal Pain: no   Fever: no     History:   Sexually active: YES  New Partner: no   Possibility of Pregnancy:  No    Precipitating factors:   Recent Antibiotic Use: YES- Macrobid    Alleviating factors:  Patient is breast feeding had baby 8 weeks ago did bleed for about " 1.5 to 2 weeks after having baby     Therapies Tried and outcome: Patient started Macrobid on Friday due to bladder sx     Patient states that she is 8 weeks post . Developed brownish bloody discharge yesterday and intermittent LL flank pain. No fever or chills, no nausea or vomiting.       Video Start Time: 9:26    -------------------------------------    Patient Active Problem List   Diagnosis     Recurrent major depressive disorder, in remission (H)     Anxiety     Factor 5 Leiden mutation, heterozygous (H)     Sinus tachycardia     Non-rheumatic mitral regurgitation, trace     Anemia     Dysthymia     Chronic migraine without aura without status migrainosus, not intractable     Palpitations     S/P      Acute pulmonary embolism (H)     Past Surgical History:   Procedure Laterality Date      SECTION N/A 2020    Procedure:  SECTION;  Surgeon: Grace Dee MD;  Location: [facility] OR      SECTION      c section     HEAD & NECK SURGERY      hemangioma on neck at age 2     LAPAROSCOPIC CHOLECYSTECTOMY       SINUS FRONTAL OPEN OBLITERATION             Social History     Tobacco Use     Smoking status: Never Smoker     Smokeless tobacco: Never Used   Substance Use Topics     Alcohol use: Yes     Comment:  rare-quit with pregnancy     Family History   Problem Relation Age of Onset     Stomach Problem Mother         ulcer     Bleeding Disorder Father         factor V     Ovarian Cancer Maternal Grandmother      Thyroid Cancer Paternal Grandmother      Alzheimer Disease Paternal Grandfather      Bleeding Disorder Paternal Grandfather         factor V           Reviewed and updated as needed this visit by Provider         Review of Systems   ROS COMP: Constitutional, HEENT, cardiovascular, pulmonary, GI, , musculoskeletal, neuro, skin, endocrine and psych systems are negative, except as otherwise noted.      Objective    There were no vitals taken for this  "visit.  Estimated body mass index is 22.11 kg/m  as calculated from the following:    Height as of 4/19/20: 1.549 m (5' 1\").    Weight as of 4/22/20: 53.1 kg (117 lb).  Physical Exam     GENERAL: healthy, alert and no distress  EYES: Eyes grossly normal to inspection, conjunctivae and sclerae normal  RESP: no audible wheeze, cough, or visible cyanosis.  No visible retractions or increased work of breathing.  Able to speak fully in complete sentences.  NEURO: Cranial nerves grossly intact, mentation intact and speech normal  PSYCH: mentation appears normal, affect normal/bright, judgement and insight intact, normal speech and appearance well-groomed      Diagnostic Test Results:  Labs reviewed in Epic        Assessment & Plan     1. Vaginal discharge  ? UTI vs vaginal infection vs Menstrual cycle  - **UA reflex to Microscopic FUTURE anytime; Future  - Wet prep  - Chlamydia trachomatis PCR  - Neisseria gonorrhoeae PCR    2. Dysuria  Patient treated for UTI 4 days ago  - **UA reflex to Microscopic FUTURE anytime; Future  - Wet prep  - Chlamydia trachomatis PCR  - Neisseria gonorrhoeae PCR         No follow-ups on file.    SNOW Mckeon CNP  [facility]      Video-Visit Details    Type of service:  Video Visit    Video End Time:9:32    Originating Location (pt. Location): Home    Distant Location (provider location):  [facility]     Mode of Communication:  Video Conference via [employer]    No follow-ups on file.       SNOW Mckeon CNP      "

## 2020-04-29 LAB
C TRACH DNA SPEC QL NAA+PROBE: NEGATIVE
N GONORRHOEA DNA SPEC QL NAA+PROBE: NEGATIVE
SPECIMEN SOURCE: NORMAL
SPECIMEN SOURCE: NORMAL

## 2020-05-08 ENCOUNTER — ANTICOAGULATION THERAPY VISIT (OUTPATIENT)
Dept: FAMILY MEDICINE | Facility: CLINIC | Age: 25
End: 2020-05-08

## 2020-05-08 DIAGNOSIS — D68.51 FACTOR V LEIDEN MUTATION (H): ICD-10-CM

## 2020-05-08 DIAGNOSIS — D68.51 FACTOR 5 LEIDEN MUTATION, HETEROZYGOUS (H): ICD-10-CM

## 2020-05-08 DIAGNOSIS — I26.99 ACUTE PULMONARY EMBOLISM (H): ICD-10-CM

## 2020-05-08 DIAGNOSIS — I26.99 PULMONARY EMBOLISM (H): ICD-10-CM

## 2020-05-08 LAB
CAPILLARY BLOOD COLLECTION: NORMAL
INR PPP: 3.1 (ref 0.86–1.14)

## 2020-05-08 PROCEDURE — 36416 COLLJ CAPILLARY BLOOD SPEC: CPT | Performed by: NURSE PRACTITIONER

## 2020-05-08 PROCEDURE — 85610 PROTHROMBIN TIME: CPT | Performed by: NURSE PRACTITIONER

## 2020-05-08 PROCEDURE — 99207 ZZC NO CHARGE NURSE ONLY: CPT | Performed by: NURSE PRACTITIONER

## 2020-05-08 NOTE — PROGRESS NOTES
ANTICOAGULATION FOLLOW-UP CLINIC VISIT    Patient Name:  Rocío Catherine  Date:  5/8/2020  Contact Type:  Telephone    SUBJECTIVE:  Patient Findings     Comments:   No changes in medications, activity, health, or diet noted. No bleeding or increased bruising noted. Took warfarin as prescribed.  Completed antibiotic on Sunday.  Patient will continue weekly maintenance dose.   Recheck in 1 week.  Patient verbalizes understanding and agrees to plan. No further questions or concerns.  Patient denies signs or symptoms of bleeding. Writer educated patient regarding increased bleed risk and when to seek immediate medical attention. Patient verbalized understanding.          Clinical Outcomes     Negatives:   Major bleeding event, Thromboembolic event, Anticoagulation-related hospital admission, Anticoagulation-related ED visit, Anticoagulation-related fatality    Comments:   No changes in medications, activity, health, or diet noted. No bleeding or increased bruising noted. Took warfarin as prescribed.  Completed antibiotic on Sunday.  Patient will continue weekly maintenance dose.   Recheck in 1 week.  Patient verbalizes understanding and agrees to plan. No further questions or concerns.  Patient denies signs or symptoms of bleeding. Writer educated patient regarding increased bleed risk and when to seek immediate medical attention. Patient verbalized understanding.             OBJECTIVE    INR   Date Value Ref Range Status   05/08/2020 3.10 (H) 0.86 - 1.14 Final     Comment:     This test is intended for monitoring Coumadin therapy.  Results are not   accurate in patients with prolonged INR due to factor deficiency.         ASSESSMENT / PLAN  INR assessment SUPRA    Recheck INR In: 1 WEEK    INR Location Outside lab      Anticoagulation Summary  As of 5/8/2020    INR goal:   2.0-3.0   TTR:   56.3 % (1.6 mo)   INR used for dosing:   3.10! (5/8/2020)   Warfarin maintenance plan:   10 mg (5 mg x 2) every Mon, Wed, Fri; 7.5 mg (5  mg x 1.5) all other days   Full warfarin instructions:   10 mg every Mon, Wed, Fri; 7.5 mg all other days   Weekly warfarin total:   60 mg   No change documented:   Tim Randall RN   Plan last modified:   Shelby Samaniego RN (4/20/2020)   Next INR check:   5/15/2020   Priority:   High   Target end date:   9/16/2020    Indications    Acute pulmonary embolism  unspecified pulmonary embolism type  unspecified whether acute cor pulmonale present (H) (Resolved) [I26.99]  Factor 5 Leiden mutation  heterozygous (H) [D68.51]  Other acute pulmonary embolism without acute cor pulmonale (H) (Resolved) [I26.99]             Anticoagulation Episode Summary     INR check location:       Preferred lab:       Send INR reminders to:   JENNIFER KOENIG    Comments:   * hematology referral placed. Pt is breastfeeding. SEND MYCHART (she usually responds immediately) Okay to leave detailed message on phone      Anticoagulation Care Providers     Provider Role Specialty Phone number    Jesse Farnsworth MD Referring Family Practice [phone]    Yessy Lyn APRN CNP Referring Nurse Practitioner [phone]            See the Encounter Report to view Anticoagulation Flowsheet and Dosing Calendar (Go to Encounters tab in chart review, and find the Anticoagulation Therapy Visit)        Tim Randall RN

## 2020-05-11 ENCOUNTER — MYC MEDICAL ADVICE (OUTPATIENT)
Dept: FAMILY MEDICINE | Facility: CLINIC | Age: 25
End: 2020-05-11

## 2020-05-18 ENCOUNTER — ANTICOAGULATION THERAPY VISIT (OUTPATIENT)
Dept: ANTICOAGULATION | Facility: CLINIC | Age: 25
End: 2020-05-18

## 2020-05-18 DIAGNOSIS — I26.99 PULMONARY EMBOLISM (H): ICD-10-CM

## 2020-05-18 DIAGNOSIS — D68.51 FACTOR V LEIDEN MUTATION (H): ICD-10-CM

## 2020-05-18 DIAGNOSIS — D68.51 FACTOR 5 LEIDEN MUTATION, HETEROZYGOUS (H): ICD-10-CM

## 2020-05-18 LAB
CAPILLARY BLOOD COLLECTION: NORMAL
INR PPP: 2.8 (ref 0.86–1.14)

## 2020-05-18 PROCEDURE — 36416 COLLJ CAPILLARY BLOOD SPEC: CPT | Performed by: NURSE PRACTITIONER

## 2020-05-18 PROCEDURE — 99207 ZZC NO CHARGE NURSE ONLY: CPT

## 2020-05-18 PROCEDURE — 85610 PROTHROMBIN TIME: CPT | Performed by: NURSE PRACTITIONER

## 2020-05-18 NOTE — PROGRESS NOTES
ANTICOAGULATION FOLLOW-UP CLINIC VISIT    Patient Name:  Rocío Catherine  Date:  2020  Contact Type:  Telephone/ FeeFighterst message also sent    SUBJECTIVE:  Patient Findings     Positives:   Other complaints (PCP appt tomorrow)    Comments:   Patient states she is scheduled to see her PCP tomorrow to discuss options of restarting/starting new antidepressants. She is not sure what medication she would be able to take since she is breastfeeding. She states she will notify [facility] of the change, if one is made. INR is therapeutic. Patient will continue maintenance dose of warfarin and recheck INR in 2 weeks. No other changes or concerns reported.           Clinical Outcomes     Comments:   Patient states she is scheduled to see her PCP tomorrow to discuss options of restarting/starting new antidepressants. She is not sure what medication she would be able to take since she is breastfeeding. She states she will notify [facility] of the change, if one is made. INR is therapeutic. Patient will continue maintenance dose of warfarin and recheck INR in 2 weeks. No other changes or concerns reported.              OBJECTIVE    Recent labs: (last 7 days)     20  1427   INR 2.80*       ASSESSMENT / PLAN  INR assessment THER    Recheck INR In: 2 WEEKS    INR Location Outside lab      Anticoagulation Summary  As of 2020    INR goal:   2.0-3.0   TTR:   58.1 % (1.9 mo)   INR used for dosin.80 (2020)   Warfarin maintenance plan:   10 mg (5 mg x 2) every Mon, Wed, Fri; 7.5 mg (5 mg x 1.5) all other days   Full warfarin instructions:   10 mg every Mon, Wed, Fri; 7.5 mg all other days   Weekly warfarin total:   60 mg   No change documented:   Sarah Felipe RN   Plan last modified:   Shelby Samaniego RN (2020)   Next INR check:   2020   Priority:   High   Target end date:   2020    Indications    Acute pulmonary embolism  unspecified pulmonary embolism type  unspecified whether acute cor pulmonale present (H)  (Resolved) [I26.99]  Factor 5 Leiden mutation  heterozygous (H) [D68.51]  Other acute pulmonary embolism without acute cor pulmonale (H) (Resolved) [I26.99]             Anticoagulation Episode Summary     INR check location:       Preferred lab:       Send INR reminders to:   JENNIFER KOENIG    Comments:   * hematology referral placed. Pt is breastfeeding. SEND MYCHART (she usually responds immediately) Okay to leave detailed message on phone      Anticoagulation Care Providers     Provider Role Specialty Phone number    Jesse Farnsworth MD Referring Family Practice [phone]    Yessy Lyn APRN CNP Referring Nurse Practitioner [phone]            See the Encounter Report to view Anticoagulation Flowsheet and Dosing Calendar (Go to Encounters tab in chart review, and find the Anticoagulation Therapy Visit)      Sarah Felipe RN

## 2020-05-19 ENCOUNTER — VIRTUAL VISIT (OUTPATIENT)
Dept: FAMILY MEDICINE | Facility: CLINIC | Age: 25
End: 2020-05-19
Payer: COMMERCIAL

## 2020-05-19 ENCOUNTER — TELEPHONE (OUTPATIENT)
Dept: FAMILY MEDICINE | Facility: CLINIC | Age: 25
End: 2020-05-19

## 2020-05-19 DIAGNOSIS — F41.9 ANXIETY: Primary | ICD-10-CM

## 2020-05-19 DIAGNOSIS — F32.A DEPRESSION, UNSPECIFIED DEPRESSION TYPE: ICD-10-CM

## 2020-05-19 DIAGNOSIS — D68.51 FACTOR 5 LEIDEN MUTATION, HETEROZYGOUS (H): ICD-10-CM

## 2020-05-19 PROCEDURE — 99214 OFFICE O/P EST MOD 30 MIN: CPT | Mod: TEL | Performed by: NURSE PRACTITIONER

## 2020-05-19 ASSESSMENT — ANXIETY QUESTIONNAIRES
IF YOU CHECKED OFF ANY PROBLEMS ON THIS QUESTIONNAIRE, HOW DIFFICULT HAVE THESE PROBLEMS MADE IT FOR YOU TO DO YOUR WORK, TAKE CARE OF THINGS AT HOME, OR GET ALONG WITH OTHER PEOPLE: SOMEWHAT DIFFICULT
6. BECOMING EASILY ANNOYED OR IRRITABLE: NEARLY EVERY DAY
3. WORRYING TOO MUCH ABOUT DIFFERENT THINGS: NEARLY EVERY DAY
2. NOT BEING ABLE TO STOP OR CONTROL WORRYING: NEARLY EVERY DAY
GAD7 TOTAL SCORE: 18
7. FEELING AFRAID AS IF SOMETHING AWFUL MIGHT HAPPEN: NEARLY EVERY DAY
5. BEING SO RESTLESS THAT IT IS HARD TO SIT STILL: NOT AT ALL
1. FEELING NERVOUS, ANXIOUS, OR ON EDGE: NEARLY EVERY DAY

## 2020-05-19 ASSESSMENT — PATIENT HEALTH QUESTIONNAIRE - PHQ9
SUM OF ALL RESPONSES TO PHQ QUESTIONS 1-9: 8
5. POOR APPETITE OR OVEREATING: NEARLY EVERY DAY

## 2020-05-19 NOTE — TELEPHONE ENCOUNTER
Patient calling to update INR; she will be starting Zoloft today. Discussed that this class of medication can impact her INR/interfere with Warfarin.    Patient has appt scheduled for INR in 2 weeks for recheck.    Dayami Mcfarland RN  Anticoagulation Nurse - [facility] [place]

## 2020-05-19 NOTE — PROGRESS NOTES
"Rocío Catherine is a 24 year old female who is being evaluated via a billable telephone visit.      The patient has been notified of following:     \"This telephone visit will be conducted via a call between you and your physician/provider. We have found that certain health care needs can be provided without the need for a physical exam.  This service lets us provide the care you need with a short phone conversation.  If a prescription is necessary we can send it directly to your pharmacy.  If lab work is needed we can place an order for that and you can then stop by our lab to have the test done at a later time.    Telephone visits are billed at different rates depending on your insurance coverage. During this emergency period, for some insurers they may be billed the same as an in-person visit.  Please reach out to your insurance provider with any questions.    If during the course of the call the physician/provider feels a telephone visit is not appropriate, you will not be charged for this service.\"    Patient has given verbal consent for Telephone visit?  Yes    What phone number would you like to be contacted at? [phone]    How would you like to obtain your AVS? Mail a copy    Subjective     Rocío Catherine is a 24 year old female who presents via phone visit today for the following health issues:    HPI  Depression and Anxiety Follow-Up    How are you doing with your depression since your last visit? Worsened - She stopped taking her medication when she found out she was pregnant.    How are you doing with your anxiety since your last visit?  Worsened     Are you having other symptoms that might be associated with depression or anxiety? Yes:  Pt reports that she had a minor panic attack last Friday.    Have you had a significant life event? OTHER: Pt just had her 3rd baby. Her  has been home a lot more and her routine has changed.     Do you have any concerns with your use of alcohol or other drugs? No   PHQ " 2/13/2019 4/3/2020 5/19/2020   PHQ-9 Total Score 0 1 8   Q9: Thoughts of better off dead/self-harm past 2 weeks Not at all Not at all Not at all     MORA-7 SCORE 7/3/2018 4/3/2020 5/19/2020   Total Score 13 2 18           Social History     Tobacco Use     Smoking status: Never Smoker     Smokeless tobacco: Never Used   Substance Use Topics     Alcohol use: Yes     Comment:  rare-quit with pregnancy     Drug use: No     PHQ 7/3/2018 2/13/2019 4/3/2020   PHQ-9 Total Score 8 0 1   Q9: Thoughts of better off dead/self-harm past 2 weeks Not at all Not at all Not at all     MORA-7 SCORE 3/20/2018 7/3/2018 4/3/2020   Total Score 7 13 2     Last PHQ-9 4/3/2020   1.  Little interest or pleasure in doing things 0   2.  Feeling down, depressed, or hopeless 0   3.  Trouble falling or staying asleep, or sleeping too much 0   4.  Feeling tired or having little energy 1   5.  Poor appetite or overeating 0   6.  Feeling bad about yourself 0   7.  Trouble concentrating 0   8.  Moving slowly or restless 0   Q9: Thoughts of better off dead/self-harm past 2 weeks 0   PHQ-9 Total Score 1   Difficulty at work, home, or with people Not difficult at all     MORA-7  4/3/2020   1. Feeling nervous, anxious, or on edge 0   2. Not being able to stop or control worrying 0   3. Worrying too much about different things 0   4. Trouble relaxing 0   5. Being so restless that it is hard to sit still 0   6. Becoming easily annoyed or irritable 1   7. Feeling afraid, as if something awful might happen 1   MORA-7 Total Score 2   If you checked any problems, how difficult have they made it for you to do your work, take care of things at home, or get along with other people? Not difficult at all     In the past two weeks have you had thoughts of suicide or self-harm?  No.    Do you have concerns about your personal safety or the safety of others?   No    Suicide Assessment Five-step Evaluation and Treatment (SAFE-T)      How many servings of fruits and  vegetables do you eat daily?  0-1    On average, how many sweetened beverages do you drink each day (Examples: soda, juice, sweet tea, etc.  Do NOT count diet or artificially sweetened beverages)?   0    How many days per week do you exercise enough to make your heart beat faster? 3 or less    How many minutes a day do you exercise enough to make your heart beat faster? 60 or more    How many days per week do you miss taking your medication? 0        -------------------------------------    Patient Active Problem List   Diagnosis     Recurrent major depressive disorder, in remission (H)     Anxiety     Factor 5 Leiden mutation, heterozygous (H)     Sinus tachycardia     Non-rheumatic mitral regurgitation, trace     Anemia     Dysthymia     Chronic migraine without aura without status migrainosus, not intractable     Palpitations     S/P      Acute pulmonary embolism (H)     Past Surgical History:   Procedure Laterality Date      SECTION N/A 2020    Procedure:  SECTION;  Surgeon: Grace Dee MD;  Location: [facility]      SECTION      c section     HEAD & NECK SURGERY      hemangioma on neck at age 2     LAPAROSCOPIC CHOLECYSTECTOMY       SINUS FRONTAL OPEN OBLITERATION             Social History     Tobacco Use     Smoking status: Never Smoker     Smokeless tobacco: Never Used   Substance Use Topics     Alcohol use: Yes     Comment:  rare-quit with pregnancy     Family History   Problem Relation Age of Onset     Stomach Problem Mother         ulcer     Bleeding Disorder Father         factor V     Ovarian Cancer Maternal Grandmother      Thyroid Cancer Paternal Grandmother      Alzheimer Disease Paternal Grandfather      Bleeding Disorder Paternal Grandfather         factor V           Reviewed and updated as needed this visit by Provider         Review of Systems   Constitutional, HEENT, cardiovascular, pulmonary, gi and gu systems are negative, except as otherwise  noted.       Objective   Reported vitals:  There were no vitals taken for this visit.   alert and no distress  PSYCH: Alert and oriented times 3; coherent speech, normal   rate and volume, able to articulate logical thoughts, able   to abstract reason, no tangential thoughts, no hallucinations   or delusions  Her affect is normal  RESP: No cough, no audible wheezing, able to talk in full sentences  Remainder of exam unable to be completed due to telephone visits    Diagnostic Test Results:  Labs reviewed in Epic        Assessment/Plan:  1. Anxiety  Will start low dose serotonin specific reuptake inhibitor-   - sertraline (ZOLOFT) 50 MG tablet; Take 1 tablet (50 mg) by mouth daily  Dispense: 30 tablet; Refill: 3    2. Depression, unspecified depression type  Will start low  dose SSRI  - sertraline (ZOLOFT) 50 MG tablet; Take 1 tablet (50 mg) by mouth daily  Dispense: 30 tablet; Refill: 3    No follow-ups on file.      Phone call duration:  8 minutes    SNOW Mckeon CNP

## 2020-05-20 ASSESSMENT — ANXIETY QUESTIONNAIRES: GAD7 TOTAL SCORE: 18

## 2020-06-01 ENCOUNTER — ANTICOAGULATION THERAPY VISIT (OUTPATIENT)
Dept: ANTICOAGULATION | Facility: CLINIC | Age: 25
End: 2020-06-01

## 2020-06-01 ENCOUNTER — MYC MEDICAL ADVICE (OUTPATIENT)
Dept: ANTICOAGULATION | Facility: CLINIC | Age: 25
End: 2020-06-01

## 2020-06-01 DIAGNOSIS — I26.99 PULMONARY EMBOLISM (H): ICD-10-CM

## 2020-06-01 DIAGNOSIS — D68.51 FACTOR V LEIDEN MUTATION (H): ICD-10-CM

## 2020-06-01 DIAGNOSIS — D68.51 FACTOR 5 LEIDEN MUTATION, HETEROZYGOUS (H): ICD-10-CM

## 2020-06-01 DIAGNOSIS — I26.99 ACUTE PULMONARY EMBOLISM (H): ICD-10-CM

## 2020-06-01 LAB
CAPILLARY BLOOD COLLECTION: NORMAL
INR PPP: 3.4 (ref 0.86–1.14)

## 2020-06-01 PROCEDURE — 99207 ZZC NO CHARGE NURSE ONLY: CPT

## 2020-06-01 PROCEDURE — 85610 PROTHROMBIN TIME: CPT | Performed by: NURSE PRACTITIONER

## 2020-06-01 PROCEDURE — 36416 COLLJ CAPILLARY BLOOD SPEC: CPT | Performed by: NURSE PRACTITIONER

## 2020-06-01 RX ORDER — WARFARIN SODIUM 5 MG/1
TABLET ORAL
Qty: 150 TABLET | Refills: 0
Start: 2020-06-01 | End: 2020-06-19

## 2020-06-01 NOTE — PROGRESS NOTES
Addendum: Patient responded to [employer] message that she has not yet started zoloft. Writer requested she check her INR within a week of starting this medication.    Shelby Samaniego RN, BSN, PHN  6-3-2020    ANTICOAGULATION FOLLOW-UP CLINIC VISIT    Patient Name:  Rocío Catherine  Date:  6/1/2020  Contact Type:  Telephone/ DVM left and mychart sent. Patient to call [facility] back or respond to [employer].    SUBJECTIVE:  Patient Findings     Positives:   Change in medications (started zoloft on 5-19)    Comments:   Per epic notes, patient recently started zoloft, which can increase INR. Will reduce warfarin dose by 8.3% and recheck INR in 10-14 days. Patient prefers mychart communication so this was sent and a VM left on home phone to either call [facility] back or respond via [employer].         Clinical Outcomes     Comments:   Per epic notes, patient recently started zoloft, which can increase INR. Will reduce warfarin dose by 8.3% and recheck INR in 10-14 days. Patient prefers mychart communication so this was sent and a VM left on home phone to either call [facility] back or respond via [employer].            OBJECTIVE    Recent labs: (last 7 days)     06/01/20  1015   INR 3.40*       ASSESSMENT / PLAN  INR assessment SUPRA    Recheck INR In: 2 WEEKS    INR Location Clinic      Anticoagulation Summary  As of 6/1/2020    INR goal:   2.0-3.0   TTR:   53.3 % (2.4 mo)   INR used for dosing:   3.40! (6/1/2020)   Warfarin maintenance plan:   10 mg (5 mg x 2) every Fri; 7.5 mg (5 mg x 1.5) all other days   Full warfarin instructions:   10 mg every Fri; 7.5 mg all other days   Weekly warfarin total:   55 mg   Plan last modified:   Shelby Samaniego RN (6/1/2020)   Next INR check:   6/15/2020   Priority:   High   Target end date:   9/16/2020    Indications    Acute pulmonary embolism  unspecified pulmonary embolism type  unspecified whether acute cor pulmonale present (H) (Resolved) [I26.99]  Factor 5 Leiden mutation  heterozygous (H) [D68.51]  Other  acute pulmonary embolism without acute cor pulmonale (H) (Resolved) [I26.99]             Anticoagulation Episode Summary     INR check location:       Preferred lab:       Send INR reminders to:   JENNIFER KOENIG    Comments:   * hematology referral placed. Pt is breastfeeding. SEND MYCHART (she usually responds immediately) Okay to leave detailed message on phone      Anticoagulation Care Providers     Provider Role Specialty Phone number    Jesse Farnsworth MD Referring Family Practice [phone]    Yessy Lyn APRN CNP Referring Nurse Practitioner [phone]            See the Encounter Report to view Anticoagulation Flowsheet and Dosing Calendar (Go to Encounters tab in chart review, and find the Anticoagulation Therapy Visit)        Shelby Samaniego RN

## 2020-06-16 ENCOUNTER — ANTICOAGULATION THERAPY VISIT (OUTPATIENT)
Dept: FAMILY MEDICINE | Facility: CLINIC | Age: 25
End: 2020-06-16

## 2020-06-16 ENCOUNTER — TELEPHONE (OUTPATIENT)
Dept: FAMILY MEDICINE | Facility: CLINIC | Age: 25
End: 2020-06-16

## 2020-06-16 DIAGNOSIS — D68.51 FACTOR 5 LEIDEN MUTATION, HETEROZYGOUS (H): ICD-10-CM

## 2020-06-16 DIAGNOSIS — D68.51 FACTOR V LEIDEN MUTATION (H): ICD-10-CM

## 2020-06-16 DIAGNOSIS — I26.99 PULMONARY EMBOLISM (H): ICD-10-CM

## 2020-06-16 LAB
CAPILLARY BLOOD COLLECTION: NORMAL
INR PPP: 1.2 (ref 0.86–1.14)

## 2020-06-16 PROCEDURE — 36416 COLLJ CAPILLARY BLOOD SPEC: CPT | Performed by: NURSE PRACTITIONER

## 2020-06-16 PROCEDURE — 85610 PROTHROMBIN TIME: CPT | Performed by: NURSE PRACTITIONER

## 2020-06-16 PROCEDURE — 99207 ZZC NO CHARGE NURSE ONLY: CPT | Performed by: NURSE PRACTITIONER

## 2020-06-16 NOTE — TELEPHONE ENCOUNTER
06/16/20    Patient's INR was 1.20 today. She is on Coumadin for an acute PE in March 2020. Writer increased the patient's dose for the next 2 days. Recheck on Friday. Would you like the patient to bridge?    Tim Randall RN, BSN, PHN  Anticoagulation Clinic   [phone]

## 2020-06-16 NOTE — PROGRESS NOTES
ANTICOAGULATION FOLLOW-UP CLINIC VISIT    Patient Name:  Rocío Catherine  Date:  2020  Contact Type:  Telephone    SUBJECTIVE:  Patient Findings     Positives:   Missed doses    Comments:   No changes in medications, activity, health, or diet noted. No bleeding or increased bruising noted.   Etelvina was camping this weekend so she for sure missed one dose.  Patient has not started her Zoloft.  Patient will take 10 mg today and tomorrow, then 7.5 mg on Thursday.  Recheck in 3 days.   Will send TE to PCP to see if patient should bridge.        Clinical Outcomes     Comments:   No changes in medications, activity, health, or diet noted. No bleeding or increased bruising noted.   Felixt was camping this weekend so she for sure missed one dose.  Patient has not started her Zoloft.  Patient will take 10 mg today and tomorrow, then 7.5 mg on Thursday.  Recheck in 3 days.   Will send TE to PCP to see if patient should bridge.           OBJECTIVE    Recent labs: (last 7 days)     20  1308   INR 1.20*       ASSESSMENT / PLAN  INR assessment SUB    Recheck INR In: 3 DAYS    INR Location Outside lab      Anticoagulation Summary  As of 2020    INR goal:   2.0-3.0   TTR:   51.9 % (2.9 mo)   INR used for dosin.20! (2020)   Warfarin maintenance plan:   10 mg (5 mg x 2) every Fri; 7.5 mg (5 mg x 1.5) all other days   Full warfarin instructions:   : 10 mg; : 10 mg; Otherwise 10 mg every Fri; 7.5 mg all other days   Weekly warfarin total:   55 mg   Plan last modified:   Shelby Samaniego RN (2020)   Next INR check:   2020   Priority:   High   Target end date:   2020    Indications    Acute pulmonary embolism  unspecified pulmonary embolism type  unspecified whether acute cor pulmonale present (H) (Resolved) [I26.99]  Factor 5 Leiden mutation  heterozygous (H) [D68.51]  Other acute pulmonary embolism without acute cor pulmonale (H) (Resolved) [I26.99]             Anticoagulation Episode  Summary     INR check location:       Preferred lab:       Send INR reminders to:   JENNIFER KOENIG    Comments:   * hematology referral placed. Pt is breastfeeding. SEND MYCHART (she usually responds immediately) Okay to leave detailed message on phone      Anticoagulation Care Providers     Provider Role Specialty Phone number    Jesse Farnsworth MD Referring Family Practice [phone]    Yessy Lyn APRN CNP Referring Nurse Practitioner [phone]            See the Encounter Report to view Anticoagulation Flowsheet and Dosing Calendar (Go to Encounters tab in chart review, and find the Anticoagulation Therapy Visit)        Tim Randall RN

## 2020-06-17 ENCOUNTER — APPOINTMENT (OUTPATIENT)
Dept: MRI IMAGING | Facility: CLINIC | Age: 25
End: 2020-06-17
Attending: FAMILY MEDICINE
Payer: COMMERCIAL

## 2020-06-17 ENCOUNTER — APPOINTMENT (OUTPATIENT)
Dept: CT IMAGING | Facility: CLINIC | Age: 25
End: 2020-06-17
Attending: FAMILY MEDICINE
Payer: COMMERCIAL

## 2020-06-17 ENCOUNTER — HOSPITAL ENCOUNTER (EMERGENCY)
Facility: CLINIC | Age: 25
Discharge: HOME | End: 2020-06-17
Attending: FAMILY MEDICINE | Admitting: FAMILY MEDICINE
Payer: COMMERCIAL

## 2020-06-17 VITALS
HEART RATE: 107 BPM | SYSTOLIC BLOOD PRESSURE: 121 MMHG | WEIGHT: 110 LBS | DIASTOLIC BLOOD PRESSURE: 79 MMHG | RESPIRATION RATE: 13 BRPM | OXYGEN SATURATION: 96 % | TEMPERATURE: 98.4 F | BODY MASS INDEX: 20.77 KG/M2 | HEIGHT: 61 IN

## 2020-06-17 DIAGNOSIS — R07.89 ATYPICAL CHEST PAIN: ICD-10-CM

## 2020-06-17 DIAGNOSIS — R79.1 SUBTHERAPEUTIC INTERNATIONAL NORMALIZED RATIO (INR): ICD-10-CM

## 2020-06-17 DIAGNOSIS — R42 VERTIGO: ICD-10-CM

## 2020-06-17 LAB
ALBUMIN SERPL-MCNC: 4.5 G/DL (ref 3.4–5)
ALP SERPL-CCNC: 111 U/L (ref 40–150)
ALT SERPL W P-5'-P-CCNC: 26 U/L (ref 0–50)
ANION GAP SERPL CALCULATED.3IONS-SCNC: 8 MMOL/L (ref 3–14)
AST SERPL W P-5'-P-CCNC: 24 U/L (ref 0–45)
BASOPHILS # BLD AUTO: 0.1 10E9/L (ref 0–0.2)
BASOPHILS NFR BLD AUTO: 0.6 %
BILIRUB SERPL-MCNC: 0.3 MG/DL (ref 0.2–1.3)
BUN SERPL-MCNC: 16 MG/DL (ref 7–30)
CALCIUM SERPL-MCNC: 9.3 MG/DL (ref 8.5–10.1)
CHLORIDE SERPL-SCNC: 106 MMOL/L (ref 94–109)
CO2 SERPL-SCNC: 26 MMOL/L (ref 20–32)
CREAT SERPL-MCNC: 0.84 MG/DL (ref 0.52–1.04)
DIFFERENTIAL METHOD BLD: NORMAL
EOSINOPHIL # BLD AUTO: 0.4 10E9/L (ref 0–0.7)
EOSINOPHIL NFR BLD AUTO: 5.4 %
ERYTHROCYTE [DISTWIDTH] IN BLOOD BY AUTOMATED COUNT: 14.3 % (ref 10–15)
GFR SERPL CREATININE-BSD FRML MDRD: >90 ML/MIN/{1.73_M2}
GLUCOSE SERPL-MCNC: 126 MG/DL (ref 70–99)
HCG SERPL QL: NEGATIVE
HCT VFR BLD AUTO: 42.5 % (ref 35–47)
HGB BLD-MCNC: 14 G/DL (ref 11.7–15.7)
IMM GRANULOCYTES # BLD: 0 10E9/L (ref 0–0.4)
IMM GRANULOCYTES NFR BLD: 0.2 %
INR PPP: 1.64 (ref 0.86–1.14)
LYMPHOCYTES # BLD AUTO: 3.2 10E9/L (ref 0.8–5.3)
LYMPHOCYTES NFR BLD AUTO: 39.7 %
MCH RBC QN AUTO: 27.4 PG (ref 26.5–33)
MCHC RBC AUTO-ENTMCNC: 32.9 G/DL (ref 31.5–36.5)
MCV RBC AUTO: 83 FL (ref 78–100)
MONOCYTES # BLD AUTO: 0.6 10E9/L (ref 0–1.3)
MONOCYTES NFR BLD AUTO: 7.7 %
NEUTROPHILS # BLD AUTO: 3.7 10E9/L (ref 1.6–8.3)
NEUTROPHILS NFR BLD AUTO: 46.4 %
NRBC # BLD AUTO: 0 10*3/UL
NRBC BLD AUTO-RTO: 0 /100
PLATELET # BLD AUTO: 269 10E9/L (ref 150–450)
POTASSIUM SERPL-SCNC: 3.7 MMOL/L (ref 3.4–5.3)
PROT SERPL-MCNC: 8.4 G/DL (ref 6.8–8.8)
RBC # BLD AUTO: 5.11 10E12/L (ref 3.8–5.2)
SODIUM SERPL-SCNC: 140 MMOL/L (ref 133–144)
TROPONIN I SERPL-MCNC: <0.015 UG/L (ref 0–0.04)
WBC # BLD AUTO: 8 10E9/L (ref 4–11)

## 2020-06-17 PROCEDURE — 71275 CT ANGIOGRAPHY CHEST: CPT

## 2020-06-17 PROCEDURE — 70553 MRI BRAIN STEM W/O & W/DYE: CPT

## 2020-06-17 PROCEDURE — 84703 CHORIONIC GONADOTROPIN ASSAY: CPT | Performed by: FAMILY MEDICINE

## 2020-06-17 PROCEDURE — 25000125 ZZHC RX 250: Performed by: FAMILY MEDICINE

## 2020-06-17 PROCEDURE — 25500064 ZZH RX 255 OP 636: Performed by: FAMILY MEDICINE

## 2020-06-17 PROCEDURE — 93005 ELECTROCARDIOGRAM TRACING: CPT | Performed by: FAMILY MEDICINE

## 2020-06-17 PROCEDURE — 80053 COMPREHEN METABOLIC PANEL: CPT | Performed by: FAMILY MEDICINE

## 2020-06-17 PROCEDURE — 96361 HYDRATE IV INFUSION ADD-ON: CPT | Performed by: FAMILY MEDICINE

## 2020-06-17 PROCEDURE — 93010 ELECTROCARDIOGRAM REPORT: CPT | Mod: Z6 | Performed by: FAMILY MEDICINE

## 2020-06-17 PROCEDURE — 25800030 ZZH RX IP 258 OP 636: Performed by: FAMILY MEDICINE

## 2020-06-17 PROCEDURE — 99285 EMERGENCY DEPT VISIT HI MDM: CPT | Mod: 25 | Performed by: FAMILY MEDICINE

## 2020-06-17 PROCEDURE — 84484 ASSAY OF TROPONIN QUANT: CPT | Performed by: FAMILY MEDICINE

## 2020-06-17 PROCEDURE — A9585 GADOBUTROL INJECTION: HCPCS | Performed by: FAMILY MEDICINE

## 2020-06-17 PROCEDURE — 25000128 H RX IP 250 OP 636: Performed by: FAMILY MEDICINE

## 2020-06-17 PROCEDURE — 85610 PROTHROMBIN TIME: CPT | Performed by: FAMILY MEDICINE

## 2020-06-17 PROCEDURE — 85025 COMPLETE CBC W/AUTO DIFF WBC: CPT | Performed by: FAMILY MEDICINE

## 2020-06-17 PROCEDURE — 96360 HYDRATION IV INFUSION INIT: CPT | Mod: 59 | Performed by: FAMILY MEDICINE

## 2020-06-17 RX ORDER — IOPAMIDOL 755 MG/ML
59 INJECTION, SOLUTION INTRAVASCULAR ONCE
Status: COMPLETED | OUTPATIENT
Start: 2020-06-17 | End: 2020-06-17

## 2020-06-17 RX ORDER — GADOBUTROL 604.72 MG/ML
5 INJECTION INTRAVENOUS ONCE
Status: COMPLETED | OUTPATIENT
Start: 2020-06-17 | End: 2020-06-17

## 2020-06-17 RX ADMIN — GADOBUTROL 5 ML: 604.72 INJECTION INTRAVENOUS at 19:57

## 2020-06-17 RX ADMIN — SODIUM CHLORIDE 92 ML: 9 INJECTION, SOLUTION INTRAVENOUS at 22:03

## 2020-06-17 RX ADMIN — SODIUM CHLORIDE, POTASSIUM CHLORIDE, SODIUM LACTATE AND CALCIUM CHLORIDE 1000 ML: 600; 310; 30; 20 INJECTION, SOLUTION INTRAVENOUS at 19:31

## 2020-06-17 RX ADMIN — IOPAMIDOL 59 ML: 755 INJECTION, SOLUTION INTRAVENOUS at 22:03

## 2020-06-17 ASSESSMENT — ENCOUNTER SYMPTOMS
DIZZINESS: 1
HEMATOLOGIC/LYMPHATIC NEGATIVE: 1
SHORTNESS OF BREATH: 1
ENDOCRINE NEGATIVE: 1
MUSCULOSKELETAL NEGATIVE: 1
LIGHT-HEADEDNESS: 1
CONSTITUTIONAL NEGATIVE: 1
PSYCHIATRIC NEGATIVE: 1
GASTROINTESTINAL NEGATIVE: 1
RHINORRHEA: 1
EYES NEGATIVE: 1
ALLERGIC/IMMUNOLOGIC NEGATIVE: 1

## 2020-06-17 ASSESSMENT — MIFFLIN-ST. JEOR: SCORE: 1181.34

## 2020-06-17 NOTE — TELEPHONE ENCOUNTER
Patient responded to message, she is willing to do the injections if we feel it is necessary. 5 syringes of Lovenox ordered to [facility].      Eder ALVAREZ RN, CACP

## 2020-06-17 NOTE — ED AVS SNAPSHOT
[facility] Emergency Department  [address] [facility] [address]  Phone:  [phone]  Fax:  [phone]                                    Rocío Catherine   MRN: [id number]    Department:  [facility] Emergency Department   Date of Visit:  6/17/2020           After Visit Summary Signature Page    I have received my discharge instructions, and my questions have been answered. I have discussed any challenges I see with this plan with the nurse or doctor.    ..........................................................................................................................................  Patient/Patient Representative Signature      ..........................................................................................................................................  Patient Representative Print Name and Relationship to Patient    ..................................................               ................................................  Date                                   Time    ..........................................................................................................................................  Reviewed by Signature/Title    ...................................................              ..............................................  Date                                               Time          22EPIC Rev 08/18

## 2020-06-17 NOTE — TELEPHONE ENCOUNTER
Covering for PCP (out of clinic today):  Yes, would recommend bridging with therapeutic dose of Lovenox.  Looks like she started off with Lovenox- perhaps she still has some at home.     I'm searching for her chart to determine the planned duration of therapy, but I'm not finding much.  It appears she was supposed to talk with hematology to discuss this, but I don't see that she has done so.  Please advise her to schedule this appointment, referral is already in from before.    Alberto Luke MD

## 2020-06-17 NOTE — TELEPHONE ENCOUNTER
Update: confirmed patient read message at 12:37 PM, waiting for response on need for more Lovenox injections and which pharmacy she would prefer if another script needs to be ordered.    Eder ALVAREZ RN, CACP

## 2020-06-17 NOTE — TELEPHONE ENCOUNTER
Creatinine   Date Value Ref Range Status   04/22/2020 0.82 0.52 - 1.04 mg/dL Final     Wt Readings from Last 2 Encounters:   04/22/20 53.1 kg (117 lb)   04/19/20 53.1 kg (117 lb)          Writer sent [employer] message to patient requesting that she restart the Lovenox injections. If patient doesn't read the message within 2-3 hours, will call with the updated information.

## 2020-06-18 ENCOUNTER — DOCUMENTATION ONLY (OUTPATIENT)
Dept: ANTICOAGULATION | Facility: CLINIC | Age: 25
End: 2020-06-18

## 2020-06-18 NOTE — PROGRESS NOTES
ANTICOAGULATION  MANAGEMENT: Discharge Review    Rocío Catherine chart reviewed for anticoagulation continuity of care    Emergency room visit on 6/17/20 for chest pain, dizziness.    ED notes:  No concerning etiology was found for her vertigo or for her chest pain at this point with imaging modalities including chest CT with contrast to rule out of recurrent pulmonary emboli as well as MRI of the head for evaluation of potentially smaller lesions that could be conceivably missed on CT such as a multiple sclerosis.  I have recommended outpatient follow-up in clinic with neurology for the patient refer the vertiginous type symptoms before proceeding with any further diagnostic modalities.    Discharge disposition: Home    Results:    Recent labs: (last 7 days)     06/16/20  1308 06/17/20  1907   INR 1.20* 1.64*     Anticoagulation inpatient management:     not applicable     Anticoagulation discharge instructions:     Warfarin dosing: home regimen continued   Bridging: bridging with enoxaparin (Lovenox) - ordered by [facility]   INR goal change: No      Medication changes affecting anticoagulation: No    Additional factors affecting anticoagulation: No    Plan     No adjustment to anticoagulation plan needed    Patient not contacted    No adjustment to Anticoagulation Calendar was required    Mansi Wilson RN CACP

## 2020-06-18 NOTE — DISCHARGE INSTRUCTIONS
You may use Tylenol/ibuprofen as needed for discomfort.  Return to the emergency department if worse or changes.  With the persistent vertigo you are describing I would recommend follow-up in clinic with neurology for further discussion prior to proceeding with any further diagnostic tests.  Please start the Lovenox as you were previously instructed to do.  Follow-up primary care provider.

## 2020-06-18 NOTE — ED PROVIDER NOTES
"  History     Chief Complaint   Patient presents with     Chest Pain     was dx with mitral regurgetation about a year ago, had an echo during pregnancy, delivered in february, chest pain started a couple weeks ago, now having a new sharp chest pain     Dizziness     was seen a couple weeks ago for the same thing. Hasn't gotten worse but hasn't gotten better     HPI  Rocío Catherine is a 24 year old female, past medical history is significant for pulmonary embolism, palpitations, chronic migraine, nonrheumatic mitral regurgitation (trace), depression, anxiety, factor V Leyden mutation, anemia, dysthymia, presents to the emergency department concerns of ongoing chest pain and dizziness.  History is obtained from the patient who presents by private car with concerns of chest pain left-sided sharp needlelike fleeting occurring daily since early April.  She was diagnosed with pulmonary embolism on CT on 3/12/2020 initiated initially on Lovenox and then subsequently Coumadin which she is currently still on.  She states that the pain is no worse but definitely no better.  She has occasional shortness of breath but none presently.  She represented on 4/22/2020 and CT of the chest with chest pain at that time was negative for PE.  Ultrasound of her uncomfortable right lower extremity was negative for DVT on 4/19/2020.  She has been compliant with Coumadin therapy however I note from reviewing her chart that her INR yesterday is subtherapeutic at 1.20.  Additionally the patient notes dizziness which is ill-defined, she describes it as difficulty with visual focusing when she is driving or trying to concentrate on watching something such as TV, also a sensation of disequilibrium like \"being drunk\".  There is no rotary motion type vertigo or rocking type motion.  Says also persisted around the same duration of time perhaps even slightly longer.   She has taken no medications for her pain or for her dizziness.      Allergies:  No " Known Allergies    Problem List:    Patient Active Problem List    Diagnosis Date Noted     Acute pulmonary embolism (H) 2020     Priority: Medium     S/P  2020     Priority: Medium     Palpitations 2019     Priority: Medium     Chronic migraine without aura without status migrainosus, not intractable 2019     Priority: Medium     Sinus tachycardia 2019     Priority: Medium     Non-rheumatic mitral regurgitation, trace 2019     Priority: Medium     Seen on echocardiogram 2019. Recommended for follow up echo in 2-3 years.        Recurrent major depressive disorder, in remission (H) 2018     Priority: Medium     Anxiety 2018     Priority: Medium     Factor 5 Leiden mutation, heterozygous (H) 2018     Priority: Medium     Anemia 2016     Priority: Medium     Dysthymia 2013     Priority: Medium        Past Medical History:    Past Medical History:   Diagnosis Date     Calculus of gallbladder with acute cholecystitis without obstruction 2017     Chickenpox      Factor 5 Leiden mutation, heterozygous (H) 2018     Factor V Leiden (H)      History of frequent urinary tract infections      Non-rheumatic mitral regurgitation, trace 2019     Postpartum depression      Pulmonary emboli (H) 2020       Past Surgical History:    Past Surgical History:   Procedure Laterality Date      SECTION N/A 2020    Procedure:  SECTION;  Surgeon: Grace Dee MD;  Location: [facility] OR      SECTION      c section     HEAD & NECK SURGERY      hemangioma on neck at age 2     LAPAROSCOPIC CHOLECYSTECTOMY       SINUS FRONTAL OPEN OBLITERATION             Family History:    Family History   Problem Relation Age of Onset     Stomach Problem Mother         ulcer     Bleeding Disorder Father         factor V     Ovarian Cancer Maternal Grandmother      Thyroid Cancer Paternal Grandmother      Alzheimer Disease Paternal  "Grandfather      Bleeding Disorder Paternal Grandfather         factor V       Social History:  Marital Status:   [2]  Social History     Tobacco Use     Smoking status: Never Smoker     Smokeless tobacco: Never Used   Substance Use Topics     Alcohol use: Yes     Comment:  rare-quit with pregnancy     Drug use: No        Medications:    enoxaparin ANTICOAGULANT (LOVENOX) 60 MG/0.6ML syringe  sertraline (ZOLOFT) 50 MG tablet  warfarin ANTICOAGULANT (JANTOVEN ANTICOAGULANT) 5 MG tablet          Review of Systems   Constitutional: Negative.    HENT: Positive for rhinorrhea.    Eyes: Negative.    Respiratory: Positive for shortness of breath.    Cardiovascular: Positive for chest pain.   Gastrointestinal: Negative.    Endocrine: Negative.    Genitourinary: Negative.    Musculoskeletal: Negative.    Skin: Negative.    Allergic/Immunologic: Negative.    Neurological: Positive for dizziness and light-headedness.   Hematological: Negative.    Psychiatric/Behavioral: Negative.    All other systems reviewed and are negative.      Physical Exam   BP: 132/85  Pulse: 107  Heart Rate: 111  Temp: 98.4  F (36.9  C)  Resp: 16  Height: 154.9 cm (5' 1\")  Weight: 49.9 kg (110 lb)(stated)  SpO2: 100 %      Physical Exam  Vitals signs and nursing note reviewed.   Constitutional:       Appearance: She is well-developed and normal weight.   HENT:      Head: Normocephalic and atraumatic.   Eyes:      Extraocular Movements: Extraocular movements intact.      Pupils: Pupils are equal, round, and reactive to light.   Neck:      Musculoskeletal: Normal range of motion and neck supple.   Cardiovascular:      Rate and Rhythm: Normal rate and regular rhythm.      Heart sounds: Normal heart sounds.   Abdominal:      General: Bowel sounds are normal.      Palpations: Abdomen is soft.   Musculoskeletal: Normal range of motion.   Skin:     General: Skin is warm and dry.      Capillary Refill: Capillary refill takes less than 2 seconds. "   Neurological:      General: No focal deficit present.      Mental Status: She is alert.   Psychiatric:         Mood and Affect: Mood normal.         Behavior: Behavior normal.         ED Course        Procedures               EKG Interpretation:      Interpreted by Jesse Farnsworth MD  Time reviewed: Time obtained 1859 time interpreted same.  89 bpm sinus rhythm no acute ST-T wave changes.  Normal axis, normal intervals.  Impression normal EKG.        Critical Care time:  none               Results for orders placed or performed during the hospital encounter of 06/17/20 (from the past 24 hour(s))   CBC with platelets differential   Result Value Ref Range    WBC 8.0 4.0 - 11.0 10e9/L    RBC Count 5.11 3.8 - 5.2 10e12/L    Hemoglobin 14.0 11.7 - 15.7 g/dL    Hematocrit 42.5 35.0 - 47.0 %    MCV 83 78 - 100 fl    MCH 27.4 26.5 - 33.0 pg    MCHC 32.9 31.5 - 36.5 g/dL    RDW 14.3 10.0 - 15.0 %    Platelet Count 269 150 - 450 10e9/L    Diff Method Automated Method     % Neutrophils 46.4 %    % Lymphocytes 39.7 %    % Monocytes 7.7 %    % Eosinophils 5.4 %    % Basophils 0.6 %    % Immature Granulocytes 0.2 %    Nucleated RBCs 0 0 /100    Absolute Neutrophil 3.7 1.6 - 8.3 10e9/L    Absolute Lymphocytes 3.2 0.8 - 5.3 10e9/L    Absolute Monocytes 0.6 0.0 - 1.3 10e9/L    Absolute Eosinophils 0.4 0.0 - 0.7 10e9/L    Absolute Basophils 0.1 0.0 - 0.2 10e9/L    Abs Immature Granulocytes 0.0 0 - 0.4 10e9/L    Absolute Nucleated RBC 0.0    Comprehensive metabolic panel   Result Value Ref Range    Sodium 140 133 - 144 mmol/L    Potassium 3.7 3.4 - 5.3 mmol/L    Chloride 106 94 - 109 mmol/L    Carbon Dioxide 26 20 - 32 mmol/L    Anion Gap 8 3 - 14 mmol/L    Glucose 126 (H) 70 - 99 mg/dL    Urea Nitrogen 16 7 - 30 mg/dL    Creatinine 0.84 0.52 - 1.04 mg/dL    GFR Estimate >90 >60 mL/min/[1.73_m2]    GFR Estimate If Black >90 >60 mL/min/[1.73_m2]    Calcium 9.3 8.5 - 10.1 mg/dL    Bilirubin Total 0.3 0.2 - 1.3 mg/dL    Albumin  4.5 3.4 - 5.0 g/dL    Protein Total 8.4 6.8 - 8.8 g/dL    Alkaline Phosphatase 111 40 - 150 U/L    ALT 26 0 - 50 U/L    AST 24 0 - 45 U/L   Troponin I   Result Value Ref Range    Troponin I ES <0.015 0.000 - 0.045 ug/L   INR   Result Value Ref Range    INR 1.64 (H) 0.86 - 1.14   HCG qualitative pregnancy (blood)   Result Value Ref Range    HCG Qualitative Serum Negative NEG^Negative   MR Brain w/o & w Contrast    Narrative    MRI BRAIN WITHOUT AND WITH CONTRAST  6/17/2020 8:30 PM     HISTORY: Persistent vertigo, negative head CT in 4/7/2020.     TECHNIQUE: Multiplanar, multisequence MRI of the brain without and  with 5mL Gadavist. This was performed according to the internal  auditory canal protocol.    COMPARISON: CT head dated 4/7/2020. MRI head 2/7/2019.    FINDINGS: There is no abnormal intracranial restricted diffusion to  suggest acute infarct. The ventricles are normal in size and  configuration. Normal morphology and signal intensity of the brain  parenchyma. No hemorrhage, extra axial fluid collection, mass lesion  or mass effect. No abnormal intracranial postcontrast enhancement  seen.    Specific attention to the bilateral cranial nerve VII-VIII complexes  demonstrates no evidence for mass lesion, abnormal signal or abnormal  enhancement. The internal auditory canals and cerebellopontine angle  cisterns appear normal. The membranous labyrinthine structures appear  within normal limits.     Orbits appear within normal limits. Mild scattered paranasal sinus  mucosal thickening. The major vascular flow voids at the skull base  are maintained. The orbits, calvarium, and skull base otherwise appear  unremarkable.      Impression    IMPRESSION:  1. Unremarkable brain MRI. No acute intracranial abnormality.  2. Unremarkable appearance of the bilateral cranial nerves VII-VIII  complexes, internal auditory canals, cerebellopontine angle cisterns,  and membranous labyrinth structures.    EMA SZYMANSKI MD   CT  Chest Pulmonary Embolism w Contrast    Narrative    EXAM: CT CHEST PULMONARY EMBOLISM W CONTRAST  LOCATION: [facility]  DATE/TIME: 6/17/2020 10:20 PM    INDICATION: Left-sided chest pain. Recent pulmonary emboli. On Coumadin. Currently subtherapeutic.  COMPARISON: CTA chest exam 04/22/2020  TECHNIQUE: CT chest pulmonary angiogram during arterial phase injection of IV contrast. Multiplanar reformats and MIP reconstructions were performed. Dose reduction techniques were used.   CONTRAST: 59 ML iSOVUE 370    FINDINGS:  ANGIOGRAM CHEST: Pulmonary arteries are normal caliber and negative for pulmonary emboli. Thoracic aorta is negative for dissection. No CT evidence of right heart strain.    LUNGS AND PLEURA: The lungs are clear. No infiltrates or effusions.    MEDIASTINUM/AXILLAE: Normal.    UPPER ABDOMEN: Cholecystectomy.    MUSCULOSKELETAL: Normal.      Impression    IMPRESSION:  1.  No evidence for pulmonary emboli.  2.  No evidence for acute pulmonary disease.     11:08 PM  Patient updated answered her questions.    Medications   lactated ringers BOLUS 1,000 mL (0 mLs Intravenous Stopped 6/17/20 2209)   gadobutrol (GADAVIST) injection 5 mL (5 mLs Intravenous Given 6/17/20 1957)   iopamidol (ISOVUE-370) solution 59 mL (59 mLs Intravenous Given 6/17/20 2203)   sodium chloride 0.9 % bag 500mL for CT scan flush use (92 mLs As instructed Given 6/17/20 2203)       Assessments & Plan (with Medical Decision Making)   24-year-old female past medical history reviewed as above who presents for evaluation of ongoing chest pain and vertigo as described in the HPI.  Extensive chart review was performed as well as diagnostic evaluation here in the emergency department today.  Lab diagnostics show a subtherapeutic INR and as identified yesterday the patient informs me after completing her evaluation here that she is to start Lovenox at the direction of her primary care provider which she has not yet done.  No  concerning etiology was found for her vertigo or for her chest pain at this point with imaging modalities including chest CT with contrast to rule out of recurrent pulmonary emboli as well as MRI of the head for evaluation of potentially smaller lesions that could be conceivably missed on CT such as a multiple sclerosis.  I have recommended outpatient follow-up in clinic with neurology for the patient refer the vertiginous type symptoms before proceeding with any further diagnostic modalities.  I have reassured her with respect to her chest symptoms today.  If she is worse or develops changing symptoms she will return otherwise to follow-up in clinic with your primary care provider.      Disclaimer: This note consists of symbols derived from keyboarding, dictation and/or voice recognition software. As a result, there may be errors in the script that have gone undetected. Please consider this when interpreting information found in this chart.      I have reviewed the nursing notes.    I have reviewed the findings, diagnosis, plan and need for follow up with the patient.          New Prescriptions    No medications on file       Final diagnoses:   Vertigo - Difficulty focusing, disequilibrium   Subtherapeutic international normalized ratio (INR)   Atypical chest pain       6/17/2020   [facility] EMERGENCY DEPARTMENT     Jesse Farnsworth MD  06/17/20 1975

## 2020-06-19 ENCOUNTER — ANTICOAGULATION THERAPY VISIT (OUTPATIENT)
Dept: FAMILY MEDICINE | Facility: CLINIC | Age: 25
End: 2020-06-19

## 2020-06-19 DIAGNOSIS — D68.51 FACTOR 5 LEIDEN MUTATION, HETEROZYGOUS (H): ICD-10-CM

## 2020-06-19 DIAGNOSIS — D68.51 FACTOR V LEIDEN MUTATION (H): ICD-10-CM

## 2020-06-19 DIAGNOSIS — I26.99 PULMONARY EMBOLISM (H): ICD-10-CM

## 2020-06-19 DIAGNOSIS — I26.99 ACUTE PULMONARY EMBOLISM (H): ICD-10-CM

## 2020-06-19 LAB
CAPILLARY BLOOD COLLECTION: NORMAL
INR PPP: 1.8 (ref 0.86–1.14)
INR PPP: NORMAL (ref 0.86–1.14)

## 2020-06-19 PROCEDURE — 99207 ZZC NO CHARGE NURSE ONLY: CPT | Performed by: NURSE PRACTITIONER

## 2020-06-19 PROCEDURE — 36416 COLLJ CAPILLARY BLOOD SPEC: CPT | Performed by: NURSE PRACTITIONER

## 2020-06-19 PROCEDURE — 85610 PROTHROMBIN TIME: CPT | Performed by: NURSE PRACTITIONER

## 2020-06-19 RX ORDER — WARFARIN SODIUM 5 MG/1
TABLET ORAL
Qty: 150 TABLET | Refills: 0 | COMMUNITY
Start: 2020-06-19 | End: 2020-07-29

## 2020-06-19 NOTE — PROGRESS NOTES
4:24 PM    Repeat INR 1.80. No change in dosing. Reviewed with patient.    20    INR inaccurate due to strip error. Patient to f/u with a repeat INR today.    Tim Randall RN, BSN, PHN  [facility]tico[facility]   [phone]        ANTICOAGULATION FOLLOW-UP CLINIC VISIT    Patient Name:  Rocío Catherine  Date:  2020  Contact Type:  Telephone    SUBJECTIVE:  Patient Findings     Positives:   Extra doses    Comments:   No changes in medications, activity, health, or diet noted. No bleeding or increased bruising noted. Took warfarin as prescribed.  Writer increased maintenance dose to 62.5 mg weekly for a 13.6% increase.  Patient will continue on Lovenox injections. Refill sent to the pharmacy.  Recheck in 4 days.  Patient verbalizes understanding and agrees to plan. No further questions or concerns.          Clinical Outcomes     Negatives:   Major bleeding event, Thromboembolic event, Anticoagulation-related hospital admission, Anticoagulation-related ED visit, Anticoagulation-related fatality    Comments:   No changes in medications, activity, health, or diet noted. No bleeding or increased bruising noted. Took warfarin as prescribed.  Writer increased maintenance dose to 62.5 mg weekly for a 13.6% increase.  Patient will continue on Lovenox injections. Refill sent to the pharmacy.  Recheck in 4 days.  Patient verbalizes understanding and agrees to plan. No further questions or concerns.             OBJECTIVE    Recent labs: (last 7 days)     20  0825   INR 1.70*       ASSESSMENT / PLAN  INR assessment SUB    Recheck INR In: 4 DAYS    INR Location Outside lab      Anticoagulation Summary  As of 2020    INR goal:   2.0-3.0   TTR:   50.3 % (3 mo)   INR used for dosin.70! (2020)   Warfarin maintenance plan:   7.5 mg (5 mg x 1.5) every Mon, Wed, Fri; 10 mg (5 mg x 2) all other days   Full warfarin instructions:   : 10 mg; : 7.5 mg; Otherwise 7.5 mg every Mon, Wed, Fri; 10 mg all  other days   Weekly warfarin total:   62.5 mg   Plan last modified:   Tim Randall RN (6/19/2020)   Next INR check:   6/23/2020   Priority:   High   Target end date:   9/16/2020    Indications    Acute pulmonary embolism  unspecified pulmonary embolism type  unspecified whether acute cor pulmonale present (H) (Resolved) [I26.99]  Factor 5 Leiden mutation  heterozygous (H) [D68.51]  Other acute pulmonary embolism without acute cor pulmonale (H) (Resolved) [I26.99]             Anticoagulation Episode Summary     INR check location:       Preferred lab:       Send INR reminders to:   JENNIFER [place]    Comments:   * hematology referral placed. Pt is breastfeeding. SEND MYCHART (she usually responds immediately) Okay to leave detailed message on phone      Anticoagulation Care Providers     Provider Role Specialty Phone number    Jesse Farnsworth MD Referring Family Practice [phone]    EboniYessy APRN CNP Referring Nurse Practitioner [phone]            See the Encounter Report to view Anticoagulation Flowsheet and Dosing Calendar (Go to Encounters tab in chart review, and find the Anticoagulation Therapy Visit)        Tim Randall, RN

## 2020-06-22 ENCOUNTER — MEDICAL CORRESPONDENCE (OUTPATIENT)
Dept: HEALTH INFORMATION MANAGEMENT | Facility: CLINIC | Age: 25
End: 2020-06-22

## 2020-06-23 ENCOUNTER — ANTICOAGULATION THERAPY VISIT (OUTPATIENT)
Dept: FAMILY MEDICINE | Facility: CLINIC | Age: 25
End: 2020-06-23

## 2020-06-23 DIAGNOSIS — D68.51 FACTOR 5 LEIDEN MUTATION, HETEROZYGOUS (H): ICD-10-CM

## 2020-06-23 DIAGNOSIS — D68.51 FACTOR V LEIDEN MUTATION (H): ICD-10-CM

## 2020-06-23 DIAGNOSIS — I26.99 PULMONARY EMBOLISM (H): ICD-10-CM

## 2020-06-23 LAB
CAPILLARY BLOOD COLLECTION: NORMAL
INR PPP: 2.3 (ref 0.86–1.14)

## 2020-06-23 PROCEDURE — 99207 ZZC NO CHARGE NURSE ONLY: CPT | Performed by: NURSE PRACTITIONER

## 2020-06-23 PROCEDURE — 85610 PROTHROMBIN TIME: CPT | Performed by: NURSE PRACTITIONER

## 2020-06-23 PROCEDURE — 36416 COLLJ CAPILLARY BLOOD SPEC: CPT | Performed by: NURSE PRACTITIONER

## 2020-06-23 NOTE — TELEPHONE ENCOUNTER
Routed back to [facility].  However, since the start of this message on the 16th, pt has followed up with [facility] twice.  Pt seen today in [facility].    It does not appear that anything further is needed.    Thank you.    Leti Fernando RN

## 2020-06-23 NOTE — PROGRESS NOTES
ANTICOAGULATION FOLLOW-UP CLINIC VISIT    Patient Name:  Rocío Catherine  Date:  2020  Contact Type:  Telephone    SUBJECTIVE:  Patient Findings     Comments:   The patient was assessed for diet, medication, and activity level changes, missed or extra doses, bruising or bleeding, with no problem findings.          Clinical Outcomes     Negatives:   Major bleeding event, Thromboembolic event, Anticoagulation-related hospital admission, Anticoagulation-related ED visit, Anticoagulation-related fatality    Comments:   The patient was assessed for diet, medication, and activity level changes, missed or extra doses, bruising or bleeding, with no problem findings.             OBJECTIVE    Recent labs: (last 7 days)     20  0959   INR 2.30*       ASSESSMENT / PLAN  INR assessment THER    Recheck INR In: 2 WEEKS    INR Location Clinic      Anticoagulation Summary  As of 2020    INR goal:   2.0-3.0   TTR:   50.5 % (3.1 mo)   INR used for dosin.30 (2020)   Warfarin maintenance plan:   7.5 mg (5 mg x 1.5) every Mon, Wed, Fri; 10 mg (5 mg x 2) all other days   Full warfarin instructions:   7.5 mg every Mon, Wed, Fri; 10 mg all other days   Weekly warfarin total:   62.5 mg   Plan last modified:   Debi Camacho, RN (2020)   Next INR check:   2020   Priority:   High   Target end date:   2020    Indications    Acute pulmonary embolism  unspecified pulmonary embolism type  unspecified whether acute cor pulmonale present (H) (Resolved) [I26.99]  Factor 5 Leiden mutation  heterozygous (H) [D68.51]  Other acute pulmonary embolism without acute cor pulmonale (H) (Resolved) [I26.99]             Anticoagulation Episode Summary     INR check location:       Preferred lab:       Send INR reminders to:   JENNIFER KOENIG    Comments:   * hematology referral placed. Pt is breastfeeding. SEND MYCHART (she usually responds immediately) Okay to leave detailed message on phone      Anticoagulation Care  Providers     Provider Role Specialty Phone number    Jesse Farnsworth MD Referring Family Practice [phone]    Yessy Lyn APRN CNP Referring Nurse Practitioner [phone]            See the Encounter Report to view Anticoagulation Flowsheet and Dosing Calendar (Go to Encounters tab in chart review, and find the Anticoagulation Therapy Visit)        Debi Camacho RN

## 2020-06-23 NOTE — TELEPHONE ENCOUNTER
Lovenox discharge today as INR is therapeutic at 2.3.    Debi Camacho RN on 6/23/2020 at 1:18 PM

## 2020-06-25 ENCOUNTER — TELEPHONE (OUTPATIENT)
Dept: OBGYN | Facility: CLINIC | Age: 25
End: 2020-06-25

## 2020-06-25 NOTE — TELEPHONE ENCOUNTER
Patient left a message at 1:16pm stating she is having breast pain and would like to be seen.  Please call her back at [phone].

## 2020-06-26 NOTE — TELEPHONE ENCOUNTER
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  Pain is on the left side of affected breast.  No fever, no redness of the breast, not sure if left breast feels warmer than the right.  No firm areas of the breast.  Pt feels that her breast does not empty when pumping.  Pain is sudden sharp tingling sensation then goes away.  She reports that this has been going on for about a month.  She thought her heart was the cause of the left arm pain that radiated to her back therefore was seen in the ER on 06-17-20.    B-(background): S/p partum 02-21-20.  See ER visit notes from 06-17-20.  EKG normal.    A-(assessment): Left breast pain.    R-(recommendations): To provider to review and advise if needs in office visit or to try abx treatment.    Rand Azevedo  [facility] RN

## 2020-06-26 NOTE — TELEPHONE ENCOUNTER
Pt was advised of Dr. Santillan's recommendation, she will schedule an ov is symptoms don't improve.    Rand Azevedo  [facility] RN

## 2020-06-26 NOTE — TELEPHONE ENCOUNTER
It doesn't sound super concerning for mastitis if it has been going on for some time. In general if there is a blocked duct she should continue with frequent feeding and pumping. If the pain continues or is worsening she should come in for clinic visit for exam.     Lu Santillan MD

## 2020-07-07 ENCOUNTER — ANTICOAGULATION THERAPY VISIT (OUTPATIENT)
Dept: FAMILY MEDICINE | Facility: CLINIC | Age: 25
End: 2020-07-07

## 2020-07-07 ENCOUNTER — TELEPHONE (OUTPATIENT)
Dept: FAMILY MEDICINE | Facility: CLINIC | Age: 25
End: 2020-07-07

## 2020-07-07 DIAGNOSIS — I26.99 PULMONARY EMBOLISM (H): ICD-10-CM

## 2020-07-07 DIAGNOSIS — D68.51 FACTOR V LEIDEN MUTATION (H): ICD-10-CM

## 2020-07-07 DIAGNOSIS — D68.51 FACTOR 5 LEIDEN MUTATION, HETEROZYGOUS (H): ICD-10-CM

## 2020-07-07 LAB
CAPILLARY BLOOD COLLECTION: NORMAL
INR PPP: 1.4 (ref 0.86–1.14)

## 2020-07-07 PROCEDURE — 85610 PROTHROMBIN TIME: CPT | Performed by: NURSE PRACTITIONER

## 2020-07-07 PROCEDURE — 36416 COLLJ CAPILLARY BLOOD SPEC: CPT | Performed by: NURSE PRACTITIONER

## 2020-07-07 PROCEDURE — 99207 ZZC NO CHARGE NURSE ONLY: CPT | Performed by: NURSE PRACTITIONER

## 2020-07-07 NOTE — TELEPHONE ENCOUNTER
07/07/20    Patient missed her Coumadin dose on 7/4/20.  INR today is 1.40.   Writer increased dose from 10 to 15 mg today.   F/U INR on 7/14/20 due to restricted lab times.    Would you like the patient to bridge with Lovenox?    Tim Randall RN, BSN, PHN  Anticoagulation Clinic   [phone]

## 2020-07-07 NOTE — PROGRESS NOTES
ANTICOAGULATION FOLLOW-UP CLINIC VISIT    Patient Name:  Rocío Catherine  Date:  2020  Contact Type:  Telephone    SUBJECTIVE:  Patient Findings     Positives:   Missed doses    Comments:   No changes in medications, activity, health, or diet noted. No bleeding or increased bruising noted.   Patient was instructed to take 15 mg tonight, then resume the maintenance dose.  Recheck in 1 week.  Patient verbalizes understanding and agrees to plan. No further questions or concerns.  Denies S/S clotting.        Clinical Outcomes     Comments:   No changes in medications, activity, health, or diet noted. No bleeding or increased bruising noted.   Patient was instructed to take 15 mg tonight, then resume the maintenance dose.  Recheck in 1 week.  Patient verbalizes understanding and agrees to plan. No further questions or concerns.  Denies S/S clotting.           OBJECTIVE    Recent labs: (last 7 days)     20  1556   INR 1.40*       ASSESSMENT / PLAN  INR assessment SUB    Recheck INR In: 1 WEEK    INR Location Outside lab      Anticoagulation Summary  As of 2020    INR goal:   2.0-3.0   TTR:   48.2 % (3.6 mo)   INR used for dosin.40! (2020)   Warfarin maintenance plan:   7.5 mg (5 mg x 1.5) every Mon, Wed, Fri; 10 mg (5 mg x 2) all other days   Full warfarin instructions:   : 15 mg; Otherwise 7.5 mg every Mon, Wed, Fri; 10 mg all other days   Weekly warfarin total:   62.5 mg   Plan last modified:   Debi Camacho RN (2020)   Next INR check:   2020   Priority:   High   Target end date:   2020    Indications    Acute pulmonary embolism  unspecified pulmonary embolism type  unspecified whether acute cor pulmonale present (H) (Resolved) [I26.99]  Factor 5 Leiden mutation  heterozygous (H) [D68.51]  Other acute pulmonary embolism without acute cor pulmonale (H) (Resolved) [I26.99]             Anticoagulation Episode Summary     INR check location:       Preferred lab:       Send INR  reminders to:   JENNIFER KOENIG    Comments:   * hematology referral placed. Pt is breastfeeding. SEND MYCHART (she usually responds immediately) Okay to leave detailed message on phone      Anticoagulation Care Providers     Provider Role Specialty Phone number    Jesse Farnsworth MD Referring Family Practice [phone]    Yessy Lyn APRN CNP Referring Nurse Practitioner [phone]            See the Encounter Report to view Anticoagulation Flowsheet and Dosing Calendar (Go to Encounters tab in chart review, and find the Anticoagulation Therapy Visit)        Tim Randall RN

## 2020-07-10 NOTE — TELEPHONE ENCOUNTER
Covering for PCP (out of clinic today):  Since her PE was now more than three months ago, I think it is fine to not bridge at this point.    I still do not see that she has followed up with hematology as previously advised to discuss duration of therapy, so recommend she schedule this.     Thanks,  Alberto Luke MD

## 2020-07-14 ENCOUNTER — ANTICOAGULATION THERAPY VISIT (OUTPATIENT)
Dept: ANTICOAGULATION | Facility: CLINIC | Age: 25
End: 2020-07-14

## 2020-07-14 DIAGNOSIS — D68.51 FACTOR V LEIDEN MUTATION (H): ICD-10-CM

## 2020-07-14 DIAGNOSIS — I26.99 PULMONARY EMBOLISM (H): ICD-10-CM

## 2020-07-14 DIAGNOSIS — D68.51 FACTOR 5 LEIDEN MUTATION, HETEROZYGOUS (H): ICD-10-CM

## 2020-07-14 LAB
CAPILLARY BLOOD COLLECTION: NORMAL
INR PPP: 2.4 (ref 0.86–1.14)

## 2020-07-14 PROCEDURE — 85610 PROTHROMBIN TIME: CPT | Performed by: NURSE PRACTITIONER

## 2020-07-14 PROCEDURE — 36416 COLLJ CAPILLARY BLOOD SPEC: CPT | Performed by: NURSE PRACTITIONER

## 2020-07-14 NOTE — PROGRESS NOTES
ANTICOAGULATION MANAGEMENT     Patient Name:  Rocío Catherine  Date:  2020    ASSESSMENT /SUBJECTIVE:    Today's INR result of 2.40 is therapeutic. Goal INR of 2.0-3.0      Previous INR: Subtherapeutic 1.40    Additional findings: Sent [employer] message to patient with results and dosing recommendations. Requested patient to call/message [facility] back ([phone]) to report any missed doses, falls, signs/symptoms of bleeding or clotting, any changes in health, medications, or diet. Asked patient to call/message back with any questions or concerns.      Confirmed [employer] message was read.       PLAN:    Detailed [employer] message sent regarding INR result and instructed:     Warfarin Dosing Instructions: Continue your current warfarin dose 7.5mg Mon,Wed,Fri; 10mg all other days    Instructed patient to follow up no later than: 7-10 days  Left detailed message with recommended recheck date    Education provided: Please call back if any changes to your diet, medications or how you've been taking warfarin       Instructed to call the Anticoagulation Clinic for any changes, questions or concerns. (#[phone])      OBJECTIVE:  Recent labs: (last 7 days)     20  1556 20  1247   INR 1.40* 2.40*         INR assessment THER    Recheck INR In:  7-10 days   INR Location Clinic      Anticoagulation Summary  As of 2020    INR goal:   2.0-3.0   TTR:   47.7 % (3.8 mo)   INR used for dosin.40 (2020)   Warfarin maintenance plan:   7.5 mg (5 mg x 1.5) every Mon, Wed, Fri; 10 mg (5 mg x 2) all other days   Full warfarin instructions:   7.5 mg every Mon, Wed, Fri; 10 mg all other days   Weekly warfarin total:   62.5 mg   Plan last modified:   Debi Camacho RN (2020)   Next INR check:   2020   Priority:   High   Target end date:   2020    Indications    Acute pulmonary embolism  unspecified pulmonary embolism type  unspecified whether acute cor pulmonale present (H) (Resolved) [I26.99]  Factor  5 Leiden mutation  heterozygous (H) [D68.51]  Other acute pulmonary embolism without acute cor pulmonale (H) (Resolved) [I26.99]             Anticoagulation Episode Summary     INR check location:       Preferred lab:       Send INR reminders to:   JENNIFER KOENIG    Comments:   * hematology referral placed. Pt is breastfeeding. SEND MYCHART (she usually responds immediately) Okay to leave detailed message on phone      Anticoagulation Care Providers     Provider Role Specialty Phone number    Jesse Farnsworth MD Referring Family Practice [phone]    Yessy Lyn APRN CNP Referring Nurse Practitioner [phone]

## 2020-07-16 ENCOUNTER — TELEPHONE (OUTPATIENT)
Dept: FAMILY MEDICINE | Facility: CLINIC | Age: 25
End: 2020-07-16

## 2020-07-16 NOTE — TELEPHONE ENCOUNTER
Patient states she has a history of blood clots and is on coumadin.  She is having back pain.  She doesn't want a CT  She would just like to get the D-dimer drawn.  She has been utd on her INR's.  If patient is needing a d-dimer then she should be seen in the ER for work up. Michelle MEANS RN

## 2020-07-16 NOTE — TELEPHONE ENCOUNTER
Reason for call:    Symptom or request:     Patient called asking for a blood test to determine if she has a blood clot, she is having back pain similar to before. For the last couple days.     She had a blood clot in march, currently taking anticoagulant. Coumadin     Best Time:  any    Can we leave a detailed message on this number?  YES     Annalee CALIXTO  Station

## 2020-07-17 ENCOUNTER — OFFICE VISIT (OUTPATIENT)
Dept: FAMILY MEDICINE | Facility: CLINIC | Age: 25
End: 2020-07-17
Payer: COMMERCIAL

## 2020-07-17 VITALS
TEMPERATURE: 97.7 F | OXYGEN SATURATION: 100 % | DIASTOLIC BLOOD PRESSURE: 70 MMHG | HEART RATE: 94 BPM | RESPIRATION RATE: 12 BRPM | SYSTOLIC BLOOD PRESSURE: 102 MMHG | WEIGHT: 105 LBS | BODY MASS INDEX: 19.84 KG/M2

## 2020-07-17 DIAGNOSIS — M54.9 UPPER BACK PAIN: Primary | ICD-10-CM

## 2020-07-17 LAB — D DIMER PPP FEU-MCNC: <0.3 UG/ML FEU (ref 0–0.5)

## 2020-07-17 PROCEDURE — 36415 COLL VENOUS BLD VENIPUNCTURE: CPT | Performed by: FAMILY MEDICINE

## 2020-07-17 PROCEDURE — 99213 OFFICE O/P EST LOW 20 MIN: CPT | Performed by: FAMILY MEDICINE

## 2020-07-17 PROCEDURE — 85379 FIBRIN DEGRADATION QUANT: CPT | Performed by: FAMILY MEDICINE

## 2020-07-17 NOTE — PROGRESS NOTES
Subjective     Rocío Catherine is a 24 year old female who presents to clinic today for the following health issues:    Patient is a 24 yr old female here for upper back pain. She reports that this has been ongoing for a week. She states that she was diagnosed with a PE a few months ago and her symptoms are similar to this she reports no shortness of breath and no chest pain or tightness. She was seen in the ED a few days ago and a CT showed no PE.  HPI       Back Pain -HX of PE and is on blood thinners, presented with similar pain when she was diagnosed      Duration: one week        Specific cause: none    Description:   Location of pain: middle upper  Character of pain: hard for patient to explain  Pain radiation:left arm  New numbness or weakness in legs, not attributed to pain:  no     Intensity: Currently moderate     History:   Pain interferes with job: Not applicable,   History of back problems: no prior back problems  Any previous MRI or X-rays: Yes--at CT two weeks ago and had a CT.    Sees a specialist for back pain:  No  Therapies tried without relief: none    Alleviating factors:   Improved by: nothing      Precipitating factors:  Worsened by: sitting           Accompanying Signs & Symptoms:  Risk of Fracture:  None  Risk of Cauda Equina:  None  Risk of Infection:  None  Risk of Cancer:  None  Risk of Ankylosing Spondylitis:  Onset at age <35, male, AND morning back stiffness. no         Patient Active Problem List   Diagnosis     Recurrent major depressive disorder, in remission (H)     Anxiety     Factor 5 Leiden mutation, heterozygous (H)     Sinus tachycardia     Non-rheumatic mitral regurgitation, trace     Anemia     Dysthymia     Chronic migraine without aura without status migrainosus, not intractable     Palpitations     S/P      Acute pulmonary embolism (H)     Past Surgical History:   Procedure Laterality Date      SECTION N/A 2020    Procedure:  SECTION;  Surgeon:  Grace Dee MD;  Location: [facility] OR      SECTION      c section     HEAD & NECK SURGERY      hemangioma on neck at age 2     LAPAROSCOPIC CHOLECYSTECTOMY       SINUS FRONTAL OPEN OBLITERATION             Social History     Tobacco Use     Smoking status: Never Smoker     Smokeless tobacco: Never Used   Substance Use Topics     Alcohol use: Yes     Comment:  rare-quit with pregnancy     Family History   Problem Relation Age of Onset     Stomach Problem Mother         ulcer     Bleeding Disorder Father         factor V     Ovarian Cancer Maternal Grandmother      Thyroid Cancer Paternal Grandmother      Alzheimer Disease Paternal Grandfather      Bleeding Disorder Paternal Grandfather         factor V         Current Outpatient Medications   Medication Sig Dispense Refill     enoxaparin ANTICOAGULANT (LOVENOX) 60 MG/0.6ML syringe Inject 0.5 mLs (50 mg) Subcutaneous every 12 hours (Patient taking differently: Inject 1 mg/kg Subcutaneous every 12 hours PRN Only if INR is low) 7 Syringe 0     warfarin ANTICOAGULANT (JANTOVEN ANTICOAGULANT) 5 MG tablet 7.5 mg (5 mg x 1.5) every Mon, Wed, Fri; 10 mg (5 mg x 2) all other days or as directed by the Anticoagulation Clinic 150 tablet 0     sertraline (ZOLOFT) 50 MG tablet Take 1 tablet (50 mg) by mouth daily 30 tablet 3     No Known Allergies  BP Readings from Last 3 Encounters:   20 102/70   20 121/79   20 118/81    Wt Readings from Last 3 Encounters:   20 47.6 kg (105 lb)   20 49.9 kg (110 lb)   20 53.1 kg (117 lb)                    Reviewed and updated as needed this visit by Provider  Tobacco  Allergies  Meds  Problems  Med Hx  Surg Hx  Fam Hx         Review of Systems   Constitutional, HEENT, cardiovascular, pulmonary, gi and gu systems are negative, except as otherwise noted.      Objective    /70   Pulse 94   Temp 97.7  F (36.5  C) (Tympanic)   Resp 12   Wt 47.6 kg (105 lb)   SpO2 100%   BMI 19.84  kg/m    Body mass index is 19.84 kg/m .  Physical Exam  Constitutional:       Appearance: Normal appearance.   Neck:      Musculoskeletal: Normal range of motion and neck supple.   Cardiovascular:      Rate and Rhythm: Normal rate and regular rhythm.      Pulses: Normal pulses.      Heart sounds: Normal heart sounds.   Pulmonary:      Effort: Pulmonary effort is normal. No respiratory distress.      Breath sounds: Normal breath sounds. No stridor. No wheezing, rhonchi or rales.   Chest:      Chest wall: No tenderness.   Musculoskeletal: Normal range of motion.      Thoracic back: She exhibits normal range of motion, no tenderness, no bony tenderness and no swelling.        Back:       Comments: Upper back pain .    Skin:     General: Skin is warm and dry.   Neurological:      Mental Status: She is alert and oriented to person, place, and time.   Psychiatric:         Mood and Affect: Mood normal.         Behavior: Behavior normal.            Diagnostic Test Results:  Labs reviewed in Epic  Results for orders placed or performed in visit on 07/17/20 (from the past 24 hour(s))   D dimer, quantitative   Result Value Ref Range    D Dimer <0.3 0.0 - 0.50 ug/ml FEU           Assessment & Plan     1. Upper back pain  D-dimer negative , highly unlikely that this is a PE .recommend tylenol. Offered muscle relaxants - patient declined for now.  - D dimer, quantitative       FUTURE APPOINTMENTS:       - Follow-up visit in one week or sooner as needed    Return in about 1 week (around 7/24/2020), or if symptoms worsen or fail to improve, for In-Clinic Visit.    Dionicio Liang MD  [facility]

## 2020-07-23 ENCOUNTER — ANTICOAGULATION THERAPY VISIT (OUTPATIENT)
Dept: NURSING | Facility: CLINIC | Age: 25
End: 2020-07-23

## 2020-07-23 ENCOUNTER — MYC MEDICAL ADVICE (OUTPATIENT)
Dept: NURSING | Facility: CLINIC | Age: 25
End: 2020-07-23

## 2020-07-23 DIAGNOSIS — I26.99 PULMONARY EMBOLISM (H): ICD-10-CM

## 2020-07-23 DIAGNOSIS — I26.99 ACUTE PULMONARY EMBOLISM (H): ICD-10-CM

## 2020-07-23 DIAGNOSIS — D68.51 FACTOR 5 LEIDEN MUTATION, HETEROZYGOUS (H): ICD-10-CM

## 2020-07-23 DIAGNOSIS — D68.51 FACTOR V LEIDEN MUTATION (H): ICD-10-CM

## 2020-07-23 LAB
CAPILLARY BLOOD COLLECTION: NORMAL
INR PPP: 3.8 (ref 0.86–1.14)

## 2020-07-23 PROCEDURE — 85610 PROTHROMBIN TIME: CPT | Performed by: NURSE PRACTITIONER

## 2020-07-23 PROCEDURE — 36416 COLLJ CAPILLARY BLOOD SPEC: CPT | Performed by: NURSE PRACTITIONER

## 2020-07-23 NOTE — PROGRESS NOTES
Anticoagulation Management    Unable to reach [facility] today.    Today's INR result of 3.8 is supratherapeutic (goal INR of 2.0-3.0).  Result received from: Clinic Lab    Follow up required to confirm warfarin dose taken and assess for changes    Left message to call [phone] and [employer] message sent      Anticoagulation clinic to follow up    Delmi Kennedy RN

## 2020-07-23 NOTE — PROGRESS NOTES
Confirmed patient read Mychart message     Delmi Kennedy RN - [facility]ticoagulation [facility]

## 2020-07-29 RX ORDER — WARFARIN SODIUM 5 MG/1
TABLET ORAL
Qty: 150 TABLET | Refills: 0 | Status: SHIPPED | OUTPATIENT
Start: 2020-07-29 | End: 2020-09-09

## 2020-07-30 ENCOUNTER — ANTICOAGULATION THERAPY VISIT (OUTPATIENT)
Dept: FAMILY MEDICINE | Facility: CLINIC | Age: 25
End: 2020-07-30

## 2020-07-30 DIAGNOSIS — D68.51 FACTOR V LEIDEN MUTATION (H): ICD-10-CM

## 2020-07-30 DIAGNOSIS — D68.51 FACTOR 5 LEIDEN MUTATION, HETEROZYGOUS (H): ICD-10-CM

## 2020-07-30 DIAGNOSIS — I26.99 PULMONARY EMBOLISM (H): ICD-10-CM

## 2020-07-30 LAB
CAPILLARY BLOOD COLLECTION: NORMAL
INR PPP: 2.6 (ref 0.86–1.14)

## 2020-07-30 PROCEDURE — 36416 COLLJ CAPILLARY BLOOD SPEC: CPT | Performed by: NURSE PRACTITIONER

## 2020-07-30 PROCEDURE — 85610 PROTHROMBIN TIME: CPT | Performed by: NURSE PRACTITIONER

## 2020-07-31 NOTE — PROGRESS NOTES
07/31/20  Left another non-detailed voicemail for patient to either review her [employer] message or call [facility] back.     It is not clear if patient skipped her warfarin dose or not last week - patient's message was sent on 7/23, the message was read on 7/29. Unless she held on her own, she would not have seen the message to do so.     Given there is no reason stated for the elevated INR, it would be best to continue same warfarin dose for 1 more week (unless assessment from patient indicates otherwise).       Eder ALVAREZ, RN, CACP

## 2020-07-31 NOTE — PROGRESS NOTES
Anticoagulation Management    Sent [facility] message to continue current done of 7.5 mg Mon, Wed, Fri and 10 mg daily all other days of the week.    Yesterday's INR result of 2.6 is therapeutic (goal INR of 2.0-3.0).  Result received from: Clinic Lab    Follow-up INR lab scheduled in 2 weeks while in clinic for OB appointment.      Oumou Ventura [facility]

## 2020-08-14 ENCOUNTER — ANTICOAGULATION THERAPY VISIT (OUTPATIENT)
Dept: ANTICOAGULATION | Facility: CLINIC | Age: 25
End: 2020-08-14

## 2020-08-14 DIAGNOSIS — D68.51 FACTOR V LEIDEN MUTATION (H): ICD-10-CM

## 2020-08-14 DIAGNOSIS — I26.99 PULMONARY EMBOLISM (H): ICD-10-CM

## 2020-08-14 DIAGNOSIS — D68.51 FACTOR 5 LEIDEN MUTATION, HETEROZYGOUS (H): ICD-10-CM

## 2020-08-14 LAB
CAPILLARY BLOOD COLLECTION: NORMAL
INR PPP: 3.3 (ref 0.86–1.14)

## 2020-08-14 PROCEDURE — 85610 PROTHROMBIN TIME: CPT | Performed by: NURSE PRACTITIONER

## 2020-08-14 PROCEDURE — 36416 COLLJ CAPILLARY BLOOD SPEC: CPT | Performed by: NURSE PRACTITIONER

## 2020-08-14 NOTE — PROGRESS NOTES
ANTICOAGULATION MANAGEMENT     Patient Name:  Rocío Catherine  Date:  8/14/2020    ASSESSMENT /SUBJECTIVE:    Today's INR result of 3.30 is supratherapeutic. Goal INR of 2.0-3.0      Previous INR: Therapeutic 2.60 (this included a held dose in the 7 day total, INR the week prior was 3.80)    Additional findings: Sent [employer] message with dosing information and requested patient to respond with any changes (confirmed message was read on 8/14 at 2:48 PM)      PLAN:    Send detailed [employer] message regarding INR result and instructed:     Warfarin Dosing Instructions: Change your warfarin dose to 10mg Mon/Thurs; 7.5mg all other days (~8% decrease)    Instructed patient to follow up no later than: 2 weeks  Left detailed message with recommended recheck date    Education provided: Please call back if any changes to your diet, medications or how you've been taking warfarin       Instructed to call the Anticoagulation Clinic for any changes, questions or concerns. (#[phone])      OBJECTIVE:  Recent labs: (last 7 days)     08/14/20  1328   INR 3.30*         INR assessment SUPRA    Recheck INR In: 2 WEEKS    INR Location Clinic      Anticoagulation Summary  As of 8/14/2020    INR goal:   2.0-3.0   TTR:   47.7 % (4.8 mo)   INR used for dosing:   3.30! (8/14/2020)   Warfarin maintenance plan:   10 mg (5 mg x 2) every Mon, Thu; 7.5 mg (5 mg x 1.5) all other days   Full warfarin instructions:   10 mg every Mon, Thu; 7.5 mg all other days   Weekly warfarin total:   57.5 mg   Plan last modified:   Mansi Wilson RN (8/14/2020)   Next INR check:   8/28/2020   Priority:   High   Target end date:   9/16/2020    Indications    Acute pulmonary embolism  unspecified pulmonary embolism type  unspecified whether acute cor pulmonale present (H) (Resolved) [I26.99]  Factor 5 Leiden mutation  heterozygous (H) [D68.51]  Other acute pulmonary embolism without acute cor pulmonale (H) (Resolved) [I26.99]             Anticoagulation  Episode Summary     INR check location:       Preferred lab:       Send INR reminders to:   JENNIFER KOENIG    Comments:   * hematology referral placed. Pt is breastfeeding. SEND MYCHART (she usually responds immediately) Okay to leave detailed message on phone      Anticoagulation Care Providers     Provider Role Specialty Phone number    Jesse Farnsworth MD Referring Family Practice [phone]    Yessy Lyn APRN CNP Referring Nurse Practitioner [phone]

## 2020-08-20 ENCOUNTER — MEDICAL CORRESPONDENCE (OUTPATIENT)
Dept: HEALTH INFORMATION MANAGEMENT | Facility: CLINIC | Age: 25
End: 2020-08-20

## 2020-08-21 ENCOUNTER — TELEPHONE (OUTPATIENT)
Dept: PEDIATRICS | Facility: CLINIC | Age: 25
End: 2020-08-21

## 2020-08-21 NOTE — TELEPHONE ENCOUNTER
EPDS was done in clinic today during child's 6 month visit and score was 10 with negative psychosis and negative screen for suicidal ideation.   Rocío has previous history of depression.   Copy of EPDS was given to Rocío today as well as educational material about postpartum depression.     Plan:   Rocío is already working with her provider and declined any additional help or services today.     Jessica Martin MD  [facility]

## 2020-09-09 ENCOUNTER — ANTICOAGULATION THERAPY VISIT (OUTPATIENT)
Dept: ANTICOAGULATION | Facility: CLINIC | Age: 25
End: 2020-09-09

## 2020-09-09 DIAGNOSIS — D68.51 FACTOR V LEIDEN MUTATION (H): ICD-10-CM

## 2020-09-09 DIAGNOSIS — I26.99 ACUTE PULMONARY EMBOLISM (H): ICD-10-CM

## 2020-09-09 DIAGNOSIS — D68.51 FACTOR 5 LEIDEN MUTATION, HETEROZYGOUS (H): ICD-10-CM

## 2020-09-09 DIAGNOSIS — I26.99 PULMONARY EMBOLISM (H): ICD-10-CM

## 2020-09-09 LAB
CAPILLARY BLOOD COLLECTION: NORMAL
INR PPP: 1.9 (ref 0.86–1.14)

## 2020-09-09 PROCEDURE — 99207 ZZC NO CHARGE NURSE ONLY: CPT

## 2020-09-09 PROCEDURE — 36416 COLLJ CAPILLARY BLOOD SPEC: CPT | Performed by: NURSE PRACTITIONER

## 2020-09-09 PROCEDURE — 85610 PROTHROMBIN TIME: CPT | Performed by: NURSE PRACTITIONER

## 2020-09-09 RX ORDER — WARFARIN SODIUM 5 MG/1
TABLET ORAL
Qty: 150 TABLET | Refills: 0 | COMMUNITY
Start: 2020-09-09 | End: 2020-10-30

## 2020-09-09 NOTE — PROGRESS NOTES
ANTICOAGULATION FOLLOW-UP CLINIC VISIT    Patient Name:  Rocío Catherine  Date:  2020  Contact Type:  Telephone/ Left DVM/Sent my chart message.    SUBJECTIVE:  Patient Findings     Comments:   Unable to assess.   My chart message sent. Left DVM.  Patient will continue weekly maintenance dose.  Recheck in 2 weeks.        Clinical Outcomes     Negatives:   Major bleeding event, Thromboembolic event, Anticoagulation-related hospital admission, Anticoagulation-related ED visit, Anticoagulation-related fatality    Comments:   Unable to assess.   My chart message sent. Left DVM.  Patient will continue weekly maintenance dose.  Recheck in 2 weeks.           OBJECTIVE    Recent labs: (last 7 days)     20  1454   INR 1.90*       ASSESSMENT / PLAN  INR assessment SUB    Recheck INR In: 2 WEEKS    INR Location Outside lab      Anticoagulation Summary  As of 2020    INR goal:   2.0-3.0   TTR:   51.3 % (5.7 mo)   INR used for dosin.90! (2020)   Warfarin maintenance plan:   10 mg (5 mg x 2) every Mon, Thu; 7.5 mg (5 mg x 1.5) all other days   Full warfarin instructions:   10 mg every Mon, Thu; 7.5 mg all other days   Weekly warfarin total:   57.5 mg   Plan last modified:   Mansi Wilson RN (2020)   Next INR check:   2020   Priority:   High   Target end date:   2020    Indications    Acute pulmonary embolism  unspecified pulmonary embolism type  unspecified whether acute cor pulmonale present (H) (Resolved) [I26.99]  Factor 5 Leiden mutation  heterozygous (H) [D68.51]  Other acute pulmonary embolism without acute cor pulmonale (H) (Resolved) [I26.99]             Anticoagulation Episode Summary     INR check location:       Preferred lab:       Send INR reminders to:   JENNIFER KOENIG    Comments:   * hematology referral placed. Pt is breastfeeding. SEND MYCHART (she usually responds immediately) Okay to leave detailed message on phone      Anticoagulation Care Providers     Provider Role  Specialty Phone number    Jesse Farnsworth MD Referring Family Practice [phone]    Yessy Lyn APRN CNP Referring Nurse Practitioner [phone]            See the Encounter Report to view Anticoagulation Flowsheet and Dosing Calendar (Go to Encounters tab in chart review, and find the Anticoagulation Therapy Visit)        Tim Randall RN

## 2020-09-11 ENCOUNTER — OFFICE VISIT (OUTPATIENT)
Dept: FAMILY MEDICINE | Facility: CLINIC | Age: 25
End: 2020-09-11
Payer: COMMERCIAL

## 2020-09-11 ENCOUNTER — ANCILLARY PROCEDURE (OUTPATIENT)
Dept: GENERAL RADIOLOGY | Facility: CLINIC | Age: 25
End: 2020-09-11
Attending: NURSE PRACTITIONER
Payer: COMMERCIAL

## 2020-09-11 VITALS
BODY MASS INDEX: 19.71 KG/M2 | TEMPERATURE: 98.6 F | OXYGEN SATURATION: 100 % | WEIGHT: 104.3 LBS | HEART RATE: 80 BPM | DIASTOLIC BLOOD PRESSURE: 76 MMHG | SYSTOLIC BLOOD PRESSURE: 110 MMHG

## 2020-09-11 DIAGNOSIS — R07.9 CHEST PAIN, UNSPECIFIED TYPE: Primary | ICD-10-CM

## 2020-09-11 DIAGNOSIS — R07.9 CHEST PAIN, UNSPECIFIED TYPE: ICD-10-CM

## 2020-09-11 DIAGNOSIS — R10.84 ABDOMINAL PAIN, GENERALIZED: ICD-10-CM

## 2020-09-11 DIAGNOSIS — M54.6 ACUTE BILATERAL THORACIC BACK PAIN: ICD-10-CM

## 2020-09-11 LAB
ALBUMIN UR-MCNC: NEGATIVE MG/DL
APPEARANCE UR: CLEAR
BILIRUB UR QL STRIP: NEGATIVE
COLOR UR AUTO: YELLOW
GLUCOSE UR STRIP-MCNC: NEGATIVE MG/DL
HCG UR QL: NEGATIVE
HGB UR QL STRIP: ABNORMAL
KETONES UR STRIP-MCNC: NEGATIVE MG/DL
LEUKOCYTE ESTERASE UR QL STRIP: NEGATIVE
NITRATE UR QL: NEGATIVE
PH UR STRIP: 6 PH (ref 5–7)
RBC #/AREA URNS AUTO: NORMAL /HPF
SOURCE: ABNORMAL
SP GR UR STRIP: >1.03 (ref 1–1.03)
UROBILINOGEN UR STRIP-ACNC: 0.2 EU/DL (ref 0.2–1)
WBC #/AREA URNS AUTO: NORMAL /HPF

## 2020-09-11 PROCEDURE — 81001 URINALYSIS AUTO W/SCOPE: CPT | Performed by: NURSE PRACTITIONER

## 2020-09-11 PROCEDURE — 99214 OFFICE O/P EST MOD 30 MIN: CPT | Performed by: NURSE PRACTITIONER

## 2020-09-11 PROCEDURE — 71046 X-RAY EXAM CHEST 2 VIEWS: CPT

## 2020-09-11 PROCEDURE — 81025 URINE PREGNANCY TEST: CPT | Performed by: NURSE PRACTITIONER

## 2020-09-11 PROCEDURE — 93000 ELECTROCARDIOGRAM COMPLETE: CPT | Performed by: NURSE PRACTITIONER

## 2020-09-11 RX ORDER — FAMOTIDINE 20 MG/1
20 TABLET, FILM COATED ORAL 2 TIMES DAILY
Qty: 60 TABLET | Refills: 0 | Status: SHIPPED | OUTPATIENT
Start: 2020-09-11 | End: 2020-11-04

## 2020-09-11 RX ORDER — TIZANIDINE HYDROCHLORIDE 4 MG/1
4 CAPSULE, GELATIN COATED ORAL 3 TIMES DAILY
Qty: 30 CAPSULE | Refills: 0 | Status: SHIPPED | OUTPATIENT
Start: 2020-09-11 | End: 2020-11-04

## 2020-09-11 NOTE — PATIENT INSTRUCTIONS
Try pepcid for the stomach discomfort.  Zanaflex for back pain.   Let me know if things aren't getting better by Monday.

## 2020-09-11 NOTE — PROGRESS NOTES
Subjective     Rocío Catherine is a 24 year old female who presents to clinic today for the following health issues:    HPI       Abdominal/Flank Pain  Onset/Duration: 2 weeks  Description:   Character: Sharp in back, right side is a ache  Location: upper middle  Radiation: None and Back  Intensity: mild  Progression of Symptoms:  worsening  Accompanying Signs & Symptoms:  Fever/Chills: no  Gas/Bloating: YES  Nausea: YES  Vomitting: no  Diarrhea: YES  Constipation: no  But was before 2 weeks ago  Dysuria or Hematuria: no  History:   Trauma: no  Previous similar pain: no  Previous tests done: none  Precipitating factors:   Does the pain change with:     Food: YES- after lunch    Bowel Movement: no    Urination: no   Other factors:  YES- stopped breat feeding about 1 month ago, got period on   Therapies tried and outcome: None  No LMP recorded.    Above HPI reviewed. Additionally, of note, patient is currently on warfarin.   several weeks after  was diagnosed with bilateral pulmonary emboli.  Does have a history of factor V.  Over the past 2 weeks, patient has had bilateral upper back pain radiating to the lower back without associated paresthesias, saddle anesthesia, lower extremity weakness, pain radiating to legs.  Also notes a tightness in her chest.  She denies any fever, cough, congestion, URI symptoms.  She also notes that she has had intermittent right lower abdominal pain for the past week to 2 weeks and over the past 4 days has had diarrhea at least twice daily.  She reports both watery and loose stools.  She denies hematochezia or melena.  She has had some ongoing nausea, but no vomiting.  She denies shortness of breath, dyspnea on exertion, hemoptysis. No painful respiration.  She does not recall if this is how she felt when she was initially diagnosed with PE.  She has had 2 subsequent CT scans since diagnosis of PE, both which note resolution of the PE.  Most recent INR is 1.93. She does  endorse that she recently started exercising.        Review of Systems   Constitutional, HEENT, cardiovascular, pulmonary, gi and gu systems are negative, except as otherwise noted.      Objective    /76 (BP Location: Right arm, Patient Position: Sitting, Cuff Size: Adult Regular)   Pulse 80   Temp 98.6  F (37  C) (Tympanic)   Wt 47.3 kg (104 lb 4.8 oz)   LMP 09/06/2020 (Exact Date)   SpO2 100%   Breastfeeding No   BMI 19.71 kg/m    Body mass index is 19.71 kg/m .  Physical Exam  Vitals signs and nursing note reviewed.   Constitutional:       Appearance: Normal appearance.   HENT:      Head: Normocephalic and atraumatic.      Mouth/Throat:      Mouth: Mucous membranes are moist.   Eyes:      Comments: Non-icteric   Neck:      Musculoskeletal: Neck supple.   Cardiovascular:      Rate and Rhythm: Normal rate and regular rhythm.      Pulses: Normal pulses.      Heart sounds: Normal heart sounds, S1 normal and S2 normal. Heart sounds not distant. No murmur. No friction rub. No gallop.    Pulmonary:      Effort: Pulmonary effort is normal.      Breath sounds: Normal breath sounds.   Chest:      Chest wall: No mass, lacerations, tenderness or crepitus.   Abdominal:      General: Abdomen is flat. Bowel sounds are normal.      Palpations: Abdomen is soft.      Tenderness: There is no right CVA tenderness or left CVA tenderness.   Musculoskeletal:      Right lower leg: No edema.      Left lower leg: No edema.      Comments: No midline spinal TTP. No paraspinal TTP. Normal ROM. Normal lower extremity strength and sensation.   Skin:     General: Skin is warm and dry.      Capillary Refill: Capillary refill takes less than 2 seconds.   Neurological:      General: No focal deficit present.      Mental Status: She is alert and oriented to person, place, and time.   Psychiatric:         Mood and Affect: Mood normal.         Behavior: Behavior normal.         Thought Content: Thought content normal.         Judgment:  Judgment normal.            CXR - Normal- no infiltrates, effusions, pneumothoraces, cardiomegaly or masses  awaiting formal interpretation from Radiologist at this time  EKG - appears normal, NSR, normal axis, normal intervals, no acute ST/T changes c/w ischemia, no LVH by voltage criteria, unchanged from previous tracings        Assessment & Plan     Abdominal pain, generalized  Exam is benign, no RLQ TTP to suggest appendicitis. No RUQ TTP to suggest gallbladder etiology, has not had any RUQ pain. Does occasionally have burning sensation, could try pepcid to see if that alleviates symptoms. If no improvement, would consider RUQ ultrasound.  - UA reflex to Microscopic and Culture  - HCG Qual, Urine ([id number])  - Urine Microscopic  - famotidine (PEPCID) 20 MG tablet; Take 1 tablet (20 mg) by mouth 2 times daily    Chest pain, unspecified type  Negative EKG and CXR. Is on warfarin for PE in March, last 2 CTs have shown resolution. She is neither tachycardic or hypoxic. She has not had any shortness of breath or dyspnea on exertion. I think this is unlikely to be PE. If things are not improving over the next few days, she will let me know and we will repeat CT Chest. Discussed reasons to be seen in ED.  - XR Chest 2 Views; Future  - EKG 12-lead complete w/read - Clinics  - famotidine (PEPCID) 20 MG tablet; Take 1 tablet (20 mg) by mouth 2 times daily    Acute bilateral thoracic back pain  I think this is likely MSK. Recommend heat, stretching, Zanaflex. If not improving, she will let me know.   - tiZANidine (ZANAFLEX) 4 MG capsule; Take 1 capsule (4 mg) by mouth 3 times daily       Patient Instructions   Try pepcid for the stomach discomfort.  Zanaflex for back pain.   Let me know if things aren't getting better by Monday.      Return in about 3 days (around 9/14/2020) for worsening or continued symptoms.    SNOW Hidalgo [facility]

## 2020-09-22 ENCOUNTER — DOCUMENTATION ONLY (OUTPATIENT)
Dept: ANTICOAGULATION | Facility: CLINIC | Age: 25
End: 2020-09-22

## 2020-09-22 ENCOUNTER — NURSE TRIAGE (OUTPATIENT)
Dept: NURSING | Facility: CLINIC | Age: 25
End: 2020-09-22

## 2020-09-22 ENCOUNTER — HOSPITAL ENCOUNTER (EMERGENCY)
Facility: CLINIC | Age: 25
Discharge: HOME | End: 2020-09-22
Attending: EMERGENCY MEDICINE | Admitting: EMERGENCY MEDICINE
Payer: COMMERCIAL

## 2020-09-22 VITALS
DIASTOLIC BLOOD PRESSURE: 66 MMHG | BODY MASS INDEX: 19.84 KG/M2 | TEMPERATURE: 98.9 F | HEART RATE: 104 BPM | RESPIRATION RATE: 12 BRPM | SYSTOLIC BLOOD PRESSURE: 111 MMHG | OXYGEN SATURATION: 99 % | WEIGHT: 105 LBS

## 2020-09-22 DIAGNOSIS — K59.00 CONSTIPATION, UNSPECIFIED CONSTIPATION TYPE: ICD-10-CM

## 2020-09-22 DIAGNOSIS — D68.51 FACTOR 5 LEIDEN MUTATION, HETEROZYGOUS (H): ICD-10-CM

## 2020-09-22 DIAGNOSIS — G44.209 ACUTE NON INTRACTABLE TENSION-TYPE HEADACHE: ICD-10-CM

## 2020-09-22 DIAGNOSIS — R25.1 TREMOR: ICD-10-CM

## 2020-09-22 LAB
ALBUMIN SERPL-MCNC: 3.8 G/DL (ref 3.4–5)
ALBUMIN UR-MCNC: 30 MG/DL
ALP SERPL-CCNC: 82 U/L (ref 40–150)
ALT SERPL W P-5'-P-CCNC: 14 U/L (ref 0–50)
ANION GAP SERPL CALCULATED.3IONS-SCNC: 4 MMOL/L (ref 3–14)
APPEARANCE UR: ABNORMAL
AST SERPL W P-5'-P-CCNC: 19 U/L (ref 0–45)
BACTERIA #/AREA URNS HPF: ABNORMAL /HPF
BASOPHILS # BLD AUTO: 0 10E9/L (ref 0–0.2)
BASOPHILS NFR BLD AUTO: 0.4 %
BILIRUB SERPL-MCNC: 0.7 MG/DL (ref 0.2–1.3)
BILIRUB UR QL STRIP: NEGATIVE
BUN SERPL-MCNC: 17 MG/DL (ref 7–30)
CALCIUM SERPL-MCNC: 9.2 MG/DL (ref 8.5–10.1)
CHLORIDE SERPL-SCNC: 109 MMOL/L (ref 94–109)
CO2 SERPL-SCNC: 26 MMOL/L (ref 20–32)
COLOR UR AUTO: YELLOW
CREAT SERPL-MCNC: 0.68 MG/DL (ref 0.52–1.04)
DIFFERENTIAL METHOD BLD: ABNORMAL
EOSINOPHIL # BLD AUTO: 0.1 10E9/L (ref 0–0.7)
EOSINOPHIL NFR BLD AUTO: 1.2 %
ERYTHROCYTE [DISTWIDTH] IN BLOOD BY AUTOMATED COUNT: 11.7 % (ref 10–15)
GFR SERPL CREATININE-BSD FRML MDRD: >90 ML/MIN/{1.73_M2}
GLUCOSE SERPL-MCNC: 96 MG/DL (ref 70–99)
GLUCOSE UR STRIP-MCNC: NEGATIVE MG/DL
HCG SERPL QL: NEGATIVE
HCT VFR BLD AUTO: 42.7 % (ref 35–47)
HGB BLD-MCNC: 14.2 G/DL (ref 11.7–15.7)
HGB UR QL STRIP: ABNORMAL
IMM GRANULOCYTES # BLD: 0 10E9/L (ref 0–0.4)
IMM GRANULOCYTES NFR BLD: 0.3 %
INR PPP: 1.69 (ref 0.86–1.14)
KETONES UR STRIP-MCNC: NEGATIVE MG/DL
LEUKOCYTE ESTERASE UR QL STRIP: NEGATIVE
LYMPHOCYTES # BLD AUTO: 0.6 10E9/L (ref 0.8–5.3)
LYMPHOCYTES NFR BLD AUTO: 9 %
MCH RBC QN AUTO: 28.5 PG (ref 26.5–33)
MCHC RBC AUTO-ENTMCNC: 33.3 G/DL (ref 31.5–36.5)
MCV RBC AUTO: 86 FL (ref 78–100)
MONOCYTES # BLD AUTO: 0.3 10E9/L (ref 0–1.3)
MONOCYTES NFR BLD AUTO: 4.3 %
MUCOUS THREADS #/AREA URNS LPF: PRESENT /LPF
NEUTROPHILS # BLD AUTO: 5.9 10E9/L (ref 1.6–8.3)
NEUTROPHILS NFR BLD AUTO: 84.8 %
NITRATE UR QL: NEGATIVE
NRBC # BLD AUTO: 0 10*3/UL
NRBC BLD AUTO-RTO: 0 /100
PH UR STRIP: 5 PH (ref 5–7)
PLATELET # BLD AUTO: 182 10E9/L (ref 150–450)
POTASSIUM SERPL-SCNC: 3.9 MMOL/L (ref 3.4–5.3)
PROT SERPL-MCNC: 6.9 G/DL (ref 6.8–8.8)
RBC # BLD AUTO: 4.99 10E12/L (ref 3.8–5.2)
RBC #/AREA URNS AUTO: 59 /HPF (ref 0–2)
SODIUM SERPL-SCNC: 139 MMOL/L (ref 133–144)
SOURCE: ABNORMAL
SP GR UR STRIP: 1.01 (ref 1–1.03)
SQUAMOUS #/AREA URNS AUTO: 6 /HPF (ref 0–1)
UROBILINOGEN UR STRIP-MCNC: 0 MG/DL (ref 0–2)
WBC # BLD AUTO: 6.9 10E9/L (ref 4–11)
WBC #/AREA URNS AUTO: <1 /HPF (ref 0–5)

## 2020-09-22 PROCEDURE — 85025 COMPLETE CBC W/AUTO DIFF WBC: CPT | Performed by: EMERGENCY MEDICINE

## 2020-09-22 PROCEDURE — 80053 COMPREHEN METABOLIC PANEL: CPT | Performed by: EMERGENCY MEDICINE

## 2020-09-22 PROCEDURE — 25800030 ZZH RX IP 258 OP 636: Performed by: EMERGENCY MEDICINE

## 2020-09-22 PROCEDURE — 96361 HYDRATE IV INFUSION ADD-ON: CPT | Performed by: EMERGENCY MEDICINE

## 2020-09-22 PROCEDURE — 85610 PROTHROMBIN TIME: CPT | Performed by: EMERGENCY MEDICINE

## 2020-09-22 PROCEDURE — 96374 THER/PROPH/DIAG INJ IV PUSH: CPT | Performed by: EMERGENCY MEDICINE

## 2020-09-22 PROCEDURE — 81001 URINALYSIS AUTO W/SCOPE: CPT | Performed by: EMERGENCY MEDICINE

## 2020-09-22 PROCEDURE — 99284 EMERGENCY DEPT VISIT MOD MDM: CPT | Mod: 25 | Performed by: EMERGENCY MEDICINE

## 2020-09-22 PROCEDURE — 25000128 H RX IP 250 OP 636: Performed by: EMERGENCY MEDICINE

## 2020-09-22 PROCEDURE — 99284 EMERGENCY DEPT VISIT MOD MDM: CPT | Mod: Z6 | Performed by: EMERGENCY MEDICINE

## 2020-09-22 PROCEDURE — 84703 CHORIONIC GONADOTROPIN ASSAY: CPT | Performed by: EMERGENCY MEDICINE

## 2020-09-22 RX ORDER — METOCLOPRAMIDE HYDROCHLORIDE 5 MG/ML
10 INJECTION INTRAMUSCULAR; INTRAVENOUS ONCE
Status: COMPLETED | OUTPATIENT
Start: 2020-09-22 | End: 2020-09-22

## 2020-09-22 RX ORDER — SODIUM CHLORIDE, SODIUM LACTATE, POTASSIUM CHLORIDE, CALCIUM CHLORIDE 600; 310; 30; 20 MG/100ML; MG/100ML; MG/100ML; MG/100ML
1000 INJECTION, SOLUTION INTRAVENOUS CONTINUOUS
Status: DISCONTINUED | OUTPATIENT
Start: 2020-09-22 | End: 2020-09-22 | Stop reason: HOSPADM

## 2020-09-22 RX ADMIN — SODIUM CHLORIDE, POTASSIUM CHLORIDE, SODIUM LACTATE AND CALCIUM CHLORIDE 1000 ML: 600; 310; 30; 20 INJECTION, SOLUTION INTRAVENOUS at 13:08

## 2020-09-22 RX ADMIN — METOCLOPRAMIDE HYDROCHLORIDE 10 MG: 5 INJECTION INTRAMUSCULAR; INTRAVENOUS at 13:20

## 2020-09-22 NOTE — ED NOTES
Dealing with constipation for the last few days and took miralax. Had a small bm today and then had onset of facial flushing HA and shaking all over along with muscle spasms in upper back and neck. Pt eyes are red and watery and continues with tremors and ha and neck and back pain. Pt also with continued abd cramping

## 2020-09-22 NOTE — TELEPHONE ENCOUNTER
Called regarding daughter that suddenly developed a severe headache, chills/shivering and stiff neck  Sates that she has not had bowel movement x 2-3 days, reported having a small stool today after taken miralax.  Caller reported that patient has factor v Leiden and  on blood thinner (warfarin)  Laura Samson RN.  COVID 19 Nurse Triage Plan/Patient Instructions    Please be aware that novel coronavirus (COVID-19) may be circulating in the community. If you develop symptoms such as fever, cough, or SOB or if you have concerns about the presence of another infection including coronavirus (COVID-19), please contact your health care provider or visit [email or web address].     Disposition/Instructions    ED Visit recommended. Follow protocol based instructions.     Bring Your Own Device:  Please also bring your smart device(s) (smart phones, tablets, laptops) and their charging cables for your personal use and to communicate with your care team during your visit.    Thank you for taking steps to prevent the spread of this virus.  o Limit your contact with others.  o Wear a simple mask to cover your cough.  o Wash your hands well and often.    Resources    [facility]: About COVID-19: [email or web address]    CDC: What to Do If You're Sick: www.cdc.gov/coronavirus/2019-ncov/about/steps-when-sick.html    CDC: Ending Home Isolation: www.cdc.gov/coronavirus/2019-ncov/hcp/disposition-in-home-patients.html     CDC: Caring for Someone: www.cdc.gov/coronavirus/2019-ncov/if-you-are-sick/care-for-someone.html     [facility]: Interim Guidance for Hospital Discharge to Home: www.health.[facility].mn.us/diseases/coronavirus/hcp/hospdischarge.pdf    [facility] clinical trials (COVID-19 research studies): clinicalaffairs.[facility]/umn-clinical-trials     Below are the COVID-19 hotlines at the [facility] ([facility]). Interpreters are available.   o For health questions: Call 805-751-1700 or 1-584.123.5893 (7 a.m.  to 7 p.m.)  o For questions about schools and childcare: Call 579-856-4790 or 1-985.452.2164 (7 a.m. to 7 p.m.)                     Reason for Disposition    Stiff neck (can't touch chin to chest)    Additional Information    Negative: Difficult to awaken or acting confused (e.g., disoriented, slurred speech)    Negative: Weakness of the face, arm or leg on one side of the body and new onset    Negative: Numbness of the face, arm or leg on one side of the body and new onset    Negative: Loss of speech or garbled speech and new onset    Negative: Passed out (i.e., fainted, collapsed and was not responding)    Negative: Sounds like a life-threatening emergency to the triager    Negative: Followed a head injury within last 3 days    Negative: Traumatic Brain Injury (TBI) is suspected    Negative: Sinus pain of forehead and yellow or green nasal discharge    Negative: Pregnant    Negative: Unable to walk without falling    Protocols used: HEADACHE-A-[place]

## 2020-09-22 NOTE — DISCHARGE INSTRUCTIONS
INR today is 1.69. Take 10 mg of Coumadin today and recheck INR later this week.        Treatment of constipation:    Increase fiber in diet (see handout)  Fiber supplement daily  Milk of Magnesia daily  Miralax once daily  Colace (stool softener)   Dulcolax or Senokot if needed (laxative pills or suppositories)  Magnesium Citrate one bottle if needed for severe constipation  Fleets Enema if needed for severe constipation    These are all available over the counter without prescription. And you may use more than one and use them in combination.

## 2020-09-22 NOTE — PROGRESS NOTES
ANTICOAGULATION  MANAGEMENT: Discharge Review    Rocío Catherine chart reviewed for anticoagulation continuity of care    Emergency room visit on 9/22/2020 for Constipation/back pain/headache.    Discharge disposition: Home    Results:    Recent labs: (last 7 days)     09/22/20  1308   INR 1.69*     Anticoagulation inpatient management:     not applicable     Anticoagulation discharge instructions:     Warfarin dosing: increase dose to 10 mg   Bridging: No   INR goal change: No      Medication changes affecting anticoagulation: No    Additional factors affecting anticoagulation: Nausea    Plan     Recommend to check INR on 9/29/2020    My chart message sent.    Anticoagulation Calendar updated    Tim Randall RN

## 2020-09-22 NOTE — ED NOTES
Rounded on pt and she said she felt much better and would like to go home. Spoke with Dr. Rob about pt's request and he said he would work on discharging.

## 2020-09-22 NOTE — ED AVS SNAPSHOT
[facility] Emergency Department  [address] [address]  Phone:  [phone]  Fax:  [phone]                                    Rocío Catherine   MRN: [id number]    Department:  [facility] Emergency Department   Date of Visit:  9/22/2020           After Visit Summary Signature Page    I have received my discharge instructions, and my questions have been answered. I have discussed any challenges I see with this plan with the nurse or doctor.    ..........................................................................................................................................  Patient/Patient Representative Signature      ..........................................................................................................................................  Patient Representative Print Name and Relationship to Patient    ..................................................               ................................................  Date                                   Time    ..........................................................................................................................................  Reviewed by Signature/Title    ...................................................              ..............................................  Date                                               Time          22EPIC Rev 08/18

## 2020-09-22 NOTE — ED PROVIDER NOTES
History     Chief Complaint   Patient presents with     Headache     taking tylenol on blood thinners for factor 5     Back Pain     Constipation     last BM  4 day ago      Nausea     no emesis     HPI  Rocío Catherine is a 24 year old female with history of anxiety, depression, migraine headaches, factor V Leiden and PE who is currently anticoagulated who presents for multiple somatic complaints, primarily due to an episode of flushing and shaking or tremulousness earlier today.  Recent constipation and last NL BM AT least several days ago who took a generic MiraLax product (ClearLax) at ~ 0930 am and an hour later had a small normal appearing BM and then developed facial redness, tremulousness, headache, posterior neck pain and back pain.  She has a frontal throbbing headache without associated aura, photophobia, nausea or vomiting.  No neck stiffness, fever or chills.  Poorly localized low back pain without recent UTI signs or symptoms or hematuria.  Mild cramping poorly localized abdominal pain that she has had with this episode of constipation.    Allergies:  No Known Allergies    Problem List:    Patient Active Problem List    Diagnosis Date Noted     Acute pulmonary embolism (H) 2020     Priority: Medium     S/P  2020     Priority: Medium     Palpitations 2019     Priority: Medium     Chronic migraine without aura without status migrainosus, not intractable 2019     Priority: Medium     Sinus tachycardia 2019     Priority: Medium     Non-rheumatic mitral regurgitation, trace 2019     Priority: Medium     Seen on echocardiogram 2019. Recommended for follow up echo in 2-3 years.        Recurrent major depressive disorder, in remission (H) 2018     Priority: Medium     Anxiety 2018     Priority: Medium     Factor 5 Leiden mutation, heterozygous (H) 2018     Priority: Medium     Anemia 2016     Priority: Medium     Dysthymia 2013      Priority: Medium        Past Medical History:    Past Medical History:   Diagnosis Date     Calculus of gallbladder with acute cholecystitis without obstruction 2017     Chickenpox      Factor 5 Leiden mutation, heterozygous (H) 2018     Factor V Leiden (H)      History of frequent urinary tract infections      Non-rheumatic mitral regurgitation, trace 2019     Postpartum depression      Pulmonary emboli (H) 2020       Past Surgical History:    Past Surgical History:   Procedure Laterality Date      SECTION N/A 2020    Procedure:  SECTION;  Surgeon: Grace Dee MD;  Location: [facility] OR      SECTION      c section     HEAD & NECK SURGERY      hemangioma on neck at age 2     LAPAROSCOPIC CHOLECYSTECTOMY       SINUS FRONTAL OPEN OBLITERATION             Family History:    Family History   Problem Relation Age of Onset     Stomach Problem Mother         ulcer     Bleeding Disorder Father         factor V     Ovarian Cancer Maternal Grandmother      Thyroid Cancer Paternal Grandmother      Alzheimer Disease Paternal Grandfather      Bleeding Disorder Paternal Grandfather         factor V       Social History:  Marital Status:   [2]  Social History     Tobacco Use     Smoking status: Never Smoker     Smokeless tobacco: Never Used   Substance Use Topics     Alcohol use: Yes     Comment:  rare-quit with pregnancy     Drug use: No        Medications:    warfarin ANTICOAGULANT (JANTOVEN ANTICOAGULANT) 5 MG tablet  famotidine (PEPCID) 20 MG tablet  sertraline (ZOLOFT) 50 MG tablet  tiZANidine (ZANAFLEX) 4 MG capsule        Review of Systems  As mentioned above in the history present illness.  All other systems were reviewed and are negative.    Physical Exam   BP: (!) 122/93  Temp: 98.9  F (37.2  C)  Resp: 12  Weight: 47.6 kg (105 lb)  SpO2: 98 %      Physical Exam  Vitals signs and nursing note reviewed.   Constitutional:       General: She is not in acute  distress.     Appearance: Normal appearance. She is well-developed. She is not ill-appearing or diaphoretic.   HENT:      Head: Normocephalic and atraumatic.      Right Ear: External ear normal.      Left Ear: External ear normal.      Nose: Nose normal.      Mouth/Throat:      Mouth: Mucous membranes are moist.   Eyes:      General: No scleral icterus.     Extraocular Movements: Extraocular movements intact.      Conjunctiva/sclera: Conjunctivae normal.      Pupils: Pupils are equal, round, and reactive to light.   Neck:      Musculoskeletal: Normal range of motion and neck supple.      Trachea: No tracheal deviation.   Cardiovascular:      Rate and Rhythm: Normal rate and regular rhythm.      Heart sounds: Normal heart sounds. No murmur. No friction rub. No gallop.    Pulmonary:      Effort: Pulmonary effort is normal. No respiratory distress.      Breath sounds: Normal breath sounds. No wheezing, rhonchi or rales.   Abdominal:      General: Bowel sounds are normal. There is no distension.      Palpations: Abdomen is soft.      Tenderness: There is no abdominal tenderness.   Musculoskeletal: Normal range of motion.         General: No tenderness.      Right lower leg: No edema.      Left lower leg: No edema.   Skin:     General: Skin is warm and dry.      Coloration: Skin is not pale.      Findings: No erythema or rash.   Neurological:      General: No focal deficit present.      Mental Status: She is alert and oriented to person, place, and time.      Cranial Nerves: No cranial nerve deficit ( 2-12 intact).      Sensory: No sensory deficit.      Motor: No weakness.      Coordination: Coordination normal.   Psychiatric:         Behavior: Behavior normal.      Comments: Flat affect.         ED Course        Procedures                   Results for orders placed or performed during the hospital encounter of 09/22/20 (from the past 24 hour(s))   Comprehensive metabolic panel   Result Value Ref Range    Sodium 139 133  - 144 mmol/L    Potassium 3.9 3.4 - 5.3 mmol/L    Chloride 109 94 - 109 mmol/L    Carbon Dioxide 26 20 - 32 mmol/L    Anion Gap 4 3 - 14 mmol/L    Glucose 96 70 - 99 mg/dL    Urea Nitrogen 17 7 - 30 mg/dL    Creatinine 0.68 0.52 - 1.04 mg/dL    GFR Estimate >90 >60 mL/min/[1.73_m2]    GFR Estimate If Black >90 >60 mL/min/[1.73_m2]    Calcium 9.2 8.5 - 10.1 mg/dL    Bilirubin Total 0.7 0.2 - 1.3 mg/dL    Albumin 3.8 3.4 - 5.0 g/dL    Protein Total 6.9 6.8 - 8.8 g/dL    Alkaline Phosphatase 82 40 - 150 U/L    ALT 14 0 - 50 U/L    AST 19 0 - 45 U/L   CBC with platelets differential   Result Value Ref Range    WBC 6.9 4.0 - 11.0 10e9/L    RBC Count 4.99 3.8 - 5.2 10e12/L    Hemoglobin 14.2 11.7 - 15.7 g/dL    Hematocrit 42.7 35.0 - 47.0 %    MCV 86 78 - 100 fl    MCH 28.5 26.5 - 33.0 pg    MCHC 33.3 31.5 - 36.5 g/dL    RDW 11.7 10.0 - 15.0 %    Platelet Count 182 150 - 450 10e9/L    Diff Method Automated Method     % Neutrophils 84.8 %    % Lymphocytes 9.0 %    % Monocytes 4.3 %    % Eosinophils 1.2 %    % Basophils 0.4 %    % Immature Granulocytes 0.3 %    Nucleated RBCs 0 0 /100    Absolute Neutrophil 5.9 1.6 - 8.3 10e9/L    Absolute Lymphocytes 0.6 (L) 0.8 - 5.3 10e9/L    Absolute Monocytes 0.3 0.0 - 1.3 10e9/L    Absolute Eosinophils 0.1 0.0 - 0.7 10e9/L    Absolute Basophils 0.0 0.0 - 0.2 10e9/L    Abs Immature Granulocytes 0.0 0 - 0.4 10e9/L    Absolute Nucleated RBC 0.0    HCG qualitative Blood   Result Value Ref Range    HCG Qualitative Serum Negative NEG^Negative   INR   Result Value Ref Range    INR 1.69 (H) 0.86 - 1.14   UA with Microscopic   Result Value Ref Range    Color Urine Yellow     Appearance Urine Slightly Cloudy     Glucose Urine Negative NEG^Negative mg/dL    Bilirubin Urine Negative NEG^Negative    Ketones Urine Negative NEG^Negative mg/dL    Specific Gravity Urine 1.008 1.003 - 1.035    Blood Urine Large (A) NEG^Negative    pH Urine 5.0 5.0 - 7.0 pH    Protein Albumin Urine 30 (A) NEG^Negative  mg/dL    Urobilinogen mg/dL 0.0 0.0 - 2.0 mg/dL    Nitrite Urine Negative NEG^Negative    Leukocyte Esterase Urine Negative NEG^Negative    Source Midstream Urine     WBC Urine <1 0 - 5 /HPF    RBC Urine 59 (H) 0 - 2 /HPF    Bacteria Urine Few (A) NEG^Negative /HPF    Squamous Epithelial /HPF Urine 6 (H) 0 - 1 /HPF    Mucous Urine Present (A) NEG^Negative /LPF       Medications   lactated ringers infusion (has no administration in time range)   lactated ringers BOLUS 1,000 mL (1,000 mLs Intravenous New Bag 9/22/20 1308)   metoclopramide (REGLAN) injection 10 mg (10 mg Intravenous Given 9/22/20 1320)     We discussed brain imaging/CT the head and the risks and benefits of doing so. She declined CT imaging and feels that her headache is not unusual or concerning.    Feeling much improved after an IV fluid bolus and Reglan, comfortable discharge home.    I reviewed her subtherapeutic INR with the pharmacist on duty and the patient.  She will increase her Coumadin dose today from 7.5 mg to 10 mg and follow-up with anticoagulation clinic later this week.    Assessments & Plan (with Medical Decision Making)   24 year old female with history of anxiety, depression, migraine headaches, factor V Leiden and PE who is currently anticoagulated on Coumadin who presents for multiple somatic complaints, primarily due to an episode of flushing and shaking or tremulousness earlier today after taking ClearLax/generic MiraLAX for constipation the past several days to 1 week.  Symptoms began approximately 1 hour after taking this, and she developed a frontal throbbing headache, posterior neck and back pain as well.  Headache responded to Reglan and is probably a migraine or tension type headache.  Facial flushing and tremors are probably secondary to anxiety. Neck and back pain appear to be a benign musculoskeletal etiology. Doubt seizure, SAH/ICH, CVA, venous sinus thrombosis, meningitis or encephalitis.  She declined head imaging  today, which is felt to be clinically acceptable at the present time.  She has had prior CT and MRI imaging which is been unremarkable.  She felt significantly improved after Reglan and comfortable with d/c home.  Laboratory evaluation remarkable for hematuria without signs or symptoms of UTI.  She noted a small amount of blood in the toilet paper when she urinated to provide a urine specimen and she may have benign vaginal bleeding or menstrual bleeding that has just begun.  Serum pregnancy test negative.  Recently had first menstrual cycle since discontinuing breast-feeding not that long ago.  I doubt kidney stone or pyelonephritis.  She is comfortable deferring antibiotic therapy for possible UTI pending results of urine culture.  I recommended she have urine rechecked in primary care clinic in the near future and we discussed Urology follow-up if this persists.  She was also found to have a sub-therapeutic INR and after consultation with pharmacy it was recommended that she increase her dose of Coumadin today from 7.5 mg to 10 mg and have her INR rechecked later this week.  She is due for follow-up with her anticoagulation clinic and an INR check and she will do this later this week.  She was provided instructions for supportive care of constipation and will return as needed for worsened condition or worsening symptoms, or new problems or concerns.        I have reviewed the nursing notes.    I have reviewed the findings, diagnosis, plan and need for follow up with the patient.      New Prescriptions    No medications on file       Final diagnoses:   None       9/22/2020   [facility] EMERGENCY DEPARTMENT     Spencer Rob MD  09/23/20 9470

## 2020-10-01 ENCOUNTER — ANTICOAGULATION THERAPY VISIT (OUTPATIENT)
Dept: ANTICOAGULATION | Facility: CLINIC | Age: 25
End: 2020-10-01

## 2020-10-01 DIAGNOSIS — D68.51 FACTOR V LEIDEN MUTATION (H): ICD-10-CM

## 2020-10-01 DIAGNOSIS — I26.99 PULMONARY EMBOLISM (H): ICD-10-CM

## 2020-10-01 DIAGNOSIS — D68.51 FACTOR 5 LEIDEN MUTATION, HETEROZYGOUS (H): ICD-10-CM

## 2020-10-01 LAB — INR PPP: 1.9 (ref 0.86–1.14)

## 2020-10-01 PROCEDURE — 36415 COLL VENOUS BLD VENIPUNCTURE: CPT | Performed by: NURSE PRACTITIONER

## 2020-10-01 PROCEDURE — 85610 PROTHROMBIN TIME: CPT | Performed by: NURSE PRACTITIONER

## 2020-10-01 NOTE — PROGRESS NOTES
ANTICOAGULATION MANAGEMENT     Patient Name:  Rocío Catherine  Date:  10/1/2020    ASSESSMENT /SUBJECTIVE:    Today's INR result of 1.9 is subtherapeutic. Goal INR of 2.0-3.0      Previous INR: Subtherapeutic last 3 INRs have been subtherapeutic      Additional findings: [employer] message sent with dosing instructions and requested recheck date. Additionally, requested patient respond to [employer] or call [facility] with any changes, questions, or concerns.       PLAN:    Corresponded via [employer] regarding INR result and instructed: (Roamer[employer] was read, confirmed 10/1 at 1634)    Warfarin Dosing Instructions: Change your warfarin dose to 10 mg every Mon, Thu, Sat; 7.5 mg all other days (4% increase)    Instructed patient to follow up no later than: 2 weeks  Left detailed message with recommended recheck date    Education provided: Contact the anticoagulation clinic with any changes, questions or concerns at #[phone]          Sarah Felipe RN      OBJECTIVE:  Recent labs: (last 7 days)     10/01/20  1446   INR 1.90*         No question data found.  Anticoagulation Summary  As of 10/1/2020    INR goal:  2.0-3.0   TTR:  45.5 % (6.4 mo)   INR used for dosin.90 (10/1/2020)   Warfarin maintenance plan:  10 mg (5 mg x 2) every Mon, Thu, Sat; 7.5 mg (5 mg x 1.5) all other days   Full warfarin instructions:  10 mg every Mon, Thu, Sat; 7.5 mg all other days   Weekly warfarin total:  60 mg   Plan last modified:  Sarah Felipe RN (10/1/2020)   Next INR check:  10/15/2020   Priority:  High   Target end date:  2020    Indications    Acute pulmonary embolism  unspecified pulmonary embolism type  unspecified whether acute cor pulmonale present (H) (Resolved) [I26.99]  Factor 5 Leiden mutation  heterozygous (H) [D68.51]  Other acute pulmonary embolism without acute cor pulmonale (H) (Resolved) [I26.99]             Anticoagulation Episode Summary     INR check location:      Preferred lab:      Send INR reminders to:   JENNIFER KOENIG    Comments:  * hematology referral placed. Pt is breastfeeding. SEND MYCHART (she usually responds immediately) Okay to leave detailed message on phone      Anticoagulation Care Providers     Provider Role Specialty Phone number    Jesse Farnsworth MD Referring Family Practice [phone]    Yessy Lyn APRN CNP Referring Nurse Practitioner [phone]

## 2020-10-15 ENCOUNTER — ANTICOAGULATION THERAPY VISIT (OUTPATIENT)
Dept: ANTICOAGULATION | Facility: CLINIC | Age: 25
End: 2020-10-15

## 2020-10-15 DIAGNOSIS — D68.51 FACTOR V LEIDEN MUTATION (H): ICD-10-CM

## 2020-10-15 DIAGNOSIS — D68.51 FACTOR 5 LEIDEN MUTATION, HETEROZYGOUS (H): ICD-10-CM

## 2020-10-15 DIAGNOSIS — I26.99 PULMONARY EMBOLISM (H): ICD-10-CM

## 2020-10-15 LAB
CAPILLARY BLOOD COLLECTION: NORMAL
INR PPP: 2.2 (ref 0.86–1.14)

## 2020-10-15 PROCEDURE — 85610 PROTHROMBIN TIME: CPT | Performed by: NURSE PRACTITIONER

## 2020-10-15 PROCEDURE — 36416 COLLJ CAPILLARY BLOOD SPEC: CPT | Performed by: NURSE PRACTITIONER

## 2020-10-15 NOTE — PROGRESS NOTES
ANTICOAGULATION MANAGEMENT     Patient Name:  Rocío Catherine  Date:  10/15/2020    ASSESSMENT /SUBJECTIVE:    Today's INR result of 2.20 is therapeutic. Goal INR of 2.0-3.0      Previous INR: Subtherapeutic 1.90      Additional findings: [employer] message sent to patient with dosing instructions and recommended recheck date. Requested patient notify [facility] of any changes or concerns-if she has missed any doses of warfarin or is taking her dose differently than directed. Also if patient has any concerns with bleeding, clotting, increased bruising or changes to her diet, medications or health.       PLAN:    Corresponded via [employer] regarding INR result and instructed:  (confirmed [employer] msg read 10/15/20 at 5:03 PM)    Warfarin Dosing Instructions: Continue your current warfarin dose 10 mg Mon, Thur; Sat; 7.5 mg all other days    Instructed patient to follow up no later than: 3 weeks  Left detailed message with recommended recheck date    Education provided: Contact the anticoagulation clinic with any changes, questions or concerns at #[phone]       Instructed to call the Anticoagulation Clinic for any changes, questions or concerns. (#[phone])        Sarah Felipe RN      OBJECTIVE:  Recent labs: (last 7 days)     10/15/20  1549   INR 2.20*         INR assessment THER    Recheck INR In: 3 WEEKS    INR Location Outside lab      Anticoagulation Summary  As of 10/15/2020    INR goal:  2.0-3.0   TTR:  46.9 % (6.9 mo)   INR used for dosin.20 (10/15/2020)   Warfarin maintenance plan:  10 mg (5 mg x 2) every Mon, Thu, Sat; 7.5 mg (5 mg x 1.5) all other days   Full warfarin instructions:  10 mg every Mon, Thu, Sat; 7.5 mg all other days   Weekly warfarin total:  60 mg   No change documented:  Sarah Felipe, RN   Plan last modified:  Sarah Felipe RN (10/1/2020)   Next INR check:  2020   Priority:  High   Target end date:  2020    Indications    Acute pulmonary embolism  unspecified  pulmonary embolism type  unspecified whether acute cor pulmonale present (H) (Resolved) [I26.99]  Factor 5 Leiden mutation  heterozygous (H) [D68.51]  Other acute pulmonary embolism without acute cor pulmonale (H) (Resolved) [I26.99]             Anticoagulation Episode Summary     INR check location:      Preferred lab:      Send INR reminders to:  PATCHAITANYA [place]    Comments:  * hematology referral placed. Pt is breastfeeding. SEND MYCHART (she usually responds immediately) Okay to leave detailed message on phone      Anticoagulation Care Providers     Provider Role Specialty Phone number    Jesse Farnsworth MD Referring Family Practice [phone]    Yessy Lyn APRN CNP Referring Nurse Practitioner [phone]

## 2020-11-02 ENCOUNTER — IMMUNIZATION (OUTPATIENT)
Dept: FAMILY MEDICINE | Facility: CLINIC | Age: 25
End: 2020-11-02
Payer: COMMERCIAL

## 2020-11-02 ENCOUNTER — ANTICOAGULATION THERAPY VISIT (OUTPATIENT)
Dept: ANTICOAGULATION | Facility: CLINIC | Age: 25
End: 2020-11-02

## 2020-11-02 DIAGNOSIS — Z23 NEED FOR PROPHYLACTIC VACCINATION AND INOCULATION AGAINST INFLUENZA: Primary | ICD-10-CM

## 2020-11-02 DIAGNOSIS — D68.51 FACTOR 5 LEIDEN MUTATION, HETEROZYGOUS (H): ICD-10-CM

## 2020-11-02 DIAGNOSIS — D68.51 FACTOR V LEIDEN MUTATION (H): ICD-10-CM

## 2020-11-02 DIAGNOSIS — I26.99 PULMONARY EMBOLISM (H): ICD-10-CM

## 2020-11-02 LAB
CAPILLARY BLOOD COLLECTION: NORMAL
INR PPP: 2.1 (ref 0.86–1.14)

## 2020-11-02 PROCEDURE — 85610 PROTHROMBIN TIME: CPT | Performed by: NURSE PRACTITIONER

## 2020-11-02 PROCEDURE — 99207 PR NO CHARGE NURSE ONLY: CPT

## 2020-11-02 PROCEDURE — 90686 IIV4 VACC NO PRSV 0.5 ML IM: CPT

## 2020-11-02 PROCEDURE — 36416 COLLJ CAPILLARY BLOOD SPEC: CPT | Performed by: NURSE PRACTITIONER

## 2020-11-02 PROCEDURE — 90471 IMMUNIZATION ADMIN: CPT

## 2020-11-02 NOTE — PROGRESS NOTES
ANTICOAGULATION MANAGEMENT     Patient Name:  Rocío Catherine  Date:  2020    ASSESSMENT /SUBJECTIVE:    Today's INR result of 2.10 is therapeutic. Goal INR of 2.0-3.0      Previous INR: Therapeutic 2.20    Additional findings: [employer] message sent to patient with dosing instructions and recommended recheck date. Requested patient call [facility] to report any missed doses, changes to her health, diet, medications, activity or to report any concerns with clotting, bleeding or increased bruising.       PLAN:    Corresponded via [employer] regarding INR result and instructed: (confirmed [employer] message read 20 at 2:35 PM)    Warfarin Dosing Instructions: Continue your current warfarin dose 10 mg every Mon, Thu, Sat; 7.5 mg all other days     Instructed patient to follow up no later than: 4 weeks  Left detailed message with recommended recheck date    Education provided: Please call back if any changes to your diet, medications or how you've been taking warfarin     Instructed to call the Anticoagulation Clinic for any changes, questions or concerns. (#[phone])        Sarah Felipe RN      OBJECTIVE:  Recent labs: (last 7 days)     20  1214   INR 2.10*         No question data found.  Anticoagulation Summary  As of 2020    INR goal:  2.0-3.0   TTR:  51.1 % (7.5 mo)   INR used for dosin.10 (2020)   Warfarin maintenance plan:  10 mg (5 mg x 2) every Mon, Thu, Sat; 7.5 mg (5 mg x 1.5) all other days   Full warfarin instructions:  10 mg every Mon, Thu, Sat; 7.5 mg all other days   Weekly warfarin total:  60 mg   No change documented:  Sarah Felipe RN   Plan last modified:  Sarah Felipe RN (10/1/2020)   Next INR check:  2020   Priority:  Maintenance   Target end date:  2020    Indications    Acute pulmonary embolism  unspecified pulmonary embolism type  unspecified whether acute cor pulmonale present (H) (Resolved) [I26.99]  Factor 5 Leiden mutation  heterozygous (H)  [D68.51]  Other acute pulmonary embolism without acute cor pulmonale (H) (Resolved) [I26.99]             Anticoagulation Episode Summary     INR check location:      Preferred lab:      Send INR reminders to:  JENNIFER KOENIG    Comments:  * hematology referral placed. Pt is breastfeeding. SEND MYCHART (she usually responds immediately) Okay to leave detailed message on phone      Anticoagulation Care Providers     Provider Role Specialty Phone number    Jesse Farnsworth MD Referring Family Medicine [phone]    Yessy Lyn APRN CNP Referring Family Medicine [phone]

## 2020-11-04 ENCOUNTER — OFFICE VISIT (OUTPATIENT)
Dept: FAMILY MEDICINE | Facility: CLINIC | Age: 25
End: 2020-11-04
Payer: COMMERCIAL

## 2020-11-04 VITALS
WEIGHT: 101.3 LBS | RESPIRATION RATE: 16 BRPM | DIASTOLIC BLOOD PRESSURE: 70 MMHG | OXYGEN SATURATION: 100 % | HEART RATE: 108 BPM | BODY MASS INDEX: 19.13 KG/M2 | HEIGHT: 61 IN | TEMPERATURE: 98.9 F | SYSTOLIC BLOOD PRESSURE: 112 MMHG

## 2020-11-04 DIAGNOSIS — M54.6 CHRONIC BILATERAL THORACIC BACK PAIN: ICD-10-CM

## 2020-11-04 DIAGNOSIS — G89.29 CHRONIC BILATERAL THORACIC BACK PAIN: ICD-10-CM

## 2020-11-04 DIAGNOSIS — R10.13 EPIGASTRIC PAIN: Primary | ICD-10-CM

## 2020-11-04 LAB
ALBUMIN SERPL-MCNC: 4.1 G/DL (ref 3.4–5)
ALP SERPL-CCNC: 77 U/L (ref 40–150)
ALT SERPL W P-5'-P-CCNC: 19 U/L (ref 0–50)
ANION GAP SERPL CALCULATED.3IONS-SCNC: 7 MMOL/L (ref 3–14)
AST SERPL W P-5'-P-CCNC: 19 U/L (ref 0–45)
BILIRUB SERPL-MCNC: 0.4 MG/DL (ref 0.2–1.3)
BUN SERPL-MCNC: 10 MG/DL (ref 7–30)
CALCIUM SERPL-MCNC: 9.3 MG/DL (ref 8.5–10.1)
CHLORIDE SERPL-SCNC: 108 MMOL/L (ref 94–109)
CO2 SERPL-SCNC: 24 MMOL/L (ref 20–32)
CREAT SERPL-MCNC: 0.74 MG/DL (ref 0.52–1.04)
GFR SERPL CREATININE-BSD FRML MDRD: >90 ML/MIN/{1.73_M2}
GLUCOSE SERPL-MCNC: 87 MG/DL (ref 70–99)
LIPASE SERPL-CCNC: 134 U/L (ref 73–393)
POTASSIUM SERPL-SCNC: 4.1 MMOL/L (ref 3.4–5.3)
PROT SERPL-MCNC: 7.6 G/DL (ref 6.8–8.8)
SODIUM SERPL-SCNC: 139 MMOL/L (ref 133–144)

## 2020-11-04 PROCEDURE — 36415 COLL VENOUS BLD VENIPUNCTURE: CPT | Performed by: NURSE PRACTITIONER

## 2020-11-04 PROCEDURE — 80053 COMPREHEN METABOLIC PANEL: CPT | Performed by: NURSE PRACTITIONER

## 2020-11-04 PROCEDURE — 83690 ASSAY OF LIPASE: CPT | Performed by: NURSE PRACTITIONER

## 2020-11-04 PROCEDURE — 99214 OFFICE O/P EST MOD 30 MIN: CPT | Performed by: NURSE PRACTITIONER

## 2020-11-04 RX ORDER — PANTOPRAZOLE SODIUM 20 MG/1
40 TABLET, DELAYED RELEASE ORAL DAILY
Qty: 30 TABLET | Refills: 1 | Status: SHIPPED | OUTPATIENT
Start: 2020-11-04 | End: 2021-03-12

## 2020-11-04 RX ORDER — CYCLOBENZAPRINE HCL 5 MG
5-10 TABLET ORAL 3 TIMES DAILY PRN
Qty: 30 TABLET | Refills: 1 | Status: SHIPPED | OUTPATIENT
Start: 2020-11-04 | End: 2021-03-12

## 2020-11-04 ASSESSMENT — ANXIETY QUESTIONNAIRES
6. BECOMING EASILY ANNOYED OR IRRITABLE: MORE THAN HALF THE DAYS
1. FEELING NERVOUS, ANXIOUS, OR ON EDGE: NEARLY EVERY DAY
IF YOU CHECKED OFF ANY PROBLEMS ON THIS QUESTIONNAIRE, HOW DIFFICULT HAVE THESE PROBLEMS MADE IT FOR YOU TO DO YOUR WORK, TAKE CARE OF THINGS AT HOME, OR GET ALONG WITH OTHER PEOPLE: SOMEWHAT DIFFICULT
7. FEELING AFRAID AS IF SOMETHING AWFUL MIGHT HAPPEN: NEARLY EVERY DAY
3. WORRYING TOO MUCH ABOUT DIFFERENT THINGS: NEARLY EVERY DAY
GAD7 TOTAL SCORE: 16
5. BEING SO RESTLESS THAT IT IS HARD TO SIT STILL: NOT AT ALL
2. NOT BEING ABLE TO STOP OR CONTROL WORRYING: NEARLY EVERY DAY

## 2020-11-04 ASSESSMENT — PATIENT HEALTH QUESTIONNAIRE - PHQ9
5. POOR APPETITE OR OVEREATING: MORE THAN HALF THE DAYS
SUM OF ALL RESPONSES TO PHQ QUESTIONS 1-9: 8

## 2020-11-04 ASSESSMENT — MIFFLIN-ST. JEOR: SCORE: 1141.87

## 2020-11-04 NOTE — PATIENT INSTRUCTIONS
over the counter Lidocaine cream   Aspercreme as needed  Tylenol 500 mg 4 times daily   Flexeril 5-10 mg 3 times daily as needed for back pain    Labs today to check liver function, kidneys and pancrease     Recommend to start Protonix 20 mg daily morning before first meal

## 2020-11-04 NOTE — PROGRESS NOTES
"Subjective     Rocío Catherine is a 24 year old female who presents to clinic today for the following health issues:    HPI         Back Pain  Onset/Duration: 2 months +   Description:   Location of pain: bilateral thoracic   Character of pain: constant pain   Pain radiation: underneath her left breast and into the center of her chest   New numbness or weakness in legs, not attributed to pain: no   Intensity: Currently 4/10 worse: 6/10   Progression of Symptoms: worsening, was seen on 9/11, given zanaflex but insurance wouldn't cover it.   History:   Specific cause: none  Pain interferes with job:  no   History of back problems: no prior back problems  Any previous MRI or X-rays: Yes- at [place].    Sees a specialist for back pain: No  Alleviating factors:   Improved by: none     Precipitating factors:  Worsened by: running   Therapies tried and outcome: none       Review of Systems   Constitutional, HEENT, cardiovascular, pulmonary, gi and gu systems are negative, except as otherwise noted.      Objective    /70 (BP Location: Right arm, Patient Position: Sitting, Cuff Size: Adult Regular)   Pulse 108   Temp 98.9  F (37.2  C) (Tympanic)   Resp 16   Ht 1.549 m (5' 1\")   Wt 45.9 kg (101 lb 4.8 oz)   SpO2 100%   BMI 19.14 kg/m    Body mass index is 19.14 kg/m .  Physical Exam   GENERAL: healthy, alert and no distress  EYES: Eyes grossly normal to inspection, PERRL and conjunctivae and sclerae normal  RESP: lungs clear to auscultation - no rales, rhonchi or wheezes  CV: regular rate and rhythm, normal S1 S2, no S3 or S4, no murmur, click or rub, no peripheral edema and peripheral pulses strong  ABDOMEN: tenderness epigastric  MS: no gross musculoskeletal defects noted, no edema  SKIN: no suspicious lesions or rashes  NEURO: Normal strength and tone, mentation intact and speech normal  PSYCH: mentation appears normal, affect normal/bright    Results for orders placed or performed in visit on 11/04/20   Lipase  "    Status: None   Result Value Ref Range    Lipase 134 73 - 393 U/L   Comprehensive metabolic panel (BMP + Alb, Alk Phos, ALT, AST, Total. Bili, TP)     Status: None   Result Value Ref Range    Sodium 139 133 - 144 mmol/L    Potassium 4.1 3.4 - 5.3 mmol/L    Chloride 108 94 - 109 mmol/L    Carbon Dioxide 24 20 - 32 mmol/L    Anion Gap 7 3 - 14 mmol/L    Glucose 87 70 - 99 mg/dL    Urea Nitrogen 10 7 - 30 mg/dL    Creatinine 0.74 0.52 - 1.04 mg/dL    GFR Estimate >90 >60 mL/min/[1.73_m2]    GFR Estimate If Black >90 >60 mL/min/[1.73_m2]    Calcium 9.3 8.5 - 10.1 mg/dL    Bilirubin Total 0.4 0.2 - 1.3 mg/dL    Albumin 4.1 3.4 - 5.0 g/dL    Protein Total 7.6 6.8 - 8.8 g/dL    Alkaline Phosphatase 77 40 - 150 U/L    ALT 19 0 - 50 U/L    AST 19 0 - 45 U/L           Assessment & Plan     Epigastric pain    - Lipase  - Comprehensive metabolic panel (BMP + Alb, Alk Phos, ALT, AST, Total. Bili, TP)  - pantoprazole (PROTONIX) 20 MG EC tablet; Take 2 tablets (40 mg) by mouth daily    Chronic bilateral thoracic back pain    - cyclobenzaprine (FLEXERIL) 5 MG tablet; Take 1-2 tablets (5-10 mg) by mouth 3 times daily as needed for muscle spasms or other (back pain)  -Tylenol 500 mg 4 times daily as needed  -stretching exercises         See Patient Instructions    Return in about 2 weeks (around 11/18/2020), or if symptoms worsen or fail to improve.    SNOW Ramos [facility]

## 2020-11-05 ASSESSMENT — ANXIETY QUESTIONNAIRES: GAD7 TOTAL SCORE: 16

## 2020-11-06 ENCOUNTER — APPOINTMENT (OUTPATIENT)
Dept: GENERAL RADIOLOGY | Facility: CLINIC | Age: 25
End: 2020-11-06
Attending: EMERGENCY MEDICINE
Payer: COMMERCIAL

## 2020-11-06 ENCOUNTER — HOSPITAL ENCOUNTER (EMERGENCY)
Facility: CLINIC | Age: 25
Discharge: HOME | End: 2020-11-06
Attending: EMERGENCY MEDICINE | Admitting: EMERGENCY MEDICINE
Payer: COMMERCIAL

## 2020-11-06 ENCOUNTER — TELEPHONE (OUTPATIENT)
Dept: FAMILY MEDICINE | Facility: CLINIC | Age: 25
End: 2020-11-06

## 2020-11-06 VITALS
HEIGHT: 61 IN | OXYGEN SATURATION: 100 % | TEMPERATURE: 98.4 F | RESPIRATION RATE: 13 BRPM | BODY MASS INDEX: 19.63 KG/M2 | DIASTOLIC BLOOD PRESSURE: 77 MMHG | WEIGHT: 104 LBS | HEART RATE: 90 BPM | SYSTOLIC BLOOD PRESSURE: 119 MMHG

## 2020-11-06 DIAGNOSIS — R07.9 CHEST PAIN, UNSPECIFIED TYPE: ICD-10-CM

## 2020-11-06 LAB
ANION GAP SERPL CALCULATED.3IONS-SCNC: 7 MMOL/L (ref 3–14)
BASOPHILS # BLD AUTO: 0.1 10E9/L (ref 0–0.2)
BASOPHILS NFR BLD AUTO: 0.5 %
BUN SERPL-MCNC: 13 MG/DL (ref 7–30)
CALCIUM SERPL-MCNC: 9.1 MG/DL (ref 8.5–10.1)
CHLORIDE SERPL-SCNC: 110 MMOL/L (ref 94–109)
CO2 SERPL-SCNC: 24 MMOL/L (ref 20–32)
CREAT SERPL-MCNC: 0.77 MG/DL (ref 0.52–1.04)
D DIMER PPP FEU-MCNC: <0.3 UG/ML FEU (ref 0–0.5)
DIFFERENTIAL METHOD BLD: NORMAL
EOSINOPHIL # BLD AUTO: 0.1 10E9/L (ref 0–0.7)
EOSINOPHIL NFR BLD AUTO: 1.2 %
ERYTHROCYTE [DISTWIDTH] IN BLOOD BY AUTOMATED COUNT: 11.8 % (ref 10–15)
GFR SERPL CREATININE-BSD FRML MDRD: >90 ML/MIN/{1.73_M2}
GLUCOSE SERPL-MCNC: 111 MG/DL (ref 70–99)
HCG SERPL QL: NEGATIVE
HCT VFR BLD AUTO: 41.3 % (ref 35–47)
HGB BLD-MCNC: 14.1 G/DL (ref 11.7–15.7)
IMM GRANULOCYTES # BLD: 0 10E9/L (ref 0–0.4)
IMM GRANULOCYTES NFR BLD: 0.2 %
INR PPP: 1.88 (ref 0.86–1.14)
LYMPHOCYTES # BLD AUTO: 2 10E9/L (ref 0.8–5.3)
LYMPHOCYTES NFR BLD AUTO: 21.2 %
MCH RBC QN AUTO: 28.9 PG (ref 26.5–33)
MCHC RBC AUTO-ENTMCNC: 34.1 G/DL (ref 31.5–36.5)
MCV RBC AUTO: 85 FL (ref 78–100)
MONOCYTES # BLD AUTO: 0.5 10E9/L (ref 0–1.3)
MONOCYTES NFR BLD AUTO: 5.8 %
NEUTROPHILS # BLD AUTO: 6.6 10E9/L (ref 1.6–8.3)
NEUTROPHILS NFR BLD AUTO: 71.1 %
NRBC # BLD AUTO: 0 10*3/UL
NRBC BLD AUTO-RTO: 0 /100
PLATELET # BLD AUTO: 229 10E9/L (ref 150–450)
POTASSIUM SERPL-SCNC: 3.7 MMOL/L (ref 3.4–5.3)
RBC # BLD AUTO: 4.88 10E12/L (ref 3.8–5.2)
SODIUM SERPL-SCNC: 141 MMOL/L (ref 133–144)
TROPONIN I SERPL-MCNC: <0.015 UG/L (ref 0–0.04)
TSH SERPL DL<=0.005 MIU/L-ACNC: 0.9 MU/L (ref 0.4–4)
WBC # BLD AUTO: 9.3 10E9/L (ref 4–11)

## 2020-11-06 PROCEDURE — 84703 CHORIONIC GONADOTROPIN ASSAY: CPT | Performed by: EMERGENCY MEDICINE

## 2020-11-06 PROCEDURE — 96361 HYDRATE IV INFUSION ADD-ON: CPT | Performed by: EMERGENCY MEDICINE

## 2020-11-06 PROCEDURE — 71046 X-RAY EXAM CHEST 2 VIEWS: CPT

## 2020-11-06 PROCEDURE — 96360 HYDRATION IV INFUSION INIT: CPT | Performed by: EMERGENCY MEDICINE

## 2020-11-06 PROCEDURE — 85025 COMPLETE CBC W/AUTO DIFF WBC: CPT | Performed by: EMERGENCY MEDICINE

## 2020-11-06 PROCEDURE — 84484 ASSAY OF TROPONIN QUANT: CPT | Performed by: EMERGENCY MEDICINE

## 2020-11-06 PROCEDURE — 85610 PROTHROMBIN TIME: CPT | Performed by: EMERGENCY MEDICINE

## 2020-11-06 PROCEDURE — 250N000009 HC RX 250: Performed by: EMERGENCY MEDICINE

## 2020-11-06 PROCEDURE — 250N000013 HC RX MED GY IP 250 OP 250 PS 637: Performed by: EMERGENCY MEDICINE

## 2020-11-06 PROCEDURE — 258N000003 HC RX IP 258 OP 636: Performed by: EMERGENCY MEDICINE

## 2020-11-06 PROCEDURE — 93005 ELECTROCARDIOGRAM TRACING: CPT | Performed by: EMERGENCY MEDICINE

## 2020-11-06 PROCEDURE — 99285 EMERGENCY DEPT VISIT HI MDM: CPT | Mod: 25 | Performed by: EMERGENCY MEDICINE

## 2020-11-06 PROCEDURE — 80048 BASIC METABOLIC PNL TOTAL CA: CPT | Performed by: EMERGENCY MEDICINE

## 2020-11-06 PROCEDURE — 93010 ELECTROCARDIOGRAM REPORT: CPT | Performed by: EMERGENCY MEDICINE

## 2020-11-06 PROCEDURE — 85379 FIBRIN DEGRADATION QUANT: CPT | Performed by: EMERGENCY MEDICINE

## 2020-11-06 PROCEDURE — 84443 ASSAY THYROID STIM HORMONE: CPT | Performed by: EMERGENCY MEDICINE

## 2020-11-06 RX ADMIN — SODIUM CHLORIDE 500 ML: 9 INJECTION, SOLUTION INTRAVENOUS at 15:06

## 2020-11-06 RX ADMIN — SODIUM CHLORIDE 1000 ML: 9 INJECTION, SOLUTION INTRAVENOUS at 16:10

## 2020-11-06 RX ADMIN — LIDOCAINE HYDROCHLORIDE 30 ML: 20 SOLUTION ORAL; TOPICAL at 15:06

## 2020-11-06 ASSESSMENT — MIFFLIN-ST. JEOR: SCORE: 1154.12

## 2020-11-06 NOTE — TELEPHONE ENCOUNTER
She has not tried the flexeril. Advised her to try it and if not better go to the er. We have no openings in clinic today nor virtual. This epigastric pain wraps around to her left side between breasts . She has a history of anxiety and is not on her medication. She is weeping on the phone and said the pain is all the time, and its been this way for a week. Pain is not worse than when she was seen in the office.  Meghan sEtrada RN

## 2020-11-06 NOTE — ED NOTES
Pt c/o left side chest pain radiating into back and neck. Pt called provider's office, was told to take muscle relaxer Flexeril with no change in pain, was told to come for eval. Pt c/o feeling dizzy last few days. No aggravating factors to pain. Mild SOB, no n/v.

## 2020-11-06 NOTE — ED AVS SNAPSHOT
[facility] Emergency Dept  [address] [address]  Phone: [phone]  Fax: [phone]                                    Rocío Catherine   MRN: [id number]    Department: [facility] Emergency Dept   Date of Visit: 11/6/2020           After Visit Summary Signature Page    I have received my discharge instructions, and my questions have been answered. I have discussed any challenges I see with this plan with the nurse or doctor.    ..........................................................................................................................................  Patient/Patient Representative Signature      ..........................................................................................................................................  Patient Representative Print Name and Relationship to Patient    ..................................................               ................................................  Date                                   Time    ..........................................................................................................................................  Reviewed by Signature/Title    ...................................................              ..............................................  Date                                               Time          22EPIC Rev 08/18

## 2020-11-06 NOTE — TELEPHONE ENCOUNTER
Reason for call:  Patient reporting a symptom    Symptom or request: Patient was seen on 11/4/2020 and she is still really uncomfortable and what was given did not help.     Duration (how long have symptoms been present): on going    Have you been treated for this before? Yes      Phone Number patient can be reached at:  Home number on file [phone] (home)    Best Time:  any    Can we leave a detailed message on this number:  YES    Call taken on 11/6/2020 at 12:07 PM by Mahi Guan

## 2020-11-09 ENCOUNTER — DOCUMENTATION ONLY (OUTPATIENT)
Dept: FAMILY MEDICINE | Facility: CLINIC | Age: 25
End: 2020-11-09

## 2020-11-09 DIAGNOSIS — D68.51 FACTOR 5 LEIDEN MUTATION, HETEROZYGOUS (H): ICD-10-CM

## 2020-11-09 NOTE — PROGRESS NOTES
ANTICOAGULATION  MANAGEMENT: Discharge Review    Rocío Catherine chart reviewed for anticoagulation continuity of care    Emergency room visit on 11-6 for chest pain.    Discharge disposition: Home    Results:    Recent labs: (last 7 days)     11/06/20  1426   INR 1.88*     Anticoagulation inpatient management:     not applicable     Anticoagulation discharge instructions:     Warfarin dosing: home regimen continued   Bridging: No   INR goal change: No      Medication changes affecting anticoagulation: Yes: protonix prior to ER visit    Additional factors affecting anticoagulation: No    Plan     Recommend to check INR on 11-13-20. Will send pt mychart as she prefers this form of communication    mychart to patient    Anticoagulation Calendar updated    Shelby Samaniego RN

## 2020-11-13 ENCOUNTER — ANTICOAGULATION THERAPY VISIT (OUTPATIENT)
Dept: ANTICOAGULATION | Facility: CLINIC | Age: 25
End: 2020-11-13

## 2020-11-13 DIAGNOSIS — D68.51 FACTOR V LEIDEN MUTATION (H): ICD-10-CM

## 2020-11-13 DIAGNOSIS — D68.51 FACTOR 5 LEIDEN MUTATION, HETEROZYGOUS (H): ICD-10-CM

## 2020-11-13 DIAGNOSIS — I26.99 PULMONARY EMBOLISM (H): ICD-10-CM

## 2020-11-13 LAB
CAPILLARY BLOOD COLLECTION: NORMAL
INR PPP: 1.8 (ref 0.86–1.14)

## 2020-11-13 PROCEDURE — 85610 PROTHROMBIN TIME: CPT | Performed by: NURSE PRACTITIONER

## 2020-11-13 PROCEDURE — 36416 COLLJ CAPILLARY BLOOD SPEC: CPT | Performed by: NURSE PRACTITIONER

## 2020-11-13 NOTE — PROGRESS NOTES
ANTICOAGULATION MANAGEMENT     Patient Name:  Rocío Catherine  Date:  2020    ASSESSMENT /SUBJECTIVE:    Today's INR result of 1.80 is subtherapeutic. Goal INR of 2.0-3.0      Warfarin dose taken: Less warfarin taken than planned which may be affecting INR    Diet: No new diet changes affecting INR    Medication changes/ interactions: Interaction between zoloft started on today or tomorrow and warfarin may be affecting INR    Previous INR: Subtherapeutic     S/S of bleeding or thromboembolism: No    New injury or illness: No    Upcoming surgery, procedure or cardioversion: No    Additional findings: None      PLAN:    Telephone call with Rocío regarding INR result and instructed:     Warfarin Dosing Instructions: 10 mg today then continue your current warfarin dose of 10 mg Mon, Thur, Sat; 7.5 mg all other days    Instructed patient to follow up no later than: 1 week  Lab visit scheduled    Education provided: Potential interaction between warfarin and zoloft, scheduled recheck in one week to monitor.      Rocío verbalizes understanding and agrees to warfarin dosing plan.    Instructed to call the Anticoagulation Clinic for any changes, questions or concerns. (#[phone])        Shelby Valero, HILARIO      OBJECTIVE:  Recent labs: (last 7 days)     20  1516   INR 1.80*         INR assessment SUB    Recheck INR In: 1 WEEK    INR Location Clinic      Anticoagulation Summary  As of 2020    INR goal:  2.0-3.0   TTR:  49.5 % (7.9 mo)   INR used for dosin.80 (2020)   Warfarin maintenance plan:  10 mg (5 mg x 2) every Mon, Thu, Sat; 7.5 mg (5 mg x 1.5) all other days   Full warfarin instructions:  : 10 mg; Otherwise 10 mg every Mon, Thu, Sat; 7.5 mg all other days   Weekly warfarin total:  60 mg   Plan last modified:  Shelby Valero RN (2020)   Next INR check:  2020   Priority:  High   Target end date:  2020    Indications    Acute pulmonary embolism  unspecified  pulmonary embolism type  unspecified whether acute cor pulmonale present (H) (Resolved) [I26.99]  Factor 5 Leiden mutation  heterozygous (H) [D68.51]  Other acute pulmonary embolism without acute cor pulmonale (H) (Resolved) [I26.99]             Anticoagulation Episode Summary     INR check location:      Preferred lab:      Send INR reminders to:  PATCHAITANYA WYOMING    Comments:  * hematology referral placed. Pt is breastfeeding. SEND MYCHART (she usually responds immediately) Okay to leave detailed message on phone      Anticoagulation Care Providers     Provider Role Specialty Phone number    Jesse Farnsworth MD Referring Family Medicine [phone]    Yessy Lyn APRN CNP Referring Family Medicine [phone]

## 2020-11-13 NOTE — PROGRESS NOTES
Anticoagulation Management    Unable to reach [facility] today.    Today's INR result of 1.80 is subtherapeutic (goal INR of 2.0-3.0).  Result received from: Clinic Lab    Follow up required to confirm warfarin dose taken and assess for changes    [facility]      Anticoagulation clinic to follow up    Shelby Valero RN  [phone] till 5 pm.

## 2020-11-18 ENCOUNTER — MYC MEDICAL ADVICE (OUTPATIENT)
Dept: FAMILY MEDICINE | Facility: CLINIC | Age: 25
End: 2020-11-18

## 2020-11-20 ENCOUNTER — ANTICOAGULATION THERAPY VISIT (OUTPATIENT)
Dept: ANTICOAGULATION | Facility: CLINIC | Age: 25
End: 2020-11-20

## 2020-11-20 DIAGNOSIS — I26.99 PULMONARY EMBOLISM (H): ICD-10-CM

## 2020-11-20 DIAGNOSIS — D68.51 FACTOR 5 LEIDEN MUTATION, HETEROZYGOUS (H): ICD-10-CM

## 2020-11-20 DIAGNOSIS — D68.51 FACTOR V LEIDEN MUTATION (H): ICD-10-CM

## 2020-11-20 LAB
CAPILLARY BLOOD COLLECTION: NORMAL
INR PPP: 2.5 (ref 0.86–1.14)

## 2020-11-20 PROCEDURE — 36416 COLLJ CAPILLARY BLOOD SPEC: CPT | Performed by: NURSE PRACTITIONER

## 2020-11-20 PROCEDURE — 99207 PR NO CHARGE NURSE ONLY: CPT

## 2020-11-20 PROCEDURE — 85610 PROTHROMBIN TIME: CPT | Performed by: NURSE PRACTITIONER

## 2020-11-20 NOTE — PROGRESS NOTES
ANTICOAGULATION FOLLOW-UP CLINIC VISIT    Patient Name:  Rocío Catherine  Date:  2020  Contact Type:  Telephone    SUBJECTIVE:  Patient Findings     Positives:  Change in medications (Zoloft 25 mg started )    Comments:  Patient did start to take her Zoloft on . She will take 25 mg then increase up to 50 mg.  Per micro-medics Concurrent use of SERTRALINE and ANTICOAGULANTS may result in an increased risk of bleeding.  Patient will continue weekly maintenance dose. INR is therapeutic.   Recheck in 1 week.   Patient verbalizes understanding and agrees to plan. No further questions or concerns.          Clinical Outcomes     Negatives:  Major bleeding event, Thromboembolic event, Anticoagulation-related hospital admission, Anticoagulation-related ED visit, Anticoagulation-related fatality    Comments:  Patient did start to take her Zoloft on . She will take 25 mg then increase up to 50 mg.  Per micro-medics Concurrent use of SERTRALINE and ANTICOAGULANTS may result in an increased risk of bleeding.  Patient will continue weekly maintenance dose. INR is therapeutic.   Recheck in 1 week.   Patient verbalizes understanding and agrees to plan. No further questions or concerns.             OBJECTIVE    Recent labs: (last 7 days)     20  1415   INR 2.50*       ASSESSMENT / PLAN  INR assessment THER    Recheck INR In: 1 WEEK    INR Location Outside lab      Anticoagulation Summary  As of 2020    INR goal:  2.0-3.0   TTR:  50.1 % (8.1 mo)   INR used for dosin.50 (2020)   Warfarin maintenance plan:  10 mg (5 mg x 2) every Mon, Thu, Sat; 7.5 mg (5 mg x 1.5) all other days   Full warfarin instructions:  10 mg every Mon, Thu, Sat; 7.5 mg all other days   Weekly warfarin total:  60 mg   No change documented:  Tim Randall, RN   Plan last modified:  Shelby Valero RN (2020)   Next INR check:  2020   Priority:  Maintenance   Target end date:  2020    Indications     Acute pulmonary embolism  unspecified pulmonary embolism type  unspecified whether acute cor pulmonale present (H) (Resolved) [I26.99]  Factor 5 Leiden mutation  heterozygous (H) [D68.51]  Other acute pulmonary embolism without acute cor pulmonale (H) (Resolved) [I26.99]             Anticoagulation Episode Summary     INR check location:      Preferred lab:      Send INR reminders to:  PATCHAITANYA WYOMING    Comments:  * hematology referral placed. Pt is breastfeeding. SEND MYCHART (she usually responds immediately) Okay to leave detailed message on phone      Anticoagulation Care Providers     Provider Role Specialty Phone number    Jesse Farnsworth MD Referring Family Medicine [phone]    Yessy Lyn APRN CNP Referring Family Medicine [phone]            See the Encounter Report to view Anticoagulation Flowsheet and Dosing Calendar (Go to Encounters tab in chart review, and find the Anticoagulation Therapy Visit)        Tim Randall RN

## 2020-11-27 ENCOUNTER — ANTICOAGULATION THERAPY VISIT (OUTPATIENT)
Dept: ANTICOAGULATION | Facility: CLINIC | Age: 25
End: 2020-11-27

## 2020-11-27 DIAGNOSIS — D68.51 FACTOR 5 LEIDEN MUTATION, HETEROZYGOUS (H): ICD-10-CM

## 2020-11-27 DIAGNOSIS — D68.51 FACTOR V LEIDEN MUTATION (H): ICD-10-CM

## 2020-11-27 DIAGNOSIS — I26.99 PULMONARY EMBOLISM (H): ICD-10-CM

## 2020-11-27 LAB
CAPILLARY BLOOD COLLECTION: NORMAL
INR PPP: 2.8 (ref 0.86–1.14)

## 2020-11-27 PROCEDURE — 99207 PR NO CHARGE NURSE ONLY: CPT

## 2020-11-27 PROCEDURE — 36416 COLLJ CAPILLARY BLOOD SPEC: CPT | Performed by: NURSE PRACTITIONER

## 2020-11-27 PROCEDURE — 85610 PROTHROMBIN TIME: CPT | Performed by: NURSE PRACTITIONER

## 2020-11-27 NOTE — PROGRESS NOTES
ANTICOAGULATION FOLLOW-UP CLINIC VISIT    Patient Name:  Rocío Catherine  Date:  2020  Contact Type:  Telephone    SUBJECTIVE:  Patient Findings     Comments:  No changes in medications, activity, or diet noted. No concerns with clotting, bleeding, or increased bruising noted. Took warfarin as prescribed.  Patient is to continue maintenance warfarin plan, and check INR in one week (due to recent med changes - sertraline).  Patient verbalizes understanding and agrees to plan. No further questions or concerns.        Clinical Outcomes     Negatives:  Major bleeding event, Thromboembolic event, Anticoagulation-related hospital admission, Anticoagulation-related ED visit, Anticoagulation-related fatality    Comments:  No changes in medications, activity, or diet noted. No concerns with clotting, bleeding, or increased bruising noted. Took warfarin as prescribed.  Patient is to continue maintenance warfarin plan, and check INR in one week (due to recent med changes - sertraline).  Patient verbalizes understanding and agrees to plan. No further questions or concerns.           OBJECTIVE    Recent labs: (last 7 days)     20  1531   INR 2.80*       ASSESSMENT / PLAN  INR assessment THER    Recheck INR In: 1 WEEK    INR Location Clinic      Anticoagulation Summary  As of 2020    INR goal:  2.0-3.0   TTR:  51.5 % (8.3 mo)   INR used for dosin.80 (2020)   Warfarin maintenance plan:  10 mg (5 mg x 2) every Mon, Thu, Sat; 7.5 mg (5 mg x 1.5) all other days   Full warfarin instructions:  10 mg every Mon, Thu, Sat; 7.5 mg all other days   Weekly warfarin total:  60 mg   No change documented:  Chika Delaney RN   Plan last modified:  Shelby Valero RN (2020)   Next INR check:  2020   Priority:  Maintenance   Target end date:  2020    Indications    Acute pulmonary embolism  unspecified pulmonary embolism type  unspecified whether acute cor pulmonale present (H) (Resolved)  [I26.99]  Factor 5 Leiden mutation  heterozygous (H) [D68.51]  Other acute pulmonary embolism without acute cor pulmonale (H) (Resolved) [I26.99]             Anticoagulation Episode Summary     INR check location:      Preferred lab:      Send INR reminders to:  JENNIFER KOENIG    Comments:  * hematology referral placed. Pt is breastfeeding. SEND MYCHART (she usually responds immediately) Okay to leave detailed message on phone      Anticoagulation Care Providers     Provider Role Specialty Phone number    Jesse Farnsworth MD Referring Family Medicine [phone]    Yessy Lyn APRN CNP Referring Family Medicine [phone]            See the Encounter Report to view Anticoagulation Flowsheet and Dosing Calendar (Go to Encounters tab in chart review, and find the Anticoagulation Therapy Visit)        Chika Delaney RN

## 2020-12-04 ENCOUNTER — ANTICOAGULATION THERAPY VISIT (OUTPATIENT)
Dept: ANTICOAGULATION | Facility: CLINIC | Age: 25
End: 2020-12-04

## 2020-12-04 ENCOUNTER — TELEPHONE (OUTPATIENT)
Dept: FAMILY MEDICINE | Facility: CLINIC | Age: 25
End: 2020-12-04

## 2020-12-04 DIAGNOSIS — D68.51 FACTOR 5 LEIDEN MUTATION, HETEROZYGOUS (H): ICD-10-CM

## 2020-12-04 DIAGNOSIS — D68.51 FACTOR V LEIDEN MUTATION (H): ICD-10-CM

## 2020-12-04 DIAGNOSIS — I26.99 PULMONARY EMBOLISM (H): ICD-10-CM

## 2020-12-04 DIAGNOSIS — I26.99 ACUTE PULMONARY EMBOLISM, UNSPECIFIED PULMONARY EMBOLISM TYPE, UNSPECIFIED WHETHER ACUTE COR PULMONALE PRESENT (H): Primary | ICD-10-CM

## 2020-12-04 LAB
CAPILLARY BLOOD COLLECTION: NORMAL
INR PPP: 2.6 (ref 0.86–1.14)

## 2020-12-04 PROCEDURE — 36416 COLLJ CAPILLARY BLOOD SPEC: CPT | Performed by: NURSE PRACTITIONER

## 2020-12-04 PROCEDURE — 85610 PROTHROMBIN TIME: CPT | Performed by: NURSE PRACTITIONER

## 2020-12-04 PROCEDURE — 99207 PR NO CHARGE NURSE ONLY: CPT

## 2020-12-04 NOTE — TELEPHONE ENCOUNTER
Could you please review this patient's chart? Her current anticoagulation clinic referral  in September. Is she on indefinite anticoagulation? If so, we need a new referral.    Thank you,  Shelby Samaniego RN, BSN, PHN

## 2020-12-04 NOTE — PROGRESS NOTES
ANTICOAGULATION FOLLOW-UP CLINIC VISIT    Patient Name:  Rocío Catherine  Date:  2020  Contact Type:  Telephone    SUBJECTIVE:  Patient Findings     Comments:  No changes in diet, activity level, medications (including over the counter), or health. No missed doses of warfarin. Patient took dosing as prescribed. No signs of clots or bleeding concerns. Patient will continue maintenance warfarin dosing.  Writer sent TE to PCP to review length of therapy. Her current INR referral is .        Clinical Outcomes     Negatives:  Major bleeding event, Thromboembolic event, Anticoagulation-related hospital admission, Anticoagulation-related ED visit, Anticoagulation-related fatality    Comments:  No changes in diet, activity level, medications (including over the counter), or health. No missed doses of warfarin. Patient took dosing as prescribed. No signs of clots or bleeding concerns. Patient will continue maintenance warfarin dosing.  Writer sent TE to PCP to review length of therapy. Her current INR referral is .           OBJECTIVE    Recent labs: (last 7 days)     20  1439   INR 2.60*       ASSESSMENT / PLAN  INR assessment THER    Recheck INR In: 2 WEEKS    INR Location Clinic      Anticoagulation Summary  As of 2020    INR goal:  2.0-3.0   TTR:  52.8 % (8.6 mo)   INR used for dosin.60 (2020)   Warfarin maintenance plan:  10 mg (5 mg x 2) every Mon, Thu, Sat; 7.5 mg (5 mg x 1.5) all other days   Full warfarin instructions:  10 mg every Mon, Thu, Sat; 7.5 mg all other days   Weekly warfarin total:  60 mg   No change documented:  Shelby Samaniego RN   Plan last modified:  Shelby Valero RN (2020)   Next INR check:  2020   Priority:  Maintenance   Target end date:  2020    Indications    Acute pulmonary embolism  unspecified pulmonary embolism type  unspecified whether acute cor pulmonale present (H) (Resolved) [I26.99]  Factor 5 Leiden mutation  heterozygous (H)  [D68.51]  Other acute pulmonary embolism without acute cor pulmonale (H) (Resolved) [I26.99]             Anticoagulation Episode Summary     INR check location:      Preferred lab:      Send INR reminders to:  JENNIFER KOENIG    Comments:  * hematology referral placed. Pt is breastfeeding. SEND MYCHART (she usually responds immediately) Okay to leave detailed message on phone      Anticoagulation Care Providers     Provider Role Specialty Phone number    Jesse Farnsworth MD Referring Family Medicine [phone]    Yessy Lyn APRN CNP Referring Family Medicine [phone]            See the Encounter Report to view Anticoagulation Flowsheet and Dosing Calendar (Go to Encounters tab in chart review, and find the Anticoagulation Therapy Visit)        Shelby Samaniego RN

## 2020-12-08 ENCOUNTER — TELEPHONE (OUTPATIENT)
Dept: FAMILY MEDICINE | Facility: CLINIC | Age: 25
End: 2020-12-08

## 2020-12-08 ENCOUNTER — OFFICE VISIT (OUTPATIENT)
Dept: FAMILY MEDICINE | Facility: CLINIC | Age: 25
End: 2020-12-08
Payer: COMMERCIAL

## 2020-12-08 ENCOUNTER — ANCILLARY PROCEDURE (OUTPATIENT)
Dept: GENERAL RADIOLOGY | Facility: CLINIC | Age: 25
End: 2020-12-08
Attending: NURSE PRACTITIONER
Payer: COMMERCIAL

## 2020-12-08 VITALS
OXYGEN SATURATION: 100 % | SYSTOLIC BLOOD PRESSURE: 120 MMHG | RESPIRATION RATE: 16 BRPM | WEIGHT: 101 LBS | DIASTOLIC BLOOD PRESSURE: 62 MMHG | BODY MASS INDEX: 19.08 KG/M2 | HEART RATE: 89 BPM | TEMPERATURE: 96.7 F

## 2020-12-08 DIAGNOSIS — Z79.01 LONG TERM CURRENT USE OF ANTICOAGULANT THERAPY: ICD-10-CM

## 2020-12-08 DIAGNOSIS — M79.675 PAIN OF TOE OF LEFT FOOT: ICD-10-CM

## 2020-12-08 DIAGNOSIS — M79.675 PAIN OF TOE OF LEFT FOOT: Primary | ICD-10-CM

## 2020-12-08 PROCEDURE — 99214 OFFICE O/P EST MOD 30 MIN: CPT | Performed by: NURSE PRACTITIONER

## 2020-12-08 PROCEDURE — 73660 X-RAY EXAM OF TOE(S): CPT | Mod: LT | Performed by: RADIOLOGY

## 2020-12-08 ASSESSMENT — ENCOUNTER SYMPTOMS: CONSTITUTIONAL NEGATIVE: 1

## 2020-12-08 NOTE — PROGRESS NOTES
Subjective     Rocío Catherine is a 25 year old female who presents to clinic today for the following health issues:    HPI         Musculoskeletal problem/pain  Onset/Duration: Last Night pt bump her left foot   Description  Location: foot - left  Joint Swelling: no  Redness: YES  Pain: YES  Warmth: no  Intensity:  severe  Progression of Symptoms:  worsening and constant  Accompanying signs and symptoms:   Fevers: no  Numbness/tingling/weakness: YES- weakness   History  Trauma to the area: YES  Recent illness:  no  Previous similar problem: no  Previous evaluation:  no  Precipitating or alleviating factors:  Aggravating factors include: none  Therapies tried and outcome: nothing    Additional provider notes: Left 5th digit toe pain after running into the bed frame over night. Here to see if it is broken.     Review of Systems   Constitutional: Negative.    Musculoskeletal:        Left toe pain, redness, bruising            Objective    /62   Pulse 89   Temp 96.7  F (35.9  C) (Tympanic)   Resp 16   Wt 45.8 kg (101 lb)   SpO2 100%   Breastfeeding No   BMI 19.08 kg/m    Body mass index is 19.08 kg/m .  Physical Exam  Vitals signs and nursing note reviewed.   Constitutional:       General: She is not in acute distress.     Appearance: Normal appearance. She is not ill-appearing or toxic-appearing.   Musculoskeletal:        Feet:    Skin:     General: Skin is warm and dry.   Neurological:      Mental Status: She is alert.                Assessment & Plan     Pain of toe of left foot  No acute fracture noted upon initial provider review. Will notify patient when official read from radiology comes through. Unable to use ibuprofen. Continue taking Tylenol. Offered a couple doses of oxycodone for pain since she is unable to take any NSAIDs. Patient is going to try Tylenol, ice, and elevation first. Discussed firm sole shoes.     - XR Toe Left G/E 2 Views; Future    Long term current use of anticoagulant  therapy  Follow-up with [facility] clinic as scheduled. Follow-up sooner if bruising worsens.           Patient Instructions   Left toe pain:  1. X-ray was negative for any acute fractures. We will notify you when radiology does the official read.  2. This is likely just severe bruising.  3. You can also take Tylenol 1000mg every 8 hours (max 3000mg/day). Recommend taking 4 hours after Ibuprofen.  4. Rest, ice, and elevate as able.  5. Follow-up in 1-2 weeks if symptoms do not improve or sooner if symptoms worsen.          Return if symptoms worsen or fail to improve.    SNOW Barboza [facility]

## 2020-12-08 NOTE — TELEPHONE ENCOUNTER
Pt says she cannot bear weight at all on her foot.    Needs appt to evaluate.  Will likely need an xray.    Pt will arrange.    Leti Fernando RN

## 2020-12-08 NOTE — TELEPHONE ENCOUNTER
Mary,    [facility] wants for you to review patient's chart.  If indefinite therapy needed referral will be needed.  I have pended this for you. Michelle MEANS RN

## 2020-12-08 NOTE — TELEPHONE ENCOUNTER
Reason for call:    Symptom or request:     Mom called concern about a possible broken toe-- left small toe, happened last night. --can walk on it but painful, are is black and blue.       Best Time:  any    Can we leave a detailed message on this number?  YES     Annalee CALIXTO  Station

## 2020-12-08 NOTE — PATIENT INSTRUCTIONS
Left toe pain:  1. X-ray was negative for any acute fractures. We will notify you when radiology does the official read.  2. This is likely just severe bruising.  3. You can also take Tylenol 1000mg every 8 hours (max 3000mg/day). Recommend taking 4 hours after Ibuprofen.  4. Rest, ice, and elevate as able.  5. Follow-up in 1-2 weeks if symptoms do not improve or sooner if symptoms worsen.

## 2020-12-15 PROBLEM — I26.99 ACUTE PULMONARY EMBOLISM, UNSPECIFIED PULMONARY EMBOLISM TYPE, UNSPECIFIED WHETHER ACUTE COR PULMONALE PRESENT (H): Status: ACTIVE | Noted: 2020-12-15

## 2020-12-15 NOTE — TELEPHONE ENCOUNTER
12/15/20    INR referral placed for indefinite Coumadin therapy. Patient needs to f/u with Hematology referral placed on 3/2020.

## 2021-01-01 NOTE — TELEPHONE ENCOUNTER
31+ called on the phone to ask if she needed to come in.  Reports that she is having some more discharge today and is experiencing a little bit of cramping.  Had been in about a week ago for irritable uterus.  Pt reports that she has been taking care of her kids today.  Encouraged her to find someone to watch the kids so she could push fluids, lie down and rest on her side and even take 2 500 mg Tylenol.  Pt was agreeable to that and will call back after at least an hour of rest and let us know how she is feeling.    
Statement Selected

## 2021-01-06 ENCOUNTER — VIRTUAL VISIT (OUTPATIENT)
Dept: FAMILY MEDICINE | Facility: CLINIC | Age: 26
End: 2021-01-06
Payer: COMMERCIAL

## 2021-01-06 DIAGNOSIS — R42 DIZZINESS: Primary | ICD-10-CM

## 2021-01-06 DIAGNOSIS — D68.51 FACTOR 5 LEIDEN MUTATION, HETEROZYGOUS (H): ICD-10-CM

## 2021-01-06 PROCEDURE — 99214 OFFICE O/P EST MOD 30 MIN: CPT | Mod: GT | Performed by: NURSE PRACTITIONER

## 2021-01-06 NOTE — PROGRESS NOTES
Rocío Catherine is a 25 year old female who is being evaluated via a billable video visit.      How would you like to obtain your AVS? MyChart  If the video visit is dropped, the invitation should be resent by: Send to e-mail at: [email or web address]  Will anyone else be joining your video visit? No      Video Start Time: 2:28 PM  Assessment & Plan     Dizziness  Patient correlates this dizziness with the sertraline - started around the same time.  Advised to decrease to 25 mg daily for 3-4 days and then stop.  If dizziness is not improving in a week, she should schedule a clinic appointment.  If symptoms worsen at any point, she was directed to go to the ER.    Factor 5 Leiden mutation, heterozygous (H)  Hasn't seen hematology yet - frustrated with long hold times on the phone.  New referral placed - they will call the patient.  - Oncology/Hematology Adult Referral; Future                    Return in about 1 week (around 1/13/2021).    The risks, benefits and treatment options of prescribed medications or other treatments have been discussed with the patient. The patient verbalized their understanding and should call or follow up if no improvement or if they develop further problems.    SNOW Ramos [facility]    Subjective     Rocío Catherine is a 25 year old who presents to clinic today for the following health issues     HPI   Chief Complaint   Patient presents with     Dizziness       Dizziness  Onset/Duration: ongoing for a few months, worse x 3 Days   Description:   Do you feel faint: no  Does it feel like the surroundings (bed, room) are moving: no  Unsteady/off balance: no  Have you passed out or fallen: no  Intensity: moderate  Progression of Symptoms: worsening  Accompanying Signs & Symptoms:  She states she Does Not Feel Safe Driving with these symptoms.  Heart palpitations or chest pain: no  Nausea, vomiting: YES- Nausea   Weakness or lack of coordination in arms or  legs: no  Vision or speech changes: YES- Hard to Focus , Blurriness   Headaches Today and Yesterday - Above Eyes, And today Base of Skull  Numbness or tingling: no  Ringing in ears (Tinnitus): no  Hearing Loss: no  History:   Head trauma/concussion history: no  Previous similar symptoms: YES  Recent bleeding history: no  Any new medications (BP?): YES- started taking Sertraline in November 2020  Precipitating factors:   Worse with activity: YES  Worse with head movement: YES  Alleviating factors:   Does staying in a fixed position give relief: no   Therapies tried and outcome: None        Review of Systems   Constitutional, HEENT, cardiovascular, pulmonary, gi and gu systems are negative, except as otherwise noted.      Objective           Vitals:  No vitals were obtained today due to virtual visit.    Physical Exam   GENERAL: Healthy, alert and no distress  EYES: Eyes grossly normal to inspection.  No discharge or erythema, or obvious scleral/conjunctival abnormalities.  RESP: No audible wheeze, cough, or visible cyanosis.  No visible retractions or increased work of breathing.    SKIN: Visible skin clear. No significant rash, abnormal pigmentation or lesions.  NEURO: Cranial nerves grossly intact.  Mentation and speech appropriate for age.  PSYCH: Mentation appears normal, affect normal/bright, judgement and insight intact, normal speech and appearance well-groomed.                Video-Visit Details    Type of service:  Video Visit    Video End Time:2:50pm    Originating Location (pt. Location): Home    Distant Location (provider location):  [facility]     Platform used for Video Visit: [employer]

## 2021-01-07 ENCOUNTER — ANTICOAGULATION THERAPY VISIT (OUTPATIENT)
Dept: ANTICOAGULATION | Facility: CLINIC | Age: 26
End: 2021-01-07

## 2021-01-07 DIAGNOSIS — I26.99 ACUTE PULMONARY EMBOLISM, UNSPECIFIED PULMONARY EMBOLISM TYPE, UNSPECIFIED WHETHER ACUTE COR PULMONALE PRESENT (H): ICD-10-CM

## 2021-01-07 DIAGNOSIS — D68.51 FACTOR V LEIDEN MUTATION (H): ICD-10-CM

## 2021-01-07 DIAGNOSIS — I26.99 PULMONARY EMBOLISM (H): ICD-10-CM

## 2021-01-07 DIAGNOSIS — D68.51 FACTOR 5 LEIDEN MUTATION, HETEROZYGOUS (H): ICD-10-CM

## 2021-01-07 LAB
CAPILLARY BLOOD COLLECTION: NORMAL
INR PPP: 2.5 (ref 0.86–1.14)

## 2021-01-07 PROCEDURE — 85610 PROTHROMBIN TIME: CPT | Performed by: NURSE PRACTITIONER

## 2021-01-07 PROCEDURE — 36416 COLLJ CAPILLARY BLOOD SPEC: CPT | Performed by: NURSE PRACTITIONER

## 2021-01-07 NOTE — PROGRESS NOTES
ANTICOAGULATION MANAGEMENT     Patient Name:  Rocío Catherine  Date:  1/7/2021    ASSESSMENT /SUBJECTIVE:    Today's INR result of 2.50 is therapeutic. Goal INR of 2.0-3.0      Warfarin dose taken: Warfarin taken as instructed    Diet: No new diet changes affecting INR    Medication changes/ interactions: No new medications/supplements affecting INR    Previous INR: Therapeutic     S/S of bleeding or thromboembolism: No    New injury or illness: No    Upcoming surgery, procedure or cardioversion: No    Additional findings: Patient had a virtual visit yesterday with a Provider to discuss headaches, dizziness, nausea, blurred vision symptoms she has been having since starting sertraline in November 2020. She was advised to decrease her dose to 25 mg for 3-4 days and then stop taking the medication. When speaking to patient today she states she does not want to do this right now, she wants to speak with her Psychiatrist to see if there is another option for medication before stopping this one. Advised that since she is not making any medication changes YET, to continue her same dose. However if she does decided to make any changes to notify [facility] and we can check INR accordingly. Also noted that another referral to hematology was done-patient to follow up with them.       PLAN:    Telephone call with Rocío regarding INR result and instructed:     Warfarin Dosing Instructions: Continue your current warfarin dose 10 mg every Mon, Thu, Sat; 7.5 mg all other days    Instructed patient to follow up no later than: 3 weeks  Lab visit scheduled    Education provided: Target INR goal and significance of current INR result, Importance of following up for INR monitoring at instructed interval and Importance of notifying clinic for changes in medications; a sooner lab recheck maybe needed.      Rocío verbalizes understanding and agrees to warfarin dosing plan.    Instructed to call the Anticoagulation Clinic for any changes, questions  or concerns. (#[phone])        Sarah Felipe, HILARIO      OBJECTIVE:  Recent labs: (last 7 days)     21  1242   INR 2.50*         No question data found.  Anticoagulation Summary  As of 2021    INR goal:  2.0-3.0   TTR:  58.3 % (9.7 mo)   INR used for dosin.50 (2021)   Warfarin maintenance plan:  10 mg (5 mg x 2) every Mon, Thu, Sat; 7.5 mg (5 mg x 1.5) all other days   Full warfarin instructions:  10 mg every Mon, Thu, Sat; 7.5 mg all other days   Weekly warfarin total:  60 mg   No change documented:  Sarah Felipe RN   Plan last modified:  Shelby Valero RN (2020)   Next INR check:  2021   Priority:  Maintenance   Target end date:  Indefinite    Indications    Acute pulmonary embolism  unspecified pulmonary embolism type  unspecified whether acute cor pulmonale present (H) (Resolved) [I26.99]  Factor 5 Leiden mutation  heterozygous (H) [D68.51]  Other acute pulmonary embolism without acute cor pulmonale (H) (Resolved) [I26.99]  Acute pulmonary embolism  unspecified pulmonary embolism type  unspecified whether acute cor pulmonale present (H) [I26.99]             Anticoagulation Episode Summary     INR check location:      Preferred lab:      Send INR reminders to:  JENNIFER KOENIG    Comments:  * hematology referral placed. Pt is breastfeeding. SEND MYCHART (she usually responds immediately) Okay to leave detailed message on phone      Anticoagulation Care Providers     Provider Role Specialty Phone number    Jesse Farnsworth MD Referring Family Medicine [phone]    Yessy Lyn APRN CNP Referring Family Medicine [phone]

## 2021-01-08 NOTE — TELEPHONE ENCOUNTER
ONCOLOGY INTAKE: Records Information      APPT INFORMATION:  Referring provider:  Marlene ADAMSON CNP  Referring provider s clinic:  [facility]   Reason for visit/diagnosis:  Factor 5 Leiden mutation, heterozygous (H)  Has patient been notified of appointment date and time?: Yes    RECORDS INFORMATION:  Were the records received with the referral (via [employer])? No,Internal Referral      Has patient been seen for any external appt for this diagnosis? No    If yes, where? NA      ADDITIONAL INFORMATION:  None

## 2021-01-08 NOTE — TELEPHONE ENCOUNTER
RECORDS STATUS - ALL OTHER DIAGNOSIS      RECORDS RECEIVED FROM: [facility]   DATE RECEIVED: 1/15/2021   NOTES STATUS DETAILS   OFFICE NOTE from referring provider Complete Marlene Dewitt APRN CNP   OFFICE NOTE from medical oncologist N/A    DISCHARGE SUMMARY from hospital N/A    DISCHARGE REPORT from the ER     OPERATIVE REPORT N/A    MEDICATION LIST Complete [facility]   CLINICAL TRIAL TREATMENTS TO DATE     LABS     PATHOLOGY REPORTS N/A    ANYTHING RELATED TO DIAGNOSIS Complete Labs last updated on 1/7/2021   GENONOMIC TESTING     TYPE:     IMAGING (NEED IMAGES & REPORT)     Xray Chest Complete 3/12/2020   CT SCANS Complete [facility]- [facility] Chest Pulmonary 6/17/2020, 4/22/2020, 3/12/2020, 1/25/2020 more in PACS     CT Head 4/7/2020    MRI Complete MRI Brain 6/17/2020    MAMMO     ULTRASOUND Complete 4/19/2020 US Lower Extremity Venous     3/12/2020 US Lower Extremity     10/10/2019 US Lower Ext Venous    PET

## 2021-01-15 ENCOUNTER — PRE VISIT (OUTPATIENT)
Dept: ONCOLOGY | Facility: CLINIC | Age: 26
End: 2021-01-15

## 2021-01-15 ENCOUNTER — VIRTUAL VISIT (OUTPATIENT)
Dept: ONCOLOGY | Facility: CLINIC | Age: 26
End: 2021-01-15
Attending: NURSE PRACTITIONER
Payer: COMMERCIAL

## 2021-01-15 VITALS — BODY MASS INDEX: 19.45 KG/M2 | HEIGHT: 61 IN | WEIGHT: 103 LBS

## 2021-01-15 DIAGNOSIS — D68.51 FACTOR 5 LEIDEN MUTATION, HETEROZYGOUS (H): ICD-10-CM

## 2021-01-15 DIAGNOSIS — I26.99 ACUTE PULMONARY EMBOLISM WITHOUT ACUTE COR PULMONALE, UNSPECIFIED PULMONARY EMBOLISM TYPE (H): Primary | ICD-10-CM

## 2021-01-15 DIAGNOSIS — F33.40 RECURRENT MAJOR DEPRESSIVE DISORDER, IN REMISSION (H): ICD-10-CM

## 2021-01-15 PROCEDURE — 99203 OFFICE O/P NEW LOW 30 MIN: CPT | Mod: GT | Performed by: INTERNAL MEDICINE

## 2021-01-15 ASSESSMENT — MIFFLIN-ST. JEOR: SCORE: 1144.58

## 2021-01-15 ASSESSMENT — PAIN SCALES - GENERAL: PAINLEVEL: EXTREME PAIN (8)

## 2021-01-15 NOTE — PROGRESS NOTES
"Oncology Rooming Note-VideoVisit Via- José Luis Kendall #[phone].     January 15, 2021 8:23 AM   Rocío Catherine is a 25 year old female who presents for:    Chief Complaint   Patient presents with     Hematology     Complete KEB, Factor 5 Lwiswn mutation, Heterozygous.      Initial Vitals: Ht 1.549 m (5' 1\")   Wt 46.7 kg (103 lb)   Breastfeeding No   BMI 19.46 kg/m   Estimated body mass index is 19.46 kg/m  as calculated from the following:    Height as of this encounter: 1.549 m (5' 1\").    Weight as of this encounter: 46.7 kg (103 lb). Body surface area is 1.42 meters squared.  Extreme Pain (8) Comment: Data Unavailable   No LMP recorded.  Allergies reviewed: Yes  Medications reviewed: Yes    Medications: Medication refills not needed today.  Pharmacy name entered into [facility]: [facility] [address]    Clinical concerns: Complete KEB, Factor 5 Lwiswn mutation, Heterozygous. C/o headaches and dizziness.       Em Lezama, [facility]            "

## 2021-01-15 NOTE — LETTER
"    1/15/2021         RE: Rocío Catherine  [address] [address] [address]        Dear Colleague,    Thank you for referring your patient, Rocío Catherine, to the [facility]. Please see a copy of my visit note below.    Oncology Rooming Note-VideoVisit Via- José Luis Kendall #[phone].     January 15, 2021 8:23 AM   Rocío Catherine is a 25 year old female who presents for:    Chief Complaint   Patient presents with     Hematology     Complete KEB, Factor 5 Lwiswn mutation, Heterozygous.      Initial Vitals: Ht 1.549 m (5' 1\")   Wt 46.7 kg (103 lb)   Breastfeeding No   BMI 19.46 kg/m   Estimated body mass index is 19.46 kg/m  as calculated from the following:    Height as of this encounter: 1.549 m (5' 1\").    Weight as of this encounter: 46.7 kg (103 lb). Body surface area is 1.42 meters squared.  Extreme Pain (8) Comment: Data Unavailable   No LMP recorded.  Allergies reviewed: Yes  Medications reviewed: Yes    Medications: Medication refills not needed today.  Pharmacy name entered into [employer]: [facility] [place], [place] - [address]    Clinical concerns: Complete KEB, Factor 5 Lwiswn mutation, Heterozygous. C/o headaches and dizziness.       [facility]                Hematology/ Oncology virtual video visit:  Yaron 15, 2021    Due to the concerns around COVID-19 and adhering to social distancing we conducted this visit via video visit.      Reason for Visit:   Chief Complaint   Patient presents with     Hematology     Complete KEB, Factor 5 Lwiswn mutation, Heterozygous.        History of present illness:  Rocío Catherine is a 25-year-old female patient with known history of factor V Leiden mutation.  She presented to the emergency room 3 weeks on 2020 following her  section with chest pain and shortness of breath.  At that time she was diagnosed with pulmonary embolism and was started on warfarin.  She continues on warfarin since she is " here today to discuss management of anticoagulation.  She has been tolerating warfarin without significant abnormalities.    Review of systems:  Pertinent positives have been included in HPI; remainder of detailed complete 20-point ROS was negative.    Past medical, social, surgical, and family histories reviewed.    Allergies:  Allergies as of 01/15/2021     (No Known Allergies)       Current Medications:  Current Outpatient Medications   Medication Sig Dispense Refill     sertraline (ZOLOFT) 50 MG tablet Take 1 tablet (50 mg) by mouth daily 30 tablet 3     warfarin ANTICOAGULANT (JANTOVEN ANTICOAGULANT) 5 MG tablet 10 mg (5 mg x 2) every Mon, Thu, Sat; 7.5 mg (5 mg x 1.5) all other days as directed by the Anticoagulation Clinic 150 tablet 0     cyclobenzaprine (FLEXERIL) 5 MG tablet Take 1-2 tablets (5-10 mg) by mouth 3 times daily as needed for muscle spasms or other (back pain) (Patient not taking: Reported on 1/6/2021) 30 tablet 1     pantoprazole (PROTONIX) 20 MG EC tablet Take 2 tablets (40 mg) by mouth daily (Patient not taking: Reported on 12/8/2020) 30 tablet 1        Physical examination:  Physical Exam as observed via telehealth:         Constitutional - General appearance, and body habitus are within normal range         Eyes -there is no redness, or discharge seen.         Respiratory -there is no cough, or labored breathing observed         Musculoskeletal -full range of motion is observed         Skin -there is no visible discoloration, or visible lesions         Neurological -there is no tremors observed         Psychiatric -the patient is alert & oriented.    The rest of a comprehensive physical examination is deferred due to PHE (public health emergency) video visit restrictions.    Laboratory/Imaging Studies:  Orders Only on 01/07/2021   Component Date Value Ref Range Status     INR 01/07/2021 2.50* 0.86 - 1.14 Final    Comment: This test is intended for monitoring Coumadin therapy.  Results are  not   accurate in patients with prolonged INR due to factor deficiency.       Capillary Blood Collection 2021 Capillary collection performed   Final          Assessment and plan:    (I26.99) Acute pulmonary embolism without acute cor pulmonale, unspecified pulmonary embolism type (H)  (primary encounter diagnosis)  (D68.51) Factor 5 Leiden mutation, heterozygous (H)  I explained to the patient today the natural history of thromboembolic disease, increasing risk with thromboembolic disease with factor V Leiden mutation.  We also talked about predisposing factors including postoperative especially her .  I would like to continue with a formal hypercoagulability work-up to assess.  I think the patient PE was related to postoperative after her .  She will probably could stop her anticoagulation however we did not find another abnormality and hypercoagulability work-up is an indication to continue with long term anticoagulation.  It is also explained to the patient that use of birth control pill should be contraindicated.  in subsequent pregnancies the patient should be on anticoagulation       The patient is ready to learn, no apparent learning barriers were identified.  Diagnosis and treatment plans were explained to the patient. The patient expressed understanding of the content. The patient asked appropriate questions. The patient questions were answered to her satisfaction.    This is started 8:30 AM  Visit ended 9 AM    Solo Keen MD    Chart documentation with Dragon Voice recognition Software. Although reviewed after completion, some words and grammatical errors may remain.      Again, thank you for allowing me to participate in the care of your patient.        Sincerely,        Solo Keen MD

## 2021-01-15 NOTE — LETTER
"    1/15/2021         RE: Rocío Catherine  [id number] [address] [address]        Dear Colleague,    Thank you for referring your patient, Rocío Catherine, to the [facility] [place]. Please see a copy of my visit note below.    Oncology Rooming Note-VideoVisit Via- José Luis Kendall #[phone].     January 15, 2021 8:23 AM   Rocío Catherine is a 25 year old female who presents for:    Chief Complaint   Patient presents with     Hematology     Complete KEB, Factor 5 Lwiswn mutation, Heterozygous.      Initial Vitals: Ht 1.549 m (5' 1\")   Wt 46.7 kg (103 lb)   Breastfeeding No   BMI 19.46 kg/m   Estimated body mass index is 19.46 kg/m  as calculated from the following:    Height as of this encounter: 1.549 m (5' 1\").    Weight as of this encounter: 46.7 kg (103 lb). Body surface area is 1.42 meters squared.  Extreme Pain (8) Comment: Data Unavailable   No LMP recorded.  Allergies reviewed: Yes  Medications reviewed: Yes    Medications: Medication refills not needed today.  Pharmacy name entered into [employer]: [facility] [place], [place] - [address]    Clinical concerns: Complete KEB, Factor 5 Lwiswn mutation, Heterozygous. C/o headaches and dizziness.       Em Lezama [facility]                Hematology/ Oncology virtual video visit:  Yaron 15, 2021    Due to the concerns around COVID-19 and adhering to social distancing we conducted this visit via video visit.      Reason for Visit:   Chief Complaint   Patient presents with     Hematology     Complete KEB, Factor 5 Lwiswn mutation, Heterozygous.        History of present illness:  Rocío Catherine is a 25-year-old female patient with known history of factor V Leiden mutation.  She presented to the emergency room 3 weeks on 2020 following her  section with chest pain and shortness of breath.  At that time she was diagnosed with pulmonary embolism and was started on warfarin.  She continues on warfarin since she is " here today to discuss management of anticoagulation.  She has been tolerating warfarin without significant abnormalities.    Review of systems:  Pertinent positives have been included in HPI; remainder of detailed complete 20-point ROS was negative.    Past medical, social, surgical, and family histories reviewed.    Allergies:  Allergies as of 01/15/2021     (No Known Allergies)       Current Medications:  Current Outpatient Medications   Medication Sig Dispense Refill     sertraline (ZOLOFT) 50 MG tablet Take 1 tablet (50 mg) by mouth daily 30 tablet 3     warfarin ANTICOAGULANT (JANTOVEN ANTICOAGULANT) 5 MG tablet 10 mg (5 mg x 2) every Mon, Thu, Sat; 7.5 mg (5 mg x 1.5) all other days as directed by the Anticoagulation Clinic 150 tablet 0     cyclobenzaprine (FLEXERIL) 5 MG tablet Take 1-2 tablets (5-10 mg) by mouth 3 times daily as needed for muscle spasms or other (back pain) (Patient not taking: Reported on 1/6/2021) 30 tablet 1     pantoprazole (PROTONIX) 20 MG EC tablet Take 2 tablets (40 mg) by mouth daily (Patient not taking: Reported on 12/8/2020) 30 tablet 1        Physical examination:  Physical Exam as observed via telehealth:         Constitutional - General appearance, and body habitus are within normal range         Eyes -there is no redness, or discharge seen.         Respiratory -there is no cough, or labored breathing observed         Musculoskeletal -full range of motion is observed         Skin -there is no visible discoloration, or visible lesions         Neurological -there is no tremors observed         Psychiatric -the patient is alert & oriented.    The rest of a comprehensive physical examination is deferred due to PHE (public health emergency) video visit restrictions.    Laboratory/Imaging Studies:  Orders Only on 01/07/2021   Component Date Value Ref Range Status     INR 01/07/2021 2.50* 0.86 - 1.14 Final    Comment: This test is intended for monitoring Coumadin therapy.  Results are  not   accurate in patients with prolonged INR due to factor deficiency.       Capillary Blood Collection 2021 Capillary collection performed   Final          Assessment and plan:    (I26.99) Acute pulmonary embolism without acute cor pulmonale, unspecified pulmonary embolism type (H)  (primary encounter diagnosis)  (D68.51) Factor 5 Leiden mutation, heterozygous (H)  I explained to the patient today the natural history of thromboembolic disease, increasing risk with thromboembolic disease with factor V Leiden mutation.  We also talked about predisposing factors including postoperative especially her .  I would like to continue with a formal hypercoagulability work-up to assess.  I think the patient PE was related to postoperative after her .  She will probably could stop her anticoagulation however we did not find another abnormality and hypercoagulability work-up is an indication to continue with long term anticoagulation.  It is also explained to the patient that use of birth control pill should be contraindicated.  in subsequent pregnancies the patient should be on anticoagulation       The patient is ready to learn, no apparent learning barriers were identified.  Diagnosis and treatment plans were explained to the patient. The patient expressed understanding of the content. The patient asked appropriate questions. The patient questions were answered to her satisfaction.    This is started 8:30 AM  Visit ended 9 AM    Solo Keen MD    Chart documentation with Dragon Voice recognition Software. Although reviewed after completion, some words and grammatical errors may remain.      Again, thank you for allowing me to participate in the care of your patient.        Sincerely,        Solo Keen MD

## 2021-01-26 NOTE — PROGRESS NOTES
Hematology/ Oncology virtual video visit:  Yaron 15, 2021    Due to the concerns around COVID-19 and adhering to social distancing we conducted this visit via video visit.      Reason for Visit:   Chief Complaint   Patient presents with     Hematology     Complete KEB, Factor 5 Lwiswn mutation, Heterozygous.        History of present illness:  Rocío Catherine is a 25-year-old female patient with known history of factor V Leiden mutation.  She presented to the emergency room 3 weeks on 2020 following her  section with chest pain and shortness of breath.  At that time she was diagnosed with pulmonary embolism and was started on warfarin.  She continues on warfarin since she is here today to discuss management of anticoagulation.  She has been tolerating warfarin without significant abnormalities.    Review of systems:  Pertinent positives have been included in HPI; remainder of detailed complete 20-point ROS was negative.    Past medical, social, surgical, and family histories reviewed.    Allergies:  Allergies as of 01/15/2021     (No Known Allergies)       Current Medications:  Current Outpatient Medications   Medication Sig Dispense Refill     sertraline (ZOLOFT) 50 MG tablet Take 1 tablet (50 mg) by mouth daily 30 tablet 3     warfarin ANTICOAGULANT (JANTOVEN ANTICOAGULANT) 5 MG tablet 10 mg (5 mg x 2) every Mon, Thu, Sat; 7.5 mg (5 mg x 1.5) all other days as directed by the Anticoagulation Clinic 150 tablet 0     cyclobenzaprine (FLEXERIL) 5 MG tablet Take 1-2 tablets (5-10 mg) by mouth 3 times daily as needed for muscle spasms or other (back pain) (Patient not taking: Reported on 2021) 30 tablet 1     pantoprazole (PROTONIX) 20 MG EC tablet Take 2 tablets (40 mg) by mouth daily (Patient not taking: Reported on 2020) 30 tablet 1        Physical examination:  Physical Exam as observed via telehealth:         Constitutional - General appearance, and body habitus are within normal range          Eyes -there is no redness, or discharge seen.         Respiratory -there is no cough, or labored breathing observed         Musculoskeletal -full range of motion is observed         Skin -there is no visible discoloration, or visible lesions         Neurological -there is no tremors observed         Psychiatric -the patient is alert & oriented.    The rest of a comprehensive physical examination is deferred due to PHE (public health emergency) video visit restrictions.    Laboratory/Imaging Studies:  Orders Only on 2021   Component Date Value Ref Range Status     INR 2021 2.50* 0.86 - 1.14 Final    Comment: This test is intended for monitoring Coumadin therapy.  Results are not   accurate in patients with prolonged INR due to factor deficiency.       Capillary Blood Collection 2021 Capillary collection performed   Final          Assessment and plan:    (I26.99) Acute pulmonary embolism without acute cor pulmonale, unspecified pulmonary embolism type (H)  (primary encounter diagnosis)  (D68.51) Factor 5 Leiden mutation, heterozygous (H)  I explained to the patient today the natural history of thromboembolic disease, increasing risk with thromboembolic disease with factor V Leiden mutation.  We also talked about predisposing factors including postoperative especially her .  I would like to continue with a formal hypercoagulability work-up to assess.  I think the patient PE was related to postoperative after her .  She will probably could stop her anticoagulation however we did not find another abnormality and hypercoagulability work-up is an indication to continue with long term anticoagulation.  It is also explained to the patient that use of birth control pill should be contraindicated.  in subsequent pregnancies the patient should be on anticoagulation       The patient is ready to learn, no apparent learning barriers were identified.  Diagnosis and treatment plans were  explained to the patient. The patient expressed understanding of the content. The patient asked appropriate questions. The patient questions were answered to her satisfaction.    This is started 8:30 AM  Visit ended 9 AM    Solo Keen MD    Chart documentation with Dragon Voice recognition Software. Although reviewed after completion, some words and grammatical errors may remain.

## 2021-01-29 ENCOUNTER — HOSPITAL ENCOUNTER (OUTPATIENT)
Dept: LAB | Facility: CLINIC | Age: 26
Discharge: HOME | End: 2021-01-29
Attending: INTERNAL MEDICINE | Admitting: INTERNAL MEDICINE
Payer: COMMERCIAL

## 2021-01-29 ENCOUNTER — ANTICOAGULATION THERAPY VISIT (OUTPATIENT)
Dept: ANTICOAGULATION | Facility: CLINIC | Age: 26
End: 2021-01-29
Payer: COMMERCIAL

## 2021-01-29 DIAGNOSIS — D68.51 FACTOR 5 LEIDEN MUTATION, HETEROZYGOUS (H): ICD-10-CM

## 2021-01-29 DIAGNOSIS — I26.99 ACUTE PULMONARY EMBOLISM, UNSPECIFIED PULMONARY EMBOLISM TYPE, UNSPECIFIED WHETHER ACUTE COR PULMONALE PRESENT (H): ICD-10-CM

## 2021-01-29 DIAGNOSIS — D68.51 FACTOR V LEIDEN MUTATION (H): ICD-10-CM

## 2021-01-29 DIAGNOSIS — I26.99 PULMONARY EMBOLISM (H): ICD-10-CM

## 2021-01-29 LAB — INR PPP: 0.99 (ref 0.86–1.14)

## 2021-01-29 PROCEDURE — 86147 CARDIOLIPIN ANTIBODY EA IG: CPT | Performed by: INTERNAL MEDICINE

## 2021-01-29 PROCEDURE — G0452 MOLECULAR PATHOLOGY INTERPR: HCPCS | Mod: 26 | Performed by: PATHOLOGY

## 2021-01-29 PROCEDURE — 85306 CLOT INHIBIT PROT S FREE: CPT | Performed by: INTERNAL MEDICINE

## 2021-01-29 PROCEDURE — 85303 CLOT INHIBIT PROT C ACTIVITY: CPT | Performed by: INTERNAL MEDICINE

## 2021-01-29 PROCEDURE — 85300 ANTITHROMBIN III ACTIVITY: CPT | Performed by: INTERNAL MEDICINE

## 2021-01-29 PROCEDURE — 81240 F2 GENE: CPT | Performed by: INTERNAL MEDICINE

## 2021-01-29 PROCEDURE — 85610 PROTHROMBIN TIME: CPT | Performed by: INTERNAL MEDICINE

## 2021-01-29 PROCEDURE — 99207 PR NO CHARGE LOS: CPT

## 2021-01-29 PROCEDURE — 36415 COLL VENOUS BLD VENIPUNCTURE: CPT | Performed by: INTERNAL MEDICINE

## 2021-01-29 NOTE — PROGRESS NOTES
ANTICOAGULATION FOLLOW-UP CLINIC VISIT    Patient Name:  Rocío Catherine  Date:  2021  Contact Type:  Telephone    SUBJECTIVE:  Patient Findings     Positives:  Missed doses    Comments:  Per patient, she has been off warfarin for the last 2 weeks for hematology testing today. She has 5 labs pending for Dr. Keen. Per patient, if any of those labs come back indicating hypercoagulability, she will be back on warfarin for life. The notes from Dr. Keen are not that specific. Patient reports she can stay off warfarin until labs come back. Will set reminder for 21 for ACC to follow up.        Clinical Outcomes     Comments:  Per patient, she has been off warfarin for the last 2 weeks for hematology testing today. She has 5 labs pending for Dr. Keen. Per patient, if any of those labs come back indicating hypercoagulability, she will be back on warfarin for life. The notes from Dr. Keen are not that specific. Patient reports she can stay off warfarin until labs come back. Will set reminder for 21 for ACC to follow up.           OBJECTIVE    Recent labs: (last 7 days)     21  1529   INR 0.99       ASSESSMENT / PLAN  INR assessment SUB holding warfarin x 2 weeks   INR Location Clinic      Anticoagulation Summary  As of 2021    INR goal:  2.0-3.0   TTR:  56.5 % (10.4 mo)   INR used for dosin.99 (2021)   Warfarin maintenance plan:  10 mg (5 mg x 2) every Mon, Thu, Sat; 7.5 mg (5 mg x 1.5) all other days   Full warfarin instructions:  : Hold; : Hold; : Hold; Otherwise 10 mg every Mon, Thu, Sat; 7.5 mg all other days   Weekly warfarin total:  60 mg   Plan last modified:  Shelby Valero RN (2020)   Next INR check:  2021   Priority:  Critical   Target end date:  Indefinite    Indications    Acute pulmonary embolism  unspecified pulmonary embolism type  unspecified whether acute cor pulmonale present (H) (Resolved) [I26.99]  Factor 5 Leiden mutation  heterozygous  (H) [D68.51]  Other acute pulmonary embolism without acute cor pulmonale (H) (Resolved) [I26.99]  Acute pulmonary embolism  unspecified pulmonary embolism type  unspecified whether acute cor pulmonale present (H) [I26.99]             Anticoagulation Episode Summary     INR check location:      Preferred lab:      Send INR reminders to:  JENNIFER TRAMMELLNICKIE    Comments:  * hematology referral placed. Pt is breastfeeding. SEND MYCHART (she usually responds immediately) Okay to leave detailed message on phone      Anticoagulation Care Providers     Provider Role Specialty Phone number    Jesse Farnsworth MD Referring Family Medicine [phone]    Yessy Lyn APRN CNP Referring Family Medicine [phone]            See the Encounter Report to view Anticoagulation Flowsheet and Dosing Calendar (Go to Encounters tab in chart review, and find the Anticoagulation Therapy Visit)        Shelby Samaniego RN

## 2021-02-01 LAB
AT III ACT/NOR PPP CHRO: 106 % (ref 85–135)
CARDIOLIPIN ANTIBODY IGG: 7.9 GPL-U/ML (ref 0–19.9)
CARDIOLIPIN ANTIBODY IGM: 23.9 MPL-U/ML (ref 0–19.9)
PROT C ACT/NOR PPP CHRO: 92 % (ref 70–170)
PROT S FREE AG ACT/NOR PPP IA: 90 % (ref 55–125)

## 2021-02-02 ENCOUNTER — TELEPHONE (OUTPATIENT)
Dept: ONCOLOGY | Facility: CLINIC | Age: 26
End: 2021-02-02

## 2021-02-02 DIAGNOSIS — I26.99 ACUTE PULMONARY EMBOLISM, UNSPECIFIED PULMONARY EMBOLISM TYPE, UNSPECIFIED WHETHER ACUTE COR PULMONALE PRESENT (H): ICD-10-CM

## 2021-02-02 DIAGNOSIS — D68.51 FACTOR 5 LEIDEN MUTATION, HETEROZYGOUS (H): ICD-10-CM

## 2021-02-02 NOTE — TELEPHONE ENCOUNTER
Please advise if this patient should resume her warfarin. She states she has been off it for over 2 weeks for labs.    Thank you,  Shelby Samaniego RN, BSN, PHN  Anticoagulation Clinic

## 2021-02-04 LAB — COPATH REPORT: NORMAL

## 2021-02-04 NOTE — TELEPHONE ENCOUNTER
Please let patient know due to test still pending, Dr Keen prefers to wait until all results in.    Demetria Davis, PharmD BCACP  Anticoagulation Clinical Pharmacist

## 2021-02-09 NOTE — TELEPHONE ENCOUNTER
02/09/21    It looks like all the results have finalized. Please advise on what the future plan is. Thank you.    Tim Randall RN, BSN, PHN  Anticoagulation Clinic   [phone]

## 2021-02-11 ENCOUNTER — OFFICE VISIT (OUTPATIENT)
Dept: FAMILY MEDICINE | Facility: CLINIC | Age: 26
End: 2021-02-11
Payer: COMMERCIAL

## 2021-02-11 VITALS
HEART RATE: 105 BPM | WEIGHT: 102.4 LBS | OXYGEN SATURATION: 100 % | RESPIRATION RATE: 12 BRPM | TEMPERATURE: 98.4 F | SYSTOLIC BLOOD PRESSURE: 94 MMHG | HEIGHT: 61 IN | DIASTOLIC BLOOD PRESSURE: 70 MMHG | BODY MASS INDEX: 19.33 KG/M2

## 2021-02-11 DIAGNOSIS — R10.2 PELVIC PAIN IN FEMALE: Primary | ICD-10-CM

## 2021-02-11 DIAGNOSIS — R39.89 URINARY PROBLEM: ICD-10-CM

## 2021-02-11 LAB
ALBUMIN UR-MCNC: NEGATIVE MG/DL
APPEARANCE UR: ABNORMAL
BACTERIA #/AREA URNS HPF: ABNORMAL /HPF
BILIRUB UR QL STRIP: NEGATIVE
COLOR UR AUTO: YELLOW
GLUCOSE UR STRIP-MCNC: NEGATIVE MG/DL
HGB UR QL STRIP: ABNORMAL
KETONES UR STRIP-MCNC: NEGATIVE MG/DL
LEUKOCYTE ESTERASE UR QL STRIP: NEGATIVE
MUCOUS THREADS #/AREA URNS LPF: PRESENT /LPF
NITRATE UR QL: NEGATIVE
NON-SQ EPI CELLS #/AREA URNS LPF: ABNORMAL /LPF
PH UR STRIP: 6 PH (ref 5–7)
RBC #/AREA URNS AUTO: ABNORMAL /HPF
SOURCE: ABNORMAL
SP GR UR STRIP: >1.03 (ref 1–1.03)
SPECIMEN SOURCE: NORMAL
UROBILINOGEN UR STRIP-ACNC: 0.2 EU/DL (ref 0.2–1)
WBC #/AREA URNS AUTO: ABNORMAL /HPF
WET PREP SPEC: NORMAL

## 2021-02-11 PROCEDURE — 81001 URINALYSIS AUTO W/SCOPE: CPT | Performed by: INTERNAL MEDICINE

## 2021-02-11 PROCEDURE — 87210 SMEAR WET MOUNT SALINE/INK: CPT | Performed by: INTERNAL MEDICINE

## 2021-02-11 PROCEDURE — 99214 OFFICE O/P EST MOD 30 MIN: CPT | Performed by: INTERNAL MEDICINE

## 2021-02-11 ASSESSMENT — MIFFLIN-ST. JEOR: SCORE: 1137.89

## 2021-02-11 NOTE — PROGRESS NOTES
Assessment & Plan     Pelvic pain in female    Rocío presents with 1-1.5 weeks of intense pelvic cramping with her last period that was associated with some increased urinary frequency.  Urinary symptom has improved but she continues to have some milder cramping.  U/A today more consistent with contamination than infection.  Wet prep normal.  She reports no concern for STIs.  She has a history of ovarian cysts- we'll get U/S for further eval.  If normal, we discussed the possibility of endometriosis causing intense cramping.  If she had endometriosis implant on outer bladder wall, this could cause urinary symptoms.  Would have her follow-up with OB/GYN if symptoms continue.     - Wet prep  - US Pelvic Complete with Transvaginal; Future    Urinary problem    - UA reflex to Microscopic  - Urine Microscopic    Alberto Luke MD  [facility]    Juan Pablo Alford is a 25 year old who presents for the following health issues     HPI       Genitourinary - Female  Onset/Duration: 1-1.5 weeks developed very bad cramping with her period.  Felt like she was not able to hold her bladder well for a few days.    Description:   Painful urination (Dysuria): no           Frequency: no, no urgency  Blood in urine (Hematuria): no  Delay in urine (Hesitency): no  Intensity: moderate  Progression of Symptoms:  intermittent  Accompanying Signs & Symptoms:  Fever/chills: no  Flank pain: YES right lower back, started a few days ago  Nausea and vomiting: no  Vaginal symptoms: none  Abdominal/Pelvic Pain: YES continued cramping despite being at the tail end of her period. Was worse last week.  History: Has a history of ovarian cysts. History of irregular periods since having last child.  History of frequent UTI s: YES  History of kidney stones: no  Sexually Active: YES  Possibility of pregnancy: No  Precipitating or alleviating factors: None  Therapies tried and outcome: Cranberry juice prn (contraindicated in  "Coumadin patients)      Review of Systems   Constitutional, gi and gu systems are negative, except as otherwise noted.      Objective    BP 94/70   Pulse 105   Temp 98.4  F (36.9  C) (Tympanic)   Resp 12   Ht 1.543 m (5' 0.75\")   Wt 46.4 kg (102 lb 6.4 oz)   LMP 02/02/2021 (LMP Unknown)   SpO2 100%   BMI 19.51 kg/m    Body mass index is 19.51 kg/m .  Physical Exam     GENERAL: healthy, alert and no distress  RESP: lungs clear to auscultation - no rales, rhonchi or wheezes  CV: regular rate and rhythm, normal S1 S2, no S3 or S4, no murmur, click or rub  ABDOMEN: soft, mildly tender over epigastric area and RLQ, no hepatosplenomegaly, no masses and bowel sounds normal  BACK: no CVA tenderness     Results for orders placed or performed in visit on 02/11/21 (from the past 24 hour(s))   UA reflex to Microscopic   Result Value Ref Range    Color Urine Yellow     Appearance Urine Slightly Cloudy     Glucose Urine Negative NEG^Negative mg/dL    Bilirubin Urine Negative NEG^Negative    Ketones Urine Negative NEG^Negative mg/dL    Specific Gravity Urine >1.030 1.003 - 1.035    Blood Urine Trace (A) NEG^Negative    pH Urine 6.0 5.0 - 7.0 pH    Protein Albumin Urine Negative NEG^Negative mg/dL    Urobilinogen Urine 0.2 0.2 - 1.0 EU/dL    Nitrite Urine Negative NEG^Negative    Leukocyte Esterase Urine Negative NEG^Negative    Source Midstream Urine    Urine Microscopic   Result Value Ref Range    WBC Urine 0 - 5 OTO5^0 - 5 /HPF    RBC Urine O - 2 OTO2^O - 2 /HPF    Squamous Epithelial /LPF Urine Few FEW^Few /LPF    Bacteria Urine Few (A) NEG^Negative /HPF    Mucous Urine Present (A) NEG^Negative /LPF   Wet prep    Specimen: Vagina   Result Value Ref Range    Specimen Description Vagina     Wet Prep No Trichomonas seen     Wet Prep No clue cells seen     Wet Prep No yeast seen     Wet Prep WBC'S seen  Rare                "

## 2021-02-11 NOTE — NURSING NOTE
"Initial BP 94/70   Pulse 105   Temp 98.4  F (36.9  C) (Tympanic)   Resp 12   Ht 1.543 m (5' 0.75\")   Wt 46.4 kg (102 lb 6.4 oz)   SpO2 100%   BMI 19.51 kg/m   Estimated body mass index is 19.51 kg/m  as calculated from the following:    Height as of this encounter: 1.543 m (5' 0.75\").    Weight as of this encounter: 46.4 kg (102 lb 6.4 oz). .      "

## 2021-02-17 ENCOUNTER — VIRTUAL VISIT (OUTPATIENT)
Dept: ONCOLOGY | Facility: CLINIC | Age: 26
End: 2021-02-17
Attending: INTERNAL MEDICINE
Payer: COMMERCIAL

## 2021-02-17 VITALS — BODY MASS INDEX: 19.26 KG/M2 | HEIGHT: 61 IN | WEIGHT: 102 LBS

## 2021-02-17 DIAGNOSIS — D68.51 FACTOR 5 LEIDEN MUTATION, HETEROZYGOUS (H): ICD-10-CM

## 2021-02-17 DIAGNOSIS — I26.99 ACUTE PULMONARY EMBOLISM, UNSPECIFIED PULMONARY EMBOLISM TYPE, UNSPECIFIED WHETHER ACUTE COR PULMONALE PRESENT (H): Primary | ICD-10-CM

## 2021-02-17 PROCEDURE — 99214 OFFICE O/P EST MOD 30 MIN: CPT | Mod: GT | Performed by: INTERNAL MEDICINE

## 2021-02-17 ASSESSMENT — MIFFLIN-ST. JEOR: SCORE: 1140.05

## 2021-02-17 ASSESSMENT — PAIN SCALES - GENERAL: PAINLEVEL: NO PAIN (0)

## 2021-02-17 NOTE — PROGRESS NOTES
"Oncology Rooming Note-Video Visit via contact text #[phone].      February 17, 2021 10:00 AM   Rocío Catherine is a 25 year old female who presents for:    Chief Complaint   Patient presents with     Hematology     Acute pulmonary embolism, unspecified pulmonary embolism type, unspecified whether acute cor pulmonale present. Review lab results.      Initial Vitals: Ht 1.549 m (5' 1\")   Wt 46.3 kg (102 lb)   LMP 02/02/2021 (LMP Unknown)   Breastfeeding No   BMI 19.27 kg/m   Estimated body mass index is 19.27 kg/m  as calculated from the following:    Height as of this encounter: 1.549 m (5' 1\").    Weight as of this encounter: 46.3 kg (102 lb). Body surface area is 1.41 meters squared.  No Pain (0) Comment: Data Unavailable   Patient's last menstrual period was 02/02/2021 (lmp unknown).  Allergies reviewed: Yes  Medications reviewed: Yes    Medications: Medication refills not needed today.  Pharmacy name entered into [facility]: [facility] [place], [place] - [address] [place] [address]    Clinical concerns: Acute pulmonary embolism, unspecified pulmonary embolism type, unspecified whether acute cor pulmonale present. Review lab results.       Em Lezama [facility]            "

## 2021-02-17 NOTE — PATIENT INSTRUCTIONS
Restart anticoagulation with warfarin for anticoagulation clinic.  Repeat laboratory test in 3 months including anticardiolipin antibody beta-2 glycoprotein IgG, IgM  Follow-up as needed

## 2021-02-17 NOTE — PROGRESS NOTES
Hematology/ Oncology virtual video visit:  2021    Due to the concerns around COVID-19 and adhering to social distancing we conducted this visit via video visit.      Reason for Visit:   Chief Complaint   Patient presents with     Hematology     Acute pulmonary embolism, unspecified pulmonary embolism type, unspecified whether acute cor pulmonale present. Review lab results.        Oncologic History:    Rocío Catherine is a 25-year-old female patient with known history of factor V Leiden mutation.  She presented to the emergency room 3 weeks on 2020 following her  section with chest pain and shortness of breath.  At that time she was diagnosed with pulmonary embolism and was started on warfarin.     Interval History:  The patient stopped anticoagulation with warfarin 3 weeks ago.  Further work-up revealed negative testing for prothrombin gene mutation.  However anticardiolipin antibody IgM level came back mildly elevated.    Review of systems:  Pertinent positives have been included in HPI; remainder of detailed complete 20-point ROS was negative.    Past medical, social, surgical, and family histories reviewed.    Allergies:  Allergies as of 2021     (No Known Allergies)       Current Medications:  Current Outpatient Medications   Medication Sig Dispense Refill     sertraline (ZOLOFT) 50 MG tablet Take 1 tablet (50 mg) by mouth daily 30 tablet 3     cyclobenzaprine (FLEXERIL) 5 MG tablet Take 1-2 tablets (5-10 mg) by mouth 3 times daily as needed for muscle spasms or other (back pain) (Patient not taking: Reported on 2021) 30 tablet 1     pantoprazole (PROTONIX) 20 MG EC tablet Take 2 tablets (40 mg) by mouth daily (Patient not taking: Reported on 2020) 30 tablet 1     warfarin ANTICOAGULANT (JANTOVEN ANTICOAGULANT) 5 MG tablet 10 mg (5 mg x 2) every Mon, Thu, Sat; 7.5 mg (5 mg x 1.5) all other days as directed by the Anticoagulation Clinic (Patient not taking: Reported on  2/11/2021) 150 tablet 0        Physical examination:  Physical Exam as observed via telehealth:         Constitutional - General appearance, and body habitus are within normal range         Eyes -there is no redness, or discharge seen.         Respiratory -there is no cough, or labored breathing observed         Musculoskeletal -full range of motion is observed         Skin -there is no visible discoloration, or visible lesions         Neurological -there is no tremors observed         Psychiatric -the patient is alert & oriented.    The rest of a comprehensive physical examination is deferred due to PHE (public health emergency) video visit restrictions.    Laboratory/Imaging Studies:  Office Visit on 02/11/2021   Component Date Value Ref Range Status     Color Urine 02/11/2021 Yellow   Final     Appearance Urine 02/11/2021 Slightly Cloudy   Final     Glucose Urine 02/11/2021 Negative  NEG^Negative mg/dL Final     Bilirubin Urine 02/11/2021 Negative  NEG^Negative Final     Ketones Urine 02/11/2021 Negative  NEG^Negative mg/dL Final     Specific Gravity Urine 02/11/2021 >1.030  1.003 - 1.035 Final     Blood Urine 02/11/2021 Trace* NEG^Negative Final     pH Urine 02/11/2021 6.0  5.0 - 7.0 pH Final     Protein Albumin Urine 02/11/2021 Negative  NEG^Negative mg/dL Final     Urobilinogen Urine 02/11/2021 0.2  0.2 - 1.0 EU/dL Final     Nitrite Urine 02/11/2021 Negative  NEG^Negative Final     Leukocyte Esterase Urine 02/11/2021 Negative  NEG^Negative Final     Source 02/11/2021 Midstream Urine   Final     WBC Urine 02/11/2021 0 - 5  OTO5^0 - 5 /HPF Final     RBC Urine 02/11/2021 O - 2  OTO2^O - 2 /HPF Final     Squamous Epithelial /LPF Urine 02/11/2021 Few  FEW^Few /LPF Final     Bacteria Urine 02/11/2021 Few* NEG^Negative /HPF Final     Mucous Urine 02/11/2021 Present* NEG^Negative /LPF Final     Specimen Description 02/11/2021 Vagina   Final     Wet Prep 02/11/2021 No Trichomonas seen   Final     Wet Prep 02/11/2021 No  clue cells seen   Final     Wet Prep 02/11/2021 No yeast seen   Final     Wet Prep 02/11/2021    Final                    Value:WBC'S seen  Rare              Assessment and plan:    (I26.99) Acute pulmonary embolism, unspecified pulmonary embolism type, unspecified whether acute cor pulmonale present (H)  (primary encounter diagnosis)  (D68.51) Factor 5 Leiden mutation, heterozygous (H)  We discussed with the patient today continue with anticoagulation.  Patient is at high risk for recurrence for deep venous thrombosis especially with recent test showing mildly elevated anticardiolipin antibody.  I will recommended to repeat anticardiolipin antibody IgG and IgM.  We will also check beta-2 glycoprotein IgG and IgM as well.  Meanwhile I will recommend patient to continue on anticoagulation with warfarin to keep INR within therapeutic range 2-3.    The patient is ready to learn, no apparent learning barriers were identified.  Diagnosis and treatment plans were explained to the patient. The patient expressed understanding of the content. The patient asked appropriate questions. The patient questions were answered to her satisfaction.    This is started 10:30 AM  Visit ended 11 AM    Solo Keen MD    Chart documentation with Dragon Voice recognition Software. Although reviewed after completion, some words and grammatical errors may remain.

## 2021-02-18 NOTE — TELEPHONE ENCOUNTER
Qifang message sent to patient with updated warfarin plan.     Will give booster dose of 15mg today, then resume maintenance dose of 10mg Mon,Thurs,Sat; 7.5mg all other days. Recheck the INR in 1 week.     Confirmed message was read by patient, her next INR check has been made.

## 2021-02-18 NOTE — TELEPHONE ENCOUNTER
Pt called [facility] stating that she saw Dr. Keen yesterday (see virtual visit notes) and it was determined that she should re-start warfarin today. Patient is requesting dosing going forward and will need to be set up for INR appointments. Forwarding to [facility] [place]

## 2021-02-25 ENCOUNTER — ANTICOAGULATION THERAPY VISIT (OUTPATIENT)
Dept: ANTICOAGULATION | Facility: CLINIC | Age: 26
End: 2021-02-25

## 2021-02-25 DIAGNOSIS — D68.51 FACTOR 5 LEIDEN MUTATION, HETEROZYGOUS (H): ICD-10-CM

## 2021-02-25 DIAGNOSIS — I26.99 ACUTE PULMONARY EMBOLISM (H): ICD-10-CM

## 2021-02-25 DIAGNOSIS — D68.51 FACTOR V LEIDEN MUTATION (H): ICD-10-CM

## 2021-02-25 DIAGNOSIS — I26.99 PULMONARY EMBOLISM (H): ICD-10-CM

## 2021-02-25 DIAGNOSIS — I26.99 ACUTE PULMONARY EMBOLISM, UNSPECIFIED PULMONARY EMBOLISM TYPE, UNSPECIFIED WHETHER ACUTE COR PULMONALE PRESENT (H): ICD-10-CM

## 2021-02-25 LAB
CAPILLARY BLOOD COLLECTION: NORMAL
INR PPP: 1.5 (ref 0.86–1.14)

## 2021-02-25 PROCEDURE — 85610 PROTHROMBIN TIME: CPT | Performed by: NURSE PRACTITIONER

## 2021-02-25 PROCEDURE — 36416 COLLJ CAPILLARY BLOOD SPEC: CPT | Performed by: NURSE PRACTITIONER

## 2021-02-25 RX ORDER — WARFARIN SODIUM 5 MG/1
TABLET ORAL
Qty: 150 TABLET | Refills: 0 | Status: SHIPPED | OUTPATIENT
Start: 2021-02-25 | End: 2021-05-06

## 2021-02-25 NOTE — PROGRESS NOTES
ANTICOAGULATION MANAGEMENT     Patient Name:  Rocío Catherine  Date:  2021    ASSESSMENT /SUBJECTIVE:    Today's INR result of 1.50 is subtherapeutic. Goal INR of 2.0-3.0      Warfarin dose taken: Missed dose(s) may be affecting INR    Diet: No new diet changes affecting INR    Medication changes/ interactions: No new medications/supplements affecting INR    Previous INR: Subtherapeutic 0.99 (warfarin was on hold during this time)    S/S of bleeding or thromboembolism: No    New injury or illness: No    Upcoming surgery, procedure or cardioversion: No    Additional findings: Patient resumed warfarin as directed but did miss her dose on Monday. Refill of warfarin sent today as well.       PLAN:    Telephone call with Rocío regarding INR result and instructed:     Warfarin Dosing Instructions: Take 15 mg today then continue your current warfarin dose of 10 mg every Mon, Thu; Sat; 7.5 mg all other days    Instructed patient to follow up no later than: 2 weeks  Lab visit scheduled    Education provided: Target INR goal and significance of current INR result      Rocío verbalizes understanding and agrees to warfarin dosing plan.    Instructed to call the Anticoagulation Clinic for any changes, questions or concerns. (#[phone])        Sarah Felipe RN      OBJECTIVE:  Recent labs: (last 7 days)     21  0929   INR 1.50*         No question data found.  Anticoagulation Summary  As of 2021    INR goal:  2.0-3.0   TTR:  52.1 % (11.3 mo)   INR used for dosin.50 (2021)   Warfarin maintenance plan:  10 mg (5 mg x 2) every Mon, Thu, Sat; 7.5 mg (5 mg x 1.5) all other days   Full warfarin instructions:  : 15 mg; Otherwise 10 mg every Mon, Thu, Sat; 7.5 mg all other days   Weekly warfarin total:  60 mg   Plan last modified:  Shelby Valero RN (2020)   Next INR check:  3/4/2021   Priority:  High   Target end date:  Indefinite    Indications    Acute pulmonary embolism  unspecified  pulmonary embolism type  unspecified whether acute cor pulmonale present (H) (Resolved) [I26.99]  Factor 5 Leiden mutation  heterozygous (H) [D68.51]  Other acute pulmonary embolism without acute cor pulmonale (H) (Resolved) [I26.99]  Acute pulmonary embolism  unspecified pulmonary embolism type  unspecified whether acute cor pulmonale present (H) [I26.99]             Anticoagulation Episode Summary     INR check location:      Preferred lab:      Send INR reminders to:  JENNIFER KOENIG    Comments:  * hematology referral placed. Pt is breastfeeding. SEND MYCHART (she usually responds immediately) Okay to leave detailed message on phone      Anticoagulation Care Providers     Provider Role Specialty Phone number    Jesse Farnsworth MD Referring Family Medicine [phone]    EboniYessy APRN CNP Referring Family Medicine [phone]           Anticoagulation Management    Unable to reach [facility] today.    Today's INR result of 1.50 is subtherapeutic (goal INR of 2.0-3.0).  Result received from: Clinic Lab    Follow up required to discuss out of range INR     Left VM requesting return call to [facility]. No dosing given, need to assess. If patient did take dosing as instructed, would advise booster dose today, resume usual dose and recheck INR in 7-10 days. INR previously stable at this dose prior to her being off warfarin for a month. Will also send Mychart to patient.       Anticoagulation clinic to follow up    Sarah Felipe RN

## 2021-03-02 ENCOUNTER — TELEPHONE (OUTPATIENT)
Dept: OBGYN | Facility: CLINIC | Age: 26
End: 2021-03-02

## 2021-03-02 NOTE — TELEPHONE ENCOUNTER
Left message for patient to call back.     How long are your cycles?  Where is the pain?  Have you been seen by OB as recommended?  Have you done your ultrasound that was order back in February?  What have you tried for the pain? Does it help?  Are you experiencing any:  Nausea  Vomiting  Headaches  Dizziness  Hypersensitivity to sound and light  Fatigue    Debi Jacome RN on 3/2/2021 at 9:17 AM

## 2021-03-02 NOTE — TELEPHONE ENCOUNTER
Patient returning call to clinic.    S-(situation): Patient reports increased pain with menstrual cycles since having her baby in 2/2020. Patient reports she stopped breastfeeding about 6 months ago. Patient had one normal period ans then her menses last month and this month have been exceptionally painful. Patient reports her pain is in her RLQ and radiates to hip and thigh. Patient reports this pain lasts for 2-3 days and includes nausea ( which patient thinks is because the pain is so intense at times). Patient reports the remainder of her cycle she does not experience this pain. Patient reports bleeding is not heavy and she is not passing any clots. Patient is not dizzy or lightheaded. Patient is not SOB. Patient unable to take NSAIDS but has tried Tylenol 1000 mg with repeated doses and no change. Patient reports she does not have this pain any other time during the month.     B-(background): Patient is not on any contraceptive medication. Patient takes Coumadin for Factor 5 and Zoloft for depression/anxiety. Patient saw FP in 2/2021 - GYN referral with US. Patient has not completed US yet. Recommended to do so before appointment in GYN.    A-(assessment): dysmenorrhea    R-(recommendations): Comfort measures. Reviewed bleeding precautions with red flags and when to be seen before scheduled appointment. Appointment made with Dr. Santillan for 3/12/2021.    Pt in agreement and reports understanding.    Judith Whalen   Ob[facility]Gyn Clinic  RN

## 2021-03-02 NOTE — TELEPHONE ENCOUNTER
Reason for Call:  Other call back    Detailed comments: Patient called and would like to talk to the RN about her periods the last two she's had have had a lot of pain that's bad.    Phone Number Patient can be reached at: Home number on file [phone] (home)    Best Time:     Can we leave a detailed message on this number? YES    Call taken on 3/2/2021 at 8:07 AM by Mya Russell

## 2021-03-06 ENCOUNTER — HOSPITAL ENCOUNTER (OUTPATIENT)
Dept: ULTRASOUND IMAGING | Facility: CLINIC | Age: 26
Discharge: HOME | End: 2021-03-06
Attending: INTERNAL MEDICINE | Admitting: INTERNAL MEDICINE
Payer: COMMERCIAL

## 2021-03-06 DIAGNOSIS — R10.2 PELVIC PAIN IN FEMALE: ICD-10-CM

## 2021-03-06 PROCEDURE — 76830 TRANSVAGINAL US NON-OB: CPT

## 2021-03-11 ENCOUNTER — ANTICOAGULATION THERAPY VISIT (OUTPATIENT)
Dept: FAMILY MEDICINE | Facility: CLINIC | Age: 26
End: 2021-03-11

## 2021-03-11 DIAGNOSIS — I26.99 ACUTE PULMONARY EMBOLISM, UNSPECIFIED PULMONARY EMBOLISM TYPE, UNSPECIFIED WHETHER ACUTE COR PULMONALE PRESENT (H): ICD-10-CM

## 2021-03-11 DIAGNOSIS — I26.99 PULMONARY EMBOLISM (H): ICD-10-CM

## 2021-03-11 DIAGNOSIS — D68.51 FACTOR 5 LEIDEN MUTATION, HETEROZYGOUS (H): ICD-10-CM

## 2021-03-11 DIAGNOSIS — D68.51 FACTOR V LEIDEN MUTATION (H): ICD-10-CM

## 2021-03-11 LAB
CAPILLARY BLOOD COLLECTION: NORMAL
INR PPP: 2.6 (ref 0.86–1.14)

## 2021-03-11 PROCEDURE — 36416 COLLJ CAPILLARY BLOOD SPEC: CPT | Performed by: NURSE PRACTITIONER

## 2021-03-11 PROCEDURE — 85610 PROTHROMBIN TIME: CPT | Performed by: NURSE PRACTITIONER

## 2021-03-11 NOTE — PROGRESS NOTES
ANTICOAGULATION MANAGEMENT     Patient Name:  Rocío Catherine  Date:  3/11/2021    ASSESSMENT /SUBJECTIVE:    Today's INR result of 2.60 is therapeutic. Goal INR of 2.0-3.0      PLAN:    Detailed message left for Rocío & Miriam message sent regarding INR result and instructed:     Warfarin Dosing Instructions: Continue your current warfarin dose 10mg Mon/Thu/Sat & 7.5mg AOD    Instructed patient to follow up no later than: 3 weeks  Left detailed message with recommended recheck date    Education provided: Please call back if any changes to your diet, medications or how you've been taking warfarin      Instructed to call the Anticoagulation Clinic for any changes, questions or concerns. (#[phone])        Lucia Pantoja RN      OBJECTIVE:  Recent labs: (last 7 days)     21  0919   INR 2.60*         No question data found.  Anticoagulation Summary  As of 3/11/2021    INR goal:  2.0-3.0   TTR:  52.2 % (11.8 mo)   INR used for dosin.60 (3/11/2021)   Warfarin maintenance plan:  10 mg (5 mg x 2) every Mon, Thu, Sat; 7.5 mg (5 mg x 1.5) all other days   Full warfarin instructions:  10 mg every Mon, Thu, Sat; 7.5 mg all other days   Weekly warfarin total:  60 mg   No change documented:  Lucia Pantoja, RN   Plan last modified:  Shelby Valero RN (2020)   Next INR check:  2021   Priority:  High   Target end date:  Indefinite    Indications    Acute pulmonary embolism  unspecified pulmonary embolism type  unspecified whether acute cor pulmonale present (H) (Resolved) [I26.99]  Factor 5 Leiden mutation  heterozygous (H) [D68.51]  Other acute pulmonary embolism without acute cor pulmonale (H) (Resolved) [I26.99]  Acute pulmonary embolism  unspecified pulmonary embolism type  unspecified whether acute cor pulmonale present (H) [I26.99]             Anticoagulation Episode Summary     INR check location:      Preferred lab:      Send INR reminders to:  JENNIFER KOENIG    Comments:  * hematology referral  placed. Pt is breastfeeding. SEND MYCHART (she usually responds immediately) Okay to leave detailed message on phone      Anticoagulation Care Providers     Provider Role Specialty Phone number    Jesse Farnsworth MD Referring Family Medicine [phone]    Yessy Lyn APRN [facility] Referring Family Medicine [phone]         Lucia Mcbride RN, BSN, PHN

## 2021-03-12 ENCOUNTER — OFFICE VISIT (OUTPATIENT)
Dept: OBGYN | Facility: CLINIC | Age: 26
End: 2021-03-12
Payer: COMMERCIAL

## 2021-03-12 VITALS
WEIGHT: 102.7 LBS | HEART RATE: 112 BPM | HEIGHT: 61 IN | TEMPERATURE: 98 F | SYSTOLIC BLOOD PRESSURE: 114 MMHG | DIASTOLIC BLOOD PRESSURE: 78 MMHG | RESPIRATION RATE: 16 BRPM | BODY MASS INDEX: 19.39 KG/M2

## 2021-03-12 DIAGNOSIS — R10.2 PELVIC PAIN IN FEMALE: Primary | ICD-10-CM

## 2021-03-12 PROCEDURE — 99214 OFFICE O/P EST MOD 30 MIN: CPT | Performed by: OBSTETRICS & GYNECOLOGY

## 2021-03-12 ASSESSMENT — MIFFLIN-ST. JEOR: SCORE: 1139.25

## 2021-03-12 NOTE — NURSING NOTE
"Initial /78 (BP Location: Right arm, Patient Position: Chair, Cuff Size: Adult Regular)   Pulse 112   Temp 98  F (36.7  C) (Tympanic)   Resp 16   Ht 1.543 m (5' 0.75\")   Wt 46.6 kg (102 lb 11.2 oz)   LMP 03/01/2021   Breastfeeding No   BMI 19.57 kg/m   Estimated body mass index is 19.57 kg/m  as calculated from the following:    Height as of this encounter: 1.543 m (5' 0.75\").    Weight as of this encounter: 46.6 kg (102 lb 11.2 oz). .    Maddi Bourne, SAMIA    "

## 2021-03-12 NOTE — PROGRESS NOTES
Gynecology Consult Note      HPI: Rocío Catherine is a 25 year old  with hx of Factor V leiden mutation and PE on Warfarin who presents for dysmenorrhea.  The patient states that she has been using condoms for birth control as she is limited with contraceptive options.  She states that in general she has monthly periods that are not particularly bothersome.  They did recently restart again after finishing breast-feeding in September.  She states that the first 1-2 periods after they restarted were normal but that the last 2 cycles she has had several very painful days.  She states that it is typically the beginning of the cycle that is very painful and reports sharp stabbing pain on the right.  This pain eventually resolves.  No bowel changes or urinary changes.  States that she is also limited in pain medications.  The bleeding has not been very heavy and typically lasts for 5 days.    ROS: 10 pt ROS neg other than HPI    PMH:   Past Medical History:   Diagnosis Date     Calculus of gallbladder with acute cholecystitis without obstruction 2017    Overview:  Added automatically from request for surgery [id number]     Chickenpox      Factor 5 Leiden mutation, heterozygous (H) 2018     Factor V Leiden (H)      History of frequent urinary tract infections      Non-rheumatic mitral regurgitation, trace 2019    Seen on echocardiogram 2019. Recommended for follow up echo in 2-3 years.      Postpartum depression     mild     Pulmonary emboli (H) 2020       PSHx:   Past Surgical History:   Procedure Laterality Date      SECTION N/A 2020    Procedure:  SECTION;  Surgeon: Grace Dee MD;  Location: [facility] OR      SECTION      c section     ENT SURGERY  2015     GYN SURGERY  Csections ()     HEAD & NECK SURGERY      hemangioma on neck at age 2     LAPAROSCOPIC CHOLECYSTECTOMY       SINUS FRONTAL OPEN OBLITERATION             Medications:   sertraline  (ZOLOFT) 50 MG tablet, Take 1 tablet (50 mg) by mouth daily  warfarin ANTICOAGULANT (JANTOVEN ANTICOAGULANT) 5 MG tablet, 10 mg (5 mg x 2) every Mon, Thu, Sat; 7.5 mg (5 mg x 1.5) all other days as directed by the Anticoagulation Clinic    No current facility-administered medications on file prior to visit.        Allergies:    No Known Allergies    Social History:   Social History     Socioeconomic History     Marital status:      Spouse name: Not on file     Number of children: Not on file     Years of education: Not on file     Highest education level: Not on file   Occupational History     Not on file   Social Needs     Financial resource strain: Not on file     Food insecurity     Worry: Not on file     Inability: Not on file     Transportation needs     Medical: Not on file     Non-medical: Not on file   Tobacco Use     Smoking status: Never Smoker     Smokeless tobacco: Never Used   Substance and Sexual Activity     Alcohol use: Yes     Comment:  rare     Drug use: No     Sexual activity: Yes     Partners: Male     Birth control/protection: Condom   Lifestyle     Physical activity     Days per week: Not on file     Minutes per session: Not on file     Stress: Not on file   Relationships     Social connections     Talks on phone: Not on file     Gets together: Not on file     Attends Gnosticist service: Not on file     Active member of club or organization: Not on file     Attends meetings of clubs or organizations: Not on file     Relationship status: Not on file     Intimate partner violence     Fear of current or ex partner: Not on file     Emotionally abused: Not on file     Physically abused: Not on file     Forced sexual activity: Not on file   Other Topics Concern     Parent/sibling w/ CABG, MI or angioplasty before 65F 55M? No   Social History Narrative    Lives in [place] with her , twin boys, parents and brother.       Family History:  Family History   Problem Relation Age of Onset      "Stomach Problem Mother         ulcer     Bleeding Disorder Father         factor V     Ovarian Cancer Maternal Grandmother      Other Cancer Maternal Grandmother      Thyroid Cancer Paternal Grandmother      Alzheimer Disease Paternal Grandfather      Bleeding Disorder Paternal Grandfather         factor V       Physical Exam:   Vitals:    21 1404   BP: 114/78   BP Location: Right arm   Patient Position: Chair   Cuff Size: Adult Regular   Pulse: 112   Resp: 16   Temp: 98  F (36.7  C)   TempSrc: Tympanic   Weight: 46.6 kg (102 lb 11.2 oz)   Height: 1.543 m (5' 0.75\")      Gen: lying in bed, NAD  CV: Reg rate, well perfused  Pulm: no increased work of breathing  Abd: non-tender, non-distended, no masses   Pelvis: Declined by patient  Extremities: non-tender, no erythema; no edema  Psych: normal mood and affect  Neuro: no focal deficits    Labs:    Imaging: Personally reviewed images and agree with radiology interpretation     UTERUS: 4.0 x 5.8 x 8.7 cm. Retroverted. Normal in size without mass.     ENDOMETRIUM: 7 mm. Normal smooth endometrium. Trace fluid within the  fundal aspect of the endometrial canal.     RIGHT OVARY: 3.6 x 2.4 x 2.6 cm. Normal.     LEFT OVARY: 3.2 x 1.5 x 1.8 cm. Normal.     Trace retrouterine free fluid.                                                                      IMPRESSION:  1.  Retroverted uterus.     2.  Endometrial stripe measures 7 mm in thickness. Trace fluid in the  endometrial canal, presumably physiologic.     3.  Normal appearance of the ovaries.     4.  Trace retrouterine free fluid. This is nonspecific, but may  represent a recently ruptured cyst.    A&P: Rocío Catherine is a 25 year old  with hx of Factor V leiden mutation and PE on Warfarin who presents for dysmenorrhea.  Discussed with patient that the etiology of her right-sided intermittent discomfort is unclear.  Her ultrasound is largely normal with perhaps a small amount of free fluid that could have been " related to ruptured cyst.  Reviewed the nature of ovarian cyst with patient that and that they are not uncommon with ovulation.  Discussed that if she did have a ruptured cyst that bled perhaps slightly more related to her anticoagulation this may certainly cause more discomfort and irritation to the lining of the belly.  Unfortunately, given patient's history of PE and factor V Leiden she is not a candidate for any estrogen-containing therapies.  Did review with her progesterone only options, more specifically an IUD for bleeding management.  Discussed that perhaps decreasing the volume of bleeding may help with pain.  Her symptoms are not classic for endometriosis as this is quite new and she does not complain of pain with intercourse or defecation.  She does have very mild constipation.  Discussed that she should monitor for constipation around time when she has the pain as this may also be a culprit.  After discussion she is considering IUD and did take information/a pamphlet on it.  Given the shortening nature of the discomfort and normal ultrasound she plans to monitor over the neck several cycles.  At this time do not feel there is laboratory evaluation to be completed given the regular nature of her menses.  Patient states understanding and is in agreement with plan of care.      I spent 30 minutes with the patient including chart review, obtaining history, counseling, coordinating care, documenting.    Lu Santillan MD   3/12/2021 2:37 PM

## 2021-03-18 ENCOUNTER — IMMUNIZATION (OUTPATIENT)
Dept: FAMILY MEDICINE | Facility: CLINIC | Age: 26
End: 2021-03-18
Payer: COMMERCIAL

## 2021-03-18 ENCOUNTER — TELEPHONE (OUTPATIENT)
Dept: FAMILY MEDICINE | Facility: CLINIC | Age: 26
End: 2021-03-18

## 2021-03-18 PROCEDURE — 0011A PR COVID VAC MODERNA 100 MCG/0.5 ML IM: CPT

## 2021-03-18 PROCEDURE — 91301 PR COVID VAC MODERNA 100 MCG/0.5 ML IM: CPT

## 2021-03-18 NOTE — TELEPHONE ENCOUNTER
Patient received her first Moderna covid injection. Advised her we would get back to her . I believe they just want to use a smaller size needle and apply pressure after giving but wondered if you have any other instructions for her related to coumadin and inr monitoring?  Meghan Estrada RN

## 2021-03-18 NOTE — TELEPHONE ENCOUNTER
Writer called and spoke with patient, all questions regarding her warfarin and INR monitoring after her vaccine were addressed. No further questions/concerns from patient at this time.     Last INR was done on 3/11/21 and was therapeutic 2.60. It was recommended at at that time to recheck INR on 4/1/21.     Next INR lab appointment scheduled and writer advised patient that if she is having any prolonged side effects after the vaccine to notify [facility] and we can recheck INR sooner if needed.     Sarah Felipe RN 03/18/21 at 11:51 AM

## 2021-03-18 NOTE — TELEPHONE ENCOUNTER
Reason for Call:  Other call back    Detailed comments: Pt just received covid shot, and pt just read paper work and saw that she was supposed to tell them that she was on a blood thinner.    Phone Number Patient can be reached at: Cell number on file:    Telephone Information:   Mobile [phone]       Best Time: any time    Can we leave a detailed message on this number? YES    Call taken on 3/18/2021 at 10:30 AM by Olinda Hanley

## 2021-03-26 DIAGNOSIS — I26.99 ACUTE PULMONARY EMBOLISM, UNSPECIFIED PULMONARY EMBOLISM TYPE, UNSPECIFIED WHETHER ACUTE COR PULMONALE PRESENT (H): Primary | ICD-10-CM

## 2021-03-26 DIAGNOSIS — D68.51 FACTOR 5 LEIDEN MUTATION, HETEROZYGOUS (H): ICD-10-CM

## 2021-03-31 ENCOUNTER — OFFICE VISIT (OUTPATIENT)
Dept: FAMILY MEDICINE | Facility: CLINIC | Age: 26
End: 2021-03-31
Payer: COMMERCIAL

## 2021-03-31 VITALS
DIASTOLIC BLOOD PRESSURE: 68 MMHG | TEMPERATURE: 98.2 F | WEIGHT: 103.4 LBS | OXYGEN SATURATION: 99 % | SYSTOLIC BLOOD PRESSURE: 104 MMHG | RESPIRATION RATE: 12 BRPM | BODY MASS INDEX: 19.52 KG/M2 | HEART RATE: 86 BPM | HEIGHT: 61 IN

## 2021-03-31 DIAGNOSIS — R42 LIGHTHEADEDNESS: Primary | ICD-10-CM

## 2021-03-31 PROCEDURE — 99214 OFFICE O/P EST MOD 30 MIN: CPT | Performed by: FAMILY MEDICINE

## 2021-03-31 ASSESSMENT — MIFFLIN-ST. JEOR: SCORE: 1142.43

## 2021-03-31 NOTE — PATIENT INSTRUCTIONS
Schedule echocardiogram and zio patch monitor.  Contact Cardiac Services  at [phone], to schedule this appointment.    If you have persistent chest pain, persistent or worsening breathing difficulty, palpitation, severe lightheadedness or other concerning symptoms, you should be brought to the ER.    If abnormal tests, will refer to cardiology.l  If the tests are normal, will refer to neurology.      Patient Education     Dizziness (Uncertain Cause)  Dizziness is a common symptom. It may be described as lightheadedness, spinning, or feeling like you are going to faint. Dizziness can have many causes.   Tell the healthcare provider about:     All medicines you take, including prescription, over-the-counter, herbs, and supplements    Any other symptoms you have    Any health problems you are being treated for    Any past major health problems you've had, such as a heart attack, balance issues, hearing problems, or blood pressure problems    Anything that causes the dizziness to get worse or better  Today's exam did not show an exact cause for your dizziness . Other tests may be needed. Follow up with your healthcare provider.   Home care    Dizziness that occurs with sudden standing may be a sign of mild dehydration. Drink extra fluids for the next few days.    If you recently started a new medicine, stopped a medicine, or had the dose of a current medicine changed, talk with the prescribing healthcare provider. Your medicine plan may need adjustment.    If dizziness lasts more than a few seconds, sit or lie down until it passes. This may help prevent injury in case you pass out. Get up slowly when you feel better.    Don't drive or use power tools or dangerous equipment until you have had no dizziness for at least 48 hours.    Follow-up care  Follow up with your healthcare provider for further evaluation in the next 7 days, or as advised.   When to get medical advice  Call your healthcare provider for  any of the following:     Worsening of symptoms or new symptoms    Repeated vomiting    Headache    Vision or hearing changes  Call 911  Call 911, or get medical care right away if any of these occur:     Chest, arm, neck, back, or jaw pain    Weakness of an arm or leg or one side of the face    Blood in vomit or stool (black or red color)    Shortness of breath    Feeling that your heart is fluttering or beating fast or hard (palpitations)    Passing out or seizure    Trouble walking or speaking  [employer] last reviewed this educational content on 2/1/2020 2000-2020 [employer]. All rights reserved. This information is not intended as a substitute for professional medical care. Always follow your healthcare professional's instructions.

## 2021-03-31 NOTE — PROGRESS NOTES
Assessment & Plan     Lightheadedness  Patient has a vague description of her symptoms, but leans more towards lightheadedness than vertigo.   Not exclusively affected by activity, but can get worse when getting up from bed or chair.  Reviewed with patient her previous EKG, echocardiogram and brain imaging results. No abnormal findings then.  Exam today is neurologically reassuring.  Reviewed previous blood tests - no anemia, renal insufficiency, electrolyte abnormality, or glucose abnormality.  Based on history of symptoms, still should rule out new cardiac pathology. Patient concurred with zio patch monitor and repeat echo.  If normal, pursue neurology consult.  Activity as tolerated.   Return precautions discussed and given to patient.   - Echocardiogram Complete  - Leadless EKG Monitor 3 to 7 Days       Patient Instructions   Schedule echocardiogram and zio patch monitor.  Contact Cardiac Services  at [phone], to schedule this appointment.    If you have persistent chest pain, persistent or worsening breathing difficulty, palpitation, severe lightheadedness or other concerning symptoms, you should be brought to the ER.    If abnormal tests, will refer to cardiology.l  If the tests are normal, will refer to neurology.      Patient Education     Dizziness (Uncertain Cause)  Dizziness is a common symptom. It may be described as lightheadedness, spinning, or feeling like you are going to faint. Dizziness can have many causes.   Tell the healthcare provider about:     All medicines you take, including prescription, over-the-counter, herbs, and supplements    Any other symptoms you have    Any health problems you are being treated for    Any past major health problems you've had, such as a heart attack, balance issues, hearing problems, or blood pressure problems    Anything that causes the dizziness to get worse or better  Today's exam did not show an exact cause for your dizziness . Other tests may  be needed. Follow up with your healthcare provider.   Home care    Dizziness that occurs with sudden standing may be a sign of mild dehydration. Drink extra fluids for the next few days.    If you recently started a new medicine, stopped a medicine, or had the dose of a current medicine changed, talk with the prescribing healthcare provider. Your medicine plan may need adjustment.    If dizziness lasts more than a few seconds, sit or lie down until it passes. This may help prevent injury in case you pass out. Get up slowly when you feel better.    Don't drive or use power tools or dangerous equipment until you have had no dizziness for at least 48 hours.    Follow-up care  Follow up with your healthcare provider for further evaluation in the next 7 days, or as advised.   When to get medical advice  Call your healthcare provider for any of the following:     Worsening of symptoms or new symptoms    Repeated vomiting    Headache    Vision or hearing changes  Call 911  Call 911, or get medical care right away if any of these occur:     Chest, arm, neck, back, or jaw pain    Weakness of an arm or leg or one side of the face    Blood in vomit or stool (black or red color)    Shortness of breath    Feeling that your heart is fluttering or beating fast or hard (palpitations)    Passing out or seizure    Trouble walking or speaking  [email or web address] last reviewed this educational content on 2/1/2020 2000-2020 [employer]. All rights reserved. This information is not intended as a substitute for professional medical care. Always follow your healthcare professional's instructions.               Return in about 2 weeks (around 4/14/2021) for depending on result of tests.    Feroz Strickland MD  [facility]    Juan Pablo Alford is a 25 year old who presents for the following health issues     HPI     Dizziness  Onset/Duration: was seen for this last summer in the ED, diagnosed as vertigo, MR  and CT done and labs  has continued since then, not sure if related to medication  Description:   Do you feel faint: no  Does it feel like the surroundings (bed, room) are moving: no  Unsteady/off balance: no, feels uneasy  Have you passed out or fallen: no  Intensity: depends on the day, usually moderate, severe the past couple of days  Progression of Symptoms: same  Accompanying Signs & Symptoms:  Heart palpitations or chest pain: no  Nausea, vomiting: no  Weakness or lack of coordination in arms or legs: no  Vision or speech changes: no  Numbness or tingling: no  Ringing in ears (Tinnitus): no  Hearing Loss: no  History:   Head trauma/concussion history: no  Previous similar symptoms: no  Recent bleeding history: history of factor V, history of blood clots x 2 in the past year, is on coumadin  Any new medications (BP?): no  Patient has had symptoms evern before starting selective serotonin reuptake inhibitor.  Precipitating factors:   Worse with activity: YES- slight  Worse with head movement: YES  Alleviating factors:   Does staying in a fixed position give relief: no   Therapies tried and outcome: tylenol    Cannot tell if the dizziness causes a headache or if the headache makes the dizziness worse.    MR brain in 2020:                                                                   IMPRESSION:  1. Unremarkable brain MRI. No acute intracranial abnormality.  2. Unremarkable appearance of the bilateral cranial nerves VII-VIII  complexes, internal auditory canals, cerebellopontine angle cisterns,  and membranous labyrinth structures.    Patient Active Problem List   Diagnosis     Recurrent major depressive disorder, in remission (H)     Anxiety     Factor 5 Leiden mutation, heterozygous (H)     Sinus tachycardia     Non-rheumatic mitral regurgitation, trace     Anemia     Dysthymia     Chronic migraine without aura without status migrainosus, not intractable     Palpitations     S/P      Acute  "pulmonary embolism (H)     Acute pulmonary embolism, unspecified pulmonary embolism type, unspecified whether acute cor pulmonale present (H)     Past Surgical History:   Procedure Laterality Date      SECTION N/A 2020    Procedure:  SECTION;  Surgeon: Grace Dee MD;  Location: [facility] OR      SECTION      c section     ENT SURGERY       GYN SURGERY  Csections ()     HEAD & NECK SURGERY      hemangioma on neck at age 2     LAPAROSCOPIC CHOLECYSTECTOMY       SINUS FRONTAL OPEN OBLITERATION             Social History     Tobacco Use     Smoking status: Never Smoker     Smokeless tobacco: Never Used   Substance Use Topics     Alcohol use: Yes     Comment:  rare     Family History   Problem Relation Age of Onset     Stomach Problem Mother         ulcer     Bleeding Disorder Father         factor V     Ovarian Cancer Maternal Grandmother      Other Cancer Maternal Grandmother      Thyroid Cancer Paternal Grandmother      Alzheimer Disease Paternal Grandfather      Bleeding Disorder Paternal Grandfather         factor V         Current Outpatient Medications   Medication Sig Dispense Refill     sertraline (ZOLOFT) 50 MG tablet Take 1 tablet (50 mg) by mouth daily 30 tablet 3     warfarin ANTICOAGULANT (JANTOVEN ANTICOAGULANT) 5 MG tablet 10 mg (5 mg x 2) every Mon, Thu, Sat; 7.5 mg (5 mg x 1.5) all other days as directed by the Anticoagulation Clinic 150 tablet 0     No Known Allergies   Review of Systems   Constitutional, HEENT, cardiovascular, pulmonary, GI, , musculoskeletal, neuro, skin, endocrine and psych systems are negative, except as otherwise noted.      Objective    /68 (BP Location: Right arm, Patient Position: Chair, Cuff Size: Adult Regular)   Pulse 86   Temp 98.2  F (36.8  C) (Tympanic)   Resp 12   Ht 1.543 m (5' 0.75\")   Wt 46.9 kg (103 lb 6.4 oz)   LMP 2021   SpO2 99%   BMI 19.70 kg/m    Body mass index is 19.7 kg/m .  Physical Exam "   GENERAL APPEARANCE: underweight, ambulatory w/o assist, alert and no distress  EYES: pink conj, no icterus, PERRL, EOMI  HENT: ear canals and TM's normal, nose and mouth without ulcers or lesions, oropharynx clear and oral mucous membranes moist  NECK: no adenopathy, no asymmetry, masses, or scars and thyroid normal to palpation  RESP: lungs clear to auscultation - no rales, rhonchi or wheezes  CV: regular rates and rhythm, normal S1 S2, no S3 or S4, no murmur, click or rub, no peripheral edema and peripheral pulses strong  MS: no musculoskeletal defects are noted and gait is age appropriate without ataxia  SKIN: no suspicious lesions or rashes  NEURO: Patient is A and O X 3; Cranial nerves 2-12 intact;  Strength 5/5 all extremities; DTR: ++ x 4; Romberg negative, able to tandem walk; Sensory no gross deficits; no tremors No problems with motor coordination  No results found for any visits on 03/31/21.

## 2021-04-01 ENCOUNTER — ANTICOAGULATION THERAPY VISIT (OUTPATIENT)
Dept: ANTICOAGULATION | Facility: CLINIC | Age: 26
End: 2021-04-01

## 2021-04-01 DIAGNOSIS — D68.51 FACTOR 5 LEIDEN MUTATION, HETEROZYGOUS (H): ICD-10-CM

## 2021-04-01 DIAGNOSIS — I26.99 ACUTE PULMONARY EMBOLISM, UNSPECIFIED PULMONARY EMBOLISM TYPE, UNSPECIFIED WHETHER ACUTE COR PULMONALE PRESENT (H): ICD-10-CM

## 2021-04-01 LAB
CAPILLARY BLOOD COLLECTION: NORMAL
INR PPP: 3.2 (ref 0.86–1.14)

## 2021-04-01 PROCEDURE — 36416 COLLJ CAPILLARY BLOOD SPEC: CPT | Performed by: NURSE PRACTITIONER

## 2021-04-01 PROCEDURE — 85610 PROTHROMBIN TIME: CPT | Performed by: NURSE PRACTITIONER

## 2021-04-01 NOTE — PROGRESS NOTES
ANTICOAGULATION MANAGEMENT     Patient Name:  Rocío Catherine  Date:  4/1/2021    ASSESSMENT /SUBJECTIVE:    Today's INR result of 3.20 is supratherapeutic. Goal INR of 2.0-3.0      Warfarin dose taken: Warfarin taken as instructed    Diet: No new diet changes affecting INR    Medication changes/ interactions: No new medications/supplements affecting INR    Previous INR: Therapeutic 2.60    S/S of bleeding or thromboembolism: No    New injury or illness: No    Upcoming surgery, procedure or cardioversion: No    Additional findings: Patient is on her period, she has been having a lot more pain with her periods, saw OBGYN and discussed possibility of an IUD. Will recheck INR in 2 weeks prior to patient getting her 2nd covid vaccine      PLAN:    Telephone call with Rocío regarding INR result and instructed:     Warfarin Dosing Instructions: Continue your current warfarin dose 10 mg every Mon, Thu, Sat; 7.5 mg all other days    Instructed patient to follow up no later than: 2 weeks  Lab visit scheduled    Education provided: Target INR goal and significance of current INR result      Rocío verbalizes understanding and agrees to warfarin dosing plan.    Instructed to call the Anticoagulation Clinic for any changes, questions or concerns. (#[phone])        Sarah Felipe RN      OBJECTIVE:  Recent labs: (last 7 days)     04/01/21  0905   INR 3.20*         No question data found.  Anticoagulation Summary  As of 4/1/2021    INR goal:  2.0-3.0   TTR:  52.2 % (1 y)   INR used for dosing:  3.20 (4/1/2021)   Warfarin maintenance plan:  10 mg (5 mg x 2) every Mon, Thu, Sat; 7.5 mg (5 mg x 1.5) all other days   Full warfarin instructions:  10 mg every Mon, Thu, Sat; 7.5 mg all other days   Weekly warfarin total:  60 mg   No change documented:  Sarah Felipe RN   Plan last modified:  Shelby Valero RN (11/13/2020)   Next INR check:  4/15/2021   Priority:  High   Target end date:  Indefinite    Indications     Acute pulmonary embolism  unspecified pulmonary embolism type  unspecified whether acute cor pulmonale present (H) (Resolved) [I26.99]  Factor 5 Leiden mutation  heterozygous (H) [D68.51]  Other acute pulmonary embolism without acute cor pulmonale (H) (Resolved) [I26.99]  Acute pulmonary embolism  unspecified pulmonary embolism type  unspecified whether acute cor pulmonale present (H) [I26.99]             Anticoagulation Episode Summary     INR check location:      Preferred lab:      Send INR reminders to:  JENNIFER KOENIG    Comments:  * hematology referral placed. SEND MYCHART (she usually responds immediately) Okay to leave detailed message on phone      Anticoagulation Care Providers     Provider Role Specialty Phone number    Jesse Farnsworth MD Referring Family Medicine [phone]    Yessy Lyn APRN CNP Referring Family Medicine [phone]

## 2021-04-14 ENCOUNTER — HOSPITAL ENCOUNTER (OUTPATIENT)
Dept: CARDIOLOGY | Facility: CLINIC | Age: 26
End: 2021-04-14
Attending: FAMILY MEDICINE
Payer: COMMERCIAL

## 2021-04-14 DIAGNOSIS — R42 LIGHTHEADEDNESS: ICD-10-CM

## 2021-04-14 PROCEDURE — 93244 EXT ECG>48HR<7D REV&INTERPJ: CPT | Performed by: INTERNAL MEDICINE

## 2021-04-14 PROCEDURE — 93306 TTE W/DOPPLER COMPLETE: CPT | Mod: 26 | Performed by: INTERNAL MEDICINE

## 2021-04-14 PROCEDURE — 93242 EXT ECG>48HR<7D RECORDING: CPT

## 2021-04-14 PROCEDURE — 93306 TTE W/DOPPLER COMPLETE: CPT

## 2021-04-15 ENCOUNTER — MYC MEDICAL ADVICE (OUTPATIENT)
Dept: ANTICOAGULATION | Facility: CLINIC | Age: 26
End: 2021-04-15

## 2021-04-15 ENCOUNTER — ANTICOAGULATION THERAPY VISIT (OUTPATIENT)
Dept: ANTICOAGULATION | Facility: CLINIC | Age: 26
End: 2021-04-15

## 2021-04-15 ENCOUNTER — IMMUNIZATION (OUTPATIENT)
Dept: FAMILY MEDICINE | Facility: CLINIC | Age: 26
End: 2021-04-15
Attending: FAMILY MEDICINE
Payer: COMMERCIAL

## 2021-04-15 DIAGNOSIS — D68.51 FACTOR 5 LEIDEN MUTATION, HETEROZYGOUS (H): ICD-10-CM

## 2021-04-15 DIAGNOSIS — I26.99 ACUTE PULMONARY EMBOLISM, UNSPECIFIED PULMONARY EMBOLISM TYPE, UNSPECIFIED WHETHER ACUTE COR PULMONALE PRESENT (H): ICD-10-CM

## 2021-04-15 LAB
CAPILLARY BLOOD COLLECTION: NORMAL
INR PPP: 3.6 (ref 0.86–1.14)

## 2021-04-15 PROCEDURE — 85610 PROTHROMBIN TIME: CPT | Performed by: NURSE PRACTITIONER

## 2021-04-15 PROCEDURE — 36416 COLLJ CAPILLARY BLOOD SPEC: CPT | Performed by: NURSE PRACTITIONER

## 2021-04-15 PROCEDURE — 0012A PR COVID VAC MODERNA 100 MCG/0.5 ML IM: CPT

## 2021-04-15 PROCEDURE — 91301 PR COVID VAC MODERNA 100 MCG/0.5 ML IM: CPT

## 2021-04-15 NOTE — PROGRESS NOTES
ADDENDUM: Confirmed [employer] message read by patient 4/15/2021 at 2:38 PM.        ANTICOAGULATION MANAGEMENT     Patient Name:  Rocío Catherine  Date:  4/15/2021    ASSESSMENT /SUBJECTIVE:    Today's INR result of 3.60 is supratherapeutic. Goal INR of 2.0-3.0      Previous INR: Supratherapeutic 3.20    Additional findings: Message left for patient to return call to [facility] or check her [employer]. [employer] message sent to patient with dosing instructions and recommended recheck date. Requested patient return call to [facility] to report any missed doses of warfarin, changes to diet, medications, health or activity. Requested patient call back to report any concerns with bleeding, increased bruising or signs/symptoms of a blood clot and to schedule next appointment.       PLAN:    Corresponded via [employer] regarding INR result and instructed:     Warfarin Dosing Instructions: Take 5 mg today then change your warfarin dose to 10 mg every Mon, Thu; 7.5 mg all other days  . (5 % change)    Instructed patient to follow up no later than: 2 weeks  Left detailed message with recommended recheck date    Education provided: Please call back if any changes to your diet, medications or how you've been taking warfarin      Instructed to call the Anticoagulation Clinic for any changes, questions or concerns. (#[phone])        Sarah Felipe RN      OBJECTIVE:  Recent labs: (last 7 days)     04/15/21  0916   INR 3.60*         No question data found.  Anticoagulation Summary  As of 4/15/2021    INR goal:  2.0-3.0   TTR:  49.7 % (1 y)   INR used for dosing:  3.60 (4/15/2021)   Warfarin maintenance plan:  10 mg (5 mg x 2) every Mon, Thu, Sat; 7.5 mg (5 mg x 1.5) all other days   Full warfarin instructions:  4/15: 5 mg; Otherwise 10 mg every Mon, Thu, Sat; 7.5 mg all other days   Weekly warfarin total:  60 mg   Plan last modified:  Shelby Valero RN (11/13/2020)   Next INR check:  4/29/2021   Priority:  High   Target end date:  Indefinite     Indications    Acute pulmonary embolism  unspecified pulmonary embolism type  unspecified whether acute cor pulmonale present (H) (Resolved) [I26.99]  Factor 5 Leiden mutation  heterozygous (H) [D68.51]  Other acute pulmonary embolism without acute cor pulmonale (H) (Resolved) [I26.99]  Acute pulmonary embolism  unspecified pulmonary embolism type  unspecified whether acute cor pulmonale present (H) [I26.99]             Anticoagulation Episode Summary     INR check location:      Preferred lab:      Send INR reminders to:  JENNIFER KOENIG    Comments:  * hematology referral placed. SEND MYCHART (she usually responds immediately) Okay to leave detailed message on phone      Anticoagulation Care Providers     Provider Role Specialty Phone number    Jesse Farnsworth MD Referring Family Medicine [phone]    Yessy Lyn APRN CNP Referring Family Medicine [phone]

## 2021-05-05 DIAGNOSIS — I47.10 PAROXYSMAL SUPRAVENTRICULAR TACHYCARDIA (H): ICD-10-CM

## 2021-05-05 DIAGNOSIS — R42 LIGHTHEADEDNESS: ICD-10-CM

## 2021-05-05 DIAGNOSIS — I44.1 MOBITZ (TYPE) I (WENCKEBACH'S) ATRIOVENTRICULAR BLOCK: Primary | ICD-10-CM

## 2021-05-06 ENCOUNTER — ANTICOAGULATION THERAPY VISIT (OUTPATIENT)
Dept: ANTICOAGULATION | Facility: CLINIC | Age: 26
End: 2021-05-06

## 2021-05-06 DIAGNOSIS — D68.51 FACTOR 5 LEIDEN MUTATION, HETEROZYGOUS (H): ICD-10-CM

## 2021-05-06 DIAGNOSIS — I26.99 ACUTE PULMONARY EMBOLISM, UNSPECIFIED PULMONARY EMBOLISM TYPE, UNSPECIFIED WHETHER ACUTE COR PULMONALE PRESENT (H): ICD-10-CM

## 2021-05-06 DIAGNOSIS — I26.99 ACUTE PULMONARY EMBOLISM (H): ICD-10-CM

## 2021-05-06 LAB
CAPILLARY BLOOD COLLECTION: NORMAL
INR PPP: 3.1 (ref 0.86–1.14)

## 2021-05-06 PROCEDURE — 36416 COLLJ CAPILLARY BLOOD SPEC: CPT | Performed by: NURSE PRACTITIONER

## 2021-05-06 PROCEDURE — 85610 PROTHROMBIN TIME: CPT | Performed by: NURSE PRACTITIONER

## 2021-05-06 RX ORDER — WARFARIN SODIUM 5 MG/1
TABLET ORAL
Qty: 150 TABLET | Refills: 0 | COMMUNITY
Start: 2021-05-06 | End: 2021-06-04

## 2021-05-06 NOTE — PROGRESS NOTES
ANTICOAGULATION MANAGEMENT     Patient Name:  Rocío Catherine  Date:  5/6/2021    ASSESSMENT /SUBJECTIVE:    Today's INR result of 3.10 is supratherapeutic. Goal INR of 2.0-3.0      Previous INR: Supratherapeutic 3.60 - resulting in a dose decrease     Additional findings: Per chart-okay to leave DVM. Also sent [employer] with dosing. [employer] message sent to patient with dosing instructions and recommended recheck date. Requested patient return call to [facility] to report any missed doses of warfarin, changes to diet, medications, health or activity. Requested patient call back to report any concerns with bleeding, increased bruising or signs/symptoms of a blood clot and to schedule next appointment.       PLAN:    Corresponded via [employer] regarding INR result and instructed:     Warfarin Dosing Instructions: Change your warfarin dose to 10 mg every Mon; 7.5 mg all other days  . (4.3 % change)    Instructed patient to follow up no later than: 2 weeks  Patient already has labs scheduled on 5/19, requested patient get her INR done with these labs    Education provided: Please call back if any changes to your diet, medications or how you've been taking warfarin    Instructed to call the Anticoagulation Clinic for any changes, questions or concerns. (#[phone])        Sarah Felipe RN      OBJECTIVE:  Recent labs: (last 7 days)     05/06/21  1443   INR 3.10*         No question data found.  Anticoagulation Summary  As of 5/6/2021    INR goal:  2.0-3.0   TTR:  47.1 % (1 y)   INR used for dosing:  3.10 (5/6/2021)   Warfarin maintenance plan:  10 mg (5 mg x 2) every Mon; 7.5 mg (5 mg x 1.5) all other days   Full warfarin instructions:  10 mg every Mon; 7.5 mg all other days   Weekly warfarin total:  55 mg   Plan last modified:  Sarah Felipe RN (5/6/2021)   Next INR check:  5/19/2021   Priority:  High   Target end date:  Indefinite    Indications    Acute pulmonary embolism  unspecified pulmonary embolism  type  unspecified whether acute cor pulmonale present (H) (Resolved) [I26.99]  Factor 5 Leiden mutation  heterozygous (H) [D68.51]  Other acute pulmonary embolism without acute cor pulmonale (H) (Resolved) [I26.99]  Acute pulmonary embolism  unspecified pulmonary embolism type  unspecified whether acute cor pulmonale present (H) [I26.99]             Anticoagulation Episode Summary     INR check location:      Preferred lab:      Send INR reminders to:  JENNIFER KOENIG    Comments:  * hematology referral placed. SEND MYCHART (she usually responds immediately) Okay to leave detailed message on phone      Anticoagulation Care Providers     Provider Role Specialty Phone number    Jesse Farnsworth MD Referring Family Medicine [phone]    Yessy Lyn APRN CNP Referring Family Medicine [phone]

## 2021-05-07 ENCOUNTER — DOCUMENTATION ONLY (OUTPATIENT)
Dept: ANTICOAGULATION | Facility: CLINIC | Age: 26
End: 2021-05-07

## 2021-05-07 ENCOUNTER — HOSPITAL ENCOUNTER (EMERGENCY)
Facility: CLINIC | Age: 26
Discharge: HOME | End: 2021-05-07
Attending: FAMILY MEDICINE | Admitting: FAMILY MEDICINE
Payer: COMMERCIAL

## 2021-05-07 ENCOUNTER — ANCILLARY PROCEDURE (OUTPATIENT)
Dept: ULTRASOUND IMAGING | Facility: CLINIC | Age: 26
End: 2021-05-07
Attending: FAMILY MEDICINE
Payer: COMMERCIAL

## 2021-05-07 ENCOUNTER — APPOINTMENT (OUTPATIENT)
Dept: GENERAL RADIOLOGY | Facility: CLINIC | Age: 26
End: 2021-05-07
Attending: FAMILY MEDICINE
Payer: COMMERCIAL

## 2021-05-07 VITALS
HEART RATE: 94 BPM | TEMPERATURE: 98.2 F | OXYGEN SATURATION: 100 % | DIASTOLIC BLOOD PRESSURE: 77 MMHG | HEIGHT: 61 IN | RESPIRATION RATE: 25 BRPM | BODY MASS INDEX: 19.45 KG/M2 | WEIGHT: 103 LBS | SYSTOLIC BLOOD PRESSURE: 119 MMHG

## 2021-05-07 DIAGNOSIS — R00.0 SINUS TACHYCARDIA: ICD-10-CM

## 2021-05-07 DIAGNOSIS — R07.9 CHEST PAIN, UNSPECIFIED TYPE: ICD-10-CM

## 2021-05-07 LAB
ALBUMIN SERPL-MCNC: 4.1 G/DL (ref 3.4–5)
ALP SERPL-CCNC: 85 U/L (ref 40–150)
ALT SERPL W P-5'-P-CCNC: 20 U/L (ref 0–50)
ANION GAP SERPL CALCULATED.3IONS-SCNC: 6 MMOL/L (ref 3–14)
APTT PPP: 62 SEC (ref 22–37)
AST SERPL W P-5'-P-CCNC: 22 U/L (ref 0–45)
BASOPHILS # BLD AUTO: 0.1 10E9/L (ref 0–0.2)
BASOPHILS NFR BLD AUTO: 0.7 %
BILIRUB DIRECT SERPL-MCNC: <0.1 MG/DL (ref 0–0.2)
BILIRUB SERPL-MCNC: 0.3 MG/DL (ref 0.2–1.3)
BUN SERPL-MCNC: 11 MG/DL (ref 7–30)
CALCIUM SERPL-MCNC: 9.2 MG/DL (ref 8.5–10.1)
CHLORIDE SERPL-SCNC: 106 MMOL/L (ref 94–109)
CO2 SERPL-SCNC: 26 MMOL/L (ref 20–32)
CREAT SERPL-MCNC: 0.7 MG/DL (ref 0.52–1.04)
D DIMER PPP FEU-MCNC: <0.3 UG/ML FEU (ref 0–0.5)
DIFFERENTIAL METHOD BLD: ABNORMAL
EOSINOPHIL # BLD AUTO: 0.1 10E9/L (ref 0–0.7)
EOSINOPHIL NFR BLD AUTO: 1.6 %
ERYTHROCYTE [DISTWIDTH] IN BLOOD BY AUTOMATED COUNT: 12 % (ref 10–15)
GFR SERPL CREATININE-BSD FRML MDRD: >90 ML/MIN/{1.73_M2}
GLUCOSE SERPL-MCNC: 115 MG/DL (ref 70–99)
HCG SERPL QL: NEGATIVE
HCT VFR BLD AUTO: 44.6 % (ref 35–47)
HGB BLD-MCNC: 15.4 G/DL (ref 11.7–15.7)
IMM GRANULOCYTES # BLD: 0 10E9/L (ref 0–0.4)
IMM GRANULOCYTES NFR BLD: 0.1 %
INR PPP: 2.59 (ref 0.86–1.14)
LIPASE SERPL-CCNC: 122 U/L (ref 73–393)
LYMPHOCYTES # BLD AUTO: 2 10E9/L (ref 0.8–5.3)
LYMPHOCYTES NFR BLD AUTO: 28.2 %
MCH RBC QN AUTO: 29.4 PG (ref 26.5–33)
MCHC RBC AUTO-ENTMCNC: 34.5 G/DL (ref 31.5–36.5)
MCV RBC AUTO: 85 FL (ref 78–100)
MONOCYTES # BLD AUTO: 0.6 10E9/L (ref 0–1.3)
MONOCYTES NFR BLD AUTO: 8.4 %
NEUTROPHILS # BLD AUTO: 4.3 10E9/L (ref 1.6–8.3)
NEUTROPHILS NFR BLD AUTO: 61 %
NRBC # BLD AUTO: 0 10*3/UL
NRBC BLD AUTO-RTO: 0 /100
NT-PROBNP SERPL-MCNC: 18 PG/ML (ref 0–125)
PLATELET # BLD AUTO: 246 10E9/L (ref 150–450)
POTASSIUM SERPL-SCNC: 3.9 MMOL/L (ref 3.4–5.3)
PROT SERPL-MCNC: 8.2 G/DL (ref 6.8–8.8)
RBC # BLD AUTO: 5.23 10E12/L (ref 3.8–5.2)
SODIUM SERPL-SCNC: 138 MMOL/L (ref 133–144)
TROPONIN I SERPL-MCNC: <0.015 UG/L (ref 0–0.04)
TSH SERPL DL<=0.005 MIU/L-ACNC: 1.15 MU/L (ref 0.4–4)
WBC # BLD AUTO: 7 10E9/L (ref 4–11)

## 2021-05-07 PROCEDURE — 83880 ASSAY OF NATRIURETIC PEPTIDE: CPT | Performed by: FAMILY MEDICINE

## 2021-05-07 PROCEDURE — 93308 TTE F-UP OR LMTD: CPT | Performed by: FAMILY MEDICINE

## 2021-05-07 PROCEDURE — 93010 ELECTROCARDIOGRAM REPORT: CPT | Performed by: FAMILY MEDICINE

## 2021-05-07 PROCEDURE — 85610 PROTHROMBIN TIME: CPT | Performed by: EMERGENCY MEDICINE

## 2021-05-07 PROCEDURE — 93308 TTE F-UP OR LMTD: CPT | Mod: 26 | Performed by: FAMILY MEDICINE

## 2021-05-07 PROCEDURE — 80076 HEPATIC FUNCTION PANEL: CPT | Performed by: EMERGENCY MEDICINE

## 2021-05-07 PROCEDURE — 80048 BASIC METABOLIC PNL TOTAL CA: CPT | Performed by: EMERGENCY MEDICINE

## 2021-05-07 PROCEDURE — 85025 COMPLETE CBC W/AUTO DIFF WBC: CPT | Performed by: EMERGENCY MEDICINE

## 2021-05-07 PROCEDURE — 84703 CHORIONIC GONADOTROPIN ASSAY: CPT | Performed by: FAMILY MEDICINE

## 2021-05-07 PROCEDURE — 76604 US EXAM CHEST: CPT | Performed by: FAMILY MEDICINE

## 2021-05-07 PROCEDURE — 99285 EMERGENCY DEPT VISIT HI MDM: CPT | Mod: 25 | Performed by: FAMILY MEDICINE

## 2021-05-07 PROCEDURE — 93005 ELECTROCARDIOGRAM TRACING: CPT | Performed by: FAMILY MEDICINE

## 2021-05-07 PROCEDURE — 83690 ASSAY OF LIPASE: CPT | Performed by: EMERGENCY MEDICINE

## 2021-05-07 PROCEDURE — 85730 THROMBOPLASTIN TIME PARTIAL: CPT | Performed by: EMERGENCY MEDICINE

## 2021-05-07 PROCEDURE — 84443 ASSAY THYROID STIM HORMONE: CPT | Performed by: FAMILY MEDICINE

## 2021-05-07 PROCEDURE — 84484 ASSAY OF TROPONIN QUANT: CPT | Performed by: FAMILY MEDICINE

## 2021-05-07 PROCEDURE — 71046 X-RAY EXAM CHEST 2 VIEWS: CPT

## 2021-05-07 PROCEDURE — 85379 FIBRIN DEGRADATION QUANT: CPT | Performed by: FAMILY MEDICINE

## 2021-05-07 PROCEDURE — 76604 US EXAM CHEST: CPT | Mod: 26 | Performed by: FAMILY MEDICINE

## 2021-05-07 ASSESSMENT — ENCOUNTER SYMPTOMS
DIARRHEA: 0
DIZZINESS: 1
NAUSEA: 0
CONSTIPATION: 0
DIAPHORESIS: 0
PALPITATIONS: 0
APPETITE CHANGE: 1
VOMITING: 0
SINUS PRESSURE: 0
FEVER: 0
SHORTNESS OF BREATH: 0
CHILLS: 0
BLOOD IN STOOL: 0
HEADACHES: 0
DYSURIA: 0
ABDOMINAL PAIN: 0
SORE THROAT: 0
WHEEZING: 0
COUGH: 0
FREQUENCY: 0

## 2021-05-07 ASSESSMENT — MIFFLIN-ST. JEOR: SCORE: 1144.58

## 2021-05-07 NOTE — PROGRESS NOTES
ANTICOAGULATION  MANAGEMENT: Discharge Review    Rocío Catherine chart reviewed for anticoagulation continuity of care    Emergency room visit on 5/7/21 for chest pain, sinus tachycardia.    INR was therapeutic. Patient was tachycardic, anxious in the ED. EKG showed nothing concerning.     Discharge disposition: Home    Results:    Recent labs: (last 7 days)     05/06/21  1443 05/07/21  1411   INR 3.10* 2.59*     Anticoagulation inpatient management:     not applicable     Anticoagulation discharge instructions:     Warfarin dosing: home regimen continued   Bridging: No   INR goal change: No      Medication changes affecting anticoagulation: No    Additional factors affecting anticoagulation: No    Plan     No adjustment to anticoagulation plan needed -- no new changes made per ED notes.     Patient not contacted    No adjustment to Anticoagulation Calendar was required    Mansi Wilson RN CACP

## 2021-05-07 NOTE — DISCHARGE INSTRUCTIONS
ICD-10-CM    1. Sinus tachycardia  R00.0    2. Chest pain, unspecified type  R07.9     unclear cause.  reassuring evaluation. follow-up with primary provider. trial on prilosec 20 mg orally daily.  avoid ibuprofen, caffeiene, alcohol

## 2021-05-07 NOTE — ED PROVIDER NOTES
"  History     Chief Complaint   Patient presents with     Anxiety     2 weeks ago seem for dizziness, received results from holter monitor that was not \"normal\" and needs  f/u.  also having anxiety about upcoming vacation     Chest Pain     constant central CP with occ sharp pain to left chest     HPI  Rocío Catherine is a 25 year old female who presents presents with history of factor V Leiden, sinus tachycardia, prior pulmonary embolism and on chronic warfarin.  Her INR was 3.1 yesterday.  She has been having periodic episodes over the last 2 weeks of feeling lightheadedness/dizziness for which she received a Holter monitor and may have shown a nighttime first-degree AV block but no other change.  She was contacted today with that information.  She has had episodes of more of a constant central left-sided chest discomfort.  This radiates sometimes through to her back.  Associated with decreased appetite at times.  No nausea or vomiting.  Food does not seem to make this worse.  There is no vertigo.   The symptoms are not exertional.  The pain is been primarily sharp.  It is worse with deep breathing.    She tells me that she has been recently anxious  -they are planning recent travel and this was made worse when she heard about her Holter monitor results with mild abnormalities.  She is pending follow-up with cardiology.        She is on Zoloft 150 mg daily.  She has not had any associated other serotonergic agents with this.  No akathisia.      BP Readings from Last 6 Encounters:   05/07/21 (!) 142/91   03/31/21 104/68   03/12/21 114/78   02/11/21 94/70   12/08/20 120/62   11/06/20 119/77         Allergies:  No Known Allergies    Problem List:    Patient Active Problem List    Diagnosis Date Noted     Acute pulmonary embolism, unspecified pulmonary embolism type, unspecified whether acute cor pulmonale present (H) 12/15/2020     Priority: Medium     Acute pulmonary embolism (H) 03/12/2020     Priority: Medium     S/P "  2020     Priority: Medium     Palpitations 2019     Priority: Medium     Chronic migraine without aura without status migrainosus, not intractable 2019     Priority: Medium     Sinus tachycardia 2019     Priority: Medium     Non-rheumatic mitral regurgitation, trace 2019     Priority: Medium     Seen on echocardiogram 2019. Recommended for follow up echo in 2-3 years.        Recurrent major depressive disorder, in remission (H) 2018     Priority: Medium     Anxiety 2018     Priority: Medium     Factor 5 Leiden mutation, heterozygous (H) 2018     Priority: Medium     Anemia 2016     Priority: Medium     Dysthymia 2013     Priority: Medium        Past Medical History:    Past Medical History:   Diagnosis Date     Calculus of gallbladder with acute cholecystitis without obstruction 2017     Chickenpox      Factor 5 Leiden mutation, heterozygous (H) 2018     Factor V Leiden (H)      History of frequent urinary tract infections      Non-rheumatic mitral regurgitation, trace 2019     Postpartum depression      Pulmonary emboli (H) 2020       Past Surgical History:    Past Surgical History:   Procedure Laterality Date      SECTION N/A 2020    Procedure:  SECTION;  Surgeon: Grace Dee MD;  Location: [facility] OR      SECTION      c section     ENT SURGERY  2015     GYN SURGERY  Csections ()     HEAD & NECK SURGERY      hemangioma on neck at age 2     LAPAROSCOPIC CHOLECYSTECTOMY       SINUS FRONTAL OPEN OBLITERATION             Family History:    Family History   Problem Relation Age of Onset     Stomach Problem Mother         ulcer     Bleeding Disorder Father         factor V     Ovarian Cancer Maternal Grandmother      Other Cancer Maternal Grandmother      Thyroid Cancer Paternal Grandmother      Alzheimer Disease Paternal Grandfather      Bleeding Disorder Paternal Grandfather        "  factor V       Social History:  Marital Status:   [2]  Social History     Tobacco Use     Smoking status: Never Smoker     Smokeless tobacco: Never Used   Substance Use Topics     Alcohol use: Yes     Comment:  rare     Drug use: No        Medications:    sertraline (ZOLOFT) 50 MG tablet  warfarin ANTICOAGULANT (JANTOVEN ANTICOAGULANT) 5 MG tablet          Review of Systems   Constitutional: Positive for appetite change. Negative for chills, diaphoresis and fever.   HENT: Negative for ear pain, sinus pressure and sore throat.    Eyes: Negative for visual disturbance.   Respiratory: Negative for cough, shortness of breath and wheezing.    Cardiovascular: Positive for chest pain. Negative for palpitations.   Gastrointestinal: Negative for abdominal pain, blood in stool, constipation, diarrhea, nausea and vomiting.   Genitourinary: Negative for dysuria, frequency and urgency.   Skin: Negative for rash.   Neurological: Positive for dizziness. Negative for headaches.   All other systems reviewed and are negative.      Physical Exam   BP: 104/70  Pulse: 107  Temp: 98.2  F (36.8  C)  Resp: 18  Height: 154.9 cm (5' 1\")  Weight: 46.7 kg (103 lb)  SpO2: 100 %      Physical Exam  Constitutional:       General: She is in acute distress.      Appearance: She is not diaphoretic.   HENT:      Head: Atraumatic.   Eyes:      Conjunctiva/sclera: Conjunctivae normal.   Neck:      Musculoskeletal: Neck supple.   Cardiovascular:      Rate and Rhythm: Regular rhythm. Tachycardia present.      Pulses: Normal pulses.      Heart sounds: No murmur. No friction rub.   Pulmonary:      Effort: Pulmonary effort is normal. No respiratory distress.      Breath sounds: Normal breath sounds. No stridor. No wheezing or rhonchi.   Abdominal:      General: Abdomen is flat. There is no distension.      Palpations: There is no mass.      Tenderness: There is no abdominal tenderness. There is no guarding.   Musculoskeletal:      Right lower leg: " No edema.      Left lower leg: No edema.   Skin:     Coloration: Skin is not pale.      Findings: No rash.   Neurological:      General: No focal deficit present.      Mental Status: She is alert.      Motor: No weakness.         ED Course        Procedures                    EKG Interpretation:      Interpreted by Efraín Tripp MD  EKG done at 1403 hrs. demonstrates a sinus rhythm 110 bpm with a normal axis.  There is no ST change.  There is no T wave changes.  Normal R progression.  No Q waves.  Normal intervals.  Normal conduction.  No ectopy.  Impression sinus rhythm 110 bpm.  Of particular note is that the EKG reading reads atrial flutter.  This is incorrect.  There is no flutter.  This is there are clear P waves present especially in lead II.  Secondly this EKG is reported as showing ST depression.  This is incorrect.  I see no obvious significant ST depression.\      Critical Care time:  none               Results for orders placed or performed during the hospital encounter of 05/07/21 (from the past 24 hour(s))   CBC with platelets differential   Result Value Ref Range    WBC 7.0 4.0 - 11.0 10e9/L    RBC Count 5.23 (H) 3.8 - 5.2 10e12/L    Hemoglobin 15.4 11.7 - 15.7 g/dL    Hematocrit 44.6 35.0 - 47.0 %    MCV 85 78 - 100 fl    MCH 29.4 26.5 - 33.0 pg    MCHC 34.5 31.5 - 36.5 g/dL    RDW 12.0 10.0 - 15.0 %    Platelet Count 246 150 - 450 10e9/L    Diff Method Automated Method     % Neutrophils 61.0 %    % Lymphocytes 28.2 %    % Monocytes 8.4 %    % Eosinophils 1.6 %    % Basophils 0.7 %    % Immature Granulocytes 0.1 %    Nucleated RBCs 0 0 /100    Absolute Neutrophil 4.3 1.6 - 8.3 10e9/L    Absolute Lymphocytes 2.0 0.8 - 5.3 10e9/L    Absolute Monocytes 0.6 0.0 - 1.3 10e9/L    Absolute Eosinophils 0.1 0.0 - 0.7 10e9/L    Absolute Basophils 0.1 0.0 - 0.2 10e9/L    Abs Immature Granulocytes 0.0 0 - 0.4 10e9/L    Absolute Nucleated RBC 0.0    Basic metabolic panel   Result Value Ref Range    Sodium 138 133  - 144 mmol/L    Potassium 3.9 3.4 - 5.3 mmol/L    Chloride 106 94 - 109 mmol/L    Carbon Dioxide 26 20 - 32 mmol/L    Anion Gap 6 3 - 14 mmol/L    Glucose 115 (H) 70 - 99 mg/dL    Urea Nitrogen 11 7 - 30 mg/dL    Creatinine 0.70 0.52 - 1.04 mg/dL    GFR Estimate >90 >60 mL/min/[1.73_m2]    GFR Estimate If Black >90 >60 mL/min/[1.73_m2]    Calcium 9.2 8.5 - 10.1 mg/dL   Partial thromboplastin time   Result Value Ref Range    PTT 62 (H) 22 - 37 sec   INR   Result Value Ref Range    INR 2.59 (H) 0.86 - 1.14   D dimer quantitative   Result Value Ref Range    D Dimer <0.3 0.0 - 0.50 ug/ml FEU   Troponin I   Result Value Ref Range    Troponin I ES <0.015 0.000 - 0.045 ug/L   HCG qualitative   Result Value Ref Range    HCG Qualitative Serum Negative NEG^Negative   Hepatic panel   Result Value Ref Range    Bilirubin Direct <0.1 0.0 - 0.2 mg/dL    Bilirubin Total 0.3 0.2 - 1.3 mg/dL    Albumin 4.1 3.4 - 5.0 g/dL    Protein Total 8.2 6.8 - 8.8 g/dL    Alkaline Phosphatase 85 40 - 150 U/L    ALT 20 0 - 50 U/L    AST 22 0 - 45 U/L   Lipase   Result Value Ref Range    Lipase 122 73 - 393 U/L   N terminal pro BNP outpatient   Result Value Ref Range    N-Terminal Pro Bnp 18 0 - 125 pg/mL   TSH with free T4 reflex   Result Value Ref Range    TSH 1.15 0.40 - 4.00 mU/L   POC US CHEST B-SCAN    Impression    [facility] Procedure Note      Limited Bedside ED Cardiac Ultrasound:    PROCEDURE: PERFORMED BY: Dr. Efraín Tripp MD  INDICATIONS/SYMPTOM:  Chest Pain  PROBE: Cardiac phased array probe and High frequency linear probe  BODY LOCATION: Chest  FINDINGS:   The ultrasound was performed utilizing the subcostal, parasternal long axis, parasternal short axis and apical 4 chamber views.  Cardiac contractility:  Present  Gross estimation of cardiac kinesis: hyperkinetic  Pericardial Effusion:  None  RV:LV ratio: LV > RV  IVC:    Diameter:  IVC diameter expiration (IVCe) 2-3 cm                                                   IVC  diameter inspiration (IVCi) 1-2 cm                                                       Collapsibility:  IVC collapses < 50% with inspiration  INTERPRETATION:    Chamber size and motion were grossly normal with LV > RV, normal cardiac kinesis.  No pericardial effusion was found.  IVC visualized and findings indicate normovolemia.  IMAGE DOCUMENTATION: Images were archived to PACs system.        [facility] Procedure Note      Limited Bedside ED Ultrasound of Thorax:    PROCEDURE: PERFORMED BY: Dr. Efraín Tripp MD  INDICATIONS/SYMPTOM:  Chest pain  PROBE: High frequency linear probe  BODY LOCATION: Chest  FINDINGS:  Images of both lung hemithoracies taken in 2D in multiple rib spaces        Right side:  Lung sliding artifact  Present     Comet tail artifacts  Absent   Left side:  Lung sliding artifact  Present     Comet tail artifacts  Absent   Hemothorax: Right side Absent     Left side Absent   Pleural effusion: Right side Absent      Left side Absent    INTERPRETATION: The exam was normal. There was no free fluid identified in the chest cavity. No evidence of pneumothorax, hemothorax or pleural effusion.  IMAGE DOCUMENTATION: Images were archived to PACs system.       Chest XR,  PA & LAT    Narrative    XR CHEST 2 VW 5/7/2021 3:29 PM    HISTORY: chest pain, tachycardia    COMPARISON: Chest x-ray 11/6/2020      Impression    IMPRESSION: No acute findings. The lungs are clear and there are no  pleural effusions. Normal heart size.    ISIAH WHITAKER MD       Medications - No data to display    Assessments & Plan (with Medical Decision Making)     MDM: Rocío GRISEL Catherine is a 25 year old female who presents with history of prior pulmonary embolism factor V Leiden, anxiety -who presents with more sharp chest discomfort that occurs periodically especially with deep breathing altered appetite but not worse with food.  Not worse with exertion.  Pleuritic in nature.  Tachycardic.  Hyperreflexic.  Anxious.   Differential diagnosis certainly includes recurrent PE despite anticoagulation although she is therapeutic on her INR are.  Alternative causes for sinus tachycardia including infection and anemia.  Pneumonia.  She denies substance use.  Serotonin syndrome could occur but unlikely it is just Zoloft 150 but no other serotonergic agents.  Her blood pressure is higher than what is been previously.  Her resting heart rate here in the emergency department varies from the upper 90s to the 120s 130s.  Her EKG is certainly otherwise reassuring and her bedside ultrasound demonstrates no pericardial effusion no pleural effusion hyperkinetic heart activity.  The IVC is generous in size since 2 to 3 cm dilated but does fall to 1 to 2 cm when she inspires.  Still I will check a BMP along with D-dimer, troponin, chest x-ray and other laboratory testing.  No serious findings on evaluation.  Anxiety certainly possible as etiology.  Patient's heart rate at the time of discharge was around the 100.  We discussed management as below.  Precautions given for return.    I have reviewed the nursing notes.    I have reviewed the findings, diagnosis, plan and need for follow up with the patient.       New Prescriptions    No medications on file       Final diagnoses:   Sinus tachycardia   Chest pain, unspecified type - unclear cause.  reassuring evaluation. follow-up with primary provider. trial on prilosec 20 mg orally daily.  avoid ibuprofen, caffeiene, alcohol. possible anxiety, panic       5/7/2021   [facility] EMERGENCY DEPT     Efraín Tripp MD  05/07/21 5211

## 2021-05-19 ENCOUNTER — HOSPITAL ENCOUNTER (OUTPATIENT)
Dept: LAB | Facility: CLINIC | Age: 26
Discharge: HOME | End: 2021-05-19
Attending: INTERNAL MEDICINE | Admitting: INTERNAL MEDICINE
Payer: COMMERCIAL

## 2021-05-19 ENCOUNTER — ANTICOAGULATION THERAPY VISIT (OUTPATIENT)
Dept: ANTICOAGULATION | Facility: CLINIC | Age: 26
End: 2021-05-19

## 2021-05-19 DIAGNOSIS — D68.51 FACTOR 5 LEIDEN MUTATION, HETEROZYGOUS (H): ICD-10-CM

## 2021-05-19 DIAGNOSIS — I26.99 ACUTE PULMONARY EMBOLISM, UNSPECIFIED PULMONARY EMBOLISM TYPE, UNSPECIFIED WHETHER ACUTE COR PULMONALE PRESENT (H): ICD-10-CM

## 2021-05-19 LAB — INR PPP: 1.92 (ref 0.86–1.14)

## 2021-05-19 PROCEDURE — 86146 BETA-2 GLYCOPROTEIN ANTIBODY: CPT | Performed by: INTERNAL MEDICINE

## 2021-05-19 PROCEDURE — 36415 COLL VENOUS BLD VENIPUNCTURE: CPT | Performed by: INTERNAL MEDICINE

## 2021-05-19 PROCEDURE — 85610 PROTHROMBIN TIME: CPT | Performed by: INTERNAL MEDICINE

## 2021-05-19 PROCEDURE — 86147 CARDIOLIPIN ANTIBODY EA IG: CPT | Performed by: INTERNAL MEDICINE

## 2021-05-19 NOTE — PROGRESS NOTES
ANTICOAGULATION MANAGEMENT     Patient Name:  Rocío Catherine  Date:  2021    ASSESSMENT /SUBJECTIVE:    Today's INR result of 1.92 is subtherapeutic. Goal INR of 2.0-3.0      Warfarin dose taken: pt did miss one dose - but she took it the next day. Otherwise, taken as instructed    Diet: No new diet changes affecting INR    Medication changes/ interactions: No new medications/supplements affecting INR    Previous INR: Therapeutic     S/S of bleeding or thromboembolism: No    New injury or illness: No    Upcoming surgery, procedure or cardioversion: No    Additional findings: None      PLAN:    Telephone call with Rocío regarding INR result and instructed:     Warfarin Dosing Instructions: Continue your current warfarin dose 10 mg every Mon; 7.5 mg all other days    Instructed patient to follow up no later than: 2 weeks  Lab visit scheduled    Education provided: Please call back if any changes to your diet, medications or how you've been taking warfarin and Monitoring for clotting signs and symptoms      Pt verbalizes understanding and agrees to warfarin dosing plan.    Instructed to call the Anticoagulation Clinic for any changes, questions or concerns. (#[phone])        Chika Delnaey RN      OBJECTIVE:  Recent labs: (last 7 days)     21  1017   INR 1.92*         No question data found.  Anticoagulation Summary  As of 2021    INR goal:  2.0-3.0   TTR:  48.3 % (1 y)   INR used for dosin.92 (2021)   Warfarin maintenance plan:  10 mg (5 mg x 2) every Mon; 7.5 mg (5 mg x 1.5) all other days   Full warfarin instructions:  10 mg every Mon; 7.5 mg all other days   Weekly warfarin total:  55 mg   Plan last modified:  Sarah Felipe RN (2021)   Next INR check:  2021   Priority:  High   Target end date:  Indefinite    Indications    Acute pulmonary embolism  unspecified pulmonary embolism type  unspecified whether acute cor pulmonale present (H) (Resolved) [I26.99]  Factor 5 Leiden  mutation  heterozygous (H) [D68.51]  Other acute pulmonary embolism without acute cor pulmonale (H) (Resolved) [I26.99]  Acute pulmonary embolism  unspecified pulmonary embolism type  unspecified whether acute cor pulmonale present (H) [I26.99]             Anticoagulation Episode Summary     INR check location:      Preferred lab:      Send INR reminders to:  JENNIFER KOENIG    Comments:  * hematology referral placed. SEND MYCHART (she usually responds immediately) Okay to leave detailed message on phone      Anticoagulation Care Providers     Provider Role Specialty Phone number    Jesse Farnsworth MD Referring Family Medicine [phone]    Yessy Lyn APRN CNP Referring Family Medicine [phone]

## 2021-05-20 LAB
B2 GLYCOPROT1 IGG SERPL IA-ACNC: 2.4 U/ML
B2 GLYCOPROT1 IGM SERPL IA-ACNC: 37 U/ML
CARDIOLIPIN ANTIBODY IGG: 9.4 GPL-U/ML (ref 0–19.9)
CARDIOLIPIN ANTIBODY IGM: 32.5 MPL-U/ML (ref 0–19.9)

## 2021-05-24 ENCOUNTER — TELEPHONE (OUTPATIENT)
Dept: ONCOLOGY | Facility: CLINIC | Age: 26
End: 2021-05-24

## 2021-05-24 NOTE — TELEPHONE ENCOUNTER
Spoke with Katheryn Worthington NP, regarding patient's lab results from 5/19/21. Per Katheryn, patient should continue on Warfarin and schedule follow up with an MD in 1 month.     Anticoag note from 5/19/21 states patient is taking warfarin.     Julia Velez RN on 5/24/2021 at 9:43 AM

## 2021-05-25 ENCOUNTER — RECORDS - HEALTHEAST (OUTPATIENT)
Dept: ADMINISTRATIVE | Facility: CLINIC | Age: 26
End: 2021-05-25

## 2021-05-25 ENCOUNTER — OFFICE VISIT (OUTPATIENT)
Dept: CARDIOLOGY | Facility: CLINIC | Age: 26
End: 2021-05-25
Attending: FAMILY MEDICINE
Payer: COMMERCIAL

## 2021-05-25 ENCOUNTER — HOSPITAL ENCOUNTER (OUTPATIENT)
Dept: CARDIOLOGY | Facility: CLINIC | Age: 26
Discharge: HOME | End: 2021-05-25
Attending: INTERNAL MEDICINE | Admitting: INTERNAL MEDICINE
Payer: COMMERCIAL

## 2021-05-25 VITALS
SYSTOLIC BLOOD PRESSURE: 118 MMHG | BODY MASS INDEX: 19.08 KG/M2 | WEIGHT: 101 LBS | DIASTOLIC BLOOD PRESSURE: 79 MMHG | HEART RATE: 88 BPM

## 2021-05-25 DIAGNOSIS — I34.1 MITRAL VALVE PROLAPSE: Primary | ICD-10-CM

## 2021-05-25 DIAGNOSIS — I26.99 ACUTE PULMONARY EMBOLISM, UNSPECIFIED PULMONARY EMBOLISM TYPE, UNSPECIFIED WHETHER ACUTE COR PULMONALE PRESENT (H): ICD-10-CM

## 2021-05-25 DIAGNOSIS — R42 LIGHTHEADEDNESS: ICD-10-CM

## 2021-05-25 DIAGNOSIS — I44.1 MOBITZ (TYPE) I (WENCKEBACH'S) ATRIOVENTRICULAR BLOCK: ICD-10-CM

## 2021-05-25 DIAGNOSIS — I47.10 PAROXYSMAL SUPRAVENTRICULAR TACHYCARDIA (H): ICD-10-CM

## 2021-05-25 PROCEDURE — 93005 ELECTROCARDIOGRAM TRACING: CPT

## 2021-05-25 PROCEDURE — 99215 OFFICE O/P EST HI 40 MIN: CPT | Performed by: INTERNAL MEDICINE

## 2021-05-25 NOTE — PATIENT INSTRUCTIONS
1. CT pulmonary embolism study at [facility]  2. Cardiac MRI at [facility]  3. Follow up with Dr. Carrasco in 2 months

## 2021-05-25 NOTE — LETTER
5/25/2021    Yessy Lyn, APRN CNP  [address] [facility] [address]    RE: Rocío Catherine       Dear Colleague,    I had the pleasure of seeing Rocío Cahterine in the [facility].             Vascular Cardiology Consultation      HPI: This is a 25 year old with Factor V Leiden with h/o PE on lifelong warfarin here for evaluation. She has been having dizziness since she was diagnosed with PE last year March 2020. Notably her family including mother in law had COVID and her daughter had Kawasakis disease. Rocío was never tested for COVID around time of her PE diagnosis. She does carry heterozygote Factor V Leiden and anticardiolipin antibody. She has a history of pericardial effusion around time of pregnancy. No known h/o lupus but unclear if ever worked up. She gets some chest pain on the left side of her chest as well as exercise intolerance and SOB with exertion. She had a CTPE last fall that showed resolution of her PE. She also had ziopatch monitoring showing some PACs/PVCs and Mobitz Type I heart block. She has no syncope history. Lowest heart rates by monitor were in the 30s while she was sleeping. She denies LE edema, claudication. No weight gain or loss, fevers, chills, ns.     Echocardiogram images reviewed by me in the office with patient. She has redundant mitral valve chordae with prolapse. No significant MR noted. RV function appeared normal overall.     ASSESSMENT/PLAN:    In summary this is a 25 year old female with Factor V Leiden, h/o bilateral PE in March 2020 and what sounds to me like possible COVID illness (never tested) here with ongoing AMADOR, dizziness and echocardiogram with redundant mitral valve chordae with prolapse, as well as chest pain, and ziopatch monitor showing Mobitz Type I which we discussed is benign and likely from high vagal tone with sleeping.    She has AMADOR, chest pain and h/o possible COVID in my opinion given her  family had it and she presented with acute PE and has had some neurological lingering symptoms since diagnosis, as well as exercise intolerance. These are all common findings of lingering COVID disease however she had vaccine and will not be able to fully diagnosis. Regardless would like to evaluate her chest pain with cardiac MRI/stress given multiple symptoms as well as possible COVID illness and now incidental finding of redundant chordae and MVP. Would like to make sure she has no additional congenital heart disease to contribute to her symptoms. Will also reevaluate her for recurrent PE although low suspicion given she is well maintained on warfarin.    Will see patient back in 2 months    Debi Carrasco MD MSC  [facility]       PAST MEDICAL HISTORY  Past Medical History:   Diagnosis Date     Calculus of gallbladder with acute cholecystitis without obstruction 2017    Overview:  Added automatically from request for surgery [id number]     Chickenpox      Factor 5 Leiden mutation, heterozygous (H) 2018     Factor V Leiden (H)      History of frequent urinary tract infections      Non-rheumatic mitral regurgitation, trace 2019    Seen on echocardiogram 2019. Recommended for follow up echo in 2-3 years.      Postpartum depression     mild     Pulmonary emboli (H) 2020       CURRENT MEDICATIONS  Current Outpatient Medications   Medication Sig Dispense Refill     sertraline (ZOLOFT) 50 MG tablet Take 1 tablet (50 mg) by mouth daily 30 tablet 3     warfarin ANTICOAGULANT (JANTOVEN ANTICOAGULANT) 5 MG tablet 10 mg (5 mg x 2) every Mon; 7.5 mg (5 mg x 1.5) all other days as directed by the Anticoagulation Clinic 150 tablet 0       PAST SURGICAL HISTORY:  Past Surgical History:   Procedure Laterality Date      SECTION N/A 2020    Procedure:  SECTION;  Surgeon: Grace Dee MD;  Location: [facility] OR      SECTION      c section     ENT SURGERY  2015     GYN  SURGERY  Csections (2016and2020)     HEAD & NECK SURGERY      hemangioma on neck at age 2     LAPAROSCOPIC CHOLECYSTECTOMY       SINUS FRONTAL OPEN OBLITERATION      2015       ALLERGIES   No Known Allergies    FAMILY HISTORY  Family History   Problem Relation Age of Onset     Stomach Problem Mother         ulcer     Bleeding Disorder Father         factor V     Ovarian Cancer Maternal Grandmother      Other Cancer Maternal Grandmother      Thyroid Cancer Paternal Grandmother      Alzheimer Disease Paternal Grandfather      Bleeding Disorder Paternal Grandfather         factor V       SOCIAL HISTORY  Social History     Socioeconomic History     Marital status:      Spouse name: Not on file     Number of children: Not on file     Years of education: Not on file     Highest education level: Not on file   Occupational History     Not on file   Social Needs     Financial resource strain: Not on file     Food insecurity     Worry: Not on file     Inability: Not on file     Transportation needs     Medical: Not on file     Non-medical: Not on file   Tobacco Use     Smoking status: Never Smoker     Smokeless tobacco: Never Used   Substance and Sexual Activity     Alcohol use: Yes     Comment:  rare     Drug use: No     Sexual activity: Yes     Partners: Male     Birth control/protection: Condom   Lifestyle     Physical activity     Days per week: Not on file     Minutes per session: Not on file     Stress: Not on file   Relationships     Social connections     Talks on phone: Not on file     Gets together: Not on file     Attends Gnosticism service: Not on file     Active member of club or organization: Not on file     Attends meetings of clubs or organizations: Not on file     Relationship status: Not on file     Intimate partner violence     Fear of current or ex partner: Not on file     Emotionally abused: Not on file     Physically abused: Not on file     Forced sexual activity: Not on file   Other Topics Concern      Parent/sibling w/ CABG, MI or angioplasty before 65F 55M? No   Social History Narrative    Lives in [place] with her , twin boys, parents and brother.       ROS:   Constitutional: No fever, chills, or sweats. No weight gain/loss   ENT: No visual disturbance, ear ache, epistaxis, sore throat  Allergies/Immunologic: Negative  Respiratory: No cough, hemoptysia  Cardiovascular: As per HPI  GI: No nausea, vomiting, hematemesis, melena, or hematochezia  : No urinary frequency, dysuria, or hematuria  Integument: Negative  Psychiatric: Negative  Neuro: Negative  Endocrinology: Negative   Musculoskeletal: Negative  Vascular: No walking impairment, claudication, ischemic rest pain or nonhealing wounds    EXAM:  /79   Pulse 88   Wt 45.8 kg (101 lb)   BMI 19.08 kg/m    In general, the patient is a pleasant female in no apparent distress.    HEENT: NC/AT.  PERRLA.  EOMI.  Sclerae white, not injected.  Nares clear.  Pharynx without erythema or exudate.  Dentition intact.    Neck: No adenopathy.  No thyromegaly. Carotids +2/2 bilaterally without bruits.  No jugular venous distension.   Heart: RRR. Normal S1, S2 splits physiologically. No murmur, rub, click, or gallop. The PMI is in the 5th ICS in the midclavicular line. There is no heave.    Lungs: CTA.  No ronchi, wheezes, rales.  No dullness to percussion.   Abdomen: Soft, nontender, nondistended. No organomegaly. No AAA.  No bruits.   Extremities: No clubbing, cyanosis, or edema.  No wounds. No varicose veins signs of chronic venous insufficiency.   Vascular: No bruits are noted.    Labs:  LIPID RESULTS:  No results found for: CHOL, HDL, LDL, TRIG, CHOLHDLRATIO, NHDL    LIVER ENZYME RESULTS:  Lab Results   Component Value Date    AST 22 05/07/2021    ALT 20 05/07/2021       CBC RESULTS:  Lab Results   Component Value Date    WBC 7.0 05/07/2021    RBC 5.23 (H) 05/07/2021    HGB 15.4 05/07/2021    HCT 44.6 05/07/2021    MCV 85 05/07/2021    MCH 29.4  05/07/2021    MCHC 34.5 05/07/2021    RDW 12.0 05/07/2021     05/07/2021       BMP RESULTS:  Lab Results   Component Value Date     05/07/2021    POTASSIUM 3.9 05/07/2021    CHLORIDE 106 05/07/2021    CO2 26 05/07/2021    ANIONGAP 6 05/07/2021     (H) 05/07/2021    BUN 11 05/07/2021    CR 0.70 05/07/2021    GFRESTIMATED >90 05/07/2021    GFRESTBLACK >90 05/07/2021    LANA 9.2 05/07/2021        A1C RESULTS:  No results found for: A1C    Thank you for allowing me to participate in the care of your patient.      Sincerely,     Debi Carrasco MD     [facility]    cc:   Feroz Strickland MD  [address] [place] [address]

## 2021-05-25 NOTE — PROGRESS NOTES
Vascular Cardiology Consultation      HPI: This is a 25 year old with Factor V Leiden with h/o PE on lifelong warfarin here for evaluation. She has been having dizziness since she was diagnosed with PE last year March 2020. Notably her family including mother in law had COVID and her daughter had Kawasakis disease. Rocío was never tested for COVID around time of her PE diagnosis. She does carry heterozygote Factor V Leiden and anticardiolipin antibody. She has a history of pericardial effusion around time of pregnancy. No known h/o lupus but unclear if ever worked up. She gets some chest pain on the left side of her chest as well as exercise intolerance and SOB with exertion. She had a CTPE last fall that showed resolution of her PE. She also had ziopatch monitoring showing some PACs/PVCs and Mobitz Type I heart block. She has no syncope history. Lowest heart rates by monitor were in the 30s while she was sleeping. She denies LE edema, claudication. No weight gain or loss, fevers, chills, ns.     Echocardiogram images reviewed by me in the office with patient. She has redundant mitral valve chordae with prolapse. No significant MR noted. RV function appeared normal overall.     ASSESSMENT/PLAN:    In summary this is a 25 year old female with Factor V Leiden, h/o bilateral PE in March 2020 and what sounds to me like possible COVID illness (never tested) here with ongoing AMADOR, dizziness and echocardiogram with redundant mitral valve chordae with prolapse, as well as chest pain, and ziopatch monitor showing Mobitz Type I which we discussed is benign and likely from high vagal tone with sleeping.    She has AMADOR, chest pain and h/o possible COVID in my opinion given her family had it and she presented with acute PE and has had some neurological lingering symptoms since diagnosis, as well as exercise intolerance. These are all common findings of lingering COVID disease however she had vaccine and will not be able  to fully diagnosis. Regardless would like to evaluate her chest pain with cardiac MRI/stress given multiple symptoms as well as possible COVID illness and now incidental finding of redundant chordae and MVP. Would like to make sure she has no additional congenital heart disease to contribute to her symptoms. Will also reevaluate her for recurrent PE although low suspicion given she is well maintained on warfarin.    Will see patient back in 2 months    Debi Carrasco MD MSC  [facility]       PAST MEDICAL HISTORY  Past Medical History:   Diagnosis Date     Calculus of gallbladder with acute cholecystitis without obstruction 2017    Overview:  Added automatically from request for surgery [id number]     Chickenpox      Factor 5 Leiden mutation, heterozygous (H) 2018     Factor V Leiden (H)      History of frequent urinary tract infections      Non-rheumatic mitral regurgitation, trace 2019    Seen on echocardiogram 2019. Recommended for follow up echo in 2-3 years.      Postpartum depression     mild     Pulmonary emboli (H) 2020       CURRENT MEDICATIONS  Current Outpatient Medications   Medication Sig Dispense Refill     sertraline (ZOLOFT) 50 MG tablet Take 1 tablet (50 mg) by mouth daily 30 tablet 3     warfarin ANTICOAGULANT (JANTOVEN ANTICOAGULANT) 5 MG tablet 10 mg (5 mg x 2) every Mon; 7.5 mg (5 mg x 1.5) all other days as directed by the Anticoagulation Clinic 150 tablet 0       PAST SURGICAL HISTORY:  Past Surgical History:   Procedure Laterality Date      SECTION N/A 2020    Procedure:  SECTION;  Surgeon: Grace Dee MD;  Location: [facility] OR      SECTION      c section     ENT SURGERY  2015     GYN SURGERY  Csections ()     HEAD & NECK SURGERY      hemangioma on neck at age 2     LAPAROSCOPIC CHOLECYSTECTOMY       SINUS FRONTAL OPEN OBLITERATION      2015       ALLERGIES   No Known Allergies    FAMILY HISTORY  Family History   Problem  Relation Age of Onset     Stomach Problem Mother         ulcer     Bleeding Disorder Father         factor V     Ovarian Cancer Maternal Grandmother      Other Cancer Maternal Grandmother      Thyroid Cancer Paternal Grandmother      Alzheimer Disease Paternal Grandfather      Bleeding Disorder Paternal Grandfather         factor V       SOCIAL HISTORY  Social History     Socioeconomic History     Marital status:      Spouse name: Not on file     Number of children: Not on file     Years of education: Not on file     Highest education level: Not on file   Occupational History     Not on file   Social Needs     Financial resource strain: Not on file     Food insecurity     Worry: Not on file     Inability: Not on file     Transportation needs     Medical: Not on file     Non-medical: Not on file   Tobacco Use     Smoking status: Never Smoker     Smokeless tobacco: Never Used   Substance and Sexual Activity     Alcohol use: Yes     Comment:  rare     Drug use: No     Sexual activity: Yes     Partners: Male     Birth control/protection: Condom   Lifestyle     Physical activity     Days per week: Not on file     Minutes per session: Not on file     Stress: Not on file   Relationships     Social connections     Talks on phone: Not on file     Gets together: Not on file     Attends Bahai service: Not on file     Active member of club or organization: Not on file     Attends meetings of clubs or organizations: Not on file     Relationship status: Not on file     Intimate partner violence     Fear of current or ex partner: Not on file     Emotionally abused: Not on file     Physically abused: Not on file     Forced sexual activity: Not on file   Other Topics Concern     Parent/sibling w/ CABG, MI or angioplasty before 65F 55M? No   Social History Narrative    Lives in [place] with her , twin boys, parents and brother.       ROS:   Constitutional: No fever, chills, or sweats. No weight gain/loss   ENT: No  visual disturbance, ear ache, epistaxis, sore throat  Allergies/Immunologic: Negative  Respiratory: No cough, hemoptysia  Cardiovascular: As per HPI  GI: No nausea, vomiting, hematemesis, melena, or hematochezia  : No urinary frequency, dysuria, or hematuria  Integument: Negative  Psychiatric: Negative  Neuro: Negative  Endocrinology: Negative   Musculoskeletal: Negative  Vascular: No walking impairment, claudication, ischemic rest pain or nonhealing wounds    EXAM:  /79   Pulse 88   Wt 45.8 kg (101 lb)   BMI 19.08 kg/m    In general, the patient is a pleasant female in no apparent distress.    HEENT: NC/AT.  PERRLA.  EOMI.  Sclerae white, not injected.  Nares clear.  Pharynx without erythema or exudate.  Dentition intact.    Neck: No adenopathy.  No thyromegaly. Carotids +2/2 bilaterally without bruits.  No jugular venous distension.   Heart: RRR. Normal S1, S2 splits physiologically. No murmur, rub, click, or gallop. The PMI is in the 5th ICS in the midclavicular line. There is no heave.    Lungs: CTA.  No ronchi, wheezes, rales.  No dullness to percussion.   Abdomen: Soft, nontender, nondistended. No organomegaly. No AAA.  No bruits.   Extremities: No clubbing, cyanosis, or edema.  No wounds. No varicose veins signs of chronic venous insufficiency.   Vascular: No bruits are noted.    Labs:  LIPID RESULTS:  No results found for: CHOL, HDL, LDL, TRIG, CHOLHDLRATIO, NHDL    LIVER ENZYME RESULTS:  Lab Results   Component Value Date    AST 22 05/07/2021    ALT 20 05/07/2021       CBC RESULTS:  Lab Results   Component Value Date    WBC 7.0 05/07/2021    RBC 5.23 (H) 05/07/2021    HGB 15.4 05/07/2021    HCT 44.6 05/07/2021    MCV 85 05/07/2021    MCH 29.4 05/07/2021    MCHC 34.5 05/07/2021    RDW 12.0 05/07/2021     05/07/2021       BMP RESULTS:  Lab Results   Component Value Date     05/07/2021    POTASSIUM 3.9 05/07/2021    CHLORIDE 106 05/07/2021    CO2 26 05/07/2021    ANIONGAP 6 05/07/2021      (H) 05/07/2021    BUN 11 05/07/2021    CR 0.70 05/07/2021    GFRESTIMATED >90 05/07/2021    GFRESTBLACK >90 05/07/2021    LANA 9.2 05/07/2021        A1C RESULTS:  No results found for: A1C

## 2021-05-30 ENCOUNTER — RECORDS - HEALTHEAST (OUTPATIENT)
Dept: ADMINISTRATIVE | Facility: CLINIC | Age: 26
End: 2021-05-30

## 2021-06-01 ENCOUNTER — APPOINTMENT (OUTPATIENT)
Dept: CT IMAGING | Facility: CLINIC | Age: 26
End: 2021-06-01
Attending: FAMILY MEDICINE
Payer: COMMERCIAL

## 2021-06-01 ENCOUNTER — TELEPHONE (OUTPATIENT)
Dept: FAMILY MEDICINE | Facility: CLINIC | Age: 26
End: 2021-06-01

## 2021-06-01 ENCOUNTER — HOSPITAL ENCOUNTER (EMERGENCY)
Facility: CLINIC | Age: 26
Discharge: HOME | End: 2021-06-01
Attending: FAMILY MEDICINE | Admitting: FAMILY MEDICINE
Payer: COMMERCIAL

## 2021-06-01 ENCOUNTER — ANTICOAGULATION THERAPY VISIT (OUTPATIENT)
Dept: FAMILY MEDICINE | Facility: CLINIC | Age: 26
End: 2021-06-01

## 2021-06-01 VITALS
BODY MASS INDEX: 18.88 KG/M2 | RESPIRATION RATE: 18 BRPM | SYSTOLIC BLOOD PRESSURE: 120 MMHG | OXYGEN SATURATION: 99 % | HEART RATE: 96 BPM | HEIGHT: 61 IN | WEIGHT: 100 LBS | TEMPERATURE: 99.1 F | DIASTOLIC BLOOD PRESSURE: 82 MMHG

## 2021-06-01 DIAGNOSIS — I26.99 ACUTE PULMONARY EMBOLISM, UNSPECIFIED PULMONARY EMBOLISM TYPE, UNSPECIFIED WHETHER ACUTE COR PULMONALE PRESENT (H): ICD-10-CM

## 2021-06-01 DIAGNOSIS — J01.20 ACUTE NON-RECURRENT ETHMOIDAL SINUSITIS: ICD-10-CM

## 2021-06-01 DIAGNOSIS — D68.51 FACTOR 5 LEIDEN MUTATION, HETEROZYGOUS (H): ICD-10-CM

## 2021-06-01 LAB
ANION GAP SERPL CALCULATED.3IONS-SCNC: 5 MMOL/L (ref 3–14)
BASOPHILS # BLD AUTO: 0 10E9/L (ref 0–0.2)
BASOPHILS NFR BLD AUTO: 0.4 %
BUN SERPL-MCNC: 11 MG/DL (ref 7–30)
CALCIUM SERPL-MCNC: 8.7 MG/DL (ref 8.5–10.1)
CAPILLARY BLOOD COLLECTION: NORMAL
CHLORIDE SERPL-SCNC: 108 MMOL/L (ref 94–109)
CO2 SERPL-SCNC: 27 MMOL/L (ref 20–32)
CREAT SERPL-MCNC: 0.66 MG/DL (ref 0.52–1.04)
CRP SERPL-MCNC: 4.6 MG/L (ref 0–8)
DIFFERENTIAL METHOD BLD: NORMAL
EOSINOPHIL # BLD AUTO: 0.1 10E9/L (ref 0–0.7)
EOSINOPHIL NFR BLD AUTO: 1.4 %
ERYTHROCYTE [DISTWIDTH] IN BLOOD BY AUTOMATED COUNT: 12.3 % (ref 10–15)
ERYTHROCYTE [SEDIMENTATION RATE] IN BLOOD BY WESTERGREN METHOD: 10 MM/H (ref 0–20)
GFR SERPL CREATININE-BSD FRML MDRD: >90 ML/MIN/{1.73_M2}
GLUCOSE SERPL-MCNC: 106 MG/DL (ref 70–99)
HCG UR QL: NEGATIVE
HCT VFR BLD AUTO: 42.5 % (ref 35–47)
HGB BLD-MCNC: 14.3 G/DL (ref 11.7–15.7)
IMM GRANULOCYTES # BLD: 0 10E9/L (ref 0–0.4)
IMM GRANULOCYTES NFR BLD: 0.3 %
INR PPP: 2.4 (ref 0.86–1.14)
LYMPHOCYTES # BLD AUTO: 2.2 10E9/L (ref 0.8–5.3)
LYMPHOCYTES NFR BLD AUTO: 30.6 %
MCH RBC QN AUTO: 28.7 PG (ref 26.5–33)
MCHC RBC AUTO-ENTMCNC: 33.6 G/DL (ref 31.5–36.5)
MCV RBC AUTO: 85 FL (ref 78–100)
MONOCYTES # BLD AUTO: 0.5 10E9/L (ref 0–1.3)
MONOCYTES NFR BLD AUTO: 6.7 %
NEUTROPHILS # BLD AUTO: 4.3 10E9/L (ref 1.6–8.3)
NEUTROPHILS NFR BLD AUTO: 60.6 %
NRBC # BLD AUTO: 0 10*3/UL
NRBC BLD AUTO-RTO: 0 /100
PLATELET # BLD AUTO: 235 10E9/L (ref 150–450)
POTASSIUM SERPL-SCNC: 3.9 MMOL/L (ref 3.4–5.3)
RBC # BLD AUTO: 4.98 10E12/L (ref 3.8–5.2)
SODIUM SERPL-SCNC: 140 MMOL/L (ref 133–144)
WBC # BLD AUTO: 7.1 10E9/L (ref 4–11)

## 2021-06-01 PROCEDURE — 85610 PROTHROMBIN TIME: CPT | Performed by: NURSE PRACTITIONER

## 2021-06-01 PROCEDURE — 80048 BASIC METABOLIC PNL TOTAL CA: CPT | Performed by: FAMILY MEDICINE

## 2021-06-01 PROCEDURE — 70486 CT MAXILLOFACIAL W/O DYE: CPT

## 2021-06-01 PROCEDURE — 250N000013 HC RX MED GY IP 250 OP 250 PS 637: Performed by: FAMILY MEDICINE

## 2021-06-01 PROCEDURE — 99285 EMERGENCY DEPT VISIT HI MDM: CPT | Performed by: FAMILY MEDICINE

## 2021-06-01 PROCEDURE — 99284 EMERGENCY DEPT VISIT MOD MDM: CPT | Mod: 25 | Performed by: FAMILY MEDICINE

## 2021-06-01 PROCEDURE — 85025 COMPLETE CBC W/AUTO DIFF WBC: CPT | Performed by: FAMILY MEDICINE

## 2021-06-01 PROCEDURE — 36416 COLLJ CAPILLARY BLOOD SPEC: CPT | Performed by: NURSE PRACTITIONER

## 2021-06-01 PROCEDURE — 85652 RBC SED RATE AUTOMATED: CPT | Performed by: FAMILY MEDICINE

## 2021-06-01 PROCEDURE — 86140 C-REACTIVE PROTEIN: CPT | Performed by: FAMILY MEDICINE

## 2021-06-01 PROCEDURE — 81025 URINE PREGNANCY TEST: CPT | Performed by: FAMILY MEDICINE

## 2021-06-01 RX ORDER — AMOXICILLIN 500 MG/1
500 CAPSULE ORAL ONCE
Status: COMPLETED | OUTPATIENT
Start: 2021-06-01 | End: 2021-06-01

## 2021-06-01 RX ORDER — AMOXICILLIN 500 MG/1
500 CAPSULE ORAL 3 TIMES DAILY
Qty: 30 CAPSULE | Refills: 0 | Status: SHIPPED | OUTPATIENT
Start: 2021-06-01 | End: 2021-06-11

## 2021-06-01 RX ADMIN — AMOXICILLIN 500 MG: 500 CAPSULE ORAL at 21:11

## 2021-06-01 ASSESSMENT — MIFFLIN-ST. JEOR: SCORE: 1130.98

## 2021-06-01 NOTE — ED NOTES
Pt presents to ED with concerns of 1 week of pain behind her left eyebrow. Pt reports this is worse when looking up. This pain has resolved, but now there is a new pain/throbbing in the same area that evolved 2 days ago. This feels better when she is sleeping/laying down. Typically starts within 30 min of waking up and then evolves into a headache. Pt has been trying tylenol and ice which seems to help with the headache, but not the pain above the left eye. Pt describes this pain above the eye as throbbing. Diagnosed with migraines a few years ago, usually these resolve with tylenol.

## 2021-06-01 NOTE — PROGRESS NOTES
Attempted to reach patient again, LVM reiterating tonight's dosing plan.     Of note, per chart review, patient called with eye symptoms today. Writer encouraged patient to callback to discuss further. Mentioned on vm if patient is having extreme eye pain or change in vision she needs to be seen in the ER. Starr Garcia, ANISHN, RN

## 2021-06-01 NOTE — PROGRESS NOTES
"ANTICOAGULATION MANAGEMENT     Patient Name:  Rocío Catherine  Date:  2021    ASSESSMENT /SUBJECTIVE:    Today's INR result of 2.4 is therapeutic. Goal INR of 2.0-3.0      Warfarin dose taken: Warfarin taken as instructed    Diet: No new diet changes affecting INR    Medication changes/ interactions: No new medications/supplements affecting INR    Previous INR: Subtherapeutic     S/S of bleeding or thromboembolism: No    New injury or illness: Yes: severe eye pain - pt rates the pain 10-11/10. Pt states that tylenol does not help the pain. The pain is described as a \"throbbing\" pain and \"sharp\". Pt advised to be seen in clinic today for Sx of eye pain.    Upcoming surgery, procedure or cardioversion: No    Additional findings: None      PLAN:    Warfarin Dosing Instructions: Continue your current warfarin dose 10 mg every Mon; 7.5 mg all other days    Instructed patient to follow up no later than: 3 weeks  Check at provider office visit    Education provided: Please call back if any changes to your diet, medications or how you've been taking warfarin    Telephone call with Rocío whom verbalizes understanding and agrees to plan    Instructed to call the Anticoagulation Clinic for any changes, questions or concerns. (#[phone])        Chika Delaney RN      OBJECTIVE:  Recent labs: (last 7 days)     21  0925   INR 2.40*         INR assessment THER    Recheck INR In: 3 WEEKS    INR Location Clinic      Anticoagulation Summary  As of 2021    INR goal:  2.0-3.0   TTR:  50.1 % (1 y)   INR used for dosin.40 (2021)   Warfarin maintenance plan:  10 mg (5 mg x 2) every Mon; 7.5 mg (5 mg x 1.5) all other days   Full warfarin instructions:  10 mg every Mon; 7.5 mg all other days   Weekly warfarin total:  55 mg   No change documented:  Starr Garcia RN   Plan last modified:  Sarah Felipe RN (2021)   Next INR check:  2021   Priority:  Maintenance   Target end date:  Indefinite    " Indications    Acute pulmonary embolism  unspecified pulmonary embolism type  unspecified whether acute cor pulmonale present (H) (Resolved) [I26.99]  Factor 5 Leiden mutation  heterozygous (H) [D68.51]  Other acute pulmonary embolism without acute cor pulmonale (H) (Resolved) [I26.99]  Acute pulmonary embolism  unspecified pulmonary embolism type  unspecified whether acute cor pulmonale present (H) [I26.99]             Anticoagulation Episode Summary     INR check location:      Preferred lab:      Send INR reminders to:  JENNIFER KOENIG    Comments:  * hematology referral placed. SEND MYCHART (she usually responds immediately) Okay to leave detailed message on phone      Anticoagulation Care Providers     Provider Role Specialty Phone number    Jesse Farnsworth MD Referring Family Medicine [phone]    Yessy Lyn APRN CNP Referring Family Medicine [phone]

## 2021-06-01 NOTE — PROGRESS NOTES
Anticoagulation Management    Unable to reach [facility] today.    Today's INR result of 2.4 is therapeutic (goal INR of 2.0-3.0).  Result received from: Clinic Lab    Follow up required to confirm warfarin dose taken and assess for changes    Left message to continue current dose of warfarin 7.5 mg tonight. Requested callback today, please transfer to Starr #[phone] before 130 pm.      Anticoagulation clinic to follow up    Starr Garcia RN

## 2021-06-01 NOTE — TELEPHONE ENCOUNTER
Reason for call:  Patient reporting a symptom of pain underneath her left eyebrow and goes all over her left eye and its very painful. Stated she took tylenol and did not help.     Symptom or request: Eye pain    Duration (how long have symptoms been present): 3 days    Have you been treated for this before? No      Phone Number patient can be reached at:  Home number on file [phone] (home)    Best Time:  any    Can we leave a detailed message on this number:  YES    Call taken on 6/1/2021 at 12:00 PM by Carola Ortega

## 2021-06-01 NOTE — ED PROVIDER NOTES
"  HPI   The patient is a 25-year-old female presenting with left eye pain.  She has a known history of factor V Leiden mutation heterozygous, chronic migraine, acute pulmonary embolism, and Coumadin use.  Her INR today was therapeutic.  She was seen for dizziness and chest discomfort about 2 weeks ago here in the ED, see that note for details.    The patient has new left eye pain.  This started 2 days ago.  She initially felt the pain in her left eyebrow.  Since then the pain has evolved to include all of the space around her eye and behind the eye.  Her pain is constant.  When she sleeps she does not feel the pain.  She does not wake up with pain at night.  However, she does wake up in the morning and about 30 minutes after getting up she has recurrent discomfort.  Her pain is described as severe.  It evolves into a frontal headache over the course of the day.  She denies having any visual changes, including blurriness and double vision.  She denies nasal congestion.  She does experience rhinorrhea because of allergies on a regular basis.  This is unchanged.  She denies no dental pain or trauma.  She denies no ear pain or trauma.  No tinnitus or hearing changes.  She does point toward her left maxillary region and left alveolar region as a location that is uncomfortable as well.  No neck pain.  No trauma or injury.  When she looks up and to the right she has \"muscle pain behind my eye.\"  No ecchymosis.  No epistaxis.  No bleeding of the gums.  No hematochezia or melena.  She does have a history of migraine and she admits that she is not had any migraine symptoms recently.  She says that this would be unusual for her headache history.        Allergies:  No Known Allergies  Problem List:    Patient Active Problem List    Diagnosis Date Noted     Acute pulmonary embolism, unspecified pulmonary embolism type, unspecified whether acute cor pulmonale present (H) 12/15/2020     Priority: Medium     Acute pulmonary " embolism (H) 2020     Priority: Medium     S/P  2020     Priority: Medium     Palpitations 2019     Priority: Medium     Chronic migraine without aura without status migrainosus, not intractable 2019     Priority: Medium     Sinus tachycardia 2019     Priority: Medium     Non-rheumatic mitral regurgitation, trace 2019     Priority: Medium     Seen on echocardiogram 2019. Recommended for follow up echo in 2-3 years.        Recurrent major depressive disorder, in remission (H) 2018     Priority: Medium     Anxiety 2018     Priority: Medium     Factor 5 Leiden mutation, heterozygous (H) 2018     Priority: Medium     Anemia 2016     Priority: Medium     Dysthymia 2013     Priority: Medium      Past Medical History:    Past Medical History:   Diagnosis Date     Calculus of gallbladder with acute cholecystitis without obstruction 2017     Chickenpox      Factor 5 Leiden mutation, heterozygous (H) 2018     Factor V Leiden (H)      History of frequent urinary tract infections      Non-rheumatic mitral regurgitation, trace 2019     Postpartum depression      Pulmonary emboli (H) 2020     Past Surgical History:    Past Surgical History:   Procedure Laterality Date      SECTION N/A 2020    Procedure:  SECTION;  Surgeon: Grace Dee MD;  Location: [facility] OR      SECTION      c section     ENT SURGERY       GYN SURGERY  Csections ()     HEAD & NECK SURGERY      hemangioma on neck at age 2     LAPAROSCOPIC CHOLECYSTECTOMY       SINUS FRONTAL OPEN OBLITERATION           Family History:    Family History   Problem Relation Age of Onset     Stomach Problem Mother         ulcer     Bleeding Disorder Father         factor V     Ovarian Cancer Maternal Grandmother      Other Cancer Maternal Grandmother      Thyroid Cancer Paternal Grandmother      Alzheimer Disease Paternal Grandfather   "    Bleeding Disorder Paternal Grandfather         factor V     Social History:  Marital Status:   [2]  Social History     Tobacco Use     Smoking status: Never Smoker     Smokeless tobacco: Never Used   Substance Use Topics     Alcohol use: Yes     Comment:  rare     Drug use: No      Medications:    amoxicillin (AMOXIL) 500 MG capsule  sertraline (ZOLOFT) 50 MG tablet  warfarin ANTICOAGULANT (JANTOVEN ANTICOAGULANT) 5 MG tablet      Review of Systems   All other systems reviewed and are negative.      PE   BP: 134/83  Pulse: 94  Temp: 99.1  F (37.3  C)  Height: 154.9 cm (5' 1\")  Weight: 45.4 kg (100 lb)  SpO2: 100 %  Physical Exam  Vitals signs reviewed.   Constitutional:       General: She is not in acute distress.     Appearance: She is well-developed.   HENT:      Head: Normocephalic and atraumatic.      Right Ear: External ear normal.      Left Ear: External ear normal.      Nose: Nose normal.      Mouth/Throat:      Mouth: Mucous membranes are moist.      Pharynx: Oropharynx is clear.   Eyes:      Extraocular Movements: Extraocular movements intact.      Conjunctiva/sclera: Conjunctivae normal.      Pupils: Pupils are equal, round, and reactive to light.      Comments: Pupils are also reactive to interact light stimulation.   Neck:      Musculoskeletal: Normal range of motion. No muscular tenderness.   Cardiovascular:      Rate and Rhythm: Normal rate and regular rhythm.   Pulmonary:      Effort: Pulmonary effort is normal.   Musculoskeletal: Normal range of motion.   Skin:     General: Skin is warm and dry.   Neurological:      General: No focal deficit present.      Mental Status: She is alert and oriented to person, place, and time.   Psychiatric:         Behavior: Behavior normal.         ED COURSE and [facility]   1737.  The patient presents with left eye pain, left facial pain, frontal headache.  She has multiple medical problems and is on Coumadin.  She had a therapeutic INR today.  We talked at length " about the difficulty in deciding how aggressive to be with her work-up here today.  We decided to obtain a CT scan and basic blood work.    2105.  CT scan reviewed with the patient.  Ethmoid sinusitis diagnosed.  Amoxicillin prescribed.  Follow-up discussed.  Return for worsening as discussed.    LABS  Labs Ordered and Resulted from Time of ED Arrival Up to the Time of Departure from the ED   BASIC METABOLIC PANEL - Abnormal; Notable for the following components:       Result Value    Glucose 106 (*)     All other components within normal limits   CRP INFLAMMATION   ERYTHROCYTE SEDIMENTATION RATE AUTO   CBC WITH PLATELETS DIFFERENTIAL   HCG QUALITATIVE URINE       IMAGING  Images reviewed by me.  Radiology report also reviewed.  CT Facial Bones without Contrast   Final Result   IMPRESSION:    1.  Sequela of prior paranasal sinus surgery.   2.  Severe left ethmoid sinus disease.    3.  Moderate left maxillary sinus mucosal thickening.   4.  Patent paranasal sinus drainage pathways.   5.  No findings specific for acute sinusitis.   6.  Otherwise unremarkable appearance of the facial bones.             Procedures    Medications   amoxicillin (AMOXIL) capsule 500 mg (has no administration in time range)         IMPRESSION       ICD-10-CM    1. Acute non-recurrent ethmoidal sinusitis  J01.20             Medication List      Started    amoxicillin 500 MG capsule  Commonly known as: AMOXIL  500 mg, Oral, 3 TIMES DAILY                          Chino Sol MD  06/01/21 210

## 2021-06-02 ENCOUNTER — RECORDS - HEALTHEAST (OUTPATIENT)
Dept: ADMINISTRATIVE | Facility: CLINIC | Age: 26
End: 2021-06-02

## 2021-06-02 ENCOUNTER — DOCUMENTATION ONLY (OUTPATIENT)
Dept: ANTICOAGULATION | Facility: CLINIC | Age: 26
End: 2021-06-02

## 2021-06-02 NOTE — DISCHARGE INSTRUCTIONS
Return to the Emergency Room if the following occurs:     Severely worsened pain, fever greater than 101, concerning behavioral changes such as confusion, or for any concern at anytime.    Or, follow-up with the following provider as we discussed:     Consider follow-up with ENT if not improving over the next 5-7 days.    Medications discussed:    Amoxicillin.  Tylenol for pain control.    If you received pain-relieving or sedating medication during your time in the ER, avoid alcohol, driving automobiles, or working with machinery.  Also, a responsible adult must stay with you.        Call the Nurse Advice Line at [phone] or [phone] for any concern at anytime.

## 2021-06-02 NOTE — PROGRESS NOTES
"Please see \"Imaging\" tab under Chart Review for full report.  This ultrasound was performed in the [facility], and may be located under Care Everywhere.    Leti Whitley MD  Maternal Fetal Medicine    "

## 2021-06-02 NOTE — TELEPHONE ENCOUNTER
Patient called back, she was seen in ER yesterday and had this taken care of, says she is using the Amoxicillin, was told to see ENT if does not improve with the antibiotic, patient says she is not comfortable but will see if the antibiotic helps, she says the tylenol does not and can not take ibuprofen. Patient told to call back if not seeing improvement.    HILARIO Antonio

## 2021-06-02 NOTE — PROGRESS NOTES
ANTICOAGULATION  MANAGEMENT: Discharge Review    Rocío M Florin chart reviewed for anticoagulation continuity of care    Emergency room visit on 6/1/21 for Ethmoid Sinusitis.    Discharge disposition: Home    Results:    Recent labs: (last 7 days)     06/01/21  0925   INR 2.40*     Anticoagulation inpatient management:     not applicable     Anticoagulation discharge instructions:     Warfarin dosing: home regimen continued   Bridging: No   INR goal change: No      Medication changes affecting anticoagulation: Yes: Amoxicillin    Additional factors affecting anticoagulation: No    Plan     No adjustment to anticoagulation plan needed    Patient not contacted    No adjustment to Anticoagulation Calendar was required    Tim Randall RN

## 2021-06-03 ENCOUNTER — HOSPITAL ENCOUNTER (OUTPATIENT)
Dept: CT IMAGING | Facility: CLINIC | Age: 26
Discharge: HOME | End: 2021-06-03
Attending: INTERNAL MEDICINE | Admitting: INTERNAL MEDICINE
Payer: COMMERCIAL

## 2021-06-03 DIAGNOSIS — I26.99 ACUTE PULMONARY EMBOLISM, UNSPECIFIED PULMONARY EMBOLISM TYPE, UNSPECIFIED WHETHER ACUTE COR PULMONALE PRESENT (H): ICD-10-CM

## 2021-06-03 PROCEDURE — 250N000009 HC RX 250: Performed by: RADIOLOGY

## 2021-06-03 PROCEDURE — 71275 CT ANGIOGRAPHY CHEST: CPT

## 2021-06-03 PROCEDURE — 250N000011 HC RX IP 250 OP 636: Performed by: RADIOLOGY

## 2021-06-03 RX ORDER — IOPAMIDOL 755 MG/ML
53 INJECTION, SOLUTION INTRAVASCULAR ONCE
Status: COMPLETED | OUTPATIENT
Start: 2021-06-03 | End: 2021-06-03

## 2021-06-03 RX ADMIN — SODIUM CHLORIDE 88 ML: 9 INJECTION, SOLUTION INTRAVENOUS at 08:55

## 2021-06-03 RX ADMIN — IOPAMIDOL 53 ML: 755 INJECTION, SOLUTION INTRAVENOUS at 08:55

## 2021-06-04 DIAGNOSIS — I26.99 ACUTE PULMONARY EMBOLISM (H): ICD-10-CM

## 2021-06-04 RX ORDER — WARFARIN SODIUM 5 MG/1
TABLET ORAL
Qty: 150 TABLET | Refills: 1 | Status: SHIPPED | OUTPATIENT
Start: 2021-06-04 | End: 2021-12-10

## 2021-06-04 NOTE — TELEPHONE ENCOUNTER
Refill Request  Warfarin 5 mg  Current Dosing Instructions: 10 mg M and 7.5 mg ROW  Tabs: 150  Refills: 1  Last INR: 6/1/21  Next INR due: 6/22/21  Last OV with responsible provider: 3/31/21

## 2021-06-04 NOTE — PROGRESS NOTES
"Please see the \"Imaging\" tab for details of today's ultrasound.    Marlene Parks MD  Specialist in Maternal-Fetal Medicine    "

## 2021-06-08 ENCOUNTER — TELEPHONE (OUTPATIENT)
Dept: FAMILY MEDICINE | Facility: CLINIC | Age: 26
End: 2021-06-08

## 2021-06-08 NOTE — TELEPHONE ENCOUNTER
Reason for call:  Patient reporting a symptom yeast infection. Patient Stated she was prescribed with Amoxicillin  for sinus infection and starts having symptoms of yeast infection and vaginal itching.    Symptom or request: Yeast infection and vaginal itching    Duration (how long have symptoms been present): 2 days    Have you been treated for this before? No      Phone Number patient can be reached at:  Home number on file [phone] (home)    Best Time:  any    Can we leave a detailed message on this number:  YES    Call taken on 6/8/2021 at 5:59 PM by Carola Ortega

## 2021-06-09 NOTE — TELEPHONE ENCOUNTER
Discussed yeast in general she only has itching. She will try otc treatment and if not better call for an appt. Meghan Estrada RN

## 2021-06-11 ENCOUNTER — DOCUMENTATION ONLY (OUTPATIENT)
Dept: ANTICOAGULATION | Facility: CLINIC | Age: 26
End: 2021-06-11

## 2021-06-11 ENCOUNTER — OFFICE VISIT (OUTPATIENT)
Dept: FAMILY MEDICINE | Facility: CLINIC | Age: 26
End: 2021-06-11
Payer: COMMERCIAL

## 2021-06-11 VITALS
WEIGHT: 100.2 LBS | SYSTOLIC BLOOD PRESSURE: 100 MMHG | RESPIRATION RATE: 16 BRPM | TEMPERATURE: 97.4 F | OXYGEN SATURATION: 100 % | BODY MASS INDEX: 18.92 KG/M2 | HEIGHT: 61 IN | DIASTOLIC BLOOD PRESSURE: 80 MMHG | HEART RATE: 88 BPM

## 2021-06-11 DIAGNOSIS — D68.51 FACTOR 5 LEIDEN MUTATION, HETEROZYGOUS (H): ICD-10-CM

## 2021-06-11 DIAGNOSIS — N76.0 BV (BACTERIAL VAGINOSIS): ICD-10-CM

## 2021-06-11 DIAGNOSIS — B37.31 YEAST VAGINITIS: ICD-10-CM

## 2021-06-11 DIAGNOSIS — T36.95XA ANTIBIOTIC-INDUCED YEAST INFECTION: ICD-10-CM

## 2021-06-11 DIAGNOSIS — I26.99 ACUTE PULMONARY EMBOLISM, UNSPECIFIED PULMONARY EMBOLISM TYPE, UNSPECIFIED WHETHER ACUTE COR PULMONALE PRESENT (H): ICD-10-CM

## 2021-06-11 DIAGNOSIS — Z00.00 HEALTH CARE MAINTENANCE: ICD-10-CM

## 2021-06-11 DIAGNOSIS — N89.8 VAGINAL DISCHARGE: Primary | ICD-10-CM

## 2021-06-11 DIAGNOSIS — B96.89 BV (BACTERIAL VAGINOSIS): ICD-10-CM

## 2021-06-11 DIAGNOSIS — B37.9 ANTIBIOTIC-INDUCED YEAST INFECTION: ICD-10-CM

## 2021-06-11 LAB
SPECIMEN SOURCE: ABNORMAL
WET PREP SPEC: ABNORMAL

## 2021-06-11 PROCEDURE — 99213 OFFICE O/P EST LOW 20 MIN: CPT | Performed by: FAMILY MEDICINE

## 2021-06-11 PROCEDURE — 87210 SMEAR WET MOUNT SALINE/INK: CPT | Performed by: FAMILY MEDICINE

## 2021-06-11 RX ORDER — METRONIDAZOLE 500 MG/1
500 TABLET ORAL 2 TIMES DAILY
Qty: 14 TABLET | Refills: 0 | Status: SHIPPED | OUTPATIENT
Start: 2021-06-11 | End: 2021-06-18

## 2021-06-11 ASSESSMENT — MIFFLIN-ST. JEOR: SCORE: 1131.88

## 2021-06-11 NOTE — PROGRESS NOTES
ANTICOAGULATION  MANAGEMENT     Interacting Medication Review    Interacting medication(s): Metronidazole (Flagyl) with warfarin.    Duration: 7 days (6/11 to 6/18)    Indication: Vaginal    New medication?: Yes, interaction may increase INR and risk of bleeding       PLAN     Continue current warfarin dose. Recommend to check INR on 6/14 or 6/15    My chart message sent.    No adjustment to Anticoagulation Calendar was required    Tim Randall RN

## 2021-06-11 NOTE — PROGRESS NOTES
"  Assessment & Plan     Vaginal discharge  - Wet prep    Yeast vaginitis  Antibiotic induced yeast infection  Recommended to complete full monistat 7-day course because of fluconazole / warfarin interaction  Patient will do this  Also recommended that patient always do this when given abx since it was triggered by abx and has been before as well    BV (bacterial vaginosis)  - metroNIDAZOLE (FLAGYL) 500 MG tablet  Dispense: 14 tablet; Refill: 0    Health maintenance - reviewed maintenance orders and reminded to be seen for wellness visit    Return if symptoms worsen or fail to improve, for Routine preventive when able.    Margie Dominguez MD  [facility]   Rocío is a 25 year old who presents for the following health issues     HPI     Vaginal Symptoms  Onset/Duration: 4 days  Description:  Vaginal Discharge: clear green   Itching (Pruritis): YES  Burning sensation:  no  Odor: no  Accompanying Signs & Symptoms:  Urinary symptoms: no  Abdominal pain: no  Fever: no  History:   Sexually active: YES  New Partner: no  Possibility of Pregnancy:  no  Recent antibiotic use: YES  Previous vaginitis issues: YES  Precipitating or alleviating factors: None  Therapies tried and outcome: Monistat    Initially it was more of an itching without discharge  Felt improvement before using monistat but then used monistat yesterday evening and she still feels the same today.  No concerns for STIs today. Low risk  Started right after amoxicillin - received it for sinus infection - finishes it tomorrow  Has had yeast after antibiotics in past as well    Review of Systems   Constitutional, HEENT, cardiovascular, pulmonary, gi and gu systems are negative, except as otherwise noted.      Objective    /80   Pulse 88   Temp 97.4  F (36.3  C) (Tympanic)   Resp 16   Ht 1.549 m (5' 1\")   Wt 45.5 kg (100 lb 3.2 oz)   LMP 05/25/2021 (Approximate)   SpO2 100%   Breastfeeding No   BMI 18.93 kg/m  "   Body mass index is 18.93 kg/m .  Physical Exam   GENERAL: healthy, alert and no distress  RESP: normal respiratory effort, speaking in complete sentences   (female): deferred. Self swab.  MS: no gross musculoskeletal defects noted, no edema  PSYCH: mentation appears normal, affect normal/bright

## 2021-06-15 ENCOUNTER — ANTICOAGULATION THERAPY VISIT (OUTPATIENT)
Dept: ANTICOAGULATION | Facility: CLINIC | Age: 26
End: 2021-06-15

## 2021-06-15 ENCOUNTER — TELEPHONE (OUTPATIENT)
Dept: FAMILY MEDICINE | Facility: CLINIC | Age: 26
End: 2021-06-15

## 2021-06-15 DIAGNOSIS — I26.99 ACUTE PULMONARY EMBOLISM, UNSPECIFIED PULMONARY EMBOLISM TYPE, UNSPECIFIED WHETHER ACUTE COR PULMONALE PRESENT (H): ICD-10-CM

## 2021-06-15 DIAGNOSIS — D68.51 FACTOR 5 LEIDEN MUTATION, HETEROZYGOUS (H): ICD-10-CM

## 2021-06-15 LAB
CAPILLARY BLOOD COLLECTION: NORMAL
INR PPP: 5.3 (ref 0.86–1.14)

## 2021-06-15 PROCEDURE — 36416 COLLJ CAPILLARY BLOOD SPEC: CPT | Performed by: NURSE PRACTITIONER

## 2021-06-15 PROCEDURE — 85610 PROTHROMBIN TIME: CPT | Performed by: NURSE PRACTITIONER

## 2021-06-15 NOTE — TELEPHONE ENCOUNTER
INR result managed by Anticoagulation Clinic. See Anticoagulation Therapy Visit Encounter from 06/15/21 for details.     Sarah Felipe RN 06/15/21 at 12:34 PM

## 2021-06-15 NOTE — TELEPHONE ENCOUNTER
Lab calling with an INR value of 5.3 today.    Routed to [facility].    Serena Greenwood RN  [facility]

## 2021-06-15 NOTE — PROGRESS NOTES
ANTICOAGULATION MANAGEMENT     Patient Name:  Rocío Catherine  Date:  6/15/2021    ASSESSMENT /SUBJECTIVE:    Today's INR result of 5.30 is supratherapeutic. Goal INR of 2.0-3.0      Warfarin dose taken: Warfarin taken as instructed    Diet: No new diet changes affecting INR    Medication changes/ interactions: Interaction between metronidazole started on  and warfarin may be affecting INR    Previous INR: Therapeutic     S/S of bleeding or thromboembolism: No    New injury or illness: Yes: on a 7 day course of Flagyl for bacterial vaginosis     Upcoming surgery, procedure or cardioversion: No    Additional findings: Patient reports she did notice some bruising and felt slightly light headed; she will monitor her symptoms and be seen if worsening or any changes. Routing to PCP as fyi.       PLAN:    Warfarin Dosing Instructions: Hold your warfarin x 2 days    Instructed patient to follow up no later than: 21  Lab visit scheduled    Education provided: Potential interaction between warfarin and Flagyl, Monitoring for bleeding signs and symptoms, When to seek medical attention/emergency care and Contact [facility] Anticoagulation: [phone]  with any changes, questions or concerns.     Telephone call with Rocío whom verbalizes understanding and agrees to plan    Instructed to call the Anticoagulation Clinic for any changes, questions or concerns. (#[phone])        Sarah Felipe RN      OBJECTIVE:  Recent labs: (last 7 days)     06/15/21  1054   INR 5.30*         No question data found.  Anticoagulation Summary  As of 6/15/2021    INR goal:  2.0-3.0   TTR:  49.0 % (1 y)   INR used for dosin.30 (6/15/2021)   Warfarin maintenance plan:  10 mg (5 mg x 2) every Mon; 7.5 mg (5 mg x 1.5) all other days   Full warfarin instructions:  6/15: Hold; : Hold; Otherwise 10 mg every Mon; 7.5 mg all other days   Weekly warfarin total:  55 mg   Plan last modified:  Sarah Felipe  ANDRES RN (5/6/2021)   Next INR check:  6/17/2021   Priority:  Maintenance   Target end date:  Indefinite    Indications    Acute pulmonary embolism  unspecified pulmonary embolism type  unspecified whether acute cor pulmonale present (H) (Resolved) [I26.99]  Factor 5 Leiden mutation  heterozygous (H) [D68.51]  Other acute pulmonary embolism without acute cor pulmonale (H) (Resolved) [I26.99]  Acute pulmonary embolism  unspecified pulmonary embolism type  unspecified whether acute cor pulmonale present (H) [I26.99]             Anticoagulation Episode Summary     INR check location:      Preferred lab:      Send INR reminders to:  JENNIFER KOENIG    Comments:  * hematology referral placed. SEND MYCHART (she usually responds immediately) Okay to leave detailed message on phone      Anticoagulation Care Providers     Provider Role Specialty Phone number    Jesse Farnsworth MD Referring Family Medicine [phone]    Yessy Lyn APRN CNP Referring Family Medicine [phone]

## 2021-06-17 ENCOUNTER — ANTICOAGULATION THERAPY VISIT (OUTPATIENT)
Dept: ANTICOAGULATION | Facility: CLINIC | Age: 26
End: 2021-06-17

## 2021-06-17 DIAGNOSIS — D68.51 FACTOR 5 LEIDEN MUTATION, HETEROZYGOUS (H): ICD-10-CM

## 2021-06-17 DIAGNOSIS — I26.99 ACUTE PULMONARY EMBOLISM, UNSPECIFIED PULMONARY EMBOLISM TYPE, UNSPECIFIED WHETHER ACUTE COR PULMONALE PRESENT (H): ICD-10-CM

## 2021-06-17 LAB
CAPILLARY BLOOD COLLECTION: NORMAL
INR PPP: 4.6 (ref 0.86–1.14)

## 2021-06-17 PROCEDURE — 85610 PROTHROMBIN TIME: CPT | Performed by: NURSE PRACTITIONER

## 2021-06-17 PROCEDURE — 36416 COLLJ CAPILLARY BLOOD SPEC: CPT | Performed by: NURSE PRACTITIONER

## 2021-06-17 NOTE — PROGRESS NOTES
ANTICOAGULATION MANAGEMENT     Patient Name:  Rocío Catherine  Date:  6/17/2021    ASSESSMENT /SUBJECTIVE:    Today's INR result of 4.6 is supratherapeutic. Goal INR of 2.0-3.0      Warfarin dose taken: Warfarin recently held for supra therapeutic INR which may be affecting INR    Diet: No new diet changes identified    Medication changes/ interactions: Interaction between flagyl stopping on 6-18 (last dose in AM) and warfarin may be affecting INR    Previous INR: Supratherapeutic     S/S of bleeding or thromboembolism: No    New injury or illness: Yes: bacterial vaginosis but this is much better per patient    Upcoming surgery, procedure or cardioversion: No    Additional findings: Flagyl has a 6-9 hour half life and will be mostly out of her system around the time she resumes her warfarin on 6-19 in PM.      PLAN:    Warfarin Dosing Instructions: hold x 2 more days then continue your current warfarin dose 10 mg Mondays; 7.5 mg ROW    Instructed patient to follow up no later than: 1 week  Lab visit scheduled. Will recheck on 6-25, which was already scheduled.    Education provided: Monitoring for bleeding signs and symptoms and Contact [phone]  with any changes, questions or concerns.     Telephone call with Rocío whom verbalizes understanding and agrees to plan    Instructed to call the Anticoagulation Clinic for any changes, questions or concerns. (#[phone])        Shelby Samaniego RN      OBJECTIVE:  Recent labs: (last 7 days)     06/15/21  1054 06/17/21  1355   INR 5.30* 4.60*         No question data found.  Anticoagulation Summary  As of 6/17/2021    INR goal:  2.0-3.0   TTR:  49.0 % (1 y)   INR used for dosing:     Warfarin maintenance plan:  10 mg (5 mg x 2) every Mon; 7.5 mg (5 mg x 1.5) all other days   Full warfarin instructions:  6/17: Hold; 6/18: Hold; Otherwise 10 mg every Mon; 7.5 mg all other days   Weekly warfarin total:  55 mg   Plan last modified:  Sarah Felipe RN (5/6/2021)   Next  INR check:  6/25/2021   Priority:  Maintenance   Target end date:  Indefinite    Indications    Acute pulmonary embolism  unspecified pulmonary embolism type  unspecified whether acute cor pulmonale present (H) (Resolved) [I26.99]  Factor 5 Leiden mutation  heterozygous (H) [D68.51]  Other acute pulmonary embolism without acute cor pulmonale (H) (Resolved) [I26.99]  Acute pulmonary embolism  unspecified pulmonary embolism type  unspecified whether acute cor pulmonale present (H) [I26.99]             Anticoagulation Episode Summary     INR check location:      Preferred lab:      Send INR reminders to:  JENNIFER KOENIG    Comments:  * hematology referral placed. SEND MYCHART (she usually responds immediately) Okay to leave detailed message on phone      Anticoagulation Care Providers     Provider Role Specialty Phone number    Jesse Farnsworth MD Referring Family Medicine [phone]    Yessy Lyn APRN CNP Referring Family Medicine [phone]

## 2021-06-19 ENCOUNTER — HEALTH MAINTENANCE LETTER (OUTPATIENT)
Age: 26
End: 2021-06-19

## 2021-06-25 ENCOUNTER — ONCOLOGY VISIT (OUTPATIENT)
Dept: ONCOLOGY | Facility: CLINIC | Age: 26
End: 2021-06-25
Attending: INTERNAL MEDICINE
Payer: COMMERCIAL

## 2021-06-25 ENCOUNTER — ANTICOAGULATION THERAPY VISIT (OUTPATIENT)
Dept: ANTICOAGULATION | Facility: CLINIC | Age: 26
End: 2021-06-25

## 2021-06-25 VITALS
WEIGHT: 101.8 LBS | HEIGHT: 61 IN | TEMPERATURE: 99.4 F | RESPIRATION RATE: 18 BRPM | SYSTOLIC BLOOD PRESSURE: 108 MMHG | DIASTOLIC BLOOD PRESSURE: 75 MMHG | OXYGEN SATURATION: 99 % | BODY MASS INDEX: 19.22 KG/M2 | HEART RATE: 91 BPM

## 2021-06-25 DIAGNOSIS — D68.61 ANTIPHOSPHOLIPID SYNDROME (H): ICD-10-CM

## 2021-06-25 DIAGNOSIS — D68.51 FACTOR 5 LEIDEN MUTATION, HETEROZYGOUS (H): ICD-10-CM

## 2021-06-25 DIAGNOSIS — I26.99 ACUTE PULMONARY EMBOLISM, UNSPECIFIED PULMONARY EMBOLISM TYPE, UNSPECIFIED WHETHER ACUTE COR PULMONALE PRESENT (H): ICD-10-CM

## 2021-06-25 DIAGNOSIS — I26.99 ACUTE PULMONARY EMBOLISM WITHOUT ACUTE COR PULMONALE, UNSPECIFIED PULMONARY EMBOLISM TYPE (H): ICD-10-CM

## 2021-06-25 DIAGNOSIS — D68.61 ANTIPHOSPHOLIPID SYNDROME (H): Primary | ICD-10-CM

## 2021-06-25 LAB
CAPILLARY BLOOD COLLECTION: NORMAL
INR PPP: 2.7 (ref 0.86–1.14)

## 2021-06-25 PROCEDURE — 36416 COLLJ CAPILLARY BLOOD SPEC: CPT | Performed by: NURSE PRACTITIONER

## 2021-06-25 PROCEDURE — 99212 OFFICE O/P EST SF 10 MIN: CPT | Performed by: INTERNAL MEDICINE

## 2021-06-25 PROCEDURE — 85610 PROTHROMBIN TIME: CPT | Performed by: NURSE PRACTITIONER

## 2021-06-25 PROCEDURE — G0463 HOSPITAL OUTPT CLINIC VISIT: HCPCS

## 2021-06-25 ASSESSMENT — MIFFLIN-ST. JEOR: SCORE: 1139.14

## 2021-06-25 ASSESSMENT — PAIN SCALES - GENERAL: PAINLEVEL: NO PAIN (0)

## 2021-06-25 NOTE — LETTER
6/25/2021         RE: Rocío Catherine  [address] [address] [address]        Dear Colleague,    Thank you for referring your patient, Rocío Catherine, to the [facility]. Please see a copy of my visit note below.    Usual.  DATE OF VISIT: Jun 25, 2021    Rocío Catherine is a 25 year old female is seen today for   Chief Complaint   Patient presents with     Hematology     Factor 5 leiden mutation, heterozygous.    .       (I26.99) Acute pulmonary embolism without acute cor pulmonale, unspecified pulmonary embolism type (H)  (primary encounter diagnosis)  (D68.61) Antiphospholipid syndrome (H)  (D68.51) Factor 5 Leiden mutation, heterozygous (H)  I have discussed with patient results of most recent laboratory tests Anticort antibody IgM came back elevated in January 21, 2021 at 23.9 and repeated in May 19,020 came back at 32.5.  Based on this information, diagnosis of antiphospholipid syndrome should be entertained.  I would recommend to continue on long-term anticoagulation with warfarin.  I would referred the patient to see rheumatology as well.  I have not scheduled patient for any further appointments I will see her in future if there are new problems or concerns.    The patient is ready to learn, no apparent learning barriers were identified.  Diagnosis and treatment plans were explained to the patient. The patient expressed understanding of the content. The patient asked appropriate questions. The patient questions were answered to her satisfaction.  Chart documentation with Dragon Voice recognition Software. Although reviewed after completion, some words and grammatical errors may remain.    Oncology Rooming Note    June 25, 2021 3:34 PM   oRcío Catherine is a 25 year old female who presents for:    Chief Complaint   Patient presents with     Hematology     Factor 5 leiden mutation, heterozygous.      Initial Vitals: /75 (BP Location: Right arm, Patient Position: Sitting, Cuff  "Size: Adult Regular)   Pulse 91   Temp 99.4  F (37.4  C) (Oral)   Resp 18   Ht 1.549 m (5' 1\")   Wt 46.2 kg (101 lb 12.8 oz)   SpO2 99%   Breastfeeding No   BMI 19.23 kg/m   Estimated body mass index is 19.23 kg/m  as calculated from the following:    Height as of this encounter: 1.549 m (5' 1\").    Weight as of this encounter: 46.2 kg (101 lb 12.8 oz). Body surface area is 1.41 meters squared.  No Pain (0) Comment: Data Unavailable   No LMP recorded.  Allergies reviewed: Yes  Medications reviewed: Yes    Medications: Medication refills not needed today.  Pharmacy name entered into [employer]: [facility] [address]    Clinical concerns: Factor 5 leiden mutation, heterozygous.       Em Lezama, [facility]                Again, thank you for allowing me to participate in the care of your patient.        Sincerely,        Solo Keen MD    "

## 2021-06-25 NOTE — PROGRESS NOTES
Usual.  DATE OF VISIT: Jun 25, 2021    Rocío Catherine is a 25 year old female is seen today for   Chief Complaint   Patient presents with     Hematology     Factor 5 leiden mutation, heterozygous.    .       (I26.99) Acute pulmonary embolism without acute cor pulmonale, unspecified pulmonary embolism type (H)  (primary encounter diagnosis)  (D68.61) Antiphospholipid syndrome (H)  (D68.51) Factor 5 Leiden mutation, heterozygous (H)  I have discussed with patient results of most recent laboratory tests Anticort antibody IgM came back elevated in January 21, 2021 at 23.9 and repeated in May 19,020 came back at 32.5.  Based on this information, diagnosis of antiphospholipid syndrome should be entertained.  I would recommend to continue on long-term anticoagulation with warfarin.  I would referred the patient to see rheumatology as well.  I have not scheduled patient for any further appointments I will see her in future if there are new problems or concerns.    The patient is ready to learn, no apparent learning barriers were identified.  Diagnosis and treatment plans were explained to the patient. The patient expressed understanding of the content. The patient asked appropriate questions. The patient questions were answered to her satisfaction.  Chart documentation with Dragon Voice recognition Software. Although reviewed after completion, some words and grammatical errors may remain.

## 2021-06-25 NOTE — PROGRESS NOTES
"Oncology Rooming Note    June 25, 2021 3:34 PM   Rocío Catherine is a 25 year old female who presents for:    Chief Complaint   Patient presents with     Hematology     Factor 5 leiden mutation, heterozygous.      Initial Vitals: /75 (BP Location: Right arm, Patient Position: Sitting, Cuff Size: Adult Regular)   Pulse 91   Temp 99.4  F (37.4  C) (Oral)   Resp 18   Ht 1.549 m (5' 1\")   Wt 46.2 kg (101 lb 12.8 oz)   SpO2 99%   Breastfeeding No   BMI 19.23 kg/m   Estimated body mass index is 19.23 kg/m  as calculated from the following:    Height as of this encounter: 1.549 m (5' 1\").    Weight as of this encounter: 46.2 kg (101 lb 12.8 oz). Body surface area is 1.41 meters squared.  No Pain (0) Comment: Data Unavailable   No LMP recorded.  Allergies reviewed: Yes  Medications reviewed: Yes    Medications: Medication refills not needed today.  Pharmacy name entered into [facility]: [facility] [place] [address] [address]    Clinical concerns: Factor 5 leiden mutation, heterozygous.       Em Lezama, SAMIA            "

## 2021-06-25 NOTE — PATIENT INSTRUCTIONS
Please arrange for rheumatology consult reference antiphospholipid syndrome  Continue on long-term anticoagulation.  Follow-up as needed

## 2021-06-25 NOTE — PROGRESS NOTES
ANTICOAGULATION MANAGEMENT     Rocío Catherine 25 year old female is on warfarin with therapeutic INR result. (Goal INR 2.0-3.0)    Recent labs: (last 7 days)     06/25/21  1514   INR 2.70*       ASSESSMENT     Source(s): Patient/Caregiver Call       Warfarin doses taken: Less warfarin taken than planned which may be affecting INR, held one more day because she was on the flagyl a little longer than she initially told [facility].    Diet: No new diet changes identified    New illness, injury, or hospitalization: No    Medication/supplement changes: Flagyl stopped on Friday night (originally thought it would be Friday AM) No interaction anticipated -- INR was affected while on the medication, ongoing interactions should not occur.    Signs or symptoms of bleeding or clotting: No    Previous INR: Supratherapeutic    Additional findings: Patient was seen in hematology today, diagnosed with antiphospholipid antibody syndrome.      PLAN     Recommended plan for no diet, medication or health factor changes affecting INR     Dosing Instructions: Continue your current warfarin dose with next INR in 1 week       Summary  As of 6/25/2021    Full warfarin instructions:  10 mg every Mon; 7.5 mg all other days   Next INR check:  7/2/2021             Telephone call with Rocío whom verbalizes understanding and agrees to plan    Lab visit scheduled    Education provided: None required    Plan made per [facility] anticoagulation protocol    Mansi Wilson, RN  Anticoagulation Clinic  6/25/2021    _______________________________________________________________________     Anticoagulation Episode Summary     Current INR goal:  2.0-3.0   TTR:  48.2 % (1 y)   Target end date:  Indefinite   Send INR reminders to:  JENNIFER KOENIG    Indications    Acute pulmonary embolism  unspecified pulmonary embolism type  unspecified whether acute cor pulmonale present (H) (Resolved) [I26.99]  Factor 5 Leiden mutation  heterozygous (H) [D68.51]  Other acute  pulmonary embolism without acute cor pulmonale (H) (Resolved) [I26.99]  Acute pulmonary embolism  unspecified pulmonary embolism type  unspecified whether acute cor pulmonale present (H) [I26.99]           Comments:  *          Anticoagulation Care Providers     Provider Role Specialty Phone number    Jesse Farnsworth MD Referring Family Medicine [phone]    Yessy Lny APRN [facility] Referring Family Medicine [phone]

## 2021-06-30 ENCOUNTER — TELEPHONE (OUTPATIENT)
Dept: ONCOLOGY | Facility: CLINIC | Age: 26
End: 2021-06-30

## 2021-06-30 ENCOUNTER — HOSPITAL ENCOUNTER (OUTPATIENT)
Dept: CARDIOLOGY | Facility: CLINIC | Age: 26
Discharge: HOME | End: 2021-06-30
Attending: INTERNAL MEDICINE | Admitting: INTERNAL MEDICINE
Payer: COMMERCIAL

## 2021-06-30 VITALS — OXYGEN SATURATION: 100 % | HEART RATE: 104 BPM | DIASTOLIC BLOOD PRESSURE: 67 MMHG | SYSTOLIC BLOOD PRESSURE: 107 MMHG

## 2021-06-30 DIAGNOSIS — I47.10 PAROXYSMAL SUPRAVENTRICULAR TACHYCARDIA (H): ICD-10-CM

## 2021-06-30 DIAGNOSIS — R42 LIGHTHEADEDNESS: ICD-10-CM

## 2021-06-30 DIAGNOSIS — I44.1 MOBITZ (TYPE) I (WENCKEBACH'S) ATRIOVENTRICULAR BLOCK: ICD-10-CM

## 2021-06-30 DIAGNOSIS — I34.1 MITRAL VALVE PROLAPSE: ICD-10-CM

## 2021-06-30 PROCEDURE — 255N000002 HC RX 255 OP 636: Performed by: INTERNAL MEDICINE

## 2021-06-30 PROCEDURE — 93018 CV STRESS TEST I&R ONLY: CPT | Performed by: INTERNAL MEDICINE

## 2021-06-30 PROCEDURE — 93016 CV STRESS TEST SUPVJ ONLY: CPT | Performed by: INTERNAL MEDICINE

## 2021-06-30 PROCEDURE — 75563 CARD MRI W/STRESS IMG & DYE: CPT | Mod: 26 | Performed by: INTERNAL MEDICINE

## 2021-06-30 PROCEDURE — 93017 CV STRESS TEST TRACING ONLY: CPT

## 2021-06-30 PROCEDURE — 250N000011 HC RX IP 250 OP 636: Performed by: INTERNAL MEDICINE

## 2021-06-30 PROCEDURE — 75565 CARD MRI VELOC FLOW MAPPING: CPT | Mod: 26 | Performed by: INTERNAL MEDICINE

## 2021-06-30 PROCEDURE — 75565 CARD MRI VELOC FLOW MAPPING: CPT

## 2021-06-30 PROCEDURE — A9585 GADOBUTROL INJECTION: HCPCS | Performed by: INTERNAL MEDICINE

## 2021-06-30 RX ORDER — ALBUTEROL SULFATE 90 UG/1
2 AEROSOL, METERED RESPIRATORY (INHALATION) EVERY 5 MIN PRN
Status: DISCONTINUED | OUTPATIENT
Start: 2021-06-30 | End: 2021-07-01 | Stop reason: HOSPADM

## 2021-06-30 RX ORDER — DIAZEPAM 5 MG
5 TABLET ORAL EVERY 30 MIN PRN
Status: DISCONTINUED | OUTPATIENT
Start: 2021-06-30 | End: 2021-07-01 | Stop reason: HOSPADM

## 2021-06-30 RX ORDER — ONDANSETRON 2 MG/ML
4 INJECTION INTRAMUSCULAR; INTRAVENOUS
Status: DISCONTINUED | OUTPATIENT
Start: 2021-06-30 | End: 2021-07-01 | Stop reason: HOSPADM

## 2021-06-30 RX ORDER — GADOBUTROL 604.72 MG/ML
12 INJECTION INTRAVENOUS ONCE
Status: COMPLETED | OUTPATIENT
Start: 2021-06-30 | End: 2021-06-30

## 2021-06-30 RX ORDER — METHYLPREDNISOLONE SODIUM SUCCINATE 125 MG/2ML
125 INJECTION, POWDER, LYOPHILIZED, FOR SOLUTION INTRAMUSCULAR; INTRAVENOUS
Status: DISCONTINUED | OUTPATIENT
Start: 2021-06-30 | End: 2021-07-01 | Stop reason: HOSPADM

## 2021-06-30 RX ORDER — DIPHENHYDRAMINE HYDROCHLORIDE 50 MG/ML
25-50 INJECTION INTRAMUSCULAR; INTRAVENOUS
Status: DISCONTINUED | OUTPATIENT
Start: 2021-06-30 | End: 2021-07-01 | Stop reason: HOSPADM

## 2021-06-30 RX ORDER — DIPHENHYDRAMINE HCL 25 MG
25 CAPSULE ORAL
Status: DISCONTINUED | OUTPATIENT
Start: 2021-06-30 | End: 2021-07-01 | Stop reason: HOSPADM

## 2021-06-30 RX ORDER — CAFFEINE CITRATE 20 MG/ML
60 SOLUTION INTRAVENOUS
Status: DISCONTINUED | OUTPATIENT
Start: 2021-06-30 | End: 2021-07-01 | Stop reason: HOSPADM

## 2021-06-30 RX ORDER — REGADENOSON 0.08 MG/ML
0.4 INJECTION, SOLUTION INTRAVENOUS ONCE
Status: COMPLETED | OUTPATIENT
Start: 2021-06-30 | End: 2021-06-30

## 2021-06-30 RX ORDER — ACYCLOVIR 200 MG/1
0-1 CAPSULE ORAL
Status: DISCONTINUED | OUTPATIENT
Start: 2021-06-30 | End: 2021-07-01 | Stop reason: HOSPADM

## 2021-06-30 RX ORDER — AMINOPHYLLINE 25 MG/ML
100 INJECTION, SOLUTION INTRAVENOUS ONCE
Status: DISCONTINUED | OUTPATIENT
Start: 2021-06-30 | End: 2021-07-01 | Stop reason: HOSPADM

## 2021-06-30 RX ADMIN — GADOBUTROL 12 ML: 604.72 INJECTION INTRAVENOUS at 13:26

## 2021-06-30 RX ADMIN — REGADENOSON 0.4 MG: 0.08 INJECTION, SOLUTION INTRAVENOUS at 12:38

## 2021-06-30 NOTE — TELEPHONE ENCOUNTER
----- Message from Brooklyn Perez sent at 6/29/2021  9:40 AM CDT -----  I called patient yesterdayand left message with number to call to schedule if she does not hear from them. Thanks.  Brooklyn  ----- Message -----  From: Mya Lagos RN  Sent: 6/29/2021   9:34 AM CDT  To: [facility]cology     Please assist patient in scheduling a rheumatology consult per Dr Keen on 6/25.. Orders placed.  Thanks!  Jo Ann

## 2021-07-01 NOTE — RESULT ENCOUNTER NOTE
Normal biventricular function; no evidence ischemia, MI, fibrosis or infiltrative disease. Follow up with Dr Carrasco on 7/26/21

## 2021-07-02 ENCOUNTER — ANTICOAGULATION THERAPY VISIT (OUTPATIENT)
Dept: ANTICOAGULATION | Facility: CLINIC | Age: 26
End: 2021-07-02

## 2021-07-02 DIAGNOSIS — I26.99 ACUTE PULMONARY EMBOLISM, UNSPECIFIED PULMONARY EMBOLISM TYPE, UNSPECIFIED WHETHER ACUTE COR PULMONALE PRESENT (H): ICD-10-CM

## 2021-07-02 DIAGNOSIS — D68.51 FACTOR 5 LEIDEN MUTATION, HETEROZYGOUS (H): ICD-10-CM

## 2021-07-02 DIAGNOSIS — D68.61 ANTIPHOSPHOLIPID SYNDROME (H): ICD-10-CM

## 2021-07-02 LAB
CAPILLARY BLOOD COLLECTION: NORMAL
INR PPP: 3.3 (ref 0.86–1.14)

## 2021-07-02 PROCEDURE — 85610 PROTHROMBIN TIME: CPT | Performed by: NURSE PRACTITIONER

## 2021-07-02 PROCEDURE — 36416 COLLJ CAPILLARY BLOOD SPEC: CPT | Performed by: NURSE PRACTITIONER

## 2021-07-02 NOTE — PROGRESS NOTES
ANTICOAGULATION MANAGEMENT     Rocío Catherine 25 year old female is on warfarin with supratherapeutic INR result. (Goal INR 2.0-3.0)    Recent labs: (last 7 days)     07/02/21  1315   INR 3.30*       ASSESSMENT     Source(s): Chart Review and Patient/Caregiver Call       Warfarin doses taken: Reviewed in chart    Diet:     New illness, injury, or hospitalization:     Medication/supplement changes:     Signs or symptoms of bleeding or clotting:     Previous INR: Therapeutic last visit; previously outside of goal range    Additional findings: INRs on 6-15 and 6-17 were elevated due to flagyl and vaginosis. Due to this temporary elevation, do not want to decrease maintenance dose at this time. Will route to [facility] for co sign.     PLAN     Recommended plan for no diet, medication or health factor changes affecting INR     Dosing Instructions: Partial hold then continue your current warfarin dose with next INR in 1 week       Summary  As of 7/2/2021    Full warfarin instructions:  7/2: 5 mg; Otherwise 10 mg every Mon; 7.5 mg all other days   Next INR check:               Detailed voice message left for Rocío with dosing instructions and follow up date.     Contact [phone]  to schedule and with any changes, questions or concerns.     Education provided: Importance of notifying clinic for changes in medications; a sooner lab recheck maybe needed., Importance of notifying clinic for diarrhea, nausea/vomiting, reduced intake, and/or illness; a sooner lab recheck maybe needed. and Contact [phone]  with any changes, questions or concerns.     Plan made per [facility] anticoagulation protocol    Shelby Samaniego RN  Anticoagulation Clinic  7/2/2021    _______________________________________________________________________     Anticoagulation Episode Summary     Current INR goal:  2.0-3.0   TTR:  48.3 % (1 y)   Target end date:  Indefinite   Send INR reminders to:  JENNIFER KOENIG    Indications    Acute pulmonary  embolism  unspecified pulmonary embolism type  unspecified whether acute cor pulmonale present (H) [I26.99]  Factor 5 Leiden mutation  heterozygous (H) [D68.51]  Other acute pulmonary embolism without acute cor pulmonale (H) (Resolved) [I26.99]  Acute pulmonary embolism  unspecified pulmonary embolism type  unspecified whether acute cor pulmonale present (H) (Resolved) [I26.99]  Antiphospholipid syndrome (H) [D68.61]           Comments:  *          Anticoagulation Care Providers     Provider Role Specialty Phone number    Jesse Farnsworth MD Referring Family Medicine [phone]    Yessy Lyn APRN [employer] Referring Family Medicine [phone]

## 2021-07-13 ENCOUNTER — TELEPHONE (OUTPATIENT)
Dept: FAMILY MEDICINE | Facility: CLINIC | Age: 26
End: 2021-07-13

## 2021-07-13 NOTE — TELEPHONE ENCOUNTER
Writer spoke with patient and scheduled her for an INR lab appointment tomorrow at 12:30 at [place] lab.      Sarah Felipe RN 07/13/21 at 3:38 PM

## 2021-07-13 NOTE — TELEPHONE ENCOUNTER
TOMMY left at 3:24-    Calling to schedule an appt. Please call her back at [phone]    Eileen Rojas RN

## 2021-07-14 ENCOUNTER — LAB (OUTPATIENT)
Dept: LAB | Facility: CLINIC | Age: 26
End: 2021-07-14
Payer: COMMERCIAL

## 2021-07-14 ENCOUNTER — ANTICOAGULATION THERAPY VISIT (OUTPATIENT)
Dept: ANTICOAGULATION | Facility: CLINIC | Age: 26
End: 2021-07-14

## 2021-07-14 DIAGNOSIS — D68.51 FACTOR 5 LEIDEN MUTATION, HETEROZYGOUS (H): ICD-10-CM

## 2021-07-14 DIAGNOSIS — I26.99 ACUTE PULMONARY EMBOLISM, UNSPECIFIED PULMONARY EMBOLISM TYPE, UNSPECIFIED WHETHER ACUTE COR PULMONALE PRESENT (H): Primary | ICD-10-CM

## 2021-07-14 DIAGNOSIS — I26.99 ACUTE PULMONARY EMBOLISM (H): ICD-10-CM

## 2021-07-14 DIAGNOSIS — D68.61 ANTIPHOSPHOLIPID SYNDROME (H): ICD-10-CM

## 2021-07-14 DIAGNOSIS — D68.51 FACTOR 5 LEIDEN MUTATION, HETEROZYGOUS (H): Primary | ICD-10-CM

## 2021-07-14 LAB — INR BLD: 2.6 (ref 0.9–1.1)

## 2021-07-14 PROCEDURE — 85610 PROTHROMBIN TIME: CPT

## 2021-07-14 PROCEDURE — 36416 COLLJ CAPILLARY BLOOD SPEC: CPT

## 2021-07-14 NOTE — PROGRESS NOTES
ANTICOAGULATION MANAGEMENT     Rocío Catherine 25 year old female is on warfarin with therapeutic INR result. (Goal INR 2.0-3.0)    Recent labs: (last 7 days)     07/14/21  1237   INR 2.6*       ASSESSMENT     Source(s): Patient/Caregiver Call       Previous INR: Supratherapeutic       PLAN       Dosing Instructions: Continue your current warfarin dose with next INR in 2 weeks       Summary  As of 7/14/2021    Full warfarin instructions:  10 mg every Mon; 7.5 mg all other days   Next INR check:  7/26/2021             Sent [employer] message with dosing and follow up instructions    Lab visit scheduled    Education provided: Please call back if any changes to your diet, medications or how you've been taking warfarin and Contact [phone]  with any changes, questions or concerns.     Plan made per [facility] anticoagulation protocol    Tim Randall RN  Anticoagulation Clinic  7/14/2021    _______________________________________________________________________     Anticoagulation Episode Summary     Current INR goal:  2.0-3.0   TTR:  49.6 % (1 y)   Target end date:  Indefinite   Send INR reminders to:  JENNIFER KOENIG    Indications    Acute pulmonary embolism  unspecified pulmonary embolism type  unspecified whether acute cor pulmonale present (H) [I26.99]  Factor 5 Leiden mutation  heterozygous (H) [D68.51]  Other acute pulmonary embolism without acute cor pulmonale (H) (Resolved) [I26.99]  Acute pulmonary embolism  unspecified pulmonary embolism type  unspecified whether acute cor pulmonale present (H) (Resolved) [I26.99]  Antiphospholipid syndrome (H) [D68.61]           Comments:  *          Anticoagulation Care Providers     Provider Role Specialty Phone number    Jesse Farnsworth MD Referring Family Medicine [phone]    Yessy Lyn APRN CNP Referring Family Medicine [phone]

## 2021-07-15 ENCOUNTER — TELEPHONE (OUTPATIENT)
Dept: RHEUMATOLOGY | Facility: CLINIC | Age: 26
End: 2021-07-15

## 2021-07-15 NOTE — TELEPHONE ENCOUNTER
"----- Message from Alberto Lopez RN sent at 7/14/2021 12:27 PM CDT -----  Regarding: FW: Rheumatology consult needed  Please call pt and schedule with next available as Dr Rodriguez noted below. Ok for new fellow also. Thanks.  Alberto  ----- Message -----  From: Juliano Rodriguez MD  Sent: 7/13/2021   9:58 PM CDT  To: Alberto Lopez RN, Mikaela Orr RN  Subject: RE: Rheumatology consult needed                  She should see next avail provider; does not need to see [facility]. Reasonable new fellow pt.   ----- Message -----  From: Alberto Lopez RN  Sent: 7/13/2021   8:24 AM CDT  To: Juliano Rodriguez MD  Subject: FW: Rheumatology consult needed                  This pt is being referred with [facility] by an oncologist, Dr Keen. The question seems to be does she have lupus or any other autoimmune reason for this. SLE specific labs have not been drawn. Dr Freire is out on vacation which is why I sent it to you. Thanks.  ----- Message -----  From: Shawanda Calderon CMA  Sent: 7/12/2021   3:38 PM CDT  To: Alberto Lopez RN  Subject: FW: Rheumatology consult needed                  Would we see for this?  ----- Message -----  From: Mya Lagos RN  Sent: 7/9/2021  11:26 AM CDT  To: Solo Keen MD, #  Subject: Rheumatology consult needed                      Hello,  A Rheumatology consult was placed for this patient on 6/25. We are trying to get this patient scheduled, however your scheduling department states your clinic does not treat this specific diagnosis and cannot schedule this patient. Please see Dr. Tadeo 6/25 note :    \"Acute pulmonary embolism without acute cor pulmonale, unspecified pulmonary embolism type (H)  (primary encounter diagnosis)  (D68.61) Antiphospholipid syndrome (H)  (D68.51) Factor 5 Leiden mutation, heterozygous (H)  I have discussed with patient results of most recent laboratory tests Anticort antibody IgM came back elevated in January 21, 2021 at 23.9 and repeated in May 19,020 " "came back at 32.5.  Based on this information, diagnosis of antiphospholipid syndrome should be entertained.  I would recommend to continue on long-term anticoagulation with warfarin.  I would referred the patient to see rheumatology as well.\"     Please advise where she should be seen.  Thanks!  Jo Ann Lagos RN            "

## 2021-07-16 NOTE — TELEPHONE ENCOUNTER
Received this staff message today:    Mikaela Orr, Juliano Cunningham MD; Alberto Lopez, RN  She [facility] to be seen in [facility] and has scheduled there.       Thank you,   Mikaela     No further action needed at this time.    ANISH WrightN, RN  Rheumatology Care Coordinator  [facility]

## 2021-07-26 ENCOUNTER — OFFICE VISIT (OUTPATIENT)
Dept: CARDIOLOGY | Facility: CLINIC | Age: 26
End: 2021-07-26
Attending: INTERNAL MEDICINE
Payer: COMMERCIAL

## 2021-07-26 ENCOUNTER — ANTICOAGULATION THERAPY VISIT (OUTPATIENT)
Dept: ANTICOAGULATION | Facility: CLINIC | Age: 26
End: 2021-07-26

## 2021-07-26 ENCOUNTER — LAB (OUTPATIENT)
Dept: LAB | Facility: CLINIC | Age: 26
End: 2021-07-26

## 2021-07-26 VITALS
DIASTOLIC BLOOD PRESSURE: 75 MMHG | HEART RATE: 96 BPM | WEIGHT: 102 LBS | SYSTOLIC BLOOD PRESSURE: 108 MMHG | BODY MASS INDEX: 19.27 KG/M2

## 2021-07-26 DIAGNOSIS — I26.99 ACUTE PULMONARY EMBOLISM, UNSPECIFIED PULMONARY EMBOLISM TYPE, UNSPECIFIED WHETHER ACUTE COR PULMONALE PRESENT (H): ICD-10-CM

## 2021-07-26 DIAGNOSIS — D68.51 FACTOR 5 LEIDEN MUTATION, HETEROZYGOUS (H): ICD-10-CM

## 2021-07-26 DIAGNOSIS — I26.99 ACUTE PULMONARY EMBOLISM, UNSPECIFIED PULMONARY EMBOLISM TYPE, UNSPECIFIED WHETHER ACUTE COR PULMONALE PRESENT (H): Primary | ICD-10-CM

## 2021-07-26 DIAGNOSIS — D68.61 ANTIPHOSPHOLIPID SYNDROME (H): ICD-10-CM

## 2021-07-26 DIAGNOSIS — I26.99 ACUTE PULMONARY EMBOLISM (H): ICD-10-CM

## 2021-07-26 DIAGNOSIS — I34.1 MITRAL VALVE PROLAPSE: ICD-10-CM

## 2021-07-26 LAB — INR BLD: 2.3 (ref 0.9–1.1)

## 2021-07-26 PROCEDURE — 85610 PROTHROMBIN TIME: CPT

## 2021-07-26 PROCEDURE — 36416 COLLJ CAPILLARY BLOOD SPEC: CPT

## 2021-07-26 PROCEDURE — 99213 OFFICE O/P EST LOW 20 MIN: CPT | Performed by: INTERNAL MEDICINE

## 2021-07-26 NOTE — PROGRESS NOTES
HPI: This is a 25 year old with Factor V Leiden with h/o PE on lifelong warfarin here for follow up.     From prior visit:   She has been having dizziness since she was diagnosed with PE last year March 2020. Notably her family including mother in law had COVID and her daughter had Kawasakis disease. Rocío was never tested for COVID around time of her PE diagnosis. She does carry heterozygote Factor V Leiden and anticardiolipin antibody. She has a history of pericardial effusion around time of pregnancy. No known h/o lupus but unclear if ever worked up. She gets some chest pain on the left side of her chest as well as exercise intolerance and SOB with exertion. She had a CTPE last fall that showed resolution of her PE. She also had ziopatch monitoring showing some PACs/PVCs and Mobitz Type I heart block. She has no syncope history. Lowest heart rates by monitor were in the 30s while she was sleeping. She denies LE edema, claudication. No weight gain or loss, fevers, chills, ns.      Echocardiogram images reviewed by me in the office with patient. She has redundant mitral valve chordae with prolapse. No significant MR noted. RV function appeared normal overall.     Rheumatology appointment in September - having some inflammatory symptoms. Daugher with Kawasakis disease. CMR reviewed and no COVID sequelae of violet or pericarditis.      ASSESSMENT/PLAN:     In summary this is a 25 year old female with Factor V Leiden, h/o bilateral PE in March 2020 and what sounds to me like possible undiagnosed COVID illness (never tested) initially here with ongoing AMADOR, dizziness and echocardiogram with redundant mitral valve chordae with prolapse, as well as chest pain, and ziopatch monitor showing Mobitz Type I which we discussed is benign and likely from high vagal tone with sleeping. We underwent CMR which was overall benign and no significant congenital disease or COVID sequelae.     Will plan on long term surveillance of  MVP with echocardiogram and then follow up with me in one year.      Debi Carrasco MD MSC  [facility]       PAST MEDICAL HISTORY  Past Medical History:   Diagnosis Date     Calculus of gallbladder with acute cholecystitis without obstruction 2017    Overview:  Added automatically from request for surgery [id number]     Chickenpox      Depression      Factor 5 Leiden mutation, heterozygous (H) 2018     Factor V Leiden (H)      History of foot fracture      History of frequent urinary tract infections      History of pyelonephritis      History of recurrent UTI (urinary tract infection)      Non-rheumatic mitral regurgitation, trace 2019    Seen on echocardiogram 2019. Recommended for follow up echo in 2-3 years.      Ovarian cyst      Postpartum depression     mild     Pulmonary emboli (H) 2020       CURRENT MEDICATIONS  Current Outpatient Medications   Medication Sig Dispense Refill     sertraline (ZOLOFT) 50 MG tablet Take 1 tablet (50 mg) by mouth daily 30 tablet 3     warfarin ANTICOAGULANT (JANTOVEN ANTICOAGULANT) 5 MG tablet TAKE TWO TABLETS (10MG) BY MOUTH EVERY Monday AND TAKE ONE AND ONE-HALF TABLETS (7.5MG) BY MOUTH ALL OTHER DAYS AS DIRECTED BY THE ANTICOAGULATION CLINIC 150 tablet 1       PAST SURGICAL HISTORY:  Past Surgical History:   Procedure Laterality Date      SECTION N/A 2020    Procedure:  SECTION;  Surgeon: Grace Dee MD;  Location: [facility] OR      SECTION      c section     ENT SURGERY       GYN SURGERY  Csections ()     HEAD & NECK SURGERY      hemangioma on neck at age 2     LAPAROSCOPIC CHOLECYSTECTOMY       OTHER SURGICAL HISTORY      Spinal Cord Surgery     SINUS FRONTAL OPEN OBLITERATION      2015       ALLERGIES   No Known Allergies    FAMILY HISTORY  Family History   Problem Relation Age of Onset     Stomach Problem Mother         ulcer     Bleeding Disorder Father         factor V     Ovarian Cancer  Maternal Grandmother      Other Cancer Maternal Grandmother      Thyroid Cancer Paternal Grandmother      Alzheimer Disease Paternal Grandfather      Bleeding Disorder Paternal Grandfather         factor V         SOCIAL HISTORY  Social History     Socioeconomic History     Marital status:      Spouse name: Not on file     Number of children: Not on file     Years of education: Not on file     Highest education level: Not on file   Occupational History     Not on file   Tobacco Use     Smoking status: Never Smoker     Smokeless tobacco: Never Used   Substance and Sexual Activity     Alcohol use: Yes     Comment:  rare     Drug use: No     Sexual activity: Yes     Partners: Male     Birth control/protection: Condom   Other Topics Concern     Parent/sibling w/ CABG, MI or angioplasty before 65F 55M? No   Social History Narrative    Lives in [place] with her , twin boys, parents and brother.     Social Determinants of Health     Financial Resource Strain:      Difficulty of Paying Living Expenses:    Food Insecurity:      Worried About Running Out of Food in the Last Year:      Ran Out of Food in the Last Year:    Transportation Needs:      Lack of Transportation (Medical):      Lack of Transportation (Non-Medical):    Physical Activity:      Days of Exercise per Week:      Minutes of Exercise per Session:    Stress:      Feeling of Stress :    Social Connections:      Frequency of Communication with Friends and Family:      Frequency of Social Gatherings with Friends and Family:      Attends Gnosticism Services:      Active Member of Clubs or Organizations:      Attends Club or Organization Meetings:      Marital Status:    Intimate Partner Violence:      Fear of Current or Ex-Partner:      Emotionally Abused:      Physically Abused:      Sexually Abused:        ROS:   Constitutional: No fever, chills, or sweats. No weight gain/loss   ENT: No visual disturbance, ear ache, epistaxis, sore  throat  Allergies/Immunologic: Negative  Respiratory: No cough, hemoptysia  Cardiovascular: As per HPI  GI: No nausea, vomiting, hematemesis, melena, or hematochezia  : No urinary frequency, dysuria, or hematuria  Integument: Negative  Psychiatric: Negative  Neuro: Negative  Endocrinology: Negative   Musculoskeletal: Negative  Vascular: No walking impairment, claudication, ischemic rest pain or nonhealing wounds    EXAM:  /75   Pulse 96   Wt 46.3 kg (102 lb)   BMI 19.27 kg/m    In general, the patient is a pleasant female in no apparent distress.    HEENT: NC/AT.  PERRLA.  EOMI.  Sclerae white, not injected.  Nares clear.  Pharynx without erythema or exudate.  Dentition intact.    Neck: No adenopathy.  No thyromegaly. Carotids +2/2 bilaterally without bruits.  No jugular venous distension.   Heart: RRR. Normal S1, S2 splits physiologically. 1+ systolic murmur, rub, click, or gallop.   Lungs: CTA.  No ronchi, wheezes, rales.    Abdomen: Soft, nontender, nondistended.   Extremities: No clubbing, cyanosis, or edema.    Vascular: No bruits are noted.    Labs:  LIPID RESULTS:  No results found for: CHOL, HDL, LDL, TRIG, CHOLHDLRATIO, NHDL    LIVER ENZYME RESULTS:  Lab Results   Component Value Date    AST 22 05/07/2021    ALT 20 05/07/2021       CBC RESULTS:  Lab Results   Component Value Date    WBC 7.1 06/01/2021    RBC 4.98 06/01/2021    HGB 14.3 06/01/2021    HCT 42.5 06/01/2021    MCV 85 06/01/2021    MCH 28.7 06/01/2021    MCHC 33.6 06/01/2021    RDW 12.3 06/01/2021     06/01/2021       BMP RESULTS:  Lab Results   Component Value Date     06/01/2021    POTASSIUM 3.9 06/01/2021    CHLORIDE 108 06/01/2021    CO2 27 06/01/2021    ANIONGAP 5 06/01/2021     (H) 06/01/2021    BUN 11 06/01/2021    CR 0.66 06/01/2021    GFRESTIMATED >90 06/01/2021    GFRESTBLACK >90 06/01/2021    LANA 8.7 06/01/2021        A1C RESULTS:  No results found for: A1C

## 2021-07-26 NOTE — PROGRESS NOTES
ANTICOAGULATION MANAGEMENT     Rocío Catherine 25 year old female is on warfarin with therapeutic INR result. (Goal INR 2.0-3.0)    Recent labs: (last 7 days)     07/26/21  1522   INR 2.3*       ASSESSMENT     Source(s): Patient/Caregiver Call       Warfarin doses taken: Warfarin taken as instructed    Diet: No new diet changes identified    New illness, injury, or hospitalization: No    Medication/supplement changes: None noted    Signs or symptoms of bleeding or clotting: No    Previous INR: Therapeutic last visit; previously outside of goal range    Additional findings: None     PLAN     Recommended plan for no diet, medication or health factor changes affecting INR     Dosing Instructions: Continue your current warfarin dose with next INR in 3 weeks       Summary  As of 7/26/2021    Full warfarin instructions:  10 mg every Mon; 7.5 mg all other days   Next INR check:               Telephone call with Rocío who verbalizes understanding and agrees to plan    Lab visit scheduled    Education provided: Please call back if any changes to your diet, medications or how you've been taking warfarin    Plan made per [facility] anticoagulation protocol    Chika Delaney RN  Anticoagulation Clinic  7/26/2021    _______________________________________________________________________     Anticoagulation Episode Summary     Current INR goal:  2.0-3.0   TTR:  51.7 % (1 y)   Target end date:  Indefinite   Send INR reminders to:  [facility]    Indications    Acute pulmonary embolism  unspecified pulmonary embolism type  unspecified whether acute cor pulmonale present (H) [I26.99]  Factor 5 Leiden mutation  heterozygous (H) [D68.51]  Other acute pulmonary embolism without acute cor pulmonale (H) (Resolved) [I26.99]  Acute pulmonary embolism  unspecified pulmonary embolism type  unspecified whether acute cor pulmonale present (H) (Resolved) [I26.99]  Antiphospholipid syndrome (H) [D68.61]           Comments:  *          Anticoagulation  Care Providers     Provider Role Specialty Phone number    Jesse Farnsworth MD Referring Family Medicine [phone]    Yessy Lyn APRN [facility] Referring Family Medicine [phone]

## 2021-07-26 NOTE — LETTER
7/26/2021    Yessy Lyn, APRN CNP  [address] [facility] [id number]    RE: Rocío GRISEL Florin       Dear Colleague,    I had the pleasure of seeing Rocío Catherine in the [facility].          HPI: This is a 25 year old with Factor V Leiden with h/o PE on lifelong warfarin here for follow up.     From prior visit:   She has been having dizziness since she was diagnosed with PE last year March 2020. Notably her family including mother in law had COVID and her daughter had Kawasakis disease. Rocío was never tested for COVID around time of her PE diagnosis. She does carry heterozygote Factor V Leiden and anticardiolipin antibody. She has a history of pericardial effusion around time of pregnancy. No known h/o lupus but unclear if ever worked up. She gets some chest pain on the left side of her chest as well as exercise intolerance and SOB with exertion. She had a CTPE last fall that showed resolution of her PE. She also had ziopatch monitoring showing some PACs/PVCs and Mobitz Type I heart block. She has no syncope history. Lowest heart rates by monitor were in the 30s while she was sleeping. She denies LE edema, claudication. No weight gain or loss, fevers, chills, ns.      Echocardiogram images reviewed by me in the office with patient. She has redundant mitral valve chordae with prolapse. No significant MR noted. RV function appeared normal overall.     Rheumatology appointment in September - having some inflammatory symptoms. Daugher with Kawasakis disease. CMR reviewed and no COVID sequelae of violet or pericarditis.      ASSESSMENT/PLAN:     In summary this is a 25 year old female with Factor V Leiden, h/o bilateral PE in March 2020 and what sounds to me like possible undiagnosed COVID illness (never tested) initially here with ongoing AMADOR, dizziness and echocardiogram with redundant mitral valve chordae with prolapse, as well as chest pain, and ziopatch  monitor showing Mobitz Type I which we discussed is benign and likely from high vagal tone with sleeping. We underwent CMR which was overall benign and no significant congenital disease or COVID sequelae.     Will plan on long term surveillance of MVP with echocardiogram and then follow up with me in one year.      Debi Carrasco MD MSC  [facility]       PAST MEDICAL HISTORY  Past Medical History:   Diagnosis Date     Calculus of gallbladder with acute cholecystitis without obstruction 2017    Overview:  Added automatically from request for surgery [id number]     Chickenpox      Depression      Factor 5 Leiden mutation, heterozygous (H) 2018     Factor V Leiden (H)      History of foot fracture      History of frequent urinary tract infections      History of pyelonephritis      History of recurrent UTI (urinary tract infection)      Non-rheumatic mitral regurgitation, trace 2019    Seen on echocardiogram 2019. Recommended for follow up echo in 2-3 years.      Ovarian cyst      Postpartum depression     mild     Pulmonary emboli (H) 2020       CURRENT MEDICATIONS  Current Outpatient Medications   Medication Sig Dispense Refill     sertraline (ZOLOFT) 50 MG tablet Take 1 tablet (50 mg) by mouth daily 30 tablet 3     warfarin ANTICOAGULANT (JANTOVEN ANTICOAGULANT) 5 MG tablet TAKE TWO TABLETS (10MG) BY MOUTH EVERY Monday AND TAKE ONE AND ONE-HALF TABLETS (7.5MG) BY MOUTH ALL OTHER DAYS AS DIRECTED BY THE ANTICOAGULATION CLINIC 150 tablet 1       PAST SURGICAL HISTORY:  Past Surgical History:   Procedure Laterality Date      SECTION N/A 2020    Procedure:  SECTION;  Surgeon: Grace Dee MD;  Location: [place] OR      SECTION      c section     ENT SURGERY       GYN SURGERY  Csections ()     HEAD & NECK SURGERY      hemangioma on neck at age 2     LAPAROSCOPIC CHOLECYSTECTOMY       OTHER SURGICAL HISTORY      Spinal Cord Surgery     SINUS  FRONTAL OPEN OBLITERATION      2015       ALLERGIES   No Known Allergies    FAMILY HISTORY  Family History   Problem Relation Age of Onset     Stomach Problem Mother         ulcer     Bleeding Disorder Father         factor V     Ovarian Cancer Maternal Grandmother      Other Cancer Maternal Grandmother      Thyroid Cancer Paternal Grandmother      Alzheimer Disease Paternal Grandfather      Bleeding Disorder Paternal Grandfather         factor V         SOCIAL HISTORY  Social History     Socioeconomic History     Marital status:      Spouse name: Not on file     Number of children: Not on file     Years of education: Not on file     Highest education level: Not on file   Occupational History     Not on file   Tobacco Use     Smoking status: Never Smoker     Smokeless tobacco: Never Used   Substance and Sexual Activity     Alcohol use: Yes     Comment:  rare     Drug use: No     Sexual activity: Yes     Partners: Male     Birth control/protection: Condom   Other Topics Concern     Parent/sibling w/ CABG, MI or angioplasty before 65F 55M? No   Social History Narrative    Lives in [place] with her , twin boys, parents and brother.     Social Determinants of Health     Financial Resource Strain:      Difficulty of Paying Living Expenses:    Food Insecurity:      Worried About Running Out of Food in the Last Year:      Ran Out of Food in the Last Year:    Transportation Needs:      Lack of Transportation (Medical):      Lack of Transportation (Non-Medical):    Physical Activity:      Days of Exercise per Week:      Minutes of Exercise per Session:    Stress:      Feeling of Stress :    Social Connections:      Frequency of Communication with Friends and Family:      Frequency of Social Gatherings with Friends and Family:      Attends [employer] Services:      Active Member of Clubs or Organizations:      Attends Club or Organization Meetings:      Marital Status:    Intimate Partner Violence:      Fear of  Current or Ex-Partner:      Emotionally Abused:      Physically Abused:      Sexually Abused:        ROS:   Constitutional: No fever, chills, or sweats. No weight gain/loss   ENT: No visual disturbance, ear ache, epistaxis, sore throat  Allergies/Immunologic: Negative  Respiratory: No cough, hemoptysia  Cardiovascular: As per HPI  GI: No nausea, vomiting, hematemesis, melena, or hematochezia  : No urinary frequency, dysuria, or hematuria  Integument: Negative  Psychiatric: Negative  Neuro: Negative  Endocrinology: Negative   Musculoskeletal: Negative  Vascular: No walking impairment, claudication, ischemic rest pain or nonhealing wounds    EXAM:  /75   Pulse 96   Wt 46.3 kg (102 lb)   BMI 19.27 kg/m    In general, the patient is a pleasant female in no apparent distress.    HEENT: NC/AT.  PERRLA.  EOMI.  Sclerae white, not injected.  Nares clear.  Pharynx without erythema or exudate.  Dentition intact.    Neck: No adenopathy.  No thyromegaly. Carotids +2/2 bilaterally without bruits.  No jugular venous distension.   Heart: RRR. Normal S1, S2 splits physiologically. 1+ systolic murmur, rub, click, or gallop.   Lungs: CTA.  No ronchi, wheezes, rales.    Abdomen: Soft, nontender, nondistended.   Extremities: No clubbing, cyanosis, or edema.    Vascular: No bruits are noted.    Labs:  LIPID RESULTS:  No results found for: CHOL, HDL, LDL, TRIG, CHOLHDLRATIO, NHDL    LIVER ENZYME RESULTS:  Lab Results   Component Value Date    AST 22 05/07/2021    ALT 20 05/07/2021       CBC RESULTS:  Lab Results   Component Value Date    WBC 7.1 06/01/2021    RBC 4.98 06/01/2021    HGB 14.3 06/01/2021    HCT 42.5 06/01/2021    MCV 85 06/01/2021    MCH 28.7 06/01/2021    MCHC 33.6 06/01/2021    RDW 12.3 06/01/2021     06/01/2021       BMP RESULTS:  Lab Results   Component Value Date     06/01/2021    POTASSIUM 3.9 06/01/2021    CHLORIDE 108 06/01/2021    CO2 27 06/01/2021    ANIONGAP 5 06/01/2021     (H)  06/01/2021    BUN 11 06/01/2021    CR 0.66 06/01/2021    GFRESTIMATED >90 06/01/2021    GFRESTBLACK >90 06/01/2021    LANA 8.7 06/01/2021        A1C RESULTS:  No results found for: A1C          Thank you for allowing me to participate in the care of your patient.      Sincerely,     Debi Carrasco MD     [facility]  cc:   Debi Carrasco MD  [address] [place] [id number]

## 2021-08-16 ENCOUNTER — ANTICOAGULATION THERAPY VISIT (OUTPATIENT)
Dept: ANTICOAGULATION | Facility: CLINIC | Age: 26
End: 2021-08-16

## 2021-08-16 ENCOUNTER — LAB (OUTPATIENT)
Dept: LAB | Facility: CLINIC | Age: 26
End: 2021-08-16
Payer: COMMERCIAL

## 2021-08-16 DIAGNOSIS — D68.51 FACTOR 5 LEIDEN MUTATION, HETEROZYGOUS (H): ICD-10-CM

## 2021-08-16 DIAGNOSIS — D68.61 ANTIPHOSPHOLIPID SYNDROME (H): ICD-10-CM

## 2021-08-16 DIAGNOSIS — I26.99 ACUTE PULMONARY EMBOLISM (H): ICD-10-CM

## 2021-08-16 DIAGNOSIS — I26.99 ACUTE PULMONARY EMBOLISM, UNSPECIFIED PULMONARY EMBOLISM TYPE, UNSPECIFIED WHETHER ACUTE COR PULMONALE PRESENT (H): Primary | ICD-10-CM

## 2021-08-16 LAB — INR BLD: 2.3 (ref 0.9–1.1)

## 2021-08-16 PROCEDURE — 85610 PROTHROMBIN TIME: CPT

## 2021-08-16 PROCEDURE — 36416 COLLJ CAPILLARY BLOOD SPEC: CPT

## 2021-08-26 NOTE — PROGRESS NOTES
Chief Complaint   Patient presents with     Sinusitis     history of sinus problems and surgery done in 2015- this summer she has constant nasal congestion- had recent sinus infection with oral antibiotic given and got good relief- today she has pressure above right eye ( was left side before) and the congestion     History of Present Illness   Rocío Catherine is a 25 year old female who presents for nose and sinus evaluation. The patient describes symptoms of facial pressure in the periorbital areas, nasal congestion, intermittent rhinorrhea and postnasal drainage for the past few weeks.  She had improvement on an oral antibiotic, she took it about a week before she had side effects.  She feels like the facial pressure is still there intermittently, she is still having some sinus congestion.  She currently denies any taste or smell disturbance.  She is had 1 prior nose and sinus surgery in 2015.  She recalls having allergy testing when she was very young but nothing recently.  She occasionally will take an antihistamine with symptoms without significant benefit.  She does have a history of migraine headache.     The patient underwent a maxillofacial CT on 6/1/2021.  My review of the CT shows postsurgical changes of bilateral maxillary antrostomies and ethmoidectomies.  The patient appears to have very posterior antrostomies bilaterally.  There is fairly significant sinus mucosal thickening without complete opacification of the left maxillary sinus.  There is complete opacification of the left ethmoid systems.  She has very hypoplastic frontal sinuses bilaterally.  The remainder the paranasal sinuses are without any evidence of acute or chronic inflammation.  The nasal septum is essentially midline.    Past Medical History  Patient Active Problem List   Diagnosis     Recurrent major depressive disorder, in remission (H)     Anxiety     Factor 5 Leiden mutation, heterozygous (H)     Sinus tachycardia     Non-rheumatic  mitral regurgitation, trace     Anemia     Dysthymia     Chronic migraine without aura without status migrainosus, not intractable     Palpitations     S/P      Acute pulmonary embolism (H)     Acute pulmonary embolism, unspecified pulmonary embolism type, unspecified whether acute cor pulmonale present (H)     Antiphospholipid syndrome (H)     Current Medications     Current Outpatient Medications:      budesonide (PULMICORT) 0.5 MG/2ML neb solution, Place 0.5 mg/2 mL budesonide vial in 8 oz normal saline sinus rinse bottle.  Irrigate each nostril with one half of the bottle twice daily., Disp: 360 mL, Rfl: 3     sertraline (ZOLOFT) 50 MG tablet, Take 1 tablet (50 mg) by mouth daily, Disp: 30 tablet, Rfl: 3     warfarin ANTICOAGULANT (JANTOVEN ANTICOAGULANT) 5 MG tablet, TAKE TWO TABLETS (10MG) BY MOUTH EVERY Monday AND TAKE ONE AND ONE-HALF TABLETS (7.5MG) BY MOUTH ALL OTHER DAYS AS DIRECTED BY THE ANTICOAGULATION CLINIC, Disp: 150 tablet, Rfl: 1    Current Facility-Administered Medications:      triamcinolone (KENALOG-40) injection 60 mg, 60 mg, Intramuscular, Once, Marc Hudson MD    Allergies  No Known Allergies    Social History   Social History     Socioeconomic History     Marital status:      Spouse name: Not on file     Number of children: Not on file     Years of education: Not on file     Highest education level: Not on file   Occupational History     Not on file   Tobacco Use     Smoking status: Never Smoker     Smokeless tobacco: Never Used   Substance and Sexual Activity     Alcohol use: Yes     Comment:  rare     Drug use: No     Sexual activity: Yes     Partners: Male     Birth control/protection: Condom   Other Topics Concern     Parent/sibling w/ CABG, MI or angioplasty before 65F 55M? No   Social History Narrative    Lives in [place] with her , twin boys, parents and brother.     Social Determinants of Health     Financial Resource Strain:      Difficulty of Paying Living  Expenses:    Food Insecurity:      Worried About Running Out of Food in the Last Year:      Ran Out of Food in the Last Year:    Transportation Needs:      Lack of Transportation (Medical):      Lack of Transportation (Non-Medical):    Physical Activity:      Days of Exercise per Week:      Minutes of Exercise per Session:    Stress:      Feeling of Stress :    Social Connections:      Frequency of Communication with Friends and Family:      Frequency of Social Gatherings with Friends and Family:      Attends Taoist Services:      Active Member of Clubs or Organizations:      Attends Club or Organization Meetings:      Marital Status:    Intimate Partner Violence:      Fear of Current or Ex-Partner:      Emotionally Abused:      Physically Abused:      Sexually Abused:        Family History  Family History   Problem Relation Age of Onset     Stomach Problem Mother         ulcer     Bleeding Disorder Father         factor V     Ovarian Cancer Maternal Grandmother      Other Cancer Maternal Grandmother      Thyroid Cancer Paternal Grandmother      Alzheimer Disease Paternal Grandfather      Bleeding Disorder Paternal Grandfather         factor V     Bleeding Disorder Daughter         Factor V Leiden       Review of Systems  As per HPI and PMHx, otherwise 10+ comprehensive system review is negative.    Physical Exam  /71 (BP Location: Right arm, Patient Position: Chair, Cuff Size: Adult Regular)   Pulse 90   Temp 98.4  F (36.9  C) (Tympanic)   Wt 47.2 kg (104 lb)   BMI 19.65 kg/m    GENERAL: Patient is a pleasant, cooperative 25 year old female in no acute distress.  HEAD: Normocephalic, atraumatic.  Hair and scalp are normal.  EYES: Pupils are equal, round, reactive to light and accommodation.  Extraocular movements are intact.  The sclera nonicteric without injection.  The extraocular structures are normal.  EARS: Normal shape and symmetry.  No tenderness when palpating the mastoid or tragal areas  bilaterally.  Otoscopic exam reveals a minimal amount of cerumen bilaterally.  The bilateral tympanic membranes are round, intact without evidence of effusion, good landmarks.  No retraction, granulation, or drainage.  NOSE: Nares are patent.  Nasal mucosa is pink and moist.  Nasal septum is midline.  Negative anterior rhinoscopy.  NEUROLOGIC: Cranial nerves II through XII are grossly intact.  Voice is strong.  Patient is House-Brackmann I/VI bilaterally.  CARDIOVASCULAR: Extremities are warm and well-perfused.  No significant peripheral edema.  RESPIRATORY: Patient has nonlabored breathing without cough, wheeze, stridor.  PSYCHIATRIC: Patient is alert and oriented.  Mood and affect appear normal.  SKIN: Warm and dry.  No scalp, face, or neck lesions noted.    Procedure: Flexible Nasal Endoscopy  Indication: Chronic sinusitis, recurrent acute sinusitis, history of sinus surgery    To best visualize the sinonasal anatomy and due to the chief complaint and HPI, I proceeded with flexible fiberoptic nasal endoscopy.  The bilateral nasal cavities were anesthetized and decongested with a mixture of lidocaine and neosynephrine.  The bilateral nasal cavities were then examined using flexible fiberoptic nasal endoscope.  The right nasal cavity a widely patent maxillary antrostomy and ethmoid cavity.  There is postsurgical changes of the inferior turbinate.  There is a slight amount of mucus recirculation in the right maxillary sinus.  No nasal cavity masses, polyps, or mucopurulence.  The left nasal cavity does have a small posteriorly oriented maxillary antrostomy with quite a bit of uncinate left. The left middle turbinate and middle meatus was normal in appearance.  The sphenoethmoid recess was clear.  The sinonasal mucosa appeared normal.  The nasal septum is essentially midline.  The nasopharynx had a normal appearance with normal Eustachian tube openings and fossa of Rosenmuller bilaterally. Minimal adenoid tissue.   The scope was removed.  The patient tolerated the procedure well.            Assessment and Plan     ICD-10-CM    1. Chronic sinusitis, unspecified location  J32.9 NASAL ENDOSCOPY, DIAGNOSTIC     budesonide (PULMICORT) 0.5 MG/2ML neb solution     triamcinolone (KENALOG-40) injection 60 mg     Adult Allergy/Asthma Referral   2. History of endoscopic sinus surgery  Z98.890 NASAL ENDOSCOPY, DIAGNOSTIC     budesonide (PULMICORT) 0.5 MG/2ML neb solution     triamcinolone (KENALOG-40) injection 60 mg     Adult Allergy/Asthma Referral   3. History of allergic rhinitis  Z87.09 Adult Allergy/Asthma Referral     It was my pleasure seeing Rocío Catherine today in clinic.  The patient presents with chronic sinusitis without nasal polyposis.  We discussed the pathophysiology of chronic sinusitis.  We discussed medical and surgical management.  I feel the patient would benefit from nasal saline irrigations with Budesonide.  We discussed proper nasal saline irrigation technique with Budesonide.  The patient would also benefit from an 60 mg intramuscular injection of Kenalog injection today in office.  We discussed the risks, benefits, options, goals of a intramuscular Kenalog injection today in office including, but not limited to: Risk of pain at injection site, risk of infection, side effects of systemic steroids including blood pressure problems, insulin resistance, weight gain, bone/joint issues, mood alteration.  Patient voiced understanding and is willing to proceed.    Given the patient's history, we will also pursue allergy evaluation.      The patient will return in 6 weeks for follow-up.  I will contact the patient 1-2 weeks prior to the return appointment to recheck their symptoms.  If symptoms are not improved, we will obtain a sinus CT and review the imaging at the return appointment.    Marc Hudson MD  Department of Otolarygology-Head and Neck Surgery  [facility]

## 2021-08-30 ENCOUNTER — OFFICE VISIT (OUTPATIENT)
Dept: OTOLARYNGOLOGY | Facility: CLINIC | Age: 26
End: 2021-08-30
Payer: COMMERCIAL

## 2021-08-30 VITALS
BODY MASS INDEX: 19.65 KG/M2 | TEMPERATURE: 98.4 F | HEART RATE: 90 BPM | WEIGHT: 104 LBS | SYSTOLIC BLOOD PRESSURE: 111 MMHG | DIASTOLIC BLOOD PRESSURE: 71 MMHG

## 2021-08-30 DIAGNOSIS — J32.9 CHRONIC SINUSITIS, UNSPECIFIED LOCATION: Primary | ICD-10-CM

## 2021-08-30 DIAGNOSIS — Z87.09 HISTORY OF ALLERGIC RHINITIS: ICD-10-CM

## 2021-08-30 DIAGNOSIS — Z98.890 HISTORY OF ENDOSCOPIC SINUS SURGERY: ICD-10-CM

## 2021-08-30 PROCEDURE — 31231 NASAL ENDOSCOPY DX: CPT | Performed by: OTOLARYNGOLOGY

## 2021-08-30 PROCEDURE — 99204 OFFICE O/P NEW MOD 45 MIN: CPT | Mod: 25 | Performed by: OTOLARYNGOLOGY

## 2021-08-30 RX ORDER — BUDESONIDE 0.5 MG/2ML
INHALANT ORAL
Qty: 360 ML | Refills: 3 | Status: SHIPPED | OUTPATIENT
Start: 2021-08-30 | End: 2024-07-12

## 2021-08-30 RX ORDER — TRIAMCINOLONE ACETONIDE 40 MG/ML
60 INJECTION, SUSPENSION INTRA-ARTICULAR; INTRAMUSCULAR ONCE
Status: COMPLETED | OUTPATIENT
Start: 2021-08-30 | End: 2021-08-30

## 2021-08-30 RX ADMIN — TRIAMCINOLONE ACETONIDE 60 MG: 40 INJECTION, SUSPENSION INTRA-ARTICULAR; INTRAMUSCULAR at 17:04

## 2021-08-30 ASSESSMENT — PAIN SCALES - GENERAL: PAINLEVEL: MILD PAIN (2)

## 2021-08-30 NOTE — PATIENT INSTRUCTIONS
BUDESONIDE IRRIGATION INSTRUCTIONS    You will be starting Budesonide nasal irrigations and will need to obtain the following:      - NeilMed Sinus Rinse 8 oz Kit  - Distilled or filtered water   - Normal saline salt packets  - Budesonide medication     Place filtered or distilled water into the NeilMed bottle up to the fill line (DO NOT USE TAP OR WELL WATER).  Place the pre-made salt packet in the 8 oz of saline.  Then placed the budesonide medication into the bottle.  Shake the bottle to suspend into solution.  Lean head forward over a sink or a basin.  Rinse each side of the nose with one-half of the bottle (each squeeze is about one-half of the bottle). Rinse the nose twice daily.     You will follow up with your ENT surgeon in 8-12 weeks to recheck your symptoms.  If you are having problems with your irrigations or problems obtaining the medication, please contact your ENT surgeon.    Video example: [email or web address]

## 2021-08-30 NOTE — LETTER
8/30/2021         RE: Rocío Catherine  [id number] [facility] [id number]        Dear Colleague,    Thank you for referring your patient, Rocío Catherine, to the [facility]. Please see a copy of my visit note below.    Chief Complaint   Patient presents with     Sinusitis     history of sinus problems and surgery done in 2015- this summer she has constant nasal congestion- had recent sinus infection with oral antibiotic given and got good relief- today she has pressure above right eye ( was left side before) and the congestion     History of Present Illness   Rocío Catherine is a 25 year old female who presents for nose and sinus evaluation. The patient describes symptoms of facial pressure in the periorbital areas, nasal congestion, intermittent rhinorrhea and postnasal drainage for the past few weeks.  She had improvement on an oral antibiotic, she took it about a week before she had side effects.  She feels like the facial pressure is still there intermittently, she is still having some sinus congestion.  She currently denies any taste or smell disturbance.  She is had 1 prior nose and sinus surgery in 2015.  She recalls having allergy testing when she was very young but nothing recently.  She occasionally will take an antihistamine with symptoms without significant benefit.  She does have a history of migraine headache.     The patient underwent a maxillofacial CT on 6/1/2021.  My review of the CT shows postsurgical changes of bilateral maxillary antrostomies and ethmoidectomies.  The patient appears to have very posterior antrostomies bilaterally.  There is fairly significant sinus mucosal thickening without complete opacification of the left maxillary sinus.  There is complete opacification of the left ethmoid systems.  She has very hypoplastic frontal sinuses bilaterally.  The remainder the paranasal sinuses are without any evidence of acute or chronic inflammation.  The nasal septum is  essentially midline.    Past Medical History  Patient Active Problem List   Diagnosis     Recurrent major depressive disorder, in remission (H)     Anxiety     Factor 5 Leiden mutation, heterozygous (H)     Sinus tachycardia     Non-rheumatic mitral regurgitation, trace     Anemia     Dysthymia     Chronic migraine without aura without status migrainosus, not intractable     Palpitations     S/P      Acute pulmonary embolism (H)     Acute pulmonary embolism, unspecified pulmonary embolism type, unspecified whether acute cor pulmonale present (H)     Antiphospholipid syndrome (H)     Current Medications     Current Outpatient Medications:      budesonide (PULMICORT) 0.5 MG/2ML neb solution, Place 0.5 mg/2 mL budesonide vial in 8 oz normal saline sinus rinse bottle.  Irrigate each nostril with one half of the bottle twice daily., Disp: 360 mL, Rfl: 3     sertraline (ZOLOFT) 50 MG tablet, Take 1 tablet (50 mg) by mouth daily, Disp: 30 tablet, Rfl: 3     warfarin ANTICOAGULANT (JANTOVEN ANTICOAGULANT) 5 MG tablet, TAKE TWO TABLETS (10MG) BY MOUTH EVERY Monday AND TAKE ONE AND ONE-HALF TABLETS (7.5MG) BY MOUTH ALL OTHER DAYS AS DIRECTED BY THE ANTICOAGULATION CLINIC, Disp: 150 tablet, Rfl: 1    Current Facility-Administered Medications:      triamcinolone (KENALOG-40) injection 60 mg, 60 mg, Intramuscular, Once, Marc Hudson MD    Allergies  No Known Allergies    Social History   Social History     Socioeconomic History     Marital status:      Spouse name: Not on file     Number of children: Not on file     Years of education: Not on file     Highest education level: Not on file   Occupational History     Not on file   Tobacco Use     Smoking status: Never Smoker     Smokeless tobacco: Never Used   Substance and Sexual Activity     Alcohol use: Yes     Comment:  rare     Drug use: No     Sexual activity: Yes     Partners: Male     Birth control/protection: Condom   Other Topics Concern      Parent/sibling w/ CABG, MI or angioplasty before 65F 55M? No   Social History Narrative    Lives in [place] with her , twin boys, parents and brother.     Social Determinants of Health     Financial Resource Strain:      Difficulty of Paying Living Expenses:    Food Insecurity:      Worried About Running Out of Food in the Last Year:      Ran Out of Food in the Last Year:    Transportation Needs:      Lack of Transportation (Medical):      Lack of Transportation (Non-Medical):    Physical Activity:      Days of Exercise per Week:      Minutes of Exercise per Session:    Stress:      Feeling of Stress :    Social Connections:      Frequency of Communication with Friends and Family:      Frequency of Social Gatherings with Friends and Family:      Attends Taoist Services:      Active Member of Clubs or Organizations:      Attends Club or Organization Meetings:      Marital Status:    Intimate Partner Violence:      Fear of Current or Ex-Partner:      Emotionally Abused:      Physically Abused:      Sexually Abused:        Family History  Family History   Problem Relation Age of Onset     Stomach Problem Mother         ulcer     Bleeding Disorder Father         factor V     Ovarian Cancer Maternal Grandmother      Other Cancer Maternal Grandmother      Thyroid Cancer Paternal Grandmother      Alzheimer Disease Paternal Grandfather      Bleeding Disorder Paternal Grandfather         factor V     Bleeding Disorder Daughter         Factor V Leiden       Review of Systems  As per HPI and PMHx, otherwise 10+ comprehensive system review is negative.    Physical Exam  /71 (BP Location: Right arm, Patient Position: Chair, Cuff Size: Adult Regular)   Pulse 90   Temp 98.4  F (36.9  C) (Tympanic)   Wt 47.2 kg (104 lb)   BMI 19.65 kg/m    GENERAL: Patient is a pleasant, cooperative 25 year old female in no acute distress.  HEAD: Normocephalic, atraumatic.  Hair and scalp are normal.  EYES: Pupils are equal,  round, reactive to light and accommodation.  Extraocular movements are intact.  The sclera nonicteric without injection.  The extraocular structures are normal.  EARS: Normal shape and symmetry.  No tenderness when palpating the mastoid or tragal areas bilaterally.  Otoscopic exam reveals a minimal amount of cerumen bilaterally.  The bilateral tympanic membranes are round, intact without evidence of effusion, good landmarks.  No retraction, granulation, or drainage.  NOSE: Nares are patent.  Nasal mucosa is pink and moist.  Nasal septum is midline.  Negative anterior rhinoscopy.  NEUROLOGIC: Cranial nerves II through XII are grossly intact.  Voice is strong.  Patient is House-Brackmann I/VI bilaterally.  CARDIOVASCULAR: Extremities are warm and well-perfused.  No significant peripheral edema.  RESPIRATORY: Patient has nonlabored breathing without cough, wheeze, stridor.  PSYCHIATRIC: Patient is alert and oriented.  Mood and affect appear normal.  SKIN: Warm and dry.  No scalp, face, or neck lesions noted.    Procedure: Flexible Nasal Endoscopy  Indication: Chronic sinusitis, recurrent acute sinusitis, history of sinus surgery    To best visualize the sinonasal anatomy and due to the chief complaint and HPI, I proceeded with flexible fiberoptic nasal endoscopy.  The bilateral nasal cavities were anesthetized and decongested with a mixture of lidocaine and neosynephrine.  The bilateral nasal cavities were then examined using flexible fiberoptic nasal endoscope.  The right nasal cavity a widely patent maxillary antrostomy and ethmoid cavity.  There is postsurgical changes of the inferior turbinate.  There is a slight amount of mucus recirculation in the right maxillary sinus.  No nasal cavity masses, polyps, or mucopurulence.  The left nasal cavity does have a small posteriorly oriented maxillary antrostomy with quite a bit of uncinate left. The left middle turbinate and middle meatus was normal in appearance.  The  sphenoethmoid recess was clear.  The sinonasal mucosa appeared normal.  The nasal septum is essentially midline.  The nasopharynx had a normal appearance with normal Eustachian tube openings and fossa of Rosenmuller bilaterally. Minimal adenoid tissue.  The scope was removed.  The patient tolerated the procedure well.            Assessment and Plan     ICD-10-CM    1. Chronic sinusitis, unspecified location  J32.9 NASAL ENDOSCOPY, DIAGNOSTIC     budesonide (PULMICORT) 0.5 MG/2ML neb solution     triamcinolone (KENALOG-40) injection 60 mg     Adult Allergy/Asthma Referral   2. History of endoscopic sinus surgery  Z98.890 NASAL ENDOSCOPY, DIAGNOSTIC     budesonide (PULMICORT) 0.5 MG/2ML neb solution     triamcinolone (KENALOG-40) injection 60 mg     Adult Allergy/Asthma Referral   3. History of allergic rhinitis  Z87.09 Adult Allergy/Asthma Referral     It was my pleasure seeing Rocío Catherine today in clinic.  The patient presents with chronic sinusitis without nasal polyposis.  We discussed the pathophysiology of chronic sinusitis.  We discussed medical and surgical management.  I feel the patient would benefit from nasal saline irrigations with Budesonide.  We discussed proper nasal saline irrigation technique with Budesonide.  The patient would also benefit from an 60 mg intramuscular injection of Kenalog injection today in office.  We discussed the risks, benefits, options, goals of a intramuscular Kenalog injection today in office including, but not limited to: Risk of pain at injection site, risk of infection, side effects of systemic steroids including blood pressure problems, insulin resistance, weight gain, bone/joint issues, mood alteration.  Patient voiced understanding and is willing to proceed.    Given the patient's history, we will also pursue allergy evaluation.      The patient will return in 6 weeks for follow-up.  I will contact the patient 1-2 weeks prior to the return appointment to recheck their  symptoms.  If symptoms are not improved, we will obtain a sinus CT and review the imaging at the return appointment.    Marc Hudson MD  Department of Otolarygology-Head and Neck Surgery  [facility]         Again, thank you for allowing me to participate in the care of your patient.        Sincerely,        Marc Hudson MD

## 2021-08-30 NOTE — NURSING NOTE
"Initial /71 (BP Location: Right arm, Patient Position: Chair, Cuff Size: Adult Regular)   Pulse 90   Temp 98.4  F (36.9  C) (Tympanic)   Wt 47.2 kg (104 lb)   BMI 19.65 kg/m   Estimated body mass index is 19.65 kg/m  as calculated from the following:    Height as of 6/25/21: 1.549 m (5' 1\").    Weight as of this encounter: 47.2 kg (104 lb). .    Leydi Stapleton LPN    "

## 2021-09-13 ENCOUNTER — ANTICOAGULATION THERAPY VISIT (OUTPATIENT)
Dept: ANTICOAGULATION | Facility: CLINIC | Age: 26
End: 2021-09-13

## 2021-09-13 ENCOUNTER — TELEPHONE (OUTPATIENT)
Dept: FAMILY MEDICINE | Facility: CLINIC | Age: 26
End: 2021-09-13

## 2021-09-13 ENCOUNTER — LAB (OUTPATIENT)
Dept: LAB | Facility: CLINIC | Age: 26
End: 2021-09-13
Payer: COMMERCIAL

## 2021-09-13 DIAGNOSIS — I26.99 ACUTE PULMONARY EMBOLISM, UNSPECIFIED PULMONARY EMBOLISM TYPE, UNSPECIFIED WHETHER ACUTE COR PULMONALE PRESENT (H): Primary | ICD-10-CM

## 2021-09-13 DIAGNOSIS — I26.99 ACUTE PULMONARY EMBOLISM (H): ICD-10-CM

## 2021-09-13 DIAGNOSIS — D68.51 FACTOR 5 LEIDEN MUTATION, HETEROZYGOUS (H): ICD-10-CM

## 2021-09-13 DIAGNOSIS — D68.61 ANTIPHOSPHOLIPID SYNDROME (H): ICD-10-CM

## 2021-09-13 LAB — INR BLD: 1.5 (ref 0.9–1.1)

## 2021-09-13 PROCEDURE — 85610 PROTHROMBIN TIME: CPT

## 2021-09-13 PROCEDURE — 36416 COLLJ CAPILLARY BLOOD SPEC: CPT

## 2021-09-13 NOTE — TELEPHONE ENCOUNTER
Anticoagulation Management    Discussed INR home monitoring program with Rocío Catherine reviewing:      Elibigility requirements: >= 3 months of anticoagulation therapy, indication for chronic anticoagulation and order from provider    Required testing frequency (q1-2 weeks)    Home meters, testing supplies, meter training, and reporting of INR results done through an outside company. Patient would be contacted by home monitoring company to review insurance coverage with home monitoring company prior to enrolling.    [facility] would continue to receive and manage INR results.    Home monitoring application may take several weeks and must continue to follow up with recommended INR monitoring in clinic until receives monitor and training completed.     Home monitoring terms reviewed with patient      Patient agrees to frequency of testing as directed by referring provider ( weekly or biweekly) Yes    Testing to be performed during business hours of [facility] Yes    Patient agrees they have the skill (or a designated caregiver) necessary to perform the self test Yes    Patient agrees to report all INR results to INR home monitoring company Yes    Patient agrees to have additional INR test in clinic if a home result is critical Yes    Patient agrees to schedule an INR test at a [facility] clinic yearly for technique observation and quality check of INR results with their home meter Yes    Patient agrees to use a [facility] approved service provider and device for home monitoring Yes    [facility]    Referring provider: SNOW Riley CNP    Referring providers Clinic Fax number [phone]    Rocío Catherine is interested home INR monitoring and requests order be submitted.

## 2021-09-13 NOTE — PROGRESS NOTES
ANTICOAGULATION MANAGEMENT     Rocío Catherine 25 year old female is on warfarin with subtherapeutic INR result. (Goal INR 2.0-3.0)    Recent labs: (last 7 days)     09/13/21  1516   INR 1.5*       ASSESSMENT     Source(s): Chart Review and Patient/Caregiver Call       Warfarin doses taken: Missed dose(s) may be affecting INR    Diet: Change in alcohol intake may be affecting INR. patient states over the weekend she did have a little more alcohol than usual     New illness, injury, or hospitalization: Yes: patient saw ENT for sinusitis and will also be seeing allergy for some testing next week    Medication/supplement changes: Patient had a 1 time IM injection of kenalog 2 weeks ago, which can raise the INR. Patient is doing budesonide nasal irrigations (no interaction)    Signs or symptoms of bleeding or clotting: No    Previous INR: Therapeutic last 2(+) visits    Additional findings: Patient also interested in a home meter, will route TE with application, discussed requirements and process with patient.     PLAN     Recommended plan for temporary change(s) affecting INR     Dosing Instructions: Booster dose then continue your current warfarin dose with next INR in 10 days       Summary  As of 9/13/2021    Full warfarin instructions:  9/13: 15 mg; Otherwise 10 mg every Mon; 7.5 mg all other days   Next INR check:  9/24/2021             Telephone call with Rocío who verbalizes understanding and agrees to plan    Lab visit scheduled    Education provided: Potential interaction between warfarin and alcohol, Importance of taking warfarin as instructed, Monitoring for clotting signs and symptoms, When to seek medical attention/emergency care and Contact [phone]  with any changes, questions or concerns.     Plan made per [facility] anticoagulation protocol    Sarah Felipe, RN  Anticoagulation Clinic  9/13/2021    _______________________________________________________________________     Anticoagulation Episode Summary      Current INR goal:  2.0-3.0   TTR:  52.2 % (1 y)   Target end date:  Indefinite   Send INR reminders to:  JENNIFER KOENIG    Indications    Acute pulmonary embolism  unspecified pulmonary embolism type  unspecified whether acute cor pulmonale present (H) [I26.99]  Factor 5 Leiden mutation  heterozygous (H) [D68.51]  Other acute pulmonary embolism without acute cor pulmonale (H) (Resolved) [I26.99]  Acute pulmonary embolism  unspecified pulmonary embolism type  unspecified whether acute cor pulmonale present (H) (Resolved) [I26.99]  Antiphospholipid syndrome (H) [D68.61]           Comments:  *          Anticoagulation Care Providers     Provider Role Specialty Phone number    Jesse Farnsworth MD Referring Family Medicine [phone]    Yessy Lyn APRN CNP Referring Family Medicine [phone]

## 2021-09-29 ENCOUNTER — LAB (OUTPATIENT)
Dept: LAB | Facility: CLINIC | Age: 26
End: 2021-09-29
Payer: COMMERCIAL

## 2021-09-29 ENCOUNTER — ANTICOAGULATION THERAPY VISIT (OUTPATIENT)
Dept: ANTICOAGULATION | Facility: CLINIC | Age: 26
End: 2021-09-29

## 2021-09-29 DIAGNOSIS — D68.51 FACTOR 5 LEIDEN MUTATION, HETEROZYGOUS (H): ICD-10-CM

## 2021-09-29 DIAGNOSIS — I26.99 ACUTE PULMONARY EMBOLISM, UNSPECIFIED PULMONARY EMBOLISM TYPE, UNSPECIFIED WHETHER ACUTE COR PULMONALE PRESENT (H): Primary | ICD-10-CM

## 2021-09-29 DIAGNOSIS — D68.61 ANTIPHOSPHOLIPID SYNDROME (H): ICD-10-CM

## 2021-09-29 DIAGNOSIS — I26.99 ACUTE PULMONARY EMBOLISM (H): ICD-10-CM

## 2021-09-29 LAB — INR BLD: 3.6 (ref 0.9–1.1)

## 2021-09-29 PROCEDURE — 85610 PROTHROMBIN TIME: CPT

## 2021-09-29 PROCEDURE — 36416 COLLJ CAPILLARY BLOOD SPEC: CPT

## 2021-09-29 NOTE — PROGRESS NOTES
ANTICOAGULATION MANAGEMENT     Rocío Catherine 25 year old female is on warfarin with supratherapeutic INR result. (Goal INR 2.0-3.0)    Recent labs: (last 7 days)     09/29/21  1445   INR 3.6*       ASSESSMENT     Source(s): Chart Review and Patient/Caregiver Call       Warfarin doses taken: Warfarin taken as instructed    Diet: No new diet changes identified    New illness, injury, or hospitalization: Yes: sinus symptoms, possibly sinus infection, and seasonal allergies    Medication/supplement changes: None noted    Signs or symptoms of bleeding or clotting: No    Previous INR: Subtherapeutic due to missed dose    Additional findings: seeing ENT on 10-11     PLAN     Recommended plan for temporary change(s) affecting INR     Dosing Instructions: Partial hold then continue your current warfarin dose with next INR in 10 days       Summary  As of 9/29/2021    Full warfarin instructions:  9/29: 2.5 mg; Otherwise 10 mg every Mon; 7.5 mg all other days   Next INR check:  10/11/2021             Telephone call with Rocío who verbalizes understanding and agrees to plan    Lab visit scheduled    Education provided: Please call back if any changes to your diet, medications or how you've been taking warfarin and Contact [phone]  with any changes, questions or concerns.     Plan made per [facility] anticoagulation protocol    Shelby Samaniego RN  Anticoagulation Clinic  9/29/2021    _______________________________________________________________________     Anticoagulation Episode Summary     Current INR goal:  2.0-3.0   TTR:  54.3 % (1 y)   Target end date:  Indefinite   Send INR reminders to:  JENNIFER KOENIG    Indications    Acute pulmonary embolism  unspecified pulmonary embolism type  unspecified whether acute cor pulmonale present (H) [I26.99]  Factor 5 Leiden mutation  heterozygous (H) [D68.51]  Other acute pulmonary embolism without acute cor pulmonale (H) (Resolved) [I26.99]  Acute pulmonary embolism  unspecified  pulmonary embolism type  unspecified whether acute cor pulmonale present (H) (Resolved) [I26.99]  Antiphospholipid syndrome (H) [D68.61]           Comments:  *          Anticoagulation Care Providers     Provider Role Specialty Phone number    Jesse Farnsworth MD Referring Family Medicine [phone]    Yessy Lyn APRN CNP Referring Family Medicine [phone]

## 2021-09-30 ENCOUNTER — LAB (OUTPATIENT)
Dept: LAB | Facility: CLINIC | Age: 26
End: 2021-09-30
Payer: COMMERCIAL

## 2021-09-30 ENCOUNTER — MEDICAL CORRESPONDENCE (OUTPATIENT)
Dept: HEALTH INFORMATION MANAGEMENT | Facility: CLINIC | Age: 26
End: 2021-09-30

## 2021-09-30 ENCOUNTER — OFFICE VISIT (OUTPATIENT)
Dept: RHEUMATOLOGY | Facility: CLINIC | Age: 26
End: 2021-09-30
Attending: INTERNAL MEDICINE
Payer: COMMERCIAL

## 2021-09-30 VITALS
WEIGHT: 102 LBS | BODY MASS INDEX: 19.27 KG/M2 | HEART RATE: 100 BPM | SYSTOLIC BLOOD PRESSURE: 112 MMHG | DIASTOLIC BLOOD PRESSURE: 76 MMHG

## 2021-09-30 DIAGNOSIS — D68.51 FACTOR 5 LEIDEN MUTATION, HETEROZYGOUS (H): ICD-10-CM

## 2021-09-30 DIAGNOSIS — D68.61 ANTIPHOSPHOLIPID SYNDROME (H): ICD-10-CM

## 2021-09-30 DIAGNOSIS — D68.61 ANTIPHOSPHOLIPID SYNDROME (H): Primary | ICD-10-CM

## 2021-09-30 LAB
BASOPHILS # BLD AUTO: 0 10E3/UL (ref 0–0.2)
BASOPHILS NFR BLD AUTO: 0 %
EOSINOPHIL # BLD AUTO: 0.2 10E3/UL (ref 0–0.7)
EOSINOPHIL NFR BLD AUTO: 3 %
ERYTHROCYTE [DISTWIDTH] IN BLOOD BY AUTOMATED COUNT: 12.6 % (ref 10–15)
HCT VFR BLD AUTO: 41.9 % (ref 35–47)
HGB BLD-MCNC: 14.3 G/DL (ref 11.7–15.7)
IMM GRANULOCYTES # BLD: 0 10E3/UL
IMM GRANULOCYTES NFR BLD: 0 %
LYMPHOCYTES # BLD AUTO: 1.4 10E3/UL (ref 0.8–5.3)
LYMPHOCYTES NFR BLD AUTO: 19 %
MCH RBC QN AUTO: 29.5 PG (ref 26.5–33)
MCHC RBC AUTO-ENTMCNC: 34.1 G/DL (ref 31.5–36.5)
MCV RBC AUTO: 87 FL (ref 78–100)
MONOCYTES # BLD AUTO: 0.7 10E3/UL (ref 0–1.3)
MONOCYTES NFR BLD AUTO: 9 %
NEUTROPHILS # BLD AUTO: 5.1 10E3/UL (ref 1.6–8.3)
NEUTROPHILS NFR BLD AUTO: 68 %
PLATELET # BLD AUTO: 187 10E3/UL (ref 150–450)
RBC # BLD AUTO: 4.84 10E6/UL (ref 3.8–5.2)
WBC # BLD AUTO: 7.5 10E3/UL (ref 4–11)

## 2021-09-30 PROCEDURE — 86038 ANTINUCLEAR ANTIBODIES: CPT

## 2021-09-30 PROCEDURE — 99204 OFFICE O/P NEW MOD 45 MIN: CPT | Performed by: INTERNAL MEDICINE

## 2021-09-30 PROCEDURE — 36415 COLL VENOUS BLD VENIPUNCTURE: CPT

## 2021-09-30 PROCEDURE — 85025 COMPLETE CBC W/AUTO DIFF WBC: CPT

## 2021-09-30 RX ORDER — HYDROXYZINE HYDROCHLORIDE 25 MG/1
TABLET, FILM COATED ORAL
COMMUNITY
Start: 2021-08-02

## 2021-09-30 NOTE — PROGRESS NOTES
This document was created using a software with less than 100% fidelity, at times resulting in unintended, even erroneous syntax and grammar.  The reader is advised to keep this under consideration while reviewing, interpreting this note.      Rheumatology Consult Note      Rocío Catherine     YOB: 1995 Age: 25 year old    Date of visit: 9/30/21    PCP: Yessy Lyn    Chief Complaint   Patient presents with:  Consult      Assessment and Plan     Rocío was seen today for consult.    Diagnoses and all orders for this visit:    Antiphospholipid syndrome (H)  -     Anti Nuclear Karen IgG by IFA with Reflex; Future  -     CBC with Platelets & Differential; Future    Factor 5 Leiden mutation, heterozygous (H)           This patient with history of pulmonary embolism in March 2020 has been found to have 2 sets of cardiolipin antibodies positive across 12 weeks.  Beta-2 glycoprotein positive when checked once in May of this year.  She is aware that she is going to be on lifelong anticoagulation.  She plans no further children, we did however discuss that should she plan to have further children Coumadin would not be a suitable drug while she is pregnant and she will be on parenteral subcutaneous heparin analog.  The likelihood that she has an overlapping connective tissue disease such as SLE will be one of the key questions, appears to be unlikely.  Literature on antiphospholipid syndrome and lupus are provided.  Further work-up as noted.  We will meet here in the next 3 to 4 months or sooner.    Return to clinic: 4 months      HPI   Rocío Catherine is a 25 year old female  is seen today for evaluation of antiphospholipid syndrome.  This was discovered after she developed pulmonary embolism this was she recalls March 2020.  On January 29 she had cardiolipin antibodies checked and came back positive, IgM and again on May 19 at the same were positive.  This time however beta-2 glycoprotein antibodies were  checked, IgG were negative and IgM were positive.  And I am unable to find out beta-2 glycoprotein from the original January labs.  She is also known to be factor V Leiden mutation heterozygous, and as she found out her daughter is 2..  She reports no history of rash such as livedo reticularis.  She has not had Raynaud's.  She recalls that her mom however has phenomenon where her left middle finger turns pale white in cold weather.  She has osteoarthritis and no history of lupus rheumatoid arthritis in the family.  She reports no history of rash, photosensitivity, mouth ulcers.  She has not had pleuritic symptoms.  She had mild discomfort in the chest when the original PE was found.  She recalls that DVT was checked for in the lower extremities and ruled out.  She has not had seizure disorder.  No issues with kidneys that she is aware of.  She has had 3 pregnancies.  The first 1 resulted in miscarriage, second pregnancy were 2 boys twins, and the third most recently was a daughter.  The deliveries were  in view of breech for one of the twins.  Otherwise the pregnancies were uneventful.  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  She describes herself otherwise in good health.  She has not had any joint pains.  There is no sense of stiffness in the morning.  In  she had a neck tumor, benign removed.  Her aunt has also colitis, this is her maternal aunt.         Active Problem List     Patient Active Problem List   Diagnosis     Recurrent major depressive disorder, in remission (H)     Anxiety     Factor 5 Leiden mutation, heterozygous (H)     Sinus tachycardia     Non-rheumatic mitral regurgitation, trace     Anemia     Dysthymia     Chronic migraine without aura without status migrainosus, not intractable     Palpitations     S/P      Acute pulmonary embolism (H)     Acute pulmonary embolism, unspecified pulmonary embolism type, unspecified whether acute cor pulmonale present (H)      Antiphospholipid syndrome (H)     Past Medical History     Past Medical History:   Diagnosis Date     Calculus of gallbladder with acute cholecystitis without obstruction 2017    Overview:  Added automatically from request for surgery [id number]     Chickenpox      Depression      Factor 5 Leiden mutation, heterozygous (H) 2018     Factor V Leiden (H)      History of foot fracture      History of frequent urinary tract infections      History of pyelonephritis      History of recurrent UTI (urinary tract infection)      Non-rheumatic mitral regurgitation, trace 2019    Seen on echocardiogram 2019. Recommended for follow up echo in 2-3 years.      Ovarian cyst      Postpartum depression     mild     Pulmonary emboli (H) 2020     Past Surgical History     Past Surgical History:   Procedure Laterality Date      SECTION N/A 2020    Procedure:  SECTION;  Surgeon: Grace Dee MD;  Location: [facility] OR      SECTION      c section     ENT SURGERY       GYN SURGERY  Csections ()     HEAD & NECK SURGERY      hemangioma on neck at age 2     LAPAROSCOPIC CHOLECYSTECTOMY       OTHER SURGICAL HISTORY      Spinal Cord Surgery     SINUS FRONTAL OPEN OBLITERATION           Allergy   No Known Allergies  Current Medication List     Current Outpatient Medications   Medication Sig     budesonide (PULMICORT) 0.5 MG/2ML neb solution Place 0.5 mg/2 mL budesonide vial in 8 oz normal saline sinus rinse bottle.  Irrigate each nostril with one half of the bottle twice daily.     hydrOXYzine (ATARAX) 25 MG tablet      warfarin ANTICOAGULANT (JANTOVEN ANTICOAGULANT) 5 MG tablet TAKE TWO TABLETS (10MG) BY MOUTH EVERY Monday AND TAKE ONE AND ONE-HALF TABLETS (7.5MG) BY MOUTH ALL OTHER DAYS AS DIRECTED BY THE ANTICOAGULATION CLINIC     sertraline (ZOLOFT) 50 MG tablet Take 1 tablet (50 mg) by mouth daily (Patient taking differently: Take 50 mg by mouth daily 1.5 tablets daily.  Unsure of dose.)     No current facility-administered medications for this visit.            Family History     Family History   Problem Relation Age of Onset     Stomach Problem Mother         ulcer     Bleeding Disorder Father         factor V     Ovarian Cancer Maternal Grandmother      Other Cancer Maternal Grandmother      Thyroid Cancer Paternal Grandmother      Alzheimer Disease Paternal Grandfather      Bleeding Disorder Paternal Grandfather         factor V     Bleeding Disorder Daughter         Factor V Leiden         Physical Exam     COMPREHENSIVE EXAMINATION:  Vitals:    09/30/21 1430   BP: 112/76   Pulse: 100   Weight: 46.3 kg (102 lb)     A well appearing alert oriented female. Vital data as noted above. Her eyes examined for inflammation/scleromalacia. ENT examined for oral mucositis, moisture, thrush, nasal deformity, external ear redness, deformity. Her neck is examined for suppleness and lymphadenopathy.  Cardiopulmonary examination without dyspnea at rest, use of accessory muscles of breathing, wheezing, edema, peripheral or central cyanosis.  Abdomen examined for softness, tenderness, obvious organomegaly.  Skin examined for heliotrope, malar area eruption, lupus pernio, periungual erythema, sclerodactyly, papules, erythema nodosum, purpura, nail pitting, onycholysis, and obvious psoriasis lesion. Neurological examination done for alertness, speech, facial symmetry,  tone and power in upper and lower extremities, and gait. The joint examination is performed for swelling, tenderness, warmth, erythema, and range of motion in the following joints: DIPs, PIPs, MCPs, wrists, first CMC's, elbows, shoulders, hips, knees, ankles, feet; spine for range of motion and paraspinal muscles for tenderness. The salient normal / abnormal findings are appended.  She does not have synovitis in any of the palpable joints, she does not have sclerodactyly.  There is no Malar area eruption she does not have  stomatitis.  Her gait is normal.  There is no periungual erythema.    Labs / Imaging (select studies)     No results found for: PPDINDURATIO, PPDREDNESS, TBGOLT, RHF, CCPABG, URIC, [id number], [id number], ANTIDNA, ANTIDNAINT, CARIA, [id number], [id number], ENASM, ENASCL, JO1, ENASSA, ENASSB, CENTA, T2BPXPT, A0ZPIGQ, DH5722   Hemoglobin   Date Value Ref Range Status   06/01/2021 14.3 11.7 - 15.7 g/dL Final   05/07/2021 15.4 11.7 - 15.7 g/dL Final   11/06/2020 14.1 11.7 - 15.7 g/dL Final     Urea Nitrogen   Date Value Ref Range Status   06/01/2021 11 7 - 30 mg/dL Final   05/07/2021 11 7 - 30 mg/dL Final   11/06/2020 13 7 - 30 mg/dL Final     Sed Rate   Date Value Ref Range Status   06/01/2021 10 0 - 20 mm/h Final   02/09/2019 5 0 - 20 mm/h Final     CRP Inflammation   Date Value Ref Range Status   06/01/2021 4.6 0.0 - 8.0 mg/L Final   02/09/2019 <2.9 0.0 - 8.0 mg/L Final   02/07/2019 <2.9 0.0 - 8.0 mg/L Final     AST   Date Value Ref Range Status   05/07/2021 22 0 - 45 U/L Final   11/04/2020 19 0 - 45 U/L Final   09/22/2020 19 0 - 45 U/L Final     Albumin   Date Value Ref Range Status   05/07/2021 4.1 3.4 - 5.0 g/dL Final   11/04/2020 4.1 3.4 - 5.0 g/dL Final   09/22/2020 3.8 3.4 - 5.0 g/dL Final     Alkaline Phosphatase   Date Value Ref Range Status   05/07/2021 85 40 - 150 U/L Final   11/04/2020 77 40 - 150 U/L Final   09/22/2020 82 40 - 150 U/L Final     ALT   Date Value Ref Range Status   05/07/2021 20 0 - 50 U/L Final   11/04/2020 19 0 - 50 U/L Final   09/22/2020 14 0 - 50 U/L Final          Immunization History     Immunization History   Administered Date(s) Administered     COVID-19,PF,Moderna 03/18/2021, 04/15/2021     DTAP (<7y) 01/16/1995, 05/03/1995, 06/19/1995, 05/31/1996, 06/26/2000     HPV Quadrivalent 01/03/2013, 09/11/2013, 09/03/2014     Hep B, Peds or Adolescent 01/16/1995, 05/03/1995, 08/29/1995     HepB, Unspecified 05/06/2019     Influenza Vaccine IM > 6 months Valent IIV4 (Lc Jensen) 09/11/2013,  09/24/2014, 08/28/2017, 09/26/2018, 09/17/2019, 11/02/2020     MMR 05/31/1996, 06/26/2000     Meningococcal (Menactra ) 01/03/2013, 09/11/2013     TDAP Vaccine (Adacel) 05/31/2007, 08/27/2016, 12/10/2019     Tdap (Adult) Unspecified Formulation 10/30/2015

## 2021-10-05 LAB — ANA SER QL IF: NEGATIVE

## 2021-10-10 ENCOUNTER — HEALTH MAINTENANCE LETTER (OUTPATIENT)
Age: 26
End: 2021-10-10

## 2021-10-11 ENCOUNTER — LAB (OUTPATIENT)
Dept: LAB | Facility: CLINIC | Age: 26
End: 2021-10-11

## 2021-10-11 ENCOUNTER — ANTICOAGULATION THERAPY VISIT (OUTPATIENT)
Dept: ANTICOAGULATION | Facility: CLINIC | Age: 26
End: 2021-10-11

## 2021-10-11 DIAGNOSIS — I26.99 ACUTE PULMONARY EMBOLISM, UNSPECIFIED PULMONARY EMBOLISM TYPE, UNSPECIFIED WHETHER ACUTE COR PULMONALE PRESENT (H): Primary | ICD-10-CM

## 2021-10-11 DIAGNOSIS — D68.51 FACTOR 5 LEIDEN MUTATION, HETEROZYGOUS (H): ICD-10-CM

## 2021-10-11 DIAGNOSIS — I26.99 ACUTE PULMONARY EMBOLISM (H): ICD-10-CM

## 2021-10-11 DIAGNOSIS — D68.61 ANTIPHOSPHOLIPID SYNDROME (H): ICD-10-CM

## 2021-10-11 LAB — INR BLD: 3.1 (ref 0.9–1.1)

## 2021-10-11 PROCEDURE — 36416 COLLJ CAPILLARY BLOOD SPEC: CPT

## 2021-10-11 PROCEDURE — 85610 PROTHROMBIN TIME: CPT

## 2021-10-11 NOTE — PROGRESS NOTES
ANTICOAGULATION MANAGEMENT     Rocío Catherine 25 year old female is on warfarin with supratherapeutic INR result. (Goal INR 2.0-3.0)    Recent labs: (last 7 days)     10/11/21  1444   INR 3.1*       ASSESSMENT     Source(s): Chart Review and Patient/Caregiver Call       Warfarin doses taken: Warfarin taken as instructed    Diet: Change in alcohol intake may be affecting INR. had a few drinks over the weekend while camping     New illness, injury, or hospitalization: No-sinus infection/congestion is better; she thinks she has year-round allergies so that is ongoing    Medication/supplement changes: None noted    Signs or symptoms of bleeding or clotting: No    Previous INR: Supratherapeutic    Additional findings: INR has been elevated two times, but since alcohol was a factor will not make a permanent change to patient's dose at this time, especially since previous INR was subtherapeutic with a missed dose.     PLAN     Recommended plan for temporary change(s) affecting INR     Dosing Instructions: Continue your current warfarin dose with next INR in 2 weeks       Summary  As of 10/11/2021    Full warfarin instructions:  10 mg every Mon; 7.5 mg all other days   Next INR check:  10/25/2021             Telephone call with Rocío who verbalizes understanding and agrees to plan    Lab visit scheduled    Education provided: Potential interaction between warfarin and alcohol and Contact [phone]  with any changes, questions or concerns.     Plan made per [facility] anticoagulation protocol    Sarah Felipe RN  Anticoagulation Clinic  10/11/2021    _______________________________________________________________________     Anticoagulation Episode Summary     Current INR goal:  2.0-3.0   TTR:  53.0 % (1 y)   Target end date:  Indefinite   Send INR reminders to:  JENNIFER KOENIG    Indications    Acute pulmonary embolism  unspecified pulmonary embolism type  unspecified whether acute cor pulmonale present (H)  [I26.99]  Factor 5 Leiden mutation  heterozygous (H) [D68.51]  Other acute pulmonary embolism without acute cor pulmonale (H) (Resolved) [I26.99]  Acute pulmonary embolism  unspecified pulmonary embolism type  unspecified whether acute cor pulmonale present (H) (Resolved) [I26.99]  Antiphospholipid syndrome (H) [D68.61]           Comments:  *          Anticoagulation Care Providers     Provider Role Specialty Phone number    Jesse Farnsworth MD Referring Family Medicine [phone]    Yessy Lyn APRN [facility] Referring Family Medicine [phone]

## 2021-10-29 ENCOUNTER — ANTICOAGULATION THERAPY VISIT (OUTPATIENT)
Dept: FAMILY MEDICINE | Facility: CLINIC | Age: 26
End: 2021-10-29

## 2021-10-29 ENCOUNTER — TELEPHONE (OUTPATIENT)
Dept: FAMILY MEDICINE | Facility: CLINIC | Age: 26
End: 2021-10-29

## 2021-10-29 ENCOUNTER — LAB (OUTPATIENT)
Dept: LAB | Facility: CLINIC | Age: 26
End: 2021-10-29
Payer: COMMERCIAL

## 2021-10-29 DIAGNOSIS — Z79.01 LONG TERM CURRENT USE OF ANTICOAGULANT THERAPY: ICD-10-CM

## 2021-10-29 DIAGNOSIS — D68.61 ANTIPHOSPHOLIPID SYNDROME (H): ICD-10-CM

## 2021-10-29 DIAGNOSIS — I26.99 PULMONARY EMBOLISM (H): ICD-10-CM

## 2021-10-29 DIAGNOSIS — D68.51 FACTOR 5 LEIDEN MUTATION, HETEROZYGOUS (H): ICD-10-CM

## 2021-10-29 DIAGNOSIS — I26.99 ACUTE PULMONARY EMBOLISM, UNSPECIFIED PULMONARY EMBOLISM TYPE, UNSPECIFIED WHETHER ACUTE COR PULMONALE PRESENT (H): Primary | ICD-10-CM

## 2021-10-29 DIAGNOSIS — I26.99 ACUTE PULMONARY EMBOLISM (H): ICD-10-CM

## 2021-10-29 LAB — INR BLD: 2.4 (ref 0.9–1.1)

## 2021-10-29 PROCEDURE — 85610 PROTHROMBIN TIME: CPT

## 2021-10-29 PROCEDURE — 36416 COLLJ CAPILLARY BLOOD SPEC: CPT

## 2021-10-29 NOTE — TELEPHONE ENCOUNTER
ANTICOAGULATION MANAGEMENT      Rocío Catherine due for annual renewal of referral to anticoagulation monitoring. Order pended for your review and signature.      ANTICOAGULATION SUMMARY      Warfarin indication(s)     Acute PE, unspecified whether acute cor pulmonale present, Factor 5 Leiden mutation heterozygous, antiphospholipid syndrome     Heart valve present?  NO       Current goal range   INR: 2.0-3.0     Goal appropriate for indication? Yes, INR 2-3 appropriate for hx of DVT, PE, hypercoagulable state, Afib, LVAD, or bileaflet AVR without risk factors     Current duration of therapy Indefinite/long term therapy   Time in Therapeutic Range (TTR)  (Goal > 60%) 52.3%       Office visit with referring provider's group within last year yes on 6/11/21       Chika Delaney RN

## 2021-10-29 NOTE — PROGRESS NOTES
VM left for pt to return call to [facility] [phone]. Dosing instructions not given to pt.  Tentative plan: recheck INR in 3 weeks.  Chika Delaney RN on 10/29/2021 at 10:43 AM

## 2021-10-29 NOTE — PROGRESS NOTES
ANTICOAGULATION MANAGEMENT     Rocío Catherine 25 year old female is on warfarin with therapeutic INR result. (Goal INR 2.0-3.0)    Recent labs: (last 7 days)     10/29/21  0829   INR 2.4*       ASSESSMENT     Source(s): Chart Review and Patient/Caregiver Call       Warfarin doses taken: Warfarin taken as instructed    Diet: No new diet changes identified    New illness, injury, or hospitalization: No    Medication/supplement changes: None noted    Signs or symptoms of bleeding or clotting: No    Previous INR: Supratherapeutic    Additional findings: None     PLAN     Recommended plan for no diet, medication or health factor changes affecting INR     Dosing Instructions: Continue your current warfarin dose with next INR in 3 weeks       Summary  As of 10/29/2021    Full warfarin instructions:  10 mg every Mon; 7.5 mg all other days   Next INR check:  11/19/2021             Telephone call with Rocío who verbalizes understanding and agrees to plan    Lab visit scheduled    Education provided: Please call back if any changes to your diet, medications or how you've been taking warfarin    Plan made per [facility] anticoagulation protocol    Chika Delaney RN  Anticoagulation Clinic  10/29/2021    _______________________________________________________________________     Anticoagulation Episode Summary     Current INR goal:  2.0-3.0   TTR:  52.3 % (1 y)   Target end date:  Indefinite   Send INR reminders to:  [facility]    Indications    Acute pulmonary embolism  unspecified pulmonary embolism type  unspecified whether acute cor pulmonale present (H) [I26.99]  Factor 5 Leiden mutation  heterozygous (H) [D68.51]  Other acute pulmonary embolism without acute cor pulmonale (H) (Resolved) [I26.99]  Acute pulmonary embolism  unspecified pulmonary embolism type  unspecified whether acute cor pulmonale present (H) (Resolved) [I26.99]  Antiphospholipid syndrome (H) [D68.61]  Long term current use of anticoagulant therapy  [Z79.01]           Comments:           Anticoagulation Care Providers     Provider Role Specialty Phone number    Jesse Farnsworth MD Referring Family Medicine [phone]    Yessy Lyn APRN [facility] Referring Family Medicine [phone]

## 2021-11-11 ENCOUNTER — TRANSFERRED RECORDS (OUTPATIENT)
Dept: HEALTH INFORMATION MANAGEMENT | Facility: CLINIC | Age: 26
End: 2021-11-11
Payer: COMMERCIAL

## 2021-11-11 ENCOUNTER — TELEPHONE (OUTPATIENT)
Dept: FAMILY MEDICINE | Facility: CLINIC | Age: 26
End: 2021-11-11
Payer: COMMERCIAL

## 2021-11-11 DIAGNOSIS — Z79.01 LONG TERM CURRENT USE OF ANTICOAGULANT THERAPY: ICD-10-CM

## 2021-11-11 DIAGNOSIS — I26.99 ACUTE PULMONARY EMBOLISM, UNSPECIFIED PULMONARY EMBOLISM TYPE, UNSPECIFIED WHETHER ACUTE COR PULMONALE PRESENT (H): Primary | ICD-10-CM

## 2021-11-11 DIAGNOSIS — D68.61 ANTIPHOSPHOLIPID SYNDROME (H): ICD-10-CM

## 2021-11-11 DIAGNOSIS — D68.51 FACTOR 5 LEIDEN MUTATION, HETEROZYGOUS (H): ICD-10-CM

## 2021-11-11 LAB — INR (EXTERNAL): 1.2 (ref 0.9–1.18)

## 2021-11-11 NOTE — TELEPHONE ENCOUNTER
Incoming fax from [employer] home monitoring company    Date of result 11/11    INR result 1.2

## 2021-11-11 NOTE — TELEPHONE ENCOUNTER
Patient Rocío Catherine calling. She did an at home INR test and is calling to give her result of 1.2.  Can someone please call her asap, as she was supposed to call us back to give her result. Thank you!      Please call patient at [phone]

## 2021-11-18 ENCOUNTER — ANTICOAGULATION THERAPY VISIT (OUTPATIENT)
Dept: FAMILY MEDICINE | Facility: CLINIC | Age: 26
End: 2021-11-18
Payer: COMMERCIAL

## 2021-11-18 ENCOUNTER — TRANSFERRED RECORDS (OUTPATIENT)
Dept: HEALTH INFORMATION MANAGEMENT | Facility: CLINIC | Age: 26
End: 2021-11-18
Payer: COMMERCIAL

## 2021-11-18 DIAGNOSIS — I26.99 ACUTE PULMONARY EMBOLISM, UNSPECIFIED PULMONARY EMBOLISM TYPE, UNSPECIFIED WHETHER ACUTE COR PULMONALE PRESENT (H): Primary | ICD-10-CM

## 2021-11-18 DIAGNOSIS — Z79.01 LONG TERM CURRENT USE OF ANTICOAGULANT THERAPY: ICD-10-CM

## 2021-11-18 DIAGNOSIS — D68.61 ANTIPHOSPHOLIPID SYNDROME (H): ICD-10-CM

## 2021-11-18 DIAGNOSIS — D68.51 FACTOR 5 LEIDEN MUTATION, HETEROZYGOUS (H): ICD-10-CM

## 2021-11-18 LAB — INR (EXTERNAL): 2.1 (ref 0.9–1.1)

## 2021-11-18 NOTE — PROGRESS NOTES
ANTICOAGULATION MANAGEMENT     Rocío Catherine 26 year old female is on warfarin with therapeutic INR result. (Goal INR 2.0-3.0)    Recent labs: (last 7 days)     11/18/21  1312   INR 2.1*       ASSESSMENT     Source(s): Chart Review and Patient/Caregiver Call       Warfarin doses taken: Warfarin taken as instructed    Diet: No new diet changes identified    New illness, injury, or hospitalization: No    Medication/supplement changes: None noted    Signs or symptoms of bleeding or clotting: No    Previous INR: Subtherapeutic    Additional findings: None     PLAN     Recommended plan for no diet, medication or health factor changes affecting INR     Dosing Instructions:  Increase your warfarin dose (4.5% change) with next INR in 2 weeks   Patient is on lower end of dosing after boost last Thursday, will increase slightly to prevent her dropping in the next week     Summary  As of 11/18/2021    Full warfarin instructions:  10 mg every Mon; 7.5 mg all other days   Next INR check:               Telephone call with Rocío who verbalizes understanding and agrees to plan    Patient to recheck with home meter    Education provided: Please call back if any changes to your diet, medications or how you've been taking warfarin    Plan made per [facility] anticoagulation protocol    Mayra Palma RN  Anticoagulation Clinic  11/18/2021    _______________________________________________________________________     Anticoagulation Episode Summary     Current INR goal:  2.0-3.0   TTR:  51.3 % (1 y)   Target end date:  Indefinite   Send INR reminders to:  JENNIFER LECHUGA    Indications    Acute pulmonary embolism  unspecified pulmonary embolism type  unspecified whether acute cor pulmonale present (H) [I26.99]  Factor 5 Leiden mutation  heterozygous (H) [D68.51]  Other acute pulmonary embolism without acute cor pulmonale (H) (Resolved) [I26.99]  Acute pulmonary embolism  unspecified pulmonary embolism type  unspecified whether acute cor  pulmonale present (H) (Resolved) [I26.99]  Antiphospholipid syndrome (H) [D68.61]  Long term current use of anticoagulant therapy [Z79.01]           Comments:  * [employer]         Anticoagulation Care Providers     Provider Role Specialty Phone number    Jesse Farnsworth MD Referring Family Medicine [phone]    Yessy Lyn APRN [facility] Referring Family Medicine [phone]

## 2021-11-18 NOTE — PROGRESS NOTES
Incoming fax from [employer] home monitoring company    Date of result 11/18    INR result 2.1

## 2021-12-02 ENCOUNTER — TRANSFERRED RECORDS (OUTPATIENT)
Dept: HEALTH INFORMATION MANAGEMENT | Facility: CLINIC | Age: 26
End: 2021-12-02
Payer: COMMERCIAL

## 2021-12-02 ENCOUNTER — ANTICOAGULATION THERAPY VISIT (OUTPATIENT)
Dept: FAMILY MEDICINE | Facility: CLINIC | Age: 26
End: 2021-12-02
Payer: COMMERCIAL

## 2021-12-02 DIAGNOSIS — I26.99 ACUTE PULMONARY EMBOLISM, UNSPECIFIED PULMONARY EMBOLISM TYPE, UNSPECIFIED WHETHER ACUTE COR PULMONALE PRESENT (H): Primary | ICD-10-CM

## 2021-12-02 DIAGNOSIS — D68.51 FACTOR 5 LEIDEN MUTATION, HETEROZYGOUS (H): ICD-10-CM

## 2021-12-02 DIAGNOSIS — D68.61 ANTIPHOSPHOLIPID SYNDROME (H): ICD-10-CM

## 2021-12-02 DIAGNOSIS — Z79.01 LONG TERM CURRENT USE OF ANTICOAGULANT THERAPY: ICD-10-CM

## 2021-12-02 LAB — INR (EXTERNAL): 1.9 (ref 2–3)

## 2021-12-02 NOTE — PROGRESS NOTES
ANTICOAGULATION MANAGEMENT     Rocío Catherine 26 year old female is on warfarin with subtherapeutic INR result. (Goal INR 2.0-3.0)    Recent labs: (last 7 days)     12/02/21  1020   INR 1.9*       ASSESSMENT     Source(s): Chart Review and Patient/Caregiver Call       Warfarin doses taken: Warfarin taken as instructed    Diet: No new diet changes identified    New illness, injury, or hospitalization: No    Medication/supplement changes: None noted    Signs or symptoms of bleeding or clotting: No    Previous INR: Therapeutic last visit; previously outside of goal range    Additional findings: None     PLAN     Recommended plan for no diet, medication or health factor changes affecting INR     Dosing Instructions:  Increase your warfarin dose (8.7% change) with next INR in 1 week       Summary  As of 12/2/2021    Full warfarin instructions:  7.5 mg every Mon, Wed, Fri; 10 mg all other days   Next INR check:               Telephone call with Rocío who verbalizes understanding and agrees to plan and who agrees to plan and repeated back plan correctly. [email or web address] sent to patient with dosing and recheck per patient request    Patient to recheck with home meter    Education provided: Please call back if any changes to your diet, medications or how you've been taking warfarin and Contact [phone]  with any changes, questions or concerns.     Plan made per [facility] anticoagulation protocol    Niyah Ferrer, RN  Anticoagulation Clinic  12/2/2021    _______________________________________________________________________     Anticoagulation Episode Summary     Current INR goal:  2.0-3.0   TTR:  49.4 % (1 y)   Target end date:  Indefinite   Send INR reminders to:  JENNIFER LECHUGA    Indications    Acute pulmonary embolism  unspecified pulmonary embolism type  unspecified whether acute cor pulmonale present (H) [I26.99]  Factor 5 Leiden mutation  heterozygous (H) [D68.51]  Other acute pulmonary embolism without acute cor pulmonale  (H) (Resolved) [I26.99]  Acute pulmonary embolism  unspecified pulmonary embolism type  unspecified whether acute cor pulmonale present (H) (Resolved) [I26.99]  Antiphospholipid syndrome (H) [D68.61]  Long term current use of anticoagulant therapy [Z79.01]           Comments:  * [employer]         Anticoagulation Care Providers     Provider Role Specialty Phone number    Jesse Farnsworth MD Referring Family Medicine [phone]    Yessy Lyn APRN [facility] Referring Family Medicine [phone]

## 2021-12-02 NOTE — PROGRESS NOTES
Incoming fax from [employer] home monitoring company    Date of result 12/2    INR result 1.9

## 2021-12-06 ENCOUNTER — E-VISIT (OUTPATIENT)
Dept: FAMILY MEDICINE | Facility: CLINIC | Age: 26
End: 2021-12-06
Payer: COMMERCIAL

## 2021-12-06 DIAGNOSIS — Z20.822 CLOSE EXPOSURE TO 2019 NOVEL CORONAVIRUS: Primary | ICD-10-CM

## 2021-12-06 PROCEDURE — 99421 OL DIG E/M SVC 5-10 MIN: CPT | Performed by: NURSE PRACTITIONER

## 2021-12-06 NOTE — PATIENT INSTRUCTIONS
"  Dear Rocío Catherine,    Based on your exposure to COVID-19 (coronavirus), we would like to test you for this virus. I have placed an order for this test. The best time for testing is 5-7 days after the exposure.    How to schedule:  For all employees or close contacts (except [employer] [facility] and [employer] - see below), go to your [employer] home page and scroll down to the section that says  You have an appointment that needs to be scheduled  and click the large green button that says  Schedule Now  and follow the steps to find the next available opening.     If you are unable to complete these steps or if you cannot find any available times, please call [phone] to schedule employee testing.     [facility] employees or close contacts, please call [phone].   [employer] [employer]) employees or close contacts call [phone].    Return to work/school/ guidance:   For people with high risk exposures outside the home    Please let your workplace manager and staffing office know when your quarantine ends.     We can not give you an exact date as it depends on the information below. You can calculate this on your own or work with your manager/staffing office to calculate this. (For example if you were exposed on 10/4, you would have to quarantine for 14 full days. That would be through 10/18. You could return on 10/19.)    Quarantine Guidelines:  Patients (\"contacts\") who have been in close prolonged contact of an infected person(s) (within six feet for at least 15 minutes within a 24 hour period), and remain asymptomatic should enter quarantine based on the following options:    14-day quarantine period (this remains the CDC recommendation for the greatest protection against spread of COVID-19) OR    Minimum 7-day quarantine with negative RT-PCR test collected on day 5 or later OR    10-day quarantine with no test  Quarantine Guideline exceptions are as follows:    People who have been fully vaccinated do not need to " quarantine if the exposure was at least 2 weeks after the last vaccination. This includes vaccinated health care workers.    Not fully vaccinated and unvaccinated Individuals who work in health care, congregate care, or congregate living should be off work for 14 days from their last date of exposure. Community activities for this group can be resumed based on options above. Fully vaccinated individuals in this group do not need to quarantine from work after exposure.    Not fully vaccinated and unvaccinated people whose high-risk exposure was a household member should always quarantine for 14 days from their last date of exposure. Fully vaccinated people in this category do not need to quarantine.    Not fully vaccinated or unvaccinated residents of congregate care and congregate living settings should always quarantine for 14 days from their last date of exposure. Fully vaccinated residents do not need to quarantine.    Note: If you have ongoing exposure to the covid positive person, this quarantine period may be more than 14 days. (For example, if you are continued to be exposed to your child who tested positive and cannot isolate from them, then the quarantine of 7-14 days can't start until your child is no longer contagious. This is typically 10 days from onset of the child's symptoms. So the total duration may be 17-24 days in this case.)    You should continue symptom monitoring until day 14 post-exposure. If you develop signs or symptoms of COVID-19, isolate and get tested (even if you have been tested already).    How to quarantine:   Stay home and away from others. Don't go to school or anywhere else. Generally quarantine means staying home from work but there are some exceptions to this. Please contact your workplace.  No hugging, kissing or shaking hands.  Don't let anyone visit.  Cover your mouth and nose with a mask, tissue or washcloth to avoid spreading germs.  Wash your hands and face often. Use  soap and water.    What are the symptoms of COVID-19?  The most common symptoms are cough, fever and trouble breathing. Less common symptoms include headache, body aches, fatigue (feeling very tired), chills, sore throat, stuffy or runny nose, diarrhea (loose poop), loss of taste or smell, belly pain, and nausea or vomiting (feeling sick to your stomach or throwing up).  After 14 days, if you have still don't have symptoms, you likely don't have this virus.  If you develop symptoms, follow these guidelines.  If you're normally healthy: Please start another eVisit.  If you have a serious health problem (like cancer, heart failure, an organ transplant or kidney disease): Call your specialty clinic. Let them know that you might have COVID-19.    Where can I get more information?   [facility] - About COVID-19: [email or web address]  CDC - What to Do If You're Sick: www.cdc.gov/coronavirus/2019-ncov/about/steps-when-sick.html  CDC - Ending Home Isolation: www.cdc.gov/coronavirus/2019-ncov/hcp/disposition-in-home-patients.html  CDC - Caring for Someone: www.cdc.gov/coronavirus/2019-ncov/if-you-are-sick/care-for-someone.html  [facility] clinical trials (COVID-19 research studies): clinicalaffairs.[facility]-clinical-trials  Below are the COVID-19 hotlines at the [facility] ([facility]). Interpreters are available.  For health questions: Call [phone] or 1-774.449.7957 (7 a.m. to 7 p.m.)  For questions about schools and childcare: Call [phone] or 1-965.710.2631 (7 a.m. to 7 p.m.)

## 2021-12-09 ENCOUNTER — TRANSFERRED RECORDS (OUTPATIENT)
Dept: HEALTH INFORMATION MANAGEMENT | Facility: CLINIC | Age: 26
End: 2021-12-09
Payer: COMMERCIAL

## 2021-12-09 ENCOUNTER — ANTICOAGULATION THERAPY VISIT (OUTPATIENT)
Dept: FAMILY MEDICINE | Facility: CLINIC | Age: 26
End: 2021-12-09
Payer: COMMERCIAL

## 2021-12-09 DIAGNOSIS — I26.99 ACUTE PULMONARY EMBOLISM, UNSPECIFIED PULMONARY EMBOLISM TYPE, UNSPECIFIED WHETHER ACUTE COR PULMONALE PRESENT (H): Primary | ICD-10-CM

## 2021-12-09 DIAGNOSIS — Z79.01 LONG TERM CURRENT USE OF ANTICOAGULANT THERAPY: ICD-10-CM

## 2021-12-09 DIAGNOSIS — D68.61 ANTIPHOSPHOLIPID SYNDROME (H): ICD-10-CM

## 2021-12-09 DIAGNOSIS — D68.51 FACTOR 5 LEIDEN MUTATION, HETEROZYGOUS (H): ICD-10-CM

## 2021-12-09 LAB — INR (EXTERNAL): 2.8 (ref 0.9–1.1)

## 2021-12-09 NOTE — PROGRESS NOTES
ANTICOAGULATION MANAGEMENT     Rocío Catherine 26 year old female is on warfarin with therapeutic INR result. (Goal INR 2.0-3.0)    Recent labs: (last 7 days)     12/09/21  0846   INR 2.8*       ASSESSMENT     Source(s): Chart Review and Patient/Caregiver Call       Warfarin doses taken: Warfarin taken as instructed    Diet: No new diet changes identified    New illness, injury, or hospitalization: No    Medication/supplement changes: None noted    Signs or symptoms of bleeding or clotting: No    Previous INR: Subtherapeutic    Additional findings: None     PLAN     Recommended plan for no diet, medication or health factor changes affecting INR     Dosing Instructions: Continue your current warfarin dose with next INR in 2 weeks       Summary  As of 12/9/2021    Full warfarin instructions:  7.5 mg every Mon, Wed, Fri; 10 mg all other days   Next INR check:  12/23/2021             Telephone call with Rocío who verbalizes understanding and agrees to plan    Patient to recheck with home meter    Education provided: Please call back if any changes to your diet, medications or how you've been taking warfarin    Plan made per [facility] anticoagulation protocol    Dayami Mcfarland, RN  Anticoagulation Clinic  12/9/2021    _______________________________________________________________________     Anticoagulation Episode Summary     Current INR goal:  2.0-3.0   TTR:  49.2 % (1 y)   Target end date:  Indefinite   Send INR reminders to:  JENNIFER LECHUGA    Indications    Acute pulmonary embolism  unspecified pulmonary embolism type  unspecified whether acute cor pulmonale present (H) [I26.99]  Factor 5 Leiden mutation  heterozygous (H) [D68.51]  Other acute pulmonary embolism without acute cor pulmonale (H) (Resolved) [I26.99]  Acute pulmonary embolism  unspecified pulmonary embolism type  unspecified whether acute cor pulmonale present (H) (Resolved) [I26.99]  Antiphospholipid syndrome (H) [D68.61]  Long term current use of  anticoagulant therapy [Z79.01]           Comments:  * [employer]         Anticoagulation Care Providers     Provider Role Specialty Phone number    Jesse Fanrsworth MD Referring Family Medicine [phone]    Yessy Lyn APRN [facility] Referring Family Medicine [phone]

## 2021-12-09 NOTE — PROGRESS NOTES
ANTICOAGULATION MANAGEMENT     Rocío Catherine 26 year old female is on warfarin with therapeutic INR result. (Goal INR 2.0-3.0)    Recent labs: (last 7 days)     12/09/21  0846   INR 2.8*       ASSESSMENT     Source(s): Chart Review and Patient/Caregiver Call       Warfarin doses taken: Warfarin taken as instructed    Diet: No new diet changes identified    New illness, injury, or hospitalization: No    Medication/supplement changes: None noted    Signs or symptoms of bleeding or clotting: No    Previous INR: Subtherapeutic    Additional findings: None     PLAN     Recommended plan for no diet, medication or health factor changes affecting INR     Dosing Instructions: Continue your current warfarin dose with next INR in 2 weeks       Summary  As of 12/9/2021    Full warfarin instructions:  7.5 mg every Mon, Wed, Fri; 10 mg all other days   Next INR check:  12/23/2021             Telephone call with Rocío who verbalizes understanding and agrees to plan    Patient to recheck with home meter    Education provided: Please call back if any changes to your diet, medications or how you've been taking warfarin    Plan made per [facility] anticoagulation protocol    Dayami Mcfarland, RN  Anticoagulation Clinic  12/9/2021    _______________________________________________________________________     Anticoagulation Episode Summary     Current INR goal:  2.0-3.0   TTR:  49.2 % (1 y)   Target end date:  Indefinite   Send INR reminders to:  JENNIFER LECHUGA    Indications    Acute pulmonary embolism  unspecified pulmonary embolism type  unspecified whether acute cor pulmonale present (H) [I26.99]  Factor 5 Leiden mutation  heterozygous (H) [D68.51]  Other acute pulmonary embolism without acute cor pulmonale (H) (Resolved) [I26.99]  Acute pulmonary embolism  unspecified pulmonary embolism type  unspecified whether acute cor pulmonale present (H) (Resolved) [I26.99]  Antiphospholipid syndrome (H) [D68.61]  Long term current use of  anticoagulant therapy [Z79.01]           Comments:  * [employer]         Anticoagulation Care Providers     Provider Role Specialty Phone number    Jesse Farnsworth MD Referring Family Medicine [phone]    Yessy Lyn APRN [facility] Referring Family Medicine [phone]

## 2021-12-10 DIAGNOSIS — I26.99 ACUTE PULMONARY EMBOLISM (H): ICD-10-CM

## 2021-12-10 RX ORDER — WARFARIN SODIUM 5 MG/1
TABLET ORAL
Qty: 180 TABLET | Refills: 1 | Status: SHIPPED | OUTPATIENT
Start: 2021-12-10 | End: 2022-07-14

## 2021-12-10 NOTE — TELEPHONE ENCOUNTER
Anticoagulation Monitoring Instructions   Latest Ref Rng & Units    12/9/2021 7.5 mg every Mon, Wed, Fri; 10 mg all other days   Prescription approved per [facility] Refill Protocol.  Dayami Mcfarland RN  Anticoagulation Nurse - [facility]

## 2021-12-22 ENCOUNTER — ALLIED HEALTH/NURSE VISIT (OUTPATIENT)
Dept: FAMILY MEDICINE | Facility: CLINIC | Age: 26
End: 2021-12-22
Payer: COMMERCIAL

## 2021-12-22 DIAGNOSIS — Z23 NEED FOR PROPHYLACTIC VACCINATION AND INOCULATION AGAINST INFLUENZA: Primary | ICD-10-CM

## 2021-12-22 PROCEDURE — 90686 IIV4 VACC NO PRSV 0.5 ML IM: CPT

## 2021-12-22 PROCEDURE — 90471 IMMUNIZATION ADMIN: CPT

## 2021-12-22 PROCEDURE — 99207 PR NO CHARGE NURSE ONLY: CPT

## 2021-12-23 ENCOUNTER — TELEPHONE (OUTPATIENT)
Dept: FAMILY MEDICINE | Facility: CLINIC | Age: 26
End: 2021-12-23
Payer: COMMERCIAL

## 2021-12-23 ENCOUNTER — ANTICOAGULATION THERAPY VISIT (OUTPATIENT)
Dept: FAMILY MEDICINE | Facility: CLINIC | Age: 26
End: 2021-12-23
Payer: COMMERCIAL

## 2021-12-23 ENCOUNTER — TRANSFERRED RECORDS (OUTPATIENT)
Dept: HEALTH INFORMATION MANAGEMENT | Facility: CLINIC | Age: 26
End: 2021-12-23
Payer: COMMERCIAL

## 2021-12-23 DIAGNOSIS — D68.61 ANTIPHOSPHOLIPID SYNDROME (H): ICD-10-CM

## 2021-12-23 DIAGNOSIS — Z79.01 LONG TERM CURRENT USE OF ANTICOAGULANT THERAPY: ICD-10-CM

## 2021-12-23 DIAGNOSIS — D68.51 FACTOR 5 LEIDEN MUTATION, HETEROZYGOUS (H): ICD-10-CM

## 2021-12-23 DIAGNOSIS — I26.99 ACUTE PULMONARY EMBOLISM, UNSPECIFIED PULMONARY EMBOLISM TYPE, UNSPECIFIED WHETHER ACUTE COR PULMONALE PRESENT (H): Primary | ICD-10-CM

## 2021-12-23 LAB — INR HOME MONITORING: 4.3 (ref 2–3)

## 2021-12-23 NOTE — TELEPHONE ENCOUNTER
Reason for Call:  Other call back    Detailed comments: Pt would like to give her INR results to a nurse she states they are higher than normal and she would like a call back.     Phone Number Patient can be reached at: Cell number on file:    Telephone Information:   Mobile [phone]       Best Time: Anytime     Can we leave a detailed message on this number? YES    Call taken on 12/23/2021 at 3:36 PM by Finesse Powers

## 2021-12-23 NOTE — PROGRESS NOTES
ANTICOAGULATION MANAGEMENT     Rocío Catherine 26 year old female is on warfarin with supratherapeutic INR result. (Goal INR 2.0-3.0)    Recent labs: (last 7 days)     12/23/21  0000   INR 4.30*       ASSESSMENT     Source(s): Chart Review and Patient/Caregiver Call       Warfarin doses taken: Warfarin taken as instructed    Diet: Increased greens/vitamin K in diet; plans to resume previous intake. Patient states had an extra serving of brocolli this past week. Patient will add on an extra 1-2 servings of greens this week.    New illness, injury, or hospitalization: No    Medication/supplement changes: patient had flu shot yesterday 12/22    Signs or symptoms of bleeding or clotting: Yes: patient had some spotting--period ended last week.     Previous INR: Therapeutic last visit; previously outside of goal range    Additional findings: None     PLAN     Recommended plan for temporary change(s) affecting INR     Dosing Instructions: Hold dose then continue your current warfarin dose with next INR in 1 week       Summary  As of 12/23/2021    Full warfarin instructions:  12/23: Hold; Otherwise 7.5 mg every Mon, Wed, Fri; 10 mg all other days   Next INR check:               Telephone call with Rocío who verbalizes understanding and agrees to plan and who agrees to plan and repeated back plan correctly    Patient to recheck with home meter    Education provided: Please call back if any changes to your diet, medications or how you've been taking warfarin and Contact [phone]  with any changes, questions or concerns.     Plan made per [facility] anticoagulation protocol    Niyah Ferrer, RN  Anticoagulation Clinic  12/23/2021    _______________________________________________________________________     Anticoagulation Episode Summary     Current INR goal:  2.0-3.0   TTR:  45.9 % (1 y)   Target end date:  Indefinite   Send INR reminders to:  JENNIFER LECHUGA    Indications    Acute pulmonary embolism  unspecified pulmonary  embolism type  unspecified whether acute cor pulmonale present (H) [I26.99]  Factor 5 Leiden mutation  heterozygous (H) [D68.51]  Other acute pulmonary embolism without acute cor pulmonale (H) (Resolved) [I26.99]  Acute pulmonary embolism  unspecified pulmonary embolism type  unspecified whether acute cor pulmonale present (H) (Resolved) [I26.99]  Antiphospholipid syndrome (H) [D68.61]  Long term current use of anticoagulant therapy [Z79.01]           Comments:  * [employer]         Anticoagulation Care Providers     Provider Role Specialty Phone number    Jesse Farnsworth MD Referring Family Medicine [phone]    Yessy Lyn APRN CNP Referring Family Medicine [phone]

## 2021-12-30 ENCOUNTER — ANTICOAGULATION THERAPY VISIT (OUTPATIENT)
Dept: FAMILY MEDICINE | Facility: CLINIC | Age: 26
End: 2021-12-30

## 2021-12-30 ENCOUNTER — TELEPHONE (OUTPATIENT)
Dept: FAMILY MEDICINE | Facility: CLINIC | Age: 26
End: 2021-12-30
Payer: COMMERCIAL

## 2021-12-30 ENCOUNTER — LAB (OUTPATIENT)
Dept: LAB | Facility: CLINIC | Age: 26
End: 2021-12-30
Payer: COMMERCIAL

## 2021-12-30 DIAGNOSIS — D68.51 FACTOR 5 LEIDEN MUTATION, HETEROZYGOUS (H): ICD-10-CM

## 2021-12-30 DIAGNOSIS — Z79.01 LONG TERM CURRENT USE OF ANTICOAGULANT THERAPY: ICD-10-CM

## 2021-12-30 DIAGNOSIS — D68.61 ANTIPHOSPHOLIPID SYNDROME (H): ICD-10-CM

## 2021-12-30 DIAGNOSIS — I26.99 ACUTE PULMONARY EMBOLISM (H): ICD-10-CM

## 2021-12-30 DIAGNOSIS — I26.99 ACUTE PULMONARY EMBOLISM, UNSPECIFIED PULMONARY EMBOLISM TYPE, UNSPECIFIED WHETHER ACUTE COR PULMONALE PRESENT (H): Primary | ICD-10-CM

## 2021-12-30 LAB — INR BLD: 1.4 (ref 0.9–1.1)

## 2021-12-30 PROCEDURE — 36416 COLLJ CAPILLARY BLOOD SPEC: CPT

## 2021-12-30 PROCEDURE — 85610 PROTHROMBIN TIME: CPT

## 2021-12-30 NOTE — TELEPHONE ENCOUNTER
Rocío states she tried to take her INR with her home meter today but got an error message x2. She is now out of test strips. Called Rocío and made an appointment for [place] lab at 4:00PM today for INR.

## 2021-12-30 NOTE — PROGRESS NOTES
Chart reviewed with [facility] RN at time of encounter.    Agree with plan for boost tonight and if INR elevated again, also check CF10.    Demetria Davis, PharmD BCACP  Anticoagulation Clinical Pharmacist

## 2021-12-31 NOTE — PROGRESS NOTES
ANTICOAGULATION MANAGEMENT     Rocío Catherine 26 year old female is on warfarin with subtherapeutic INR result. (Goal INR 2.0-3.0)    Recent labs: (last 7 days)     12/30/21  1603   INR 1.4*       ASSESSMENT     Source(s): Chart Review and Patient/Caregiver Call       Warfarin doses taken: Missed dose(s) may be affecting INR    Diet: Increased greens/vitamin K in diet; plans to resume previous intake    New illness, injury, or hospitalization: No    Medication/supplement changes: None noted    Signs or symptoms of bleeding or clotting: No    Previous INR: Supratherapeutic    Additional findings: None     PLAN     Recommended plan for temporary change(s) affecting INR     Dosing Instructions: Booster dose then continue your current warfarin dose with next INR in 3 days. Patient did not receive call yesterday in dosing so took maintenance. Patient will boost 2.5 mg tonight resume maintenance over the weekend and recheck INR on 1/3.    Summary  As of 12/30/2021    Full warfarin instructions:  12/31: 10 mg; Otherwise 7.5 mg every Mon, Wed, Fri; 10 mg all other days   Next INR check:  12/31/2021             Telephone call with Rocío who verbalizes understanding and agrees to plan and who agrees to plan and repeated back plan correctly    Lab visit scheduled patient out of strips and not sure when they will come in    Education provided: Please call back if any changes to your diet, medications or how you've been taking warfarin, Importance of consistent vitamin K intake and Contact [phone]  with any changes, questions or concerns.     Plan made per [facility] anticoagulation protocol    Niyah Ferrer, RN  Anticoagulation Clinic  12/31/2021    _______________________________________________________________________     Anticoagulation Episode Summary     Current INR goal:  2.0-3.0   TTR:  44.5 % (1 y)   Target end date:  Indefinite   Send INR reminders to:  JENNIFER LECHUGA    Indications    Acute pulmonary  embolism  unspecified pulmonary embolism type  unspecified whether acute cor pulmonale present (H) [I26.99]  Factor 5 Leiden mutation  heterozygous (H) [D68.51]  Other acute pulmonary embolism without acute cor pulmonale (H) (Resolved) [I26.99]  Acute pulmonary embolism  unspecified pulmonary embolism type  unspecified whether acute cor pulmonale present (H) (Resolved) [I26.99]  Antiphospholipid syndrome (H) [D68.61]  Long term current use of anticoagulant therapy [Z79.01]           Comments:  * [employer]         Anticoagulation Care Providers     Provider Role Specialty Phone number    Jesse Farnsworth MD Referring Family Medicine [phone]    Yessy Lyn APRN CNP Referring Family Medicine [phone]

## 2022-01-03 ENCOUNTER — ANTICOAGULATION THERAPY VISIT (OUTPATIENT)
Dept: ANTICOAGULATION | Facility: CLINIC | Age: 27
End: 2022-01-03

## 2022-01-03 ENCOUNTER — LAB (OUTPATIENT)
Dept: LAB | Facility: CLINIC | Age: 27
End: 2022-01-03
Payer: COMMERCIAL

## 2022-01-03 DIAGNOSIS — D68.51 FACTOR 5 LEIDEN MUTATION, HETEROZYGOUS (H): ICD-10-CM

## 2022-01-03 DIAGNOSIS — D68.61 ANTIPHOSPHOLIPID SYNDROME (H): ICD-10-CM

## 2022-01-03 DIAGNOSIS — I26.99 ACUTE PULMONARY EMBOLISM, UNSPECIFIED PULMONARY EMBOLISM TYPE, UNSPECIFIED WHETHER ACUTE COR PULMONALE PRESENT (H): Primary | ICD-10-CM

## 2022-01-03 DIAGNOSIS — Z79.01 LONG TERM CURRENT USE OF ANTICOAGULANT THERAPY: ICD-10-CM

## 2022-01-03 DIAGNOSIS — I26.99 ACUTE PULMONARY EMBOLISM (H): ICD-10-CM

## 2022-01-03 LAB — INR BLD: 2.4 (ref 0.9–1.1)

## 2022-01-03 PROCEDURE — 36416 COLLJ CAPILLARY BLOOD SPEC: CPT

## 2022-01-03 PROCEDURE — 85610 PROTHROMBIN TIME: CPT

## 2022-01-03 NOTE — PROGRESS NOTES
ANTICOAGULATION MANAGEMENT     Rocío Catherine 26 year old female is on warfarin with therapeutic INR result. (Goal INR 2.0-3.0)    Recent labs: (last 7 days)     01/03/22  1039   INR 2.4*       ASSESSMENT     Source(s): Chart Review and Patient/Caregiver Call       Warfarin doses taken: Warfarin taken as instructed    Diet: No new diet changes identified    New illness, injury, or hospitalization: No    Medication/supplement changes: None noted    Signs or symptoms of bleeding or clotting: No    Previous INR: Subtherapeutic    Additional findings: None     PLAN     Recommended plan for no diet, medication or health factor changes affecting INR     Dosing Instructions: Continue your current warfarin dose with next INR in 1 week       Summary  As of 1/3/2022    Full warfarin instructions:  7.5 mg every Mon, Wed, Fri; 10 mg all other days   Next INR check:  1/10/2022             Telephone call with Rocío who verbalizes understanding and agrees to plan    Patient to recheck with home meter    Education provided: None required    Plan made per [facility] anticoagulation protocol    Shelby Valero, RN  Anticoagulation Clinic  1/3/2022    _______________________________________________________________________     Anticoagulation Episode Summary     Current INR goal:  2.0-3.0   TTR:  44.0 % (1 y)   Target end date:  Indefinite   Send INR reminders to:  JENNIFER LECHUGA    Indications    Acute pulmonary embolism  unspecified pulmonary embolism type  unspecified whether acute cor pulmonale present (H) [I26.99]  Factor 5 Leiden mutation  heterozygous (H) [D68.51]  Other acute pulmonary embolism without acute cor pulmonale (H) (Resolved) [I26.99]  Acute pulmonary embolism  unspecified pulmonary embolism type  unspecified whether acute cor pulmonale present (H) (Resolved) [I26.99]  Antiphospholipid syndrome (H) [D68.61]  Long term current use of anticoagulant therapy [Z79.01]           Comments:  * [employer]         Anticoagulation Care  Providers     Provider Role Specialty Phone number    Jesse Farnsworth MD Referring Family Medicine [phone]    Yessy Lyn APRN CNP Referring Family Medicine [phone]

## 2022-01-11 ENCOUNTER — LAB (OUTPATIENT)
Dept: LAB | Facility: CLINIC | Age: 27
End: 2022-01-11
Attending: NURSE PRACTITIONER
Payer: COMMERCIAL

## 2022-01-11 ENCOUNTER — NURSE TRIAGE (OUTPATIENT)
Dept: NURSING | Facility: CLINIC | Age: 27
End: 2022-01-11

## 2022-01-11 DIAGNOSIS — Z20.822 CLOSE EXPOSURE TO 2019 NOVEL CORONAVIRUS: ICD-10-CM

## 2022-01-11 LAB — SARS-COV-2 RNA RESP QL NAA+PROBE: NEGATIVE

## 2022-01-11 PROCEDURE — U0005 INFEC AGEN DETEC AMPLI PROBE: HCPCS

## 2022-01-11 PROCEDURE — U0003 INFECTIOUS AGENT DETECTION BY NUCLEIC ACID (DNA OR RNA); SEVERE ACUTE RESPIRATORY SYNDROME CORONAVIRUS 2 (SARS-COV-2) (CORONAVIRUS DISEASE [COVID-19]), AMPLIFIED PROBE TECHNIQUE, MAKING USE OF HIGH THROUGHPUT TECHNOLOGIES AS DESCRIBED BY CMS-2020-01-R: HCPCS

## 2022-01-12 ENCOUNTER — NURSE TRIAGE (OUTPATIENT)
Dept: NURSING | Facility: CLINIC | Age: 27
End: 2022-01-12
Payer: COMMERCIAL

## 2022-01-12 ENCOUNTER — PATIENT OUTREACH (OUTPATIENT)
Dept: CARE COORDINATION | Facility: CLINIC | Age: 27
End: 2022-01-12

## 2022-01-12 ENCOUNTER — DOCUMENTATION ONLY (OUTPATIENT)
Dept: FAMILY MEDICINE | Facility: CLINIC | Age: 27
End: 2022-01-12

## 2022-01-12 ENCOUNTER — HOSPITAL ENCOUNTER (EMERGENCY)
Facility: CLINIC | Age: 27
Discharge: HOME | End: 2022-01-12
Attending: EMERGENCY MEDICINE | Admitting: EMERGENCY MEDICINE
Payer: COMMERCIAL

## 2022-01-12 VITALS
HEART RATE: 79 BPM | DIASTOLIC BLOOD PRESSURE: 78 MMHG | OXYGEN SATURATION: 99 % | SYSTOLIC BLOOD PRESSURE: 105 MMHG | TEMPERATURE: 99.4 F | RESPIRATION RATE: 16 BRPM | WEIGHT: 103 LBS | HEIGHT: 61 IN | BODY MASS INDEX: 19.45 KG/M2

## 2022-01-12 DIAGNOSIS — U07.1 COVID-19: ICD-10-CM

## 2022-01-12 DIAGNOSIS — D68.51 FACTOR 5 LEIDEN MUTATION, HETEROZYGOUS (H): ICD-10-CM

## 2022-01-12 DIAGNOSIS — Z79.01 LONG TERM CURRENT USE OF ANTICOAGULANT THERAPY: ICD-10-CM

## 2022-01-12 DIAGNOSIS — D68.61 ANTIPHOSPHOLIPID SYNDROME (H): ICD-10-CM

## 2022-01-12 DIAGNOSIS — I26.99 ACUTE PULMONARY EMBOLISM, UNSPECIFIED PULMONARY EMBOLISM TYPE, UNSPECIFIED WHETHER ACUTE COR PULMONALE PRESENT (H): Primary | ICD-10-CM

## 2022-01-12 LAB
ALBUMIN SERPL-MCNC: 3.3 G/DL (ref 3.4–5)
ALP SERPL-CCNC: 51 U/L (ref 40–150)
ALT SERPL W P-5'-P-CCNC: 20 U/L (ref 0–50)
ANION GAP SERPL CALCULATED.3IONS-SCNC: 7 MMOL/L (ref 3–14)
APAP SERPL-MCNC: 16 MG/L (ref 10–30)
AST SERPL W P-5'-P-CCNC: 25 U/L (ref 0–45)
BASOPHILS # BLD AUTO: 0 10E3/UL (ref 0–0.2)
BASOPHILS NFR BLD AUTO: 1 %
BILIRUB SERPL-MCNC: 0.3 MG/DL (ref 0.2–1.3)
BUN SERPL-MCNC: 9 MG/DL (ref 7–30)
CALCIUM SERPL-MCNC: 8.7 MG/DL (ref 8.5–10.1)
CHLORIDE BLD-SCNC: 108 MMOL/L (ref 94–109)
CO2 SERPL-SCNC: 26 MMOL/L (ref 20–32)
CREAT SERPL-MCNC: 0.69 MG/DL (ref 0.52–1.04)
CRP SERPL-MCNC: 7.2 MG/L (ref 0–8)
EOSINOPHIL # BLD AUTO: 0 10E3/UL (ref 0–0.7)
EOSINOPHIL NFR BLD AUTO: 1 %
ERYTHROCYTE [DISTWIDTH] IN BLOOD BY AUTOMATED COUNT: 11.5 % (ref 10–15)
FLUAV RNA SPEC QL NAA+PROBE: NEGATIVE
FLUBV RNA RESP QL NAA+PROBE: NEGATIVE
GFR SERPL CREATININE-BSD FRML MDRD: >90 ML/MIN/1.73M2
GLUCOSE BLD-MCNC: 101 MG/DL (ref 70–99)
HCT VFR BLD AUTO: 39.3 % (ref 35–47)
HGB BLD-MCNC: 13.4 G/DL (ref 11.7–15.7)
IMM GRANULOCYTES # BLD: 0 10E3/UL
IMM GRANULOCYTES NFR BLD: 0 %
INR PPP: 2.66 (ref 0.85–1.15)
LYMPHOCYTES # BLD AUTO: 0.8 10E3/UL (ref 0.8–5.3)
LYMPHOCYTES NFR BLD AUTO: 20 %
MCH RBC QN AUTO: 30.6 PG (ref 26.5–33)
MCHC RBC AUTO-ENTMCNC: 34.1 G/DL (ref 31.5–36.5)
MCV RBC AUTO: 90 FL (ref 78–100)
MONOCYTES # BLD AUTO: 0.6 10E3/UL (ref 0–1.3)
MONOCYTES NFR BLD AUTO: 16 %
NEUTROPHILS # BLD AUTO: 2.5 10E3/UL (ref 1.6–8.3)
NEUTROPHILS NFR BLD AUTO: 62 %
NRBC # BLD AUTO: 0 10E3/UL
NRBC BLD AUTO-RTO: 0 /100
PLATELET # BLD AUTO: 159 10E3/UL (ref 150–450)
POTASSIUM BLD-SCNC: 4 MMOL/L (ref 3.4–5.3)
PROT SERPL-MCNC: 7 G/DL (ref 6.8–8.8)
RBC # BLD AUTO: 4.38 10E6/UL (ref 3.8–5.2)
SARS-COV-2 RNA RESP QL NAA+PROBE: POSITIVE
SODIUM SERPL-SCNC: 141 MMOL/L (ref 133–144)
WBC # BLD AUTO: 4 10E3/UL (ref 4–11)

## 2022-01-12 PROCEDURE — 99285 EMERGENCY DEPT VISIT HI MDM: CPT | Mod: 25 | Performed by: EMERGENCY MEDICINE

## 2022-01-12 PROCEDURE — 86140 C-REACTIVE PROTEIN: CPT | Performed by: EMERGENCY MEDICINE

## 2022-01-12 PROCEDURE — 96361 HYDRATE IV INFUSION ADD-ON: CPT | Performed by: EMERGENCY MEDICINE

## 2022-01-12 PROCEDURE — 36415 COLL VENOUS BLD VENIPUNCTURE: CPT | Performed by: EMERGENCY MEDICINE

## 2022-01-12 PROCEDURE — 85610 PROTHROMBIN TIME: CPT | Performed by: EMERGENCY MEDICINE

## 2022-01-12 PROCEDURE — 96374 THER/PROPH/DIAG INJ IV PUSH: CPT | Performed by: EMERGENCY MEDICINE

## 2022-01-12 PROCEDURE — 80143 DRUG ASSAY ACETAMINOPHEN: CPT | Performed by: EMERGENCY MEDICINE

## 2022-01-12 PROCEDURE — C9803 HOPD COVID-19 SPEC COLLECT: HCPCS | Performed by: EMERGENCY MEDICINE

## 2022-01-12 PROCEDURE — 99284 EMERGENCY DEPT VISIT MOD MDM: CPT | Performed by: EMERGENCY MEDICINE

## 2022-01-12 PROCEDURE — 258N000003 HC RX IP 258 OP 636: Performed by: EMERGENCY MEDICINE

## 2022-01-12 PROCEDURE — 80053 COMPREHEN METABOLIC PANEL: CPT | Performed by: EMERGENCY MEDICINE

## 2022-01-12 PROCEDURE — 87636 SARSCOV2 & INF A&B AMP PRB: CPT | Performed by: EMERGENCY MEDICINE

## 2022-01-12 PROCEDURE — 96375 TX/PRO/DX INJ NEW DRUG ADDON: CPT | Performed by: EMERGENCY MEDICINE

## 2022-01-12 PROCEDURE — 85025 COMPLETE CBC W/AUTO DIFF WBC: CPT | Performed by: EMERGENCY MEDICINE

## 2022-01-12 PROCEDURE — 250N000011 HC RX IP 250 OP 636: Performed by: EMERGENCY MEDICINE

## 2022-01-12 RX ORDER — ACETAMINOPHEN 500 MG
1000 TABLET ORAL EVERY 8 HOURS PRN
Qty: 30 TABLET | Refills: 0 | COMMUNITY
Start: 2022-01-12 | End: 2022-01-17

## 2022-01-12 RX ORDER — DIPHENHYDRAMINE HYDROCHLORIDE 50 MG/ML
50 INJECTION INTRAMUSCULAR; INTRAVENOUS ONCE
Status: COMPLETED | OUTPATIENT
Start: 2022-01-12 | End: 2022-01-12

## 2022-01-12 RX ORDER — KETOROLAC TROMETHAMINE 15 MG/ML
15 INJECTION, SOLUTION INTRAMUSCULAR; INTRAVENOUS ONCE
Status: COMPLETED | OUTPATIENT
Start: 2022-01-12 | End: 2022-01-12

## 2022-01-12 RX ORDER — DEXAMETHASONE SODIUM PHOSPHATE 10 MG/ML
10 INJECTION, SOLUTION INTRAMUSCULAR; INTRAVENOUS ONCE
Status: COMPLETED | OUTPATIENT
Start: 2022-01-12 | End: 2022-01-12

## 2022-01-12 RX ADMIN — DIPHENHYDRAMINE HYDROCHLORIDE 50 MG: 50 INJECTION, SOLUTION INTRAMUSCULAR; INTRAVENOUS at 03:04

## 2022-01-12 RX ADMIN — KETOROLAC TROMETHAMINE 15 MG: 15 INJECTION, SOLUTION INTRAMUSCULAR; INTRAVENOUS at 03:05

## 2022-01-12 RX ADMIN — PROCHLORPERAZINE EDISYLATE 10 MG: 5 INJECTION, SOLUTION INTRAMUSCULAR; INTRAVENOUS at 03:04

## 2022-01-12 RX ADMIN — DEXAMETHASONE SODIUM PHOSPHATE 10 MG: 10 INJECTION, SOLUTION INTRAMUSCULAR; INTRAVENOUS at 03:04

## 2022-01-12 RX ADMIN — SODIUM CHLORIDE 1000 ML: 9 INJECTION, SOLUTION INTRAVENOUS at 03:04

## 2022-01-12 ASSESSMENT — ENCOUNTER SYMPTOMS
FATIGUE: 1
VOMITING: 0
ABDOMINAL PAIN: 0
APPETITE CHANGE: 1
NUMBNESS: 0
FEVER: 1
CHILLS: 1
DIARRHEA: 0
CHEST TIGHTNESS: 0
HEADACHES: 1
SPEECH DIFFICULTY: 0
SHORTNESS OF BREATH: 0
LIGHT-HEADEDNESS: 0
PHOTOPHOBIA: 0
WEAKNESS: 0
DYSURIA: 0
BACK PAIN: 0
COUGH: 1
SORE THROAT: 0
NAUSEA: 0

## 2022-01-12 ASSESSMENT — MIFFLIN-ST. JEOR: SCORE: 1139.58

## 2022-01-12 NOTE — ED TRIAGE NOTES
Pt reports developing headache, chills, and body aches that began Monday. Headache unrelieved by tylenol. Pt tested negative for covid yesterday. Pt has hx of factor V and on coumadin. Denies any head trauma    Denies n/v/d, fevers. Pt is 2 shot covid vax (last in April).     Will do covid/influenza swab in triage.

## 2022-01-12 NOTE — ED PROVIDER NOTES
History     Chief Complaint   Patient presents with     Headache     Generalized Body Aches     HPI  Rocío Catherine is a 26 year old female with history of factor V Leiden on chronic Coumadin therapy pending for evaluation of fever, chills, occasional cough, and body aches.  Symptoms began on Monday evening.  Has been taking 1 g of Tylenol every 4 hours ever since for symptom control.  Last Coumadin level was more than a week ago.  Denies any bleeding.  Reports ongoing body aches and diffuse frontal headache.  No numbness, tingling, or weakness.  Reports some nausea but no vomiting.  Has had a decreased appetite as well.  Was immunized for COVID in March and April but has not received a booster.    Allergies:  No Known Allergies    Problem List:    Patient Active Problem List    Diagnosis Date Noted     Long term current use of anticoagulant therapy 10/29/2021     Priority: Medium     Antiphospholipid syndrome (H) 2021     Priority: Medium     Acute pulmonary embolism, unspecified pulmonary embolism type, unspecified whether acute cor pulmonale present (H) 12/15/2020     Priority: Medium     Acute pulmonary embolism (H) 2020     Priority: Medium     S/P  2020     Priority: Medium     Palpitations 2019     Priority: Medium     Chronic migraine without aura without status migrainosus, not intractable 2019     Priority: Medium     Sinus tachycardia 2019     Priority: Medium     Non-rheumatic mitral regurgitation, trace 2019     Priority: Medium     Seen on echocardiogram 2019. Recommended for follow up echo in 2-3 years.        Recurrent major depressive disorder, in remission (H) 2018     Priority: Medium     Anxiety 2018     Priority: Medium     Factor 5 Leiden mutation, heterozygous (H) 2018     Priority: Medium     Anemia 2016     Priority: Medium     Dysthymia 2013     Priority: Medium        Past Medical History:    Past Medical  History:   Diagnosis Date     Calculus of gallbladder with acute cholecystitis without obstruction 2017     Chickenpox      Depression      Factor 5 Leiden mutation, heterozygous (H) 2018     Factor V Leiden (H)      History of foot fracture      History of frequent urinary tract infections      History of pyelonephritis      History of recurrent UTI (urinary tract infection)      Non-rheumatic mitral regurgitation, trace 2019     Ovarian cyst      Postpartum depression      Pulmonary emboli (H) 2020       Past Surgical History:    Past Surgical History:   Procedure Laterality Date      SECTION N/A 2020    Procedure:  SECTION;  Surgeon: Grace Dee MD;  Location: [facility] OR      SECTION      c section     ENT SURGERY       GYN SURGERY  Csections ()     HEAD & NECK SURGERY      hemangioma on neck at age 2     LAPAROSCOPIC CHOLECYSTECTOMY       OTHER SURGICAL HISTORY      Spinal Cord Surgery     SINUS FRONTAL OPEN OBLITERATION             Family History:    Family History   Problem Relation Age of Onset     Stomach Problem Mother         ulcer     Bleeding Disorder Father         factor V     Ovarian Cancer Maternal Grandmother      Other Cancer Maternal Grandmother      Thyroid Cancer Paternal Grandmother      Alzheimer Disease Paternal Grandfather      Bleeding Disorder Paternal Grandfather         factor V     Bleeding Disorder Daughter         Factor V Leiden       Social History:  Marital Status:   [2]  Social History     Tobacco Use     Smoking status: Never Smoker     Smokeless tobacco: Never Used   Substance Use Topics     Alcohol use: Yes     Comment:  rare     Drug use: No        Medications:    acetaminophen (TYLENOL) 500 MG tablet  budesonide (PULMICORT) 0.5 MG/2ML neb solution  hydrOXYzine (ATARAX) 25 MG tablet  sertraline (ZOLOFT) 50 MG tablet  warfarin ANTICOAGULANT (JANTOVEN ANTICOAGULANT) 5 MG tablet          Review of  "Systems   Constitutional: Positive for appetite change, chills, fatigue and fever.   HENT: Negative for congestion and sore throat.    Eyes: Negative for photophobia and visual disturbance.   Respiratory: Positive for cough (Occasional). Negative for chest tightness and shortness of breath.    Cardiovascular: Negative for chest pain.   Gastrointestinal: Negative for abdominal pain, diarrhea, nausea and vomiting.   Genitourinary: Positive for decreased urine volume (Slightly). Negative for dysuria.   Musculoskeletal: Negative for back pain.   Skin: Negative for rash.   Neurological: Positive for headaches. Negative for speech difficulty, weakness, light-headedness and numbness.   All other systems reviewed and are negative.      Physical Exam   BP: 112/81  Pulse: 116  Temp: 99.4  F (37.4  C)  Resp: 16  Height: 154.9 cm (5' 1\")  Weight: 46.7 kg (103 lb)  SpO2: 100 %      Physical Exam  Vitals and nursing note reviewed.   Constitutional:       Appearance: Normal appearance. She is not ill-appearing or diaphoretic.   HENT:      Head: Atraumatic.      Nose: Nose normal.      Mouth/Throat:      Mouth: Mucous membranes are moist.   Cardiovascular:      Rate and Rhythm: Normal rate and regular rhythm.      Pulses: Normal pulses.      Heart sounds: Normal heart sounds.   Pulmonary:      Effort: Pulmonary effort is normal.      Breath sounds: Normal breath sounds.   Abdominal:      Palpations: Abdomen is soft.      Tenderness: There is no abdominal tenderness.   Musculoskeletal:         General: Normal range of motion.      Cervical back: Normal range of motion.   Skin:     General: Skin is warm and dry.      Capillary Refill: Capillary refill takes less than 2 seconds.   Neurological:      Mental Status: She is alert and oriented to person, place, and time.   Psychiatric:         Mood and Affect: Mood normal.         ED Course                 Procedures              Critical Care time:  none               Results for orders " placed or performed during the hospital encounter of 01/12/22 (from the past 24 hour(s))   Symptomatic; Yes; 1/10/2022 Influenza A/B & SARS-CoV2 (COVID-19) Virus PCR Multiplex Nasopharyngeal    Specimen: Nasopharyngeal; Swab   Result Value Ref Range    Influenza A PCR Negative Negative    Influenza B PCR Negative Negative    SARS CoV2 PCR Positive (A) Negative    Narrative    Testing was performed using the zahira SARS-CoV-2 & Influenza A/B Assay on the zahira Dinorah System. This test should be ordered for the detection of SARS-CoV-2 and influenza viruses in individuals who meet clinical and/or epidemiological criteria. Test performance is unknown in asymptomatic patients. This test is for in vitro diagnostic use under the FDA EUA for laboratories certified under CLIA to perform moderate and/or high complexity testing. This test has not been FDA cleared or approved. A negative result does not rule out the presence of PCR inhibitors in the specimen or target RNA in concentration below the limit of detection for the assay. If only one viral target is positive but coinfection with multiple targets is suspected, the sample should be re-tested with another FDA cleared, approved or authorized test, if coinfection would change clinical management. [facility] are certified under the Clinical Laboratory Improvement Amendments of 1988 (CLIA-88) as  qualified to perform moderate and/or high complexity laboratory testing.   CBC with platelets differential    Narrative    The following orders were created for panel order CBC with platelets differential.  Procedure                               Abnormality         Status                     ---------                               -----------         ------                     CBC with platelets and d...[[id number]]                      Final result                 Please view results for these tests on the individual orders.   INR   Result Value Ref Range    INR  2.66 (H) 0.85 - 1.15   Comprehensive metabolic panel   Result Value Ref Range    Sodium 141 133 - 144 mmol/L    Potassium 4.0 3.4 - 5.3 mmol/L    Chloride 108 94 - 109 mmol/L    Carbon Dioxide (CO2) 26 20 - 32 mmol/L    Anion Gap 7 3 - 14 mmol/L    Urea Nitrogen 9 7 - 30 mg/dL    Creatinine 0.69 0.52 - 1.04 mg/dL    Calcium 8.7 8.5 - 10.1 mg/dL    Glucose 101 (H) 70 - 99 mg/dL    Alkaline Phosphatase 51 40 - 150 U/L    AST 25 0 - 45 U/L    ALT 20 0 - 50 U/L    Protein Total 7.0 6.8 - 8.8 g/dL    Albumin 3.3 (L) 3.4 - 5.0 g/dL    Bilirubin Total 0.3 0.2 - 1.3 mg/dL    GFR Estimate >90 >60 mL/min/1.73m2   CRP Inflammation   Result Value Ref Range    CRP Inflammation 7.2 0.0 - 8.0 mg/L   Acetaminophen level   Result Value Ref Range    Acetaminophen 16 10 - 30 mg/L   CBC with platelets and differential   Result Value Ref Range    WBC Count 4.0 4.0 - 11.0 10e3/uL    RBC Count 4.38 3.80 - 5.20 10e6/uL    Hemoglobin 13.4 11.7 - 15.7 g/dL    Hematocrit 39.3 35.0 - 47.0 %    MCV 90 78 - 100 fL    MCH 30.6 26.5 - 33.0 pg    MCHC 34.1 31.5 - 36.5 g/dL    RDW 11.5 10.0 - 15.0 %    Platelet Count 159 150 - 450 10e3/uL    % Neutrophils 62 %    % Lymphocytes 20 %    % Monocytes 16 %    % Eosinophils 1 %    % Basophils 1 %    % Immature Granulocytes 0 %    NRBCs per 100 WBC 0 <1 /100    Absolute Neutrophils 2.5 1.6 - 8.3 10e3/uL    Absolute Lymphocytes 0.8 0.8 - 5.3 10e3/uL    Absolute Monocytes 0.6 0.0 - 1.3 10e3/uL    Absolute Eosinophils 0.0 0.0 - 0.7 10e3/uL    Absolute Basophils 0.0 0.0 - 0.2 10e3/uL    Absolute Immature Granulocytes 0.0 <=0.4 10e3/uL    Absolute NRBCs 0.0 10e3/uL       Medications   prochlorperazine (COMPAZINE) injection 10 mg (10 mg Intravenous Given 1/12/22 0304)   diphenhydrAMINE (BENADRYL) injection 50 mg (50 mg Intravenous Given 1/12/22 0304)   dexamethasone PF (DECADRON) injection 10 mg (10 mg Intravenous Given 1/12/22 0304)   ketorolac (TORADOL) injection 15 mg (15 mg Intravenous Given 1/12/22 0305)    0.9% sodium chloride BOLUS (1,000 mLs Intravenous New Bag 1/12/22 0304)     4:22 AM Patient re-assessed: Resting comfortably.  Symptoms resolved.  Advised of positive COVID test and need to quarantine.    Assessments & Plan (with Medical Decision Making)  26-year-old with factor V Leiden presenting for evaluation of fever, chills, cough, body aches.  Tested positive for COVID here.  Has been taking an extra amount of Tylenol.  Thankfully LFTs and Tylenol level are normal.  INR therapeutic.  After discussion with risk and benefits, treated for headache and symptoms with a single dose of Toradol along with other medications as above.  Continue symptomatic treatment at home.  Return if symptoms worsen.     I have reviewed the nursing notes.    I have reviewed the findings, diagnosis, plan and need for follow up with the patient.       New Prescriptions    ACETAMINOPHEN (TYLENOL) 500 MG TABLET    Take 2 tablets (1,000 mg) by mouth every 8 hours as needed for fever or pain       Final diagnoses:   COVID-19       1/12/2022   [facility] EMERGENCY DEPT     Phelan, Jovani Almanza MD  01/12/22 5920

## 2022-01-12 NOTE — PROGRESS NOTES
Clinic Care Coordination Contact    Care Coordination ED Discharge Follow up Note    Patient referred for Virtual Home Monitoring Program for COVID-19 following recent [facility] ED visit.    RN has confirmed a [facility] referral is in place.    Criteria for Virtual Home Monitoring telephone outreach is not met after review of ED encounter/ED provider note because:    1) ED provider note indicates assessment was negative for respiratory distress. O2 sats were stable throughout course of ED visit per chart review.      2) Patient was not discharged with new supplemental oxygen.    Per notes, ED provider and/or ED team discussed appropriate follow up guidelines with patient prior to discharge, or reflected these instructions on AVS.       [employer] team remains available to support patient via [employer] account once activated by patient.     Evelyn Rodriguez RN  [facility]  - [facility] Care Coordinator

## 2022-01-12 NOTE — PROGRESS NOTES
ANTICOAGULATION  MANAGEMENT: Discharge Review    Rocío Catherine chart reviewed for anticoagulation continuity of care    ER visit on 01/12/22  for COVID 19.    Discharge disposition: Home on isolation     Results:    Recent labs: (last 7 days)     01/12/22  0302   INR 2.66*     Anticoagulation inpatient management:     not applicable     Anticoagulation discharge instructions:     Warfarin dosing: home regimen continued   Bridging: No   INR goal change: No      Medication changes affecting anticoagulation: Yes: ongoing use of tylenol for COVID 19 symptom management     Additional factors affecting anticoagulation: No    Plan     No adjustment to anticoagulation plan needed    Dragonflyt message sent     No adjustment to Anticoagulation Calendar was required    Lucia Pantoja, RN  Lucia Mcbride, RN, BSN, PHN  Anticoagulation Nurse  [phone]

## 2022-01-12 NOTE — TELEPHONE ENCOUNTER
"Pt  called in states fatigue.  Pt has also headache.  The Pt able to sand walk.  The symptom started yesterday.  No cough.  No fever.  No new medication started.  No dizziness.  No chest pain.  No shortness of breathing.  No vomit.  No diarrhea.  No bleeding.  Pt is not pregnant.  The disposition is to be seen with in 24 hours.  Care advice given per protocol.  Patient agree agrees with care advice given.   Agreed to call back if he has additional symptoms or questions.    Naveen [place] Nurse Advisor 1/11/2022 9:38 PM        Reason for Disposition    [1] MODERATE weakness (i.e., interferes with work, school, normal activities) AND [2] persists > 3 days    Additional Information    Negative: Severe difficulty breathing (e.g., struggling for each breath, speaks in single words)    Negative: Shock suspected (e.g., cold/pale/clammy skin, too weak to stand, low BP, rapid pulse)    Negative: Difficult to awaken or acting confused (e.g., disoriented, slurred speech)    Negative: [1] Fainted > 15 minutes ago AND [2] still feels too weak or dizzy to stand    Negative: [1] SEVERE weakness (i.e., unable to walk or barely able to walk, requires support) AND [2] new onset or worsening    Negative: Sounds like a life-threatening emergency to the triager    Negative: Weakness of the face, arm or leg on one side of the body    Negative: [1] Has diabetes (diabetes mellitus) AND [2] weakness from low blood sugar (i.e., < 60 mg/dl or 3.5 mmol/l)    Negative: Heat exhaustion suspected (i.e., dehydration from heat exposure)    Negative: Chest pain    Negative: Vomiting is main symptom    Negative: Diarrhea is main symptom    Negative: Difficulty breathing    Negative: Heart beating < 50 beats per minute OR > 140 beats per minute    Negative: Extra heart beats OR irregular heart beating   (i.e., \"palpitations\")    Negative: Follows bleeding (e.g., from vomiting, rectum, vagina; Exception: small brief weakness from sight of " a small amount blood)    Negative: Black or tarry bowel movements    Negative: [1] Drinking very little AND [2] dehydration suspected (e.g., no urine > 12 hours, very dry mouth, very lightheaded)    Negative: Patient sounds very sick or weak to the triager    Negative: [1] MODERATE weakness (i.e., interferes with work, school, normal activities) AND [2] cause unknown  (Exceptions: weakness with acute minor illness, or weakness from poor fluid intake)    Negative: [1] MODERATE weakness AND [2] from poor fluid intake AND [3] no improvement after 2 hours of rest and fluids    Negative: [1] Fever > 103 F (39.4 C) AND [2] not able to get the fever down using Fever Care Advice    Negative: [1] Fever > 101 F (38.3 C) AND [2] age > 60    Negative: [1] Fever > 100.0 F (37.8 C) AND [2] bedridden (e.g., nursing home patient, CVA, chronic illness, recovering from surgery)    Negative: [1] Fever > 100.0 F (37.8 C) AND [2] diabetes mellitus or weak immune system (e.g., HIV positive, cancer chemo, splenectomy, organ transplant, chronic steroids)    Negative: Pale skin (pallor)    Protocols used: WEAKNESS (GENERALIZED) AND FATIGUE-A-AH

## 2022-01-12 NOTE — TELEPHONE ENCOUNTER
"Patient ok for me to speak to Shawanda, her mom.  Started mon with headache and body aches.  Today had a slight fever, chills and headache.  Patient is factor V.  Patient states she feels pressure temples, forehead and top of the head.  This is the worst headache and rates her pain 10/10.  Has been taking tylenol and it isnt helping.    Had a covid test and was negative.    Care advise given to be seen in ED.  Plans to go there and has a family member to drive her.    Margie Coello RN   01/12/22 12:31 AM  [facility] Nurse Advisor      Reason for Disposition    [1] SEVERE headache (e.g., excruciating) AND [2] \"worst headache\" of life    Additional Information    Negative: Difficult to awaken or acting confused (e.g., disoriented, slurred speech)    Negative: [1] Weakness of the face, arm or leg on one side of the body AND [2] new onset    Negative: [1] Numbness of the face, arm or leg on one side of the body AND [2] new onset    Negative: [1] Loss of speech or garbled speech AND [2] new onset    Negative: Passed out (i.e., lost consciousness, collapsed and was not responding)    Negative: Sounds like a life-threatening emergency to the triager    Negative: Followed a head injury    Negative: Pregnant    Negative: Postpartum (from 0 to 6 weeks after delivery)    Negative: Traumatic Brain Injury (TBI) is suspected    Negative: Unable to walk, or can only walk with assistance (e.g., requires support)    Negative: Stiff neck (can't touch chin to chest)    Negative: Severe pain in one eye    Negative: [1] Other family members (or roommates) with headaches AND [2] possibility of carbon monoxide exposure    Protocols used: HEADACHE-A-AH      "

## 2022-01-13 ENCOUNTER — TELEPHONE (OUTPATIENT)
Dept: FAMILY MEDICINE | Facility: CLINIC | Age: 27
End: 2022-01-13
Payer: COMMERCIAL

## 2022-01-13 NOTE — TELEPHONE ENCOUNTER
Spoke with pt. Her symptoms are improving. Will have an INR done on Monday as she is at risk for her INR going up (Tylenol use, COVID).

## 2022-01-13 NOTE — TELEPHONE ENCOUNTER
Reason for Call:  INR follow up    Detailed comments: Patient would like a call back. States she tested positive for Covid 2 days ago and has questions on INR dosing. Please call to advise.     Phone Number Patient can be reached at: Home number on file [phone] (home)    Best Time: any    Can we leave a detailed message on this number? YES    Call taken on 1/13/2022 at 12:18 PM by Rita Taylor

## 2022-01-17 ENCOUNTER — MYC MEDICAL ADVICE (OUTPATIENT)
Dept: ANTICOAGULATION | Facility: CLINIC | Age: 27
End: 2022-01-17
Payer: COMMERCIAL

## 2022-01-17 ENCOUNTER — ANTICOAGULATION THERAPY VISIT (OUTPATIENT)
Dept: ANTICOAGULATION | Facility: CLINIC | Age: 27
End: 2022-01-17
Payer: COMMERCIAL

## 2022-01-17 DIAGNOSIS — D68.51 FACTOR 5 LEIDEN MUTATION, HETEROZYGOUS (H): ICD-10-CM

## 2022-01-17 DIAGNOSIS — I26.99 ACUTE PULMONARY EMBOLISM, UNSPECIFIED PULMONARY EMBOLISM TYPE, UNSPECIFIED WHETHER ACUTE COR PULMONALE PRESENT (H): Primary | ICD-10-CM

## 2022-01-17 DIAGNOSIS — D68.61 ANTIPHOSPHOLIPID SYNDROME (H): ICD-10-CM

## 2022-01-17 DIAGNOSIS — Z79.01 LONG TERM CURRENT USE OF ANTICOAGULANT THERAPY: ICD-10-CM

## 2022-01-17 LAB — INR HOME MONITORING: 4.9 (ref 2–3)

## 2022-01-17 NOTE — PROGRESS NOTES
ANTICOAGULATION MANAGEMENT     Rocío Catherine 26 year old female is on warfarin with supratherapeutic INR result. (Goal INR 2.0-3.0)    Recent labs: (last 7 days)     01/17/22  0000   INR 4.90*       ASSESSMENT     Source(s): Chart Review and Patient/Caregiver Call       Warfarin doses taken: Warfarin taken as instructed    Diet: No new diet changes identified  New illness, injury, or hospitalization: Yes: I am on day 7 post covid, that s really the only change. I do have a headache but I m still recovering.         Medication/supplement changes: None noted    Signs or symptoms of bleeding or clotting: No    Previous INR: Therapeutic last 2(+) visits    Additional findings: None     PLAN     Recommended plan for temporary change(s) affecting INR     Dosing Instructions: Hold a todays dose and then decrease to 10 mg Tuesday, 7.5 mg on Wednesday and Thursday with next INR in 4 days       Summary  As of 1/17/2022    Full warfarin instructions:  7.5 mg every Mon, Wed, Fri; 10 mg all other days   Next INR check:               Sent [employer] message with dosing and follow up instructions    Patient to recheck with home meter    Education provided: Covid and the possiblity of high INR    Plan made with [facility] Pharmacist Demetria Valero, RN  Anticoagulation Clinic  1/17/2022    _______________________________________________________________________     Anticoagulation Episode Summary     Current INR goal:  2.0-3.0   TTR:  43.4 % (1 y)   Target end date:  Indefinite   Send INR reminders to:  JENNIFER LECHUGA    Indications    Acute pulmonary embolism  unspecified pulmonary embolism type  unspecified whether acute cor pulmonale present (H) [I26.99]  Factor 5 Leiden mutation  heterozygous (H) [D68.51]  Other acute pulmonary embolism without acute cor pulmonale (H) (Resolved) [I26.99]  Acute pulmonary embolism  unspecified pulmonary embolism type  unspecified whether acute cor pulmonale present (H) (Resolved)  [I26.99]  Antiphospholipid syndrome (H) [D68.61]  Long term current use of anticoagulant therapy [Z79.01]           Comments:  * [employer]         Anticoagulation Care Providers     Provider Role Specialty Phone number    Jesse Farnsworth MD Referring Family Medicine [phone]    Yessy Lyn APRN [facility] Referring Family Medicine [phone]

## 2022-01-17 NOTE — PROGRESS NOTES
Sent MyChart to the patient with the questions for dosing.    Shelby Valero RN    [facility] Anticoagulation [facility]

## 2022-01-18 ENCOUNTER — TELEPHONE (OUTPATIENT)
Dept: RHEUMATOLOGY | Facility: CLINIC | Age: 27
End: 2022-01-18
Payer: COMMERCIAL

## 2022-01-21 ENCOUNTER — ANTICOAGULATION THERAPY VISIT (OUTPATIENT)
Dept: ANTICOAGULATION | Facility: CLINIC | Age: 27
End: 2022-01-21
Payer: COMMERCIAL

## 2022-01-21 ENCOUNTER — TRANSFERRED RECORDS (OUTPATIENT)
Dept: HEALTH INFORMATION MANAGEMENT | Facility: CLINIC | Age: 27
End: 2022-01-21
Payer: COMMERCIAL

## 2022-01-21 DIAGNOSIS — Z79.01 LONG TERM CURRENT USE OF ANTICOAGULANT THERAPY: ICD-10-CM

## 2022-01-21 DIAGNOSIS — D68.61 ANTIPHOSPHOLIPID SYNDROME (H): ICD-10-CM

## 2022-01-21 DIAGNOSIS — I26.99 ACUTE PULMONARY EMBOLISM, UNSPECIFIED PULMONARY EMBOLISM TYPE, UNSPECIFIED WHETHER ACUTE COR PULMONALE PRESENT (H): Primary | ICD-10-CM

## 2022-01-21 DIAGNOSIS — D68.51 FACTOR 5 LEIDEN MUTATION, HETEROZYGOUS (H): ICD-10-CM

## 2022-01-21 LAB — INR HOME MONITORING: 4.4 (ref 2–3)

## 2022-01-21 NOTE — PROGRESS NOTES
ANTICOAGULATION MANAGEMENT     Rocío Catherine 26 year old female is on warfarin with supratherapeutic INR result. (Goal INR 2.0-3.0)    Recent labs: (last 7 days)     01/21/22  0000   INR 4.40*       ASSESSMENT     Source(s): Chart Review and Patient/Caregiver Call       Warfarin doses taken: Warfarin taken as instructed    Diet: No new diet changes identified    New illness, injury, or hospitalization: Yes: Recovering from COVID. Only remaining symptom is headache.    Medication/supplement changes: None noted. Taking more tylenol, about 1,000-2,000/day.     Signs or symptoms of bleeding or clotting: No    Previous INR: Supratherapeutic    Additional findings: None     PLAN     Recommended plan for temporary change(s) affecting INR     Dosing Instructions: Hold 1.5 doses then continue your current warfarin dose with next INR in 3 days       Summary  As of 1/21/2022    Full warfarin instructions:  1/21: Hold; 1/22: 5 mg; Otherwise 7.5 mg every Mon, Wed, Fri; 10 mg all other days   Next INR check:  1/24/2022             Telephone call with Rocío who verbalizes understanding and agrees to plan    Patient to recheck with home meter    Education provided: Goal range and significance of current result    Plan made per [facility] anticoagulation protocol    Eduardo Don RN  Anticoagulation Clinic  1/21/2022    _______________________________________________________________________     Anticoagulation Episode Summary     Current INR goal:  2.0-3.0   TTR:  43.4 % (1 y)   Target end date:  Indefinite   Send INR reminders to:  JENNIFER LECHUGA    Indications    Acute pulmonary embolism  unspecified pulmonary embolism type  unspecified whether acute cor pulmonale present (H) [I26.99]  Factor 5 Leiden mutation  heterozygous (H) [D68.51]  Other acute pulmonary embolism without acute cor pulmonale (H) (Resolved) [I26.99]  Acute pulmonary embolism  unspecified pulmonary embolism type  unspecified whether acute cor pulmonale present (H)  (Resolved) [I26.99]  Antiphospholipid syndrome (H) [D68.61]  Long term current use of anticoagulant therapy [Z79.01]           Comments:  [employer] home meter         Anticoagulation Care Providers     Provider Role Specialty Phone number    Jesse Farnsworth MD Referring Family Medicine [phone]    EboniYessy martinez APRN [facility] Referring Family Medicine [phone]

## 2022-01-24 ENCOUNTER — ANTICOAGULATION THERAPY VISIT (OUTPATIENT)
Dept: ANTICOAGULATION | Facility: CLINIC | Age: 27
End: 2022-01-24
Payer: COMMERCIAL

## 2022-01-24 DIAGNOSIS — I26.99 ACUTE PULMONARY EMBOLISM, UNSPECIFIED PULMONARY EMBOLISM TYPE, UNSPECIFIED WHETHER ACUTE COR PULMONALE PRESENT (H): Primary | ICD-10-CM

## 2022-01-24 DIAGNOSIS — D68.51 FACTOR 5 LEIDEN MUTATION, HETEROZYGOUS (H): ICD-10-CM

## 2022-01-24 DIAGNOSIS — D68.61 ANTIPHOSPHOLIPID SYNDROME (H): ICD-10-CM

## 2022-01-24 DIAGNOSIS — Z79.01 LONG TERM CURRENT USE OF ANTICOAGULANT THERAPY: ICD-10-CM

## 2022-01-24 LAB — INR HOME MONITORING: 1.7 (ref 2–3)

## 2022-01-31 ENCOUNTER — ANTICOAGULATION THERAPY VISIT (OUTPATIENT)
Dept: FAMILY MEDICINE | Facility: CLINIC | Age: 27
End: 2022-01-31
Payer: COMMERCIAL

## 2022-01-31 ENCOUNTER — DOCUMENTATION ONLY (OUTPATIENT)
Dept: FAMILY MEDICINE | Facility: CLINIC | Age: 27
End: 2022-01-31
Payer: COMMERCIAL

## 2022-01-31 DIAGNOSIS — Z79.01 LONG TERM CURRENT USE OF ANTICOAGULANT THERAPY: ICD-10-CM

## 2022-01-31 DIAGNOSIS — I26.99 ACUTE PULMONARY EMBOLISM, UNSPECIFIED PULMONARY EMBOLISM TYPE, UNSPECIFIED WHETHER ACUTE COR PULMONALE PRESENT (H): Primary | ICD-10-CM

## 2022-01-31 DIAGNOSIS — D68.51 FACTOR 5 LEIDEN MUTATION, HETEROZYGOUS (H): ICD-10-CM

## 2022-01-31 DIAGNOSIS — D68.61 ANTIPHOSPHOLIPID SYNDROME (H): ICD-10-CM

## 2022-01-31 LAB — INR HOME MONITORING: 3.8 (ref 2–3)

## 2022-01-31 NOTE — PROGRESS NOTES
ANTICOAGULATION MANAGEMENT     Rocío Catherine 26 year old female is on warfarin with supratherapeutic INR result. (Goal INR 2.0-3.0)    Recent labs: (last 7 days)     01/31/22  0000   INR 3.80*       ASSESSMENT     Source(s): Chart Review and Patient/Caregiver Call       Warfarin doses taken: Warfarin taken as instructed    Diet: No new diet changes identified    New illness, injury, or hospitalization: No    Medication/supplement changes: None noted    Signs or symptoms of bleeding or clotting: No    Previous INR: Subtherapeutic    Additional findings: None     PLAN     Recommended plan for no diet, medication or health factor changes affecting INR     Dosing Instructions: Partial hold then Decrease your warfarin dose (8% change) with next INR in 1 week       Summary  As of 1/31/2022    Full warfarin instructions:  1/31: 5 mg; Otherwise 10 mg every Sun, Thu; 7.5 mg all other days   Next INR check:               Telephone call with Rocío who verbalizes understanding and agrees to plan    Patient to recheck with home meter    Education provided: Please call back if any changes to your diet, medications or how you've been taking warfarin    Plan made per [facility] anticoagulation protocol    Mayra Palma RN  Anticoagulation Clinic  1/31/2022    _______________________________________________________________________     Anticoagulation Episode Summary     Current INR goal:  2.0-3.0   TTR:  44.6 % (1 y)   Target end date:  Indefinite   Send INR reminders to:  JENNIFER LECHUGA    Indications    Acute pulmonary embolism  unspecified pulmonary embolism type  unspecified whether acute cor pulmonale present (H) [I26.99]  Factor 5 Leiden mutation  heterozygous (H) [D68.51]  Other acute pulmonary embolism without acute cor pulmonale (H) (Resolved) [I26.99]  Acute pulmonary embolism  unspecified pulmonary embolism type  unspecified whether acute cor pulmonale present (H) (Resolved) [I26.99]  Antiphospholipid syndrome (H)  [D68.61]  Long term current use of anticoagulant therapy [Z79.01]           Comments:  [employer] home meter         Anticoagulation Care Providers     Provider Role Specialty Phone number    Jesse Farnsworth MD Referring Family Medicine [phone]    Yessy Lyn APRN [facility] Referring Family Medicine [phone]

## 2022-01-31 NOTE — PROGRESS NOTES
ANTICOAGULATION     Rocío Catherine is overdue for INR check.      Mychart sent    Mayra Palma RN

## 2022-02-09 ENCOUNTER — DOCUMENTATION ONLY (OUTPATIENT)
Dept: FAMILY MEDICINE | Facility: CLINIC | Age: 27
End: 2022-02-09
Payer: COMMERCIAL

## 2022-02-09 ENCOUNTER — ANTICOAGULATION THERAPY VISIT (OUTPATIENT)
Dept: FAMILY MEDICINE | Facility: CLINIC | Age: 27
End: 2022-02-09
Payer: COMMERCIAL

## 2022-02-09 DIAGNOSIS — D68.51 FACTOR 5 LEIDEN MUTATION, HETEROZYGOUS (H): ICD-10-CM

## 2022-02-09 DIAGNOSIS — Z79.01 LONG TERM CURRENT USE OF ANTICOAGULANT THERAPY: ICD-10-CM

## 2022-02-09 DIAGNOSIS — D68.61 ANTIPHOSPHOLIPID SYNDROME (H): ICD-10-CM

## 2022-02-09 DIAGNOSIS — I26.99 ACUTE PULMONARY EMBOLISM, UNSPECIFIED PULMONARY EMBOLISM TYPE, UNSPECIFIED WHETHER ACUTE COR PULMONALE PRESENT (H): Primary | ICD-10-CM

## 2022-02-09 LAB — INR HOME MONITORING: 3.9 (ref 2–3)

## 2022-02-09 NOTE — PROGRESS NOTES
ANTICOAGULATION MANAGEMENT     Rocío Catherine 26 year old female is on warfarin with supratherapeutic INR result. (Goal INR 2.0-3.0)    Recent labs: (last 7 days)     02/09/22  0000   INR 3.90*       ASSESSMENT     Source(s): Chart Review and Patient/Caregiver Call       Warfarin doses taken: Warfarin taken as instructed    Diet: Decreased greens/vitamin K in diet; ongoing change only eating greens about once a week, sometimes none at all    New illness, injury, or hospitalization: No. Recovered from COVID recenlty    Medication/supplement changes: None noted    Signs or symptoms of bleeding or clotting: No    Previous INR: Supratherapeutic    Additional findings: None     PLAN     Recommended plan for ongoing change(s) affecting INR     Dosing Instructions: Partial hold then Decrease your warfarin dose (8.7% change) with next INR in 8 days       Summary  As of 2/9/2022    Full warfarin instructions:  10 mg every Sun, Thu; 7.5 mg all other days   Next INR check:               Telephone call with Rocío who verbalizes understanding and agrees to plan and who agrees to plan and repeated back plan correctly    Patient to recheck with home meter    Education provided: Please call back if any changes to your diet, medications or how you've been taking warfarin and Contact [phone]  with any changes, questions or concerns.     Plan made per [facility] anticoagulation protocol    Niyah Ferrer, RN  Anticoagulation Clinic  2/9/2022    _______________________________________________________________________     Anticoagulation Episode Summary     Current INR goal:  2.0-3.0   TTR:  44.6 % (1 y)   Target end date:  Indefinite   Send INR reminders to:  JENNIFER LECHUGA    Indications    Acute pulmonary embolism  unspecified pulmonary embolism type  unspecified whether acute cor pulmonale present (H) [I26.99]  Factor 5 Leiden mutation  heterozygous (H) [D68.51]  Other acute pulmonary embolism without acute cor pulmonale (H)  (Resolved) [I26.99]  Acute pulmonary embolism  unspecified pulmonary embolism type  unspecified whether acute cor pulmonale present (H) (Resolved) [I26.99]  Antiphospholipid syndrome (H) [D68.61]  Long term current use of anticoagulant therapy [Z79.01]           Comments:  [employer] home meter         Anticoagulation Care Providers     Provider Role Specialty Phone number    Jesse Farnsworth MD Referring Family Medicine [phone]    Yessy Lyn APRN [facility] Referring Family Medicine [phone]

## 2022-02-18 ENCOUNTER — ANTICOAGULATION THERAPY VISIT (OUTPATIENT)
Dept: FAMILY MEDICINE | Facility: CLINIC | Age: 27
End: 2022-02-18
Payer: COMMERCIAL

## 2022-02-18 DIAGNOSIS — I26.99 ACUTE PULMONARY EMBOLISM, UNSPECIFIED PULMONARY EMBOLISM TYPE, UNSPECIFIED WHETHER ACUTE COR PULMONALE PRESENT (H): Primary | ICD-10-CM

## 2022-02-18 DIAGNOSIS — D68.51 FACTOR 5 LEIDEN MUTATION, HETEROZYGOUS (H): ICD-10-CM

## 2022-02-18 DIAGNOSIS — D68.61 ANTIPHOSPHOLIPID SYNDROME (H): ICD-10-CM

## 2022-02-18 DIAGNOSIS — Z79.01 LONG TERM CURRENT USE OF ANTICOAGULANT THERAPY: ICD-10-CM

## 2022-02-18 LAB — INR HOME MONITORING: 1.9 (ref 2–3)

## 2022-02-18 NOTE — PROGRESS NOTES
ANTICOAGULATION MANAGEMENT     Rocío Catherine 26 year old female is on warfarin with subtherapeutic INR result. (Goal INR 2.0-3.0)    Recent labs: (last 7 days)     02/17/22  0000   INR 1.90*       ASSESSMENT     Source(s): Chart Review and Patient/Caregiver Call       Warfarin doses taken: Warfarin taken as instructed    Diet: No new diet changes identified    New illness, injury, or hospitalization: No    Medication/supplement changes: None noted    Signs or symptoms of bleeding or clotting: No    Previous INR: Supratherapeutic    Additional findings: pt INE was reducedthe past two checks--may havee been a bit too much. will increase maintnance slightly.     PLAN     Recommended plan for no diet, medication or health factor changes affecting INR     Dosing Instructions:  Increase your warfarin dose (4.8% change) with next INR in 1 week . Will increase just slightly to avoid fluctuating back and forth      Summary  As of 2/18/2022    Full warfarin instructions:  10 mg every Fri; 7.5 mg all other days   Next INR check:  2/24/2022             Telephone call with Rocío who verbalizes understanding and agrees to plan and who agrees to plan and repeated back plan correctly    Patient to recheck with home meter    Education provided: Please call back if any changes to your diet, medications or how you've been taking warfarin    Plan made per [facility] anticoagulation protocol    Niyah Ferrer, RN  Anticoagulation Clinic  2/18/2022    _______________________________________________________________________     Anticoagulation Episode Summary     Current INR goal:  2.0-3.0   TTR:  45.8 % (1 y)   Target end date:  Indefinite   Send INR reminders to:  JENNIFER LECHUGA    Indications    Acute pulmonary embolism  unspecified pulmonary embolism type  unspecified whether acute cor pulmonale present (H) [I26.99]  Factor 5 Leiden mutation  heterozygous (H) [D68.51]  Other acute pulmonary embolism without acute cor pulmonale (H)  (Resolved) [I26.99]  Acute pulmonary embolism  unspecified pulmonary embolism type  unspecified whether acute cor pulmonale present (H) (Resolved) [I26.99]  Antiphospholipid syndrome (H) [D68.61]  Long term current use of anticoagulant therapy [Z79.01]           Comments:  [employer] home meter         Anticoagulation Care Providers     Provider Role Specialty Phone number    Jesse Farnsworth MD Referring Family Medicine [phone]    Yessy Lyn APRN [facility] Referring Family Medicine [phone]

## 2022-02-24 ENCOUNTER — ANTICOAGULATION THERAPY VISIT (OUTPATIENT)
Dept: ANTICOAGULATION | Facility: CLINIC | Age: 27
End: 2022-02-24
Payer: COMMERCIAL

## 2022-02-24 DIAGNOSIS — Z79.01 LONG TERM CURRENT USE OF ANTICOAGULANT THERAPY: ICD-10-CM

## 2022-02-24 DIAGNOSIS — I26.99 ACUTE PULMONARY EMBOLISM, UNSPECIFIED PULMONARY EMBOLISM TYPE, UNSPECIFIED WHETHER ACUTE COR PULMONALE PRESENT (H): Primary | ICD-10-CM

## 2022-02-24 DIAGNOSIS — D68.51 FACTOR 5 LEIDEN MUTATION, HETEROZYGOUS (H): ICD-10-CM

## 2022-02-24 DIAGNOSIS — D68.61 ANTIPHOSPHOLIPID SYNDROME (H): ICD-10-CM

## 2022-02-24 LAB — INR HOME MONITORING: 2.7 (ref 2–3)

## 2022-02-24 NOTE — PROGRESS NOTES
ANTICOAGULATION MANAGEMENT     Rocío Catherine 26 year old female is on warfarin with therapeutic INR result. (Goal INR 2.0-3.0)    Recent labs: (last 7 days)     02/24/22  0000   INR 2.70       ASSESSMENT     Source(s): Chart Review       Warfarin doses taken: Reviewed in chart    Previous INR: Subtherapeutic    Additional findings: None     PLAN     Recommended plan for no diet, medication or health factor changes affecting INR     Dosing Instructions: Continue your current warfarin dose with next INR in 2 weeks       Summary  As of 2/24/2022    Full warfarin instructions:  10 mg every Fri; 7.5 mg all other days   Next INR check:  3/10/2022             Detailed voice message left for Rocío with dosing instructions and follow up date.     Patient to recheck with home meter    Education provided: Please call back if any changes to your diet, medications or how you've been taking warfarin    Plan made per [facility] anticoagulation protocol    Michael Molina RN  Anticoagulation Clinic  2/24/2022    _______________________________________________________________________     Anticoagulation Episode Summary     Current INR goal:  2.0-3.0   TTR:  47.3 % (1 y)   Target end date:  Indefinite   Send INR reminders to:  JENNIFER LECHUGA    Indications    Acute pulmonary embolism  unspecified pulmonary embolism type  unspecified whether acute cor pulmonale present (H) [I26.99]  Factor 5 Leiden mutation  heterozygous (H) [D68.51]  Other acute pulmonary embolism without acute cor pulmonale (H) (Resolved) [I26.99]  Acute pulmonary embolism  unspecified pulmonary embolism type  unspecified whether acute cor pulmonale present (H) (Resolved) [I26.99]  Antiphospholipid syndrome (H) [D68.61]  Long term current use of anticoagulant therapy [Z79.01]           Comments:  [employer] home meter         Anticoagulation Care Providers     Provider Role Specialty Phone number    Jesse Farnsworth MD Referring Family Medicine [phone]    Yessy Lyn  [facility] Medicine [phone]

## 2022-03-11 ENCOUNTER — ANTICOAGULATION THERAPY VISIT (OUTPATIENT)
Dept: ANTICOAGULATION | Facility: CLINIC | Age: 27
End: 2022-03-11
Payer: COMMERCIAL

## 2022-03-11 DIAGNOSIS — D68.51 FACTOR 5 LEIDEN MUTATION, HETEROZYGOUS (H): ICD-10-CM

## 2022-03-11 DIAGNOSIS — D68.61 ANTIPHOSPHOLIPID SYNDROME (H): ICD-10-CM

## 2022-03-11 DIAGNOSIS — Z79.01 LONG TERM CURRENT USE OF ANTICOAGULANT THERAPY: ICD-10-CM

## 2022-03-11 DIAGNOSIS — I26.99 ACUTE PULMONARY EMBOLISM, UNSPECIFIED PULMONARY EMBOLISM TYPE, UNSPECIFIED WHETHER ACUTE COR PULMONALE PRESENT (H): Primary | ICD-10-CM

## 2022-03-11 LAB — INR HOME MONITORING: 2.8 (ref 2–3)

## 2022-03-11 NOTE — PROGRESS NOTES
ANTICOAGULATION MANAGEMENT     Rocío Catherine 26 year old female is on warfarin with therapeutic INR result. (Goal INR 2.0-3.0)    Recent labs: (last 7 days)     03/11/22  0000   INR 2.80       ASSESSMENT       Source(s): Chart Review and Patient/Caregiver Call          PLAN     Recommended plan for no diet, medication or health factor changes affecting INR     Dosing Instructions: Continue your current warfarin dose with next INR in 2 weeks       Summary  As of 3/11/2022    Full warfarin instructions:  10 mg every Fri; 7.5 mg all other days   Next INR check:  3/25/2022             Sent [employer] message with dosing and follow up instructions    Patient to recheck with home meter    Education provided: Please call back if any changes to your diet, medications or how you've been taking warfarin    Plan made per [facility] anticoagulation protocol    Mayra Palma RN  Anticoagulation Clinic  3/11/2022    _______________________________________________________________________     Anticoagulation Episode Summary     Current INR goal:  2.0-3.0   TTR:  49.5 % (1 y)   Target end date:  Indefinite   Send INR reminders to:  JENNIFER LECHUGA    Indications    Acute pulmonary embolism  unspecified pulmonary embolism type  unspecified whether acute cor pulmonale present (H) [I26.99]  Factor 5 Leiden mutation  heterozygous (H) [D68.51]  Other acute pulmonary embolism without acute cor pulmonale (H) (Resolved) [I26.99]  Acute pulmonary embolism  unspecified pulmonary embolism type  unspecified whether acute cor pulmonale present (H) (Resolved) [I26.99]  Antiphospholipid syndrome (H) [D68.61]  Long term current use of anticoagulant therapy [Z79.01]           Comments:  [employer] home meter         Anticoagulation Care Providers     Provider Role Specialty Phone number    Jesse Farnsworth MD Referring Family Medicine [phone]    Yessy Lyn APRN CNP Referring Family Medicine [phone]

## 2022-03-25 LAB — INR HOME MONITORING: 2.4 (ref 2–3)

## 2022-03-28 ENCOUNTER — ANTICOAGULATION THERAPY VISIT (OUTPATIENT)
Dept: ANTICOAGULATION | Facility: CLINIC | Age: 27
End: 2022-03-28
Payer: COMMERCIAL

## 2022-03-28 DIAGNOSIS — D68.61 ANTIPHOSPHOLIPID SYNDROME (H): ICD-10-CM

## 2022-03-28 DIAGNOSIS — I26.99 ACUTE PULMONARY EMBOLISM, UNSPECIFIED PULMONARY EMBOLISM TYPE, UNSPECIFIED WHETHER ACUTE COR PULMONALE PRESENT (H): Primary | ICD-10-CM

## 2022-03-28 DIAGNOSIS — D68.51 FACTOR 5 LEIDEN MUTATION, HETEROZYGOUS (H): ICD-10-CM

## 2022-03-28 DIAGNOSIS — Z79.01 LONG TERM CURRENT USE OF ANTICOAGULANT THERAPY: ICD-10-CM

## 2022-03-28 NOTE — PROGRESS NOTES
ANTICOAGULATION MANAGEMENT     Rocío Catherine 26 year old female is on warfarin with therapeutic INR result. (Goal INR 2.0-3.0)    Recent labs: (last 7 days)     03/25/22  0000   INR 2.40       ASSESSMENT       Source(s): Chart Review    Previous INR was Therapeutic last 2(+) visits    Medication, diet, health changes since last INR chart reviewed; none identified           PLAN     Recommended plan for no diet, medication or health factor changes affecting INR     Dosing Instructions: Continue your current warfarin dose with next INR in 2 weeks       Summary  As of 3/28/2022    Full warfarin instructions:  10 mg every Fri; 7.5 mg all other days   Next INR check:  4/8/2022             Sent [employer] message with dosing and follow up instructions    Patient to recheck with home meter    Education provided: Contact [phone]  with any changes, questions or concerns.     Plan made per [facility] anticoagulation protocol    Lucia Pantoja RN  Anticoagulation Clinic  3/28/2022    _______________________________________________________________________     Anticoagulation Episode Summary     Current INR goal:  2.0-3.0   TTR:  49.8 % (1 y)   Target end date:  Indefinite   Send INR reminders to:  JENNIFER LECHUGA    Indications    Acute pulmonary embolism  unspecified pulmonary embolism type  unspecified whether acute cor pulmonale present (H) [I26.99]  Factor 5 Leiden mutation  heterozygous (H) [D68.51]  Other acute pulmonary embolism without acute cor pulmonale (H) (Resolved) [I26.99]  Acute pulmonary embolism  unspecified pulmonary embolism type  unspecified whether acute cor pulmonale present (H) (Resolved) [I26.99]  Antiphospholipid syndrome (H) [D68.61]  Long term current use of anticoagulant therapy [Z79.01]           Comments:  [employer] home meter         Anticoagulation Care Providers     Provider Role Specialty Phone number    Jesse Farnsworth MD Referring Family Medicine [phone]    Eboni, SNOW Negro CNP  [facility] Family Medicine [phone]            Lucia Mcbride, RN, BSN, PHN  Anticoagulation Nurse  [phone];

## 2022-04-08 LAB — INR HOME MONITORING: 2.7 (ref 2–3)

## 2022-04-11 ENCOUNTER — ANTICOAGULATION THERAPY VISIT (OUTPATIENT)
Dept: ANTICOAGULATION | Facility: CLINIC | Age: 27
End: 2022-04-11
Payer: COMMERCIAL

## 2022-04-11 DIAGNOSIS — D68.61 ANTIPHOSPHOLIPID SYNDROME (H): ICD-10-CM

## 2022-04-11 DIAGNOSIS — I26.99 ACUTE PULMONARY EMBOLISM, UNSPECIFIED PULMONARY EMBOLISM TYPE, UNSPECIFIED WHETHER ACUTE COR PULMONALE PRESENT (H): Primary | ICD-10-CM

## 2022-04-11 DIAGNOSIS — Z79.01 LONG TERM CURRENT USE OF ANTICOAGULANT THERAPY: ICD-10-CM

## 2022-04-11 DIAGNOSIS — D68.51 FACTOR 5 LEIDEN MUTATION, HETEROZYGOUS (H): ICD-10-CM

## 2022-04-11 NOTE — PROGRESS NOTES
ANTICOAGULATION MANAGEMENT     Rocío Catherine 26 year old female is on warfarin with therapeutic INR result. (Goal INR 2.0-3.0)    Recent labs: (last 7 days)     04/08/22  0000   INR 2.70       ASSESSMENT       Source(s): Chart Review    Previous INR was Therapeutic last 2(+) visits    Medication, diet, health changes since last INR chart reviewed; none identified           PLAN     Recommended plan for no diet, medication or health factor changes affecting INR     Dosing Instructions: continue your current warfarin dose with next INR in 2 weeks       Summary  As of 4/11/2022    Full warfarin instructions:  10 mg every Fri; 7.5 mg all other days   Next INR check:  4/22/2022             Telephone call with Rocío who verbalizes understanding and agrees to plan    Patient to recheck with home meter    Education provided: Please call back if any changes to your diet, medications or how you've been taking warfarin    Plan made per [facility] anticoagulation protocol    Michael Molina RN  Anticoagulation Clinic  4/11/2022    _______________________________________________________________________     Anticoagulation Episode Summary     Current INR goal:  2.0-3.0   TTR:  53.6 % (1 y)   Target end date:  Indefinite   Send INR reminders to:  JENNIFER LECHUGA    Indications    Acute pulmonary embolism  unspecified pulmonary embolism type  unspecified whether acute cor pulmonale present (H) [I26.99]  Factor 5 Leiden mutation  heterozygous (H) [D68.51]  Other acute pulmonary embolism without acute cor pulmonale (H) (Resolved) [I26.99]  Acute pulmonary embolism  unspecified pulmonary embolism type  unspecified whether acute cor pulmonale present (H) (Resolved) [I26.99]  Antiphospholipid syndrome (H) [D68.61]  Long term current use of anticoagulant therapy [Z79.01]           Comments:  [employer] home meter         Anticoagulation Care Providers     Provider Role Specialty Phone number    Jesse Farnsworth MD Referring Family Medicine  [phone]    Yessy Lyn APRN [facility] Medicine [phone]

## 2022-04-22 ENCOUNTER — ANTICOAGULATION THERAPY VISIT (OUTPATIENT)
Dept: ANTICOAGULATION | Facility: CLINIC | Age: 27
End: 2022-04-22
Payer: COMMERCIAL

## 2022-04-22 DIAGNOSIS — Z79.01 LONG TERM CURRENT USE OF ANTICOAGULANT THERAPY: ICD-10-CM

## 2022-04-22 DIAGNOSIS — I26.99 ACUTE PULMONARY EMBOLISM, UNSPECIFIED PULMONARY EMBOLISM TYPE, UNSPECIFIED WHETHER ACUTE COR PULMONALE PRESENT (H): Primary | ICD-10-CM

## 2022-04-22 DIAGNOSIS — D68.51 FACTOR 5 LEIDEN MUTATION, HETEROZYGOUS (H): ICD-10-CM

## 2022-04-22 DIAGNOSIS — D68.61 ANTIPHOSPHOLIPID SYNDROME (H): ICD-10-CM

## 2022-04-22 LAB — INR HOME MONITORING: 3.2 (ref 2–3)
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
## 2022-05-10 LAB — INR HOME MONITORING: 2.3 (ref 2–3)

## 2022-05-11 ENCOUNTER — ANTICOAGULATION THERAPY VISIT (OUTPATIENT)
Dept: ANTICOAGULATION | Facility: CLINIC | Age: 27
End: 2022-05-11
Payer: COMMERCIAL

## 2022-05-11 DIAGNOSIS — I26.99 ACUTE PULMONARY EMBOLISM, UNSPECIFIED PULMONARY EMBOLISM TYPE, UNSPECIFIED WHETHER ACUTE COR PULMONALE PRESENT (H): Primary | ICD-10-CM

## 2022-05-11 DIAGNOSIS — D68.61 ANTIPHOSPHOLIPID SYNDROME (H): ICD-10-CM

## 2022-05-11 DIAGNOSIS — Z79.01 LONG TERM CURRENT USE OF ANTICOAGULANT THERAPY: ICD-10-CM

## 2022-05-11 DIAGNOSIS — D68.51 FACTOR 5 LEIDEN MUTATION, HETEROZYGOUS (H): ICD-10-CM

## 2022-05-11 NOTE — PROGRESS NOTES
ANTICOAGULATION MANAGEMENT     Rocío Catherine 26 year old female is on warfarin with therapeutic INR result. (Goal INR 2.0-3.0)    Recent labs: (last 7 days)     05/06/22  0000   INR 2.30       ASSESSMENT       Source(s): Chart Review    Previous INR was Supratherapeutic    Medication, diet, health changes since last INR chart reviewed; none identified           PLAN     Recommended plan for no diet, medication or health factor changes affecting INR     Dosing Instructions: continue your current warfarin dose with next INR in 2 weeks       Summary  As of 5/11/2022    Full warfarin instructions:  10 mg every Fri; 7.5 mg all other days   Next INR check:  5/20/2022             Detailed voice message left for Rocío with dosing instructions and follow up date.     Patient to recheck with home meter    Education provided: Please call back if any changes to your diet, medications or how you've been taking warfarin and Contact [phone]  with any changes, questions or concerns.     Plan made per [facility] anticoagulation protocol    Shelby Valero RN  Anticoagulation Clinic  5/11/2022    _______________________________________________________________________     Anticoagulation Episode Summary     Current INR goal:  2.0-3.0   TTR:  58.1 % (1 y)   Target end date:  Indefinite   Send INR reminders to:  JENNIFER LECHUGA    Indications    Acute pulmonary embolism  unspecified pulmonary embolism type  unspecified whether acute cor pulmonale present (H) [I26.99]  Factor 5 Leiden mutation  heterozygous (H) [D68.51]  Other acute pulmonary embolism without acute cor pulmonale (H) (Resolved) [I26.99]  Acute pulmonary embolism  unspecified pulmonary embolism type  unspecified whether acute cor pulmonale present (H) (Resolved) [I26.99]  Antiphospholipid syndrome (H) [D68.61]  Long term current use of anticoagulant therapy [Z79.01]           Comments:  [employer] home meter         Anticoagulation Care Providers     Provider Role Specialty  Phone number    Jesse Farnsworth MD Referring Family Medicine [phone]    Yessy Lyn APRN CNP Referring Family Medicine [phone]

## 2022-05-11 NOTE — PROGRESS NOTES
Sent [employer] with questions per description.    Shelby Valero RN    [facility] Anticoagulation [facility]

## 2022-05-20 ENCOUNTER — DOCUMENTATION ONLY (OUTPATIENT)
Dept: ANTICOAGULATION | Facility: CLINIC | Age: 27
End: 2022-05-20
Payer: COMMERCIAL

## 2022-05-20 DIAGNOSIS — D68.61 ANTIPHOSPHOLIPID SYNDROME (H): ICD-10-CM

## 2022-05-20 DIAGNOSIS — I26.99 ACUTE PULMONARY EMBOLISM, UNSPECIFIED PULMONARY EMBOLISM TYPE, UNSPECIFIED WHETHER ACUTE COR PULMONALE PRESENT (H): Primary | ICD-10-CM

## 2022-05-20 DIAGNOSIS — Z79.01 LONG TERM CURRENT USE OF ANTICOAGULANT THERAPY: ICD-10-CM

## 2022-05-20 DIAGNOSIS — D68.51 FACTOR 5 LEIDEN MUTATION, HETEROZYGOUS (H): ICD-10-CM

## 2022-05-20 LAB — INR HOME MONITORING: 2.8 (ref 2–3)

## 2022-05-20 NOTE — PROGRESS NOTES
ANTICOAGULATION MANAGEMENT     Rocío Catherine 26 year old female is on warfarin with therapeutic INR result. (Goal INR 2.0-3.0)    Recent labs: (last 7 days)     05/20/22  0000   INR 2.80       ASSESSMENT       Source(s): Chart Review and Patient/Caregiver Call       Warfarin doses taken: Warfarin taken as instructed    Diet: No new diet changes identified    New illness, injury, or hospitalization: No    Medication/supplement changes: None noted    Signs or symptoms of bleeding or clotting: No    Previous INR: Therapeutic last visit; previously outside of goal range    Additional findings: None       PLAN     Recommended plan for no diet, medication or health factor changes affecting INR     Dosing Instructions: continue your current warfarin dose with next INR in 2 weeks       Summary  As of 5/20/2022    Full warfarin instructions:  10 mg every Fri; 7.5 mg all other days   Next INR check:  6/3/2022             Telephone call with Rocío who verbalizes understanding and agrees to plan    Patient to recheck with home meter    Education provided: Goal range and significance of current result and Resume manage by exception with home monitor. Continue to submit INR results to home monitor company.You will only be called when your result is out of range. Please call and notify [facility] if new medication started, dose missed, signs or symptoms of bleeding or clotting, or a surgery/procedure is scheduled.    Plan made per [facility] anticoagulation protocol    Eduardo Don RN  Anticoagulation Clinic  5/20/2022    _______________________________________________________________________     Anticoagulation Episode Summary     Current INR goal:  2.0-3.0   TTR:  59.2 % (1 y)   Target end date:  Indefinite   Send INR reminders to:  JENNIFER LECHUGA    Indications    Acute pulmonary embolism  unspecified pulmonary embolism type  unspecified whether acute cor pulmonale present (H) [I26.99]  Factor 5 Leiden mutation  heterozygous (H)  [D68.51]  Other acute pulmonary embolism without acute cor pulmonale (H) (Resolved) [I26.99]  Acute pulmonary embolism  unspecified pulmonary embolism type  unspecified whether acute cor pulmonale present (H) (Resolved) [I26.99]  Antiphospholipid syndrome (H) [D68.61]  Long term current use of anticoagulant therapy [Z79.01]           Comments:  [employer] home meter         Anticoagulation Care Providers     Provider Role Specialty Phone number    Jesse Farnsworth MD Referring Family Medicine [phone]    Yessy Lyn APRN [facility] Referring Family Medicine [phone]

## 2022-05-27 ENCOUNTER — APPOINTMENT (OUTPATIENT)
Dept: URGENT CARE | Facility: CLINIC | Age: 27
End: 2022-05-27
Payer: COMMERCIAL

## 2022-05-27 ENCOUNTER — MYC MEDICAL ADVICE (OUTPATIENT)
Dept: FAMILY MEDICINE | Facility: CLINIC | Age: 27
End: 2022-05-27
Payer: COMMERCIAL

## 2022-05-27 NOTE — TELEPHONE ENCOUNTER
Pt was called & states sx started Tues/wed. Pt tried OTC AZO with some relief but states sx worse on Wednesday. Has burning with urination, no fever. Some urgency this morning but mostly burning.   Pt told she would need some type of visit-virtual or [facility] would work. Pt is in the car heading out of town. [facility]. States she will think about [facility].    Gabriela Helton RN

## 2022-06-03 ENCOUNTER — MYC MEDICAL ADVICE (OUTPATIENT)
Dept: NURSING | Facility: CLINIC | Age: 27
End: 2022-06-03
Payer: COMMERCIAL

## 2022-06-03 ENCOUNTER — ANTICOAGULATION THERAPY VISIT (OUTPATIENT)
Dept: ANTICOAGULATION | Facility: CLINIC | Age: 27
End: 2022-06-03
Payer: COMMERCIAL

## 2022-06-03 DIAGNOSIS — D68.51 FACTOR 5 LEIDEN MUTATION, HETEROZYGOUS (H): ICD-10-CM

## 2022-06-03 DIAGNOSIS — I26.99 ACUTE PULMONARY EMBOLISM, UNSPECIFIED PULMONARY EMBOLISM TYPE, UNSPECIFIED WHETHER ACUTE COR PULMONALE PRESENT (H): Primary | ICD-10-CM

## 2022-06-03 DIAGNOSIS — Z79.01 LONG TERM CURRENT USE OF ANTICOAGULANT THERAPY: ICD-10-CM

## 2022-06-03 DIAGNOSIS — D68.61 ANTIPHOSPHOLIPID SYNDROME (H): ICD-10-CM

## 2022-06-03 LAB — INR HOME MONITORING: 3.8 (ref 2–3)

## 2022-06-03 NOTE — PROGRESS NOTES
ANTICOAGULATION MANAGEMENT     Rocío Catherine 26 year old female is on warfarin with supratherapeutic INR result. (Goal INR 2.0-3.0)    Recent labs: (last 7 days)     06/03/22  0000   INR 3.80*       ASSESSMENT       Source(s): Chart Review and Patient/Caregiver Call       Warfarin doses taken: Warfarin taken as instructed    Diet: No new diet changes identified    New illness, injury, or hospitalization: Yes: UTI    Medication/supplement changes: Finished abx for UTI    Signs or symptoms of bleeding or clotting: No    Previous INR: Therapeutic last visit; previously outside of goal range    Additional findings: None       PLAN     Recommended plan for temporary change(s) affecting INR     Dosing Instructions: hold dose then continue your current warfarin dose with next INR in 1 week       Summary  As of 6/3/2022    Full warfarin instructions:  6/3: Hold; Otherwise 10 mg every Fri; 7.5 mg all other days   Next INR check:  6/10/2022             Sent [employer] message with dosing and follow up instructions    Patient to recheck with home meter    Education provided: Please call back if any changes to your diet, medications or how you've been taking warfarin    Plan made per [facility] anticoagulation protocol    Mayra Palma RN  Anticoagulation Clinic  6/3/2022    _______________________________________________________________________     Anticoagulation Episode Summary     Current INR goal:  2.0-3.0   TTR:  56.5 % (1 y)   Target end date:  Indefinite   Send INR reminders to:  JENNIFER LECHUGA    Indications    Acute pulmonary embolism  unspecified pulmonary embolism type  unspecified whether acute cor pulmonale present (H) [I26.99]  Factor 5 Leiden mutation  heterozygous (H) [D68.51]  Other acute pulmonary embolism without acute cor pulmonale (H) (Resolved) [I26.99]  Acute pulmonary embolism  unspecified pulmonary embolism type  unspecified whether acute cor pulmonale present (H) (Resolved) [I26.99]  Antiphospholipid syndrome  (H) [D68.61]  Long term current use of anticoagulant therapy [Z79.01]           Comments:  [employer] home meter// manage by exception// Antiphospholipid antibody syndrome (may need CF10 if INR elevated with no known reason)         Anticoagulation Care Providers     Provider Role Specialty Phone number    Jesse Farnsworth MD Referring Family Medicine [phone]    Yessy Lyn APRN CNP Referring Family Medicine [phone]

## 2022-06-13 ENCOUNTER — ANTICOAGULATION THERAPY VISIT (OUTPATIENT)
Dept: ANTICOAGULATION | Facility: CLINIC | Age: 27
End: 2022-06-13
Payer: COMMERCIAL

## 2022-06-13 DIAGNOSIS — D68.61 ANTIPHOSPHOLIPID SYNDROME (H): ICD-10-CM

## 2022-06-13 DIAGNOSIS — Z79.01 LONG TERM CURRENT USE OF ANTICOAGULANT THERAPY: ICD-10-CM

## 2022-06-13 DIAGNOSIS — I26.99 ACUTE PULMONARY EMBOLISM, UNSPECIFIED PULMONARY EMBOLISM TYPE, UNSPECIFIED WHETHER ACUTE COR PULMONALE PRESENT (H): Primary | ICD-10-CM

## 2022-06-13 DIAGNOSIS — D68.51 FACTOR 5 LEIDEN MUTATION, HETEROZYGOUS (H): ICD-10-CM

## 2022-06-13 LAB — INR HOME MONITORING: 3.9 (ref 2–3)

## 2022-06-13 NOTE — PROGRESS NOTES
ANTICOAGULATION MANAGEMENT     Rocío Catherine 26 year old female is on warfarin with supratherapeutic INR result. (Goal INR 2.0-3.0)    Recent labs: (last 7 days)     06/13/22  0000   INR 3.9*       ASSESSMENT       Source(s): Chart Review and Patient/Caregiver Call       Warfarin doses taken: Warfarin taken as instructed    Diet: No new diet changes identified    New illness, injury, or hospitalization: No    Medication/supplement changes: None noted    Signs or symptoms of bleeding or clotting: No    Previous INR: Supratherapeutic    Additional findings: None       PLAN     Recommended plan for no diet, medication or health factor changes affecting INR     Dosing Instructions: hold dose then decrease your warfarin dose (5% change) with next INR in 1 week       Summary  As of 6/13/2022    Full warfarin instructions:  6/13: Hold; Otherwise 7.5 mg every day   Next INR check:  6/20/2022             Telephone call with Rocío who agrees to plan and repeated back plan correctly    Patient to recheck with home meter    Education provided: Please call back if any changes to your diet, medications or how you've been taking warfarin, Monitoring for bleeding signs and symptoms and Monitoring for clotting signs and symptoms    Plan made per [facility] anticoagulation protocol    Dayami Mcfarland, RN  Anticoagulation Clinic  6/13/2022    _______________________________________________________________________     Anticoagulation Episode Summary     Current INR goal:  2.0-3.0   TTR:  56.2 % (1 y)   Target end date:  Indefinite   Send INR reminders to:  JENNIFER LECHUGA    Indications    Acute pulmonary embolism  unspecified pulmonary embolism type  unspecified whether acute cor pulmonale present (H) [I26.99]  Factor 5 Leiden mutation  heterozygous (H) [D68.51]  Other acute pulmonary embolism without acute cor pulmonale (H) (Resolved) [I26.99]  Acute pulmonary embolism  unspecified pulmonary embolism type  unspecified whether acute cor  pulmonale present (H) (Resolved) [I26.99]  Antiphospholipid syndrome (H) [D68.61]  Long term current use of anticoagulant therapy [Z79.01]           Comments:  [employer] home meter// manage by exception// Antiphospholipid antibody syndrome (may need CF10 if INR elevated with no known reason)         Anticoagulation Care Providers     Provider Role Specialty Phone number    Jesse Farnsworth MD Referring Family Medicine [phone]    Yessy Lyn APRN [facility] Referring Family Medicine [phone]

## 2022-06-15 DIAGNOSIS — I26.99 ACUTE PULMONARY EMBOLISM WITHOUT ACUTE COR PULMONALE, UNSPECIFIED PULMONARY EMBOLISM TYPE (H): Primary | ICD-10-CM

## 2022-06-21 ENCOUNTER — ANTICOAGULATION THERAPY VISIT (OUTPATIENT)
Dept: ANTICOAGULATION | Facility: CLINIC | Age: 27
End: 2022-06-21
Payer: COMMERCIAL

## 2022-06-21 DIAGNOSIS — D68.61 ANTIPHOSPHOLIPID SYNDROME (H): ICD-10-CM

## 2022-06-21 DIAGNOSIS — Z79.01 LONG TERM CURRENT USE OF ANTICOAGULANT THERAPY: ICD-10-CM

## 2022-06-21 DIAGNOSIS — D68.51 FACTOR 5 LEIDEN MUTATION, HETEROZYGOUS (H): ICD-10-CM

## 2022-06-21 DIAGNOSIS — I26.99 ACUTE PULMONARY EMBOLISM, UNSPECIFIED PULMONARY EMBOLISM TYPE, UNSPECIFIED WHETHER ACUTE COR PULMONALE PRESENT (H): Primary | ICD-10-CM

## 2022-06-21 LAB — INR HOME MONITORING: 1.8 (ref 2–3)

## 2022-06-21 NOTE — PROGRESS NOTES
ANTICOAGULATION MANAGEMENT     Rocío Catherine 26 year old female is on warfarin with subtherapeutic INR result. (Goal INR 2.0-3.0)    Recent labs: (last 7 days)     06/21/22  0000   INR 1.8*       ASSESSMENT       Source(s): Chart Review and Patient/Caregiver Call       Warfarin doses taken: Warfarin taken as instructed    Diet: Increased greens/vitamin K in diet; plans to resume previous intake    Reported she ate more this past one wk, d/t previous supra INR.    New illness, injury, or hospitalization: No    Medication/supplement changes: None noted    Signs or symptoms of bleeding or clotting: No    Previous INR: Supratherapeutic last 2 INR results.    Additional findings: None       PLAN     Recommended plan for temporary change(s) affecting INR     Dosing Instructions:    continue your current warfarin dose with next INR in 1-2 weeks       Summary  As of 6/21/2022    Full warfarin instructions:  7.5 mg every day   Next INR check:  7/5/2022             Telephone call with  Rocío ([phone]) who verbalizes understanding and agrees to plan    Patient to recheck with home meter thru [employer] every 2 wks.    Education provided: Importance of consistent vitamin K intake, Impact of vitamin K foods on INR and Goal range and significance of current result    Plan made per [facility] anticoagulation protocol    Shannon Florence RN  Anticoagulation Clinic  6/21/2022    _______________________________________________________________________     Anticoagulation Episode Summary     Current INR goal:  2.0-3.0   TTR:  57.1 % (1 y)   Target end date:  Indefinite   Send INR reminders to:  JENNIFER LECHUGA    Indications    Acute pulmonary embolism  unspecified pulmonary embolism type  unspecified whether acute cor pulmonale present (H) [I26.99]  Factor 5 Leiden mutation  heterozygous (H) [D68.51]  Other acute pulmonary embolism without acute cor pulmonale (H) (Resolved) [I26.99]  Acute pulmonary embolism  unspecified pulmonary  embolism type  unspecified whether acute cor pulmonale present (H) (Resolved) [I26.99]  Antiphospholipid syndrome (H) [D68.61]  Long term current use of anticoagulant therapy [Z79.01]           Comments:  [employer] home meter// manage by exception// Antiphospholipid antibody syndrome (may need CF10 if INR elevated with no known reason)         Anticoagulation Care Providers     Provider Role Specialty Phone number    Jesse Farnsworth MD Referring Family Medicine [phone]    Yessy Lyn APRN [facility] Referring Family Medicine [phone]

## 2022-07-05 ENCOUNTER — ANTICOAGULATION THERAPY VISIT (OUTPATIENT)
Dept: ANTICOAGULATION | Facility: CLINIC | Age: 27
End: 2022-07-05

## 2022-07-05 DIAGNOSIS — D68.61 ANTIPHOSPHOLIPID SYNDROME (H): ICD-10-CM

## 2022-07-05 DIAGNOSIS — I26.99 ACUTE PULMONARY EMBOLISM, UNSPECIFIED PULMONARY EMBOLISM TYPE, UNSPECIFIED WHETHER ACUTE COR PULMONALE PRESENT (H): Primary | ICD-10-CM

## 2022-07-05 DIAGNOSIS — D68.51 FACTOR 5 LEIDEN MUTATION, HETEROZYGOUS (H): ICD-10-CM

## 2022-07-05 DIAGNOSIS — Z79.01 LONG TERM CURRENT USE OF ANTICOAGULANT THERAPY: ICD-10-CM

## 2022-07-05 LAB — INR HOME MONITORING: 2.5 (ref 2–3)

## 2022-07-05 NOTE — PROGRESS NOTES
ANTICOAGULATION MANAGEMENT     Rocío Catherine 26 year old female is on warfarin with therapeutic INR result. (Goal INR 2.0-3.0)    Recent labs: (last 7 days)     07/05/22  0000   INR 2.5       ASSESSMENT       Source(s): Chart Review and Patient/Caregiver Call       Warfarin doses taken: Warfarin taken as instructed    Diet: No new diet changes identified    New illness, injury, or hospitalization: No    Medication/supplement changes: None noted    Signs or symptoms of bleeding or clotting: No    Previous INR: Subtherapeutic    Additional findings: None       PLAN     Recommended plan for no diet, medication or health factor changes affecting INR     Dosing Instructions: continue your current warfarin dose with next INR in 2 weeks       Summary  As of 7/5/2022    Full warfarin instructions:  7.5 mg every day   Next INR check:  7/19/2022             Sent [employer] message with dosing and follow up instructions    Patient to recheck with home meter    Education provided: Please call back if any changes to your diet, medications or how you've been taking warfarin and Resume manage by exception with home monitor. Continue to submit INR results to home monitor company.You will only be called when your result is out of range. Please call and notify [facility] if new medication started, dose missed, signs or symptoms of bleeding or clotting, or a surgery/procedure is scheduled.    Plan made per [facility] anticoagulation protocol    Dayami Mcfarland RN  Anticoagulation Clinic  7/5/2022    _______________________________________________________________________     Anticoagulation Episode Summary     Current INR goal:  2.0-3.0   TTR:  58.6 % (1 y)   Target end date:  Indefinite   Send INR reminders to:  JENNIFER LECHUGA    Indications    Acute pulmonary embolism  unspecified pulmonary embolism type  unspecified whether acute cor pulmonale present (H) [I26.99]  Factor 5 Leiden mutation  heterozygous (H) [D68.51]  Other acute pulmonary embolism  without acute cor pulmonale (H) (Resolved) [I26.99]  Acute pulmonary embolism  unspecified pulmonary embolism type  unspecified whether acute cor pulmonale present (H) (Resolved) [I26.99]  Antiphospholipid syndrome (H) [D68.61]  Long term current use of anticoagulant therapy [Z79.01]           Comments:  [employer] home meter// manage by exception// Antiphospholipid antibody syndrome (may need CF10 if INR elevated with no known reason)         Anticoagulation Care Providers     Provider Role Specialty Phone number    Jesse Farnsworth MD Referring Family Medicine [phone]    Yessy Lyn APRN [facility] Referring Family Medicine [phone]

## 2022-07-14 DIAGNOSIS — I26.99 ACUTE PULMONARY EMBOLISM (H): ICD-10-CM

## 2022-07-14 RX ORDER — WARFARIN SODIUM 5 MG/1
TABLET ORAL
Qty: 140 TABLET | Refills: 1 | Status: SHIPPED | OUTPATIENT
Start: 2022-07-14 | End: 2023-01-19

## 2022-07-14 NOTE — TELEPHONE ENCOUNTER
Pending Prescriptions:                       Disp   Refills    warfarin ANTICOAGULANT (JANTOVEN ANTICOAGU*180 ta*1        Sig: Take 1-1/2 Tablets (7.5 mg) every Mon, Wed, Fri; and           take 2 tablets (10 mg) all other days of the week           or as directed by your [facility] Team.    Tamia Davis RN on 7/14/2022 at 10:28 AM

## 2022-07-14 NOTE — TELEPHONE ENCOUNTER
ANTICOAGULATION MANAGEMENT:  Medication Refill    Anticoagulation Summary  As of 7/5/2022    Warfarin maintenance plan:  7.5 mg (5 mg x 1.5) every day   Next INR check:  7/19/2022   Target end date:  Indefinite    Indications    Acute pulmonary embolism  unspecified pulmonary embolism type  unspecified whether acute cor pulmonale present (H) [I26.99]  Factor 5 Leiden mutation  heterozygous (H) [D68.51]  Other acute pulmonary embolism without acute cor pulmonale (H) (Resolved) [I26.99]  Acute pulmonary embolism  unspecified pulmonary embolism type  unspecified whether acute cor pulmonale present (H) (Resolved) [I26.99]  Antiphospholipid syndrome (H) [D68.61]  Long term current use of anticoagulant therapy [Z79.01]             Anticoagulation Care Providers     Provider Role Specialty Phone number    Jesse Farnsworth MD Referring Family Medicine [phone]    Yessy Lyn APRN [facility] Referring Family Medicine [phone]          Visit with referring provider/group within last year: Yes    [facility] referral signed within last year: Yes    Rocío meets all criteria for refill (current [facility] referral, office visit with referring provider/group in last year, lab monitoring up to date or not exceeding 2 weeks overdue). Rx instructions and quantity supplied updated to match patient's current dosing plan. Warfarin 90 day supply with 1 refill granted per [facility] protocol     Chika Delaney RN  Anticoagulation Clinic

## 2022-07-16 ENCOUNTER — HEALTH MAINTENANCE LETTER (OUTPATIENT)
Age: 27
End: 2022-07-16

## 2022-07-26 ENCOUNTER — E-VISIT (OUTPATIENT)
Dept: FAMILY MEDICINE | Facility: CLINIC | Age: 27
End: 2022-07-26
Payer: COMMERCIAL

## 2022-07-26 ENCOUNTER — TELEPHONE (OUTPATIENT)
Dept: INTERNAL MEDICINE | Facility: CLINIC | Age: 27
End: 2022-07-26

## 2022-07-26 ENCOUNTER — MYC MEDICAL ADVICE (OUTPATIENT)
Dept: ANTICOAGULATION | Facility: CLINIC | Age: 27
End: 2022-07-26

## 2022-07-26 ENCOUNTER — TELEPHONE (OUTPATIENT)
Dept: ANTICOAGULATION | Facility: CLINIC | Age: 27
End: 2022-07-26

## 2022-07-26 ENCOUNTER — DOCUMENTATION ONLY (OUTPATIENT)
Dept: ANTICOAGULATION | Facility: CLINIC | Age: 27
End: 2022-07-26

## 2022-07-26 DIAGNOSIS — J01.90 ACUTE SINUSITIS WITH SYMPTOMS > 10 DAYS: Primary | ICD-10-CM

## 2022-07-26 DIAGNOSIS — Z79.01 LONG TERM CURRENT USE OF ANTICOAGULANT THERAPY: ICD-10-CM

## 2022-07-26 DIAGNOSIS — D68.51 FACTOR 5 LEIDEN MUTATION, HETEROZYGOUS (H): ICD-10-CM

## 2022-07-26 DIAGNOSIS — D68.61 ANTIPHOSPHOLIPID SYNDROME (H): ICD-10-CM

## 2022-07-26 DIAGNOSIS — I26.99 ACUTE PULMONARY EMBOLISM, UNSPECIFIED PULMONARY EMBOLISM TYPE, UNSPECIFIED WHETHER ACUTE COR PULMONALE PRESENT (H): Primary | ICD-10-CM

## 2022-07-26 LAB — INR HOME MONITORING: 3 (ref 2–3)

## 2022-07-26 PROCEDURE — 99421 OL DIG E/M SVC 5-10 MIN: CPT | Performed by: PHYSICIAN ASSISTANT

## 2022-07-26 RX ORDER — FLUCONAZOLE 150 MG/1
150 TABLET ORAL ONCE
Qty: 1 TABLET | Refills: 0 | Status: SHIPPED | OUTPATIENT
Start: 2022-07-26 | End: 2022-07-26

## 2022-07-26 NOTE — PROGRESS NOTES
ANTICOAGULATION     Rocío Catherine is overdue for INR check.      myChart sent.    Eduardo Don RN

## 2022-07-26 NOTE — PATIENT INSTRUCTIONS
You may want to try a nasal lavage (also known as nasal irrigation). You can find over-the-counter products, such as Neti-Pot, at retail locations or make your own at home. Instructions for homemade nasal lavage and more information on the process are available online at http://www.aafp.org/afp/2009/1115/p1121.html.    Dear Rocío RECINOS Long    After reviewing your responses, I've been able to diagnose you with?a sinus infection caused by bacteria.?     Based on your responses and diagnosis, I have prescribed an antibiotic to treat your symptoms. I have sent this to your pharmacy.?     It is also important to stay well hydrated, get lots of rest and take over-the-counter decongestants,?tylenol?or ibuprofen if you?are able to?take those medications per your primary care provider to help relieve discomfort.?     It is important that you take?all of?your prescribed medication even if your symptoms are improving after a few doses.? Taking?all of?your medicine helps prevent the symptoms from returning.?     If your symptoms worsen, you develop severe headache, vomiting, high fever (>102), or are not improving in 7 days, please contact your primary care provider for an appointment or visit any of our convenient Walk-in Care or Urgent Care Centers to be seen which can be found on our website?here.?     Thanks again for choosing?us?as your health care partner,?   ?  Nila Arreaga PA-C?

## 2022-07-26 NOTE — TELEPHONE ENCOUNTER
Reason for call:    Symptom or request:     Patient called stating that she did an evist with you today 07/26, she forgot to mention she gets yeast infections from antibiotics she is requesting an additional medication for treatment of yeast infections caused by taking antibiotics.     [facility] pharmacy.       Best Time:  any    Can we leave a detailed message on this number?  YES     Annalee CALIXTO  Station

## 2022-07-26 NOTE — TELEPHONE ENCOUNTER
Left message for sending a MyChart to discuss the interaction between fluconazole and warfarin.    Shelby Valero RN    [facility] Anticoagulation [facility]

## 2022-07-27 ENCOUNTER — DOCUMENTATION ONLY (OUTPATIENT)
Dept: ANTICOAGULATION | Facility: CLINIC | Age: 27
End: 2022-07-27

## 2022-07-27 DIAGNOSIS — Z79.01 LONG TERM CURRENT USE OF ANTICOAGULANT THERAPY: ICD-10-CM

## 2022-07-27 DIAGNOSIS — I26.99 ACUTE PULMONARY EMBOLISM, UNSPECIFIED PULMONARY EMBOLISM TYPE, UNSPECIFIED WHETHER ACUTE COR PULMONALE PRESENT (H): Primary | ICD-10-CM

## 2022-07-27 DIAGNOSIS — D68.51 FACTOR 5 LEIDEN MUTATION, HETEROZYGOUS (H): ICD-10-CM

## 2022-07-27 DIAGNOSIS — D68.61 ANTIPHOSPHOLIPID SYNDROME (H): ICD-10-CM

## 2022-07-27 NOTE — PROGRESS NOTES
ANTICOAGULATION  MANAGEMENT-Home Monitor Managed by Exception    Rocío Catherine 26 year old female is on warfarin with therapeutic INR result. (Goal INR 2.0-3.0)    Recent labs: (last 7 days)     07/26/22  0000   INR 3.0         Previous INR was Therapeutic    Medication, diet, health changes since last INR:chart reviewed; none identified    Contacted within the last 12 weeks by phone on 7/05/22      VINI Alford was NOT contacted regarding therapeutic result today per home monitoring policy manage by exception agreement.   Current warfarin dose is to be continued:     Summary  As of 7/27/2022    Full warfarin instructions:  7.5 mg every day   Next INR check:  8/10/2022           ?   Mayra Palma RN  Anticoagulation Clinic  7/27/2022    _______________________________________________________________________     Anticoagulation Episode Summary     Current INR goal:  2.0-3.0   TTR:  59.2 % (1 y)   Target end date:  Indefinite   Send INR reminders to:  JENNIFER LECHUGA    Indications    Acute pulmonary embolism  unspecified pulmonary embolism type  unspecified whether acute cor pulmonale present (H) [I26.99]  Factor 5 Leiden mutation  heterozygous (H) [D68.51]  Other acute pulmonary embolism without acute cor pulmonale (H) (Resolved) [I26.99]  Acute pulmonary embolism  unspecified pulmonary embolism type  unspecified whether acute cor pulmonale present (H) (Resolved) [I26.99]  Antiphospholipid syndrome (H) [D68.61]  Long term current use of anticoagulant therapy [Z79.01]           Comments:  [employer] home meter// manage by exception// Antiphospholipid antibody syndrome (may need CF10 if INR elevated with no known reason)         Anticoagulation Care Providers     Provider Role Specialty Phone number    Jesse Farnsworth MD Referring Family Medicine [phone]    Yessy Lyn APRN CNP Referring Family Medicine [phone]

## 2022-07-27 NOTE — TELEPHONE ENCOUNTER
Patient will contact the clinic if she does take the fluconazole.    Shelby Valero RN    [facility] Anticoagulation Clinic

## 2022-08-07 ENCOUNTER — HOSPITAL ENCOUNTER (EMERGENCY)
Facility: CLINIC | Age: 27
Discharge: HOME | End: 2022-08-07
Attending: EMERGENCY MEDICINE | Admitting: EMERGENCY MEDICINE
Payer: COMMERCIAL

## 2022-08-07 VITALS
HEIGHT: 61 IN | RESPIRATION RATE: 16 BRPM | HEART RATE: 96 BPM | OXYGEN SATURATION: 96 % | SYSTOLIC BLOOD PRESSURE: 107 MMHG | DIASTOLIC BLOOD PRESSURE: 74 MMHG | BODY MASS INDEX: 20.77 KG/M2 | WEIGHT: 110 LBS | TEMPERATURE: 97.5 F

## 2022-08-07 DIAGNOSIS — G43.809 OTHER MIGRAINE WITHOUT STATUS MIGRAINOSUS, NOT INTRACTABLE: ICD-10-CM

## 2022-08-07 LAB
HOLD SPECIMEN: NORMAL

## 2022-08-07 PROCEDURE — 250N000013 HC RX MED GY IP 250 OP 250 PS 637: Performed by: EMERGENCY MEDICINE

## 2022-08-07 PROCEDURE — 250N000011 HC RX IP 250 OP 636: Performed by: EMERGENCY MEDICINE

## 2022-08-07 PROCEDURE — 99284 EMERGENCY DEPT VISIT MOD MDM: CPT | Mod: 25 | Performed by: EMERGENCY MEDICINE

## 2022-08-07 PROCEDURE — 96375 TX/PRO/DX INJ NEW DRUG ADDON: CPT | Performed by: EMERGENCY MEDICINE

## 2022-08-07 PROCEDURE — 99284 EMERGENCY DEPT VISIT MOD MDM: CPT | Performed by: EMERGENCY MEDICINE

## 2022-08-07 PROCEDURE — 96374 THER/PROPH/DIAG INJ IV PUSH: CPT | Performed by: EMERGENCY MEDICINE

## 2022-08-07 RX ORDER — DEXAMETHASONE SODIUM PHOSPHATE 10 MG/ML
10 INJECTION, SOLUTION INTRAMUSCULAR; INTRAVENOUS ONCE
Status: COMPLETED | OUTPATIENT
Start: 2022-08-07 | End: 2022-08-07

## 2022-08-07 RX ORDER — ACETAMINOPHEN 500 MG
1000 TABLET ORAL ONCE
Status: COMPLETED | OUTPATIENT
Start: 2022-08-07 | End: 2022-08-07

## 2022-08-07 RX ORDER — METOCLOPRAMIDE HYDROCHLORIDE 5 MG/ML
10 INJECTION INTRAMUSCULAR; INTRAVENOUS ONCE
Status: COMPLETED | OUTPATIENT
Start: 2022-08-07 | End: 2022-08-07

## 2022-08-07 RX ORDER — METOCLOPRAMIDE 10 MG/1
10 TABLET ORAL 3 TIMES DAILY PRN
Qty: 50 TABLET | Refills: 0 | Status: SHIPPED | OUTPATIENT
Start: 2022-08-07

## 2022-08-07 RX ORDER — DIPHENHYDRAMINE HYDROCHLORIDE 50 MG/ML
25 INJECTION INTRAMUSCULAR; INTRAVENOUS ONCE
Status: COMPLETED | OUTPATIENT
Start: 2022-08-07 | End: 2022-08-07

## 2022-08-07 RX ADMIN — ACETAMINOPHEN 1000 MG: 500 TABLET, FILM COATED ORAL at 03:18

## 2022-08-07 RX ADMIN — DEXAMETHASONE SODIUM PHOSPHATE 10 MG: 10 INJECTION, SOLUTION INTRAMUSCULAR; INTRAVENOUS at 03:21

## 2022-08-07 RX ADMIN — METOCLOPRAMIDE HYDROCHLORIDE 10 MG: 5 INJECTION INTRAMUSCULAR; INTRAVENOUS at 03:23

## 2022-08-07 RX ADMIN — DIPHENHYDRAMINE HYDROCHLORIDE 25 MG: 50 INJECTION, SOLUTION INTRAMUSCULAR; INTRAVENOUS at 03:19

## 2022-08-07 NOTE — ED NOTES
Medications and heating pack given for migraine, and lights turned off. Pt resting in bed; staff will continue to monitor.

## 2022-08-07 NOTE — ED TRIAGE NOTES
Pt  Presents with headache for 3 days, worse tonight.  History of migraines, but unable to take migraine medications now that she is on coumadin.  Took 1 gram of APAP yesterday and today with no relief.       Triage Assessment     Row Name 08/07/22 0231       Triage Assessment (Adult)    Airway WDL WDL       Respiratory WDL    Respiratory WDL WDL       Skin Circulation/Temperature WDL    Skin Circulation/Temperature WDL WDL       Cardiac WDL    Cardiac WDL WDL       Peripheral/Neurovascular WDL    Peripheral Neurovascular WDL WDL       Cognitive/Neuro/Behavioral WDL    Cognitive/Neuro/Behavioral WDL WDL

## 2022-08-07 NOTE — ED PROVIDER NOTES
Chief Complaint:   Chief Complaint   Patient presents with     Headache         HPI:   Rocío Catherine is a 26 year old female who presents to the ER complaining of headache.  This headache began 3 day(s) ago and onset was gradual. This is not the worst headache of Her life.  The headache quality is throbbing pain, dull pain, radiating from the frontal region back to the occipital region of the head.  This is similar to her prior episode of headache/migraine.  Patient without any vomiting.  She is anticoagulated on warfarin for history of blood clot, and has factor V Leiden.  No trauma has been noted.  No chills.  No extremity weakness.  Did try 1 tablet of father's migraine medication without significant relief.  Has tried Tylenol, with last dose approximately 12 hours ago..  . There is not associated fatigue, fever, stiff neck and vomiting.          Medications:   Current Outpatient Medications   Medication Sig Dispense Refill     metoclopramide (REGLAN) 10 MG tablet Take 1 tablet (10 mg) by mouth 3 times daily as needed (headache) 50 tablet 0     budesonide (PULMICORT) 0.5 MG/2ML neb solution Place 0.5 mg/2 mL budesonide vial in 8 oz normal saline sinus rinse bottle.  Irrigate each nostril with one half of the bottle twice daily. 360 mL 3     hydrOXYzine (ATARAX) 25 MG tablet        sertraline (ZOLOFT) 50 MG tablet Take 1 tablet (50 mg) by mouth daily (Patient taking differently: Take 50 mg by mouth daily 1.5 tablets daily. Unsure of dose.) 30 tablet 3     warfarin ANTICOAGULANT (JANTOVEN ANTICOAGULANT) 5 MG tablet Take 7.5 mg every day or As directed by Anticoagulation clinic 140 tablet 1       Allergies:   No Known Allergies    Medications updated and reviewed.  Past, family and surgical history is updated and reviewed in the record.    Review of Systems:  General: see HPI  Eyes: see HPI  GI: see HPI  Neuro: see HPI    Physical Exam:   /79   Pulse 87   Temp 97.5  F (36.4  C) (Oral)   Resp 16   Ht 1.549 m  "(5' 1\")   Wt 49.9 kg (110 lb)   SpO2 100%   BMI 20.78 kg/m     General: healthy, alert and cooperative  Head: Normocephalic. No masses, lesions, tenderness or abnormalities  Eyes: negative  Neck: supple, non-tender, free range of motion, no adenopathy  Neuro: normal without focal findings, mental status, speech normal, alert and oriented  , LORELEI   Psychiatric: Affect normal    Medical Decision Making:  Given gradual onset of symptoms without neurologic changes, a head CT and LP to look for SAH is not indicated.  Given the lack of infectious symptoms and stiff neck, CNS infection is unlikely. Based on history and physical exam, this headache seems most consistent with Migraine - common.     Assessment:  1. Other migraine without status migrainosus, not intractable          Plan:   She was treated in the ER with reglan, dexamethsone with diphenhydramine with improvement of headache.   continue present treatment and plan, lie in darkened room and apply cold packs prn for pain, side effect profile discussed in detail and patient reassured that neurodiagnostic workup not indicated from benign H&P  Recommended going home to rest and following up with PCP within 24 hours if symptoms do not improve and to return to the ER with increased headache, change in vision, fevers, chills, vomiting or any other concerns.    Patient discharged home with Reglan.      Condition on disposition: Stable       Jas Wilks MD  08/07/22 0531    "

## 2022-08-07 NOTE — DISCHARGE INSTRUCTIONS
Tylenol as needed for headache.  You can also take Benadryl at home.    Reglan sent to the pharmacy.  All of the above medications can be taken together.    Follow-up in clinic if ongoing symptoms.  Return or be seen if worsening pains

## 2022-08-08 ENCOUNTER — DOCUMENTATION ONLY (OUTPATIENT)
Dept: ANTICOAGULATION | Facility: CLINIC | Age: 27
End: 2022-08-08

## 2022-08-08 NOTE — PROGRESS NOTES
ANTICOAGULATION  MANAGEMENT: Discharge Review    Rocío Catherine chart reviewed for anticoagulation continuity of care    Emergency room visit on 8/7 for Headache.    Discharge disposition: Home    Results:    No results for input(s): INR, [id number], F2, ALMWH, AAUFH in the last 168 hours.  Anticoagulation inpatient management:     not applicable     Anticoagulation discharge instructions:     Warfarin dosing: home regimen continued   Bridging: No   INR goal change: No      Medication changes affecting anticoagulation: If Tylenol used this may have potential to increase INR; Decadron given in the ER--this may cause INR to rise--INR to be checked on 8/10    Additional factors affecting anticoagulation: No     PLAN     No adjustment to anticoagulation plan needed Next INR due 8/10    Patient not contacted    No adjustment to Anticoagulation Calendar was required    Niyah Ferrer, RN

## 2022-08-09 ENCOUNTER — TELEPHONE (OUTPATIENT)
Dept: FAMILY MEDICINE | Facility: CLINIC | Age: 27
End: 2022-08-09

## 2022-08-09 ENCOUNTER — E-VISIT (OUTPATIENT)
Dept: URGENT CARE | Facility: CLINIC | Age: 27
End: 2022-08-09
Payer: COMMERCIAL

## 2022-08-09 DIAGNOSIS — N76.0 BACTERIAL VAGINOSIS: ICD-10-CM

## 2022-08-09 DIAGNOSIS — B96.89 BACTERIAL VAGINOSIS: ICD-10-CM

## 2022-08-09 DIAGNOSIS — B37.31 CANDIDAL VULVOVAGINITIS: ICD-10-CM

## 2022-08-09 PROCEDURE — 99421 OL DIG E/M SVC 5-10 MIN: CPT | Performed by: EMERGENCY MEDICINE

## 2022-08-09 RX ORDER — FLUCONAZOLE 150 MG/1
150 TABLET ORAL ONCE
Qty: 1 TABLET | Refills: 0 | Status: SHIPPED | OUTPATIENT
Start: 2022-08-09 | End: 2022-08-09

## 2022-08-09 RX ORDER — METRONIDAZOLE 500 MG/1
500 TABLET ORAL 2 TIMES DAILY
Qty: 14 TABLET | Refills: 0 | Status: SHIPPED | OUTPATIENT
Start: 2022-08-09 | End: 2022-08-16

## 2022-08-09 NOTE — TELEPHONE ENCOUNTER
Patient calling regarding a new medication that will interfere with her INR and dosing    Please return patient's call    Adina Givens

## 2022-08-09 NOTE — PATIENT INSTRUCTIONS
Yeast Infection (Candida Vaginal Infection)    You have a Candida vaginal infection. This is also known as a yeast infection. It's most often caused by a type of yeast (fungus) called Candida. Candida are normally found in the vagina. But if they increase in number, this can lead to infection and cause symptoms.   Symptoms of a yeast infection can include:     Clumpy or thin, white discharge, which may look like cottage cheese    Itching or burning    Burning with urination  Certain factors can make a yeast infection more likely. These can include:     Taking certain medicines, such as antibiotics or birth control pills    Pregnancy    Diabetes    Weak immune system  A yeast infection is most often treated with antifungal medicine. This may be given as a vaginal cream or pills you take by mouth. Treatment may last for about 1 to 7 days. Women with severe or recurrent infections may need longer courses of treatment.   Home care    If you re prescribed medicine, be sure to use it as directed. Finish all of the medicine, even if your symptoms go away. Don t try to treat yourself using over-the-counter products without talking with your provider first. They will let you know if this is a good option for you.    Ask your provider what steps you can take to help reduce your risk of having a yeast infection in the future.    Follow-up care  Follow up with your healthcare provider, or as directed.   When to seek medical advice  Call your healthcare provider right away if:     You have a fever of 100.4 F (38 C) or higher, or as directed by your provider.    Your symptoms worsen, or they don t go away within a few days of starting treatment.    You have new pain in the lower belly or pelvic region.    You have side effects that bother you or a reaction to the cream or pills you re prescribed.    You or any partners you have sex with have new symptoms, such as a rash, joint pain, or sores.  Eleanor last reviewed this  educational content on 2020-2021 [employer]. All rights reserved. This information is not intended as a substitute for professional medical care. Always follow your healthcare professional's instructions.          Bacterial Vaginosis    You have a vaginal infection called bacterial vaginosis (BV). Both good and bad bacteria are present in a healthy vagina. BV occurs when these bacteria get out of balance. The number of bad bacteria increase. And the number of good bacteria decrease. BV is linked with sexual activity, but it's not a sexually transmitted infection (STI).   BV may or may not cause symptoms. If symptoms do occur, they can include:     Thin, gray, milky-white, or sometimes green discharge    Unpleasant odor or  fishy  smell    Itching, burning, or pain in or around the vagina  It is not known what causes BV, but certain factors can make the problem more likely. These can include:     Douching    Spermicides    Use of antibiotics    Change in hormone levels with pregnancy, breastfeeding, or menopause    Having sex with a new partner    Having sex with more than one partner  BV will sometimes go away on its own. But treatment is often advised. This is because untreated BV can raise the risk of more serious health problems such as:     Pelvic inflammatory disease (PID)     delivery (giving birth to a baby early if you re pregnant)    HIV and some other sexually transmitted infections (STIs)    Infection after surgery on the reproductive organs  Home care  General care    BV is most often treated with medicines called antibiotics. These may be given as pills or as a vaginal cream. If antibiotics are prescribed, be sure to use them exactly as directed. And complete all of the medicine, even if your symptoms go away.    Don't douche or having sex during treatment.    If you have sex with a female partner, ask your healthcare provider if she should also be  treated.  Prevention    Don't douche.    Don't have sex. If you do have sex, then take steps to lower your risk:  ? Use condoms when having sex.  ? Limit the number of sex partners you have.    Follow-up care  Follow up with your healthcare provider, or as advised.   When to get medical advice  Call your healthcare provider right away if:     You have a fever of 100.4 F (38 C) or higher, or as directed by your provider.    Your symptoms get worse, or they don t go away within a few days of starting treatment.    You have new pain in the lower belly or pelvic region.    You have side effects that bother you or a reaction to the pills or cream you re prescribed.    You or any of your sex partners have new symptoms, such as a rash, joint pain, or sores.  [employer] last reviewed this educational content on 6/1/2020 2000-2021 [employer]. All rights reserved. This information is not intended as a substitute for professional medical care. Always follow your healthcare professional's instructions.

## 2022-08-09 NOTE — TELEPHONE ENCOUNTER
Called the patient and she got decadron in the ER 8/7. She also took a one time dose of Fluconazole today. She will check her INR tomorrow.    She did also receive an Rx of metronidazole (7 days) but she will only take that if the Fluconazole does not work. She was informed that protocol does state to lower the dosing when metronidazole is given for more than 3 days. She will call and let [facility] know if she is going to start that medication.    Shelby Valero RN    [facility]

## 2022-08-10 ENCOUNTER — DOCUMENTATION ONLY (OUTPATIENT)
Dept: ANTICOAGULATION | Facility: CLINIC | Age: 27
End: 2022-08-10

## 2022-08-10 DIAGNOSIS — D68.51 FACTOR 5 LEIDEN MUTATION, HETEROZYGOUS (H): ICD-10-CM

## 2022-08-10 DIAGNOSIS — Z79.01 LONG TERM CURRENT USE OF ANTICOAGULANT THERAPY: ICD-10-CM

## 2022-08-10 DIAGNOSIS — I26.99 ACUTE PULMONARY EMBOLISM, UNSPECIFIED PULMONARY EMBOLISM TYPE, UNSPECIFIED WHETHER ACUTE COR PULMONALE PRESENT (H): Primary | ICD-10-CM

## 2022-08-10 DIAGNOSIS — D68.61 ANTIPHOSPHOLIPID SYNDROME (H): ICD-10-CM

## 2022-08-10 LAB — INR HOME MONITORING: 2.8 (ref 2–3)

## 2022-08-10 NOTE — PROGRESS NOTES
ANTICOAGULATION  MANAGEMENT-Home Monitor Managed by Exception    Rocío Catherine 26 year old female is on warfarin with therapeutic INR result. (Goal INR 2.0-3.0)    Recent labs: (last 7 days)     08/10/22  0000   INR 2.8         Previous INR was Therapeutic    Medication, diet, health changes since last INR:chart reviewed; none identified    Contacted within the last 12 weeks by phone on 8/9/22       VINI Alford was NOT contacted regarding therapeutic result today per home monitoring policy manage by exception agreement.   Current warfarin dose is to be continued:     Summary  As of 8/10/2022    Full warfarin instructions:  7.5 mg every day   Next INR check:  8/24/2022           ?   Dayami Mcfarland RN  Anticoagulation Clinic  8/10/2022    _______________________________________________________________________     Anticoagulation Episode Summary     Current INR goal:  2.0-3.0   TTR:  59.4 % (1 y)   Target end date:  Indefinite   Send INR reminders to:  JENNIFER LECHUGA    Indications    Acute pulmonary embolism  unspecified pulmonary embolism type  unspecified whether acute cor pulmonale present (H) [I26.99]  Factor 5 Leiden mutation  heterozygous (H) [D68.51]  Other acute pulmonary embolism without acute cor pulmonale (H) (Resolved) [I26.99]  Acute pulmonary embolism  unspecified pulmonary embolism type  unspecified whether acute cor pulmonale present (H) (Resolved) [I26.99]  Antiphospholipid syndrome (H) [D68.61]  Long term current use of anticoagulant therapy [Z79.01]           Comments:  [employer] home meter// manage by exception// Antiphospholipid antibody syndrome (may need CF10 if INR elevated with no known reason)         Anticoagulation Care Providers     Provider Role Specialty Phone number    Jesse Farnsworth MD Referring Family Medicine [phone]    Yessy Lyn APRN CNP Referring Family Medicine [phone]

## 2022-08-24 ENCOUNTER — MYC MEDICAL ADVICE (OUTPATIENT)
Dept: ANTICOAGULATION | Facility: CLINIC | Age: 27
End: 2022-08-24

## 2022-08-24 ENCOUNTER — ANTICOAGULATION THERAPY VISIT (OUTPATIENT)
Dept: ANTICOAGULATION | Facility: CLINIC | Age: 27
End: 2022-08-24

## 2022-08-24 DIAGNOSIS — Z79.01 LONG TERM CURRENT USE OF ANTICOAGULANT THERAPY: ICD-10-CM

## 2022-08-24 DIAGNOSIS — I26.99 ACUTE PULMONARY EMBOLISM, UNSPECIFIED PULMONARY EMBOLISM TYPE, UNSPECIFIED WHETHER ACUTE COR PULMONALE PRESENT (H): Primary | ICD-10-CM

## 2022-08-24 DIAGNOSIS — D68.51 FACTOR 5 LEIDEN MUTATION, HETEROZYGOUS (H): ICD-10-CM

## 2022-08-24 DIAGNOSIS — D68.61 ANTIPHOSPHOLIPID SYNDROME (H): ICD-10-CM

## 2022-08-24 LAB — INR HOME MONITORING: 3.1 (ref 2–3)

## 2022-08-24 NOTE — PROGRESS NOTES
ANTICOAGULATION MANAGEMENT     Rocío Catherine 26 year old female is on warfarin with supratherapeutic INR result. (Goal INR 2.0-3.0)    Recent labs: (last 7 days)     08/24/22  0000   INR 3.1*       ASSESSMENT       Source(s): Chart Review and Patient/Caregiver Call       Warfarin doses taken: Warfarin taken as instructed    Diet: Increased greens/vitamin K in diet; plans to resume previous intake    New illness, injury, or hospitalization: No    Medication/supplement changes: None noted    Signs or symptoms of bleeding or clotting: No    Previous INR: Therapeutic last 2(+) visits    Additional findings: has nuñez currently       PLAN     Recommended plan for temporary change(s) affecting INR     Dosing Instructions: Continue your current warfarin dose with next INR in 1 week           Sent [employer] message with dosing and follow up instructions    Patient to recheck with home meter    Education provided: Goal range and significance of current result, Importance of therapeutic range, Importance of following up at instructed interval and Contact [phone]  with any changes, questions or concerns.     Plan made per [facility] anticoagulation protocol    Meri Lombardo RN  Anticoagulation Clinic  8/24/2022    _______________________________________________________________________     Anticoagulation Episode Summary     Current INR goal:  2.0-3.0   TTR:  58.1 % (1 y)   Target end date:  Indefinite   Send INR reminders to:  JENNIFER LECHUGA    Indications    Acute pulmonary embolism  unspecified pulmonary embolism type  unspecified whether acute cor pulmonale present (H) [I26.99]  Factor 5 Leiden mutation  heterozygous (H) [D68.51]  Other acute pulmonary embolism without acute cor pulmonale (H) (Resolved) [I26.99]  Acute pulmonary embolism  unspecified pulmonary embolism type  unspecified whether acute cor pulmonale present (H) (Resolved) [I26.99]  Antiphospholipid syndrome (H) [D68.61]  Long term current use of anticoagulant  therapy [Z79.01]           Comments:  [employer] home meter// manage by exception// Antiphospholipid antibody syndrome (may need CF10 if INR elevated with no known reason)         Anticoagulation Care Providers     Provider Role Specialty Phone number    Jesse Farnsworth MD Referring Family Medicine [phone]    EboniYessy martinez APRN [facility] Referring Family Medicine [phone]

## 2022-08-24 NOTE — PROGRESS NOTES
ANTICOAGULATION MANAGEMENT     Rocío Catherine 26 year old female is on warfarin with supratherapeutic INR result. (Goal INR 2.0-3.0)    Recent labs: (last 7 days)     08/24/22  0000   INR 3.1*       ASSESSMENT       Source(s): Chart Review and my chart responses       Warfarin doses taken: Warfarin taken as instructed    Diet: Increased greens/vitamin K in diet; plans to resume previous intake    New illness, injury, or hospitalization: No    Medication/supplement changes: None noted    Signs or symptoms of bleeding or clotting: No    Previous INR: Therapeutic last 2(+) visits    Additional findings: currently has menses       PLAN     Recommended plan for temporary change(s) affecting INR     Dosing Instructions: Continue your current warfarin dose with next INR in 1 week       Summary  As of 8/24/2022    Full warfarin instructions:  7.5 mg every day   Next INR check:  8/31/2022             Sent [employer] message with dosing and follow up instructions    Patient to recheck with home meter    Education provided: Goal range and significance of current result, Importance of therapeutic range and Contact [phone]  with any changes, questions or concerns.     Plan made per [facility] anticoagulation protocol    Meri Lombardo RN  Anticoagulation Clinic  8/24/2022    _______________________________________________________________________     Anticoagulation Episode Summary     Current INR goal:  2.0-3.0   TTR:  58.1 % (1 y)   Target end date:  Indefinite   Send INR reminders to:  JENNIFER LECHUGA    Indications    Acute pulmonary embolism  unspecified pulmonary embolism type  unspecified whether acute cor pulmonale present (H) [I26.99]  Factor 5 Leiden mutation  heterozygous (H) [D68.51]  Other acute pulmonary embolism without acute cor pulmonale (H) (Resolved) [I26.99]  Acute pulmonary embolism  unspecified pulmonary embolism type  unspecified whether acute cor pulmonale present (H) (Resolved) [I26.99]  Antiphospholipid syndrome  (H) [D68.61]  Long term current use of anticoagulant therapy [Z79.01]           Comments:  [employer] home meter// manage by exception// Antiphospholipid antibody syndrome (may need CF10 if INR elevated with no known reason)         Anticoagulation Care Providers     Provider Role Specialty Phone number    Jesse Farnsworth MD Referring Family Medicine [phone]    Yessy Lyn APRN CNP Referring Family Medicine [phone]

## 2022-09-01 LAB — INR HOME MONITORING: 2.8 (ref 2–3)

## 2022-09-02 ENCOUNTER — ANTICOAGULATION THERAPY VISIT (OUTPATIENT)
Dept: ANTICOAGULATION | Facility: CLINIC | Age: 27
End: 2022-09-02

## 2022-09-02 DIAGNOSIS — Z79.01 LONG TERM CURRENT USE OF ANTICOAGULANT THERAPY: ICD-10-CM

## 2022-09-02 DIAGNOSIS — D68.51 FACTOR 5 LEIDEN MUTATION, HETEROZYGOUS (H): ICD-10-CM

## 2022-09-02 DIAGNOSIS — I26.99 ACUTE PULMONARY EMBOLISM, UNSPECIFIED PULMONARY EMBOLISM TYPE, UNSPECIFIED WHETHER ACUTE COR PULMONALE PRESENT (H): Primary | ICD-10-CM

## 2022-09-02 DIAGNOSIS — D68.61 ANTIPHOSPHOLIPID SYNDROME (H): ICD-10-CM

## 2022-09-02 NOTE — PROGRESS NOTES
ANTICOAGULATION MANAGEMENT     Rocío Catherine 26 year old female is on warfarin with therapeutic INR result. (Goal INR 2.0-3.0)    Recent labs: (last 7 days)     09/01/22  0000   INR 2.8       ASSESSMENT       Source(s): Chart Review and Patient/Caregiver Call       Warfarin doses taken: Warfarin taken as instructed    Diet: No new diet changes identified    New illness, injury, or hospitalization: No    Medication/supplement changes: None noted    Signs or symptoms of bleeding or clotting: No    Previous INR: Supratherapeutic    Additional findings: None       PLAN     Recommended plan for no diet, medication or health factor changes affecting INR     Dosing Instructions: Continue your current warfarin dose with next INR in 2 weeks       Summary  As of 9/2/2022    Full warfarin instructions:  7.5 mg every day   Next INR check:  9/16/2022             Sent [employer] message with dosing and follow up instructions    Patient to recheck with home meter    Education provided: Please call back if any changes to your diet, medications or how you've been taking warfarin and Importance of notifying clinic for changes in medications; a sooner lab recheck maybe needed.    Plan made per [facility] anticoagulation protocol    Amanda Valdez RN  Anticoagulation Clinic  9/2/2022    _______________________________________________________________________     Anticoagulation Episode Summary     Current INR goal:  2.0-3.0   TTR:  58.9 % (1 y)   Target end date:  Indefinite   Send INR reminders to:  JENNIFER LECHUGA    Indications    Acute pulmonary embolism  unspecified pulmonary embolism type  unspecified whether acute cor pulmonale present (H) [I26.99]  Factor 5 Leiden mutation  heterozygous (H) [D68.51]  Other acute pulmonary embolism without acute cor pulmonale (H) (Resolved) [I26.99]  Acute pulmonary embolism  unspecified pulmonary embolism type  unspecified whether acute cor pulmonale present (H) (Resolved) [I26.99]  Antiphospholipid  syndrome (H) [D68.61]  Long term current use of anticoagulant therapy [Z79.01]           Comments:  [employer] home meter// manage by exception// Antiphospholipid antibody syndrome (may need CF10 if INR elevated with no known reason)         Anticoagulation Care Providers     Provider Role Specialty Phone number    Jesse Farnsworth MD Referring Family Medicine [phone]    Yessy Lyn APRN CNP Referring Family Medicine [phone]

## 2022-09-18 ENCOUNTER — HEALTH MAINTENANCE LETTER (OUTPATIENT)
Age: 27
End: 2022-09-18

## 2022-09-19 ENCOUNTER — DOCUMENTATION ONLY (OUTPATIENT)
Dept: ANTICOAGULATION | Facility: CLINIC | Age: 27
End: 2022-09-19

## 2022-09-19 DIAGNOSIS — D68.61 ANTIPHOSPHOLIPID SYNDROME (H): ICD-10-CM

## 2022-09-19 DIAGNOSIS — Z79.01 LONG TERM CURRENT USE OF ANTICOAGULANT THERAPY: ICD-10-CM

## 2022-09-19 DIAGNOSIS — D68.51 FACTOR 5 LEIDEN MUTATION, HETEROZYGOUS (H): ICD-10-CM

## 2022-09-19 DIAGNOSIS — I26.99 ACUTE PULMONARY EMBOLISM, UNSPECIFIED PULMONARY EMBOLISM TYPE, UNSPECIFIED WHETHER ACUTE COR PULMONALE PRESENT (H): Primary | ICD-10-CM

## 2022-09-19 LAB — INR HOME MONITORING: 2.3 (ref 2–3)

## 2022-09-19 NOTE — PROGRESS NOTES
ANTICOAGULATION  MANAGEMENT-Home Monitor Managed by Exception    Rocío Catherine 26 year old female is on warfarin with therapeutic INR result. (Goal INR 2.0-3.0)    Recent labs: (last 7 days)     09/19/22  0000   INR 2.3         Previous INR was Therapeutic    Medication, diet, health changes since last INR:chart reviewed; none identified    Contacted within the last 12 weeks by phone on  8/24/22      VINI Alford was NOT contacted regarding therapeutic result today per home monitoring policy manage by exception agreement.   Current warfarin dose is to be continued:     Summary  As of 9/19/2022    Full warfarin instructions:  7.5 mg every day   Next INR check:  10/3/2022           My Chart message sent.    Amanda Valdez RN  Anticoagulation Clinic  9/19/2022    _______________________________________________________________________     Anticoagulation Episode Summary     Current INR goal:  2.0-3.0   TTR:  63.2 % (1 y)   Target end date:  Indefinite   Send INR reminders to:  [facility]    Indications    Acute pulmonary embolism  unspecified pulmonary embolism type  unspecified whether acute cor pulmonale present (H) [I26.99]  Factor 5 Leiden mutation  heterozygous (H) [D68.51]  Other acute pulmonary embolism without acute cor pulmonale (H) (Resolved) [I26.99]  Acute pulmonary embolism  unspecified pulmonary embolism type  unspecified whether acute cor pulmonale present (H) (Resolved) [I26.99]  Antiphospholipid syndrome (H) [D68.61]  Long term current use of anticoagulant therapy [Z79.01]           Comments:  [employer] home meter// manage by exception// Antiphospholipid antibody syndrome (may need CF10 if INR elevated with no known reason)         Anticoagulation Care Providers     Provider Role Specialty Phone number    Jesse Farnsworth MD Referring Family Medicine [phone]    Yessy Lyn APRN CNP Referring Family Medicine [phone]

## 2022-09-29 ENCOUNTER — MYC MEDICAL ADVICE (OUTPATIENT)
Dept: FAMILY MEDICINE | Facility: CLINIC | Age: 27
End: 2022-09-29

## 2022-09-30 NOTE — TELEPHONE ENCOUNTER
Agree that she needs an appointment  I only saw her once >1 year ago not even for physical or establish care type stuff    Margie Dominguez MD

## 2022-09-30 NOTE — TELEPHONE ENCOUNTER
See my chart message    Question on migraine medication on Warfarin    Has not seen you since 6/11/21, my chart message sent to inform    Tamia Davis RN on 9/30/2022 at 7:46 AM

## 2022-10-08 LAB — INR HOME MONITORING: 2.3 (ref 2–3)

## 2022-10-10 ENCOUNTER — DOCUMENTATION ONLY (OUTPATIENT)
Dept: ANTICOAGULATION | Facility: CLINIC | Age: 27
End: 2022-10-10

## 2022-10-10 DIAGNOSIS — I26.99 ACUTE PULMONARY EMBOLISM, UNSPECIFIED PULMONARY EMBOLISM TYPE, UNSPECIFIED WHETHER ACUTE COR PULMONALE PRESENT (H): Primary | ICD-10-CM

## 2022-10-10 DIAGNOSIS — Z79.01 LONG TERM CURRENT USE OF ANTICOAGULANT THERAPY: ICD-10-CM

## 2022-10-10 DIAGNOSIS — D68.61 ANTIPHOSPHOLIPID SYNDROME (H): ICD-10-CM

## 2022-10-10 DIAGNOSIS — D68.51 FACTOR 5 LEIDEN MUTATION, HETEROZYGOUS (H): ICD-10-CM

## 2022-10-10 NOTE — PROGRESS NOTES
Incoming fax from [employer] home monitoring [employer]    Date of result 10/8/22    INR result 2.3

## 2022-10-10 NOTE — PROGRESS NOTES
ANTICOAGULATION  MANAGEMENT-Home Monitor Managed by Exception    Rocío Catherine 26 year old female is on warfarin with therapeutic INR result. (Goal INR 2.0-3.0)    Recent labs: (last 7 days)     10/08/22  0000   INR 2.3         Previous INR was Therapeutic    Medication, diet, health changes since last INR:chart reviewed; none identified    Contacted within the last 12 weeks by phone on 08/24/22      VINI Alford was NOT contacted regarding therapeutic result today per home monitoring policy manage by exception agreement.   Current warfarin dose is to be continued:     Summary  As of 10/10/2022    Full warfarin instructions:  7.5 mg every day   Next INR check:  10/17/2022           My Chart message sent.    Amanda Valdez RN  Anticoagulation Clinic  10/10/2022    _______________________________________________________________________     Anticoagulation Episode Summary     Current INR goal:  2.0-3.0   TTR:  67.1 % (1 y)   Target end date:  Indefinite   Send INR reminders to:  [facility]    Indications    Acute pulmonary embolism  unspecified pulmonary embolism type  unspecified whether acute cor pulmonale present (H) [I26.99]  Factor 5 Leiden mutation  heterozygous (H) [D68.51]  Other acute pulmonary embolism without acute cor pulmonale (H) (Resolved) [I26.99]  Acute pulmonary embolism  unspecified pulmonary embolism type  unspecified whether acute cor pulmonale present (H) (Resolved) [I26.99]  Antiphospholipid syndrome (H) [D68.61]  Long term current use of anticoagulant therapy [Z79.01]           Comments:  [employer] home meter// manage by exception// Antiphospholipid antibody syndrome (may need CF10 if INR elevated with no known reason)         Anticoagulation Care Providers     Provider Role Specialty Phone number    Jesse Farnsworth MD Referring Family Medicine [phone]    Yessy Lyn APRN CNP Referring Family Medicine [phone]

## 2022-10-21 ENCOUNTER — ANTICOAGULATION THERAPY VISIT (OUTPATIENT)
Dept: ANTICOAGULATION | Facility: CLINIC | Age: 27
End: 2022-10-21

## 2022-10-21 DIAGNOSIS — Z79.01 LONG TERM CURRENT USE OF ANTICOAGULANT THERAPY: ICD-10-CM

## 2022-10-21 DIAGNOSIS — D68.61 ANTIPHOSPHOLIPID SYNDROME (H): ICD-10-CM

## 2022-10-21 DIAGNOSIS — I26.99 ACUTE PULMONARY EMBOLISM, UNSPECIFIED PULMONARY EMBOLISM TYPE, UNSPECIFIED WHETHER ACUTE COR PULMONALE PRESENT (H): Primary | ICD-10-CM

## 2022-10-21 DIAGNOSIS — D68.51 FACTOR 5 LEIDEN MUTATION, HETEROZYGOUS (H): ICD-10-CM

## 2022-10-21 LAB — INR HOME MONITORING: 3.2 (ref 2–3)

## 2022-10-21 NOTE — PROGRESS NOTES
ANTICOAGULATION MANAGEMENT     Rocío Catherine 26 year old female is on warfarin with supratherapeutic INR result. (Goal INR 2.0-3.0)    Recent labs: (last 7 days)     10/21/22  0000   INR 3.2*       ASSESSMENT       Source(s): Chart Review and Patient/Caregiver Call       Warfarin doses taken: Warfarin taken as instructed    Diet: No new diet changes identified    New illness, injury, or hospitalization: patient having allergies currently-has not taken any medications--will be taking Claritin--no interaction anticipated.     Medication/supplement changes: None noted    Signs or symptoms of bleeding or clotting: No    Previous INR: Therapeutic last 2(+) visits    Additional findings: None       PLAN     Recommended plan for temporary change(s) affecting INR     Dosing Instructions: partial hold then continue your current warfarin dose with next INR in 2 weeks       Summary  As of 10/21/2022    Full warfarin instructions:  10/21: 5 mg; Otherwise 7.5 mg every day; Starting 10/21/2022   Next INR check:  11/4/2022             Telephone call with Rocío who verbalizes understanding and agrees to plan    Patient to recheck with home meter    Education provided:     Please call back if any changes to your diet, medications or how you've been taking warfarin    Plan made per [facility] anticoagulation protocol    Niyah Ferrer, RN  Anticoagulation Clinic  10/21/2022    _______________________________________________________________________     Anticoagulation Episode Summary     Current INR goal:  2.0-3.0   TTR:  67.6 % (1 y)   Target end date:  Indefinite   Send INR reminders to:  [facility]IT[facility]    Indications    Acute pulmonary embolism  unspecified pulmonary embolism type  unspecified whether acute cor pulmonale present (H) [I26.99]  Factor 5 Leiden mutation  heterozygous (H) [D68.51]  Other acute pulmonary embolism without acute cor pulmonale (H) (Resolved) [I26.99]  Acute pulmonary embolism  unspecified pulmonary  embolism type  unspecified whether acute cor pulmonale present (H) (Resolved) [I26.99]  Antiphospholipid syndrome (H) [D68.61]  Long term current use of anticoagulant therapy [Z79.01]           Comments:  [employer] home meter// manage by exception// Antiphospholipid antibody syndrome (may need CF10 if INR elevated with no known reason)         Anticoagulation Care Providers     Provider Role Specialty Phone number    Jesse Farnsworth MD Referring Family Medicine [phone]    Yessy Lyn APRN [facility] Referring Family Medicine [phone]

## 2022-10-24 ENCOUNTER — DOCUMENTATION ONLY (OUTPATIENT)
Dept: ANTICOAGULATION | Facility: CLINIC | Age: 27
End: 2022-10-24

## 2022-10-24 ENCOUNTER — HOSPITAL ENCOUNTER (EMERGENCY)
Facility: CLINIC | Age: 27
Discharge: HOME | End: 2022-10-24
Attending: PHYSICIAN ASSISTANT | Admitting: PHYSICIAN ASSISTANT
Payer: COMMERCIAL

## 2022-10-24 VITALS
RESPIRATION RATE: 18 BRPM | TEMPERATURE: 98.1 F | DIASTOLIC BLOOD PRESSURE: 68 MMHG | SYSTOLIC BLOOD PRESSURE: 99 MMHG | OXYGEN SATURATION: 100 % | HEART RATE: 107 BPM

## 2022-10-24 DIAGNOSIS — J11.1 INFLUENZA-LIKE ILLNESS: ICD-10-CM

## 2022-10-24 LAB
FLUAV RNA SPEC QL NAA+PROBE: NEGATIVE
FLUBV RNA RESP QL NAA+PROBE: NEGATIVE
SARS-COV-2 RNA RESP QL NAA+PROBE: NEGATIVE

## 2022-10-24 PROCEDURE — 87636 SARSCOV2 & INF A&B AMP PRB: CPT | Performed by: PHYSICIAN ASSISTANT

## 2022-10-24 PROCEDURE — G0463 HOSPITAL OUTPT CLINIC VISIT: HCPCS | Mod: CS | Performed by: PHYSICIAN ASSISTANT

## 2022-10-24 PROCEDURE — C9803 HOPD COVID-19 SPEC COLLECT: HCPCS | Performed by: PHYSICIAN ASSISTANT

## 2022-10-24 PROCEDURE — 99213 OFFICE O/P EST LOW 20 MIN: CPT | Mod: CS | Performed by: PHYSICIAN ASSISTANT

## 2022-10-24 RX ORDER — ONDANSETRON 4 MG/1
4 TABLET, ORALLY DISINTEGRATING ORAL EVERY 8 HOURS PRN
Qty: 10 TABLET | Refills: 0 | Status: SHIPPED | OUTPATIENT
Start: 2022-10-24 | End: 2022-10-27

## 2022-10-24 RX ORDER — OSELTAMIVIR PHOSPHATE 75 MG/1
75 CAPSULE ORAL 2 TIMES DAILY
Qty: 10 CAPSULE | Refills: 0 | Status: SHIPPED | OUTPATIENT
Start: 2022-10-24 | End: 2022-10-29

## 2022-10-24 ASSESSMENT — ACTIVITIES OF DAILY LIVING (ADL): ADLS_ACUITY_SCORE: 33

## 2022-10-24 NOTE — ED PROVIDER NOTES
History     Chief Complaint   Patient presents with     Chills     HPI  Rocío Catherine is a 26 year old female who presents the urgent care with concern over suspected influenza.  Patient reports that her son tested positive for influenza A last week.  2 days ago she developed chills, myalgias, fatigue, nasal congestion, cough.  She denies any objective fever.  No shortness of breath, wheezing, chest pain, palpitations, vomiting, diarrhea or abdominal pain.  She has been taking some OTC medications without relief.       Allergies:  No Known Allergies    Problem List:    Patient Active Problem List    Diagnosis Date Noted     Long term current use of anticoagulant therapy 10/29/2021     Priority: Medium     Antiphospholipid syndrome (H) 2021     Priority: Medium     Acute pulmonary embolism, unspecified pulmonary embolism type, unspecified whether acute cor pulmonale present (H) 12/15/2020     Priority: Medium     Acute pulmonary embolism (H) 2020     Priority: Medium     S/P  2020     Priority: Medium     Palpitations 2019     Priority: Medium     Chronic migraine without aura without status migrainosus, not intractable 2019     Priority: Medium     Sinus tachycardia 2019     Priority: Medium     Non-rheumatic mitral regurgitation, trace 2019     Priority: Medium     Seen on echocardiogram 2019. Recommended for follow up echo in 2-3 years.        Recurrent major depressive disorder, in remission (H) 2018     Priority: Medium     Anxiety 2018     Priority: Medium     Factor 5 Leiden mutation, heterozygous (H) 2018     Priority: Medium     Anemia 2016     Priority: Medium     Dysthymia 2013     Priority: Medium        Past Medical History:    Past Medical History:   Diagnosis Date     Calculus of gallbladder with acute cholecystitis without obstruction 2017     Chickenpox      Depression      Factor 5 Leiden mutation,  heterozygous (H) 2018     Factor V Leiden (H)      History of foot fracture      History of frequent urinary tract infections      History of pyelonephritis      History of recurrent UTI (urinary tract infection)      Non-rheumatic mitral regurgitation, trace 2019     Ovarian cyst      Postpartum depression      Pulmonary emboli (H) 2020       Past Surgical History:    Past Surgical History:   Procedure Laterality Date      SECTION N/A 2020    Procedure:  SECTION;  Surgeon: Grace Dee MD;  Location: [facility] OR      SECTION      c section     ENT SURGERY       GYN SURGERY  Csections ()     HEAD & NECK SURGERY      hemangioma on neck at age 2     LAPAROSCOPIC CHOLECYSTECTOMY       OTHER SURGICAL HISTORY      Spinal Cord Surgery     SINUS FRONTAL OPEN OBLITERATION             Family History:    Family History   Problem Relation Age of Onset     Stomach Problem Mother         ulcer     Bleeding Disorder Father         factor V     Ovarian Cancer Maternal Grandmother      Other Cancer Maternal Grandmother      Thyroid Cancer Paternal Grandmother      Alzheimer Disease Paternal Grandfather      Bleeding Disorder Paternal Grandfather         factor V     Bleeding Disorder Daughter         Factor V Leiden       Social History:  Marital Status:   [2]  Social History     Tobacco Use     Smoking status: Never     Smokeless tobacco: Never   Substance Use Topics     Alcohol use: Yes     Comment:  rare     Drug use: No        Medications:    ondansetron (ZOFRAN ODT) 4 MG ODT tab  oseltamivir (TAMIFLU) 75 MG capsule  budesonide (PULMICORT) 0.5 MG/2ML neb solution  hydrOXYzine (ATARAX) 25 MG tablet  metoclopramide (REGLAN) 10 MG tablet  sertraline (ZOLOFT) 50 MG tablet  warfarin ANTICOAGULANT (JANTOVEN ANTICOAGULANT) 5 MG tablet      Review of Systems  CONSTITUTIONAL:POSITIVE  for chills, myalgias and fatigue NEGATIVE  for fever  INTEGUMENTARY/SKIN: NEGATIVE  for worrisome rashes, moles or lesions  EYES: NEGATIVE for vision changes or irritation  ENT/MOUTH: POSITIVE for nasal congestion and NEGATIVE for sore throat, ear pain   RESP:POSITIVE for cough and NEGATIVE for SOB/dyspnea and wheezing  GI: NEGATIVE for abdominal pain, diarrhea, nausea and vomiting  Physical Exam   BP: 99/68  Pulse: 107  Temp: 98.1  F (36.7  C)  Resp: 18  SpO2: 100 %  Physical Exam  GENERAL APPEARANCE:alert, cooperative and no distress  EYES: EOMI,  PERRL, conjunctiva clear  HENT: ear canals and TM's normal.  No discharge present.  Posterior pharynx is nonerythematous without exudate  NECK: supple, nontender, no lymphadenopathy  RESP: lungs clear to auscultation - no rales, rhonchi or wheezes  CV: tachycardia, regular rhythm, normal S1 S2, no murmur noted  SKIN: no suspicious lesions or rashes    ED Course                 Procedures              Critical Care time:  none               Results for orders placed or performed during the hospital encounter of 10/24/22   Symptomatic; Yes; 10/22/2022 Influenza A/B & SARS-CoV2 (COVID-19) Virus PCR Multiplex Nose     Status: Normal    Specimen: Nose; Swab   Result Value Ref Range    Influenza A PCR Negative Negative    Influenza B PCR Negative Negative    SARS CoV2 PCR Negative Negative    Narrative    Testing was performed using the zahira SARS-CoV-2 & Influenza A/B Assay on the zahira Dinorah System. This test should be ordered for the detection of SARS-CoV-2 and influenza viruses in individuals who meet clinical and/or epidemiological criteria. Test performance is unknown in asymptomatic patients. This test is for in vitro diagnostic use under the FDA EUA for laboratories certified under CLIA to perform moderate and/or high complexity testing. This test has not been FDA cleared or approved. A negative result does not rule out the presence of PCR inhibitors in the specimen or target RNA in concentration below the limit of detection for the assay. If only one  viral target is positive but coinfection with multiple targets is suspected, the sample should be re-tested with another FDA cleared, approved or authorized test, if coinfection would change clinical management. [facility] are certified under the Clinical Laboratory Improvement Amendments of 1988 (CLIA-88) as qualified to perform moderate and/or high complexity laboratory testing.     Medications - No data to display    Assessments & Plan (with Medical Decision Making)     I have reviewed the nursing notes.    I have reviewed the findings, diagnosis, plan and need for follow up with the patient.       New Prescriptions    ONDANSETRON (ZOFRAN ODT) 4 MG ODT TAB    Take 1 tablet (4 mg) by mouth every 8 hours as needed    OSELTAMIVIR (TAMIFLU) 75 MG CAPSULE    Take 1 capsule (75 mg) by mouth 2 times daily for 5 days       Final diagnoses:   Influenza-like illness     26-year-old female presents to urgent care with concern over 2-day history of chills, myalgias, fatigue, nasal congestion and cough with concern of potential influenza after her son tested positive within the last week.  She was noted to be tachycardic upon arrival, remainder of vital signs stable.  Physical exam as well auscultation without lag.  She did have testing for influenza, COVID-19 which were ultimately negative.  I discussed limitations of testing and potential that she still does have influenza as well as risk/benefits of Tamiflu and patient requested to be placed on antiviral.  We did discuss that smoking would not be effective against other viral illnesses.  I do not suspect pneumonia, pertussis, PE, strep throat at this time and will defer further evaluation.  She was discharged home stable with instructions to follow up with PCP if no improvement in 5-7 days.  Worrisome reasons to return to ER/UC sooner discussed.     Disclaimer: This note consists of symbols derived from keyboarding, dictation, and/or voice recognition  software. As a result, there may be errors in the script that have gone undetected.  Please consider this when interpreting information found in the chart.      10/24/2022   [facility] EMERGENCY DEPT     Susanne Balderrama PA-C  10/27/22 1127

## 2022-10-24 NOTE — ED TRIAGE NOTES
Pt reports fever, chills, cough, body aches since Saturday. Son tested positive for influenza Thursday.

## 2022-10-24 NOTE — PROGRESS NOTES
ANTICOAGULATION  MANAGEMENT: Discharge Review    Rocío Catherine chart reviewed for anticoagulation continuity of care    Emergency room visit on 10/24/22 for influenza-like illness.    Discharge disposition: Home    Results:    Recent labs: (last 7 days)     10/21/22  0000   INR 3.2*     Anticoagulation inpatient management:     home regimen continued    Anticoagulation discharge instructions:     Warfarin dosing: home regimen continued   Bridging: No   INR goal change: No      Medication changes affecting anticoagulation: Yes: Started on oseltamivir phosphate (Concurrent use of OSELTAMIVIR and WARFARIN may result in increased risk of bleeding.) 10/24-10/29/22     Additional factors affecting anticoagulation: No     PLAN     No adjustment to anticoagulation plan needed, recheck with home meter on 11/4    Patient not contacted    No adjustment to Anticoagulation Calendar was required    Mayra Palma RN

## 2022-10-26 ENCOUNTER — MYC MEDICAL ADVICE (OUTPATIENT)
Dept: FAMILY MEDICINE | Facility: CLINIC | Age: 27
End: 2022-10-26

## 2022-10-27 ENCOUNTER — HOSPITAL ENCOUNTER (EMERGENCY)
Facility: CLINIC | Age: 27
Discharge: HOME | End: 2022-10-27
Attending: FAMILY MEDICINE | Admitting: FAMILY MEDICINE
Payer: COMMERCIAL

## 2022-10-27 ENCOUNTER — TELEPHONE (OUTPATIENT)
Dept: ANTICOAGULATION | Facility: CLINIC | Age: 27
End: 2022-10-27

## 2022-10-27 VITALS
HEART RATE: 107 BPM | BODY MASS INDEX: 23.56 KG/M2 | TEMPERATURE: 99.6 F | WEIGHT: 120 LBS | RESPIRATION RATE: 12 BRPM | DIASTOLIC BLOOD PRESSURE: 76 MMHG | OXYGEN SATURATION: 96 % | HEIGHT: 60 IN | SYSTOLIC BLOOD PRESSURE: 100 MMHG

## 2022-10-27 DIAGNOSIS — D68.61 ANTIPHOSPHOLIPID SYNDROME (H): ICD-10-CM

## 2022-10-27 DIAGNOSIS — J10.1 INFLUENZA A: ICD-10-CM

## 2022-10-27 DIAGNOSIS — I26.99 ACUTE PULMONARY EMBOLISM, UNSPECIFIED PULMONARY EMBOLISM TYPE, UNSPECIFIED WHETHER ACUTE COR PULMONALE PRESENT (H): Primary | ICD-10-CM

## 2022-10-27 DIAGNOSIS — D68.51 FACTOR 5 LEIDEN MUTATION, HETEROZYGOUS (H): ICD-10-CM

## 2022-10-27 DIAGNOSIS — Z79.01 LONG TERM CURRENT USE OF ANTICOAGULANT THERAPY: ICD-10-CM

## 2022-10-27 LAB
FLUAV RNA SPEC QL NAA+PROBE: POSITIVE
FLUBV RNA RESP QL NAA+PROBE: NEGATIVE
SARS-COV-2 RNA RESP QL NAA+PROBE: NEGATIVE

## 2022-10-27 PROCEDURE — 99284 EMERGENCY DEPT VISIT MOD MDM: CPT | Mod: CS | Performed by: FAMILY MEDICINE

## 2022-10-27 PROCEDURE — C9803 HOPD COVID-19 SPEC COLLECT: HCPCS | Performed by: FAMILY MEDICINE

## 2022-10-27 PROCEDURE — 87636 SARSCOV2 & INF A&B AMP PRB: CPT | Performed by: FAMILY MEDICINE

## 2022-10-27 PROCEDURE — 250N000013 HC RX MED GY IP 250 OP 250 PS 637: Performed by: FAMILY MEDICINE

## 2022-10-27 PROCEDURE — 99283 EMERGENCY DEPT VISIT LOW MDM: CPT | Mod: CS | Performed by: FAMILY MEDICINE

## 2022-10-27 RX ORDER — ACETAMINOPHEN 325 MG/1
975 TABLET ORAL ONCE
Status: COMPLETED | OUTPATIENT
Start: 2022-10-27 | End: 2022-10-27

## 2022-10-27 RX ADMIN — ACETAMINOPHEN 975 MG: 325 TABLET, FILM COATED ORAL at 14:03

## 2022-10-27 ASSESSMENT — ACTIVITIES OF DAILY LIVING (ADL): ADLS_ACUITY_SCORE: 35

## 2022-10-27 NOTE — DISCHARGE INSTRUCTIONS
You can take the Tamiflu that was prescribed for you twice daily for 5 days.  You should return to be seen if you have fevers more than 5 days or you develop difficulty breathing or if your fevers come back after having gone away or have other worrisome symptoms.  When you have recovered you can get an influenza vaccination to protect you against additional strains.  You can use acetaminophen 1000 mg 4 times daily if needed for fever or discomfort.

## 2022-10-27 NOTE — TELEPHONE ENCOUNTER
ANTICOAGULATION  MANAGEMENT: Discharge Review    Rocíodanilo Catherine chart reviewed for anticoagulation continuity of care    Emergency room visit on 10/27/22 for influenza A    Discharge disposition: Home    Results:    Recent labs: (last 7 days)     10/21/22  0000   INR 3.2*     Anticoagulation inpatient management:     not applicable     Anticoagulation discharge instructions:     Warfarin dosing: home regimen continued   Bridging: No   INR goal change: No      Medication changes affecting anticoagulation: No    Additional factors affecting anticoagulation: Yes: Acute illness- fever, malaise, muscle pain, headache, sore throat.      PLAN     No adjustment to anticoagulation plan needed    Spoke with Rocío, recommended that she check her INR with home meter     Anticoagulation Calendar updated    Michael Molina RN

## 2022-10-27 NOTE — ED PROVIDER NOTES
History     Chief Complaint   Patient presents with     Generalized Body Aches     (-) covid x2 - children have influenza - here today with body aches and fever     HPI  Rocío Catherine is a 26 year old female who comes in with myalgias and malaise.  She is developed a headache in the frontal sinus area.  She has a bit of a sore throat.  She does not have any cough.  2 of her children were just diagnosed with influenza with positive tests.  Her symptoms began 5 days ago.  She was given Tamiflu to take but did not start it because she tested negative.  She has had 2 doses of COVID-vaccine and has had COVID infection in the past.  She is on warfarin for factor V Leiden mutation with a history of PE.  She has an antiphospholipid antibody syndrome.  Her INR was a bit over 3 last week.  She is not having any abdominal pain or chest pain.  She does not have any irritative voiding symptoms.  She has not yet had influenza vaccination this season.    Allergies:  No Known Allergies    Problem List:    Patient Active Problem List    Diagnosis Date Noted     Long term current use of anticoagulant therapy 10/29/2021     Priority: Medium     Antiphospholipid syndrome (H) 2021     Priority: Medium     Acute pulmonary embolism, unspecified pulmonary embolism type, unspecified whether acute cor pulmonale present (H) 12/15/2020     Priority: Medium     Acute pulmonary embolism (H) 2020     Priority: Medium     S/P  2020     Priority: Medium     Palpitations 2019     Priority: Medium     Chronic migraine without aura without status migrainosus, not intractable 2019     Priority: Medium     Sinus tachycardia 2019     Priority: Medium     Non-rheumatic mitral regurgitation, trace 2019     Priority: Medium     Seen on echocardiogram 2019. Recommended for follow up echo in 2-3 years.        Recurrent major depressive disorder, in remission (H) 2018     Priority: Medium      Anxiety 2018     Priority: Medium     Factor 5 Leiden mutation, heterozygous (H) 2018     Priority: Medium     Anemia 2016     Priority: Medium     Dysthymia 2013     Priority: Medium        Past Medical History:    Past Medical History:   Diagnosis Date     Calculus of gallbladder with acute cholecystitis without obstruction 2017     Chickenpox      Depression      Factor 5 Leiden mutation, heterozygous (H) 2018     Factor V Leiden (H)      History of foot fracture      History of frequent urinary tract infections      History of pyelonephritis      History of recurrent UTI (urinary tract infection)      Non-rheumatic mitral regurgitation, trace 2019     Ovarian cyst      Postpartum depression      Pulmonary emboli (H) 2020       Past Surgical History:    Past Surgical History:   Procedure Laterality Date      SECTION N/A 2020    Procedure:  SECTION;  Surgeon: Grace Dee MD;  Location: [facility] OR      SECTION      c section     ENT SURGERY       GYN SURGERY  Csections ()     HEAD & NECK SURGERY      hemangioma on neck at age 2     LAPAROSCOPIC CHOLECYSTECTOMY       OTHER SURGICAL HISTORY      Spinal Cord Surgery     SINUS FRONTAL OPEN OBLITERATION             Family History:    Family History   Problem Relation Age of Onset     Stomach Problem Mother         ulcer     Bleeding Disorder Father         factor V     Ovarian Cancer Maternal Grandmother      Other Cancer Maternal Grandmother      Thyroid Cancer Paternal Grandmother      Alzheimer Disease Paternal Grandfather      Bleeding Disorder Paternal Grandfather         factor V     Bleeding Disorder Daughter         Factor V Leiden       Social History:  Marital Status:   [2]  Social History     Tobacco Use     Smoking status: Never     Smokeless tobacco: Never   Substance Use Topics     Alcohol use: Yes     Comment:  rare     Drug use: No        Medications:     budesonide (PULMICORT) 0.5 MG/2ML neb solution  hydrOXYzine (ATARAX) 25 MG tablet  metoclopramide (REGLAN) 10 MG tablet  ondansetron (ZOFRAN ODT) 4 MG ODT tab  oseltamivir (TAMIFLU) 75 MG capsule  sertraline (ZOLOFT) 50 MG tablet  warfarin ANTICOAGULANT (JANTOVEN ANTICOAGULANT) 5 MG tablet      Review of Systems    All other systems are reviewed and are negative    Physical Exam   BP: 130/67  Pulse: 118  Temp: 100  F (37.8  C)  Resp: 12  Height: 152.4 cm (5')  Weight: 54.4 kg (120 lb)  SpO2: 98 %      Physical Exam     Nursing note and vitals were reviewed.  Constitutional: Awake and alert, adequately nourished and developed appearing 26-year-old in no apparent discomfort, who does not appear acutely ill, and who answers questions appropriately and cooperates with examination.  HEENT: Oropharynx shows white mucous membranes and is otherwise normal.  Voice quality is normal.  EOMI.   Neck: Freely mobile.  Cardiovascular: Cardiac examination reveals normal heart rate and regular rhythm without murmur.  Pulmonary/Chest: Breathing is unlabored.  Breath sounds are clear and equal bilaterally.  There no retractions, tachypnea, rales, wheezes, or rhonchi.  Neurological: Alert, oriented, thought content logical, coherent   Skin: Warm, dry, no rashes.  Psychiatric: Affect broad and appropriate.      ED Course                 Procedures              Critical Care time:  none               Results for orders placed or performed during the hospital encounter of 10/27/22 (from the past 24 hour(s))   Symptomatic; Unknown Influenza A/B & SARS-CoV2 (COVID-19) Virus PCR Multiplex Nasopharyngeal    Specimen: Nasopharyngeal; Swab   Result Value Ref Range    Influenza A PCR Positive (A) Negative    Influenza B PCR Negative Negative    SARS CoV2 PCR Negative Negative    Narrative    Testing was performed using the zahira SARS-CoV-2 & Influenza A/B Assay on the zahira Dinorah System. This test should be ordered for the detection of  SARS-CoV-2 and influenza viruses in individuals who meet clinical and/or epidemiological criteria. Test performance is unknown in asymptomatic patients. This test is for in vitro diagnostic use under the FDA EUA for laboratories certified under CLIA to perform moderate and/or high complexity testing. This test has not been FDA cleared or approved. A negative result does not rule out the presence of PCR inhibitors in the specimen or target RNA in concentration below the limit of detection for the assay. If only one viral target is positive but coinfection with multiple targets is suspected, the sample should be re-tested with another FDA cleared, approved or authorized test, if coinfection would change clinical management. [facility] are certified under the Clinical Laboratory Improvement Amendments of 1988 (CLIA-88) as qualified to perform moderate and/or high complexity laboratory testing.       Medications   acetaminophen (TYLENOL) tablet 975 mg (975 mg Oral Given 10/27/22 1403)       Assessments & Plan (with Medical Decision Making)     26-year-old female with a history of factor V Leiden mutation and antiphospholipid antibody syndrome on anticoagulation presented with myalgias and fever.  She had exposure to 2 children who tested positive for influenza.  Her physical examination is normal with the exception of a temp of 100.0.  She has no increased work of breathing and has normal oxygenation.  Her rapid influenza testing is positive for influenza A.  COVID testing is negative.  At this time I have no concern for viral pneumonia or complication of influenza.  She had been previously prescribed Tamiflu after exposure to her children and has not yet taken it.  We discussed the pros and cons of this and that she could take it though it may be less effective this far into the illness.  She has not however developed a cough so may be of some benefit.  She can get vaccinated once she is recovered  from this.  She should return if she develops signs of complication and occluding difficulty breathing, persistent fevers, recurrent fevers, or other worrisome symptoms.  She expressed understanding and her questions were all answered.    I have reviewed the nursing notes.    I have reviewed the findings, diagnosis, plan and need for follow up with the patient.       New Prescriptions    No medications on file       Final diagnoses:   Influenza A       10/27/2022   [facility] EMERGENCY DEPT     Adam Delgadillo MD  10/27/22 0946

## 2022-10-28 ENCOUNTER — ANTICOAGULATION THERAPY VISIT (OUTPATIENT)
Dept: ANTICOAGULATION | Facility: CLINIC | Age: 27
End: 2022-10-28

## 2022-10-28 DIAGNOSIS — D68.61 ANTIPHOSPHOLIPID SYNDROME (H): ICD-10-CM

## 2022-10-28 DIAGNOSIS — D68.51 FACTOR 5 LEIDEN MUTATION, HETEROZYGOUS (H): ICD-10-CM

## 2022-10-28 DIAGNOSIS — Z79.01 LONG TERM CURRENT USE OF ANTICOAGULANT THERAPY: ICD-10-CM

## 2022-10-28 DIAGNOSIS — I26.99 ACUTE PULMONARY EMBOLISM, UNSPECIFIED PULMONARY EMBOLISM TYPE, UNSPECIFIED WHETHER ACUTE COR PULMONALE PRESENT (H): Primary | ICD-10-CM

## 2022-10-28 LAB — INR HOME MONITORING: 2.9 (ref 2–3)

## 2022-10-28 NOTE — PROGRESS NOTES
ANTICOAGULATION MANAGEMENT     Rocoí Catherine 26 year old female is on warfarin with therapeutic INR result. (Goal INR 2.0-3.0)    Recent labs: (last 7 days)     10/28/22  0000   INR 2.9       ASSESSMENT       Source(s): Chart Review    Previous INR was Supratherapeutic    Medication, diet, health changes since last INR chart reviewed; noted that Rocío currently has influenza         PLAN     Recommended plan for temporary change(s) affecting INR     Dosing Instructions: Continue your current warfarin dose with next INR in 1-2 weeks, depending on appetite and symptoms    Summary  As of 10/28/2022    Full warfarin instructions:  7.5 mg every day; Starting 10/28/2022   Next INR check:  11/11/2022             Detailed voice message left for Rocío with dosing instructions and follow up date.   Sent [employer] message with dosing and follow up instructions    Patient to recheck with home meter    Education provided:     Please call back if any changes to your diet, medications or how you've been taking warfarin    Goal range and lab monitoring: goal range and significance of current result    Dietary considerations: importance of notifying [facility] to changes in diet    Symptom monitoring: monitoring for bleeding signs and symptoms and monitoring for clotting signs and symptoms    Contact [phone]  with any changes, questions or concerns.     Plan made per [facility] anticoagulation protocol    Pamela Mayers RN  Anticoagulation Clinic  10/28/2022    _______________________________________________________________________     Anticoagulation Episode Summary     Current INR goal:  2.0-3.0   TTR:  66.4 % (1 y)   Target end date:  Indefinite   Send INR reminders to:  [facility] HOME MONITORING    Indications    Acute pulmonary embolism  unspecified pulmonary embolism type  unspecified whether acute cor pulmonale present (H) [I26.99]  Factor 5 Leiden mutation  heterozygous (H) [D68.51]  Other acute pulmonary embolism without acute cor  pulmonale (H) (Resolved) [I26.99]  Acute pulmonary embolism  unspecified pulmonary embolism type  unspecified whether acute cor pulmonale present (H) (Resolved) [I26.99]  Antiphospholipid syndrome (H) [D68.61]  Long term current use of anticoagulant therapy [Z79.01]           Comments:  [employer] home meter// manage by exception// Antiphospholipid antibody syndrome (may need CF10 if INR elevated with no known reason)         Anticoagulation Care Providers     Provider Role Specialty Phone number    Jesse Farnsworth MD Referring Family Medicine [phone]    Yessy Lyn APRN [facility] Referring Family Medicine [phone]

## 2022-11-02 ENCOUNTER — DOCUMENTATION ONLY (OUTPATIENT)
Dept: ANTICOAGULATION | Facility: CLINIC | Age: 27
End: 2022-11-02

## 2022-11-02 DIAGNOSIS — I26.99 ACUTE PULMONARY EMBOLISM WITHOUT ACUTE COR PULMONALE, UNSPECIFIED PULMONARY EMBOLISM TYPE (H): Primary | ICD-10-CM

## 2022-11-02 DIAGNOSIS — F33.40 RECURRENT MAJOR DEPRESSIVE DISORDER, IN REMISSION (H): ICD-10-CM

## 2022-11-02 DIAGNOSIS — D68.51 FACTOR 5 LEIDEN MUTATION, HETEROZYGOUS (H): ICD-10-CM

## 2022-11-02 DIAGNOSIS — D68.61 ANTIPHOSPHOLIPID SYNDROME (H): ICD-10-CM

## 2022-11-02 NOTE — PROGRESS NOTES
ANTICOAGULATION CLINIC REFERRAL RENEWAL REQUEST       An annual renewal order is required for all patients referred to [facility].?  Please review and sign the pended referral order for Rocío Catherine.       ANTICOAGULATION SUMMARY      Warfarin indication(s)   PE, Antiphospholipid Antibodies and Heterozygous Factor V Leiden    Mechanical heart valve present?  NO       Current goal range   INR: 2.0-3.0     Goal appropriate for indication? Goal INR 2-3, standard for indication(s) above     Time in Therapeutic Range (TTR)  (Goal > 60%) 66.4%       Office visit with referring provider's group within last year yes on 12/6/21       Mayra Palma RN  [facility]

## 2022-11-05 ENCOUNTER — NURSE TRIAGE (OUTPATIENT)
Dept: NURSING | Facility: CLINIC | Age: 27
End: 2022-11-05

## 2022-11-05 DIAGNOSIS — Z79.01 LONG TERM CURRENT USE OF ANTICOAGULANT THERAPY: Primary | ICD-10-CM

## 2022-11-05 LAB — INR HOME MONITORING: 5.9 (ref 2–3)

## 2022-11-05 NOTE — TELEPHONE ENCOUNTER
Provider Recommendation Follow Up:     Spoke with on-call provider, Dr. Wahl, who advised patient hold Coumadin today and re-check INR tomorrow.    Reached patient/caregiver. Informed of provider's recommendations. Patient verbalized understanding and agrees with the plan.     Shawanda Pal RN  11/05/22 10:43 AM  [facility] Nurse Advisor

## 2022-11-05 NOTE — TELEPHONE ENCOUNTER
Nurse Triage SBAR    Is this a 2nd Level Triage? YES, LICENSED PRACTITIONER REVIEW IS REQUIRED    Situation: Patient calling about INR result of 5.9 using a home test.  Consent: Not needed     Background: Patient was diagnosed with Influenza a week and was on Tamilflu    Factor 5 clotting disorder  History of blood clot    Patient does not have an established PCP, will be seeing a new Cardiologist next week. Warfarin orders and INR are being managed at the Anticoagulation Clinic in [place]    Assessment:   Warfarin 7.5mg daily  INR 5.9 today  Last week INR was  2.9  Mild lightheadedness    Protocol Recommended Disposition:   Call PCP    Recommendation: Advised patient to wait for call-back after reaching out to on-call provider. Care advice given. Patient verbalized understanding and agreed with plan.     Paged to on-call provider at [facility], Dr. Wahl    Does the patient meet one of the following criteria for ADS visit consideration? 16+ years old, with an [facility] PCP     TIP  Providers, please consider if this condition is appropriate for management at one of our Acute and Diagnostic Services sites.     If patient is a good candidate, please use dotphrase <dot>triageresponse and select Refer to ADS to document.     Shawanda Pal RN [facility] 11/5/2022 8:58 AM

## 2022-11-06 LAB — INR HOME MONITORING: 4.2 (ref 2–3)

## 2022-11-07 ENCOUNTER — ANTICOAGULATION THERAPY VISIT (OUTPATIENT)
Dept: ANTICOAGULATION | Facility: CLINIC | Age: 27
End: 2022-11-07

## 2022-11-07 ENCOUNTER — MYC MEDICAL ADVICE (OUTPATIENT)
Dept: FAMILY MEDICINE | Facility: CLINIC | Age: 27
End: 2022-11-07

## 2022-11-07 ENCOUNTER — E-VISIT (OUTPATIENT)
Dept: URGENT CARE | Facility: CLINIC | Age: 27
End: 2022-11-07
Payer: COMMERCIAL

## 2022-11-07 DIAGNOSIS — I26.99 ACUTE PULMONARY EMBOLISM, UNSPECIFIED PULMONARY EMBOLISM TYPE, UNSPECIFIED WHETHER ACUTE COR PULMONALE PRESENT (H): Primary | ICD-10-CM

## 2022-11-07 DIAGNOSIS — B96.89 ACUTE BACTERIAL SINUSITIS: Primary | ICD-10-CM

## 2022-11-07 DIAGNOSIS — D68.51 FACTOR 5 LEIDEN MUTATION, HETEROZYGOUS (H): ICD-10-CM

## 2022-11-07 DIAGNOSIS — Z79.01 LONG TERM CURRENT USE OF ANTICOAGULANT THERAPY: ICD-10-CM

## 2022-11-07 DIAGNOSIS — J01.90 ACUTE BACTERIAL SINUSITIS: Primary | ICD-10-CM

## 2022-11-07 DIAGNOSIS — D68.61 ANTIPHOSPHOLIPID SYNDROME (H): ICD-10-CM

## 2022-11-07 PROCEDURE — 99421 OL DIG E/M SVC 5-10 MIN: CPT | Performed by: FAMILY MEDICINE

## 2022-11-07 NOTE — PATIENT INSTRUCTIONS
Dear Rocío RECINOS Long    After reviewing your responses, I've been able to diagnose you with?a sinus infection caused by bacteria.?     Based on your responses and diagnosis, I have prescribed Augmentin to treat your symptoms. I have sent this to your pharmacy.?     It is also important to stay well hydrated, get lots of rest and take over-the-counter decongestants,?tylenol?or ibuprofen if you?are able to?take those medications per your primary care provider to help relieve discomfort.?     It is important that you take?all of?your prescribed medication even if your symptoms are improving after a few doses.? Taking?all of?your medicine helps prevent the symptoms from returning.?     If your symptoms worsen, you develop severe headache, vomiting, high fever (>102), or are not improving in 7 days, please contact your primary care provider for an appointment or visit any of our convenient Walk-in Care or Urgent Care Centers to be seen which can be found on our website?here.?     Thanks again for choosing?us?as your health care partner,?   ?  Nataly Santos MD?

## 2022-11-07 NOTE — PROGRESS NOTES
ANTICOAGULATION MANAGEMENT     Rocío Catherine 26 year old female is on warfarin with supratherapeutic INR result. (Goal INR 2.0-3.0)    Recent labs: (last 7 days)     11/06/22  0000   INR 4.2*       ASSESSMENT       Source(s): Chart Review and Patient/Caregiver Call       Warfarin doses taken: On-call provider advised to hold warfarin on Saturday 11/5 and recheck on Sunday 11/6.  Patient held dose on Sunday 11/6    Diet: No new diet changes identified    New illness, injury, or hospitalization: Yes: patient is recovering from Influenza A    Medication/supplement changes: Tylenol every 4-6 hours while suffering from influenza A    Signs or symptoms of bleeding or clotting: No    Previous INR: Supratherapeutic    Additional findings: None       PLAN     Recommended plan for temporary change(s) affecting INR     Dosing Instructions: Continue your current warfarin dose with next INR in 4 days, but patient is out of test strips and will attempt to have them rush ordered or try to go into the lab       Summary  As of 11/7/2022    Full warfarin instructions:  7.5 mg every day; Starting 11/7/2022   Next INR check:  11/11/2022             Telephone call with Rocío who verbalizes understanding and agrees to plan    Patient to recheck with home meter    Education provided:     Please call back if any changes to your diet, medications or how you've been taking warfarin    Plan made per [facility] anticoagulation protocol    Margie Perez RN  Anticoagulation Clinic  11/7/2022    _______________________________________________________________________     Anticoagulation Episode Summary     Current INR goal:  2.0-3.0   TTR:  65.1 % (1 y)   Target end date:  Indefinite   Send INR reminders to:  [facility]IT[facility]    Indications    Acute pulmonary embolism  unspecified pulmonary embolism type  unspecified whether acute cor pulmonale present (H) [I26.99]  Factor 5 Leiden mutation  heterozygous (H) [D68.51]  Other acute pulmonary  embolism without acute cor pulmonale (H) (Resolved) [I26.99]  Acute pulmonary embolism  unspecified pulmonary embolism type  unspecified whether acute cor pulmonale present (H) (Resolved) [I26.99]  Antiphospholipid syndrome (H) [D68.61]  Long term current use of anticoagulant therapy [Z79.01]           Comments:  [employer] home meter// manage by exception// Antiphospholipid antibody syndrome (may need CF10 if INR elevated with no known reason)         Anticoagulation Care Providers     Provider Role Specialty Phone number    Jesse Farnsworth MD Referring Family Medicine [phone]    Yessy Lyn APRN [facility] Referring Family Medicine [phone]

## 2022-11-08 ENCOUNTER — DOCUMENTATION ONLY (OUTPATIENT)
Dept: ANTICOAGULATION | Facility: CLINIC | Age: 27
End: 2022-11-08

## 2022-11-08 ENCOUNTER — OFFICE VISIT (OUTPATIENT)
Dept: FAMILY MEDICINE | Facility: CLINIC | Age: 27
End: 2022-11-08
Payer: COMMERCIAL

## 2022-11-08 VITALS
SYSTOLIC BLOOD PRESSURE: 102 MMHG | BODY MASS INDEX: 19.73 KG/M2 | TEMPERATURE: 98.4 F | DIASTOLIC BLOOD PRESSURE: 62 MMHG | RESPIRATION RATE: 18 BRPM | WEIGHT: 107.2 LBS | HEIGHT: 62 IN | OXYGEN SATURATION: 99 % | HEART RATE: 111 BPM

## 2022-11-08 DIAGNOSIS — Z00.00 ROUTINE GENERAL MEDICAL EXAMINATION AT A HEALTH CARE FACILITY: Primary | ICD-10-CM

## 2022-11-08 DIAGNOSIS — Z12.4 CERVICAL CANCER SCREENING: ICD-10-CM

## 2022-11-08 DIAGNOSIS — J32.9 CHRONIC SINUSITIS, UNSPECIFIED LOCATION: ICD-10-CM

## 2022-11-08 DIAGNOSIS — Z79.01 LONG TERM CURRENT USE OF ANTICOAGULANT THERAPY: ICD-10-CM

## 2022-11-08 DIAGNOSIS — D68.61 ANTIPHOSPHOLIPID SYNDROME (H): ICD-10-CM

## 2022-11-08 DIAGNOSIS — I26.99 ACUTE PULMONARY EMBOLISM, UNSPECIFIED PULMONARY EMBOLISM TYPE, UNSPECIFIED WHETHER ACUTE COR PULMONALE PRESENT (H): Primary | ICD-10-CM

## 2022-11-08 DIAGNOSIS — F33.40 RECURRENT MAJOR DEPRESSIVE DISORDER, IN REMISSION (H): ICD-10-CM

## 2022-11-08 DIAGNOSIS — I47.10 PAROXYSMAL SUPRAVENTRICULAR TACHYCARDIA (H): ICD-10-CM

## 2022-11-08 DIAGNOSIS — D68.51 FACTOR 5 LEIDEN MUTATION, HETEROZYGOUS (H): ICD-10-CM

## 2022-11-08 DIAGNOSIS — J30.9 CHRONIC ALLERGIC RHINITIS: ICD-10-CM

## 2022-11-08 DIAGNOSIS — B37.31 YEAST VAGINITIS: ICD-10-CM

## 2022-11-08 PROCEDURE — 96127 BRIEF EMOTIONAL/BEHAV ASSMT: CPT | Performed by: FAMILY MEDICINE

## 2022-11-08 PROCEDURE — 99214 OFFICE O/P EST MOD 30 MIN: CPT | Mod: 25 | Performed by: FAMILY MEDICINE

## 2022-11-08 PROCEDURE — 99395 PREV VISIT EST AGE 18-39: CPT | Performed by: FAMILY MEDICINE

## 2022-11-08 PROCEDURE — G0145 SCR C/V CYTO,THINLAYER,RESCR: HCPCS | Performed by: FAMILY MEDICINE

## 2022-11-08 RX ORDER — FLUCONAZOLE 150 MG/1
TABLET ORAL
Qty: 3 TABLET | Refills: 0 | Status: SHIPPED | OUTPATIENT
Start: 2022-11-08

## 2022-11-08 ASSESSMENT — ENCOUNTER SYMPTOMS
ARTHRALGIAS: 0
HEMATOCHEZIA: 0
NERVOUS/ANXIOUS: 0
ABDOMINAL PAIN: 0
PARESTHESIAS: 0
FEVER: 0
SORE THROAT: 0
SHORTNESS OF BREATH: 0
DIARRHEA: 0
WEAKNESS: 0
CONSTIPATION: 0
EYE PAIN: 0
FREQUENCY: 0
JOINT SWELLING: 0
PALPITATIONS: 0
HEADACHES: 1
NAUSEA: 0
COUGH: 0
SINUS PAIN: 1
CONSTIPATION: 1
DIZZINESS: 0
HEARTBURN: 0
DYSURIA: 0
CHILLS: 0
HEMATURIA: 0
SINUS PRESSURE: 1
MYALGIAS: 0
BREAST MASS: 0

## 2022-11-08 ASSESSMENT — PAIN SCALES - GENERAL: PAINLEVEL: MILD PAIN (2)

## 2022-11-08 ASSESSMENT — PATIENT HEALTH QUESTIONNAIRE - PHQ9
10. IF YOU CHECKED OFF ANY PROBLEMS, HOW DIFFICULT HAVE THESE PROBLEMS MADE IT FOR YOU TO DO YOUR WORK, TAKE CARE OF THINGS AT HOME, OR GET ALONG WITH OTHER PEOPLE: SOMEWHAT DIFFICULT
SUM OF ALL RESPONSES TO PHQ QUESTIONS 1-9: 3
SUM OF ALL RESPONSES TO PHQ QUESTIONS 1-9: 3

## 2022-11-08 NOTE — PROGRESS NOTES
SUBJECTIVE:   CC: Rocío is an 26 year old who presents for preventive health visit.     Here today for annual wellness exam and due for pap smear. Currently on antibiotics for a sinus infection and hoping to get a prescription for diflucan in case she develops a yeast infection while traveling next week. Also wondering about potential ENT and allergy consults to further evaluate recurrent sinusitis and seasonal allergies. Expresses concerns that her allergies may be contributing to chronic headaches - migrainous at times -  which she is unable to treat effectively at home due to contraindications while on warfarin. Can only safely take tylenol for pain management which is not effective for migraines. Has had to present to the ED twice this year (2022) for migraine pain.    Patient has been advised of split billing requirements and indicates understanding: Yes  Healthy Habits:     Getting at least 3 servings of Calcium per day:  Yes    Bi-annual eye exam:  Yes    Dental care twice a year:  Yes    Sleep apnea or symptoms of sleep apnea:  None    Diet:  Regular (no restrictions)    Frequency of exercise:  1 day/week    Duration of exercise:  15-30 minutes    Taking medications regularly:  Yes    Medication side effects:  None    PHQ-2 Total Score: 2    Additional concerns today:  No      Chief Complaint   Patient presents with     Physical     Pap and physical exam.     Blood Draw     Patient has had coffee today.     Flu Shot     Discuss about her Flu shot today or waiting until she is done with her antibiotic for a sinus infection.  She started that yesterday.     Referral     Wanting to have a referral to Allergy for retesting.  Has not had testing done since the age of 2.  Possible referral to ENT.     Medication Request     She started Augmentin yesterday for a sinus infection.  Wanting to get an Rx for a medication to help with possible yeast infections from the antibiotic.  No current symptoms.  She will be  out of town next week.         Today's PHQ-2 Score:   PHQ-2 ( 1999 Pf[employer]) 11/8/2022   Q1: Little interest or pleasure in doing things 1   Q2: Feeling down, depressed or hopeless 1   PHQ-2 Score 2   PHQ-2 Total Score (12-17 Years)- Positive if 3 or more points; Administer PHQ-A if positive -   Q1: Little interest or pleasure in doing things Several days   Q2: Feeling down, depressed or hopeless Several days   PHQ-2 Score 2     Abuse: Current or Past (Physical, Sexual or Emotional) - No  Do you feel safe in your environment? Yes    Social History     Tobacco Use     Smoking status: Never     Smokeless tobacco: Never   Substance Use Topics     Alcohol use: Yes     Comment:  rare     If you drink alcohol do you typically have >3 drinks per day or >7 drinks per week? No    Alcohol Use 11/8/2022   Prescreen: >3 drinks/day or >7 drinks/week? No   Prescreen: >3 drinks/day or >7 drinks/week? -     Reviewed orders with patient.  Reviewed health maintenance and updated orders accordingly - Yes    Breast Cancer Screening:    FHS-7: No flowsheet data found.    History of abnormal Pap smear: NO - age 21-29 PAP every 3 years recommended  PAP / HPV 7/23/2019   PAP (Historical) NIL     Reviewed and updated as needed this visit by clinical staff   Tobacco  Allergies  Meds  Problems  Med Hx  Surg Hx  Fam Hx  Soc   Hx      Reviewed and updated as needed this visit by Provider     Meds  Problems  Med Hx  Surg Hx          Past Medical History:   Diagnosis Date     Calculus of gallbladder with acute cholecystitis without obstruction 5/31/2017    Overview:  Added automatically from request for surgery [id number]     Chickenpox      Depression      Factor 5 Leiden mutation, heterozygous (H) 2/27/2018     Factor V Leiden (H)      History of foot fracture      History of frequent urinary tract infections      History of pyelonephritis      History of recurrent UTI (urinary tract infection)      Non-rheumatic mitral regurgitation,  trace 2019    Seen on echocardiogram 2019. Recommended for follow up echo in 2-3 years.      Ovarian cyst      Postpartum depression     mild     Pulmonary emboli (H) 2020      Past Surgical History:   Procedure Laterality Date      SECTION N/A 2020    Procedure:  SECTION;  Surgeon: Grace Dee MD;  Location: [facility] OR      SECTION      c section     ENT SURGERY       GYN SURGERY  Csections ()     HEAD & NECK SURGERY      hemangioma on neck at age 2     LAPAROSCOPIC CHOLECYSTECTOMY       OTHER SURGICAL HISTORY      Spinal Cord Surgery     SINUS FRONTAL OPEN OBLITERATION           OB History    Para Term  AB Living   3 2 2 0 1 3   SAB IAB Ectopic Multiple Live Births   1 0 0 1 3      # Outcome Date GA Lbr Santosh/2nd Weight Sex Delivery Anes PTL Lv   3 Term 20 39w0d  3.232 kg (7 lb 2 oz) F CS-LTranv Spinal  NEAL      Name: LONG,FEMALE-AIDA      Apgar1: 9  Apgar5: 9   2A Term 16 37w0d  2.268 kg (5 lb) M CS-Unspec   NEAL      Birth Comments: esophageal atresia and Te-fistula      Name: olive   2B Term 16 37w0d  2.268 kg (5 lb) M CS-Unspec   NEAL      Name: Richard   1 SAB 2015     AB, MISSED         Birth Comments: D&C       Review of Systems   Constitutional: Negative for chills and fever.   HENT: Positive for congestion, sinus pressure and sinus pain. Negative for ear pain, hearing loss and sore throat.    Eyes: Negative for pain and visual disturbance.   Respiratory: Negative for cough and shortness of breath.    Cardiovascular: Negative for chest pain, palpitations and peripheral edema.   Gastrointestinal: Positive for constipation. Negative for abdominal pain, diarrhea, heartburn, hematochezia and nausea.   Breasts:  Negative for tenderness, breast mass and discharge.   Genitourinary: Negative for dysuria, frequency, genital sores, hematuria, pelvic pain, urgency, vaginal bleeding and vaginal discharge.   Musculoskeletal:  "Negative for arthralgias, joint swelling and myalgias.   Skin: Negative for rash.   Neurological: Positive for headaches. Negative for dizziness, weakness and paresthesias.   Psychiatric/Behavioral: Negative for mood changes. The patient is not nervous/anxious.      OBJECTIVE:   /62 (BP Location: Left arm, Patient Position: Chair, Cuff Size: Adult Regular)   Pulse 111   Temp 98.4  F (36.9  C) (Tympanic)   Resp 18   Ht 1.562 m (5' 1.5\")   Wt 48.6 kg (107 lb 3.2 oz)   LMP 10/19/2022 (Approximate)   SpO2 99%   BMI 19.93 kg/m       Physical Exam  Constitutional:       General: She is not in acute distress.     Appearance: Normal appearance. She is normal weight.   HENT:      Head: Normocephalic and atraumatic.      Right Ear: Tympanic membrane, ear canal and external ear normal.      Left Ear: Tympanic membrane, ear canal and external ear normal.      Nose: Congestion and rhinorrhea present.   Eyes:      Extraocular Movements: Extraocular movements intact.      Conjunctiva/sclera: Conjunctivae normal.      Pupils: Pupils are equal, round, and reactive to light.   Cardiovascular:      Rate and Rhythm: Tachycardia present.      Pulses: Normal pulses.      Heart sounds: Normal heart sounds. No murmur heard.  Pulmonary:      Effort: Pulmonary effort is normal. No respiratory distress.      Breath sounds: Normal breath sounds.   Abdominal:      General: Abdomen is flat. There is no distension.      Palpations: Abdomen is soft. There is no mass.      Tenderness: There is no abdominal tenderness.   Genitourinary:     General: Normal vulva.      Vagina: No vaginal discharge.      Rectum: Normal.   Musculoskeletal:         General: Normal range of motion.      Cervical back: Normal range of motion and neck supple.   Skin:     General: Skin is warm and dry.   Neurological:      General: No focal deficit present.      Mental Status: She is alert and oriented to person, place, and time. Mental status is at baseline. "      Cranial Nerves: No cranial nerve deficit.   Psychiatric:         Mood and Affect: Mood normal.         Behavior: Behavior normal.         Thought Content: Thought content normal.         Judgment: Judgment normal.       Diagnostic Test Results:  Labs reviewed in Epic    ASSESSMENT/PLAN:   (Z00.00) Routine general medical examination at a health care facility  (primary encounter diagnosis)  Comment: Due for pap smear. Expresses concerns about developing a yeast infection while on antibiotics for sinus infection and will be out of town next week so wants to be prepared. Interested in following up with ENT to further address recurring sinusitis. Inquires about allergy consult to reevaluate allergy status - was tested as a child but would like to get retested as she feels this is an ongoing issue that may be contributing to headache chronicity. Unfortunately headaches have been very challenging to manage due to most therapies being contraindicated while on warfarin therapy.  Plan:   - Pap smear today  - Diflucan prescription  - ENT consult in place and patient established, will make appointment to follow-up  - Adult Allergy consult placed    (Z12.4) Cervical cancer screening  Comment: Due for pap smear. Notes no history of abnormal pap smears.   Plan: Pap Screen reflex to HPV if ASCUS - recommend         age 25 - 29    (I47.1) Paroxysmal supraventricular tachycardia (H)  Comment: Noted. Heart rate 111 bpm at start of visit today. No symptoms noted.  Plan: Continue to monitor.    (F33.40) Recurrent major depressive disorder, in remission (H)  Comment: Takes sertraline 50 mg daily. Not needing refill today.  Plan: Continue sertraline    (J32.9) Chronic sinusitis, unspecified location  Comment: Current sinus infection, taking Augmentin x 7 days. Reports recurrent sinus infections and sinus surgery in the past. ENT provider mentioned in-office therapies that may be of benefit at initial consult. Patient is now  "interested in pursuing these.  Plan: Adult ENT  Referral       (B37.31) Yeast vaginitis  Comment: Has experienced yeast infections in the past while on antibiotics. Wants to make sure she is prepared in the event this develops while she is traveling next week.  Plan: fluconazole (DIFLUCAN) 150 MG tablet         (J30.9) Chronic allergic rhinitis  Comment: Ongoing since childhood, but has not undergone testing since then. Hopeful to retest and treat symptoms as feels this may be contributing to headaches which are frequent and at times migrainous. Unable to treat headaches effectively with OTC or prescription medications as many therapies are contraindicated while on warfarin.  Plan: Adult Allergy/Asthma Referral        Patient has been advised of split billing requirements and indicates understanding: Yes    COUNSELING:  Reviewed preventive health counseling, as reflected in patient instructions       Immunizations    Declined: Influenza due to Concurrent illness        Estimated body mass index is 19.93 kg/m  as calculated from the following:    Height as of this encounter: 1.562 m (5' 1.5\").    Weight as of this encounter: 48.6 kg (107 lb 3.2 oz).    She reports that she has never smoked. She has never used smokeless tobacco.    Counseling Resources:  ATP IV Guidelines  Pooled Cohorts Equation Calculator  Breast Cancer Risk Calculator  BRCA-Related Cancer Risk Assessment: FHS-7 Tool  FRAX Risk Assessment  ICSI Preventive Guidelines  Dietary Guidelines for Americans, 2010  USDA's MyPlate  ASA Prophylaxis  Lung CA Screening      Dionicio Liang MD, student NP on 11/8/2022 at 10:56 AM    Dionicio Liang MD  [facility]  Answers for HPI/ROS submitted by the patient on 11/8/2022  If you checked off any problems, how difficult have these problems made it for you to do your work, take care of things at home, or get along with other people?: Somewhat difficult  PHQ9 TOTAL " SCORE: 3

## 2022-11-08 NOTE — PROGRESS NOTES
ANTICOAGULATION  MANAGEMENT     Interacting Medication Review    Interacting medication(s): Fluconazole (Diflucan) with warfarin.    Duration: 3 days (11/08 to 11/10)    Indication: Yeast infection    New medication?: Yes, interaction may increase INR and risk of bleeding. With Fluconazole courses > 3 days [facility] protocol Appendix G recommends empiric reduction of warfarin dose in therapeutic/supratherapeutic patients.        PLAN     Check INR today prior to adjusting warfarin dose    myChart sent    Plan made with [facility] Pharmacist Demetria Don RN  Anticoagulation Clinic

## 2022-11-08 NOTE — TELEPHONE ENCOUNTER
Dr. Liang,    Patient asking for diflucan to go with recent Abx.  Pended for your consideration. Michelle MEANS RN

## 2022-11-09 RX ORDER — FLUCONAZOLE 150 MG/1
150 TABLET ORAL ONCE
Qty: 1 TABLET | Refills: 0 | OUTPATIENT
Start: 2022-11-09 | End: 2022-11-09

## 2022-11-10 PROBLEM — I26.99 ACUTE PULMONARY EMBOLISM WITHOUT ACUTE COR PULMONALE, UNSPECIFIED PULMONARY EMBOLISM TYPE (H): Status: ACTIVE | Noted: 2022-11-10

## 2022-11-10 LAB
BKR LAB AP GYN ADEQUACY: NORMAL
BKR LAB AP GYN INTERPRETATION: NORMAL
BKR LAB AP HPV REFLEX: NORMAL
BKR LAB AP LMP: NORMAL
BKR LAB AP PREVIOUS ABNORMAL: NORMAL
PATH REPORT.COMMENTS IMP SPEC: NORMAL
PATH REPORT.COMMENTS IMP SPEC: NORMAL
PATH REPORT.RELEVANT HX SPEC: NORMAL

## 2022-11-11 ENCOUNTER — ANTICOAGULATION THERAPY VISIT (OUTPATIENT)
Dept: ANTICOAGULATION | Facility: CLINIC | Age: 27
End: 2022-11-11

## 2022-11-11 DIAGNOSIS — I26.99 ACUTE PULMONARY EMBOLISM WITHOUT ACUTE COR PULMONALE, UNSPECIFIED PULMONARY EMBOLISM TYPE (H): ICD-10-CM

## 2022-11-11 DIAGNOSIS — D68.61 ANTIPHOSPHOLIPID SYNDROME (H): ICD-10-CM

## 2022-11-11 DIAGNOSIS — D68.51 FACTOR 5 LEIDEN MUTATION, HETEROZYGOUS (H): ICD-10-CM

## 2022-11-11 DIAGNOSIS — Z79.01 LONG TERM CURRENT USE OF ANTICOAGULANT THERAPY: ICD-10-CM

## 2022-11-11 DIAGNOSIS — I26.99 ACUTE PULMONARY EMBOLISM, UNSPECIFIED PULMONARY EMBOLISM TYPE, UNSPECIFIED WHETHER ACUTE COR PULMONALE PRESENT (H): Primary | ICD-10-CM

## 2022-11-11 LAB — INR HOME MONITORING: 1.9 (ref 2–3)

## 2022-11-11 NOTE — PROGRESS NOTES
ANTICOAGULATION MANAGEMENT     oRcío Catherine 26 year old female is on warfarin with subtherapeutic INR result. (Goal INR 2.0-3.0)    Recent labs: (last 7 days)     11/11/22  0000   INR 1.9*       ASSESSMENT       Source(s): Chart Review and Patient/Caregiver Call       Warfarin doses taken: Warfarin taken as instructed    Diet: No new diet changes identified    New illness, injury, or hospitalization: Yes: Sinus infection    Medication/supplement changes: Amoxicillin x7 days started on 11/7/22 which may be affecting INR today    Has Fluconazole on hand if sign or symptom of yeast infection - has not taken any yet    Signs or symptoms of bleeding or clotting: No    Previous INR: Supratherapeutic    Additional findings: Leaving for 1 week vacation on 11/15. She will take home INR testing meter with her.       PLAN     Recommended plan for temporary change(s) affecting INR     Dosing Instructions: Continue your current warfarin dose with next INR in 1 week, sooner if going to take Fluconazole.    Summary  As of 11/11/2022    Full warfarin instructions:  7.5 mg every day; Starting 11/11/2022   Next INR check:  11/18/2022             Telephone call with Rocío who verbalizes understanding and agrees to plan    Patient to recheck with home meter    Education provided:     Please call back if any changes to your diet, medications or how you've been taking warfarin    Goal range and lab monitoring: goal range and significance of current result    Interaction IS anticipated between warfarin and Fluconazole    Symptom monitoring: monitoring for bleeding signs and symptoms and monitoring for clotting signs and symptoms    Importance of notifying anticoagulation clinic for: changes in medications; a sooner lab recheck maybe needed    Contact [phone]  with any changes, questions or concerns.     Plan made per [facility] anticoagulation protocol    Debi Boland RN  Anticoagulation  Clinic  11/11/2022    _______________________________________________________________________     Anticoagulation Episode Summary     Current INR goal:  2.0-3.0   TTR:  65.3 % (1 y)   Target end date:  Indefinite   Send INR reminders to:  [facility] MONITOR[facility]    Indications    Acute pulmonary embolism  unspecified pulmonary embolism type  unspecified whether acute cor pulmonale present (H) [I26.99]  Factor 5 Leiden mutation  heterozygous (H) [D68.51]  Other acute pulmonary embolism without acute cor pulmonale (H) (Resolved) [I26.99]  Acute pulmonary embolism  unspecified pulmonary embolism type  unspecified whether acute cor pulmonale present (H) (Resolved) [I26.99]  Antiphospholipid syndrome (H) [D68.61]  Long term current use of anticoagulant therapy [Z79.01]  Acute pulmonary embolism without acute cor pulmonale  unspecified pulmonary embolism type (H) [I26.99]           Comments:  [employer] home meter// manage by exception// Antiphospholipid antibody syndrome (may need CF10 if INR elevated with no known reason)         Anticoagulation Care Providers     Provider Role Specialty Phone number    Jesse Farnsworth MD Referring Family Medicine [phone]    Yessy Lyn, SNOW [facility] Referring Family Medicine [phone]    Debi Carrasco MD Referring Cardiovascular Disease [phone]

## 2022-11-21 ENCOUNTER — DOCUMENTATION ONLY (OUTPATIENT)
Dept: ANTICOAGULATION | Facility: CLINIC | Age: 27
End: 2022-11-21

## 2022-11-21 NOTE — PROGRESS NOTES
ANTICOAGULATION     Rocío Catherine is overdue for INR check.      [facility] message sent.    Jo Ann Horner RN

## 2022-11-22 ENCOUNTER — ANTICOAGULATION THERAPY VISIT (OUTPATIENT)
Dept: ANTICOAGULATION | Facility: CLINIC | Age: 27
End: 2022-11-22

## 2022-11-22 LAB — INR HOME MONITORING: 2.5 (ref 2–3)

## 2022-11-22 NOTE — PROGRESS NOTES
ANTICOAGULATION MANAGEMENT     Rocío Catherine 27 year old female is on warfarin with therapeutic INR result. (Goal INR 2.0-3.0)    Recent labs: (last 7 days)     11/22/22  0000   INR 2.5       ASSESSMENT       Source(s): Chart Review and Patient/Caregiver Call       Warfarin doses taken: Warfarin taken as instructed    Diet: No new diet changes identified    New illness, injury, or hospitalization: No    Medication/supplement changes: None noted    Signs or symptoms of bleeding or clotting: No    Previous INR: Subtherapeutic    Additional findings: None       PLAN     Recommended plan for no diet, medication or health factor changes affecting INR     Dosing Instructions: Continue your current warfarin dose with next INR in 2 weeks       Summary  As of 11/22/2022    Full warfarin instructions:  7.5 mg every day; Starting 11/22/2022   Next INR check:  12/6/2022             Telephone call with Rocío who agrees to plan and repeated back plan correctly    Patient to recheck with home meter    Education provided:     Please call back if any changes to your diet, medications or how you've been taking warfarin    Goal range and lab monitoring: goal range and significance of current result    Plan made per [facility] anticoagulation protocol    Amanda Valdez RN  Anticoagulation Clinic  11/22/2022    _______________________________________________________________________     Anticoagulation Episode Summary     Current INR goal:  2.0-3.0   TTR:  66.6 % (1 y)   Target end date:  Indefinite   Send INR reminders to:  [facility] HOME MONITORING    Indications    Acute pulmonary embolism  unspecified pulmonary embolism type  unspecified whether acute cor pulmonale present (H) [I26.99]  Factor 5 Leiden mutation  heterozygous (H) [D68.51]  Other acute pulmonary embolism without acute cor pulmonale (H) (Resolved) [I26.99]  Acute pulmonary embolism  unspecified pulmonary embolism type  unspecified whether acute cor pulmonale present (H)  (Resolved) [I26.99]  Antiphospholipid syndrome (H) [D68.61]  Long term current use of anticoagulant therapy [Z79.01]  Acute pulmonary embolism without acute cor pulmonale  unspecified pulmonary embolism type (H) [I26.99]           Comments:  [employer] home meter// manage by exception// Antiphospholipid antibody syndrome (may need CF10 if INR elevated with no known reason)         Anticoagulation Care Providers     Provider Role Specialty Phone number    Jesse Farnsworth MD Referring Family Medicine [phone]    Yessy Lyn APRN [facility] Referring Family Medicine [phone]    Debi Carrasco MD Referring Cardiovascular Disease [phone]

## 2022-12-06 ENCOUNTER — ANTICOAGULATION THERAPY VISIT (OUTPATIENT)
Dept: ANTICOAGULATION | Facility: CLINIC | Age: 27
End: 2022-12-06

## 2022-12-06 DIAGNOSIS — I26.99 ACUTE PULMONARY EMBOLISM, UNSPECIFIED PULMONARY EMBOLISM TYPE, UNSPECIFIED WHETHER ACUTE COR PULMONALE PRESENT (H): Primary | ICD-10-CM

## 2022-12-06 DIAGNOSIS — D68.51 FACTOR 5 LEIDEN MUTATION, HETEROZYGOUS (H): ICD-10-CM

## 2022-12-06 DIAGNOSIS — I26.99 ACUTE PULMONARY EMBOLISM WITHOUT ACUTE COR PULMONALE, UNSPECIFIED PULMONARY EMBOLISM TYPE (H): ICD-10-CM

## 2022-12-06 DIAGNOSIS — D68.61 ANTIPHOSPHOLIPID SYNDROME (H): ICD-10-CM

## 2022-12-06 DIAGNOSIS — Z79.01 LONG TERM CURRENT USE OF ANTICOAGULANT THERAPY: ICD-10-CM

## 2022-12-06 LAB — INR HOME MONITORING: 2.9 (ref 2–3)

## 2022-12-06 NOTE — PROGRESS NOTES
ANTICOAGULATION MANAGEMENT     Rocío Catherine 27 year old female is on warfarin with therapeutic INR result. (Goal INR 2.0-3.0)    Recent labs: (last 7 days)     12/06/22  0000   INR 2.9       ASSESSMENT       Source(s): Chart Review    Previous INR was Therapeutic last visit; previously outside of goal range    Medication, diet, health changes since last INR chart reviewed; none identified           PLAN     Recommended plan for no diet, medication or health factor changes affecting INR     Dosing Instructions: Continue your current warfarin dose with next INR in 2 weeks       Summary  As of 12/6/2022    Full warfarin instructions:  7.5 mg every day; Starting 12/6/2022   Next INR check:  12/20/2022             Detailed voice message left for Rocío with dosing instructions and follow up date.     Patient to recheck with home meter    Education provided:     Resume manage by exception with home monitor. Continue to submit INR results to home monitor company.You will only be called when your result is out of range. Please call and notify [facility] if new medication started, dose missed, signs or symptoms of bleeding or clotting, or a surgery/procedure is scheduled.    Plan made per [facility] anticoagulation protocol    Shelby Valero, RN  Anticoagulation Clinic  12/6/2022    _______________________________________________________________________     Anticoagulation Episode Summary     Current INR goal:  2.0-3.0   TTR:  68.7 % (1 y)   Target end date:  Indefinite   Send INR reminders to:  [facility] HOME MONITORING    Indications    Acute pulmonary embolism  unspecified pulmonary embolism type  unspecified whether acute cor pulmonale present (H) [I26.99]  Factor 5 Leiden mutation  heterozygous (H) [D68.51]  Other acute pulmonary embolism without acute cor pulmonale (H) (Resolved) [I26.99]  Acute pulmonary embolism  unspecified pulmonary embolism type  unspecified whether acute cor pulmonale present (H) (Resolved)  [I26.99]  Antiphospholipid syndrome (H) [D68.61]  Long term current use of anticoagulant therapy [Z79.01]  Acute pulmonary embolism without acute cor pulmonale  unspecified pulmonary embolism type (H) [I26.99]           Comments:  [employer] home meter// manage by exception// Antiphospholipid antibody syndrome (may need CF10 if INR elevated with no known reason)         Anticoagulation Care Providers     Provider Role Specialty Phone number    Jesse Farnsworth MD Referring Family Medicine [phone]    Yessy Lyn APRN [facility] Referring Family Medicine [phone]    Debi Carrasco MD Referring Cardiovascular Disease [phone]

## 2022-12-09 ENCOUNTER — IMMUNIZATION (OUTPATIENT)
Dept: FAMILY MEDICINE | Facility: CLINIC | Age: 27
End: 2022-12-09
Payer: COMMERCIAL

## 2022-12-09 PROCEDURE — 90471 IMMUNIZATION ADMIN: CPT

## 2022-12-09 PROCEDURE — 90686 IIV4 VACC NO PRSV 0.5 ML IM: CPT

## 2022-12-16 ENCOUNTER — MYC MEDICAL ADVICE (OUTPATIENT)
Dept: FAMILY MEDICINE | Facility: CLINIC | Age: 27
End: 2022-12-16

## 2022-12-16 DIAGNOSIS — F41.9 ANXIETY: ICD-10-CM

## 2022-12-16 DIAGNOSIS — F32.A DEPRESSION, UNSPECIFIED DEPRESSION TYPE: ICD-10-CM

## 2022-12-19 NOTE — TELEPHONE ENCOUNTER
Patient requests refill of sertraline 50 mg tabs patient reports taking 1.5 tabs = 75 mg PO every day last had #30 tabs filled 5/19/20 and last office visit 11/8/22.    Message routed to provider for consideration.   Julie Behrendt RN

## 2022-12-21 ENCOUNTER — DOCUMENTATION ONLY (OUTPATIENT)
Dept: ANTICOAGULATION | Facility: CLINIC | Age: 27
End: 2022-12-21

## 2022-12-21 DIAGNOSIS — Z79.01 LONG TERM CURRENT USE OF ANTICOAGULANT THERAPY: ICD-10-CM

## 2022-12-21 DIAGNOSIS — I26.99 ACUTE PULMONARY EMBOLISM WITHOUT ACUTE COR PULMONALE, UNSPECIFIED PULMONARY EMBOLISM TYPE (H): ICD-10-CM

## 2022-12-21 DIAGNOSIS — I26.99 ACUTE PULMONARY EMBOLISM, UNSPECIFIED PULMONARY EMBOLISM TYPE, UNSPECIFIED WHETHER ACUTE COR PULMONALE PRESENT (H): Primary | ICD-10-CM

## 2022-12-21 DIAGNOSIS — D68.51 FACTOR 5 LEIDEN MUTATION, HETEROZYGOUS (H): ICD-10-CM

## 2022-12-21 DIAGNOSIS — D68.61 ANTIPHOSPHOLIPID SYNDROME (H): ICD-10-CM

## 2022-12-21 LAB — INR HOME MONITORING: 2.3 (ref 2–3)

## 2022-12-21 NOTE — PROGRESS NOTES
ANTICOAGULATION  MANAGEMENT-Home Monitor Managed by Exception    Rocío Catherine 27 year old female is on warfarin with therapeutic INR result. (Goal INR 2.0-3.0)    Recent labs: (last 7 days)     12/21/22  0000   INR 2.3         Previous INR was Therapeutic    Medication, diet, health changes since last INR:chart reviewed; none identified    Contacted within the last 12 weeks by phone on 12/6/22      VINI Alford was NOT contacted regarding therapeutic result today per home monitoring policy manage by exception agreement.   Current warfarin dose is to be continued:     Summary  As of 12/21/2022    Full warfarin instructions:  7.5 mg every day   Next INR check:  1/4/2023           ?   Michael Molina RN  Anticoagulation Clinic  12/21/2022    _______________________________________________________________________     Anticoagulation Episode Summary     Current INR goal:  2.0-3.0   TTR:  71.5 % (1 y)   Target end date:  Indefinite   Send INR reminders to:  [facility] HOME MONITORING    Indications    Acute pulmonary embolism  unspecified pulmonary embolism type  unspecified whether acute cor pulmonale present (H) [I26.99]  Factor 5 Leiden mutation  heterozygous (H) [D68.51]  Other acute pulmonary embolism without acute cor pulmonale (H) (Resolved) [I26.99]  Acute pulmonary embolism  unspecified pulmonary embolism type  unspecified whether acute cor pulmonale present (H) (Resolved) [I26.99]  Antiphospholipid syndrome (H) [D68.61]  Long term current use of anticoagulant therapy [Z79.01]  Acute pulmonary embolism without acute cor pulmonale  unspecified pulmonary embolism type (H) [I26.99]           Comments:  [employer] home meter// manage by exception// Antiphospholipid antibody syndrome (may need CF10 if INR elevated with no known reason)         Anticoagulation Care Providers     Provider Role Specialty Phone number    Jesse Farnsworth MD Referring Family Medicine [phone]    Eboni, Yessy Cassidy APRN CNP Referring  Family Medicine [phone]    Debi Carrasco MD Referring Cardiovascular Disease [phone]

## 2023-01-05 ENCOUNTER — DOCUMENTATION ONLY (OUTPATIENT)
Dept: ANTICOAGULATION | Facility: CLINIC | Age: 28
End: 2023-01-05

## 2023-01-05 DIAGNOSIS — I26.99 ACUTE PULMONARY EMBOLISM WITHOUT ACUTE COR PULMONALE, UNSPECIFIED PULMONARY EMBOLISM TYPE (H): ICD-10-CM

## 2023-01-05 DIAGNOSIS — Z79.01 LONG TERM CURRENT USE OF ANTICOAGULANT THERAPY: ICD-10-CM

## 2023-01-05 DIAGNOSIS — D68.61 ANTIPHOSPHOLIPID SYNDROME (H): ICD-10-CM

## 2023-01-05 DIAGNOSIS — D68.51 FACTOR 5 LEIDEN MUTATION, HETEROZYGOUS (H): ICD-10-CM

## 2023-01-05 DIAGNOSIS — I26.99 ACUTE PULMONARY EMBOLISM, UNSPECIFIED PULMONARY EMBOLISM TYPE, UNSPECIFIED WHETHER ACUTE COR PULMONALE PRESENT (H): Primary | ICD-10-CM

## 2023-01-05 LAB — INR HOME MONITORING: 2.1 (ref 2–3)

## 2023-01-05 NOTE — PROGRESS NOTES
ANTICOAGULATION  MANAGEMENT-Home Monitor Managed by Exception    Rocío Catherine 27 year old female is on warfarin with therapeutic INR result. (Goal INR 2.0-3.0)    Recent labs: (last 7 days)     01/05/23  0000   INR 2.1         Previous INR was Therapeutic    Medication, diet, health changes since last INR:chart reviewed; none identified    Contacted within the last 12 weeks by phone on 12/6/22      VINI Alford was NOT contacted regarding therapeutic result today per home monitoring policy manage by exception agreement.   Current warfarin dose is to be continued:     Summary  As of 1/5/2023    Full warfarin instructions:  7.5 mg every day   Next INR check:  1/19/2023           ?   Mayra Palma RN  Anticoagulation Clinic  1/5/2023    _______________________________________________________________________     Anticoagulation Episode Summary     Current INR goal:  2.0-3.0   TTR:  74.0 % (1 y)   Target end date:  Indefinite   Send INR reminders to:  [facility] HOME MONITORING    Indications    Acute pulmonary embolism  unspecified pulmonary embolism type  unspecified whether acute cor pulmonale present (H) [I26.99]  Factor 5 Leiden mutation  heterozygous (H) [D68.51]  Other acute pulmonary embolism without acute cor pulmonale (H) (Resolved) [I26.99]  Acute pulmonary embolism  unspecified pulmonary embolism type  unspecified whether acute cor pulmonale present (H) (Resolved) [I26.99]  Antiphospholipid syndrome (H) [D68.61]  Long term current use of anticoagulant therapy [Z79.01]  Acute pulmonary embolism without acute cor pulmonale  unspecified pulmonary embolism type (H) [I26.99]           Comments:  [employer] home meter// manage by exception// Antiphospholipid antibody syndrome (may need CF10 if INR elevated with no known reason)         Anticoagulation Care Providers     Provider Role Specialty Phone number    Jesse Farnsworth MD Referring Family Medicine [phone]    Eboni, Yessy Cassidy APRN CNP Referring  Family Medicine [phone]    Debi Carrasco MD Referring Cardiovascular Disease [phone]

## 2023-01-07 ENCOUNTER — HOSPITAL ENCOUNTER (EMERGENCY)
Facility: CLINIC | Age: 28
Discharge: HOME | End: 2023-01-07
Attending: EMERGENCY MEDICINE | Admitting: EMERGENCY MEDICINE
Payer: COMMERCIAL

## 2023-01-07 VITALS
RESPIRATION RATE: 18 BRPM | TEMPERATURE: 98.7 F | SYSTOLIC BLOOD PRESSURE: 119 MMHG | HEART RATE: 109 BPM | BODY MASS INDEX: 20.2 KG/M2 | HEIGHT: 61 IN | OXYGEN SATURATION: 100 % | DIASTOLIC BLOOD PRESSURE: 72 MMHG | WEIGHT: 107 LBS

## 2023-01-07 DIAGNOSIS — R10.13 EPIGASTRIC PAIN: ICD-10-CM

## 2023-01-07 LAB
ALBUMIN SERPL BCG-MCNC: 4.6 G/DL (ref 3.5–5.2)
ALP SERPL-CCNC: 54 U/L (ref 35–104)
ALT SERPL W P-5'-P-CCNC: 15 U/L (ref 10–35)
ANION GAP SERPL CALCULATED.3IONS-SCNC: 13 MMOL/L (ref 7–15)
AST SERPL W P-5'-P-CCNC: 25 U/L (ref 10–35)
BASOPHILS # BLD AUTO: 0.1 10E3/UL (ref 0–0.2)
BASOPHILS NFR BLD AUTO: 1 %
BILIRUB SERPL-MCNC: 0.3 MG/DL
BUN SERPL-MCNC: 13.4 MG/DL (ref 6–20)
CALCIUM SERPL-MCNC: 9.2 MG/DL (ref 8.6–10)
CHLORIDE SERPL-SCNC: 105 MMOL/L (ref 98–107)
CREAT SERPL-MCNC: 0.75 MG/DL (ref 0.51–0.95)
D DIMER PPP FEU-MCNC: 0.29 UG/ML FEU (ref 0–0.5)
DEPRECATED HCO3 PLAS-SCNC: 18 MMOL/L (ref 22–29)
EOSINOPHIL # BLD AUTO: 0.2 10E3/UL (ref 0–0.7)
EOSINOPHIL NFR BLD AUTO: 3 %
ERYTHROCYTE [DISTWIDTH] IN BLOOD BY AUTOMATED COUNT: 12 % (ref 10–15)
GFR SERPL CREATININE-BSD FRML MDRD: >90 ML/MIN/1.73M2
GLUCOSE SERPL-MCNC: 84 MG/DL (ref 70–99)
HCT VFR BLD AUTO: 44.1 % (ref 35–47)
HGB BLD-MCNC: 14.8 G/DL (ref 11.7–15.7)
HOLD SPECIMEN: NORMAL
IMM GRANULOCYTES # BLD: 0 10E3/UL
IMM GRANULOCYTES NFR BLD: 0 %
INR PPP: 2.12 (ref 0.85–1.15)
LIPASE SERPL-CCNC: 46 U/L (ref 13–60)
LYMPHOCYTES # BLD AUTO: 1.9 10E3/UL (ref 0.8–5.3)
LYMPHOCYTES NFR BLD AUTO: 28 %
MCH RBC QN AUTO: 29 PG (ref 26.5–33)
MCHC RBC AUTO-ENTMCNC: 33.6 G/DL (ref 31.5–36.5)
MCV RBC AUTO: 86 FL (ref 78–100)
MONOCYTES # BLD AUTO: 0.6 10E3/UL (ref 0–1.3)
MONOCYTES NFR BLD AUTO: 9 %
NEUTROPHILS # BLD AUTO: 4.2 10E3/UL (ref 1.6–8.3)
NEUTROPHILS NFR BLD AUTO: 59 %
NRBC # BLD AUTO: 0 10E3/UL
NRBC BLD AUTO-RTO: 0 /100
PLATELET # BLD AUTO: 224 10E3/UL (ref 150–450)
POTASSIUM SERPL-SCNC: 4 MMOL/L (ref 3.4–5.3)
PROT SERPL-MCNC: 7.7 G/DL (ref 6.4–8.3)
RBC # BLD AUTO: 5.11 10E6/UL (ref 3.8–5.2)
SODIUM SERPL-SCNC: 136 MMOL/L (ref 136–145)
TROPONIN T SERPL HS-MCNC: <6 NG/L
WBC # BLD AUTO: 7 10E3/UL (ref 4–11)

## 2023-01-07 PROCEDURE — 85379 FIBRIN DEGRADATION QUANT: CPT | Performed by: EMERGENCY MEDICINE

## 2023-01-07 PROCEDURE — 85025 COMPLETE CBC W/AUTO DIFF WBC: CPT | Performed by: EMERGENCY MEDICINE

## 2023-01-07 PROCEDURE — 36415 COLL VENOUS BLD VENIPUNCTURE: CPT | Performed by: EMERGENCY MEDICINE

## 2023-01-07 PROCEDURE — 80053 COMPREHEN METABOLIC PANEL: CPT | Performed by: EMERGENCY MEDICINE

## 2023-01-07 PROCEDURE — 99284 EMERGENCY DEPT VISIT MOD MDM: CPT | Performed by: EMERGENCY MEDICINE

## 2023-01-07 PROCEDURE — 83690 ASSAY OF LIPASE: CPT | Performed by: EMERGENCY MEDICINE

## 2023-01-07 PROCEDURE — 93010 ELECTROCARDIOGRAM REPORT: CPT | Performed by: EMERGENCY MEDICINE

## 2023-01-07 PROCEDURE — 84484 ASSAY OF TROPONIN QUANT: CPT | Performed by: EMERGENCY MEDICINE

## 2023-01-07 PROCEDURE — 93005 ELECTROCARDIOGRAM TRACING: CPT | Performed by: EMERGENCY MEDICINE

## 2023-01-07 PROCEDURE — 99284 EMERGENCY DEPT VISIT MOD MDM: CPT | Mod: 25 | Performed by: EMERGENCY MEDICINE

## 2023-01-07 PROCEDURE — 85610 PROTHROMBIN TIME: CPT | Performed by: EMERGENCY MEDICINE

## 2023-01-07 ASSESSMENT — ACTIVITIES OF DAILY LIVING (ADL): ADLS_ACUITY_SCORE: 35

## 2023-01-07 NOTE — ED TRIAGE NOTES
Pt here with mid to left chest pain on/off since Wednesday rates pain 3/10. Hx of PE after baby two years ago on warfarin/jantoven. INR was 2.1 on Wednesday. Denies SOB.      Triage Assessment     Row Name 01/07/23 0931       Triage Assessment (Adult)    Airway WDL WDL       Respiratory WDL    Respiratory WDL WDL       Cardiac WDL    Cardiac WDL X;chest pain       Chest Pain Assessment    Chest Pain Location midsternal;anterior chest, left       Cognitive/Neuro/Behavioral WDL    Cognitive/Neuro/Behavioral WDL WDL

## 2023-01-07 NOTE — DISCHARGE INSTRUCTIONS
Your evaluation in the Emergency Department was reassuring however no cause of your symptoms was identified.     Gastritis is a possibility. Take omeprazole daily and follow up with your primary care provider in 1-2 weeks.     Return to the emergency department if you develop worsening abdominal pain, severe nausea and vomiting to the point where you are unable to keep down fluids, if you develop chest pain or difficulty breathing, blood in your stool, dizziness or fainting, or if you develop any other new or concerning symptoms as these could be signs of more serious medical illness. Try to stay well hydrated.

## 2023-01-07 NOTE — ED PROVIDER NOTES
History     Chief Complaint   Patient presents with     Chest Pain     Pt has a history of blood clots with two blood disorders.      Heartburn     Arm Pain     HPI  Rocío Catherine is a 27 year old female with history significant for PE, factor V Leiden (heterozygous), migraine headaches antiphospholipid syndrome, on warfarin, who presents for evaluation of chest pain.  She says that Wednesday night she had a heartburn type pain in her epigastrium and substernal.  Was uncomfortable when she stood up.  She lie down for about an hour and it felt a little bit better pain was sharp and stabbing and fairly severe.  She went to bed.  Thursday when she woke up she felt okay.  Yesterday she had recurrence of symptoms and more in her epigastrium and left upper quadrant.  Described as a dull ache and radiates through to her back.  Denies any fevers, chills, nausea, vomiting, diarrhea, cough or shortness of breath.  She has had prior cholecystectomy.  Feels slightly better when she lies still.  Has only taken OTC pain control.  Denies any significant alcohol use.  Does have a history of VTE on warfarin and reports INR 2.13 days previous.    The patient's PMHx, Surgical Hx, Allergies, and Medications were all reviewed with the patient.    Allergies:  No Known Allergies    Problem List:    Patient Active Problem List    Diagnosis Date Noted     Acute pulmonary embolism without acute cor pulmonale, unspecified pulmonary embolism type (H) 11/10/2022     Priority: Medium     Paroxysmal supraventricular tachycardia (H) 2022     Priority: Medium     Long term current use of anticoagulant therapy 10/29/2021     Priority: Medium     Antiphospholipid syndrome (H) 2021     Priority: Medium     Acute pulmonary embolism, unspecified pulmonary embolism type, unspecified whether acute cor pulmonale present (H) 12/15/2020     Priority: Medium     Acute pulmonary embolism (H) 2020     Priority: Medium     S/P   2020     Priority: Medium     Palpitations 2019     Priority: Medium     Chronic migraine without aura without status migrainosus, not intractable 2019     Priority: Medium     Sinus tachycardia 2019     Priority: Medium     Non-rheumatic mitral regurgitation, trace 2019     Priority: Medium     Seen on echocardiogram 2019. Recommended for follow up echo in 2-3 years.        Recurrent major depressive disorder, in remission (H) 2018     Priority: Medium     Anxiety 2018     Priority: Medium     Factor 5 Leiden mutation, heterozygous (H) 2018     Priority: Medium     Anemia 2016     Priority: Medium     Dysthymia 2013     Priority: Medium        Past Medical History:    Past Medical History:   Diagnosis Date     Calculus of gallbladder with acute cholecystitis without obstruction 2017     Chickenpox      Depression      Factor 5 Leiden mutation, heterozygous (H) 2018     Factor V Leiden (H)      History of foot fracture      History of frequent urinary tract infections      History of pyelonephritis      History of recurrent UTI (urinary tract infection)      Non-rheumatic mitral regurgitation, trace 2019     Ovarian cyst      Postpartum depression      Pulmonary emboli (H) 2020       Past Surgical History:    Past Surgical History:   Procedure Laterality Date      SECTION N/A 2020    Procedure:  SECTION;  Surgeon: Grace Dee MD;  Location: [facility] OR      SECTION      c section     ENT SURGERY  2015     GYN SURGERY  Csections ()     HEAD & NECK SURGERY      hemangioma on neck at age 2     LAPAROSCOPIC CHOLECYSTECTOMY       OTHER SURGICAL HISTORY      Spinal Cord Surgery     SINUS FRONTAL OPEN OBLITERATION             Family History:    Family History   Problem Relation Age of Onset     Stomach Problem Mother         ulcer     Bleeding Disorder Father         factor V     Ovarian Cancer  "Maternal Grandmother      Other Cancer Maternal Grandmother      Thyroid Cancer Paternal Grandmother      Alzheimer Disease Paternal Grandfather      Bleeding Disorder Paternal Grandfather         factor V     Bleeding Disorder Daughter         Factor V Leiden       Social History:  Marital Status:   [2]  Social History     Tobacco Use     Smoking status: Never     Smokeless tobacco: Never   Vaping Use     Vaping Use: Never used   Substance Use Topics     Alcohol use: Yes     Comment:  rare     Drug use: No        Medications:    omeprazole (PRILOSEC) 20 MG DR capsule  budesonide (PULMICORT) 0.5 MG/2ML neb solution  fluconazole (DIFLUCAN) 150 MG tablet  hydrOXYzine (ATARAX) 25 MG tablet  metoclopramide (REGLAN) 10 MG tablet  sertraline (ZOLOFT) 50 MG tablet  warfarin ANTICOAGULANT (JANTOVEN ANTICOAGULANT) 5 MG tablet          Review of Systems  Pertinent positives and negatives mentioned in HPI    Physical Exam   BP: 119/72  Pulse: 109  Temp: 98.7  F (37.1  C)  Resp: 18  Height: 154.9 cm (5' 1\")  Weight: 48.9 kg (107 lb 12.8 oz)  SpO2: 99 %    Physical Exam  GEN: Awake, alert, and cooperative. No acute distress.  HENT: MMM. External ears and nose normal bilaterally.  EYES: EOM intact. Conjunctiva clear. No discharge.   NECK: Symmetric, freely mobile.  No JVD  CV : Tachycardic rate.  Regular rhythm.  PULM: Normal effort. No wheezes, rales, or rhonchi bilaterally.  ABD: Mild epigastric/left upper quadrant tenderness to palpation.  Abdomen is soft and nondistended.  No involuntary guarding or rebound tenderness.  NEURO: Normal speech. Following commands. CN II-XII grossly intact. Answering questions and interacting appropriately.   EXT: No gross deformity.  No tenderness of calves.  No leg edema.  INT: Warm. No diaphoresis. Normal color.        ED Course        Procedures         EKG: Interpreted by myself.  Sinus rhythm with rate of 103 bpm.  Axis is normal.  Normal R wave progression.  No ST segment elevations " or depressions.  Normal intervals.  There is T wave inversion in lead V2 however P wave is biphasic which makes me suspect lead misplacement.  Impression nondiagnostic EKG with sinus tachycardia.  Other than T wave inversion in V2 no change compared to 5/25/2021.    Critical Care time:  none               Results for orders placed or performed during the hospital encounter of 01/07/23 (from the past 24 hour(s))   Comprehensive metabolic panel   Result Value Ref Range    Sodium 136 136 - 145 mmol/L    Potassium 4.0 3.4 - 5.3 mmol/L    Chloride 105 98 - 107 mmol/L    Carbon Dioxide (CO2) 18 (L) 22 - 29 mmol/L    Anion Gap 13 7 - 15 mmol/L    Urea Nitrogen 13.4 6.0 - 20.0 mg/dL    Creatinine 0.75 0.51 - 0.95 mg/dL    Calcium 9.2 8.6 - 10.0 mg/dL    Glucose 84 70 - 99 mg/dL    Alkaline Phosphatase 54 35 - 104 U/L    AST 25 10 - 35 U/L    ALT 15 10 - 35 U/L    Protein Total 7.7 6.4 - 8.3 g/dL    Albumin 4.6 3.5 - 5.2 g/dL    Bilirubin Total 0.3 <=1.2 mg/dL    GFR Estimate >90 >60 mL/min/1.73m2   Troponin T, High Sensitivity   Result Value Ref Range    Troponin T, High Sensitivity <6 <=14 ng/L   D dimer quantitative   Result Value Ref Range    D-Dimer Quantitative 0.29 0.00 - 0.50 ug/mL FEU    Narrative    This D-dimer assay is intended for use in conjunction with a clinical pretest probability assessment model to exclude pulmonary embolism (PE) and deep venous thrombosis (DVT) in outpatients suspected of PE or DVT. The cut-off value is 0.50 ug/mL FEU.   CBC with platelets differential    Narrative    The following orders were created for panel order CBC with platelets differential.  Procedure                               Abnormality         Status                     ---------                               -----------         ------                     CBC with platelets and d...[[id number]]                      Final result                 Please view results for these tests on the individual orders.   Lipase   Result  Value Ref Range    Lipase 46 13 - 60 U/L   INR   Result Value Ref Range    INR 2.12 (H) 0.85 - 1.15   [place] Draw    Narrative    The following orders were created for panel order [place] Draw.  Procedure                               Abnormality         Status                     ---------                               -----------         ------                     Extra Red Top Tube[[id number]]                               Final result                 Please view results for these tests on the individual orders.   CBC with platelets and differential   Result Value Ref Range    WBC Count 7.0 4.0 - 11.0 10e3/uL    RBC Count 5.11 3.80 - 5.20 10e6/uL    Hemoglobin 14.8 11.7 - 15.7 g/dL    Hematocrit 44.1 35.0 - 47.0 %    MCV 86 78 - 100 fL    MCH 29.0 26.5 - 33.0 pg    MCHC 33.6 31.5 - 36.5 g/dL    RDW 12.0 10.0 - 15.0 %    Platelet Count 224 150 - 450 10e3/uL    % Neutrophils 59 %    % Lymphocytes 28 %    % Monocytes 9 %    % Eosinophils 3 %    % Basophils 1 %    % Immature Granulocytes 0 %    NRBCs per 100 WBC 0 <1 /100    Absolute Neutrophils 4.2 1.6 - 8.3 10e3/uL    Absolute Lymphocytes 1.9 0.8 - 5.3 10e3/uL    Absolute Monocytes 0.6 0.0 - 1.3 10e3/uL    Absolute Eosinophils 0.2 0.0 - 0.7 10e3/uL    Absolute Basophils 0.1 0.0 - 0.2 10e3/uL    Absolute Immature Granulocytes 0.0 <=0.4 10e3/uL    Absolute NRBCs 0.0 10e3/uL   Extra Red Top Tube   Result Value Ref Range    Hold Specimen JIC        Medications - No data to display    Assessments & Plan (with Medical Decision Making)   27 year old female with past medical history notable for factor V Leiden, antiphospholipid syndrome, prior pulmonary embolism on warfarin with 3 days of intermittent epigastric/left upper quadrant pain.     EKG obtained and no evidence of acute ischemia.  There is T wave inversion lead V2 however there is a biphasic P wave preceding this which makes me suspect lead placed to superior.  High-sensitivity troponin is not elevated.  No  anginal symptoms.  Very low suspicion for ACS.  Pain is not similar to prior VTE.  Not pleuritic.  She is therapeutic on warfarin.  D-dimer is not elevated.  No further work-up for VTE necessary.    On examination she did have mild epigastric/left upper quadrant tenderness to palpation.  She is status post cholecystectomy and no right upper quadrant discomfort.  Normal LFTs.  CBC without leukocytosis.  No fevers.  No urinary symptoms.  Lipase is within normal limits.  Metabolic panel grossly normal exception of nongap acidosis which is of unclear significance at this time.    We did discuss pain management she says she does not require any thing for pain at this time.  All results discussed with patient.  I feel this is more abdominal than thoracic.  Lungs are clear to auscultation and no cough to suggest pneumonia.  Discussed possibility of CT of abdomen and pelvis.  Risks and benefits were discussed.  After conversation she elected to forego at this time and will defer to if symptoms progress.    Gastritis/peptic ulcer disease is a possibility.  Will trial omeprazole and have her follow-up with her primary care provider 1 to 2 weeks.  To return to emergency department for new or worsening symptoms.  She expressed agreement understanding of plan.    I have reviewed the nursing notes.         New Prescriptions    OMEPRAZOLE (PRILOSEC) 20 MG DR CAPSULE    Take 1 capsule (20 mg) by mouth daily for 30 days       Final diagnoses:   Epigastric pain     Neal Turcios MD    1/7/2023   [facility] EMERGENCY DEPT    Disclaimer: This note consists of words and symbols derived from keyboarding and dictation using voice recognition software.  As a result, there may be errors that have gone undetected.  Please consider this when interpreting information found in this note.             Neal Turcios MD  01/07/23 4699

## 2023-01-09 ENCOUNTER — TELEPHONE (OUTPATIENT)
Dept: ANTICOAGULATION | Facility: CLINIC | Age: 28
End: 2023-01-09

## 2023-01-09 NOTE — TELEPHONE ENCOUNTER
ANTICOAGULATION  MANAGEMENT: Discharge Review    Rocío Catherine chart reviewed for anticoagulation continuity of care    Emergency room visit on 1/7/23 for chest pain, heartburn, arm pain.    Discharge disposition: Home    Results:    Recent labs: (last 7 days)     01/05/23  0000 01/07/23  1011   INR 2.1 2.12*     Anticoagulation inpatient management:     not applicable     Anticoagulation discharge instructions:     Warfarin dosing: home regimen continued   Bridging: No   INR goal change: No      Medication changes affecting anticoagulation: Yes: Omeprazole 20 mg daily x30 days which can increase INR    Additional factors affecting anticoagulation: No     PLAN     No adjustment to anticoagulation plan needed; see below.    Spoke with Rocío and she is NOT taking the Omeprazole. She said she has had no symptoms so will not start taking it unless she feels she needs to. I informed her it can increase INR so to check her INR a week after starting it, if she decised to do so. She expressed understanding. She had no questions.    No adjustment to Anticoagulation Calendar was required    Debi Boland RN

## 2023-01-13 ENCOUNTER — TELEPHONE (OUTPATIENT)
Dept: SCHEDULING | Facility: CLINIC | Age: 28
End: 2023-01-13

## 2023-01-13 NOTE — TELEPHONE ENCOUNTER
Returned call to patient.  Patient stated she is starting to have UTI symptoms so she started taking AZO OTC and was wondering if it would interact.  Reviewed up to date and it does not appear there is any drug interaction.    ANISH VillegasN, RN  Anticoagulation Clinic

## 2023-01-13 NOTE — TELEPHONE ENCOUNTER
Reason for Call:  Call back    Detailed comments: Patient called requesting an INR nurse to give her a call regarding taking new medication.     Phone Number Patient can be reached at: Cell number on file:    Telephone Information:   Mobile [phone]       Best Time: anytime    Can we leave a detailed message on this number? YES    Call taken on 1/13/2023 at 7:54 AM by Dinora Bingham

## 2023-01-18 ENCOUNTER — OFFICE VISIT (OUTPATIENT)
Dept: FAMILY MEDICINE | Facility: CLINIC | Age: 28
End: 2023-01-18
Payer: COMMERCIAL

## 2023-01-18 VITALS
OXYGEN SATURATION: 97 % | RESPIRATION RATE: 22 BRPM | DIASTOLIC BLOOD PRESSURE: 60 MMHG | HEART RATE: 107 BPM | TEMPERATURE: 99.2 F | SYSTOLIC BLOOD PRESSURE: 112 MMHG

## 2023-01-18 DIAGNOSIS — J06.9 UPPER RESPIRATORY TRACT INFECTION, UNSPECIFIED TYPE: Primary | ICD-10-CM

## 2023-01-18 DIAGNOSIS — J02.9 SORE THROAT: ICD-10-CM

## 2023-01-18 LAB
DEPRECATED S PYO AG THROAT QL EIA: NEGATIVE
FLUAV AG SPEC QL IA: NEGATIVE
FLUBV AG SPEC QL IA: NEGATIVE
GROUP A STREP BY PCR: NOT DETECTED
SARS-COV-2 RNA RESP QL NAA+PROBE: POSITIVE

## 2023-01-18 PROCEDURE — 87651 STREP A DNA AMP PROBE: CPT | Performed by: FAMILY MEDICINE

## 2023-01-18 PROCEDURE — 87804 INFLUENZA ASSAY W/OPTIC: CPT | Performed by: FAMILY MEDICINE

## 2023-01-18 PROCEDURE — U0003 INFECTIOUS AGENT DETECTION BY NUCLEIC ACID (DNA OR RNA); SEVERE ACUTE RESPIRATORY SYNDROME CORONAVIRUS 2 (SARS-COV-2) (CORONAVIRUS DISEASE [COVID-19]), AMPLIFIED PROBE TECHNIQUE, MAKING USE OF HIGH THROUGHPUT TECHNOLOGIES AS DESCRIBED BY CMS-2020-01-R: HCPCS | Performed by: FAMILY MEDICINE

## 2023-01-18 PROCEDURE — U0005 INFEC AGEN DETEC AMPLI PROBE: HCPCS | Performed by: FAMILY MEDICINE

## 2023-01-18 PROCEDURE — 99213 OFFICE O/P EST LOW 20 MIN: CPT | Mod: CS | Performed by: FAMILY MEDICINE

## 2023-01-18 ASSESSMENT — PAIN SCALES - GENERAL: PAINLEVEL: EXTREME PAIN (8)

## 2023-01-18 NOTE — PROGRESS NOTES
Assessment & Plan     Upper respiratory tract infection, unspecified type  Differential discussed in detail.  Symptoms likely secondary to viral URI.  Rapid strep and influenza negative, COVID-19 test ordered.  Recommended well hydration, warm fluids, over-the-counter analgesia and to follow-up if symptoms persist or worsen.  Patient understood and in agreement with above plan.  All questions answered.    Sore throat  - Streptococcus A Rapid Screen w/Reflex to PCR - Clinic Collect  - Influenza A & B Antigen - Clinic Collect  - Symptomatic COVID-19 Virus (Coronavirus) by PCR Nose  - Group A Streptococcus PCR Throat Swab      Renan Poole MD  [facility]      Juan Pablo Alford is a 27 year old, presenting for the following health issues:  Pharyngitis (/)      History of Present Illness       Reason for visit:  Sinus/Strep  Symptom onset:  1-3 days ago  Symptoms include:  Sore throat and minor body aches and congestion  Symptom intensity:  Severe  Symptom progression:  Worsening  Had these symptoms before:  Yes  Has tried/received treatment for these symptoms:  Yes  Previous treatment was successful:  Yes  Prior treatment description:  Treatment for sinus infection with medication  What makes it worse:  No  What makes it better:  No    She eats 0-1 servings of fruits and vegetables daily.She consumes 1 sweetened beverage(s) daily.She exercises with enough effort to increase her heart rate 20 to 29 minutes per day.  She exercises with enough effort to increase her heart rate 5 days per week.   She is taking medications regularly.         Review of Systems   Constitutional, HEENT, cardiovascular, pulmonary, gi and gu systems are negative, except as otherwise noted.      Objective    /60 (BP Location: Right arm, Patient Position: Sitting, Cuff Size: Adult Regular)   Pulse 107   Temp 99.2  F (37.3  C) (Tympanic)   Resp 22   LMP  (LMP Unknown)   SpO2 97%   There is no height or  weight on file to calculate BMI.  Physical Exam   GENERAL: alert and no distress  EYES: Eyes grossly normal to inspection, PERRL and conjunctivae and sclerae normal  HENT: normal cephalic/atraumatic, ear canals and TM's normal, oral mucous membranes moist and oropharxnx crowded  NECK: no adenopathy, no asymmetry, masses, or scars and thyroid normal to palpation  RESP: lungs clear to auscultation - no rales, rhonchi or wheezes  CV: tachycardia, normal S1 S2, no S3 or S4 and no murmur, click or rub  MS: no gross musculoskeletal defects noted, no edema  SKIN: no suspicious lesions or rashes  NEURO: Normal strength and tone, mentation intact and speech normal  PSYCH: mentation appears normal, affect normal/bright    Results for orders placed or performed in visit on 01/18/23   Streptococcus A Rapid Screen w/Reflex to PCR - Clinic Collect     Status: Normal    Specimen: Throat; Swab   Result Value Ref Range    Group A Strep antigen Negative Negative

## 2023-01-19 ENCOUNTER — ANTICOAGULATION THERAPY VISIT (OUTPATIENT)
Dept: ANTICOAGULATION | Facility: CLINIC | Age: 28
End: 2023-01-19
Payer: COMMERCIAL

## 2023-01-19 DIAGNOSIS — I26.99 ACUTE PULMONARY EMBOLISM WITHOUT ACUTE COR PULMONALE, UNSPECIFIED PULMONARY EMBOLISM TYPE (H): ICD-10-CM

## 2023-01-19 DIAGNOSIS — D68.61 ANTIPHOSPHOLIPID SYNDROME (H): ICD-10-CM

## 2023-01-19 DIAGNOSIS — I26.99 ACUTE PULMONARY EMBOLISM, UNSPECIFIED PULMONARY EMBOLISM TYPE, UNSPECIFIED WHETHER ACUTE COR PULMONALE PRESENT (H): Primary | ICD-10-CM

## 2023-01-19 DIAGNOSIS — I26.99 ACUTE PULMONARY EMBOLISM (H): Primary | ICD-10-CM

## 2023-01-19 DIAGNOSIS — Z79.01 LONG TERM CURRENT USE OF ANTICOAGULANT THERAPY: ICD-10-CM

## 2023-01-19 DIAGNOSIS — D68.51 FACTOR 5 LEIDEN MUTATION, HETEROZYGOUS (H): ICD-10-CM

## 2023-01-19 DIAGNOSIS — U07.1 INFECTION DUE TO 2019 NOVEL CORONAVIRUS: Primary | ICD-10-CM

## 2023-01-19 LAB — INR HOME MONITORING: 3.5 (ref 2–3)

## 2023-01-19 RX ORDER — WARFARIN SODIUM 5 MG/1
TABLET ORAL
Qty: 140 TABLET | Refills: 1 | Status: SHIPPED | OUTPATIENT
Start: 2023-01-19 | End: 2023-07-12

## 2023-01-19 NOTE — TELEPHONE ENCOUNTER
ANTICOAGULATION MANAGEMENT:  Medication Refill    Anticoagulation Summary  As of 1/19/2023    Warfarin maintenance plan:  7.5 mg (5 mg x 1.5) every day   Next INR check:  1/23/2023   Target end date:  Indefinite    Indications    Acute pulmonary embolism  unspecified pulmonary embolism type  unspecified whether acute cor pulmonale present (H) [I26.99]  Factor 5 Leiden mutation  heterozygous (H) [D68.51]  Other acute pulmonary embolism without acute cor pulmonale (H) (Resolved) [I26.99]  Acute pulmonary embolism  unspecified pulmonary embolism type  unspecified whether acute cor pulmonale present (H) (Resolved) [I26.99]  Antiphospholipid syndrome (H) [D68.61]  Long term current use of anticoagulant therapy [Z79.01]  Acute pulmonary embolism without acute cor pulmonale  unspecified pulmonary embolism type (H) [I26.99]             Anticoagulation Care Providers     Provider Role Specialty Phone number    Jesse Farnsworth MD Referring Family Medicine [phone]    Yessy Lyn APRN [facility] Referring Family Medicine [phone]    Debi Carrasco MD Referring Cardiovascular Disease [phone]          Visit with referring provider/group within last year: Yes    [facility] referral signed within last year: Yes    Rocío meets all criteria for refill (current [facility] referral, office visit with referring provider/group in last year, lab monitoring up to date or not exceeding 2 weeks overdue). Rx instructions and quantity supplied updated to match patient's current dosing plan. Warfarin 90 day supply with 1 refill granted per [facility] protocol     Meri Lombardo RN  Anticoagulation Clinic

## 2023-01-19 NOTE — PROGRESS NOTES
ANTICOAGULATION MANAGEMENT     Rocío Catherine 27 year old female is on warfarin with supratherapeutic INR result. (Goal INR 2.0-3.0)    Recent labs: (last 7 days)     01/19/23  0000   INR 3.5*       ASSESSMENT       Source(s): Chart Review and Patient/Caregiver Call       Warfarin doses taken: Warfarin taken as instructed    Diet: No new diet changes identified    New illness, injury, or hospitalization: Yes. Patient Covid+ 1/18; symptoms began on 1/15-cough, sore throat, congestion    Medication/supplement changes: mucinex    Signs or symptoms of bleeding or clotting: No    Previous INR: Therapeutic last 2(+) visits    Additional findings: None       PLAN     Recommended plan for temporary change(s) affecting INR     Dosing Instructions: Continue your current warfarin dose with next INR in 5 days       Summary  As of 1/19/2023    Full warfarin instructions:  1/19: Hold; Otherwise 7.5 mg every day   Next INR check:  1/24/2023             Telephone call with Rocío who verbalizes understanding and agrees to plan and who agrees to plan and repeated back plan correctly    Patient to recheck with home meter    Education provided:     Please call back if any changes to your diet, medications or how you've been taking warfarin    Plan made per [facility] anticoagulation protocol    Niyah Ferrer, RN  Anticoagulation Clinic  1/19/2023    _______________________________________________________________________     Anticoagulation Episode Summary     Current INR goal:  2.0-3.0   TTR:  74.4 % (1 y)   Target end date:  Indefinite   Send INR reminders to:  [facility] HOME MONITORING    Indications    Acute pulmonary embolism  unspecified pulmonary embolism type  unspecified whether acute cor pulmonale present (H) [I26.99]  Factor 5 Leiden mutation  heterozygous (H) [D68.51]  Other acute pulmonary embolism without acute cor pulmonale (H) (Resolved) [I26.99]  Acute pulmonary embolism  unspecified pulmonary embolism type  unspecified  whether acute cor pulmonale present (H) (Resolved) [I26.99]  Antiphospholipid syndrome (H) [D68.61]  Long term current use of anticoagulant therapy [Z79.01]  Acute pulmonary embolism without acute cor pulmonale  unspecified pulmonary embolism type (H) [I26.99]           Comments:  [employer] home meter// manage by exception// Antiphospholipid antibody syndrome (may need CF10 if INR elevated with no known reason)         Anticoagulation Care Providers     Provider Role Specialty Phone number    Jesse Farnsworth MD Referring Family Medicine [phone]    Yessy Lyn APRN [facility] Referring Family Medicine [phone]    Debi Carrasco MD Referring Cardiovascular Disease [phone]

## 2023-01-19 NOTE — PROGRESS NOTES
COVID-19 test result discussed with patient.  Paxlovid prescribed and recommended to monitor INR closely.    Dr Poole

## 2023-01-19 NOTE — PROGRESS NOTES
Patient reporting starting Paxlovid today--which decreases INR- for Covid--diagnosed 1/18  Patient will continue maintenance and recheck INR on Monday 1/23    Patient in agreement with plan and verbalizes understanding.   Thanks!   Niyah Ferrer RN      ..

## 2023-01-25 ENCOUNTER — ANTICOAGULATION THERAPY VISIT (OUTPATIENT)
Dept: ANTICOAGULATION | Facility: CLINIC | Age: 28
End: 2023-01-25
Payer: COMMERCIAL

## 2023-01-25 DIAGNOSIS — Z79.01 LONG TERM CURRENT USE OF ANTICOAGULANT THERAPY: ICD-10-CM

## 2023-01-25 DIAGNOSIS — D68.61 ANTIPHOSPHOLIPID SYNDROME (H): ICD-10-CM

## 2023-01-25 DIAGNOSIS — I26.99 ACUTE PULMONARY EMBOLISM WITHOUT ACUTE COR PULMONALE, UNSPECIFIED PULMONARY EMBOLISM TYPE (H): ICD-10-CM

## 2023-01-25 DIAGNOSIS — D68.51 FACTOR 5 LEIDEN MUTATION, HETEROZYGOUS (H): ICD-10-CM

## 2023-01-25 DIAGNOSIS — I26.99 ACUTE PULMONARY EMBOLISM, UNSPECIFIED PULMONARY EMBOLISM TYPE, UNSPECIFIED WHETHER ACUTE COR PULMONALE PRESENT (H): Primary | ICD-10-CM

## 2023-01-25 LAB — INR HOME MONITORING: 3.6 (ref 2–3)

## 2023-01-25 NOTE — PROGRESS NOTES
ANTICOAGULATION MANAGEMENT     Rocío Catherine 27 year old female is on warfarin with supratherapeutic INR result. (Goal INR 2.0-3.0)    Recent labs: (last 7 days)     01/25/23  0000   INR 3.6*       ASSESSMENT       Source(s): Chart Review and Patient/Caregiver Call       Warfarin doses taken: Warfarin taken as instructed    Diet: No new diet changes identified    New illness, injury, or hospitalization: Yes: Pt was diagnosed with COVID on 1/18/23, reports that symptoms are improving    Medication/supplement changes: Presribed the COVID antiviral medications, but pt reports that she did not take the medication     Signs or symptoms of bleeding or clotting: No    Previous INR: Supratherapeutic    Additional findings: None        PLAN     Recommended plan for temporary change(s) and ongoing change(s) affecting INR     Dosing Instructions: partial hold then decrease your warfarin dose (9.5% change) with next INR in 1 week       Summary  As of 1/25/2023    Full warfarin instructions:  1/25: 5 mg; Otherwise 5 mg every Sun, Thu; 7.5 mg all other days   Next INR check:  2/1/2023             Telephone call with Rocío who verbalizes understanding and agrees to plan    Patient to recheck with home meter    Education provided:     Goal range and lab monitoring: goal range and significance of current result and Importance of therapeutic range    Plan made per [facility] anticoagulation protocol    Michael Molina RN  Anticoagulation Clinic  1/25/2023    _______________________________________________________________________     Anticoagulation Episode Summary     Current INR goal:  2.0-3.0   TTR:  74.2 % (1 y)   Target end date:  Indefinite   Send INR reminders to:  [facility] HOME MONIT[facility]    Indications    Acute pulmonary embolism  unspecified pulmonary embolism type  unspecified whether acute cor pulmonale present (H) [I26.99]  Factor 5 Leiden mutation  heterozygous (H) [D68.51]  Other acute pulmonary embolism without acute cor pulmonale  (H) (Resolved) [I26.99]  Acute pulmonary embolism  unspecified pulmonary embolism type  unspecified whether acute cor pulmonale present (H) (Resolved) [I26.99]  Antiphospholipid syndrome (H) [D68.61]  Long term current use of anticoagulant therapy [Z79.01]  Acute pulmonary embolism without acute cor pulmonale  unspecified pulmonary embolism type (H) [I26.99]           Comments:  [employer] home meter// manage by exception// Antiphospholipid antibody syndrome (may need CF10 if INR elevated with no known reason)         Anticoagulation Care Providers     Provider Role Specialty Phone number    Jesse Farnsworth MD Referring Family Medicine [phone]    Yessy Lyn APRN [facility] Referring Family Medicine [phone]    Debi Carrasco MD Referring Cardiovascular Disease [phone]

## 2023-02-03 ENCOUNTER — ANTICOAGULATION THERAPY VISIT (OUTPATIENT)
Dept: ANTICOAGULATION | Facility: CLINIC | Age: 28
End: 2023-02-03
Payer: COMMERCIAL

## 2023-02-03 DIAGNOSIS — I26.99 ACUTE PULMONARY EMBOLISM, UNSPECIFIED PULMONARY EMBOLISM TYPE, UNSPECIFIED WHETHER ACUTE COR PULMONALE PRESENT (H): Primary | ICD-10-CM

## 2023-02-03 DIAGNOSIS — I26.99 ACUTE PULMONARY EMBOLISM WITHOUT ACUTE COR PULMONALE, UNSPECIFIED PULMONARY EMBOLISM TYPE (H): ICD-10-CM

## 2023-02-03 DIAGNOSIS — Z79.01 LONG TERM CURRENT USE OF ANTICOAGULANT THERAPY: ICD-10-CM

## 2023-02-03 DIAGNOSIS — D68.61 ANTIPHOSPHOLIPID SYNDROME (H): ICD-10-CM

## 2023-02-03 DIAGNOSIS — D68.51 FACTOR 5 LEIDEN MUTATION, HETEROZYGOUS (H): ICD-10-CM

## 2023-02-03 LAB — INR HOME MONITORING: 1.9 (ref 2–3)

## 2023-02-03 NOTE — PROGRESS NOTES
ANTICOAGULATION MANAGEMENT     Rocío Catherine 27 year old female is on warfarin with subtherapeutic INR result. (Goal INR 2.0-3.0)    Recent labs: (last 7 days)     02/03/23  0000   INR 1.9*       ASSESSMENT       Source(s): Chart Review    Previous INR was Supratherapeutic    Medication, diet, health changes since last INR chart reviewed; none identified           PLAN     Unable to reach Rocío today.    Assessment questions sent to Patient via [employer]    Follow up required to confirm warfarin dose taken and assess for changes    Dayami Mcfarland, RN  Anticoagulation Clinic  2/3/2023

## 2023-02-03 NOTE — PROGRESS NOTES
ANTICOAGULATION MANAGEMENT     Rocío Catherine 27 year old female is on warfarin with subtherapeutic INR result. (Goal INR 2.0-3.0)    Recent labs: (last 7 days)     02/03/23  0000   INR 1.9*       ASSESSMENT       Source(s): Chart Review and Patient/Caregiver Call       Warfarin doses taken: Warfarin taken as instructed    Diet: No new diet changes identified    New illness, injury, or hospitalization: No    Medication/supplement changes: None noted    Signs or symptoms of bleeding or clotting: No    Previous INR: Supratherapeutic    Additional findings: None       PLAN     Recommended plan for no diet, medication or health factor changes affecting INR     Dosing Instructions: Increase your warfarin dose (5% change) with next INR in 1 week       Summary  As of 2/3/2023    Full warfarin instructions:  5 mg every Thu; 7.5 mg all other days   Next INR check:  2/10/2023             Sent [employer] message with dosing and follow up instructions    Patient to recheck with home meter    Education provided:     Please call back if any changes to your diet, medications or how you've been taking warfarin    Plan made per [facility] anticoagulation protocol    Dayami Mcfarland, RN  Anticoagulation Clinic  2/3/2023    _______________________________________________________________________     Anticoagulation Episode Summary     Current INR goal:  2.0-3.0   TTR:  74.7 % (1 y)   Target end date:  Indefinite   Send INR reminders to:  [facility]IT[facility]    Indications    Acute pulmonary embolism  unspecified pulmonary embolism type  unspecified whether acute cor pulmonale present (H) [I26.99]  Factor 5 Leiden mutation  heterozygous (H) [D68.51]  Other acute pulmonary embolism without acute cor pulmonale (H) (Resolved) [I26.99]  Acute pulmonary embolism  unspecified pulmonary embolism type  unspecified whether acute cor pulmonale present (H) (Resolved) [I26.99]  Antiphospholipid syndrome (H) [D68.61]  Long term current use of anticoagulant  therapy [Z79.01]  Acute pulmonary embolism without acute cor pulmonale  unspecified pulmonary embolism type (H) [I26.99]           Comments:  [employer] home meter// manage by exception// Antiphospholipid antibody syndrome (may need CF10 if INR elevated with no known reason)         Anticoagulation Care Providers     Provider Role Specialty Phone number    Jesse Farnsworth MD Referring Family Medicine [phone]    Yessy Lyn APRN [facility] Referring Family Medicine [phone]    Debi Carrasco MD Referring Cardiovascular Disease [phone]

## 2023-02-14 ENCOUNTER — ANTICOAGULATION THERAPY VISIT (OUTPATIENT)
Dept: ANTICOAGULATION | Facility: CLINIC | Age: 28
End: 2023-02-14
Payer: COMMERCIAL

## 2023-02-14 LAB — INR HOME MONITORING: 2.3 (ref 2–3)

## 2023-02-14 NOTE — PROGRESS NOTES
ANTICOAGULATION MANAGEMENT     Rocío Catherine 27 year old female is on warfarin with therapeutic INR result. (Goal INR 2.0-3.0)    Recent labs: (last 7 days)     02/14/23  0000   INR 2.3       ASSESSMENT       Source(s): Chart Review and Patient/Caregiver Call       Warfarin doses taken: Warfarin taken as instructed    Diet: No new diet changes identified    New illness, injury, or hospitalization: No    Medication/supplement changes: None noted    Signs or symptoms of bleeding or clotting: No    Previous INR: Subtherapeutic    Additional findings: None       PLAN     Recommended plan for no diet, medication or health factor changes affecting INR     Dosing Instructions: Continue your current warfarin dose with next INR in 2 weeks       Summary  As of 2/14/2023    Full warfarin instructions:  5 mg every Thu; 7.5 mg all other days   Next INR check:  2/28/2023             Telephone call with Rocío who verbalizes understanding and agrees to plan    Patient to recheck with home meter    Education provided:     Please call back if any changes to your diet, medications or how you've been taking warfarin    Resume manage by exception with home monitor. Continue to submit INR results to home monitor company.You will only be called when your result is out of range. Please call and notify [facility] if new medication started, dose missed, signs or symptoms of bleeding or clotting, or a surgery/procedure is scheduled.    Plan made per [facility] anticoagulation protocol    Amanda Valdez RN  Anticoagulation Clinic  2/14/2023    _______________________________________________________________________     Anticoagulation Episode Summary     Current INR goal:  2.0-3.0   TTR:  76.6 % (1 y)   Target end date:  Indefinite   Send INR reminders to:  [facility] HOME MONITORING    Indications    Acute pulmonary embolism  unspecified pulmonary embolism type  unspecified whether acute cor pulmonale present (H) [I26.99]  Factor 5 Leiden  mutation  heterozygous (H) [D68.51]  Other acute pulmonary embolism without acute cor pulmonale (H) (Resolved) [I26.99]  Acute pulmonary embolism  unspecified pulmonary embolism type  unspecified whether acute cor pulmonale present (H) (Resolved) [I26.99]  Antiphospholipid syndrome (H) [D68.61]  Long term current use of anticoagulant therapy [Z79.01]  Acute pulmonary embolism without acute cor pulmonale  unspecified pulmonary embolism type (H) [I26.99]           Comments:  [employer] home meter// manage by exception// Antiphospholipid antibody syndrome (may need CF10 if INR elevated with no known reason)         Anticoagulation Care Providers     Provider Role Specialty Phone number    Jesse Farnsworth MD Referring Family Medicine [phone]    Yessy Lyn APRN [facility] Referring Family Medicine [phone]    Debi Carrasco MD Referring Cardiovascular Disease [phone]

## 2023-02-28 ENCOUNTER — DOCUMENTATION ONLY (OUTPATIENT)
Dept: ANTICOAGULATION | Facility: CLINIC | Age: 28
End: 2023-02-28
Payer: COMMERCIAL

## 2023-02-28 DIAGNOSIS — I26.99 ACUTE PULMONARY EMBOLISM, UNSPECIFIED PULMONARY EMBOLISM TYPE, UNSPECIFIED WHETHER ACUTE COR PULMONALE PRESENT (H): Primary | ICD-10-CM

## 2023-02-28 DIAGNOSIS — Z79.01 LONG TERM CURRENT USE OF ANTICOAGULANT THERAPY: ICD-10-CM

## 2023-02-28 DIAGNOSIS — D68.51 FACTOR 5 LEIDEN MUTATION, HETEROZYGOUS (H): ICD-10-CM

## 2023-02-28 DIAGNOSIS — D68.61 ANTIPHOSPHOLIPID SYNDROME (H): ICD-10-CM

## 2023-02-28 DIAGNOSIS — I26.99 ACUTE PULMONARY EMBOLISM WITHOUT ACUTE COR PULMONALE, UNSPECIFIED PULMONARY EMBOLISM TYPE (H): ICD-10-CM

## 2023-02-28 LAB — INR HOME MONITORING: 2.9 (ref 2–3)

## 2023-02-28 NOTE — PROGRESS NOTES
ANTICOAGULATION  MANAGEMENT-Home Monitor Managed by Exception    Rocío Catherine 27 year old female is on warfarin with therapeutic INR result. (Goal INR 2.0-3.0)    Recent labs: (last 7 days)     02/28/23  0000   INR 2.9         Previous INR was Therapeutic    Medication, diet, health changes since last INR:chart reviewed; none identified    Contacted within the last 12 weeks by phone on 2/14/23      VINI Alford was NOT contacted regarding therapeutic result today per home monitoring policy manage by exception agreement.   Current warfarin dose is to be continued:     Summary  As of 2/28/2023    Full warfarin instructions:  5 mg every Thu; 7.5 mg all other days   Next INR check:  3/14/2023           ?   Margie Perez RN  Anticoagulation Clinic  2/28/2023    _______________________________________________________________________     Anticoagulation Episode Summary     Current INR goal:  2.0-3.0   TTR:  77.0 % (1 y)   Target end date:  Indefinite   Send INR reminders to:  [facility]IT[facility]    Indications    Acute pulmonary embolism  unspecified pulmonary embolism type  unspecified whether acute cor pulmonale present (H) [I26.99]  Factor 5 Leiden mutation  heterozygous (H) [D68.51]  Other acute pulmonary embolism without acute cor pulmonale (H) (Resolved) [I26.99]  Acute pulmonary embolism  unspecified pulmonary embolism type  unspecified whether acute cor pulmonale present (H) (Resolved) [I26.99]  Antiphospholipid syndrome (H) [D68.61]  Long term current use of anticoagulant therapy [Z79.01]  Acute pulmonary embolism without acute cor pulmonale  unspecified pulmonary embolism type (H) [I26.99]           Comments:  [employer] home meter// manage by exception// Antiphospholipid antibody syndrome (may need CF10 if INR elevated with no known reason)         Anticoagulation Care Providers     Provider Role Specialty Phone number    Jesse Farnsworth MD Referring Family Medicine [phone]    Eboni,  Yessy Cassidy, [facility] Referring Family Medicine [phone]    Debi Carrasco MD Referring Cardiovascular Disease [phone]

## 2023-03-14 ENCOUNTER — MYC MEDICAL ADVICE (OUTPATIENT)
Dept: ANTICOAGULATION | Facility: CLINIC | Age: 28
End: 2023-03-14
Payer: COMMERCIAL

## 2023-03-14 ENCOUNTER — ANTICOAGULATION THERAPY VISIT (OUTPATIENT)
Dept: ANTICOAGULATION | Facility: CLINIC | Age: 28
End: 2023-03-14
Payer: COMMERCIAL

## 2023-03-14 DIAGNOSIS — Z79.01 LONG TERM CURRENT USE OF ANTICOAGULANT THERAPY: ICD-10-CM

## 2023-03-14 DIAGNOSIS — D68.51 FACTOR 5 LEIDEN MUTATION, HETEROZYGOUS (H): ICD-10-CM

## 2023-03-14 DIAGNOSIS — D68.61 ANTIPHOSPHOLIPID SYNDROME (H): ICD-10-CM

## 2023-03-14 DIAGNOSIS — I26.99 ACUTE PULMONARY EMBOLISM WITHOUT ACUTE COR PULMONALE, UNSPECIFIED PULMONARY EMBOLISM TYPE (H): ICD-10-CM

## 2023-03-14 DIAGNOSIS — I26.99 ACUTE PULMONARY EMBOLISM, UNSPECIFIED PULMONARY EMBOLISM TYPE, UNSPECIFIED WHETHER ACUTE COR PULMONALE PRESENT (H): Primary | ICD-10-CM

## 2023-03-14 LAB — INR HOME MONITORING: 1.8 (ref 2–3)

## 2023-03-14 NOTE — TELEPHONE ENCOUNTER
See [facility] encounter for details.  Dayami Mcfarland, RN  Anticoagulation Nurse - [facility] [place]

## 2023-03-14 NOTE — PROGRESS NOTES
[employer] message sent to patient with assessment questions. Will await response.  Dayami Mcfarland RN  Anticoagulation Nurse - [facility], [place]

## 2023-03-14 NOTE — PROGRESS NOTES
ANTICOAGULATION MANAGEMENT     Rocío Catherine 27 year old female is on warfarin with subtherapeutic INR result. (Goal INR 2.0-3.0)    Recent labs: (last 7 days)     03/14/23  0000   INR 1.8*       ASSESSMENT       Source(s): Chart Review and Patient/Caregiver Call       Warfarin doses taken: Missed dose(s) may be affecting INR    Diet: No new diet changes identified    New illness, injury, or hospitalization: No    Medication/supplement changes: None noted    Signs or symptoms of bleeding or clotting: No    Previous INR: Therapeutic last 2(+) visits    Additional findings: None         PLAN     Recommended plan for temporary change(s) affecting INR     Dosing Instructions: booster dose then continue your current warfarin dose with next INR in 1 week       Summary  As of 3/14/2023    Full warfarin instructions:  3/14: 15 mg; Otherwise 5 mg every Thu; 7.5 mg all other days   Next INR check:  3/21/2023             Sent [employer] message with dosing and follow up instructions    Patient to recheck with home meter    Education provided:     Please call back if any changes to your diet, medications or how you've been taking warfarin    Plan made per [facility] anticoagulation protocol    Dayami Mcfarland, RN  Anticoagulation Clinic  3/14/2023    _______________________________________________________________________     Anticoagulation Episode Summary     Current INR goal:  2.0-3.0   TTR:  76.2 % (1 y)   Target end date:  Indefinite   Send INR reminders to:  [facility] MONITOR[facility]    Indications    Acute pulmonary embolism  unspecified pulmonary embolism type  unspecified whether acute cor pulmonale present (H) [I26.99]  Factor 5 Leiden mutation  heterozygous (H) [D68.51]  Other acute pulmonary embolism without acute cor pulmonale (H) (Resolved) [I26.99]  Acute pulmonary embolism  unspecified pulmonary embolism type  unspecified whether acute cor pulmonale present (H) (Resolved) [I26.99]  Antiphospholipid syndrome (H)  [D68.61]  Long term current use of anticoagulant therapy [Z79.01]  Acute pulmonary embolism without acute cor pulmonale  unspecified pulmonary embolism type (H) [I26.99]           Comments:  [employer] home meter// manage by exception// Antiphospholipid antibody syndrome (may need CF10 if INR elevated with no known reason)         Anticoagulation Care Providers     Provider Role Specialty Phone number    Jesse Farnsworth MD Referring Family Medicine [phone]    Yessy Lyn APRN [facility] Referring Family Medicine [phone]    Debi Carrasco MD Referring Cardiovascular Disease [phone]

## 2023-03-20 ENCOUNTER — ANTICOAGULATION THERAPY VISIT (OUTPATIENT)
Dept: ANTICOAGULATION | Facility: CLINIC | Age: 28
End: 2023-03-20
Payer: COMMERCIAL

## 2023-03-20 DIAGNOSIS — D68.61 ANTIPHOSPHOLIPID SYNDROME (H): ICD-10-CM

## 2023-03-20 DIAGNOSIS — D68.51 FACTOR 5 LEIDEN MUTATION, HETEROZYGOUS (H): ICD-10-CM

## 2023-03-20 DIAGNOSIS — I26.99 ACUTE PULMONARY EMBOLISM, UNSPECIFIED PULMONARY EMBOLISM TYPE, UNSPECIFIED WHETHER ACUTE COR PULMONALE PRESENT (H): Primary | ICD-10-CM

## 2023-03-20 DIAGNOSIS — I26.99 ACUTE PULMONARY EMBOLISM WITHOUT ACUTE COR PULMONALE, UNSPECIFIED PULMONARY EMBOLISM TYPE (H): ICD-10-CM

## 2023-03-20 DIAGNOSIS — Z79.01 LONG TERM CURRENT USE OF ANTICOAGULANT THERAPY: ICD-10-CM

## 2023-03-20 LAB — INR HOME MONITORING: 2.7 (ref 2–3)

## 2023-03-20 NOTE — PROGRESS NOTES
ANTICOAGULATION MANAGEMENT     Rocío Catherine 27 year old female is on warfarin with therapeutic INR result. (Goal INR 2.0-3.0)    Recent labs: (last 7 days)     03/20/23  0000   INR 2.7       ASSESSMENT       Source(s): Chart Review and Patient/Caregiver Call       Warfarin doses taken: Warfarin taken as instructed    Diet: No new diet changes identified    New illness, injury, or hospitalization: No    Medication/supplement changes: None noted    Signs or symptoms of bleeding or clotting: No    Previous INR: Subtherapeutic    Additional findings: None         PLAN     Recommended plan for no diet, medication or health factor changes affecting INR     Dosing Instructions: Continue your current warfarin dose with next INR in 2 weeks       Summary  As of 3/20/2023    Full warfarin instructions:  5 mg every Thu; 7.5 mg all other days   Next INR check:  4/3/2023             Telephone call with Rocío who verbalizes understanding and agrees to plan    Patient to recheck with home meter    Education provided:     Please call back if any changes to your diet, medications or how you've been taking warfarin    Resume manage by exception with home monitor. Continue to submit INR results to home monitor company.You will only be called when your result is out of range. Please call and notify [facility] if new medication started, dose missed, signs or symptoms of bleeding or clotting, or a surgery/procedure is scheduled.    Plan made per [facility] anticoagulation protocol    Mayra Palma RN  Anticoagulation Clinic  3/20/2023    _______________________________________________________________________     Anticoagulation Episode Summary     Current INR goal:  2.0-3.0   TTR:  75.9 % (1 y)   Target end date:  Indefinite   Send INR reminders to:  [facility] HOME MONITORING    Indications    Acute pulmonary embolism  unspecified pulmonary embolism type  unspecified whether acute cor pulmonale present (H) [I26.99]  Factor 5 Leiden  mutation  heterozygous (H) [D68.51]  Other acute pulmonary embolism without acute cor pulmonale (H) (Resolved) [I26.99]  Acute pulmonary embolism  unspecified pulmonary embolism type  unspecified whether acute cor pulmonale present (H) (Resolved) [I26.99]  Antiphospholipid syndrome (H) [D68.61]  Long term current use of anticoagulant therapy [Z79.01]  Acute pulmonary embolism without acute cor pulmonale  unspecified pulmonary embolism type (H) [I26.99]           Comments:  [employer] home meter// manage by exception// Antiphospholipid antibody syndrome (may need CF10 if INR elevated with no known reason)         Anticoagulation Care Providers     Provider Role Specialty Phone number    Jesse Farnsworth MD Referring Family Medicine [phone]    Yessy Lyn APRN [facility] Referring Family Medicine [phone]    Debi Carrasco MD Referring Cardiovascular Disease [phone]

## 2023-04-04 ENCOUNTER — ANTICOAGULATION THERAPY VISIT (OUTPATIENT)
Dept: ANTICOAGULATION | Facility: CLINIC | Age: 28
End: 2023-04-04
Payer: COMMERCIAL

## 2023-04-04 DIAGNOSIS — D68.61 ANTIPHOSPHOLIPID SYNDROME (H): ICD-10-CM

## 2023-04-04 DIAGNOSIS — I26.99 ACUTE PULMONARY EMBOLISM WITHOUT ACUTE COR PULMONALE, UNSPECIFIED PULMONARY EMBOLISM TYPE (H): ICD-10-CM

## 2023-04-04 DIAGNOSIS — Z79.01 LONG TERM CURRENT USE OF ANTICOAGULANT THERAPY: ICD-10-CM

## 2023-04-04 DIAGNOSIS — I26.99 ACUTE PULMONARY EMBOLISM, UNSPECIFIED PULMONARY EMBOLISM TYPE, UNSPECIFIED WHETHER ACUTE COR PULMONALE PRESENT (H): Primary | ICD-10-CM

## 2023-04-04 DIAGNOSIS — D68.51 FACTOR 5 LEIDEN MUTATION, HETEROZYGOUS (H): ICD-10-CM

## 2023-04-04 LAB — INR HOME MONITORING: 1.9 (ref 2–3)

## 2023-04-04 NOTE — PROGRESS NOTES
ANTICOAGULATION MANAGEMENT     Rocío Catherine 27 year old female is on warfarin with subtherapeutic INR result. (Goal INR 2.0-3.0)    Recent labs: (last 7 days)     04/04/23  0000   INR 1.9*       ASSESSMENT       Source(s): Chart Review and [employer] response       Warfarin doses taken: Warfarin taken as instructed    Diet: No new diet changes identified    New illness, injury, or hospitalization: No    Medication/supplement changes: None noted    Signs or symptoms of bleeding or clotting: No    Previous INR: Therapeutic last visit; previously outside of goal range     Additional findings: None         PLAN     Recommended plan for no diet, medication or health factor changes affecting INR     Dosing Instructions: Increase your warfarin dose (5% change) with next INR in 2 weeks       Summary  As of 4/4/2023    Full warfarin instructions:  7.5 mg every day   Next INR check:  4/18/2023             Sent [employer] message with dosing and follow up instructions    Patient to recheck with home meter    Education provided:     Goal range and lab monitoring: goal range and significance of current result and Importance of therapeutic range    Contact [phone]  with any changes, questions or concerns.     Plan made per [facility] anticoagulation protocol    Jo Ann Zuñiga RN  Anticoagulation Clinic  4/4/2023    _______________________________________________________________________     Anticoagulation Episode Summary     Current INR goal:  2.0-3.0   TTR:  75.3 % (1 y)   Target end date:  Indefinite   Send INR reminders to:  [facility]IT[facility]    Indications    Acute pulmonary embolism  unspecified pulmonary embolism type  unspecified whether acute cor pulmonale present (H) [I26.99]  Factor 5 Leiden mutation  heterozygous (H) [D68.51]  Other acute pulmonary embolism without acute cor pulmonale (H) (Resolved) [I26.99]  Acute pulmonary embolism  unspecified pulmonary embolism type  unspecified whether acute cor pulmonale present  (H) (Resolved) [I26.99]  Antiphospholipid syndrome (H) [D68.61]  Long term current use of anticoagulant therapy [Z79.01]  Acute pulmonary embolism without acute cor pulmonale  unspecified pulmonary embolism type (H) [I26.99]           Comments:  [employer] home meter// manage by exception// Antiphospholipid antibody syndrome (may need CF10 if INR elevated with no known reason)         Anticoagulation Care Providers     Provider Role Specialty Phone number    Jesse Farnsworth MD Referring Family Medicine [phone]    Yessy Lyn APRN [facility] Referring Family Medicine [phone]    Debi Carrasco MD Referring Cardiovascular Disease [phone]

## 2023-04-24 ENCOUNTER — ANTICOAGULATION THERAPY VISIT (OUTPATIENT)
Dept: ANTICOAGULATION | Facility: CLINIC | Age: 28
End: 2023-04-24
Payer: COMMERCIAL

## 2023-04-24 DIAGNOSIS — D68.61 ANTIPHOSPHOLIPID SYNDROME (H): ICD-10-CM

## 2023-04-24 DIAGNOSIS — Z79.01 LONG TERM CURRENT USE OF ANTICOAGULANT THERAPY: ICD-10-CM

## 2023-04-24 DIAGNOSIS — I26.99 ACUTE PULMONARY EMBOLISM, UNSPECIFIED PULMONARY EMBOLISM TYPE, UNSPECIFIED WHETHER ACUTE COR PULMONALE PRESENT (H): Primary | ICD-10-CM

## 2023-04-24 DIAGNOSIS — I26.99 ACUTE PULMONARY EMBOLISM WITHOUT ACUTE COR PULMONALE, UNSPECIFIED PULMONARY EMBOLISM TYPE (H): ICD-10-CM

## 2023-04-24 DIAGNOSIS — D68.51 FACTOR 5 LEIDEN MUTATION, HETEROZYGOUS (H): ICD-10-CM

## 2023-04-24 LAB — INR HOME MONITORING: 2.5 (ref 2–3)

## 2023-04-24 NOTE — PROGRESS NOTES
ANTICOAGULATION MANAGEMENT     Rocío Catherine 27 year old female is on warfarin with therapeutic INR result. (Goal INR 2.0-3.0)    Recent labs: (last 7 days)     04/24/23  0000   INR 2.5       ASSESSMENT       Source(s): Chart Review    Previous INR was Subtherapeutic    Medication, diet, health changes since last INR chart reviewed; none identified             PLAN     Recommended plan for no diet, medication or health factor changes affecting INR     Dosing Instructions: Continue your current warfarin dose with next INR in 2 weeks       Summary  As of 4/24/2023    Full warfarin instructions:  7.5 mg every day   Next INR check:  5/8/2023             Sent [employer] message with dosing and follow up instructions    Patient to recheck with home meter    Education provided:     Please call back if any changes to your diet, medications or how you've been taking warfarin    Resume manage by exception with home monitor. Continue to submit INR results to home monitor company.You will only be called when your result is out of range. Please call and notify [facility] if new medication started, dose missed, signs or symptoms of bleeding or clotting, or a surgery/procedure is scheduled.    Plan made per [facility] anticoagulation protocol    Mayra Palma RN  Anticoagulation Clinic  4/24/2023    _______________________________________________________________________     Anticoagulation Episode Summary     Current INR goal:  2.0-3.0   TTR:  76.7 % (1 y)   Target end date:  Indefinite   Send INR reminders to:  [facility] HOME MONITORING    Indications    Acute pulmonary embolism  unspecified pulmonary embolism type  unspecified whether acute cor pulmonale present (H) [I26.99]  Factor 5 Leiden mutation  heterozygous (H) [D68.51]  Other acute pulmonary embolism without acute cor pulmonale (H) (Resolved) [I26.99]  Acute pulmonary embolism  unspecified pulmonary embolism type  unspecified whether acute cor pulmonale present (H) (Resolved)  [I26.99]  Antiphospholipid syndrome (H) [D68.61]  Long term current use of anticoagulant therapy [Z79.01]  Acute pulmonary embolism without acute cor pulmonale  unspecified pulmonary embolism type (H) [I26.99]           Comments:  [employer] home meter// manage by exception// Antiphospholipid antibody syndrome (may need CF10 if INR elevated with no known reason)         Anticoagulation Care Providers     Provider Role Specialty Phone number    Jesse Farnsworth MD Referring Family Medicine [phone]    Yessy Lyn APRN [facility] Referring Family Medicine [phone]    Debi Carrasco MD Referring Cardiovascular Disease [phone]

## 2023-05-15 ENCOUNTER — DOCUMENTATION ONLY (OUTPATIENT)
Dept: ANTICOAGULATION | Facility: CLINIC | Age: 28
End: 2023-05-15
Payer: COMMERCIAL

## 2023-05-15 LAB — INR HOME MONITORING: 2.8 (ref 2–3)

## 2023-05-15 NOTE — PROGRESS NOTES
ANTICOAGULATION     Rcoío Catherine is overdue for INR check.     [employer] message sent     Jo Ann Horner RN

## 2023-05-16 ENCOUNTER — DOCUMENTATION ONLY (OUTPATIENT)
Dept: ANTICOAGULATION | Facility: CLINIC | Age: 28
End: 2023-05-16
Payer: COMMERCIAL

## 2023-05-16 DIAGNOSIS — D68.61 ANTIPHOSPHOLIPID SYNDROME (H): ICD-10-CM

## 2023-05-16 DIAGNOSIS — Z79.01 LONG TERM CURRENT USE OF ANTICOAGULANT THERAPY: ICD-10-CM

## 2023-05-16 DIAGNOSIS — I26.99 ACUTE PULMONARY EMBOLISM, UNSPECIFIED PULMONARY EMBOLISM TYPE, UNSPECIFIED WHETHER ACUTE COR PULMONALE PRESENT (H): Primary | ICD-10-CM

## 2023-05-16 DIAGNOSIS — I26.99 ACUTE PULMONARY EMBOLISM WITHOUT ACUTE COR PULMONALE, UNSPECIFIED PULMONARY EMBOLISM TYPE (H): ICD-10-CM

## 2023-05-16 DIAGNOSIS — D68.51 FACTOR 5 LEIDEN MUTATION, HETEROZYGOUS (H): ICD-10-CM

## 2023-05-16 NOTE — PROGRESS NOTES
ANTICOAGULATION  MANAGEMENT-Home Monitor Managed by Exception    Rocío Catherine 27 year old female is on warfarin with therapeutic INR result. (Goal INR 2.0-3.0)    Recent labs: (last 7 days)     05/15/23  0000   INR 2.8         Previous INR was Therapeutic    Medication, diet, health changes since last INR:chart reviewed; none identified    Contacted within the last 12 weeks by phone on 4/24/23      VINI Alford was NOT contacted regarding therapeutic result today per home monitoring policy manage by exception agreement.   Current warfarin dose is to be continued:     Summary  As of 5/16/2023    Full warfarin instructions:  7.5 mg every day   Next INR check:  6/5/2023           ?   Dayami Mcfarland, RN  Anticoagulation Clinic  5/16/2023    _______________________________________________________________________     Anticoagulation Episode Summary     Current INR goal:  2.0-3.0   TTR:  76.9 % (1 y)   Target end date:  Indefinite   Send INR reminders to:  [facility]IT[facility]    Indications    Acute pulmonary embolism  unspecified pulmonary embolism type  unspecified whether acute cor pulmonale present (H) [I26.99]  Factor 5 Leiden mutation  heterozygous (H) [D68.51]  Other acute pulmonary embolism without acute cor pulmonale (H) (Resolved) [I26.99]  Acute pulmonary embolism  unspecified pulmonary embolism type  unspecified whether acute cor pulmonale present (H) (Resolved) [I26.99]  Antiphospholipid syndrome (H) [D68.61]  Long term current use of anticoagulant therapy [Z79.01]  Acute pulmonary embolism without acute cor pulmonale  unspecified pulmonary embolism type (H) [I26.99]           Comments:  [employer] home meter// manage by exception// Antiphospholipid antibody syndrome (may need CF10 if INR elevated with no known reason)         Anticoagulation Care Providers     Provider Role Specialty Phone number    Jesse Farnsworth MD Referring Family Medicine [phone]    Eboni, Yessy Cassidy APRN CNP Referring  Family Medicine [phone]    Debi Carrasco MD Referring Cardiovascular Disease [phone]

## 2023-06-05 ENCOUNTER — DOCUMENTATION ONLY (OUTPATIENT)
Dept: ANTICOAGULATION | Facility: CLINIC | Age: 28
End: 2023-06-05
Payer: COMMERCIAL

## 2023-06-05 DIAGNOSIS — D68.61 ANTIPHOSPHOLIPID SYNDROME (H): ICD-10-CM

## 2023-06-05 DIAGNOSIS — I26.99 ACUTE PULMONARY EMBOLISM, UNSPECIFIED PULMONARY EMBOLISM TYPE, UNSPECIFIED WHETHER ACUTE COR PULMONALE PRESENT (H): Primary | ICD-10-CM

## 2023-06-05 DIAGNOSIS — D68.51 FACTOR 5 LEIDEN MUTATION, HETEROZYGOUS (H): ICD-10-CM

## 2023-06-05 DIAGNOSIS — I26.99 ACUTE PULMONARY EMBOLISM WITHOUT ACUTE COR PULMONALE, UNSPECIFIED PULMONARY EMBOLISM TYPE (H): ICD-10-CM

## 2023-06-05 DIAGNOSIS — Z79.01 LONG TERM CURRENT USE OF ANTICOAGULANT THERAPY: ICD-10-CM

## 2023-06-05 LAB — INR HOME MONITORING: 2.8 (ref 2–3)

## 2023-06-05 NOTE — PROGRESS NOTES
ANTICOAGULATION  MANAGEMENT-Home Monitor Managed by Exception    Rocío Catherine 27 year old female is on warfarin with therapeutic INR result. (Goal INR 2.0-3.0)    Recent labs: (last 7 days)     06/05/23  0000   INR 2.8         Previous INR was Therapeutic    Medication, diet, health changes since last INR:chart reviewed; none identified    Contacted within the last 12 weeks by phone on 4/24/23      VINI Alford was NOT contacted regarding therapeutic result today per home monitoring policy manage by exception agreement.   Current warfarin dose is to be continued:     Summary  As of 6/5/2023    Full warfarin instructions:  7.5 mg every day   Next INR check:  6/26/2023           ?   Dayami Mcfarland, RN  Anticoagulation Clinic  6/5/2023    _______________________________________________________________________     Anticoagulation Episode Summary     Current INR goal:  2.0-3.0   TTR:  80.6 % (1 y)   Target end date:  Indefinite   Send INR reminders to:  [facility]IT[facility]    Indications    Acute pulmonary embolism  unspecified pulmonary embolism type  unspecified whether acute cor pulmonale present (H) [I26.99]  Factor 5 Leiden mutation  heterozygous (H) [D68.51]  Other acute pulmonary embolism without acute cor pulmonale (H) (Resolved) [I26.99]  Acute pulmonary embolism  unspecified pulmonary embolism type  unspecified whether acute cor pulmonale present (H) (Resolved) [I26.99]  Antiphospholipid syndrome (H) [D68.61]  Long term current use of anticoagulant therapy [Z79.01]  Acute pulmonary embolism without acute cor pulmonale  unspecified pulmonary embolism type (H) [I26.99]           Comments:  [employer] home meter// manage by exception// Antiphospholipid antibody syndrome (may need CF10 if INR elevated with no known reason)         Anticoagulation Care Providers     Provider Role Specialty Phone number    Jesse Farnsworth MD Referring Family Medicine [phone]    Eboni, Yessy Cassidy APRN CNP Referring  Family Medicine [phone]    Debi Carrasco MD Referring Cardiovascular Disease [phone]

## 2023-06-19 LAB — INR HOME MONITORING: 3.8 (ref 2–3)

## 2023-06-20 ENCOUNTER — ANTICOAGULATION THERAPY VISIT (OUTPATIENT)
Dept: ANTICOAGULATION | Facility: CLINIC | Age: 28
End: 2023-06-20
Payer: COMMERCIAL

## 2023-06-20 DIAGNOSIS — I26.99 ACUTE PULMONARY EMBOLISM, UNSPECIFIED PULMONARY EMBOLISM TYPE, UNSPECIFIED WHETHER ACUTE COR PULMONALE PRESENT (H): Primary | ICD-10-CM

## 2023-06-20 DIAGNOSIS — Z79.01 LONG TERM CURRENT USE OF ANTICOAGULANT THERAPY: ICD-10-CM

## 2023-06-20 DIAGNOSIS — D68.61 ANTIPHOSPHOLIPID SYNDROME (H): ICD-10-CM

## 2023-06-20 DIAGNOSIS — I26.99 ACUTE PULMONARY EMBOLISM WITHOUT ACUTE COR PULMONALE, UNSPECIFIED PULMONARY EMBOLISM TYPE (H): ICD-10-CM

## 2023-06-20 DIAGNOSIS — D68.51 FACTOR 5 LEIDEN MUTATION, HETEROZYGOUS (H): ICD-10-CM

## 2023-06-20 NOTE — PROGRESS NOTES
ANTICOAGULATION MANAGEMENT     Rocío Catherine 27 year old female is on warfarin with supratherapeutic INR result. (Goal INR 2.0-3.0)    Recent labs: (last 7 days)     06/19/23  0000   INR 3.8*       ASSESSMENT       Source(s): Chart Review and Patient/Caregiver Call       Warfarin doses taken: Warfarin taken as instructed    Diet: Decreased greens/vitamin K in diet; plans to resume previous intake, one or two drinks of alcohol    Medication/supplement changes: None noted    New illness, injury, or hospitalization: Yes: vomiting and diarrhea for 2 days    Signs or symptoms of bleeding or clotting: No    Previous result: Therapeutic last 2(+) visits    Additional findings: None         PLAN     Recommended plan for temporary change(s) affecting INR     Dosing Instructions: patient held a partical dose last night, will continue maintenace with next INR in 2 weeks       Summary  As of 6/20/2023    Full warfarin instructions:  7.5 mg every day   Next INR check:  7/3/2023             Telephone call with Rocío who verbalizes understanding and agrees to plan    Patient to recheck with home meter    Education provided:     None required    Plan made per [facility] anticoagulation protocol    Shelby Valero, RN  Anticoagulation Clinic  6/20/2023    _______________________________________________________________________     Anticoagulation Episode Summary     Current INR goal:  2.0-3.0   TTR:  80.6 % (1 y)   Target end date:  Indefinite   Send INR reminders to:  [facility] HOME MONITORING    Indications    Acute pulmonary embolism  unspecified pulmonary embolism type  unspecified whether acute cor pulmonale present (H) [I26.99]  Factor 5 Leiden mutation  heterozygous (H) [D68.51]  Other acute pulmonary embolism without acute cor pulmonale (H) (Resolved) [I26.99]  Acute pulmonary embolism  unspecified pulmonary embolism type  unspecified whether acute cor pulmonale present (H) (Resolved) [I26.99]  Antiphospholipid syndrome (H)  [D68.61]  Long term current use of anticoagulant therapy [Z79.01]  Acute pulmonary embolism without acute cor pulmonale  unspecified pulmonary embolism type (H) [I26.99]           Comments:  [employer] home meter// manage by exception// Antiphospholipid antibody syndrome (may need CF10 if INR elevated with no known reason)         Anticoagulation Care Providers     Provider Role Specialty Phone number    Jesse Farnsworth MD Referring Family Medicine [phone]    Yessy Lyn APRN [facility] Referring Family Medicine [phone]    Debi Carrasco MD Referring Cardiovascular Disease [phone]

## 2023-07-05 ENCOUNTER — LAB (OUTPATIENT)
Dept: LAB | Facility: CLINIC | Age: 28
End: 2023-07-05
Payer: COMMERCIAL

## 2023-07-05 ENCOUNTER — ANTICOAGULATION THERAPY VISIT (OUTPATIENT)
Dept: ANTICOAGULATION | Facility: CLINIC | Age: 28
End: 2023-07-05

## 2023-07-05 DIAGNOSIS — D68.51 FACTOR 5 LEIDEN MUTATION, HETEROZYGOUS (H): ICD-10-CM

## 2023-07-05 DIAGNOSIS — D68.61 ANTIPHOSPHOLIPID SYNDROME (H): ICD-10-CM

## 2023-07-05 DIAGNOSIS — I26.99 ACUTE PULMONARY EMBOLISM WITHOUT ACUTE COR PULMONALE, UNSPECIFIED PULMONARY EMBOLISM TYPE (H): ICD-10-CM

## 2023-07-05 DIAGNOSIS — I26.99 ACUTE PULMONARY EMBOLISM, UNSPECIFIED PULMONARY EMBOLISM TYPE, UNSPECIFIED WHETHER ACUTE COR PULMONALE PRESENT (H): Primary | ICD-10-CM

## 2023-07-05 DIAGNOSIS — Z79.01 LONG TERM CURRENT USE OF ANTICOAGULANT THERAPY: ICD-10-CM

## 2023-07-05 DIAGNOSIS — Z11.59 NEED FOR HEPATITIS C SCREENING TEST: Primary | ICD-10-CM

## 2023-07-05 LAB — INR BLD: 3.8 (ref 0.9–1.1)

## 2023-07-05 PROCEDURE — 85610 PROTHROMBIN TIME: CPT | Performed by: INTERNAL MEDICINE

## 2023-07-05 PROCEDURE — 36416 COLLJ CAPILLARY BLOOD SPEC: CPT | Performed by: INTERNAL MEDICINE

## 2023-07-05 NOTE — PROGRESS NOTES
ANTICOAGULATION MANAGEMENT     Rocío Catherine 27 year old female is on warfarin with supratherapeutic INR result. (Goal INR 2.0-3.0)    Recent labs: (last 7 days)     07/05/23  1200   INR 3.8*       ASSESSMENT       Source(s): Chart Review and Patient/Caregiver Call       Warfarin doses taken: Warfarin taken as instructed    Diet: Change in alcohol intake may be affecting INR. few drinking over the weekend     Medication/supplement changes: generic sudafed this morning    New illness, injury, or hospitalization: No    Signs or symptoms of bleeding or clotting: No    Previous result: Supratherapeutic    Additional findings: None         PLAN     Recommended plan for no diet, medication or health factor changes affecting INR     Dosing Instructions: decrease your warfarin dose (9.5% change) with next INR in 2 weeks       Summary  As of 7/5/2023    Full warfarin instructions:  5 mg every Wed, Sat; 7.5 mg all other days   Next INR check:  7/19/2023             Sent [employer] message with dosing and follow up instructions    Patient to recheck with home meter    Education provided:     None required    Plan made per [facility] anticoagulation protocol    Shelby Valero, RN  Anticoagulation Clinic  7/5/2023    _______________________________________________________________________     Anticoagulation Episode Summary     Current INR goal:  2.0-3.0   TTR:  77.6 % (1 y)   Target end date:  Indefinite   Send INR reminders to:  [facility] HOME MONITORING    Indications    Acute pulmonary embolism  unspecified pulmonary embolism type  unspecified whether acute cor pulmonale present (H) [I26.99]  Factor 5 Leiden mutation  heterozygous (H) [D68.51]  Other acute pulmonary embolism without acute cor pulmonale (H) (Resolved) [I26.99]  Acute pulmonary embolism  unspecified pulmonary embolism type  unspecified whether acute cor pulmonale present (H) (Resolved) [I26.99]  Antiphospholipid syndrome (H) [D68.61]  Long term current use of  anticoagulant therapy [Z79.01]  Acute pulmonary embolism without acute cor pulmonale  unspecified pulmonary embolism type (H) [I26.99]           Comments:  [employer] home meter// manage by exception// Antiphospholipid antibody syndrome (may need CF10 if INR elevated with no known reason)         Anticoagulation Care Providers     Provider Role Specialty Phone number    Jesse Farnsworth MD Referring Family Medicine [phone]    Yessy Lyn APRN [facility] Referring Family Medicine [phone]    Debi Carrasco MD Referring Cardiovascular Disease [phone]

## 2023-07-12 DIAGNOSIS — I26.99 ACUTE PULMONARY EMBOLISM (H): ICD-10-CM

## 2023-07-12 RX ORDER — WARFARIN SODIUM 5 MG/1
TABLET ORAL
Qty: 140 TABLET | Refills: 1 | Status: SHIPPED | OUTPATIENT
Start: 2023-07-12 | End: 2024-01-10

## 2023-07-12 NOTE — TELEPHONE ENCOUNTER
ANTICOAGULATION MANAGEMENT:  Medication Refill    Anticoagulation Summary  As of 7/5/2023    Warfarin maintenance plan:  5 mg (5 mg x 1) every Wed, Sat; 7.5 mg (5 mg x 1.5) all other days   Next INR check:  7/19/2023   Target end date:  Indefinite    Indications    Acute pulmonary embolism  unspecified pulmonary embolism type  unspecified whether acute cor pulmonale present (H) [I26.99]  Factor 5 Leiden mutation  heterozygous (H) [D68.51]  Other acute pulmonary embolism without acute cor pulmonale (H) (Resolved) [I26.99]  Acute pulmonary embolism  unspecified pulmonary embolism type  unspecified whether acute cor pulmonale present (H) (Resolved) [I26.99]  Antiphospholipid syndrome (H) [D68.61]  Long term current use of anticoagulant therapy [Z79.01]  Acute pulmonary embolism without acute cor pulmonale  unspecified pulmonary embolism type (H) [I26.99]             Anticoagulation Care Providers     Provider Role Specialty Phone number    Jesse Farnsworth MD Referring Family Medicine [phone]    Yessy Lyn APRN [facility] Referring Family Medicine [phone]    Debi Carrasco MD Referring Cardiovascular Disease [phone]          Visit with referring provider/group within last year: Yes    [facility] referral signed last signed: 11/10/2022; within last year: Yes    Rocío meets all criteria for refill (current [facility] referral, office visit with referring provider/group in last year, lab monitoring up to date or not exceeding 2 weeks overdue). Rx instructions and quantity supplied updated to match patient's current dosing plan. Warfarin 90 day supply with 1 refill granted per [facility] protocol     Dayami Mcfarland, RN  Anticoagulation Clinic

## 2023-07-12 NOTE — TELEPHONE ENCOUNTER
Pending Prescriptions:                       Disp   Refills    warfarin ANTICOAGULANT ([facility] ANTICOAG*140 ta*1            Sig: Take 7.5 mg every day or As directed by           Anticoagulation clinic

## 2023-07-17 ENCOUNTER — ANTICOAGULATION THERAPY VISIT (OUTPATIENT)
Dept: ANTICOAGULATION | Facility: CLINIC | Age: 28
End: 2023-07-17

## 2023-07-17 ENCOUNTER — LAB (OUTPATIENT)
Dept: LAB | Facility: CLINIC | Age: 28
End: 2023-07-17
Payer: COMMERCIAL

## 2023-07-17 DIAGNOSIS — D68.51 FACTOR 5 LEIDEN MUTATION, HETEROZYGOUS (H): ICD-10-CM

## 2023-07-17 DIAGNOSIS — Z79.01 LONG TERM CURRENT USE OF ANTICOAGULANT THERAPY: ICD-10-CM

## 2023-07-17 DIAGNOSIS — D68.61 ANTIPHOSPHOLIPID SYNDROME (H): ICD-10-CM

## 2023-07-17 DIAGNOSIS — I26.99 ACUTE PULMONARY EMBOLISM, UNSPECIFIED PULMONARY EMBOLISM TYPE, UNSPECIFIED WHETHER ACUTE COR PULMONALE PRESENT (H): Primary | ICD-10-CM

## 2023-07-17 DIAGNOSIS — I26.99 ACUTE PULMONARY EMBOLISM WITHOUT ACUTE COR PULMONALE, UNSPECIFIED PULMONARY EMBOLISM TYPE (H): ICD-10-CM

## 2023-07-17 LAB — INR BLD: 2.9 (ref 0.9–1.1)

## 2023-07-17 PROCEDURE — 85610 PROTHROMBIN TIME: CPT | Performed by: INTERNAL MEDICINE

## 2023-07-17 PROCEDURE — 36416 COLLJ CAPILLARY BLOOD SPEC: CPT | Performed by: INTERNAL MEDICINE

## 2023-07-17 NOTE — PROGRESS NOTES
ANTICOAGULATION MANAGEMENT     Rocío Catherine 27 year old female is on warfarin with therapeutic INR result. (Goal INR 2.0-3.0)    Recent labs: (last 7 days)     07/17/23  1601   INR 2.9*       ASSESSMENT       Source(s): Chart Review    Previous INR was Supratherapeutic    Medication, diet, health changes since last INR chart reviewed; none identified             PLAN     Recommended plan for no diet, medication or health factor changes affecting INR     Dosing Instructions: Continue your current warfarin dose with next INR in 2 weeks       Summary  As of 7/17/2023    Full warfarin instructions:  5 mg every Wed, Sat; 7.5 mg all other days   Next INR check:  7/31/2023             Detailed voice message left for Rocío with dosing instructions and follow up date.   Sent [employer] message with dosing and follow up instructions    Patient to recheck with home meter    Education provided:     Please call back if any changes to your diet, medications or how you've been taking warfarin    Resume manage by exception with home monitor. Continue to submit INR results to home monitor company.You will only be called when your result is out of range. Please call and notify [facility] if new medication started, dose missed, signs or symptoms of bleeding or clotting, or a surgery/procedure is scheduled.    Contact [phone]  with any changes, questions or concerns.     Plan made per [facility] anticoagulation protocol    Shelby Valero RN  Anticoagulation Clinic  7/17/2023    _______________________________________________________________________     Anticoagulation Episode Summary     Current INR goal:  2.0-3.0   TTR:  74.7 % (1 y)   Target end date:  Indefinite   Send INR reminders to:  [facility] HOME MONITORING    Indications    Acute pulmonary embolism  unspecified pulmonary embolism type  unspecified whether acute cor pulmonale present (H) [I26.99]  Factor 5 Leiden mutation  heterozygous (H) [D68.51]  Other acute pulmonary embolism  without acute cor pulmonale (H) (Resolved) [I26.99]  Acute pulmonary embolism  unspecified pulmonary embolism type  unspecified whether acute cor pulmonale present (H) (Resolved) [I26.99]  Antiphospholipid syndrome (H) [D68.61]  Long term current use of anticoagulant therapy [Z79.01]  Acute pulmonary embolism without acute cor pulmonale  unspecified pulmonary embolism type (H) [I26.99]           Comments:  [employer] home meter// manage by exception// Antiphospholipid antibody syndrome (may need CF10 if INR elevated with no known reason)         Anticoagulation Care Providers     Provider Role Specialty Phone number    Jesse Farnsworth MD Referring Family Medicine [phone]    Yessy Lyn APRN [facility] Referring Family Medicine [phone]    Debi Carrasco MD Referring Cardiovascular Disease [phone]

## 2023-07-31 ENCOUNTER — ANTICOAGULATION THERAPY VISIT (OUTPATIENT)
Dept: ANTICOAGULATION | Facility: CLINIC | Age: 28
End: 2023-07-31
Payer: COMMERCIAL

## 2023-07-31 ENCOUNTER — TRANSCRIBE ORDERS (OUTPATIENT)
Dept: ANTICOAGULATION | Facility: CLINIC | Age: 28
End: 2023-07-31
Payer: COMMERCIAL

## 2023-07-31 DIAGNOSIS — Z79.01 LONG TERM CURRENT USE OF ANTICOAGULANT THERAPY: ICD-10-CM

## 2023-07-31 DIAGNOSIS — I26.99 ACUTE PULMONARY EMBOLISM WITHOUT ACUTE COR PULMONALE, UNSPECIFIED PULMONARY EMBOLISM TYPE (H): ICD-10-CM

## 2023-07-31 DIAGNOSIS — D68.61 ANTIPHOSPHOLIPID SYNDROME (H): ICD-10-CM

## 2023-07-31 DIAGNOSIS — D68.51 FACTOR 5 LEIDEN MUTATION, HETEROZYGOUS (H): ICD-10-CM

## 2023-07-31 DIAGNOSIS — I26.99 ACUTE PULMONARY EMBOLISM, UNSPECIFIED PULMONARY EMBOLISM TYPE, UNSPECIFIED WHETHER ACUTE COR PULMONALE PRESENT (H): Primary | ICD-10-CM

## 2023-07-31 LAB — INR HOME MONITORING: 2.8 (ref 2–3)

## 2023-07-31 NOTE — PROGRESS NOTES
ANTICOAGULATION  MANAGEMENT-Home Monitor Managed by Exception    Rocío Catherine 27 year old female is on warfarin with therapeutic INR result. (Goal INR 2.0-3.0)    Recent labs: (last 7 days)     07/31/23  0000   INR 2.8       Previous INR was Therapeutic  Medication, diet, health changes since last INR:chart reviewed; none identified  Contacted within the last 12 weeks by phone on 7/5/2023      VINI Alford was NOT contacted regarding therapeutic result today per home monitoring policy manage by exception agreement.   Current warfarin dose is to be continued:     Summary  As of 7/31/2023      Full warfarin instructions:  5 mg every Wed, Sat; 7.5 mg all other days   Next INR check:               ?   Crystal Rodriguez RN  Anticoagulation Clinic  7/31/2023    _______________________________________________________________________     Anticoagulation Episode Summary       Current INR goal:  2.0-3.0   TTR:  74.6 % (1 y)   Target end date:  Indefinite   Send INR reminders to:  [facility]    Indications    Acute pulmonary embolism  unspecified pulmonary embolism type  unspecified whether acute cor pulmonale present (H) [I26.99]  Factor 5 Leiden mutation  heterozygous (H) [D68.51]  Other acute pulmonary embolism without acute cor pulmonale (H) (Resolved) [I26.99]  Acute pulmonary embolism  unspecified pulmonary embolism type  unspecified whether acute cor pulmonale present (H) (Resolved) [I26.99]  Antiphospholipid syndrome (H) [D68.61]  Long term current use of anticoagulant therapy [Z79.01]  Acute pulmonary embolism without acute cor pulmonale  unspecified pulmonary embolism type (H) [I26.99]             Comments:  [employer] home meter// manage by exception// Antiphospholipid antibody syndrome (may need CF10 if INR elevated with no known reason)             Anticoagulation Care Providers       Provider Role Specialty Phone number    Jesse Farnsworth MD Referring Family Medicine [phone]    Eboni,  Yessy Cassidy, [facility] Referring Family Medicine [phone]    Debi Carrasco MD Referring Cardiovascular Disease [phone]

## 2023-08-04 ENCOUNTER — E-VISIT (OUTPATIENT)
Dept: URGENT CARE | Facility: CLINIC | Age: 28
End: 2023-08-04
Payer: COMMERCIAL

## 2023-08-04 ENCOUNTER — E-VISIT (OUTPATIENT)
Dept: URGENT CARE | Facility: CLINIC | Age: 28
End: 2023-08-04

## 2023-08-04 ENCOUNTER — MYC MEDICAL ADVICE (OUTPATIENT)
Dept: FAMILY MEDICINE | Facility: CLINIC | Age: 28
End: 2023-08-04

## 2023-08-04 DIAGNOSIS — J06.9 VIRAL URI: Primary | ICD-10-CM

## 2023-08-04 DIAGNOSIS — J01.90 ACUTE SINUSITIS, RECURRENCE NOT SPECIFIED, UNSPECIFIED LOCATION: Primary | ICD-10-CM

## 2023-08-04 PROCEDURE — 99207 PR NON-BILLABLE SERV PER CHARTING: CPT | Performed by: PREVENTIVE MEDICINE

## 2023-08-04 RX ORDER — IPRATROPIUM BROMIDE 42 UG/1
2 SPRAY, METERED NASAL 4 TIMES DAILY
Qty: 15 ML | Refills: 0 | Status: SHIPPED | OUTPATIENT
Start: 2023-08-04

## 2023-08-04 NOTE — PATIENT INSTRUCTIONS
You may want to try a nasal lavage (also known as nasal irrigation). You can find over-the-counter products, such as Neti-Pot, at retail locations or make your own at home. Instructions for homemade nasal lavage and more information on the process are available online at http://www.aafp.org/afp/[email or web address].    Dear Rocío RECINOS Long    After reviewing your responses, I've been able to diagnose you with Acute sinusitis, recurrence not specified, unspecified location.      Based on your responses and diagnosis, I have prescribed No orders of the defined types were placed in this encounter.   to treat your symptoms. I have sent this to your pharmacy.?     It is also important to stay well hydrated, get lots of rest and take over-the-counter decongestants,?tylenol?or ibuprofen if you?are able to?take those medications per your primary care provider to help relieve discomfort.?     It is important that you take?all of?your prescribed medication even if your symptoms are improving after a few doses.? Taking?all of?your medicine helps prevent the symptoms from returning.?     If your symptoms worsen, you develop severe headache, vomiting, high fever (>102), or are not improving in 7 days, please contact your primary care provider for an appointment or visit any of our convenient Walk-in Care or Urgent Care Centers to be seen which can be found on our website?here.?     Thanks again for choosing?us?as your health care partner,?   ?  Willy Davis MD, MD?

## 2023-08-04 NOTE — PATIENT INSTRUCTIONS
You may want to try a nasal lavage (also known as nasal irrigation). You can find over-the-counter products, such as Neti-Pot, at retail locations or make your own at home. Instructions for homemade nasal lavage and more information on the process are available online at http://www.aafp.org/afp/[email or web address]/[email or web address].    2

## 2023-08-14 ENCOUNTER — ANTICOAGULATION THERAPY VISIT (OUTPATIENT)
Dept: ANTICOAGULATION | Facility: CLINIC | Age: 28
End: 2023-08-14
Payer: COMMERCIAL

## 2023-08-14 DIAGNOSIS — I26.99 ACUTE PULMONARY EMBOLISM WITHOUT ACUTE COR PULMONALE, UNSPECIFIED PULMONARY EMBOLISM TYPE (H): ICD-10-CM

## 2023-08-14 DIAGNOSIS — I26.99 ACUTE PULMONARY EMBOLISM, UNSPECIFIED PULMONARY EMBOLISM TYPE, UNSPECIFIED WHETHER ACUTE COR PULMONALE PRESENT (H): Primary | ICD-10-CM

## 2023-08-14 DIAGNOSIS — D68.61 ANTIPHOSPHOLIPID SYNDROME (H): ICD-10-CM

## 2023-08-14 DIAGNOSIS — Z79.01 LONG TERM CURRENT USE OF ANTICOAGULANT THERAPY: ICD-10-CM

## 2023-08-14 DIAGNOSIS — D68.51 FACTOR 5 LEIDEN MUTATION, HETEROZYGOUS (H): ICD-10-CM

## 2023-08-14 LAB — INR HOME MONITORING: 1.6 (ref 2–3)

## 2023-08-14 NOTE — PROGRESS NOTES
ANTICOAGULATION MANAGEMENT     Rocío Catherine 27 year old female is on warfarin with subtherapeutic INR result. (Goal INR 2.0-3.0)    Recent labs: (last 7 days)     08/14/23  0000   INR 1.6*       ASSESSMENT     Source(s): Chart Review and Patient/Caregiver Call     Warfarin doses taken: Warfarin taken as instructed  Diet: Increased greens/vitamin K in diet; plans to resume previous intake  Medication/supplement changes:  finished augmentin 3 days ago for sinusitis . Was also taking cranberry gummies while on augmentin  New illness, injury, or hospitalization: Yes: sinusitis which is better now  Signs or symptoms of bleeding or clotting: No  Previous result: Therapeutic last 2(+) visits  Additional findings: None       PLAN     Recommended plan for temporary change(s) affecting INR     Dosing Instructions: booster dose then continue your current warfarin dose with next INR in 1 week       Summary  As of 8/14/2023      Full warfarin instructions:  8/14: 12.5 mg; Otherwise 5 mg every Wed, Sat; 7.5 mg all other days   Next INR check:  8/21/2023               Telephone call with Rocío who verbalizes understanding and agrees to plan    Patient to recheck with home meter    Education provided:   Please call back if any changes to your diet, medications or how you've been taking warfarin  Dietary considerations: importance of consistent vitamin K intake  Contact [phone]  with any changes, questions or concerns.     Plan made per [facility] anticoagulation protocol    Shelby Samaniego RN  Anticoagulation Clinic  8/14/2023    _______________________________________________________________________     Anticoagulation Episode Summary       Current INR goal:  2.0-3.0   TTR:  73.4 % (1 y)   Target end date:  Indefinite   Send INR reminders to:  [facility]    Indications    Acute pulmonary embolism  unspecified pulmonary embolism type  unspecified whether acute cor pulmonale present (H) [I26.99]  Factor 5 Leiden  mutation  heterozygous (H) [D68.51]  Other acute pulmonary embolism without acute cor pulmonale (H) (Resolved) [I26.99]  Acute pulmonary embolism  unspecified pulmonary embolism type  unspecified whether acute cor pulmonale present (H) (Resolved) [I26.99]  Antiphospholipid syndrome (H) [D68.61]  Long term current use of anticoagulant therapy [Z79.01]  Acute pulmonary embolism without acute cor pulmonale  unspecified pulmonary embolism type (H) [I26.99]             Comments:  [employer] home meter// manage by exception// Antiphospholipid antibody syndrome (may need CF10 if INR elevated with no known reason)             Anticoagulation Care Providers       Provider Role Specialty Phone number    Jesse Farnsworth MD Referring Family Medicine [phone]    Yessy Lyn APRN [facility] Referring Family Medicine [phone]    Debi Carrasco MD Referring Cardiovascular Disease [phone]

## 2023-08-21 ENCOUNTER — ANTICOAGULATION THERAPY VISIT (OUTPATIENT)
Dept: ANTICOAGULATION | Facility: CLINIC | Age: 28
End: 2023-08-21
Payer: COMMERCIAL

## 2023-08-21 DIAGNOSIS — D68.51 FACTOR 5 LEIDEN MUTATION, HETEROZYGOUS (H): ICD-10-CM

## 2023-08-21 DIAGNOSIS — D68.61 ANTIPHOSPHOLIPID SYNDROME (H): ICD-10-CM

## 2023-08-21 DIAGNOSIS — I26.99 ACUTE PULMONARY EMBOLISM, UNSPECIFIED PULMONARY EMBOLISM TYPE, UNSPECIFIED WHETHER ACUTE COR PULMONALE PRESENT (H): Primary | ICD-10-CM

## 2023-08-21 DIAGNOSIS — I26.99 ACUTE PULMONARY EMBOLISM WITHOUT ACUTE COR PULMONALE, UNSPECIFIED PULMONARY EMBOLISM TYPE (H): ICD-10-CM

## 2023-08-21 DIAGNOSIS — Z79.01 LONG TERM CURRENT USE OF ANTICOAGULANT THERAPY: ICD-10-CM

## 2023-08-21 LAB — INR HOME MONITORING: 2.1 (ref 2–3)

## 2023-08-21 NOTE — PROGRESS NOTES
ANTICOAGULATION MANAGEMENT     Rocío Catherine 27 year old female is on warfarin with therapeutic INR result. (Goal INR 2.0-3.0)    Recent labs: (last 7 days)     08/21/23  0000   INR 2.1       ASSESSMENT     Source(s): Chart Review  Previous INR was Subtherapeutic  Medication, diet, health changes since last INR chart reviewed; none identified    MyChart sent      PLAN     Recommended plan for no diet, medication or health factor changes affecting INR     Dosing Instructions: Continue your current warfarin dose with next INR in 1 week       Summary  As of 8/21/2023      Full warfarin instructions:  5 mg every Wed, Sat; 7.5 mg all other days   Next INR check:  8/28/2023               Sent [employer] message with dosing and follow up instructions    Patient to recheck with home meter    Education provided:   Please call back if any changes to your diet, medications or how you've been taking warfarin    Plan made per [facility] anticoagulation protocol    Eileen Rojas, RN  Anticoagulation Clinic  8/21/2023    _______________________________________________________________________     Anticoagulation Episode Summary       Current INR goal:  2.0-3.0   TTR:  72.3 % (1 y)   Target end date:  Indefinite   Send INR reminders to:  [facility]    Indications    Acute pulmonary embolism  unspecified pulmonary embolism type  unspecified whether acute cor pulmonale present (H) [I26.99]  Factor 5 Leiden mutation  heterozygous (H) [D68.51]  Other acute pulmonary embolism without acute cor pulmonale (H) (Resolved) [I26.99]  Acute pulmonary embolism  unspecified pulmonary embolism type  unspecified whether acute cor pulmonale present (H) (Resolved) [I26.99]  Antiphospholipid syndrome (H) [D68.61]  Long term current use of anticoagulant therapy [Z79.01]  Acute pulmonary embolism without acute cor pulmonale  unspecified pulmonary embolism type (H) [I26.99]             Comments:  [employer] home meter// manage by exception//  Antiphospholipid antibody syndrome (may need CF10 if INR elevated with no known reason)             Anticoagulation Care Providers       Provider Role Specialty Phone number    Jesse Farnsworth MD Referring Family Medicine [phone]    Yessy Lyn APRN [facility] Referring Family Medicine [phone]    Debi Carrasco MD Referring Cardiovascular Disease [phone]

## 2023-08-31 NOTE — ED NOTES
Pt arrives following ongoing chest pain. Hx of factor 5, with a hx of blood clots as well. Had INR check on Monday and was subtherapeutic. Was due to start bridging lovenox tonight and has not yet. NSR on monitor no ectopy. Pt is alert, non smoker, new baby girl at home, increased stress past few weeks. But chest pain has been there unchanged despite mvmt, activity or diet. Patient placed on EKG monitor, pulse oximeter and blood pressure cuff.   Hydroxyzine Counseling: Patient advised that the medication is sedating and not to drive a car after taking this medication.  Patient informed of potential adverse effects including but not limited to dry mouth, urinary retention, and blurry vision.  The patient verbalized understanding of the proper use and possible adverse effects of hydroxyzine.  All of the patient's questions and concerns were addressed.

## 2023-09-11 ENCOUNTER — ANTICOAGULATION THERAPY VISIT (OUTPATIENT)
Dept: ANTICOAGULATION | Facility: CLINIC | Age: 28
End: 2023-09-11
Payer: COMMERCIAL

## 2023-09-11 DIAGNOSIS — I26.99 ACUTE PULMONARY EMBOLISM, UNSPECIFIED PULMONARY EMBOLISM TYPE, UNSPECIFIED WHETHER ACUTE COR PULMONALE PRESENT (H): Primary | ICD-10-CM

## 2023-09-11 DIAGNOSIS — I26.99 ACUTE PULMONARY EMBOLISM WITHOUT ACUTE COR PULMONALE, UNSPECIFIED PULMONARY EMBOLISM TYPE (H): ICD-10-CM

## 2023-09-11 DIAGNOSIS — D68.61 ANTIPHOSPHOLIPID SYNDROME (H): ICD-10-CM

## 2023-09-11 DIAGNOSIS — D68.51 FACTOR 5 LEIDEN MUTATION, HETEROZYGOUS (H): ICD-10-CM

## 2023-09-11 DIAGNOSIS — Z79.01 LONG TERM CURRENT USE OF ANTICOAGULANT THERAPY: ICD-10-CM

## 2023-09-11 LAB — INR HOME MONITORING: 2.1 (ref 2–3)

## 2023-09-11 NOTE — PROGRESS NOTES
ANTICOAGULATION MANAGEMENT     Rocío Catherine 27 year old female is on warfarin with therapeutic INR result. (Goal INR 2.0-3.0)    Recent labs: (last 7 days)     09/11/23  0000   INR 2.1       ASSESSMENT     Source(s): Chart Review  Previous INR was Therapeutic last visit; previously outside of goal range  Medication, diet, health changes since last INR chart reviewed; none identified         PLAN     Recommended plan for no diet, medication or health factor changes affecting INR     Dosing Instructions: Continue your current warfarin dose with next INR in 2 weeks       Summary  As of 9/11/2023      Full warfarin instructions:  5 mg every Wed, Sat; 7.5 mg all other days   Next INR check:  9/25/2023               Sent [employer] message with dosing and follow up instructions    Patient to recheck with home meter    Education provided:   Please call back if any changes to your diet, medications or how you've been taking warfarin    Plan made per [facility] anticoagulation protocol    Mayra Palma RN  Anticoagulation Clinic  9/11/2023    _______________________________________________________________________     Anticoagulation Episode Summary       Current INR goal:  2.0-3.0   TTR:  73.8 % (1 y)   Target end date:  Indefinite   Send INR reminders to:  [facility]    Indications    Acute pulmonary embolism  unspecified pulmonary embolism type  unspecified whether acute cor pulmonale present (H) [I26.99]  Factor 5 Leiden mutation  heterozygous (H) [D68.51]  Other acute pulmonary embolism without acute cor pulmonale (H) (Resolved) [I26.99]  Acute pulmonary embolism  unspecified pulmonary embolism type  unspecified whether acute cor pulmonale present (H) (Resolved) [I26.99]  Antiphospholipid syndrome (H) [D68.61]  Long term current use of anticoagulant therapy [Z79.01]  Acute pulmonary embolism without acute cor pulmonale  unspecified pulmonary embolism type (H) [I26.99]             Comments:  [employer] home meter//  manage by exception// Antiphospholipid antibody syndrome (may need CF10 if INR elevated with no known reason)             Anticoagulation Care Providers       Provider Role Specialty Phone number    Jesse Farnsworth MD Referring Family Medicine [phone]    Yessy Lyn APRN [facility] Referring Family Medicine [phone]    Debi Carrasco MD Referring Cardiovascular Disease [phone]

## 2023-09-13 ENCOUNTER — OFFICE VISIT (OUTPATIENT)
Dept: FAMILY MEDICINE | Facility: CLINIC | Age: 28
End: 2023-09-13
Payer: COMMERCIAL

## 2023-09-13 VITALS
TEMPERATURE: 98.6 F | SYSTOLIC BLOOD PRESSURE: 120 MMHG | WEIGHT: 109 LBS | OXYGEN SATURATION: 98 % | DIASTOLIC BLOOD PRESSURE: 86 MMHG | HEIGHT: 61 IN | RESPIRATION RATE: 16 BRPM | BODY MASS INDEX: 20.58 KG/M2 | HEART RATE: 120 BPM

## 2023-09-13 DIAGNOSIS — Z86.711 HISTORY OF PULMONARY EMBOLISM: Primary | ICD-10-CM

## 2023-09-13 DIAGNOSIS — F41.9 ANXIETY: ICD-10-CM

## 2023-09-13 PROBLEM — I26.99 ACUTE PULMONARY EMBOLISM, UNSPECIFIED PULMONARY EMBOLISM TYPE, UNSPECIFIED WHETHER ACUTE COR PULMONALE PRESENT (H): Status: RESOLVED | Noted: 2020-12-15 | Resolved: 2023-09-13

## 2023-09-13 PROBLEM — R00.2 PALPITATIONS: Status: RESOLVED | Noted: 2019-11-27 | Resolved: 2023-09-13

## 2023-09-13 PROBLEM — I26.99 ACUTE PULMONARY EMBOLISM (H): Status: RESOLVED | Noted: 2020-03-12 | Resolved: 2023-09-13

## 2023-09-13 PROBLEM — I26.99 ACUTE PULMONARY EMBOLISM WITHOUT ACUTE COR PULMONALE, UNSPECIFIED PULMONARY EMBOLISM TYPE (H): Status: RESOLVED | Noted: 2022-11-10 | Resolved: 2023-09-13

## 2023-09-13 PROCEDURE — 99213 OFFICE O/P EST LOW 20 MIN: CPT | Performed by: FAMILY MEDICINE

## 2023-09-13 ASSESSMENT — PAIN SCALES - GENERAL: PAINLEVEL: NO PAIN (0)

## 2023-09-13 NOTE — PROGRESS NOTES
"S :Rocío Catherine is a 27 year old female with anxiety.  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  Concerning ?    O;/86   Pulse 120   Temp 98.6  F (37  C) (Tympanic)   Resp 16   Ht 1.549 m (5' 1\")   Wt 49.4 kg (109 lb)   LMP 08/20/2023 (Approximate)   SpO2 98%   BMI 20.60 kg/m    GEN: Alert and oriented, in no acute distress  Has small indentation on top of scalp, not tender, no other abnormalities    A: anxiety, ongoing    P: reassured her.  She agreed anxiety got the best of her on this one.  follow up prn.    "

## 2023-09-25 ENCOUNTER — DOCUMENTATION ONLY (OUTPATIENT)
Dept: ANTICOAGULATION | Facility: CLINIC | Age: 28
End: 2023-09-25
Payer: COMMERCIAL

## 2023-09-25 LAB — INR HOME MONITORING: 2.5 (ref 2–3)

## 2023-09-25 NOTE — PROGRESS NOTES
ANTICOAGULATION  MANAGEMENT-Home Monitor Managed by Exception    Rocío Catherine 27 year old female is on warfarin with therapeutic INR result. (Goal INR 2.0-3.0)    Recent labs: (last 7 days)     09/25/23  0000   INR 2.5       Previous INR was Therapeutic  Medication, diet, health changes since last INR:chart reviewed; none identified  Contacted within the last 12 weeks by phone on 9/11/23      VINI Alford was NOT contacted regarding therapeutic result today per home monitoring policy manage by exception agreement.   Current warfarin dose is to be continued:     Summary  As of 9/25/2023      Full warfarin instructions:  5 mg every Wed, Sat; 7.5 mg all other days   Next INR check:  10/9/2023             ?   Amanda Valdez RN  Anticoagulation Clinic  9/25/2023    _______________________________________________________________________     Anticoagulation Episode Summary       Current INR goal:  2.0-3.0   TTR:  73.8 % (1 y)   Target end date:  Indefinite   Send INR reminders to:  [facility]    Indications    Acute pulmonary embolism  unspecified pulmonary embolism type  unspecified whether acute cor pulmonale present (H) (Resolved) [I26.99]  Factor 5 Leiden mutation  heterozygous (H) [D68.51]  Other acute pulmonary embolism without acute cor pulmonale (H) (Resolved) [I26.99]  Acute pulmonary embolism  unspecified pulmonary embolism type  unspecified whether acute cor pulmonale present (H) (Resolved) [I26.99]  Antiphospholipid syndrome (H) [D68.61]  Long term current use of anticoagulant therapy [Z79.01]  Acute pulmonary embolism without acute cor pulmonale  unspecified pulmonary embolism type (H) (Resolved) [I26.99]             Comments:  [employer] home meter// manage by exception// Antiphospholipid antibody syndrome (may need CF10 if INR elevated with no known reason)             Anticoagulation Care Providers       Provider Role Specialty Phone number    Jesse Farnsworth MD Referring Family  Medicine [phone]    Yessy Lyn APRN CNP Referring Family Medicine [phone]    Debi Carrasco MD Referring Cardiovascular Disease [phone]

## 2023-10-09 ENCOUNTER — ANTICOAGULATION THERAPY VISIT (OUTPATIENT)
Dept: ANTICOAGULATION | Facility: CLINIC | Age: 28
End: 2023-10-09
Payer: COMMERCIAL

## 2023-10-09 DIAGNOSIS — Z79.01 LONG TERM CURRENT USE OF ANTICOAGULANT THERAPY: ICD-10-CM

## 2023-10-09 DIAGNOSIS — D68.51 FACTOR 5 LEIDEN MUTATION, HETEROZYGOUS (H): Primary | ICD-10-CM

## 2023-10-09 DIAGNOSIS — D68.61 ANTIPHOSPHOLIPID SYNDROME (H): ICD-10-CM

## 2023-10-09 LAB — INR HOME MONITORING: 1.9 (ref 2–3)

## 2023-10-09 NOTE — PROGRESS NOTES
ANTICOAGULATION MANAGEMENT     Rocío Catherine 27 year old female is on warfarin with subtherapeutic INR result. (Goal INR 2.0-3.0)    Recent labs: (last 7 days)     10/09/23  0000   INR 1.9*       ASSESSMENT     Source(s): Chart Review and Patient/Caregiver Call     Warfarin doses taken: Missed dose(s) may be affecting INR. Patient reports missing a dose of warfarin on 9/30/23.  Diet: Increased greens/vitamin K in diet; plans to resume previous intake  Medication/supplement changes: None noted  New illness, injury, or hospitalization: No  Signs or symptoms of bleeding or clotting: No  Previous result: Therapeutic last 2(+) visits  Additional findings: None       PLAN     Recommended plan for no diet, medication or health factor changes affecting INR     Dosing Instructions: Continue your current warfarin dose with next INR in 2 weeks       Summary  As of 10/9/2023      Full warfarin instructions:  5 mg every Wed, Sat; 7.5 mg all other days   Next INR check:  10/23/2023               Telephone call with Rocío who verbalizes understanding and agrees to plan    Patient to recheck with home meter    Education provided:   Please call back if any changes to your diet, medications or how you've been taking warfarin  Dietary considerations: impact of vitamin K foods on INR  Symptom monitoring: monitoring for bleeding signs and symptoms, monitoring for clotting signs and symptoms, monitoring for stroke signs and symptoms, and when to seek medical attention/emergency care  Contact [phone]  with any changes, questions or concerns.     Plan made per [facility] anticoagulation protocol    Jo Ann Horner RN  Anticoagulation Clinic  10/9/2023    _______________________________________________________________________     Anticoagulation Episode Summary       Current INR goal:  2.0-3.0   TTR:  73.2 % (1 y)   Target end date:  Indefinite   Send INR reminders to:  [facility]    Indications    Acute pulmonary  embolism  unspecified pulmonary embolism type  unspecified whether acute cor pulmonale present (H) (Resolved) [I26.99]  Factor 5 Leiden mutation  heterozygous (H24) [D68.51]  Other acute pulmonary embolism without acute cor pulmonale (H) (Resolved) [I26.99]  Acute pulmonary embolism  unspecified pulmonary embolism type  unspecified whether acute cor pulmonale present (H) (Resolved) [I26.99]  Antiphospholipid syndrome (H24) [D68.61]  Long term current use of anticoagulant therapy [Z79.01]  Acute pulmonary embolism without acute cor pulmonale  unspecified pulmonary embolism type (H) (Resolved) [I26.99]             Comments:  [employer] home meter// manage by exception// Antiphospholipid antibody syndrome (may need CF10 if INR elevated with no known reason)             Anticoagulation Care Providers       Provider Role Specialty Phone number    Jesse Farnsworth MD Referring Family Medicine [phone]    Yessy Lyn APRN CNP Referring Family Medicine [phone]    Debi Carrasco MD Referring Cardiovascular Disease [phone]

## 2023-10-23 ENCOUNTER — ANTICOAGULATION THERAPY VISIT (OUTPATIENT)
Dept: ANTICOAGULATION | Facility: CLINIC | Age: 28
End: 2023-10-23
Payer: COMMERCIAL

## 2023-10-23 ENCOUNTER — DOCUMENTATION ONLY (OUTPATIENT)
Dept: ANTICOAGULATION | Facility: CLINIC | Age: 28
End: 2023-10-23
Payer: COMMERCIAL

## 2023-10-23 DIAGNOSIS — D68.61 ANTIPHOSPHOLIPID SYNDROME (H): ICD-10-CM

## 2023-10-23 DIAGNOSIS — D68.51 FACTOR 5 LEIDEN MUTATION, HETEROZYGOUS (H): Primary | ICD-10-CM

## 2023-10-23 DIAGNOSIS — Z79.01 LONG TERM CURRENT USE OF ANTICOAGULANT THERAPY: ICD-10-CM

## 2023-10-23 DIAGNOSIS — I26.99 ACUTE PULMONARY EMBOLISM WITHOUT ACUTE COR PULMONALE, UNSPECIFIED PULMONARY EMBOLISM TYPE (H): ICD-10-CM

## 2023-10-23 DIAGNOSIS — I26.99 ACUTE PULMONARY EMBOLISM, UNSPECIFIED PULMONARY EMBOLISM TYPE, UNSPECIFIED WHETHER ACUTE COR PULMONALE PRESENT (H): ICD-10-CM

## 2023-10-23 DIAGNOSIS — F33.40 RECURRENT MAJOR DEPRESSIVE DISORDER, IN REMISSION (H): ICD-10-CM

## 2023-10-23 LAB — INR HOME MONITORING: 2.4 (ref 2–3)

## 2023-10-23 NOTE — PROGRESS NOTES
ANTICOAGULATION CLINIC REFERRAL RENEWAL REQUEST       An annual renewal order is required for all patients referred to [facility]ticoagulation [facility].?  Please review and sign the pended referral order for Rocío M Florin.       ANTICOAGULATION SUMMARY      Warfarin indication(s)   Acute PE, Factor 5 Leiden mutation heterozygous, antiphospholipid syndrome     Mechanical heart valve present?  NO       Current goal range   INR: 2.0-3.0     Goal appropriate for indication? Goal INR 2-3, standard for indication(s) above     Time in Therapeutic Range (TTR)  (Goal > 60%) 74%       Office visit with referring provider's group within last year yes on 11/8/22       Jo Ann Horner RN  [facility]

## 2023-10-23 NOTE — PROGRESS NOTES
ANTICOAGULATION MANAGEMENT     Rocío Catherine 27 year old female is on warfarin with therapeutic INR result. (Goal INR 2.0-3.0)    Recent labs: (last 7 days)     10/23/23  0000   INR 2.4       ASSESSMENT     Source(s): Chart Review and Patient/Caregiver Call     Warfarin doses taken: Warfarin taken as instructed  Diet: No new diet changes identified  Medication/supplement changes: None noted  New illness, injury, or hospitalization: No  Signs or symptoms of bleeding or clotting: No  Previous result: Subtherapeutic  Additional findings: None       PLAN     Recommended plan for no diet, medication or health factor changes affecting INR     Dosing Instructions: Continue your current warfarin dose with next INR in 2 weeks       Summary  As of 10/23/2023      Full warfarin instructions:  5 mg every Wed, Sat; 7.5 mg all other days   Next INR check:  11/6/2023               Telephone call with Rocío who agrees to plan and repeated back plan correctly    Patient to recheck with home meter    Education provided:   Please call back if any changes to your diet, medications or how you've been taking warfarin  Symptom monitoring: monitoring for bleeding signs and symptoms and monitoring for clotting signs and symptoms  Resume manage by exception with home monitor. Continue to submit INR results to home monitor company.You will only be called when your result is out of range. Please call and notify [facility] if new medication started, dose missed, signs or symptoms of bleeding or clotting, or a surgery/procedure is scheduled.  Contact [phone]  with any changes, questions or concerns.     Plan made per [facility] anticoagulation protocol    Jo Ann Horner RN  Anticoagulation Clinic  10/23/2023    _______________________________________________________________________     Anticoagulation Episode Summary       Current INR goal:  2.0-3.0   TTR:  73.8% (1 y)   Target end date:  Indefinite   Send INR reminders to:  [facility]     Indications    Acute pulmonary embolism  unspecified pulmonary embolism type  unspecified whether acute cor pulmonale present (H) (Resolved) [I26.99]  Factor 5 Leiden mutation  heterozygous (H24) [D68.51]  Other acute pulmonary embolism without acute cor pulmonale (H) (Resolved) [I26.99]  Acute pulmonary embolism  unspecified pulmonary embolism type  unspecified whether acute cor pulmonale present (H) (Resolved) [I26.99]  Antiphospholipid syndrome (H24) [D68.61]  Long term current use of anticoagulant therapy [Z79.01]  Acute pulmonary embolism without acute cor pulmonale  unspecified pulmonary embolism type (H) (Resolved) [I26.99]             Comments:  [employer] home meter// manage by exception// Antiphospholipid antibody syndrome (may need CF10 if INR elevated with no known reason)             Anticoagulation Care Providers       Provider Role Specialty Phone number    Jesse Farnsworth MD Referring Family Medicine [phone]    Yessy Lyn APRN [facility] Referring Family Medicine [phone]    Debi Carrasco MD Referring Cardiovascular Disease [phone]

## 2023-10-24 PROBLEM — I26.99 ACUTE PULMONARY EMBOLISM WITHOUT ACUTE COR PULMONALE, UNSPECIFIED PULMONARY EMBOLISM TYPE (H): Status: ACTIVE | Noted: 2023-10-24

## 2023-10-24 PROBLEM — I26.99 ACUTE PULMONARY EMBOLISM, UNSPECIFIED PULMONARY EMBOLISM TYPE, UNSPECIFIED WHETHER ACUTE COR PULMONALE PRESENT (H): Status: ACTIVE | Noted: 2023-10-24

## 2023-11-02 ENCOUNTER — IMMUNIZATION (OUTPATIENT)
Dept: FAMILY MEDICINE | Facility: CLINIC | Age: 28
End: 2023-11-02
Payer: COMMERCIAL

## 2023-11-02 PROCEDURE — 90686 IIV4 VACC NO PRSV 0.5 ML IM: CPT

## 2023-11-02 PROCEDURE — 90471 IMMUNIZATION ADMIN: CPT

## 2023-11-04 NOTE — NURSING NOTE
"Initial BP 99/61 (BP Location: Right arm, Patient Position: Chair, Cuff Size: Adult Regular)   Pulse 103   Temp 97.7  F (36.5  C) (Tympanic)   Resp 16   Ht 1.549 m (5' 1\")   Wt 66.4 kg (146 lb 4.8 oz)   LMP 05/24/2019 (Exact Date)   BMI 27.64 kg/m   Estimated body mass index is 27.64 kg/m  as calculated from the following:    Height as of this encounter: 1.549 m (5' 1\").    Weight as of this encounter: 66.4 kg (146 lb 4.8 oz). .      "
Yes

## 2023-11-06 ENCOUNTER — DOCUMENTATION ONLY (OUTPATIENT)
Dept: ANTICOAGULATION | Facility: CLINIC | Age: 28
End: 2023-11-06
Payer: COMMERCIAL

## 2023-11-06 DIAGNOSIS — D68.51 FACTOR 5 LEIDEN MUTATION, HETEROZYGOUS (H): Primary | ICD-10-CM

## 2023-11-06 DIAGNOSIS — I26.99 ACUTE PULMONARY EMBOLISM, UNSPECIFIED PULMONARY EMBOLISM TYPE, UNSPECIFIED WHETHER ACUTE COR PULMONALE PRESENT (H): ICD-10-CM

## 2023-11-06 DIAGNOSIS — Z79.01 LONG TERM CURRENT USE OF ANTICOAGULANT THERAPY: ICD-10-CM

## 2023-11-06 DIAGNOSIS — D68.61 ANTIPHOSPHOLIPID SYNDROME (H): ICD-10-CM

## 2023-11-06 DIAGNOSIS — I26.99 ACUTE PULMONARY EMBOLISM WITHOUT ACUTE COR PULMONALE, UNSPECIFIED PULMONARY EMBOLISM TYPE (H): ICD-10-CM

## 2023-11-06 LAB — INR HOME MONITORING: 2 (ref 2–3)

## 2023-11-06 NOTE — PROGRESS NOTES
ANTICOAGULATION  MANAGEMENT-Home Monitor Managed by Exception    Rocío Catherine 27 year old female is on warfarin with therapeutic INR result. (Goal INR 2.0-3.0)    Recent labs: (last 7 days)     11/06/23  0000   INR 2.0       Previous INR was Therapeutic  Medication, diet, health changes since last INR:chart reviewed; none identified  Contacted within the last 12 weeks by phone on 10/23/23      VINI     Rocío was NOT contacted regarding therapeutic result today per home monitoring policy manage by exception agreement.   Current warfarin dose is to be continued:     Summary  As of 11/6/2023      Full warfarin instructions:  5 mg every Wed, Sat; 7.5 mg all other days   Next INR check:  11/20/2023             ?   Mayra Palma RN  Anticoagulation Clinic  11/6/2023    _______________________________________________________________________     Anticoagulation Episode Summary       Current INR goal:  2.0-3.0   TTR:  76.9% (1 y)   Target end date:  Indefinite   Send INR reminders to:  [facility]    Indications    Acute pulmonary embolism  unspecified pulmonary embolism type  unspecified whether acute cor pulmonale present (H) (Resolved) [I26.99]  Factor 5 Leiden mutation  heterozygous (H24) [D68.51]  Other acute pulmonary embolism without acute cor pulmonale (H) (Resolved) [I26.99]  Acute pulmonary embolism  unspecified pulmonary embolism type  unspecified whether acute cor pulmonale present (H) (Resolved) [I26.99]  Antiphospholipid syndrome (H24) [D68.61]  Long term current use of anticoagulant therapy [Z79.01]  Acute pulmonary embolism without acute cor pulmonale  unspecified pulmonary embolism type (H) (Resolved) [I26.99]  Acute pulmonary embolism without acute cor pulmonale  unspecified pulmonary embolism type (H) [I26.99]  Acute pulmonary embolism  unspecified pulmonary embolism type  unspecified whether acute cor pulmonale present (H) [I26.99]             Comments:  [employer] home meter// manage by  exception// Antiphospholipid antibody syndrome (may need CF10 if INR elevated with no known reason)             Anticoagulation Care Providers       Provider Role Specialty Phone number    Jesse Farnsworth MD Referring Family Medicine [phone]    Yessy Lyn APRN [facility] Referring Family Medicine [phone]    Debi Carrasco MD Referring Cardiovascular Disease [phone]    Dionicio Liang MD Referring Family Medicine [phone]

## 2023-11-20 ENCOUNTER — ANTICOAGULATION THERAPY VISIT (OUTPATIENT)
Dept: ANTICOAGULATION | Facility: CLINIC | Age: 28
End: 2023-11-20
Payer: COMMERCIAL

## 2023-11-20 DIAGNOSIS — Z79.01 LONG TERM CURRENT USE OF ANTICOAGULANT THERAPY: ICD-10-CM

## 2023-11-20 DIAGNOSIS — I26.99 ACUTE PULMONARY EMBOLISM, UNSPECIFIED PULMONARY EMBOLISM TYPE, UNSPECIFIED WHETHER ACUTE COR PULMONALE PRESENT (H): ICD-10-CM

## 2023-11-20 DIAGNOSIS — I26.99 ACUTE PULMONARY EMBOLISM WITHOUT ACUTE COR PULMONALE, UNSPECIFIED PULMONARY EMBOLISM TYPE (H): ICD-10-CM

## 2023-11-20 DIAGNOSIS — D68.51 FACTOR 5 LEIDEN MUTATION, HETEROZYGOUS (H): Primary | ICD-10-CM

## 2023-11-20 DIAGNOSIS — D68.61 ANTIPHOSPHOLIPID SYNDROME (H): ICD-10-CM

## 2023-11-20 LAB — INR HOME MONITORING: 1.9 (ref 2–3)

## 2023-11-20 NOTE — PROGRESS NOTES
ANTICOAGULATION MANAGEMENT     Rocío Catherine 28 year old female is on warfarin with subtherapeutic INR result. (Goal INR 2.0-3.0)    Recent labs: (last 7 days)     11/20/23  0000   INR 1.9*       ASSESSMENT     Source(s): Chart Review  Previous INR was Therapeutic last 2(+) visits  Medication, diet, health changes since last INR chart reviewed; none identified         PLAN     Recommended plan for no diet, medication or health factor changes affecting INR     Dosing Instructions: Increase your warfarin dose (5.3% change) with next INR in 2 weeks       Summary  As of 11/20/2023      Full warfarin instructions:  5 mg every Sat; 7.5 mg all other days   Next INR check:  12/4/2023               Detailed voice message left for Rocío with dosing instructions and follow up date.     Patient to recheck with home meter    Education provided:   Contact [phone]  with any changes, questions or concerns.     Plan made per [facility] anticoagulation protocol    Sariah Rolle RN  Anticoagulation Clinic  11/20/2023    _______________________________________________________________________     Anticoagulation Episode Summary       Current INR goal:  2.0-3.0   TTR:  74.3% (1 y)   Target end date:  Indefinite   Send INR reminders to:  [facility]    Indications    Acute pulmonary embolism  unspecified pulmonary embolism type  unspecified whether acute cor pulmonale present (H) (Resolved) [I26.99]  Factor 5 Leiden mutation  heterozygous (H24) [D68.51]  Other acute pulmonary embolism without acute cor pulmonale (H) (Resolved) [I26.99]  Acute pulmonary embolism  unspecified pulmonary embolism type  unspecified whether acute cor pulmonale present (H) (Resolved) [I26.99]  Antiphospholipid syndrome (H24) [D68.61]  Long term current use of anticoagulant therapy [Z79.01]  Acute pulmonary embolism without acute cor pulmonale  unspecified pulmonary embolism type (H) (Resolved) [I26.99]  Acute pulmonary embolism without acute cor  pulmonale  unspecified pulmonary embolism type (H) [I26.99]  Acute pulmonary embolism  unspecified pulmonary embolism type  unspecified whether acute cor pulmonale present (H) [I26.99]             Comments:  [employer] home meter// manage by exception// Antiphospholipid antibody syndrome (may need CF10 if INR elevated with no known reason)             Anticoagulation Care Providers       Provider Role Specialty Phone number    Jesse Farnsworth MD Referring Family Medicine [phone]    Yessy Lyn APRN [facility] Referring Family Medicine [phone]    Debi Carrasco MD Referring Cardiovascular Disease [phone]    Dionicio Liang MD Referring Family Medicine [phone]

## 2023-12-04 ENCOUNTER — ANTICOAGULATION THERAPY VISIT (OUTPATIENT)
Dept: ANTICOAGULATION | Facility: CLINIC | Age: 28
End: 2023-12-04
Payer: COMMERCIAL

## 2023-12-04 ENCOUNTER — MYC MEDICAL ADVICE (OUTPATIENT)
Dept: ANTICOAGULATION | Facility: CLINIC | Age: 28
End: 2023-12-04
Payer: COMMERCIAL

## 2023-12-04 DIAGNOSIS — I26.99 ACUTE PULMONARY EMBOLISM WITHOUT ACUTE COR PULMONALE, UNSPECIFIED PULMONARY EMBOLISM TYPE (H): ICD-10-CM

## 2023-12-04 DIAGNOSIS — Z79.01 LONG TERM CURRENT USE OF ANTICOAGULANT THERAPY: ICD-10-CM

## 2023-12-04 DIAGNOSIS — D68.51 FACTOR 5 LEIDEN MUTATION, HETEROZYGOUS (H): Primary | ICD-10-CM

## 2023-12-04 DIAGNOSIS — D68.61 ANTIPHOSPHOLIPID SYNDROME (H): ICD-10-CM

## 2023-12-04 DIAGNOSIS — I26.99 ACUTE PULMONARY EMBOLISM, UNSPECIFIED PULMONARY EMBOLISM TYPE, UNSPECIFIED WHETHER ACUTE COR PULMONALE PRESENT (H): ICD-10-CM

## 2023-12-04 LAB — INR HOME MONITORING: 2.1 (ref 2–3)

## 2023-12-04 NOTE — PROGRESS NOTES
ANTICOAGULATION MANAGEMENT     Rocío Catherine 28 year old female is on warfarin with therapeutic INR result. (Goal INR 2.0-3.0)    Recent labs: (last 7 days)     12/04/23  0000   INR 2.1       ASSESSMENT     Source(s): Chart Review and mychart response      Warfarin doses taken: Warfarin taken as instructed  Diet: No new diet changes identified  Medication/supplement changes: None noted  New illness, injury, or hospitalization: No  Signs or symptoms of bleeding or clotting: No  Previous result: Subtherapeutic  Additional findings: None       PLAN     Recommended plan for no diet, medication or health factor changes affecting INR     Dosing Instructions: Continue your current warfarin dose with next INR in 2 weeks       Summary  As of 12/4/2023      Full warfarin instructions:  5 mg every Sat; 7.5 mg all other days   Next INR check:  12/18/2023               Detailed voice message left for Rocío with dosing instructions and follow up date.   Sent [employer] message with dosing and follow up instructions    Patient to recheck with home meter    Education provided:   Resume manage by exception with home monitor. Continue to submit INR results to home monitor company.You will only be called when your result is out of range. Please call and notify [facility] if new medication started, dose missed, signs or symptoms of bleeding or clotting, or a surgery/procedure is scheduled.  Contact [phone]  with any changes, questions or concerns.     Plan made per [facility] anticoagulation protocol    Jo Ann Zuñiga RN  Anticoagulation Clinic  12/4/2023    _______________________________________________________________________     Anticoagulation Episode Summary       Current INR goal:  2.0-3.0   TTR:  72.4% (1 y)   Target end date:  Indefinite   Send INR reminders to:  [facility]    Indications    Acute pulmonary embolism  unspecified pulmonary embolism type  unspecified whether acute cor pulmonale present (H) (Resolved)  [I26.99]  Factor 5 Leiden mutation  heterozygous (H24) [D68.51]  Other acute pulmonary embolism without acute cor pulmonale (H) (Resolved) [I26.99]  Acute pulmonary embolism  unspecified pulmonary embolism type  unspecified whether acute cor pulmonale present (H) (Resolved) [I26.99]  Antiphospholipid syndrome (H24) [D68.61]  Long term current use of anticoagulant therapy [Z79.01]  Acute pulmonary embolism without acute cor pulmonale  unspecified pulmonary embolism type (H) (Resolved) [I26.99]  Acute pulmonary embolism without acute cor pulmonale  unspecified pulmonary embolism type (H) [I26.99]  Acute pulmonary embolism  unspecified pulmonary embolism type  unspecified whether acute cor pulmonale present (H) [I26.99]             Comments:  [employer] home meter// manage by exception// Antiphospholipid antibody syndrome (may need CF10 if INR elevated with no known reason)             Anticoagulation Care Providers       Provider Role Specialty Phone number    Jesse Farnsworth MD Referring Family Medicine [phone]    Yessy Lyn APRN [facility] Referring Family Medicine [phone]    Debi Carrasco MD Referring Cardiovascular Disease [phone]    Dionicio Liang MD Referring Family Medicine [phone]

## 2023-12-17 ENCOUNTER — HEALTH MAINTENANCE LETTER (OUTPATIENT)
Age: 28
End: 2023-12-17

## 2023-12-18 LAB — INR HOME MONITORING: 1.8 (ref 2–3)

## 2023-12-19 ENCOUNTER — MYC MEDICAL ADVICE (OUTPATIENT)
Dept: ANTICOAGULATION | Facility: CLINIC | Age: 28
End: 2023-12-19
Payer: COMMERCIAL

## 2023-12-19 ENCOUNTER — ANTICOAGULATION THERAPY VISIT (OUTPATIENT)
Dept: ANTICOAGULATION | Facility: CLINIC | Age: 28
End: 2023-12-19
Payer: COMMERCIAL

## 2023-12-19 DIAGNOSIS — I26.99 ACUTE PULMONARY EMBOLISM WITHOUT ACUTE COR PULMONALE, UNSPECIFIED PULMONARY EMBOLISM TYPE (H): ICD-10-CM

## 2023-12-19 DIAGNOSIS — D68.51 FACTOR 5 LEIDEN MUTATION, HETEROZYGOUS (H): Primary | ICD-10-CM

## 2023-12-19 DIAGNOSIS — I26.99 ACUTE PULMONARY EMBOLISM, UNSPECIFIED PULMONARY EMBOLISM TYPE, UNSPECIFIED WHETHER ACUTE COR PULMONALE PRESENT (H): ICD-10-CM

## 2023-12-19 DIAGNOSIS — D68.61 ANTIPHOSPHOLIPID SYNDROME (H): ICD-10-CM

## 2023-12-19 DIAGNOSIS — Z79.01 LONG TERM CURRENT USE OF ANTICOAGULANT THERAPY: ICD-10-CM

## 2023-12-19 NOTE — PROGRESS NOTES
ANTICOAGULATION MANAGEMENT     Rocío Catherine 28 year old female is on warfarin with subtherapeutic INR result. (Goal INR 2.0-3.0)    Recent labs: (last 7 days)     12/18/23  0000   INR 1.8*       ASSESSMENT     Source(s): Chart Review and [employer] message from 12/19/23      Warfarin doses taken: Warfarin taken as instructed  Diet: Increased greens/vitamin K in diet; plans to resume previous intake  Medication/supplement changes: None noted  New illness, injury, or hospitalization: No  Signs or symptoms of bleeding or clotting: No  Previous result: Therapeutic last visit; previously outside of goal range  Additional findings: None       PLAN     Recommended plan for temporary change(s) affecting INR     Dosing Instructions: booster dose then continue your current warfarin dose with next INR in 2 weeks       Summary  As of 12/19/2023      Full warfarin instructions:  12/19: 10 mg; Otherwise 5 mg every Sat; 7.5 mg all other days   Next INR check:  1/2/2024               Sent [employer] message with dosing and follow up instructions    Patient to recheck with home meter    Education provided:   Please call back if any changes to your diet, medications or how you've been taking warfarin  Contact [phone]  with any changes, questions or concerns.     Plan made per [facility] anticoagulation protocol    Jo Ann Horner RN  Anticoagulation Clinic  12/19/2023    _______________________________________________________________________     Anticoagulation Episode Summary       Current INR goal:  2.0-3.0   TTR:  69.8% (1 y)   Target end date:  Indefinite   Send INR reminders to:  [facility]    Indications    Acute pulmonary embolism  unspecified pulmonary embolism type  unspecified whether acute cor pulmonale present (H) (Resolved) [I26.99]  Factor 5 Leiden mutation  heterozygous (H24) [D68.51]  Other acute pulmonary embolism without acute cor pulmonale (H) (Resolved) [I26.99]  Acute pulmonary embolism  unspecified pulmonary  embolism type  unspecified whether acute cor pulmonale present (H) (Resolved) [I26.99]  Antiphospholipid syndrome (H24) [D68.61]  Long term current use of anticoagulant therapy [Z79.01]  Acute pulmonary embolism without acute cor pulmonale  unspecified pulmonary embolism type (H) (Resolved) [I26.99]  Acute pulmonary embolism without acute cor pulmonale  unspecified pulmonary embolism type (H) [I26.99]  Acute pulmonary embolism  unspecified pulmonary embolism type  unspecified whether acute cor pulmonale present (H) [I26.99]             Comments:  [employer] home meter// manage by exception// Antiphospholipid antibody syndrome (may need CF10 if INR elevated with no known reason)             Anticoagulation Care Providers       Provider Role Specialty Phone number    Jesse Farnsworth MD Referring Family Medicine [phone]    Yessy Lyn APRN [facility] Referring Family Medicine [phone]    Debi Carrasco MD Referring Cardiovascular Disease [phone]    Dionicio Liang MD Referring Family Medicine [phone]

## 2024-01-01 LAB — INR HOME MONITORING: 3 (ref 2–3)

## 2024-01-02 ENCOUNTER — ANTICOAGULATION THERAPY VISIT (OUTPATIENT)
Dept: ANTICOAGULATION | Facility: CLINIC | Age: 29
End: 2024-01-02
Payer: COMMERCIAL

## 2024-01-02 DIAGNOSIS — D68.51 FACTOR 5 LEIDEN MUTATION, HETEROZYGOUS (H): Primary | ICD-10-CM

## 2024-01-02 DIAGNOSIS — D68.61 ANTIPHOSPHOLIPID SYNDROME (H): ICD-10-CM

## 2024-01-02 DIAGNOSIS — I26.99 ACUTE PULMONARY EMBOLISM WITHOUT ACUTE COR PULMONALE, UNSPECIFIED PULMONARY EMBOLISM TYPE (H): ICD-10-CM

## 2024-01-02 DIAGNOSIS — Z79.01 LONG TERM CURRENT USE OF ANTICOAGULANT THERAPY: ICD-10-CM

## 2024-01-02 DIAGNOSIS — I26.99 ACUTE PULMONARY EMBOLISM, UNSPECIFIED PULMONARY EMBOLISM TYPE, UNSPECIFIED WHETHER ACUTE COR PULMONALE PRESENT (H): ICD-10-CM

## 2024-01-02 NOTE — PROGRESS NOTES
ANTICOAGULATION MANAGEMENT     Rocío Catherine 28 year old female is on warfarin with therapeutic INR result. (Goal INR 2.0-3.0)    Recent labs: (last 7 days)     01/01/24  0000   INR 3.0       ASSESSMENT     Source(s): Chart Review and Patient/Caregiver Call     Warfarin doses taken: Booster dose(s) recently taken which may be affecting INR  Diet: No new diet changes identified  Medication/supplement changes: None noted  New illness, injury, or hospitalization: No  Signs or symptoms of bleeding or clotting: No  Previous result: Subtherapeutic  Additional findings: None       PLAN     Recommended plan for no diet, medication or health factor changes affecting INR     Dosing Instructions: Continue your current warfarin dose with next INR in 2 weeks       Summary  As of 1/2/2024      Full warfarin instructions:  5 mg every Sat; 7.5 mg all other days   Next INR check:  1/15/2024               Sent [employer] message with dosing and follow up instructions    Patient to recheck with home meter    Education provided:   Please call back if any changes to your diet, medications or how you've been taking warfarin  Resume manage by exception with home monitor. Continue to submit INR results to home monitor company.You will only be called when your result is out of range. Please call and notify [employer] if new medication started, dose missed, signs or symptoms of bleeding or clotting, or a surgery/procedure is scheduled.    Plan made per [facility] anticoagulation protocol    Dayami Mcfarland, RN  Anticoagulation Clinic  1/2/2024    _______________________________________________________________________     Anticoagulation Episode Summary       Current INR goal:  2.0-3.0   TTR:  69.1% (1 y)   Target end date:  Indefinite   Send INR reminders to:  [facility] MONIT[facility]    Indications    Acute pulmonary embolism  unspecified pulmonary embolism type  unspecified whether acute cor pulmonale present (H) (Resolved) [I26.99]  Factor 5 Leiden  mutation  heterozygous (H24) [D68.51]  Other acute pulmonary embolism without acute cor pulmonale (H) (Resolved) [I26.99]  Acute pulmonary embolism  unspecified pulmonary embolism type  unspecified whether acute cor pulmonale present (H) (Resolved) [I26.99]  Antiphospholipid syndrome (H24) [D68.61]  Long term current use of anticoagulant therapy [Z79.01]  Acute pulmonary embolism without acute cor pulmonale  unspecified pulmonary embolism type (H) (Resolved) [I26.99]  Acute pulmonary embolism without acute cor pulmonale  unspecified pulmonary embolism type (H) [I26.99]  Acute pulmonary embolism  unspecified pulmonary embolism type  unspecified whether acute cor pulmonale present (H) [I26.99]             Comments:  [employer] home meter// manage by exception// Antiphospholipid antibody syndrome (may need CF10 if INR elevated with no known reason)             Anticoagulation Care Providers       Provider Role Specialty Phone number    Jesse Farnsworth MD Referring Family Medicine [phone]    Yessy Lyn APRN [facility] Referring Family Medicine [phone]    Debi Carrasco MD Referring Cardiovascular Disease [phone]    Dionicio Liang MD Referring Family Medicine [phone]

## 2024-01-09 DIAGNOSIS — I26.99 ACUTE PULMONARY EMBOLISM (H): ICD-10-CM

## 2024-01-10 RX ORDER — WARFARIN SODIUM 5 MG/1
TABLET ORAL
Qty: 130 TABLET | Refills: 1 | Status: SHIPPED | OUTPATIENT
Start: 2024-01-10 | End: 2024-07-05

## 2024-01-10 NOTE — TELEPHONE ENCOUNTER
ANTICOAGULATION MANAGEMENT:  Medication Refill    Anticoagulation Summary  As of 1/2/2024      Warfarin maintenance plan:  5 mg (5 mg x 1) every Sat; 7.5 mg (5 mg x 1.5) all other days   Next INR check:  1/15/2024   Target end date:  Indefinite    Indications    Acute pulmonary embolism  unspecified pulmonary embolism type  unspecified whether acute cor pulmonale present (H) (Resolved) [I26.99]  Factor 5 Leiden mutation  heterozygous (H24) [D68.51]  Other acute pulmonary embolism without acute cor pulmonale (H) (Resolved) [I26.99]  Acute pulmonary embolism  unspecified pulmonary embolism type  unspecified whether acute cor pulmonale present (H) (Resolved) [I26.99]  Antiphospholipid syndrome (H24) [D68.61]  Long term current use of anticoagulant therapy [Z79.01]  Acute pulmonary embolism without acute cor pulmonale  unspecified pulmonary embolism type (H) (Resolved) [I26.99]  Acute pulmonary embolism without acute cor pulmonale  unspecified pulmonary embolism type (H) [I26.99]  Acute pulmonary embolism  unspecified pulmonary embolism type  unspecified whether acute cor pulmonale present (H) [I26.99]                 Anticoagulation Care Providers       Provider Role Specialty Phone number    Jesse Farnsworth MD Referring Family Medicine [phone]    Yessy Lyn APRN [facility] Referring Family Medicine [phone]    Debi Carrasco MD Referring Cardiovascular Disease [phone]    Dionicio Liang MD Referring Family Medicine [phone]            Refill Criteria    Visit with referring provider/group: Meets criteria: office visit within referring provider group in the last 1 year on 9/13/23    [facility] referral signed last signed: 10/24/2023; within last year: Yes    Lab monitoring not exceeding 2 weeks overdue: No    Rocío meets all criteria for refill. Rx instructions and quantity supplied updated to match patient's current dosing plan. Warfarin 90 day supply with 1 refill granted per [facility]  protocol     Chika Delaney RN  Anticoagulation Clinic

## 2024-01-13 ENCOUNTER — HOSPITAL ENCOUNTER (EMERGENCY)
Facility: CLINIC | Age: 29
Discharge: HOME | End: 2024-01-13
Attending: NURSE PRACTITIONER | Admitting: NURSE PRACTITIONER
Payer: COMMERCIAL

## 2024-01-13 VITALS
TEMPERATURE: 98.7 F | OXYGEN SATURATION: 100 % | DIASTOLIC BLOOD PRESSURE: 81 MMHG | RESPIRATION RATE: 16 BRPM | SYSTOLIC BLOOD PRESSURE: 118 MMHG | HEART RATE: 101 BPM

## 2024-01-13 DIAGNOSIS — J02.0 ACUTE STREPTOCOCCAL PHARYNGITIS: ICD-10-CM

## 2024-01-13 LAB — GROUP A STREP BY PCR: DETECTED

## 2024-01-13 PROCEDURE — 99213 OFFICE O/P EST LOW 20 MIN: CPT | Performed by: NURSE PRACTITIONER

## 2024-01-13 PROCEDURE — G0463 HOSPITAL OUTPT CLINIC VISIT: HCPCS | Performed by: NURSE PRACTITIONER

## 2024-01-13 PROCEDURE — 87651 STREP A DNA AMP PROBE: CPT | Performed by: NURSE PRACTITIONER

## 2024-01-13 RX ORDER — AMOXICILLIN 500 MG/1
500 CAPSULE ORAL 2 TIMES DAILY
Qty: 14 CAPSULE | Refills: 0 | Status: SHIPPED | OUTPATIENT
Start: 2024-01-13 | End: 2024-01-20

## 2024-01-13 NOTE — DISCHARGE INSTRUCTIONS
Recommend finishing all of oral antibiotics ordered, increase oral fluid intake.  Tylenol or ibuprofen for pain or fever.  Follow-up in primary care or return here if symptoms worsen despite recommended treatment plan.  Warfarin/INR may be increased by taking antibiotics recommend rechecking early to make sure your blood is not too thin.

## 2024-01-13 NOTE — ED PROVIDER NOTES
ED Provider Note  [facility]      History   No chief complaint on file.    HPI  Rocío Catherine is a 28 year old female who Presents with sore throat for 2 days.  Reports an exposure to her son who tested positive for strep throat 2 days ago.  Denies any headache, nausea, vomiting, skin rash reports having a fever today.  Taking Tylenol for fevers.  Denies any cough, nasal congestion or sinus congestion.  Is any exposure to RSV, COVID, influenza.  Taking warfarin post clots, has factor V Leiden.            Allergies:  No Known Allergies    Problem List:    Patient Active Problem List    Diagnosis Date Noted    Acute pulmonary embolism without acute cor pulmonale, unspecified pulmonary embolism type (H) 10/24/2023     Priority: Medium    Acute pulmonary embolism, unspecified pulmonary embolism type, unspecified whether acute cor pulmonale present (H) 10/24/2023     Priority: Medium    History of pulmonary embolism 2023     Priority: Medium    Paroxysmal supraventricular tachycardia 2022     Priority: Medium    Long term current use of anticoagulant therapy 10/29/2021     Priority: Medium    Antiphospholipid syndrome (H24) 2021     Priority: Medium    S/P  2020     Priority: Medium    Chronic migraine without aura without status migrainosus, not intractable 2019     Priority: Medium    Sinus tachycardia 2019     Priority: Medium    Non-rheumatic mitral regurgitation, trace 2019     Priority: Medium     Seen on echocardiogram 2019. Recommended for follow up echo in 2-3 years.       Recurrent major depressive disorder, in remission (H24) 2018     Priority: Medium    Anxiety 2018     Priority: Medium    Factor 5 Leiden mutation, heterozygous (H24) 2018     Priority: Medium    Anemia 2016     Priority: Medium    Dysthymia 2013     Priority: Medium        Past Medical History:    Past Medical History:   Diagnosis  Date    Calculus of gallbladder with acute cholecystitis without obstruction 2017    Chickenpox     Depression     Factor 5 Leiden mutation, heterozygous (H) 2018    Factor V Leiden (H)     History of foot fracture     History of frequent urinary tract infections     History of pyelonephritis     History of recurrent UTI (urinary tract infection)     Non-rheumatic mitral regurgitation, trace 2019    Ovarian cyst     Postpartum depression     Pulmonary emboli (H) 2020       Past Surgical History:    Past Surgical History:   Procedure Laterality Date     SECTION N/A 2020    Procedure:  SECTION;  Surgeon: Grace Dee MD;  Location: [facility] OR     SECTION      c section    ENT SURGERY      GYN SURGERY  Csections ()    HEAD & NECK SURGERY      hemangioma on neck at age 2    LAPAROSCOPIC CHOLECYSTECTOMY      OTHER SURGICAL HISTORY      Spinal Cord Surgery    SINUS FRONTAL OPEN OBLITERATION             Family History:    Family History   Problem Relation Age of Onset    Stomach Problem Mother         ulcer    Bleeding Disorder Father         factor V    Ovarian Cancer Maternal Grandmother     Other Cancer Maternal Grandmother     Thyroid Cancer Paternal Grandmother     Alzheimer Disease Paternal Grandfather     Bleeding Disorder Paternal Grandfather         factor V    Bleeding Disorder Daughter         Factor V Leiden       Social History:  Marital Status:   [2]  Social History     Tobacco Use    Smoking status: Never    Smokeless tobacco: Never   Vaping Use    Vaping Use: Never used   Substance Use Topics    Alcohol use: Yes     Comment:  rare    Drug use: No        Medications:    amoxicillin (AMOXIL) 500 MG capsule  budesonide (PULMICORT) 0.5 MG/2ML neb solution  fluconazole (DIFLUCAN) 150 MG tablet  hydrOXYzine (ATARAX) 25 MG tablet  ipratropium (ATROVENT) 0.06 % nasal spray  metoclopramide (REGLAN) 10 MG tablet  sertraline (ZOLOFT) 50 MG  tablet  warfarin ANTICOAGULANT (JANTOVEN ANTICOAGULANT) 5 MG tablet          Review of Systems  A medically appropriate review of systems was performed with pertinent positives and negatives noted in the HPI, and all other systems negative.    Physical Exam   Patient Vitals for the past 24 hrs:   BP Temp Temp src Pulse Resp SpO2   01/13/24 1319 118/81 98.7  F (37.1  C) Oral 101 16 100 %          Physical Exam  General: alert and in no acute distress on arrival  Ears/Nose/Throat: ENT: Ear exam bilateral normal, normal TMs normal canals.  Nose: No erythema or edema patent nostrils bilateral.  Throat: Erythemic mildly enlarged tonsils with exudates, no cervical adenopathy.   Head: atraumatic, normocephalic  Lungs:  nonlabored  CV:  extremities warm and perfused  Abd: nondistended  Skin: no rashes, no diaphoresis and skin color normal  Neuro: Patient awake, alert, speech is fluent, appropriate historian  Psychiatric: affect/mood normal, pleasant      ED Course                 Procedures                    Results for orders placed or performed during the hospital encounter of 01/13/24 (from the past 24 hour(s))   Group A Streptococcus PCR Throat Swab    Specimen: Throat; Swab   Result Value Ref Range    Group A strep by PCR Detected (A) Not Detected    Narrative    The Xpert Xpress Strep A test, performed on the [employer], is a rapid, qualitative in vitro diagnostic test for the detection of Streptococcus pyogenes (Group A ß-hemolytic Streptococcus, Strep A) in throat swab specimens from patients with signs and symptoms of pharyngitis. The Xpert Xpress Strep A test can be used as an aid in the diagnosis of Group A Streptococcal pharyngitis. The assay is not intended to monitor treatment for Group A Streptococcus infections. The Xpert Xpress Strep A test utilizes an automated real-time polymerase chain reaction (PCR) to detect Streptococcus pyogenes DNA.       MEDICATIONS GIVEN IN THE EMERGENCY  DEPARTMENT:  Medications - No data to display             Assessments & Plan (with Medical Decision Making)  28 year old female who presents to the Urgent Care for evaluation of acute strep pharyngitis.  Amoxicillin ordered twice daily for 7 days advised to finish all of oral antibiotics may want to recheck INR within 1 to 2 weeks as warfarin and amoxicillin interaction causes increased INR's.  Return if symptoms worsen despite recommended treatment plan.  Tylenol for fevers and pain.       I have reviewed the nursing notes.    I have reviewed the findings, diagnosis, plan and need for follow up with the patient.        NEW PRESCRIPTIONS STARTED AT TODAY'S ER VISIT  Discharge Medication List as of 1/13/2024  1:59 PM        START taking these medications    Details   amoxicillin (AMOXIL) 500 MG capsule Take 1 capsule (500 mg) by mouth 2 times daily for 7 days, Disp-14 capsule, R-0, E-Prescribe             Final diagnoses:   Acute streptococcal pharyngitis       1/13/2024   [facility] EMERGENCY DEPT       Ainsley Almanza APRN CNP  01/13/24 6440

## 2024-01-15 ENCOUNTER — ANTICOAGULATION THERAPY VISIT (OUTPATIENT)
Dept: ANTICOAGULATION | Facility: CLINIC | Age: 29
End: 2024-01-15
Payer: COMMERCIAL

## 2024-01-15 ENCOUNTER — DOCUMENTATION ONLY (OUTPATIENT)
Dept: ANTICOAGULATION | Facility: CLINIC | Age: 29
End: 2024-01-15
Payer: COMMERCIAL

## 2024-01-15 DIAGNOSIS — I26.99 ACUTE PULMONARY EMBOLISM, UNSPECIFIED PULMONARY EMBOLISM TYPE, UNSPECIFIED WHETHER ACUTE COR PULMONALE PRESENT (H): ICD-10-CM

## 2024-01-15 DIAGNOSIS — D68.61 ANTIPHOSPHOLIPID SYNDROME (H): ICD-10-CM

## 2024-01-15 DIAGNOSIS — I26.99 ACUTE PULMONARY EMBOLISM WITHOUT ACUTE COR PULMONALE, UNSPECIFIED PULMONARY EMBOLISM TYPE (H): ICD-10-CM

## 2024-01-15 DIAGNOSIS — Z79.01 LONG TERM CURRENT USE OF ANTICOAGULANT THERAPY: ICD-10-CM

## 2024-01-15 DIAGNOSIS — D68.51 FACTOR 5 LEIDEN MUTATION, HETEROZYGOUS (H): Primary | ICD-10-CM

## 2024-01-15 LAB — INR HOME MONITORING: 2.3 (ref 2–3)

## 2024-01-15 NOTE — PROGRESS NOTES
ANTICOAGULATION  MANAGEMENT: Discharge Review    Rocío Catherine chart reviewed for anticoagulation continuity of care    Emergency room visit on 1/13/24 for strep pharyngitis.    Discharge disposition: Home    Results:    Recent labs: (last 7 days)     01/15/24  0000   INR 2.3     Anticoagulation inpatient management:     not applicable     Anticoagulation discharge instructions:     Warfarin dosing: home regimen continued   Bridging: No   INR goal change: No      Medication changes affecting anticoagulation: Yes: amoxicillin 1/13/24-1/20/24    Additional factors affecting anticoagulation: No     PLAN     No adjustment to anticoagulation plan needed    Patient not contacted    No adjustment to Anticoagulation Calendar was required    Jo Ann Horner RN

## 2024-01-29 ENCOUNTER — ANTICOAGULATION THERAPY VISIT (OUTPATIENT)
Dept: ANTICOAGULATION | Facility: CLINIC | Age: 29
End: 2024-01-29
Payer: COMMERCIAL

## 2024-01-29 DIAGNOSIS — I26.99 ACUTE PULMONARY EMBOLISM, UNSPECIFIED PULMONARY EMBOLISM TYPE, UNSPECIFIED WHETHER ACUTE COR PULMONALE PRESENT (H): ICD-10-CM

## 2024-01-29 DIAGNOSIS — D68.61 ANTIPHOSPHOLIPID SYNDROME (H): ICD-10-CM

## 2024-01-29 DIAGNOSIS — Z79.01 LONG TERM CURRENT USE OF ANTICOAGULANT THERAPY: ICD-10-CM

## 2024-01-29 DIAGNOSIS — D68.51 FACTOR 5 LEIDEN MUTATION, HETEROZYGOUS (H): Primary | ICD-10-CM

## 2024-01-29 DIAGNOSIS — I26.99 ACUTE PULMONARY EMBOLISM WITHOUT ACUTE COR PULMONALE, UNSPECIFIED PULMONARY EMBOLISM TYPE (H): ICD-10-CM

## 2024-01-29 LAB — INR HOME MONITORING: 3.2 (ref 2–3)

## 2024-01-29 NOTE — PROGRESS NOTES
ANTICOAGULATION MANAGEMENT     Rocío Catherine 28 year old female is on warfarin with supratherapeutic INR result. (Goal INR 2.0-3.0)    Recent labs: (last 7 days)     01/29/24  0000   INR 3.2*       ASSESSMENT     Source(s): Chart Review and Patient/Caregiver Call     Warfarin doses taken: Warfarin taken as instructed  Diet: Decreased greens/vitamin K in diet; plans to resume previous intake  Medication/supplement changes:  amoxicillin stopped on 1/24/24 which may be increasing INR today  New illness, injury, or hospitalization: Yes: Strep throat - feeling better with abx completion  Signs or symptoms of bleeding or clotting: No  Previous result: Therapeutic last 2(+) visits  Additional findings: None       PLAN     Recommended plan for temporary change(s) affecting INR     Dosing Instructions: partial hold then continue your current warfarin dose with next INR in 1 week       Summary  As of 1/29/2024      Full warfarin instructions:  1/29: 5 mg; Otherwise 5 mg every Sat; 7.5 mg all other days   Next INR check:  2/5/2024               Telephone call with Rocío who verbalizes understanding and agrees to plan    Patient to recheck with home meter    Education provided:   Please call back if any changes to your diet, medications or how you've been taking warfarin  Contact [phone]  with any changes, questions or concerns.     Plan made per [facility] anticoagulation protocol    Carlee Villasenor RN  Anticoagulation Clinic  1/29/2024    _______________________________________________________________________     Anticoagulation Episode Summary       Current INR goal:  2.0-3.0   TTR:  72.0% (1 y)   Target end date:  Indefinite   Send INR reminders to:  [facility]    Indications    Acute pulmonary embolism  unspecified pulmonary embolism type  unspecified whether acute cor pulmonale present (H) (Resolved) [I26.99]  Factor 5 Leiden mutation  heterozygous (H24) [D68.51]  Other acute pulmonary embolism without acute cor  pulmonale (H) (Resolved) [I26.99]  Acute pulmonary embolism  unspecified pulmonary embolism type  unspecified whether acute cor pulmonale present (H) (Resolved) [I26.99]  Antiphospholipid syndrome (H24) [D68.61]  Long term current use of anticoagulant therapy [Z79.01]  Acute pulmonary embolism without acute cor pulmonale  unspecified pulmonary embolism type (H) (Resolved) [I26.99]  Acute pulmonary embolism without acute cor pulmonale  unspecified pulmonary embolism type (H) [I26.99]  Acute pulmonary embolism  unspecified pulmonary embolism type  unspecified whether acute cor pulmonale present (H) [I26.99]             Comments:  [employer] home meter// manage by exception// Antiphospholipid antibody syndrome (may need CF10 if INR elevated with no known reason)             Anticoagulation Care Providers       Provider Role Specialty Phone number    Jesse Farnsworth MD Referring Family Medicine [phone]    Yessy Lyn, APRN CNP Referring Family Medicine [phone]    Debi Carrasco MD Referring Cardiovascular Disease [phone]    Dionicio Liang MD Referring Family Medicine [phone]

## 2024-02-03 NOTE — DISCHARGE INSTRUCTIONS
"  ICD-10-CM    1. Chest pain, non-cardiac  R07.89     no signs of recurrent PE.  D-dime done today could be used as marker for future as to whether to obtain repeat CT in chest pain. INR is therapeutic -= stay on warfarin. other causes of chest pain include constipation, chest wall from lifting (lat dorsi muscle), early shingles (watch for rash in this area). other testing reassuring.   2. Tension headache  G44.209     Recommend range of motion neck.  consider PT.  Consider \"heal your own neck\", by Sheryl.  return immed for new or different headache or any head injury as warfarin can result in intracranial bleeding.       "
The patient is a 48y Male complaining of bloody urine.

## 2024-02-12 ENCOUNTER — ANTICOAGULATION THERAPY VISIT (OUTPATIENT)
Dept: ANTICOAGULATION | Facility: CLINIC | Age: 29
End: 2024-02-12
Payer: COMMERCIAL

## 2024-02-12 DIAGNOSIS — D68.61 ANTIPHOSPHOLIPID SYNDROME (H): ICD-10-CM

## 2024-02-12 DIAGNOSIS — Z79.01 LONG TERM CURRENT USE OF ANTICOAGULANT THERAPY: ICD-10-CM

## 2024-02-12 DIAGNOSIS — I26.99 ACUTE PULMONARY EMBOLISM, UNSPECIFIED PULMONARY EMBOLISM TYPE, UNSPECIFIED WHETHER ACUTE COR PULMONALE PRESENT (H): ICD-10-CM

## 2024-02-12 DIAGNOSIS — I26.99 ACUTE PULMONARY EMBOLISM WITHOUT ACUTE COR PULMONALE, UNSPECIFIED PULMONARY EMBOLISM TYPE (H): ICD-10-CM

## 2024-02-12 DIAGNOSIS — D68.51 FACTOR 5 LEIDEN MUTATION, HETEROZYGOUS (H): Primary | ICD-10-CM

## 2024-02-12 LAB — INR HOME MONITORING: 2.3 (ref 2–3)

## 2024-02-12 NOTE — PROGRESS NOTES
ANTICOAGULATION MANAGEMENT     Rocío Catherine 28 year old female is on warfarin with therapeutic INR result. (Goal INR 2.0-3.0)    Recent labs: (last 7 days)     02/12/24  0000   INR 2.3       ASSESSMENT     Source(s): Chart Review and Patient/Caregiver Call     Warfarin doses taken: Warfarin taken as instructed  Diet: No new diet changes identified  Medication/supplement changes: None noted  New illness, injury, or hospitalization: No  Signs or symptoms of bleeding or clotting: No  Previous result: Supratherapeutic  Additional findings: None       PLAN     Recommended plan for no diet, medication or health factor changes affecting INR     Dosing Instructions: Continue your current warfarin dose with next INR in 2 weeks       Summary  As of 2/12/2024      Full warfarin instructions:  5 mg every Sat; 7.5 mg all other days   Next INR check:  2/26/2024               Sent [employer] message with dosing and follow up instructions    Patient to recheck with home meter    Education provided:   Please call back if any changes to your diet, medications or how you've been taking warfarin  Resume manage by exception with home monitor. Continue to submit INR results to home monitor company.You will only be called when your result is out of range. Please call and notify [facility] if new medication started, dose missed, signs or symptoms of bleeding or clotting, or a surgery/procedure is scheduled.    Plan made per [facility] anticoagulation protocol    Dayami Mcfarland RN  Anticoagulation Clinic  2/12/2024    _______________________________________________________________________     Anticoagulation Episode Summary       Current INR goal:  2.0-3.0   TTR:  72.1% (1 y)   Target end date:  Indefinite   Send INR reminders to:  [facility]    Indications    Acute pulmonary embolism  unspecified pulmonary embolism type  unspecified whether acute cor pulmonale present (H) (Resolved) [I26.99]  Factor 5 Leiden mutation  heterozygous (H24)  [D68.51]  Other acute pulmonary embolism without acute cor pulmonale (H) (Resolved) [I26.99]  Acute pulmonary embolism  unspecified pulmonary embolism type  unspecified whether acute cor pulmonale present (H) (Resolved) [I26.99]  Antiphospholipid syndrome (H24) [D68.61]  Long term current use of anticoagulant therapy [Z79.01]  Acute pulmonary embolism without acute cor pulmonale  unspecified pulmonary embolism type (H) (Resolved) [I26.99]  Acute pulmonary embolism without acute cor pulmonale  unspecified pulmonary embolism type (H) [I26.99]  Acute pulmonary embolism  unspecified pulmonary embolism type  unspecified whether acute cor pulmonale present (H) [I26.99]             Comments:  [employer] home meter// manage by exception// Antiphospholipid antibody syndrome (may need CF10 if INR elevated with no known reason)             Anticoagulation Care Providers       Provider Role Specialty Phone number    Jesse Farnsworth MD Referring Family Medicine [phone]    Yessy Lyn APRN [facility] Referring Family Medicine [phone]    Debi Carrasco MD Referring Cardiovascular Disease [phone]    Dionicio Liang MD Referring Family Medicine [phone]

## 2024-02-26 ENCOUNTER — DOCUMENTATION ONLY (OUTPATIENT)
Dept: ANTICOAGULATION | Facility: CLINIC | Age: 29
End: 2024-02-26
Payer: COMMERCIAL

## 2024-02-26 DIAGNOSIS — I26.99 ACUTE PULMONARY EMBOLISM WITHOUT ACUTE COR PULMONALE, UNSPECIFIED PULMONARY EMBOLISM TYPE (H): ICD-10-CM

## 2024-02-26 DIAGNOSIS — D68.61 ANTIPHOSPHOLIPID SYNDROME (H): ICD-10-CM

## 2024-02-26 DIAGNOSIS — D68.51 FACTOR 5 LEIDEN MUTATION, HETEROZYGOUS (H): Primary | ICD-10-CM

## 2024-02-26 DIAGNOSIS — I26.99 ACUTE PULMONARY EMBOLISM, UNSPECIFIED PULMONARY EMBOLISM TYPE, UNSPECIFIED WHETHER ACUTE COR PULMONALE PRESENT (H): ICD-10-CM

## 2024-02-26 DIAGNOSIS — Z79.01 LONG TERM CURRENT USE OF ANTICOAGULANT THERAPY: ICD-10-CM

## 2024-02-26 LAB — INR HOME MONITORING: 2.9 (ref 2–3)

## 2024-02-26 NOTE — PROGRESS NOTES
ANTICOAGULATION  MANAGEMENT-Home Monitor Managed by Exception    Rocío Catherine 28 year old female is on warfarin with therapeutic INR result. (Goal INR 2.0-3.0)    Recent labs: (last 7 days)     02/26/24  0000   INR 2.9       Previous INR was Therapeutic  Medication, diet, health changes since last INR:chart reviewed; none identified  Contacted within the last 12 weeks by phone on 2/12/24  Last [facility] referral date: 10/24/2023      PLAN     Rocío was NOT contacted regarding therapeutic result today per home monitoring policy manage by exception agreement.   Current warfarin dose is to be continued:     Summary  As of 2/26/2024      Full warfarin instructions:  5 mg every Sat; 7.5 mg all other days   Next INR check:  3/11/2024               Mychart sent with doing and follow up instructions  ?   Jo Ann Zuñiga RN  Anticoagulation Clinic  2/26/2024    _______________________________________________________________________     Anticoagulation Episode Summary       Current INR goal:  2.0-3.0   TTR:  72.1% (1 y)   Target end date:  Indefinite   Send INR reminders to:  [facility]    Indications    Acute pulmonary embolism  unspecified pulmonary embolism type  unspecified whether acute cor pulmonale present (H) (Resolved) [I26.99]  Factor 5 Leiden mutation  heterozygous (H24) [D68.51]  Other acute pulmonary embolism without acute cor pulmonale (H) (Resolved) [I26.99]  Acute pulmonary embolism  unspecified pulmonary embolism type  unspecified whether acute cor pulmonale present (H) (Resolved) [I26.99]  Antiphospholipid syndrome (H24) [D68.61]  Long term current use of anticoagulant therapy [Z79.01]  Acute pulmonary embolism without acute cor pulmonale  unspecified pulmonary embolism type (H) (Resolved) [I26.99]  Acute pulmonary embolism without acute cor pulmonale  unspecified pulmonary embolism type (H) [I26.99]  Acute pulmonary embolism  unspecified pulmonary embolism type  unspecified whether acute cor  pulmonale present (H) [I26.99]             Comments:  [employer] home meter// manage by exception// Antiphospholipid antibody syndrome (may need CF10 if INR elevated with no known reason)             Anticoagulation Care Providers       Provider Role Specialty Phone number    Jesse Farnsworth MD Referring Family Medicine [phone]    Yessy Lyn APRN [facility] Referring Family Medicine [phone]    Debi Carrasco MD Referring Cardiovascular Disease [phone]    Dionicio Liang MD Referring Family Medicine [phone]

## 2024-03-05 ENCOUNTER — E-VISIT (OUTPATIENT)
Dept: FAMILY MEDICINE | Facility: CLINIC | Age: 29
End: 2024-03-05
Payer: COMMERCIAL

## 2024-03-05 DIAGNOSIS — J32.9 SINUSITIS, UNSPECIFIED CHRONICITY, UNSPECIFIED LOCATION: Primary | ICD-10-CM

## 2024-03-05 PROCEDURE — 99421 OL DIG E/M SVC 5-10 MIN: CPT | Performed by: FAMILY MEDICINE

## 2024-03-06 RX ORDER — AMOXICILLIN 500 MG/1
500 CAPSULE ORAL 3 TIMES DAILY
Qty: 30 CAPSULE | Refills: 0 | Status: SHIPPED | OUTPATIENT
Start: 2024-03-06

## 2024-03-11 LAB — INR HOME MONITORING: 2.1 (ref 2–3)

## 2024-03-12 ENCOUNTER — DOCUMENTATION ONLY (OUTPATIENT)
Dept: ANTICOAGULATION | Facility: CLINIC | Age: 29
End: 2024-03-12
Payer: COMMERCIAL

## 2024-03-12 DIAGNOSIS — D68.61 ANTIPHOSPHOLIPID SYNDROME (H): ICD-10-CM

## 2024-03-12 DIAGNOSIS — Z79.01 LONG TERM CURRENT USE OF ANTICOAGULANT THERAPY: ICD-10-CM

## 2024-03-12 DIAGNOSIS — I26.99 ACUTE PULMONARY EMBOLISM, UNSPECIFIED PULMONARY EMBOLISM TYPE, UNSPECIFIED WHETHER ACUTE COR PULMONALE PRESENT (H): ICD-10-CM

## 2024-03-12 DIAGNOSIS — D68.51 FACTOR 5 LEIDEN MUTATION, HETEROZYGOUS (H): Primary | ICD-10-CM

## 2024-03-12 DIAGNOSIS — I26.99 ACUTE PULMONARY EMBOLISM WITHOUT ACUTE COR PULMONALE, UNSPECIFIED PULMONARY EMBOLISM TYPE (H): ICD-10-CM

## 2024-03-12 NOTE — PROGRESS NOTES
ANTICOAGULATION  MANAGEMENT-Home Monitor Managed by Exception    Rocío Catherine 28 year old female is on warfarin with therapeutic INR result. (Goal INR 2.0-3.0)    Recent labs: (last 7 days)     03/11/24  0000   INR 2.1       Previous INR was Therapeutic  Medication, diet, health changes since last INR:chart reviewed; none identified  Contacted within the last 12 weeks by phone on 02/12/2024  Last [facility] referral date: 10/24/2023      PLAN     Rocío was NOT contacted regarding therapeutic result today per home monitoring policy manage by exception agreement.   Current warfarin dose is to be continued:     Summary  As of 3/12/2024      Full warfarin instructions:  5 mg every Sat; 7.5 mg all other days   Next INR check:  3/25/2024             ?   Shelby Valero RN  Anticoagulation Clinic  3/12/2024    _______________________________________________________________________     Anticoagulation Episode Summary       Current INR goal:  2.0-3.0   TTR:  72.4% (1 y)   Target end date:  Indefinite   Send INR reminders to:  [facility]    Indications    Acute pulmonary embolism  unspecified pulmonary embolism type  unspecified whether acute cor pulmonale present (H) (Resolved) [I26.99]  Factor 5 Leiden mutation  heterozygous (H24) [D68.51]  Other acute pulmonary embolism without acute cor pulmonale (H) (Resolved) [I26.99]  Acute pulmonary embolism  unspecified pulmonary embolism type  unspecified whether acute cor pulmonale present (H) (Resolved) [I26.99]  Antiphospholipid syndrome (H24) [D68.61]  Long term current use of anticoagulant therapy [Z79.01]  Acute pulmonary embolism without acute cor pulmonale  unspecified pulmonary embolism type (H) (Resolved) [I26.99]  Acute pulmonary embolism without acute cor pulmonale  unspecified pulmonary embolism type (H) [I26.99]  Acute pulmonary embolism  unspecified pulmonary embolism type  unspecified whether acute cor pulmonale present (H) [I26.99]              Comments:  [employer] home meter// manage by exception// Antiphospholipid antibody syndrome (may need CF10 if INR elevated with no known reason)             Anticoagulation Care Providers       Provider Role Specialty Phone number    Jesse Farnsworth MD Referring Family Medicine [phone]    Yessy Lyn APRN [facility] Referring Family Medicine [phone]    Debi Carrasco MD Referring Cardiovascular Disease [phone]    Dionicio Liang MD Referring Family Medicine [phone]

## 2024-03-25 ENCOUNTER — MYC MEDICAL ADVICE (OUTPATIENT)
Dept: ANTICOAGULATION | Facility: CLINIC | Age: 29
End: 2024-03-25
Payer: COMMERCIAL

## 2024-03-25 ENCOUNTER — DOCUMENTATION ONLY (OUTPATIENT)
Dept: ANTICOAGULATION | Facility: CLINIC | Age: 29
End: 2024-03-25
Payer: COMMERCIAL

## 2024-03-25 DIAGNOSIS — I26.99 ACUTE PULMONARY EMBOLISM WITHOUT ACUTE COR PULMONALE, UNSPECIFIED PULMONARY EMBOLISM TYPE (H): ICD-10-CM

## 2024-03-25 DIAGNOSIS — Z79.01 LONG TERM CURRENT USE OF ANTICOAGULANT THERAPY: ICD-10-CM

## 2024-03-25 DIAGNOSIS — I26.99 ACUTE PULMONARY EMBOLISM, UNSPECIFIED PULMONARY EMBOLISM TYPE, UNSPECIFIED WHETHER ACUTE COR PULMONALE PRESENT (H): ICD-10-CM

## 2024-03-25 DIAGNOSIS — D68.51 FACTOR 5 LEIDEN MUTATION, HETEROZYGOUS (H): Primary | ICD-10-CM

## 2024-03-25 DIAGNOSIS — D68.61 ANTIPHOSPHOLIPID SYNDROME (H): ICD-10-CM

## 2024-03-25 LAB — INR HOME MONITORING: 2.7 (ref 2–3)

## 2024-03-25 NOTE — PROGRESS NOTES
ANTICOAGULATION  MANAGEMENT-Home Monitor Managed by Exception    Rocío Catherine 28 year old female is on warfarin with therapeutic INR result. (Goal INR 2.0-3.0)    Recent labs: (last 7 days)     03/25/24  0000   INR 2.7       Previous INR was Therapeutic  Medication, diet, health changes since last INR:chart reviewed; none identified  Contacted within the last 12 weeks by phone on 2/12/24  Last [facility] referral date: 10/24/2023      PLAN     Rocío was NOT contacted regarding therapeutic result today per home monitoring policy manage by exception agreement.   Current warfarin dose is to be continued:     Summary  As of 3/25/2024      Full warfarin instructions:  5 mg every Sat; 7.5 mg all other days   Next INR check:  4/8/2024             ? [employer] copy of dosing sent to patient.    Jo Ann Zuñiga RN  Anticoagulation Clinic  3/25/2024    _______________________________________________________________________     Anticoagulation Episode Summary       Current INR goal:  2.0-3.0   TTR:  73.1% (1 y)   Target end date:  Indefinite   Send INR reminders to:  [facility]    Indications    Acute pulmonary embolism  unspecified pulmonary embolism type  unspecified whether acute cor pulmonale present (H) (Resolved) [I26.99]  Factor 5 Leiden mutation  heterozygous (H24) [D68.51]  Other acute pulmonary embolism without acute cor pulmonale (H) (Resolved) [I26.99]  Acute pulmonary embolism  unspecified pulmonary embolism type  unspecified whether acute cor pulmonale present (H) (Resolved) [I26.99]  Antiphospholipid syndrome (H24) [D68.61]  Long term current use of anticoagulant therapy [Z79.01]  Acute pulmonary embolism without acute cor pulmonale  unspecified pulmonary embolism type (H) (Resolved) [I26.99]  Acute pulmonary embolism without acute cor pulmonale  unspecified pulmonary embolism type (H) [I26.99]  Acute pulmonary embolism  unspecified pulmonary embolism type  unspecified whether acute cor pulmonale present (H)  [I26.99]             Comments:  [employer] home meter// manage by exception// Antiphospholipid antibody syndrome (may need CF10 if INR elevated with no known reason)             Anticoagulation Care Providers       Provider Role Specialty Phone number    Jesse Farnsworth MD Referring Family Medicine [phone]    Yessy Lyn APRN [facility] Referring Family Medicine [phone]    Debi Carrasco MD Referring Cardiovascular Disease [phone]    Dionicio Liang MD Referring Family Medicine [phone]

## 2024-04-01 DIAGNOSIS — F32.A DEPRESSION, UNSPECIFIED DEPRESSION TYPE: ICD-10-CM

## 2024-04-01 DIAGNOSIS — F41.9 ANXIETY: ICD-10-CM

## 2024-04-08 ENCOUNTER — ANTICOAGULATION THERAPY VISIT (OUTPATIENT)
Dept: ANTICOAGULATION | Facility: CLINIC | Age: 29
End: 2024-04-08
Payer: COMMERCIAL

## 2024-04-08 DIAGNOSIS — D68.51 FACTOR 5 LEIDEN MUTATION, HETEROZYGOUS (H): Primary | ICD-10-CM

## 2024-04-08 DIAGNOSIS — Z79.01 LONG TERM CURRENT USE OF ANTICOAGULANT THERAPY: ICD-10-CM

## 2024-04-08 DIAGNOSIS — D68.61 ANTIPHOSPHOLIPID SYNDROME (H): ICD-10-CM

## 2024-04-08 DIAGNOSIS — I26.99 ACUTE PULMONARY EMBOLISM WITHOUT ACUTE COR PULMONALE, UNSPECIFIED PULMONARY EMBOLISM TYPE (H): ICD-10-CM

## 2024-04-08 DIAGNOSIS — I26.99 ACUTE PULMONARY EMBOLISM, UNSPECIFIED PULMONARY EMBOLISM TYPE, UNSPECIFIED WHETHER ACUTE COR PULMONALE PRESENT (H): ICD-10-CM

## 2024-04-08 LAB — INR HOME MONITORING: 1.5 (ref 2–3)

## 2024-04-08 NOTE — PROGRESS NOTES
ANTICOAGULATION MANAGEMENT     Rocío Catherine 28 year old female is on warfarin with subtherapeutic INR result. (Goal INR 2.0-3.0)    Recent labs: (last 7 days)     04/08/24  0000   INR 1.5*       ASSESSMENT     Source(s): Chart Review and Patient/Caregiver Call     Warfarin doses taken: Warfarin taken as instructed  Diet: Increased greens/vitamin K in diet; plans to resume previous intake  Medication/supplement changes: None noted  New illness, injury, or hospitalization: No  Signs or symptoms of bleeding or clotting: No  Previous result: Therapeutic last 2(+) visits  Additional findings: None       PLAN     Recommended plan for temporary change(s) affecting INR     Dosing Instructions: booster dose then continue your current warfarin dose with next INR in 1 week   (historically a 12.5 mg boost has moved INR back into range)    Summary  As of 4/8/2024      Full warfarin instructions:  5 mg every Sat; 7.5 mg all other days   Next INR check:  4/22/2024               Telephone call with Rocío who verbalizes understanding and agrees to plan    Patient to recheck with home meter    Education provided:   Please call back if any changes to your diet, medications or how you've been taking warfarin    Plan made per [facility] anticoagulation protocol    Mayra Palma, RN  Anticoagulation Clinic  4/8/2024    _______________________________________________________________________     Anticoagulation Episode Summary       Current INR goal:  2.0-3.0   TTR:  73.0% (1 y)   Target end date:  Indefinite   Send INR reminders to:  [facility]IT[facility]    Indications    Acute pulmonary embolism  unspecified pulmonary embolism type  unspecified whether acute cor pulmonale present (H) (Resolved) [I26.99]  Factor 5 Leiden mutation  heterozygous (H24) [D68.51]  Other acute pulmonary embolism without acute cor pulmonale (H) (Resolved) [I26.99]  Acute pulmonary embolism  unspecified pulmonary embolism type  unspecified whether acute cor  pulmonale present (H) (Resolved) [I26.99]  Antiphospholipid syndrome (H24) [D68.61]  Long term current use of anticoagulant therapy [Z79.01]  Acute pulmonary embolism without acute cor pulmonale  unspecified pulmonary embolism type (H) (Resolved) [I26.99]  Acute pulmonary embolism without acute cor pulmonale  unspecified pulmonary embolism type (H) [I26.99]  Acute pulmonary embolism  unspecified pulmonary embolism type  unspecified whether acute cor pulmonale present (H) [I26.99]             Comments:  [employer] home meter// manage by exception// Antiphospholipid antibody syndrome (may need CF10 if INR elevated with no known reason)             Anticoagulation Care Providers       Provider Role Specialty Phone number    Jesse Farnsworth MD Referring Family Medicine [phone]    Yessy Lyn APRN [facility] Referring Family Medicine [phone]    Debi Carrasco MD Referring Cardiovascular Disease [phone]    Dionicio Liang MD Referring Family Medicine [phone]

## 2024-04-22 ENCOUNTER — ANTICOAGULATION THERAPY VISIT (OUTPATIENT)
Dept: ANTICOAGULATION | Facility: CLINIC | Age: 29
End: 2024-04-22
Payer: COMMERCIAL

## 2024-04-22 DIAGNOSIS — I26.99 ACUTE PULMONARY EMBOLISM WITHOUT ACUTE COR PULMONALE, UNSPECIFIED PULMONARY EMBOLISM TYPE (H): ICD-10-CM

## 2024-04-22 DIAGNOSIS — D68.51 FACTOR 5 LEIDEN MUTATION, HETEROZYGOUS (H): Primary | ICD-10-CM

## 2024-04-22 DIAGNOSIS — I26.99 ACUTE PULMONARY EMBOLISM, UNSPECIFIED PULMONARY EMBOLISM TYPE, UNSPECIFIED WHETHER ACUTE COR PULMONALE PRESENT (H): ICD-10-CM

## 2024-04-22 DIAGNOSIS — D68.61 ANTIPHOSPHOLIPID SYNDROME (H): ICD-10-CM

## 2024-04-22 DIAGNOSIS — Z79.01 LONG TERM CURRENT USE OF ANTICOAGULANT THERAPY: ICD-10-CM

## 2024-04-22 LAB — INR HOME MONITORING: 2.9 (ref 2–3)

## 2024-05-06 ENCOUNTER — PATIENT OUTREACH (OUTPATIENT)
Dept: CARE COORDINATION | Facility: CLINIC | Age: 29
End: 2024-05-06
Payer: COMMERCIAL

## 2024-05-07 ENCOUNTER — ANTICOAGULATION THERAPY VISIT (OUTPATIENT)
Dept: ANTICOAGULATION | Facility: CLINIC | Age: 29
End: 2024-05-07
Payer: COMMERCIAL

## 2024-05-07 DIAGNOSIS — Z79.01 LONG TERM CURRENT USE OF ANTICOAGULANT THERAPY: ICD-10-CM

## 2024-05-07 DIAGNOSIS — D68.61 ANTIPHOSPHOLIPID SYNDROME (H): ICD-10-CM

## 2024-05-07 DIAGNOSIS — D68.51 FACTOR 5 LEIDEN MUTATION, HETEROZYGOUS (H): Primary | ICD-10-CM

## 2024-05-07 DIAGNOSIS — I26.99 ACUTE PULMONARY EMBOLISM, UNSPECIFIED PULMONARY EMBOLISM TYPE, UNSPECIFIED WHETHER ACUTE COR PULMONALE PRESENT (H): ICD-10-CM

## 2024-05-07 DIAGNOSIS — I26.99 ACUTE PULMONARY EMBOLISM WITHOUT ACUTE COR PULMONALE, UNSPECIFIED PULMONARY EMBOLISM TYPE (H): ICD-10-CM

## 2024-05-07 LAB — INR HOME MONITORING: 2.1 (ref 2–3)

## 2024-05-07 NOTE — PROGRESS NOTES
ANTICOAGULATION MANAGEMENT     Rocío Catherine 28 year old female is on warfarin with therapeutic INR result. (Goal INR 2.0-3.0)    Recent labs: (last 7 days)     05/07/24  0000   INR 2.1       ASSESSMENT     Source(s): Chart Review  Previous INR was Therapeutic last visit; previously outside of goal range  Medication, diet, health changes since last INR chart reviewed; none identified         PLAN     Recommended plan for no diet, medication or health factor changes affecting INR     Dosing Instructions: Continue your current warfarin dose with next INR in 2 weeks       Summary  As of 5/7/2024      Full warfarin instructions:  5 mg every Sat; 7.5 mg all other days   Next INR check:  5/21/2024               Sent [employer] message with dosing and follow up instructions    Patient to recheck with home meter    Education provided:   Resume manage by exception with home monitor. Continue to submit INR results to home monitor company.You will only be called when your result is out of range. Please call and notify [facility] if new medication started, dose missed, signs or symptoms of bleeding or clotting, or a surgery/procedure is scheduled. Due for next call no later than: 8/5/24.  Contact [phone]  with any changes, questions or concerns.     Plan made per [facility] anticoagulation protocol    Sarah Felipe, RN  Anticoagulation Clinic  5/7/2024    _______________________________________________________________________     Anticoagulation Episode Summary       Current INR goal:  2.0-3.0   TTR:  71.6% (1 y)   Target end date:  Indefinite   Send INR reminders to:  [employer] HOME MONITOR[facility]    Indications    Acute pulmonary embolism  unspecified pulmonary embolism type  unspecified whether acute cor pulmonale present (H) (Resolved) [I26.99]  Factor 5 Leiden mutation  heterozygous (H24) [D68.51]  Other acute pulmonary embolism without acute cor pulmonale (H) (Resolved) [I26.99]  Acute pulmonary embolism  unspecified pulmonary  embolism type  unspecified whether acute cor pulmonale present (H) (Resolved) [I26.99]  Antiphospholipid syndrome (H24) [D68.61]  Long term current use of anticoagulant therapy [Z79.01]  Acute pulmonary embolism without acute cor pulmonale  unspecified pulmonary embolism type (H) (Resolved) [I26.99]  Acute pulmonary embolism without acute cor pulmonale  unspecified pulmonary embolism type (H) [I26.99]  Acute pulmonary embolism  unspecified pulmonary embolism type  unspecified whether acute cor pulmonale present (H) [I26.99]             Comments:  [employer] home meter// manage by exception// Antiphospholipid antibody syndrome (may need CF10 if INR elevated with no known reason)             Anticoagulation Care Providers       Provider Role Specialty Phone number    Jesse Farnsworth MD Referring Family Medicine [phone]    Yessy Lyn APRN [facility] Referring Family Medicine [phone]    Debi Carrasco MD Referring Cardiovascular Disease [phone]    Dionicio Liang MD Referring Family Medicine [phone]

## 2024-05-20 ENCOUNTER — DOCUMENTATION ONLY (OUTPATIENT)
Dept: ANTICOAGULATION | Facility: CLINIC | Age: 29
End: 2024-05-20
Payer: COMMERCIAL

## 2024-05-20 DIAGNOSIS — D68.61 ANTIPHOSPHOLIPID SYNDROME (H): ICD-10-CM

## 2024-05-20 DIAGNOSIS — I26.99 ACUTE PULMONARY EMBOLISM, UNSPECIFIED PULMONARY EMBOLISM TYPE, UNSPECIFIED WHETHER ACUTE COR PULMONALE PRESENT (H): ICD-10-CM

## 2024-05-20 DIAGNOSIS — D68.51 FACTOR 5 LEIDEN MUTATION, HETEROZYGOUS (H): Primary | ICD-10-CM

## 2024-05-20 DIAGNOSIS — I26.99 ACUTE PULMONARY EMBOLISM WITHOUT ACUTE COR PULMONALE, UNSPECIFIED PULMONARY EMBOLISM TYPE (H): ICD-10-CM

## 2024-05-20 DIAGNOSIS — Z79.01 LONG TERM CURRENT USE OF ANTICOAGULANT THERAPY: ICD-10-CM

## 2024-05-20 LAB — INR HOME MONITORING: 2.6 (ref 2–3)

## 2024-05-20 NOTE — PROGRESS NOTES
ANTICOAGULATION  MANAGEMENT-Home Monitor Managed by Exception    Rocío GRISEL Catherine 28 year old female is on warfarin with therapeutic INR result. (Goal INR 2.0-3.0)    Recent labs: (last 7 days)     05/20/24  0000   INR 2.6       Previous INR was Therapeutic  Medication, diet, health changes since last INR:chart reviewed; none identified  Contacted within the last 12 weeks by phone on  5/7/24   Due for next call no later than: 8/5/24.   Last [facility] referral date: 10/24/2023      PLAN     Rocío was NOT contacted regarding therapeutic result today per home monitoring policy manage by exception agreement.   Current warfarin dose is to be continued:     Summary  As of 5/20/2024      Full warfarin instructions:  5 mg every Sat; 7.5 mg all other days   Next INR check:  6/3/2024             ?   Mayra Palma RN  Anticoagulation Clinic  5/20/2024    _______________________________________________________________________     Anticoagulation Episode Summary       Current INR goal:  2.0-3.0   TTR:  71.6% (1 y)   Target end date:  Indefinite   Send INR reminders to:  [facility]    Indications    Acute pulmonary embolism  unspecified pulmonary embolism type  unspecified whether acute cor pulmonale present (H) (Resolved) [I26.99]  Factor 5 Leiden mutation  heterozygous (H24) [D68.51]  Other acute pulmonary embolism without acute cor pulmonale (H) (Resolved) [I26.99]  Acute pulmonary embolism  unspecified pulmonary embolism type  unspecified whether acute cor pulmonale present (H) (Resolved) [I26.99]  Antiphospholipid syndrome (H24) [D68.61]  Long term current use of anticoagulant therapy [Z79.01]  Acute pulmonary embolism without acute cor pulmonale  unspecified pulmonary embolism type (H) (Resolved) [I26.99]  Acute pulmonary embolism without acute cor pulmonale  unspecified pulmonary embolism type (H) [I26.99]  Acute pulmonary embolism  unspecified pulmonary embolism type  unspecified whether acute cor pulmonale  present (H) [I26.99]             Comments:  [employer] home meter// manage by exception// Antiphospholipid antibody syndrome (may need CF10 if INR elevated with no known reason)             Anticoagulation Care Providers       Provider Role Specialty Phone number    Jesse Farnsworth MD Referring Family Medicine [phone]    Yessy Lyn APRN [facility] Referring Family Medicine [phone]    Debi Carrasco MD Referring Cardiovascular Disease [phone]    Dionicio Liang MD Referring Family Medicine [phone]

## 2024-05-24 ENCOUNTER — HOSPITAL ENCOUNTER (EMERGENCY)
Facility: CLINIC | Age: 29
Discharge: HOME | End: 2024-05-24
Attending: PHYSICIAN ASSISTANT | Admitting: PHYSICIAN ASSISTANT
Payer: COMMERCIAL

## 2024-05-24 VITALS
HEART RATE: 94 BPM | OXYGEN SATURATION: 100 % | RESPIRATION RATE: 22 BRPM | SYSTOLIC BLOOD PRESSURE: 120 MMHG | DIASTOLIC BLOOD PRESSURE: 77 MMHG | TEMPERATURE: 98.3 F

## 2024-05-24 DIAGNOSIS — R35.0 URINARY FREQUENCY: ICD-10-CM

## 2024-05-24 LAB
ALBUMIN UR-MCNC: NEGATIVE MG/DL
APPEARANCE UR: CLEAR
BACTERIA #/AREA URNS HPF: ABNORMAL /HPF
BILIRUB UR QL STRIP: NEGATIVE
COLOR UR AUTO: ABNORMAL
GLUCOSE UR STRIP-MCNC: NEGATIVE MG/DL
HGB UR QL STRIP: ABNORMAL
KETONES UR STRIP-MCNC: NEGATIVE MG/DL
LEUKOCYTE ESTERASE UR QL STRIP: NEGATIVE
NITRATE UR QL: NEGATIVE
PH UR STRIP: 8 [PH] (ref 5–7)
RBC URINE: 3 /HPF
SP GR UR STRIP: 1 (ref 1–1.03)
SQUAMOUS EPITHELIAL: <1 /HPF
UROBILINOGEN UR STRIP-MCNC: NORMAL MG/DL
WBC URINE: 0 /HPF

## 2024-05-24 PROCEDURE — 87086 URINE CULTURE/COLONY COUNT: CPT | Performed by: PHYSICIAN ASSISTANT

## 2024-05-24 PROCEDURE — 81001 URINALYSIS AUTO W/SCOPE: CPT | Performed by: PHYSICIAN ASSISTANT

## 2024-05-24 PROCEDURE — 99213 OFFICE O/P EST LOW 20 MIN: CPT | Performed by: PHYSICIAN ASSISTANT

## 2024-05-24 PROCEDURE — G0463 HOSPITAL OUTPT CLINIC VISIT: HCPCS

## 2024-05-24 RX ORDER — FLUCONAZOLE 150 MG/1
150 TABLET ORAL ONCE
Qty: 1 TABLET | Refills: 0 | Status: SHIPPED | OUTPATIENT
Start: 2024-05-24 | End: 2024-05-24

## 2024-05-24 RX ORDER — CEPHALEXIN 500 MG/1
500 CAPSULE ORAL 2 TIMES DAILY
Qty: 10 CAPSULE | Refills: 0 | Status: SHIPPED | OUTPATIENT
Start: 2024-05-24 | End: 2024-05-29

## 2024-05-24 ASSESSMENT — ENCOUNTER SYMPTOMS
HEMATURIA: 0
DYSURIA: 0
CONSTITUTIONAL NEGATIVE: 1
GASTROINTESTINAL NEGATIVE: 1
BACK PAIN: 0
MUSCULOSKELETAL NEGATIVE: 1
FREQUENCY: 1
FLANK PAIN: 0
ABDOMINAL PAIN: 0
FEVER: 0

## 2024-05-24 ASSESSMENT — ACTIVITIES OF DAILY LIVING (ADL): ADLS_ACUITY_SCORE: 36

## 2024-05-25 NOTE — ED PROVIDER NOTES
History     Chief Complaint   Patient presents with    Urinary Frequency     Urinary urgency , onset this morning     HPI  Rocío Catherine is a 28 year old female who presents to Urgent Care with complaints of urinary frequency since this morning.  Patient states she has had UTIs in the past, but she typically has a dysuria with them and does not currently have that symptom.  Denies fevers, chills, nausea, vomiting, abdominal pain, back or flank pain, hematuria, or vaginal discharge.  Denies history of kidney stones.  She is anticoagulated on Coumadin (history of factor V Leiden mutation, PE).      Allergies:  No Known Allergies    Problem List:    Patient Active Problem List    Diagnosis Date Noted    Acute pulmonary embolism without acute cor pulmonale, unspecified pulmonary embolism type (H) 10/24/2023     Priority: Medium    Acute pulmonary embolism, unspecified pulmonary embolism type, unspecified whether acute cor pulmonale present (H) 10/24/2023     Priority: Medium    History of pulmonary embolism 2023     Priority: Medium    Paroxysmal supraventricular tachycardia (H24) 2022     Priority: Medium    Long term current use of anticoagulant therapy 10/29/2021     Priority: Medium    Antiphospholipid syndrome (H24) 2021     Priority: Medium    S/P  2020     Priority: Medium    Chronic migraine without aura without status migrainosus, not intractable 2019     Priority: Medium    Sinus tachycardia 2019     Priority: Medium    Non-rheumatic mitral regurgitation, trace 2019     Priority: Medium     Seen on echocardiogram 2019. Recommended for follow up echo in 2-3 years.       Recurrent major depressive disorder, in remission (H24) 2018     Priority: Medium    Anxiety 2018     Priority: Medium    Factor 5 Leiden mutation, heterozygous (H24) 2018     Priority: Medium    Anemia 2016     Priority: Medium    Dysthymia 2013     Priority:  Medium        Past Medical History:    Past Medical History:   Diagnosis Date    Calculus of gallbladder with acute cholecystitis without obstruction 2017    Chickenpox     Depression     Factor 5 Leiden mutation, heterozygous (H24) 2018    Factor V Leiden (H24)     History of foot fracture     History of frequent urinary tract infections     History of pyelonephritis     History of recurrent UTI (urinary tract infection)     Non-rheumatic mitral regurgitation, trace 2019    Ovarian cyst     Postpartum depression     Pulmonary emboli (H) 2020       Past Surgical History:    Past Surgical History:   Procedure Laterality Date     SECTION N/A 2020    Procedure:  SECTION;  Surgeon: Grace Dee MD;  Location: [facility] OR     SECTION      c section    ENT SURGERY      GYN SURGERY  Csections ()    HEAD & NECK SURGERY      hemangioma on neck at age 2    LAPAROSCOPIC CHOLECYSTECTOMY      OTHER SURGICAL HISTORY      Spinal Cord Surgery    SINUS FRONTAL OPEN OBLITERATION             Family History:    Family History   Problem Relation Age of Onset    Stomach Problem Mother         ulcer    Bleeding Disorder Father         factor V    Ovarian Cancer Maternal Grandmother     Other Cancer Maternal Grandmother     Thyroid Cancer Paternal Grandmother     Alzheimer Disease Paternal Grandfather     Bleeding Disorder Paternal Grandfather         factor V    Bleeding Disorder Daughter         Factor V Leiden       Social History:  Marital Status:   [2]  Social History     Tobacco Use    Smoking status: Never    Smokeless tobacco: Never   Vaping Use    Vaping status: Never Used   Substance Use Topics    Alcohol use: Yes     Comment:  rare    Drug use: No        Medications:    amoxicillin (AMOXIL) 500 MG capsule  budesonide (PULMICORT) 0.5 MG/2ML neb solution  cephALEXin (KEFLEX) 500 MG capsule  fluconazole (DIFLUCAN) 150 MG tablet  fluconazole (DIFLUCAN) 150 MG  tablet  hydrOXYzine (ATARAX) 25 MG tablet  ipratropium (ATROVENT) 0.06 % nasal spray  metoclopramide (REGLAN) 10 MG tablet  sertraline (ZOLOFT) 50 MG tablet  warfarin ANTICOAGULANT (JANTOVEN ANTICOAGULANT) 5 MG tablet          Review of Systems   Constitutional: Negative.  Negative for fever.   Gastrointestinal: Negative.  Negative for abdominal pain.   Genitourinary:  Positive for frequency. Negative for dysuria, flank pain, hematuria and urgency.   Musculoskeletal: Negative.  Negative for back pain.   Skin: Negative.    All other systems reviewed and are negative.      Physical Exam   BP: 120/77  Pulse: 94  Temp: 98.3  F (36.8  C)  Resp: 22  SpO2: 100 %      Physical Exam  Constitutional:       General: She is not in acute distress.     Appearance: Normal appearance. She is not ill-appearing, toxic-appearing or diaphoretic.   HENT:      Head: Normocephalic and atraumatic.   Eyes:      Conjunctiva/sclera: Conjunctivae normal.   Cardiovascular:      Rate and Rhythm: Normal rate and regular rhythm.      Heart sounds: Normal heart sounds.   Pulmonary:      Effort: Pulmonary effort is normal.      Breath sounds: Normal breath sounds.   Abdominal:      General: There is no distension.      Palpations: Abdomen is soft.      Tenderness: There is no abdominal tenderness. There is no right CVA tenderness, left CVA tenderness, guarding or rebound.   Musculoskeletal:      Cervical back: Neck supple.   Skin:     General: Skin is warm and dry.   Neurological:      Mental Status: She is alert.         ED Course        Procedures    Results for orders placed or performed during the hospital encounter of 05/24/24 (from the past 24 hour(s))   UA Macroscopic with reflex to Microscopic and Culture    Specimen: Urine, Clean Catch   Result Value Ref Range    Color Urine Straw Colorless, Straw, Light Yellow, Yellow    Appearance Urine Clear Clear    Glucose Urine Negative Negative mg/dL    Bilirubin Urine Negative Negative    Ketones  Urine Negative Negative mg/dL    Specific Gravity Urine 1.005 1.003 - 1.035    Blood Urine Small (A) Negative    pH Urine 8.0 (H) 5.0 - 7.0    Protein Albumin Urine Negative Negative mg/dL    Urobilinogen Urine Normal Normal, 2.0 mg/dL    Nitrite Urine Negative Negative    Leukocyte Esterase Urine Negative Negative    Bacteria Urine Few (A) None Seen /HPF    RBC Urine 3 (H) <=2 /HPF    WBC Urine 0 <=5 /HPF    Squamous Epithelials Urine <1 <=1 /HPF    Narrative    Urine Culture not indicated       Medications - No data to display    Assessments & Plan (with Medical Decision Making)     Pt is a 28 year old female who presents to Urgent Care with complaints of urinary frequency since this morning.  Patient states she has had UTIs in the past, but she typically has a dysuria with them and does not currently have that symptom.  Denies history of kidney stones.  She is anticoagulated on Coumadin (history of factor V Leiden mutation, PE).    Pt is afebrile on arrival.  Exam as above.  Urinalysis was positive for bacteria and 3 RBCs.  0 WBCs noted.  No nitrates or leuk esterase.  Discussed results with patient.  Urine was sent for culture.  Encouraged continued hydration and symptomatic treatments at home.  Patient is leaving on vacation to [place] tomorrow.  Will send with prescription for Keflex (and patient request for Diflucan as she is prone to yeast infections if she needs to take the antibiotics).  I instructed patient to hold off on taking these antibiotics unless symptoms persist/worsen or if urine culture ends up being positive.  Patient in agreement with plan.  Her symptoms do not seem consistent with a kidney stone.  Also lower suspicion for vaginal etiologies has had BV/yeast.  Return precautions were reviewed.  Hand-outs were provided.    Patient was instructed to follow-up with PCP for continued care and management.  She is to return to the ED for persistent and/or worsening symptoms.  Patient expressed  understanding of the diagnosis and plan and was discharged home in good condition.    I have reviewed the nursing notes.    I have reviewed the findings, diagnosis, plan and need for follow up with the patient.    Discharge Medication List as of 5/24/2024  7:42 PM        START taking these medications    Details   cephALEXin (KEFLEX) 500 MG capsule Take 1 capsule (500 mg) by mouth 2 times daily for 5 days, Disp-10 capsule, R-0, E-Prescribe      !! fluconazole (DIFLUCAN) 150 MG tablet Take 1 tablet (150 mg) by mouth once for 1 dose, Disp-1 tablet, R-0, E-Prescribe       !! - Potential duplicate medications found. Please discuss with provider.          Final diagnoses:   Urinary frequency       5/24/2024   [facility] EMERGENCY DEPT      Disclaimer:  This note consists of symbols derived from keyboarding, dictation and/or voice recognition software.  As a result, there may be errors in the script that have gone undetected.  Please consider this when interpreting information found in this chart.     Serena Morillo PA-C  05/24/24 2012

## 2024-05-25 NOTE — DISCHARGE INSTRUCTIONS
Your urine culture is pending.  Will notify you of any positive results.  Since you are going out of state on vacation, I sent a prescription for antibiotics to the pharmacy.  Recommend pushing fluids and continuing to monitor your symptoms over the next 24 hours.  You may hold off on taking these antibiotics unless symptoms persist/worsen or if urine culture ends up being positive.

## 2024-05-26 LAB — BACTERIA UR CULT: NORMAL

## 2024-05-28 ENCOUNTER — TELEPHONE (OUTPATIENT)
Dept: ANTICOAGULATION | Facility: CLINIC | Age: 29
End: 2024-05-28
Payer: COMMERCIAL

## 2024-05-28 NOTE — TELEPHONE ENCOUNTER
"ANTICOAGULATION  MANAGEMENT: Discharge Review    Rocío Catherine chart reviewed for anticoagulation continuity of care    Emergency room visit on 5/24/2024 for urinary frequency.    Discharge disposition: Home    Results:    No results for input(s): \"INR\", \"[id number]\", \"F2\", \"ALMWH\", \"AAUFH\" in the last 168 hours.  Anticoagulation inpatient management:     not applicable     Anticoagulation discharge instructions:     Warfarin dosing: home regimen continued   Bridging: No   INR goal change: No      Medication changes affecting anticoagulation: Yes: patient was prescribed Keflex and a one-time dose of diflucan to take if she starts the antibiotics as she is prone to yeast infections if taking antibiotics. Patient was advised to wait on taking the antibiotics unless her symptoms worsen or if the urine culture comes back positive. Patient was leaving for [place] so was sent with the prescription just in case she needed it.     Additional factors affecting anticoagulation: No     PLAN     Recommend to check INR on 5/28/24 IF started antibiotics.    Mychart message sent to determine if patient has started the antibiotics, will need to check INR as soon as possible if she has started them.    If patient has not yet started the medications, and symptoms are improved, she can keep her recheck date of 6/3/24.    Anticoagulation Calendar updated    Sarah Felipe RN    ANTICOAGULATION  MANAGEMENT     Interacting Medication Review    Interacting medication(s): Fluconazole (Diflucan) with warfarin.    Duration: Single dose to be taken IF patient started Keflex for UTI     Indication:  to be taken with antibiotics to help prevent yeast infection    New medication?: Yes, interaction may increase INR and risk of bleeding. Closer INR monitoring recommended.        PLAN       Mychart sent to patient that IF she has started her Keflex and also Diflucan, she needs to check INR as soon as possible. Patient was given these " prescriptions with instruction to wait to start until urine culture results or if symptoms worsen. Chart review indicates culture was not indicated.        Plan made per [facility] anticoagulation protocol    Sarah Felipe RN  Anticoagulation Clinic

## 2024-06-03 ENCOUNTER — ANTICOAGULATION THERAPY VISIT (OUTPATIENT)
Dept: ANTICOAGULATION | Facility: CLINIC | Age: 29
End: 2024-06-03
Payer: COMMERCIAL

## 2024-06-03 DIAGNOSIS — D68.51 FACTOR 5 LEIDEN MUTATION, HETEROZYGOUS (H): Primary | ICD-10-CM

## 2024-06-03 DIAGNOSIS — D68.61 ANTIPHOSPHOLIPID SYNDROME (H): ICD-10-CM

## 2024-06-03 DIAGNOSIS — I26.99 ACUTE PULMONARY EMBOLISM, UNSPECIFIED PULMONARY EMBOLISM TYPE, UNSPECIFIED WHETHER ACUTE COR PULMONALE PRESENT (H): ICD-10-CM

## 2024-06-03 DIAGNOSIS — I26.99 ACUTE PULMONARY EMBOLISM WITHOUT ACUTE COR PULMONALE, UNSPECIFIED PULMONARY EMBOLISM TYPE (H): ICD-10-CM

## 2024-06-03 DIAGNOSIS — Z79.01 LONG TERM CURRENT USE OF ANTICOAGULANT THERAPY: ICD-10-CM

## 2024-06-03 LAB — INR HOME MONITORING: 2.2 (ref 2–3)

## 2024-06-03 NOTE — PROGRESS NOTES
ANTICOAGULATION MANAGEMENT     Rocío Catherine 28 year old female is on warfarin with therapeutic INR result. (Goal INR 2.0-3.0)    Recent labs: (last 7 days)     06/03/24  0000   INR 2.2       ASSESSMENT     Source(s): Chart Review  Previous INR was Therapeutic last 2(+) visits  Medication, diet, health changes since last INR chart reviewed; none identified  Left a detailed message advising patient to call back if she has started taking Keflex and Diflucan (or if she does in the future)         PLAN     Recommended plan for no diet, medication or health factor changes affecting INR     Dosing Instructions: Continue your current warfarin dose with next INR in 2 weeks       Summary  As of 6/3/2024      Full warfarin instructions:  5 mg every Sat; 7.5 mg all other days   Next INR check:  6/17/2024               Detailed voice message left for Rocío with dosing instructions and follow up date.     Patient to recheck with home meter    Education provided:   Please call back if any changes to your diet, medications or how you've been taking warfarin  Symptom monitoring: monitoring for bleeding signs and symptoms, monitoring for clotting signs and symptoms, and monitoring for stroke signs and symptoms  Resume manage by exception with home monitor. Continue to submit INR results to home monitor company.You will only be called when your result is out of range. Please call and notify [facility] if new medication started, dose missed, signs or symptoms of bleeding or clotting, or a surgery/procedure is scheduled. Due for next call no later than: 9/1/24.  Contact [phone]  with any changes, questions or concerns.     Plan made per [facility] anticoagulation protocol    Jo Ann Horner RN  Anticoagulation Clinic  6/3/2024    _______________________________________________________________________     Anticoagulation Episode Summary       Current INR goal:  2.0-3.0   TTR:  71.6% (1 y)   Target end date:  Indefinite   Send INR reminders to:   [facility]    Indications    Acute pulmonary embolism  unspecified pulmonary embolism type  unspecified whether acute cor pulmonale present (H) (Resolved) [I26.99]  Factor 5 Leiden mutation  heterozygous (H24) [D68.51]  Other acute pulmonary embolism without acute cor pulmonale (H) (Resolved) [I26.99]  Acute pulmonary embolism  unspecified pulmonary embolism type  unspecified whether acute cor pulmonale present (H) (Resolved) [I26.99]  Antiphospholipid syndrome (H24) [D68.61]  Long term current use of anticoagulant therapy [Z79.01]  Acute pulmonary embolism without acute cor pulmonale  unspecified pulmonary embolism type (H) (Resolved) [I26.99]  Acute pulmonary embolism without acute cor pulmonale  unspecified pulmonary embolism type (H) [I26.99]  Acute pulmonary embolism  unspecified pulmonary embolism type  unspecified whether acute cor pulmonale present (H) [I26.99]             Comments:  [employer] home meter// manage by exception// Antiphospholipid antibody syndrome (may need CF10 if INR elevated with no known reason)             Anticoagulation Care Providers       Provider Role Specialty Phone number    Jesse Farnsworth MD Referring Family Medicine [phone]    Yessy Lyn, APRN CNP Referring Family Medicine [phone]    Debi Carrasco MD Referring Cardiovascular Disease [phone]    Dionicio Liang MD Referring Family Medicine [phone]

## 2024-06-17 ENCOUNTER — DOCUMENTATION ONLY (OUTPATIENT)
Dept: ANTICOAGULATION | Facility: CLINIC | Age: 29
End: 2024-06-17
Payer: COMMERCIAL

## 2024-06-17 DIAGNOSIS — I26.99 ACUTE PULMONARY EMBOLISM WITHOUT ACUTE COR PULMONALE, UNSPECIFIED PULMONARY EMBOLISM TYPE (H): ICD-10-CM

## 2024-06-17 DIAGNOSIS — Z79.01 LONG TERM CURRENT USE OF ANTICOAGULANT THERAPY: ICD-10-CM

## 2024-06-17 DIAGNOSIS — I26.99 ACUTE PULMONARY EMBOLISM, UNSPECIFIED PULMONARY EMBOLISM TYPE, UNSPECIFIED WHETHER ACUTE COR PULMONALE PRESENT (H): ICD-10-CM

## 2024-06-17 DIAGNOSIS — D68.61 ANTIPHOSPHOLIPID SYNDROME (H): ICD-10-CM

## 2024-06-17 DIAGNOSIS — D68.51 FACTOR 5 LEIDEN MUTATION, HETEROZYGOUS (H): Primary | ICD-10-CM

## 2024-06-17 LAB — INR HOME MONITORING: 2.4 (ref 2–3)

## 2024-06-17 NOTE — PROGRESS NOTES
ANTICOAGULATION  MANAGEMENT-Home Monitor Managed by Exception    Rocío GRISEL Catherine 28 year old female is on warfarin with therapeutic INR result. (Goal INR 2.0-3.0)    Recent labs: (last 7 days)     06/17/24  0000   INR 2.4       Previous INR was Therapeutic  Medication, diet, health changes since last INR:chart reviewed; none identified  Contacted within the last 12 weeks by phone on 6/3/24  Mychart sent today with copy of dosing  Last [facility] referral date: 10/24/2023      PLAN     Rocío was NOT contacted regarding therapeutic result today per home monitoring policy manage by exception agreement.   Current warfarin dose is to be continued:     Summary  As of 6/17/2024      Full warfarin instructions:  5 mg every Sat; 7.5 mg all other days   Next INR check:  7/1/2024             ?   Dayami Mcfarland RN  Anticoagulation Clinic  6/17/2024    _______________________________________________________________________     Anticoagulation Episode Summary       Current INR goal:  2.0-3.0   TTR:  74.4% (1 y)   Target end date:  Indefinite   Send INR reminders to:  [facility]    Indications    Acute pulmonary embolism  unspecified pulmonary embolism type  unspecified whether acute cor pulmonale present (H) (Resolved) [I26.99]  Factor 5 Leiden mutation  heterozygous (H24) [D68.51]  Other acute pulmonary embolism without acute cor pulmonale (H) (Resolved) [I26.99]  Acute pulmonary embolism  unspecified pulmonary embolism type  unspecified whether acute cor pulmonale present (H) (Resolved) [I26.99]  Antiphospholipid syndrome (H24) [D68.61]  Long term current use of anticoagulant therapy [Z79.01]  Acute pulmonary embolism without acute cor pulmonale  unspecified pulmonary embolism type (H) (Resolved) [I26.99]  Acute pulmonary embolism without acute cor pulmonale  unspecified pulmonary embolism type (H) [I26.99]  Acute pulmonary embolism  unspecified pulmonary embolism type  unspecified whether acute cor pulmonale present  (H) [I26.99]             Comments:  [employer] home meter// manage by exception// Antiphospholipid antibody syndrome (may need CF10 if INR elevated with no known reason)             Anticoagulation Care Providers       Provider Role Specialty Phone number    Jesse Farnsworth MD Referring Family Medicine [phone]    Yessy Lyn APRN [facility] Referring Family Medicine [phone]    Debi Carrasco MD Referring Cardiovascular Disease [phone]    Dionicio Liang MD Referring Family Medicine [phone]

## 2024-07-02 ENCOUNTER — DOCUMENTATION ONLY (OUTPATIENT)
Dept: ANTICOAGULATION | Facility: CLINIC | Age: 29
End: 2024-07-02
Payer: COMMERCIAL

## 2024-07-02 DIAGNOSIS — D68.51 FACTOR 5 LEIDEN MUTATION, HETEROZYGOUS (H): Primary | ICD-10-CM

## 2024-07-02 DIAGNOSIS — Z79.01 LONG TERM CURRENT USE OF ANTICOAGULANT THERAPY: ICD-10-CM

## 2024-07-02 DIAGNOSIS — D68.61 ANTIPHOSPHOLIPID SYNDROME (H): ICD-10-CM

## 2024-07-02 DIAGNOSIS — I26.99 ACUTE PULMONARY EMBOLISM, UNSPECIFIED PULMONARY EMBOLISM TYPE, UNSPECIFIED WHETHER ACUTE COR PULMONALE PRESENT (H): ICD-10-CM

## 2024-07-02 DIAGNOSIS — I26.99 ACUTE PULMONARY EMBOLISM WITHOUT ACUTE COR PULMONALE, UNSPECIFIED PULMONARY EMBOLISM TYPE (H): ICD-10-CM

## 2024-07-02 LAB — INR HOME MONITORING: 2.5 (ref 2–3)

## 2024-07-02 NOTE — PROGRESS NOTES
ANTICOAGULATION  MANAGEMENT-Home Monitor Managed by Exception    Rocío Catherine 28 year old female is on warfarin with therapeutic INR result. (Goal INR 2.0-3.0)    Recent labs: (last 7 days)     07/02/24  0000   INR 2.5       Previous INR was Therapeutic  Medication, diet, health changes since last INR:Yes: Macrobid prescribed 6/30/24 for 7 days ([facility], but not anticipated to affect INR  Contacted within the last 12 weeks by phone on 6/3/24  Due for next call no later than: 9/1/24.   Last [facility] referral date: 10/24/2023      VINI Alford was NOT contacted regarding therapeutic result today per home monitoring policy manage by exception agreement.   Current warfarin dose is to be continued:     Summary  As of 7/2/2024      Full warfarin instructions:  5 mg every Sat; 7.5 mg all other days   Next INR check:  7/16/2024             ?   Carlee Villasenor RN  Anticoagulation Clinic  7/2/2024    _______________________________________________________________________     Anticoagulation Episode Summary       Current INR goal:  2.0-3.0   TTR:  78.5% (1 y)   Target end date:  Indefinite   Send INR reminders to:  [facility]    Indications    Acute pulmonary embolism  unspecified pulmonary embolism type  unspecified whether acute cor pulmonale present (H) (Resolved) [I26.99]  Factor 5 Leiden mutation  heterozygous (H24) [D68.51]  Other acute pulmonary embolism without acute cor pulmonale (H) (Resolved) [I26.99]  Acute pulmonary embolism  unspecified pulmonary embolism type  unspecified whether acute cor pulmonale present (H) (Resolved) [I26.99]  Antiphospholipid syndrome (H24) [D68.61]  Long term current use of anticoagulant therapy [Z79.01]  Acute pulmonary embolism without acute cor pulmonale  unspecified pulmonary embolism type (H) (Resolved) [I26.99]  Acute pulmonary embolism without acute cor pulmonale  unspecified pulmonary embolism type (H) [I26.99]  Acute pulmonary embolism  unspecified pulmonary  embolism type  unspecified whether acute cor pulmonale present (H) [I26.99]             Comments:  [employer] home meter// manage by exception// Antiphospholipid antibody syndrome (may need CF10 if INR elevated with no known reason)             Anticoagulation Care Providers       Provider Role Specialty Phone number    Jesse Farnsworth MD Referring Family Medicine [phone]    Yessy Lyn APRN [facility] Referring Family Medicine [phone]    Debi Carrasco MD Referring Cardiovascular Disease [phone]    Dionicio Liang MD Referring Family Medicine [phone]

## 2024-07-03 DIAGNOSIS — I26.99 ACUTE PULMONARY EMBOLISM (H): ICD-10-CM

## 2024-07-05 RX ORDER — WARFARIN SODIUM 5 MG/1
TABLET ORAL
Qty: 130 TABLET | Refills: 1 | Status: SHIPPED | OUTPATIENT
Start: 2024-07-05 | End: 2024-10-07

## 2024-07-05 NOTE — TELEPHONE ENCOUNTER
ANTICOAGULATION MANAGEMENT:  Medication Refill    Anticoagulation Summary  As of 7/2/2024      Warfarin maintenance plan:  5 mg (5 mg x 1) every Sat; 7.5 mg (5 mg x 1.5) all other days   Next INR check:  7/16/2024   Target end date:  Indefinite    Indications    Acute pulmonary embolism  unspecified pulmonary embolism type  unspecified whether acute cor pulmonale present (H) (Resolved) [I26.99]  Factor 5 Leiden mutation  heterozygous (H24) [D68.51]  Other acute pulmonary embolism without acute cor pulmonale (H) (Resolved) [I26.99]  Acute pulmonary embolism  unspecified pulmonary embolism type  unspecified whether acute cor pulmonale present (H) (Resolved) [I26.99]  Antiphospholipid syndrome (H24) [D68.61]  Long term current use of anticoagulant therapy [Z79.01]  Acute pulmonary embolism without acute cor pulmonale  unspecified pulmonary embolism type (H) (Resolved) [I26.99]  Acute pulmonary embolism without acute cor pulmonale  unspecified pulmonary embolism type (H) [I26.99]  Acute pulmonary embolism  unspecified pulmonary embolism type  unspecified whether acute cor pulmonale present (H) [I26.99]                 Anticoagulation Care Providers       Provider Role Specialty Phone number    Jesse Farnsworth MD Referring Family Medicine [phone]    Yessy Lyn APRN [facility] Referring Family Medicine [phone]    Debi Carrasco MD Referring Cardiovascular Disease [phone]    Dionicio Liang MD Referring Family Medicine [phone]            Refill Criteria    Visit with referring provider/group: Meets criteria: office visit within referring provider group in the last 1 year on 3/5/24    [facility] referral last signed: 10/24/2023; within last year: Yes    Lab monitoring not exceeding 2 weeks overdue: Yes    Rocío meets all criteria for refill. Rx instructions and quantity supplied updated to match patient's current dosing plan. Warfarin 90 day supply with 1 refill granted per [facility] protocol      Dayami Mcfarland, RN  Anticoagulation Clinic

## 2024-07-11 ENCOUNTER — MYC MEDICAL ADVICE (OUTPATIENT)
Dept: FAMILY MEDICINE | Facility: CLINIC | Age: 29
End: 2024-07-11
Payer: COMMERCIAL

## 2024-07-12 ENCOUNTER — OFFICE VISIT (OUTPATIENT)
Dept: FAMILY MEDICINE | Facility: CLINIC | Age: 29
End: 2024-07-12
Payer: COMMERCIAL

## 2024-07-12 ENCOUNTER — NURSE TRIAGE (OUTPATIENT)
Dept: FAMILY MEDICINE | Facility: CLINIC | Age: 29
End: 2024-07-12

## 2024-07-12 VITALS
WEIGHT: 110.3 LBS | BODY MASS INDEX: 20.82 KG/M2 | SYSTOLIC BLOOD PRESSURE: 120 MMHG | TEMPERATURE: 98.9 F | DIASTOLIC BLOOD PRESSURE: 70 MMHG | OXYGEN SATURATION: 100 % | HEIGHT: 61 IN | RESPIRATION RATE: 16 BRPM | HEART RATE: 105 BPM

## 2024-07-12 DIAGNOSIS — N36.8 IRRITATION OF URETHRA: Primary | ICD-10-CM

## 2024-07-12 LAB
ALBUMIN UR-MCNC: NEGATIVE MG/DL
APPEARANCE UR: CLEAR
BACTERIA #/AREA URNS HPF: ABNORMAL /HPF
BILIRUB UR QL STRIP: NEGATIVE
COLOR UR AUTO: YELLOW
GLUCOSE UR STRIP-MCNC: NEGATIVE MG/DL
HGB UR QL STRIP: ABNORMAL
KETONES UR STRIP-MCNC: NEGATIVE MG/DL
LEUKOCYTE ESTERASE UR QL STRIP: NEGATIVE
NITRATE UR QL: NEGATIVE
PH UR STRIP: 7.5 [PH] (ref 5–7)
RBC #/AREA URNS AUTO: ABNORMAL /HPF
SP GR UR STRIP: 1.01 (ref 1–1.03)
SQUAMOUS #/AREA URNS AUTO: ABNORMAL /LPF
UROBILINOGEN UR STRIP-ACNC: 0.2 E.U./DL
WBC #/AREA URNS AUTO: ABNORMAL /HPF

## 2024-07-12 PROCEDURE — 99213 OFFICE O/P EST LOW 20 MIN: CPT

## 2024-07-12 PROCEDURE — 81001 URINALYSIS AUTO W/SCOPE: CPT

## 2024-07-12 ASSESSMENT — PATIENT HEALTH QUESTIONNAIRE - PHQ9
SUM OF ALL RESPONSES TO PHQ QUESTIONS 1-9: 3
SUM OF ALL RESPONSES TO PHQ QUESTIONS 1-9: 3

## 2024-07-12 ASSESSMENT — PAIN SCALES - GENERAL: PAINLEVEL: NO PAIN (0)

## 2024-07-12 NOTE — PROGRESS NOTES
Assessment & Plan     Irritation of urethra  Patient reports continued irritation in urethra. Patient denies burning or increased urgency or frequency. Patient reported minimal vaginal dryness. Patient reports she had menstrual cycle during UTI treatment and used tampons. Patient completed recent course of antibiotics with last administration on 4 days ago. Patient education regarding healing expectations of UTI. Patient left UA for repeat testing.     - UA with Microscopic reflex to Culture - Clinic Collect  - UA Microscopic with Reflex to Culture    Addend 7/12/24 - UA results are positive for few bacteria and a few squamous epithelial cells in urine with trace blood. Indicating possible BV. Sent message to patient regarding if symptoms continue to make appointment at clinic for wet prep test.       Subjective   Rocío is a 28 year old, presenting for the following health issues:  Vaginal Problem (Vaginal dryness - had a UTI,  recently been treated in urgent care in may with keflex and diflucan. Seen again in June for urinary symptoms finsihed macrobid this last Monday. )        7/12/2024     2:07 PM   Additional Questions   Roomed by Lula ALVAREZ     Vaginal Problem     History of Present Illness       Reason for visit:  Uti  Symptom onset:  3-7 days ago  Symptoms include:  Burning  Symptom intensity:  Moderate  Symptom progression:  Staying the same  Had these symptoms before:  Yes  Has tried/received treatment for these symptoms:  Yes  Previous treatment was successful:  Yes  Prior treatment description:  Antibiotic  What makes it worse:  No  What makes it better:  No    She eats 0-1 servings of fruits and vegetables daily.She consumes 1 sweetened beverage(s) daily.She exercises with enough effort to increase her heart rate 10 to 19 minutes per day.  She exercises with enough effort to increase her heart rate 3 or less days per week.   She is taking medications regularly.         Genitourinary -  "Female  Onset/Duration: x 3 weeks   Description:   Painful urination (Dysuria): No           Frequency: No  Blood in urine (Hematuria): No  Delay in urine (Hesitency): No  Intensity: moderate  Progression of Symptoms:  same  Accompanying Signs & Symptoms:  Fever/chills: No  Flank pain: No  Nausea and vomiting: No  Vaginal symptoms: burning/dry sensation   Abdominal/Pelvic Pain: No  History:   History of frequent UTI s: YES  History of kidney stones: No  Sexually Active: YES  Possibility of pregnancy: No  Precipitating or alleviating factors: None  Therapies tried and outcome:     Problem (Vaginal dryness - had a UTI,  recently been treated in urgent care in may with keflex and diflucan. Seen again in June for urinary symptoms finsihed macrobid this last Monday. )  Therapies tried and outcome: none        Review of Systems  CONSTITUTIONAL: NEGATIVE for fever, chills, change in weight  ENT/MOUTH: NEGATIVE for ear, mouth and throat problems  RESP: NEGATIVE for significant cough or SOB  CV: NEGATIVE for chest pain, palpitations or peripheral edema      Objective    /70 (BP Location: Left arm, Patient Position: Sitting, Cuff Size: Adult Regular)   Pulse 105   Temp 98.9  F (37.2  C) (Tympanic)   Resp 16   Ht 1.55 m (5' 1.02\")   Wt 50 kg (110 lb 4.8 oz)   LMP 07/06/2024 (Approximate)   SpO2 100%   BMI 20.82 kg/m    Body mass index is 20.82 kg/m .  Physical Exam   GENERAL: alert and no distress  NECK: no adenopathy, no asymmetry, masses, or scars  RESP: lungs clear to auscultation - no rales, rhonchi or wheezes  CV: regular rate and rhythm, normal S1 S2, no S3 or S4, no murmur, click or rub, no peripheral edema  ABDOMEN: soft, nontender, no hepatosplenomegaly, no masses and bowel sounds normal  MS: no gross musculoskeletal defects noted, no edema          Signed Electronically by: SNOW Jade CNP    "

## 2024-07-12 NOTE — TELEPHONE ENCOUNTER
Left message on answering machine for patient to call back.     Patient seen in UC on 5/24/2024 where Keflex was prescribed for UTI and Diflucan for possible yeast infection as she tends to get these when she takes antibiotics. Per notes, patient was told to hold off on taking these antibiotics unless symptoms persist/worsen or if urine culture ends up being positive. Urine culture ended up being negative.     Seen again in UC on 6/30/2024 where patient stated she started previously prescribed Keflex on 6/28/2024 and had no improvement in symptoms. She was then prescribed Macrobid for 7 days.     Patient now stating she had has burning even when she is not urinating. She thinks it may be related to using tampons for her period while having a UTI.     Need to triage patient and possibly educate.     Sadia DEWITT RN  [facility]  [phone]

## 2024-07-15 ENCOUNTER — DOCUMENTATION ONLY (OUTPATIENT)
Dept: ANTICOAGULATION | Facility: CLINIC | Age: 29
End: 2024-07-15
Payer: COMMERCIAL

## 2024-07-15 DIAGNOSIS — Z79.01 LONG TERM CURRENT USE OF ANTICOAGULANT THERAPY: ICD-10-CM

## 2024-07-15 DIAGNOSIS — I26.99 ACUTE PULMONARY EMBOLISM WITHOUT ACUTE COR PULMONALE, UNSPECIFIED PULMONARY EMBOLISM TYPE (H): ICD-10-CM

## 2024-07-15 DIAGNOSIS — D68.51 FACTOR 5 LEIDEN MUTATION, HETEROZYGOUS (H): Primary | ICD-10-CM

## 2024-07-15 DIAGNOSIS — D68.61 ANTIPHOSPHOLIPID SYNDROME (H): ICD-10-CM

## 2024-07-15 DIAGNOSIS — I26.99 ACUTE PULMONARY EMBOLISM, UNSPECIFIED PULMONARY EMBOLISM TYPE, UNSPECIFIED WHETHER ACUTE COR PULMONALE PRESENT (H): ICD-10-CM

## 2024-07-15 LAB — INR HOME MONITORING: 2.7 (ref 2–3)

## 2024-07-15 NOTE — PROGRESS NOTES
ANTICOAGULATION  MANAGEMENT-Home Monitor Managed by Exception    Rocío Catherine 28 year old female is on warfarin with therapeutic INR result. (Goal INR 2.0-3.0)    Recent labs: (last 7 days)     07/15/24  0000   INR 2.7       Previous INR was Therapeutic  Medication, diet, health changes since last INR:chart reviewed; none identified  Contacted within the last 12 weeks by phone on 6/3/24  Last [facility] referral date: 10/24/2023      PLAN     Rocío was NOT contacted regarding therapeutic result today per home monitoring policy manage by exception agreement.   Current warfarin dose is to be continued:     Summary  As of 7/15/2024      Full warfarin instructions:  5 mg every Sat; 7.5 mg all other days   Next INR check:  7/29/2024             ?   Debi Boland RN  Anticoagulation Clinic  7/15/2024    _______________________________________________________________________     Anticoagulation Episode Summary       Current INR goal:  2.0-3.0   TTR:  82.1% (1 y)   Target end date:  Indefinite   Send INR reminders to:  [facility]    Indications    Acute pulmonary embolism  unspecified pulmonary embolism type  unspecified whether acute cor pulmonale present (H) (Resolved) [I26.99]  Factor 5 Leiden mutation  heterozygous (H24) [D68.51]  Other acute pulmonary embolism without acute cor pulmonale (H) (Resolved) [I26.99]  Acute pulmonary embolism  unspecified pulmonary embolism type  unspecified whether acute cor pulmonale present (H) (Resolved) [I26.99]  Antiphospholipid syndrome (H24) [D68.61]  Long term current use of anticoagulant therapy [Z79.01]  Acute pulmonary embolism without acute cor pulmonale  unspecified pulmonary embolism type (H) (Resolved) [I26.99]  Acute pulmonary embolism without acute cor pulmonale  unspecified pulmonary embolism type (H) [I26.99]  Acute pulmonary embolism  unspecified pulmonary embolism type  unspecified whether acute cor pulmonale present (H) [I26.99]             Comments:  [employer]  home meter// manage by exception// Antiphospholipid antibody syndrome (may need CF10 if INR elevated with no known reason)             Anticoagulation Care Providers       Provider Role Specialty Phone number    Jesse Farnsworth MD Referring Family Medicine [phone]    Yessy Lyn APRN [facility] Referring Family Medicine [phone]    Debi Carrasco MD Referring Cardiovascular Disease [phone]    CodyDionicio castro MD Referring Family Medicine [phone]

## 2024-07-22 NOTE — PROGRESS NOTES
Pt dc'd to home stable.  Verbalized understanding of discharge instructions.  Enc pt to call with concerns.    The ECG revealed sinus tachycardia. The ECG rate was 102 bpm.

## 2024-07-29 ENCOUNTER — ANTICOAGULATION THERAPY VISIT (OUTPATIENT)
Dept: ANTICOAGULATION | Facility: CLINIC | Age: 29
End: 2024-07-29
Payer: COMMERCIAL

## 2024-07-29 DIAGNOSIS — D68.61 ANTIPHOSPHOLIPID SYNDROME (H): ICD-10-CM

## 2024-07-29 DIAGNOSIS — I26.99 ACUTE PULMONARY EMBOLISM, UNSPECIFIED PULMONARY EMBOLISM TYPE, UNSPECIFIED WHETHER ACUTE COR PULMONALE PRESENT (H): ICD-10-CM

## 2024-07-29 DIAGNOSIS — I26.99 ACUTE PULMONARY EMBOLISM WITHOUT ACUTE COR PULMONALE, UNSPECIFIED PULMONARY EMBOLISM TYPE (H): ICD-10-CM

## 2024-07-29 DIAGNOSIS — Z79.01 LONG TERM CURRENT USE OF ANTICOAGULANT THERAPY: ICD-10-CM

## 2024-07-29 DIAGNOSIS — D68.51 FACTOR 5 LEIDEN MUTATION, HETEROZYGOUS (H): Primary | ICD-10-CM

## 2024-07-29 LAB — INR HOME MONITORING: 1.7 (ref 2–3)

## 2024-07-29 NOTE — PROGRESS NOTES
ANTICOAGULATION MANAGEMENT     Rocío Catherine 28 year old female is on warfarin with subtherapeutic INR result. (Goal INR 2.0-3.0)    Recent labs: (last 7 days)     07/29/24  0000   INR 1.7*       ASSESSMENT     Source(s): Chart Review and Patient/Caregiver Call     Warfarin doses taken: Warfarin taken as instructed  Diet: Increased greens/vitamin K in diet; plans to resume previous intake  Medication/supplement changes: None noted  New illness, injury, or hospitalization: No  Signs or symptoms of bleeding or clotting: No  Previous result: Therapeutic last 2(+) visits  Additional findings: None       PLAN     Recommended plan for temporary change(s) affecting INR     Dosing Instructions: booster dose then continue your current warfarin dose with next INR in 2 weeks       Summary  As of 7/29/2024      Full warfarin instructions:  7/29: 10 mg; Otherwise 5 mg every Sat; 7.5 mg all other days   Next INR check:  8/12/2024               Telephone call with Rocío who verbalizes understanding and agrees to plan  [facility] message with dosing and follow up instructions    Patient to recheck with home meter    Education provided: Dietary considerations: importance of consistent vitamin K intake    Plan made per [facility] anticoagulation protocol    Margie Perez, RN  Anticoagulation Clinic  7/29/2024    _______________________________________________________________________     Anticoagulation Episode Summary       Current INR goal:  2.0-3.0   TTR:  81.0% (1 y)   Target end date:  Indefinite   Send INR reminders to:  [facility]    Indications    Acute pulmonary embolism  unspecified pulmonary embolism type  unspecified whether acute cor pulmonale present (H) (Resolved) [I26.99]  Factor 5 Leiden mutation  heterozygous (H24) [D68.51]  Other acute pulmonary embolism without acute cor pulmonale (H) (Resolved) [I26.99]  Acute pulmonary embolism  unspecified pulmonary embolism type  unspecified whether acute cor  pulmonale present (H) (Resolved) [I26.99]  Antiphospholipid syndrome (H24) [D68.61]  Long term current use of anticoagulant therapy [Z79.01]  Acute pulmonary embolism without acute cor pulmonale  unspecified pulmonary embolism type (H) (Resolved) [I26.99]  Acute pulmonary embolism without acute cor pulmonale  unspecified pulmonary embolism type (H) [I26.99]  Acute pulmonary embolism  unspecified pulmonary embolism type  unspecified whether acute cor pulmonale present (H) [I26.99]             Comments:  [employer] home meter// manage by exception// Antiphospholipid antibody syndrome (may need CF10 if INR elevated with no known reason)             Anticoagulation Care Providers       Provider Role Specialty Phone number    Jesse Farnsworth MD Referring Family Medicine [phone]    Yessy Lyn APRN [facility] Referring Family Medicine [phone]    Debi Carrasco MD Referring Cardiovascular Disease [phone]    Dionicio Liang MD Referring Family Medicine [phone]

## 2024-08-12 ENCOUNTER — ANTICOAGULATION THERAPY VISIT (OUTPATIENT)
Dept: ANTICOAGULATION | Facility: CLINIC | Age: 29
End: 2024-08-12
Payer: COMMERCIAL

## 2024-08-12 DIAGNOSIS — D68.51 FACTOR 5 LEIDEN MUTATION, HETEROZYGOUS (H): Primary | ICD-10-CM

## 2024-08-12 DIAGNOSIS — D68.61 ANTIPHOSPHOLIPID SYNDROME (H): ICD-10-CM

## 2024-08-12 DIAGNOSIS — Z79.01 LONG TERM CURRENT USE OF ANTICOAGULANT THERAPY: ICD-10-CM

## 2024-08-12 DIAGNOSIS — I26.99 ACUTE PULMONARY EMBOLISM WITHOUT ACUTE COR PULMONALE, UNSPECIFIED PULMONARY EMBOLISM TYPE (H): ICD-10-CM

## 2024-08-12 DIAGNOSIS — I26.99 ACUTE PULMONARY EMBOLISM, UNSPECIFIED PULMONARY EMBOLISM TYPE, UNSPECIFIED WHETHER ACUTE COR PULMONALE PRESENT (H): ICD-10-CM

## 2024-08-12 LAB — INR HOME MONITORING: 2.5 (ref 2–3)

## 2024-08-12 NOTE — PROGRESS NOTES
ANTICOAGULATION MANAGEMENT     Rocío Catherine 28 year old female is on warfarin with therapeutic INR result. (Goal INR 2.0-3.0)    Recent labs: (last 7 days)     08/12/24  0000   INR 2.5       ASSESSMENT     Source(s): Chart Review and Patient/Caregiver Call     Warfarin doses taken: Warfarin taken as instructed, confirmed she took boost on 7/29  Diet: No new diet changes identified, confirmed she returned to previous Vitamin K intake  Medication/supplement changes: None noted  New illness, injury, or hospitalization: No  Signs or symptoms of bleeding or clotting: No  Previous result: Subtherapeutic  Additional findings:  Return to [facility] but send  a mychart.       PLAN     Recommended plan for no diet, medication or health factor changes affecting INR     Dosing Instructions: Continue your current warfarin dose with next INR in 2 weeks       Summary  As of 8/12/2024      Full warfarin instructions:  5 mg every Sat; 7.5 mg all other days   Next INR check:  8/26/2024               Telephone call with Rocío who verbalizes understanding and agrees to plan    Patient to recheck with home meter    Education provided: Please call back if any changes to your diet, medications or how you've been taking warfarin  Resume manage by exception with home monitor. Continue to submit INR results to home monitor company.You will only be called when your result is out of range. Please call and notify [facility] if new medication started, dose missed, signs or symptoms of bleeding or clotting, or a surgery/procedure is scheduled. Due for next call no later than: 11/10/24.    Plan made per [facility] anticoagulation protocol    Mayra Palma RN  Anticoagulation Clinic  8/12/2024    _______________________________________________________________________     Anticoagulation Episode Summary       Current INR goal:  2.0-3.0   TTR:  80.6% (1 y)   Target end date:  Indefinite   Send INR reminders to:  [facility]    Indications    Acute  pulmonary embolism  unspecified pulmonary embolism type  unspecified whether acute cor pulmonale present (H) (Resolved) [I26.99]  Factor 5 Leiden mutation  heterozygous (H24) [D68.51]  Other acute pulmonary embolism without acute cor pulmonale (H) (Resolved) [I26.99]  Acute pulmonary embolism  unspecified pulmonary embolism type  unspecified whether acute cor pulmonale present (H) (Resolved) [I26.99]  Antiphospholipid syndrome (H24) [D68.61]  Long term current use of anticoagulant therapy [Z79.01]  Acute pulmonary embolism without acute cor pulmonale  unspecified pulmonary embolism type (H) (Resolved) [I26.99]  Acute pulmonary embolism without acute cor pulmonale  unspecified pulmonary embolism type (H) [I26.99]  Acute pulmonary embolism  unspecified pulmonary embolism type  unspecified whether acute cor pulmonale present (H) [I26.99]             Comments:  [employer] home meter// manage by exception// Antiphospholipid antibody syndrome (may need CF10 if INR elevated with no known reason)             Anticoagulation Care Providers       Provider Role Specialty Phone number    Jesse Farnsworth MD Referring Family Medicine [phone]    Yessy Lyn, APRN CNP Referring Family Medicine [phone]    Debi Carrasco MD Referring Cardiovascular Disease [phone]    Dionicio Liang MD Referring Family Medicine [phone]

## 2024-08-26 ENCOUNTER — DOCUMENTATION ONLY (OUTPATIENT)
Dept: ANTICOAGULATION | Facility: CLINIC | Age: 29
End: 2024-08-26
Payer: COMMERCIAL

## 2024-08-26 DIAGNOSIS — D68.51 FACTOR 5 LEIDEN MUTATION, HETEROZYGOUS (H): Primary | ICD-10-CM

## 2024-08-26 DIAGNOSIS — D68.61 ANTIPHOSPHOLIPID SYNDROME (H): ICD-10-CM

## 2024-08-26 DIAGNOSIS — Z79.01 LONG TERM CURRENT USE OF ANTICOAGULANT THERAPY: ICD-10-CM

## 2024-08-26 DIAGNOSIS — I26.99 ACUTE PULMONARY EMBOLISM, UNSPECIFIED PULMONARY EMBOLISM TYPE, UNSPECIFIED WHETHER ACUTE COR PULMONALE PRESENT (H): ICD-10-CM

## 2024-08-26 DIAGNOSIS — I26.99 ACUTE PULMONARY EMBOLISM WITHOUT ACUTE COR PULMONALE, UNSPECIFIED PULMONARY EMBOLISM TYPE (H): ICD-10-CM

## 2024-08-26 LAB — INR HOME MONITORING: 2.2 (ref 2–3)

## 2024-08-26 NOTE — PROGRESS NOTES
ANTICOAGULATION  MANAGEMENT-Home Monitor Managed by Exception    Rocío GRISEL Catherine 28 year old female is on warfarin with therapeutic INR result. (Goal INR 2.0-3.0)    Recent labs: (last 7 days)     08/26/24  0000   INR 2.2       Previous INR was Therapeutic  Medication, diet, health changes since last INR:chart reviewed; none identified  Contacted within the last 12 weeks by phone on 8/12/24  Due for next call no later than: 11/10/24.   Last [facility] referral date: 10/24/2023      PLAN     Rocío was NOT contacted regarding therapeutic result today per home monitoring policy manage by exception agreement.   Current warfarin dose is to be continued:     Summary  As of 8/26/2024      Full warfarin instructions:  5 mg every Sat; 7.5 mg all other days   Next INR check:  9/9/2024             ?   Dayami Mcfarland RN  Anticoagulation Clinic  8/26/2024    _______________________________________________________________________     Anticoagulation Episode Summary       Current INR goal:  2.0-3.0   TTR:  82.4% (1 y)   Target end date:  Indefinite   Send INR reminders to:  [facility]    Indications    Acute pulmonary embolism  unspecified pulmonary embolism type  unspecified whether acute cor pulmonale present (H) (Resolved) [I26.99]  Factor 5 Leiden mutation  heterozygous (H24) [D68.51]  Other acute pulmonary embolism without acute cor pulmonale (H) (Resolved) [I26.99]  Acute pulmonary embolism  unspecified pulmonary embolism type  unspecified whether acute cor pulmonale present (H) (Resolved) [I26.99]  Antiphospholipid syndrome (H24) [D68.61]  Long term current use of anticoagulant therapy [Z79.01]  Acute pulmonary embolism without acute cor pulmonale  unspecified pulmonary embolism type (H) (Resolved) [I26.99]  Acute pulmonary embolism without acute cor pulmonale  unspecified pulmonary embolism type (H) [I26.99]  Acute pulmonary embolism  unspecified pulmonary embolism type  unspecified whether acute cor pulmonale  present (H) [I26.99]             Comments:  [employer] home meter// manage by exception// Antiphospholipid antibody syndrome (may need CF10 if INR elevated with no known reason)             Anticoagulation Care Providers       Provider Role Specialty Phone number    Jesse Farnsworth MD Referring Family Medicine [phone]    Yessy Lyn APRN [facility] Referring Family Medicine [phone]    Debi Carrasco MD Referring Cardiovascular Disease [phone]    Dionicio Liang MD Referring Family Medicine [phone]

## 2024-09-09 ENCOUNTER — DOCUMENTATION ONLY (OUTPATIENT)
Dept: ANTICOAGULATION | Facility: CLINIC | Age: 29
End: 2024-09-09
Payer: COMMERCIAL

## 2024-09-09 DIAGNOSIS — D68.51 FACTOR 5 LEIDEN MUTATION, HETEROZYGOUS (H): Primary | ICD-10-CM

## 2024-09-09 DIAGNOSIS — Z79.01 LONG TERM CURRENT USE OF ANTICOAGULANT THERAPY: ICD-10-CM

## 2024-09-09 DIAGNOSIS — I26.99 ACUTE PULMONARY EMBOLISM, UNSPECIFIED PULMONARY EMBOLISM TYPE, UNSPECIFIED WHETHER ACUTE COR PULMONALE PRESENT (H): ICD-10-CM

## 2024-09-09 DIAGNOSIS — D68.61 ANTIPHOSPHOLIPID SYNDROME (H): ICD-10-CM

## 2024-09-09 DIAGNOSIS — I26.99 ACUTE PULMONARY EMBOLISM WITHOUT ACUTE COR PULMONALE, UNSPECIFIED PULMONARY EMBOLISM TYPE (H): ICD-10-CM

## 2024-09-09 LAB — INR HOME MONITORING: 2.1 (ref 2–3)

## 2024-09-09 NOTE — PROGRESS NOTES
ANTICOAGULATION  MANAGEMENT-Home Monitor Managed by Exception    Rocío Catherine 28 year old female is on warfarin with therapeutic INR result. (Goal INR 2.0-3.0)    Recent labs: (last 7 days)     09/09/24  0000   INR 2.1       Previous INR was Therapeutic  Medication, diet, health changes since last INR:chart reviewed; none identified  Contacted within the last 12 weeks by phone on 8/12/24  Last [facility] referral date: 10/24/2023      PLAN     Rocío was NOT contacted regarding therapeutic result today per home monitoring policy manage by exception agreement.   Current warfarin dose is to be continued:     Summary  As of 9/9/2024      Full warfarin instructions:  5 mg every Sat; 7.5 mg all other days   Next INR check:  9/23/2024             ?   Jo Ann Horner RN  Anticoagulation Clinic  9/9/2024    _______________________________________________________________________     Anticoagulation Episode Summary       Current INR goal:  2.0-3.0   TTR:  82.4% (1 y)   Target end date:  Indefinite   Send INR reminders to:  [facility]    Indications    Acute pulmonary embolism  unspecified pulmonary embolism type  unspecified whether acute cor pulmonale present (H) (Resolved) [I26.99]  Factor 5 Leiden mutation  heterozygous (H24) [D68.51]  Other acute pulmonary embolism without acute cor pulmonale (H) (Resolved) [I26.99]  Acute pulmonary embolism  unspecified pulmonary embolism type  unspecified whether acute cor pulmonale present (H) (Resolved) [I26.99]  Antiphospholipid syndrome (H24) [D68.61]  Long term current use of anticoagulant therapy [Z79.01]  Acute pulmonary embolism without acute cor pulmonale  unspecified pulmonary embolism type (H) (Resolved) [I26.99]  Acute pulmonary embolism without acute cor pulmonale  unspecified pulmonary embolism type (H) [I26.99]  Acute pulmonary embolism  unspecified pulmonary embolism type  unspecified whether acute cor pulmonale present (H) [I26.99]             Comments:  [employer]  home meter// manage by exception// Antiphospholipid antibody syndrome (may need CF10 if INR elevated with no known reason)             Anticoagulation Care Providers       Provider Role Specialty Phone number    Jesse Farnsworth MD Referring Family Medicine [phone]    Yessy Lyn APRN [facility] Referring Family Medicine [phone]    Debi Carrasco MD Referring Cardiovascular Disease [phone]    CodyDionicio castro MD Referring Family Medicine [phone]

## 2024-09-23 ENCOUNTER — ANTICOAGULATION THERAPY VISIT (OUTPATIENT)
Dept: ANTICOAGULATION | Facility: CLINIC | Age: 29
End: 2024-09-23
Payer: COMMERCIAL

## 2024-09-23 ENCOUNTER — DOCUMENTATION ONLY (OUTPATIENT)
Dept: ANTICOAGULATION | Facility: CLINIC | Age: 29
End: 2024-09-23
Payer: COMMERCIAL

## 2024-09-23 DIAGNOSIS — I26.99 ACUTE PULMONARY EMBOLISM, UNSPECIFIED PULMONARY EMBOLISM TYPE, UNSPECIFIED WHETHER ACUTE COR PULMONALE PRESENT (H): ICD-10-CM

## 2024-09-23 DIAGNOSIS — I26.99 ACUTE PULMONARY EMBOLISM WITHOUT ACUTE COR PULMONALE, UNSPECIFIED PULMONARY EMBOLISM TYPE (H): ICD-10-CM

## 2024-09-23 DIAGNOSIS — Z79.01 LONG TERM CURRENT USE OF ANTICOAGULANT THERAPY: ICD-10-CM

## 2024-09-23 DIAGNOSIS — D68.51 FACTOR 5 LEIDEN MUTATION, HETEROZYGOUS (H): Primary | ICD-10-CM

## 2024-09-23 DIAGNOSIS — D68.61 ANTIPHOSPHOLIPID SYNDROME (H): ICD-10-CM

## 2024-09-23 LAB — INR HOME MONITORING: 1.9 (ref 2–3)

## 2024-09-23 NOTE — PROGRESS NOTES
ANTICOAGULATION MANAGEMENT     Rocío Catherine 28 year old female is on warfarin with subtherapeutic INR result. (Goal INR 2.0-3.0)    Recent labs: (last 7 days)     09/23/24  0000   INR 1.9*       ASSESSMENT     Source(s): Chart Review and Patient/Caregiver Call     Warfarin doses taken: Warfarin taken as instructed  Diet: Increased greens/vitamin K in diet; plans to resume previous intake  Medication/supplement changes: None noted  New illness, injury, or hospitalization: No  Signs or symptoms of bleeding or clotting: No  Previous result: Therapeutic last 2(+) visits  Additional findings: None       PLAN     Recommended plan for temporary change(s) affecting INR     Dosing Instructions: booster dose then continue your current warfarin dose with next INR in 2 weeks       Summary  As of 9/23/2024      Full warfarin instructions:  9/23: 10 mg; Otherwise 5 mg every Sat; 7.5 mg all other days   Next INR check:  10/7/2024               Telephone call with Rocío who verbalizes understanding and agrees to plan    Patient to recheck with home meter    Education provided: Please call back if any changes to your diet, medications or how you've been taking warfarin  Dietary considerations: impact of vitamin K foods on INR  Symptom monitoring: monitoring for bleeding signs and symptoms, monitoring for clotting signs and symptoms, and monitoring for stroke signs and symptoms    Plan made per [facility] anticoagulation protocol    Jo Ann Horner RN  9/23/2024  Anticoagulation Clinic  [facility] for routing messages: p ANTICOAG HOME MONITORING  [facility] patient phone line: [phone]        _______________________________________________________________________     Anticoagulation Episode Summary       Current INR goal:  2.0-3.0   TTR:  80.5% (1 y)   Target end date:  Indefinite   Send INR reminders to:  [facility]IT[facility]    Indications    Acute pulmonary embolism  unspecified pulmonary embolism type  unspecified whether acute cor  pulmonale present (H) (Resolved) [I26.99]  Factor 5 Leiden mutation  heterozygous (H24) [D68.51]  Other acute pulmonary embolism without acute cor pulmonale (H) (Resolved) [I26.99]  Acute pulmonary embolism  unspecified pulmonary embolism type  unspecified whether acute cor pulmonale present (H) (Resolved) [I26.99]  Antiphospholipid syndrome (H24) [D68.61]  Long term current use of anticoagulant therapy [Z79.01]  Acute pulmonary embolism without acute cor pulmonale  unspecified pulmonary embolism type (H) (Resolved) [I26.99]  Acute pulmonary embolism without acute cor pulmonale  unspecified pulmonary embolism type (H) [I26.99]  Acute pulmonary embolism  unspecified pulmonary embolism type  unspecified whether acute cor pulmonale present (H) [I26.99]             Comments:  [employer] home meter// manage by exception// Antiphospholipid antibody syndrome (may need CF10 if INR elevated with no known reason)             Anticoagulation Care Providers       Provider Role Specialty Phone number    Jesse Farnsworth MD Referring Family Medicine [phone]    Yessy Lyn, APRN [facility] Referring Family Medicine [phone]    Debi Carrasco MD Referring Cardiovascular Disease [phone]    Dionicio Liang MD Referring Family Medicine [phone]

## 2024-09-23 NOTE — PROGRESS NOTES
ANTICOAGULATION CLINIC REFERRAL RENEWAL REQUEST       An annual renewal order is required for all patients referred to [facility] Anticoagulation [facility].?  Please review and sign the pended referral order for Rocío RECINOS Florin.       ANTICOAGULATION SUMMARY      Warfarin indication(s)   Acute PE, Factor 5 Leiden mutation heterozygous, antiphospholipid syndrome     Mechanical heart valve present?  NO       Current goal range   INR: 2.0-3.0     Goal appropriate for indication? Goal INR 2-3, standard for indication(s) above     Time in Therapeutic Range (TTR)  (Goal > 60%) 81%       Office visit with referring provider's group within last year no on last visit with primary care provider on 11/8/22, but patient had an appointment within [place] on 7/12/24       Jo Ann Horner RN  [facility] Anticoagulation Clinic

## 2024-10-02 ENCOUNTER — TELEPHONE (OUTPATIENT)
Dept: ANTICOAGULATION | Facility: CLINIC | Age: 29
End: 2024-10-02
Payer: COMMERCIAL

## 2024-10-02 NOTE — TELEPHONE ENCOUNTER
Rocío said she got a call from the clinic so she called the clinic back. They said to call [facility]. We had not called Rocío.  I found an addended note from 9/23 saying Rocío needs an annual visit with PCP and the clinic had called her today to let her know.  Rocío will call the clinic number again and make an annual visit appointment.  Debi Boland RN

## 2024-10-07 DIAGNOSIS — I26.99 ACUTE PULMONARY EMBOLISM (H): ICD-10-CM

## 2024-10-07 RX ORDER — WARFARIN SODIUM 5 MG/1
TABLET ORAL
Qty: 130 TABLET | Refills: 1 | Status: SHIPPED | OUTPATIENT
Start: 2024-10-07 | End: 2024-10-28

## 2024-10-07 NOTE — TELEPHONE ENCOUNTER
ANTICOAGULATION MANAGEMENT:  Medication Refill    Anticoagulation Summary  As of 9/23/2024      Warfarin maintenance plan:  5 mg (5 mg x 1) every Sat; 7.5 mg (5 mg x 1.5) all other days   Next INR check:  10/7/2024   Target end date:  Indefinite    Indications    Acute pulmonary embolism  unspecified pulmonary embolism type  unspecified whether acute cor pulmonale present (H) (Resolved) [I26.99]  Factor 5 Leiden mutation  heterozygous (H24) [D68.51]  Other acute pulmonary embolism without acute cor pulmonale (H) (Resolved) [I26.99]  Acute pulmonary embolism  unspecified pulmonary embolism type  unspecified whether acute cor pulmonale present (H) (Resolved) [I26.99]  Antiphospholipid syndrome (H24) [D68.61]  Long term current use of anticoagulant therapy [Z79.01]  Acute pulmonary embolism without acute cor pulmonale  unspecified pulmonary embolism type (H) (Resolved) [I26.99]  Acute pulmonary embolism without acute cor pulmonale  unspecified pulmonary embolism type (H) [I26.99]  Acute pulmonary embolism  unspecified pulmonary embolism type  unspecified whether acute cor pulmonale present (H) [I26.99]                 Anticoagulation Care Providers       Provider Role Specialty Phone number    Jesse Farnsworth MD Referring Family Medicine [phone]    Yessy Lyn APRN [facility] Referring Family Medicine [phone]    Debi Carrasco MD Referring Cardiovascular Disease [phone]    Dionicio Liang MD Referring Family Medicine [phone]            Refill Criteria    Visit with referring provider/group: Meets criteria: visit within referring provider group in the last 15 months on 7/12/24    [facility] referral last signed: 10/02/2024; within last year: Yes    Lab monitoring not exceeding 2 weeks overdue: No    Rocío meets all criteria for refill. Rx instructions and quantity supplied updated to match patient's current dosing plan. Warfarin 90 day supply with 1 refill granted per [facility] protocol      Chika Delaney RN  Anticoagulation Clinic

## 2024-10-07 NOTE — TELEPHONE ENCOUNTER
Pending Prescriptions:                       Disp   Refills    warfarin ANTICOAGULANT (JANTOVEN ANTICOAG*130 ta*1            Sig: Take 5 mg (5 mg x 1 tablet) every Sat; and take           7.5 mg (5 mg x 1.5 Tablets) all other days of the           week or as directed by your ACC Team based on INR           results.    Patient is needing a refill on Jantoven 5mg, patient has already scheduled an appt with md for end of October.     Thanks  Annette Schwab   [facility] [place]  [phone]

## 2024-10-08 ENCOUNTER — ANTICOAGULATION THERAPY VISIT (OUTPATIENT)
Dept: ANTICOAGULATION | Facility: CLINIC | Age: 29
End: 2024-10-08
Payer: COMMERCIAL

## 2024-10-08 DIAGNOSIS — I26.99 ACUTE PULMONARY EMBOLISM, UNSPECIFIED PULMONARY EMBOLISM TYPE, UNSPECIFIED WHETHER ACUTE COR PULMONALE PRESENT (H): ICD-10-CM

## 2024-10-08 DIAGNOSIS — D68.61 ANTIPHOSPHOLIPID SYNDROME (H): ICD-10-CM

## 2024-10-08 DIAGNOSIS — D68.51 FACTOR 5 LEIDEN MUTATION, HETEROZYGOUS (H): Primary | ICD-10-CM

## 2024-10-08 DIAGNOSIS — I26.99 ACUTE PULMONARY EMBOLISM WITHOUT ACUTE COR PULMONALE, UNSPECIFIED PULMONARY EMBOLISM TYPE (H): ICD-10-CM

## 2024-10-08 DIAGNOSIS — Z79.01 LONG TERM CURRENT USE OF ANTICOAGULANT THERAPY: ICD-10-CM

## 2024-10-08 LAB — INR HOME MONITORING: 2.7 (ref 2–3)

## 2024-10-08 NOTE — PROGRESS NOTES
ANTICOAGULATION MANAGEMENT     Rocío Catherine 28 year old female is on warfarin with therapeutic INR result. (Goal INR 2.0-3.0)    Recent labs: (last 7 days)     10/08/24  0000   INR 2.7       ASSESSMENT     Source(s): Chart Review and Patient/Caregiver Call     Warfarin doses taken: Warfarin taken as instructed  Diet: No new diet changes identified  Medication/supplement changes: None noted  New illness, injury, or hospitalization: No  Signs or symptoms of bleeding or clotting: No  Previous result: Subtherapeutic  Additional findings: None       PLAN     Recommended plan for no diet, medication or health factor changes affecting INR     Dosing Instructions: Continue your current warfarin dose with next INR in 2 weeks       Summary  As of 10/8/2024      Full warfarin instructions:  5 mg every Sat; 7.5 mg all other days   Next INR check:  10/22/2024               Telephone call with Rocío who verbalizes understanding and agrees to plan and who agrees to plan and repeated back plan correctly    Patient to recheck with home meter    Education provided: Contact [phone] with any changes, questions or concerns.     RESUME MBE 10/22, Send mychart every time     Plan made per [facility] anticoagulation protocol    Lucia Pantoja RN  10/8/2024  Anticoagulation Clinic  [facility] for routing messages: p ANTICOAG HOME MONITORING  [facility] patient phone line: [phone]        _______________________________________________________________________     Anticoagulation Episode Summary       Current INR goal:  2.0-3.0   TTR:  80.6% (1 y)   Target end date:  Indefinite   Send INR reminders to:  ANTICOAG HOME MONITORING    Indications    Acute pulmonary embolism  unspecified pulmonary embolism type  unspecified whether acute cor pulmonale present (H) (Resolved) [I26.99]  Factor 5 Leiden mutation  heterozygous (H) [D68.51]  Other acute pulmonary embolism without acute cor pulmonale (H) (Resolved) [I26.99]  Acute pulmonary  embolism  unspecified pulmonary embolism type  unspecified whether acute cor pulmonale present (H) (Resolved) [I26.99]  Antiphospholipid syndrome (H) [D68.61]  Long term current use of anticoagulant therapy [Z79.01]  Acute pulmonary embolism without acute cor pulmonale  unspecified pulmonary embolism type (H) (Resolved) [I26.99]  Acute pulmonary embolism without acute cor pulmonale  unspecified pulmonary embolism type (H) [I26.99]  Acute pulmonary embolism  unspecified pulmonary embolism type  unspecified whether acute cor pulmonale present (H) [I26.99]             Comments:  [employer] home meter// manage by exception// Antiphospholipid antibody syndrome (may need CF10 if INR elevated with no known reason)             Anticoagulation Care Providers       Provider Role Specialty Phone number    Jesse Farnsworth MD Referring Family Medicine [phone]    Yessy Lyn APRN [facility] Referring Family Medicine [phone]    Debi Carrasco MD Referring Cardiovascular Disease [phone]    Dionicio Liang MD Referring Family Medicine [phone]

## 2024-10-20 ENCOUNTER — HOSPITAL ENCOUNTER (EMERGENCY)
Facility: CLINIC | Age: 29
Discharge: HOME | End: 2024-10-20
Attending: NURSE PRACTITIONER | Admitting: NURSE PRACTITIONER
Payer: COMMERCIAL

## 2024-10-20 VITALS
HEART RATE: 94 BPM | RESPIRATION RATE: 16 BRPM | DIASTOLIC BLOOD PRESSURE: 72 MMHG | SYSTOLIC BLOOD PRESSURE: 131 MMHG | TEMPERATURE: 98.4 F | OXYGEN SATURATION: 100 %

## 2024-10-20 DIAGNOSIS — R35.0 URINARY FREQUENCY: ICD-10-CM

## 2024-10-20 DIAGNOSIS — N30.00 ACUTE CYSTITIS WITHOUT HEMATURIA: ICD-10-CM

## 2024-10-20 LAB
ALBUMIN UR-MCNC: NEGATIVE MG/DL
APPEARANCE UR: CLEAR
BILIRUB UR QL STRIP: NEGATIVE
COLOR UR AUTO: YELLOW
GLUCOSE UR STRIP-MCNC: NEGATIVE MG/DL
HGB UR QL STRIP: ABNORMAL
KETONES UR STRIP-MCNC: NEGATIVE MG/DL
LEUKOCYTE ESTERASE UR QL STRIP: NEGATIVE
NITRATE UR QL: NEGATIVE
PH UR STRIP: 7 [PH] (ref 5–7)
RBC URINE: 4 /HPF
SP GR UR STRIP: 1.01 (ref 1–1.03)
SQUAMOUS EPITHELIAL: 4 /HPF
UROBILINOGEN UR STRIP-MCNC: NORMAL MG/DL
WBC URINE: <1 /HPF

## 2024-10-20 PROCEDURE — 81001 URINALYSIS AUTO W/SCOPE: CPT | Performed by: NURSE PRACTITIONER

## 2024-10-20 PROCEDURE — 99213 OFFICE O/P EST LOW 20 MIN: CPT | Performed by: NURSE PRACTITIONER

## 2024-10-20 PROCEDURE — G0463 HOSPITAL OUTPT CLINIC VISIT: HCPCS | Performed by: NURSE PRACTITIONER

## 2024-10-20 ASSESSMENT — COLUMBIA-SUICIDE SEVERITY RATING SCALE - C-SSRS
6. HAVE YOU EVER DONE ANYTHING, STARTED TO DO ANYTHING, OR PREPARED TO DO ANYTHING TO END YOUR LIFE?: NO
2. HAVE YOU ACTUALLY HAD ANY THOUGHTS OF KILLING YOURSELF IN THE PAST MONTH?: NO
1. IN THE PAST MONTH, HAVE YOU WISHED YOU WERE DEAD OR WISHED YOU COULD GO TO SLEEP AND NOT WAKE UP?: NO

## 2024-10-20 NOTE — DISCHARGE INSTRUCTIONS
Your UA results are virtually normal, you reported that you were starting menses and this would explain the small amount of blood seen in your urine sample.  Recommend increase oral fluids, return here or primary clinic if your symptoms are worsening despite recommended increased fluids, will order an as needed antibiotic for you. Take for 3 days with onset of symptoms.

## 2024-10-20 NOTE — ED PROVIDER NOTES
Chief Complaint:   No chief complaint on file.       HPI:   Rocío Catherine is a 28 year old female who presents to the urgent care with frequency and urgency.  Symptoms began 3 day(s) ago.  She denies rigors, flank pain, temperature > 101 degrees F., and Vomiting, significant nausea or diarrhea.  Patient has history of occ UTIs.  She has tried Cranberry juice prn, and Increased fluid intake.  Recent illnesses:  none.  LMP Last menstrual period: 10/20/2024.    Patient takes warfarin for past PE/DVT history with factor V Leiden.     Medications:   Current Outpatient Medications   Medication Sig Dispense Refill    amoxicillin (AMOXIL) 500 MG capsule Take 1 capsule (500 mg) by mouth 3 times daily (Patient not taking: Reported on 7/12/2024) 30 capsule 0    fluconazole (DIFLUCAN) 150 MG tablet Take one every three days till gone (Patient not taking: Reported on 1/18/2023) 3 tablet 0    hydrOXYzine (ATARAX) 25 MG tablet       ipratropium (ATROVENT) 0.06 % nasal spray Spray 2 sprays into both nostrils 4 times daily (Patient not taking: Reported on 9/13/2023) 15 mL 0    metoclopramide (REGLAN) 10 MG tablet Take 1 tablet (10 mg) by mouth 3 times daily as needed (headache) (Patient not taking: Reported on 1/18/2023) 50 tablet 0    sertraline (ZOLOFT) 50 MG tablet TAKE ONE AND ONE-HALF TABLETS BY MOUTH EVERY  tablet 0    warfarin ANTICOAGULANT (JANTOVEN ANTICOAGULANT) 5 MG tablet Take 5 mg every Sat; 7.5 mg all other days or As directed by Anticoagulation clinic 130 tablet 1       Allergies:   No Known Allergies    Medications updated and reviewed.  Past, family and surgical history is updated and reviewed in the record.     Review of Systems:  Abdomen: see HPI  : see HPI  Back: see HPI  Immune: see HPI    Physical Exam:   /72   Pulse 118   Temp 98.4  F (36.9  C) (Tympanic)   Resp 16   SpO2 100%    General: healthy, alert, and no distress  Head: negative, Normocephalic.  Lungs: normal effort of breathing  without signs of respiratory distress  Heart: regular rate  Abdomen: Abdomen soft, non-tender. No guarding or rebound. BS normal. No masses.  : not performed  Back/Spine: Back symmetric, no curvature. ROM normal. No CVA tenderness.    Laboratory Data:   Results for orders placed or performed during the hospital encounter of 10/20/24 (from the past 24 hour(s))   UA with Microscopic reflex to Culture    Specimen: Urine, Clean Catch   Result Value Ref Range    Color Urine Yellow Colorless, Straw, Light Yellow, Yellow    Appearance Urine Clear Clear    Glucose Urine Negative Negative mg/dL    Bilirubin Urine Negative Negative    Ketones Urine Negative Negative mg/dL    Specific Gravity Urine 1.010 1.003 - 1.035    Blood Urine Small (A) Negative    pH Urine 7.0 5.0 - 7.0    Protein Albumin Urine Negative Negative mg/dL    Urobilinogen Urine Normal Normal, 2.0 mg/dL    Nitrite Urine Negative Negative    Leukocyte Esterase Urine Negative Negative    RBC Urine 4 (H) <=2 /HPF    WBC Urine <1 <=5 /HPF    Squamous Epithelials Urine 4 (H) <=1 /HPF    Narrative    Urine Culture not indicated         Assessment:  Uncomplicated UTI    Plan:   Patient not treated with antibiotics, recommended increase oral fluid intake and discharged home.    Low suspicion for pyelonephritis at this time given patient afebrile and without abdominal pain, flank pain, significant nausea or vomiting.  Follow up with primary care provider if symptoms not improving.  Ordered an as needed antibiotic Cipro 500mg -take twice daily for 3 days. (7 day course ordered if symptoms are not resolved in 3 days). Cipro ordered as this has little impact with raising INR.  Return to the ER if fever, back pain, or vomiting develops.    Condition on disposition: Stable      Disclaimer: This note consists of symbols derived from keyboarding, dictation, and/or voice recognition software. As a result, there may be errors in the script that have gone undetected.  Please  consider this when interpreting information found in the chart.            Ainsley Almanza, APRN CNP  10/20/24 4231       Ainsley Almanza, SNOW CNP  10/22/24 6872

## 2024-10-21 ENCOUNTER — ANTICOAGULATION THERAPY VISIT (OUTPATIENT)
Dept: ANTICOAGULATION | Facility: CLINIC | Age: 29
End: 2024-10-21
Payer: COMMERCIAL

## 2024-10-21 ENCOUNTER — DOCUMENTATION ONLY (OUTPATIENT)
Dept: ANTICOAGULATION | Facility: CLINIC | Age: 29
End: 2024-10-21
Payer: COMMERCIAL

## 2024-10-21 ENCOUNTER — MYC MEDICAL ADVICE (OUTPATIENT)
Dept: ANTICOAGULATION | Facility: CLINIC | Age: 29
End: 2024-10-21
Payer: COMMERCIAL

## 2024-10-21 DIAGNOSIS — D68.61 ANTIPHOSPHOLIPID SYNDROME (H): ICD-10-CM

## 2024-10-21 DIAGNOSIS — D68.51 FACTOR 5 LEIDEN MUTATION, HETEROZYGOUS (H): Primary | ICD-10-CM

## 2024-10-21 DIAGNOSIS — I26.99 ACUTE PULMONARY EMBOLISM, UNSPECIFIED PULMONARY EMBOLISM TYPE, UNSPECIFIED WHETHER ACUTE COR PULMONALE PRESENT (H): ICD-10-CM

## 2024-10-21 DIAGNOSIS — Z79.01 LONG TERM CURRENT USE OF ANTICOAGULANT THERAPY: ICD-10-CM

## 2024-10-21 DIAGNOSIS — I26.99 ACUTE PULMONARY EMBOLISM WITHOUT ACUTE COR PULMONALE, UNSPECIFIED PULMONARY EMBOLISM TYPE (H): ICD-10-CM

## 2024-10-21 LAB — INR HOME MONITORING: 1.6 (ref 2–3)

## 2024-10-21 NOTE — PROGRESS NOTES
"ANTICOAGULATION  MANAGEMENT: Discharge Review    Rocío Catherine chart reviewed for anticoagulation continuity of care    Emergency room visit on 10/21/24 for urinary frequency.    Discharge disposition: Home    Results:    No results for input(s): \"INR\", \"BVFHGV78MAAI\", \"F2\", \"ALMWH\", \"AAUFH\" in the last 168 hours.  Anticoagulation inpatient management:     not applicable     Anticoagulation discharge instructions:     Warfarin dosing: home regimen continued   Bridging: No   INR goal change: No      Medication changes affecting anticoagulation: No    Additional factors affecting anticoagulation: No     PLAN     No adjustment to anticoagulation plan needed    Patient not contacted    No adjustment to Anticoagulation Calendar was required    Patient is scheduled to recheck Inr on 10/22/24 which is appropriate     Jo Ann Horner, RN  10/21/2024  Anticoagulation Clinic  [facility] for routing messages: reji [facility] HOME MONITORING  [facility] patient phone line: [phone]    "

## 2024-10-21 NOTE — TELEPHONE ENCOUNTER
Please see the October 21, 2024 Anticoagulation encounter for further information.    Shelby Valero RN    [facility] Anticoagulation Clinic

## 2024-10-21 NOTE — PROGRESS NOTES
ANTICOAGULATION MANAGEMENT     Rocío Catherine 28 year old female is on warfarin with subtherapeutic INR result. (Goal INR 2.0-3.0)    Recent labs: (last 7 days)     10/21/24  0000   INR 1.6*       ASSESSMENT     Source(s): Chart Review, Patient/Caregiver Call, and MyChart      Warfarin doses taken: Warfarin taken as instructed  Diet: Increased greens/vitamin K in diet; plans to resume previous intake  Medication/supplement changes: None noted  New illness, injury, or hospitalization: No  Signs or symptoms of bleeding or clotting: No  Previous result: Therapeutic last visit; previously outside of goal range  Additional findings: None       PLAN     Recommended plan for temporary change(s) affecting INR     Dosing Instructions: booster dose then continue your current warfarin dose with next INR in 2 weeks       Summary  As of 10/21/2024      Full warfarin instructions:  10/21: 12.5 mg; Otherwise 5 mg every Sat; 7.5 mg all other days   Next INR check:  11/4/2024               Sent [employer] message with dosing and follow up instructions    Patient to recheck with home meter    Education provided: None required    Plan made per [facility] anticoagulation protocol    Shelby Valero RN  10/21/2024  Anticoagulation Clinic  [employer] for routing messages: p ANTICOAG HOME MONITORING  [facility] patient phone line: [phone]        _______________________________________________________________________     Anticoagulation Episode Summary       Current INR goal:  2.0-3.0   TTR:  80.1% (1 y)   Target end date:  Indefinite   Send INR reminders to:  ANTICOAG HOME MONITORING    Indications    Acute pulmonary embolism  unspecified pulmonary embolism type  unspecified whether acute cor pulmonale present (H) (Resolved) [I26.99]  Factor 5 Leiden mutation  heterozygous (H) [D68.51]  Other acute pulmonary embolism without acute cor pulmonale (H) (Resolved) [I26.99]  Acute pulmonary embolism  unspecified pulmonary embolism type  unspecified  whether acute cor pulmonale present (H) (Resolved) [I26.99]  Antiphospholipid syndrome (H) [D68.61]  Long term current use of anticoagulant therapy [Z79.01]  Acute pulmonary embolism without acute cor pulmonale  unspecified pulmonary embolism type (H) (Resolved) [I26.99]  Acute pulmonary embolism without acute cor pulmonale  unspecified pulmonary embolism type (H) [I26.99]  Acute pulmonary embolism  unspecified pulmonary embolism type  unspecified whether acute cor pulmonale present (H) [I26.99]             Comments:  [employer] home meter// manage by exception// Antiphospholipid antibody syndrome (may need CF10 if INR elevated with no known reason)             Anticoagulation Care Providers       Provider Role Specialty Phone number    Jesse Farnsworth MD Referring Family Medicine [phone]    Yessy Lyn APRN [facility] Referring Family Medicine [phone]    Debi Carrasco MD Referring Cardiovascular Disease [phone]    Dionicio Liang MD Referring Family Medicine [phone]

## 2024-10-22 RX ORDER — CIPROFLOXACIN 500 MG/1
500 TABLET, FILM COATED ORAL 2 TIMES DAILY
Qty: 14 TABLET | Refills: 0 | Status: SHIPPED | OUTPATIENT
Start: 2024-10-22 | End: 2024-10-29

## 2024-10-23 ENCOUNTER — MYC MEDICAL ADVICE (OUTPATIENT)
Dept: ANTICOAGULATION | Facility: CLINIC | Age: 29
End: 2024-10-23
Payer: COMMERCIAL

## 2024-10-23 ENCOUNTER — TELEPHONE (OUTPATIENT)
Dept: ANTICOAGULATION | Facility: CLINIC | Age: 29
End: 2024-10-23
Payer: COMMERCIAL

## 2024-10-23 DIAGNOSIS — I26.99 ACUTE PULMONARY EMBOLISM, UNSPECIFIED PULMONARY EMBOLISM TYPE, UNSPECIFIED WHETHER ACUTE COR PULMONALE PRESENT (H): ICD-10-CM

## 2024-10-23 DIAGNOSIS — D68.61 ANTIPHOSPHOLIPID SYNDROME (H): ICD-10-CM

## 2024-10-23 DIAGNOSIS — Z79.01 LONG TERM CURRENT USE OF ANTICOAGULANT THERAPY: ICD-10-CM

## 2024-10-23 DIAGNOSIS — D68.51 FACTOR 5 LEIDEN MUTATION, HETEROZYGOUS (H): Primary | ICD-10-CM

## 2024-10-23 DIAGNOSIS — I26.99 ACUTE PULMONARY EMBOLISM WITHOUT ACUTE COR PULMONALE, UNSPECIFIED PULMONARY EMBOLISM TYPE (H): ICD-10-CM

## 2024-10-23 NOTE — TELEPHONE ENCOUNTER
"ANTICOAGULATION  MANAGEMENT     Interacting Medication Review    Interacting medication(s): Ciprofloxacin (Cipro) with warfarin.    Duration: 7 days (10/22/24 to 10/28/24)    Indication:  acute cystitis without hematuria    New medication?: Yes, interaction may increase INR and risk of bleeding. Closer INR monitoring recommended.        PLAN     Continue your current warfarin dose with next INR in 2 days        Summary  As of 10/23/2024      Full warfarin instructions:  5 mg every Sat; 7.5 mg all other days   Next INR check:  10/25/2024               Left detailed voice message and [employer] message for Rocío with dosing instructions and follow up date.     New recheck date updated on anticoagulation calendar     [facility] priority set/moved to \"high\" for new major drug interaction    Plan made with [facility] Pharmacist Demetria Molina RN  10/23/2024  Anticoagulation Clinic  [facility] for routing messages: reji [facility] MONITORING  [facility] patient phone line: [phone]   "

## 2024-10-28 ENCOUNTER — VIRTUAL VISIT (OUTPATIENT)
Dept: FAMILY MEDICINE | Facility: CLINIC | Age: 29
End: 2024-10-28
Payer: COMMERCIAL

## 2024-10-28 DIAGNOSIS — F32.A DEPRESSION, UNSPECIFIED DEPRESSION TYPE: ICD-10-CM

## 2024-10-28 DIAGNOSIS — I47.10 PAROXYSMAL SUPRAVENTRICULAR TACHYCARDIA (H): ICD-10-CM

## 2024-10-28 DIAGNOSIS — D68.51 FACTOR 5 LEIDEN MUTATION, HETEROZYGOUS (H): Primary | ICD-10-CM

## 2024-10-28 DIAGNOSIS — F33.40 RECURRENT MAJOR DEPRESSIVE DISORDER, IN REMISSION (H): ICD-10-CM

## 2024-10-28 DIAGNOSIS — F41.9 ANXIETY: ICD-10-CM

## 2024-10-28 PROBLEM — R00.0 SINUS TACHYCARDIA: Status: RESOLVED | Noted: 2019-02-08 | Resolved: 2024-10-28

## 2024-10-28 PROBLEM — I26.99 ACUTE PULMONARY EMBOLISM, UNSPECIFIED PULMONARY EMBOLISM TYPE, UNSPECIFIED WHETHER ACUTE COR PULMONALE PRESENT (H): Status: RESOLVED | Noted: 2023-10-24 | Resolved: 2024-10-28

## 2024-10-28 PROCEDURE — 99441 PR PHYSICIAN TELEPHONE EVALUATION 5-10 MIN: CPT | Mod: 93 | Performed by: FAMILY MEDICINE

## 2024-10-28 RX ORDER — WARFARIN SODIUM 5 MG/1
TABLET ORAL
Qty: 130 TABLET | Refills: 3 | Status: SHIPPED | OUTPATIENT
Start: 2024-10-28

## 2024-10-28 ASSESSMENT — ANXIETY QUESTIONNAIRES
6. BECOMING EASILY ANNOYED OR IRRITABLE: NOT AT ALL
1. FEELING NERVOUS, ANXIOUS, OR ON EDGE: SEVERAL DAYS
5. BEING SO RESTLESS THAT IT IS HARD TO SIT STILL: NOT AT ALL
7. FEELING AFRAID AS IF SOMETHING AWFUL MIGHT HAPPEN: SEVERAL DAYS
GAD7 TOTAL SCORE: 4
IF YOU CHECKED OFF ANY PROBLEMS ON THIS QUESTIONNAIRE, HOW DIFFICULT HAVE THESE PROBLEMS MADE IT FOR YOU TO DO YOUR WORK, TAKE CARE OF THINGS AT HOME, OR GET ALONG WITH OTHER PEOPLE: SOMEWHAT DIFFICULT
2. NOT BEING ABLE TO STOP OR CONTROL WORRYING: SEVERAL DAYS
3. WORRYING TOO MUCH ABOUT DIFFERENT THINGS: SEVERAL DAYS
GAD7 TOTAL SCORE: 4

## 2024-10-28 ASSESSMENT — PATIENT HEALTH QUESTIONNAIRE - PHQ9
SUM OF ALL RESPONSES TO PHQ QUESTIONS 1-9: 3
5. POOR APPETITE OR OVEREATING: NOT AT ALL

## 2024-10-28 NOTE — PROGRESS NOTES
Rocío is a 28 year old who is being evaluated via a billable telephone visit.    What phone number would you like to be contacted at? [phone]  How would you like to obtain your AVS? [facility]      Assessment & Plan   Problem List Items Addressed This Visit       Anxiety    Relevant Medications    sertraline (ZOLOFT) 50 MG tablet- medication faxed.    Factor 5 Leiden mutation, heterozygous (H) - Primary    Relevant Medications    warfarin ANTICOAGULANT (JANTOVEN ANTICOAGULANT) 5 MG tablet- stable, no new symptoms    Paroxysmal supraventricular tachycardia (H)- resolved     Recurrent major depressive disorder, in remission (H)- medication is helping.     Relevant Medications    sertraline (ZOLOFT) 50 MG tablet     Other Visit Diagnoses       Depression, unspecified depression type        Relevant Medications    sertraline (ZOLOFT) 50 MG tablet    Acute pulmonary embolism (H)  - resolved.              Medication refilled. Patient had no other complaints.      FUTURE APPOINTMENTS:       - Follow-up visit as needed.      Subjective   Rocío is a 28 year old, presenting for the following health issues:  Recheck Medication (Sertraline and Coumadin)        10/28/2024     1:58 PM   Additional Questions   Roomed by Cleo Valencia   Accompanied by self         10/28/2024     1:58 PM   Patient Reported Additional Medications   Patient reports taking the following new medications NONE     HPI     Patient is a 28 yr old female here for recheck of medications. Patient takes sertraline for anxiety and depression. She is also on chronic anticoagulation for Factor V and history of PE. She reports no side effects to her medications.      Depression and Anxiety   How are you doing with your depression since your last visit? Improved   How are you doing with your anxiety since your last visit?  Improved   Are you having other symptoms that might be associated with depression or anxiety? No  Have you had a significant life event?  No   Do you have any concerns with your use of alcohol or other drugs? No    Social History     Tobacco Use    Smoking status: Never    Smokeless tobacco: Never   Vaping Use    Vaping status: Never Used   Substance Use Topics    Alcohol use: Yes     Comment:  rare    Drug use: No         9/13/2023     7:14 AM 7/12/2024     1:56 PM 10/28/2024     2:01 PM   PHQ   PHQ-9 Total Score 3 3 3   Q9: Thoughts of better off dead/self-harm past 2 weeks Not at all  Not at all  Not at all       Patient-reported         5/19/2020     1:01 PM 11/4/2020     3:28 PM 10/28/2024     2:01 PM   MORA-7 SCORE   Total Score 18 16 4         10/28/2024     2:01 PM   Last PHQ-9   1.  Little interest or pleasure in doing things 0   2.  Feeling down, depressed, or hopeless 1   3.  Trouble falling or staying asleep, or sleeping too much 1   4.  Feeling tired or having little energy 1   5.  Poor appetite or overeating 0   6.  Feeling bad about yourself 0   7.  Trouble concentrating 0   8.  Moving slowly or restless 0   Q9: Thoughts of better off dead/self-harm past 2 weeks 0   PHQ-9 Total Score 3   Difficulty at work, home, or with people Not difficult at all         10/28/2024     2:01 PM   MORA-7    1. Feeling nervous, anxious, or on edge 1   2. Not being able to stop or control worrying 1   3. Worrying too much about different things 1   4. Trouble relaxing 0   5. Being so restless that it is hard to sit still 0   6. Becoming easily annoyed or irritable 0   7. Feeling afraid, as if something awful might happen 1   MORA-7 Total Score 4   If you checked any problems, how difficult have they made it for you to do your work, take care of things at home, or get along with other people? Somewhat difficult       Suicide Assessment Five-step Evaluation and Treatment (SAFE-T)        Medication Followup of coumadin 5 MG  Taking Medication as prescribed: yes  Side Effects:  None  Medication Helping Symptoms:  yes        Review of Systems  Constitutional,  HEENT, cardiovascular, pulmonary, gi and gu systems are negative, except as otherwise noted.      Objective    Vitals - Patient Reported  Pain Score: No Pain (0)        Physical Exam   GENERAL: alert and no distress  PSYCH: Appropriate affect, tone, and pace of words          Telephone-Visit Details    Type of service:  Telephone Visit - 5 minutes  Originating Location (pt. Location): Home  Distant Location (provider location):  On-site  Signed Electronically by: Dionicio Liang MD     Pt is a 60 y/o F with a PMHx significant for T1DM (on insulin pump), HTN, HLD, former smoker, MI, CAD s/p PCI to RCA and brachytherapy in   Aug 2017, dCHF (last TTE- November 2017- mild-mod MR, mild AS, mild-mod TR EF40-45%) chronic LLE pain and edema in setting of severe   PVD s/p L & R fem-pop bypass  graft,  multiple vascular surgery both leg w/ fem-pop bypass revisions , Subclavian vein stenosis left s/p stent, ESRD   on HD (Mon/Tu/Thr/Sat) via L AVF with oliguria, CVA with memory deficit, depression who presents with shortness of breath. The patient has multiple   admissions for DKA, most recently from 11/24 - 11/29.  Pt endorsing flu like symptoms 1 week prior to admission and notes yesterday, she began experiencing  nausea, vomiting, generalized weakness, and diffuse abdominal pain prompting her to come to the ED. In the ED, labs were significant for Glucose-862, Na-128, K-6.9, Cl-74, HCO3-11, AG, 43, and Cr-6.75. EKG showed peaked T-waves in V2-  V4. Pt was bolused 3L NS, 2g Calcium gluconate, Nebulizer x2, Insulin 6units x1, sodium bicarb 50meq x1, and subsequently started on an Insulin gtt @6u/h. Pt was admitted to MICU for management of DKA. Pt is a 60 y/o F with a PMHx significant for T1DM (on insulin pump), HTN, HLD, former smoker, MI, CAD s/p PCI to RCA and brachytherapy in   Aug 2017, dCHF (last TTE- November 2017- mild-mod MR, mild AS, mild-mod TR EF40-45%) chronic LLE pain and edema in setting of severe PVD s/p L & R fem-pop bypass  graft,  multiple vascular surgery both leg w/ fem-pop bypass revisions , Subclavian vein stenosis left s/p stent, ESRD on HD (Mon/Tu/Thr/Sat) via L AVF with oliguria, CVA with memory deficit, depression who presents with shortness of breath. The patient has multiple   admissions for DKA, most recently from 11/24 - 11/29.  Pt endorsing flu like symptoms 1 week prior to admission and notes oliguria, CVA with memory deficit, depression who presents with shortness of breath. The patient has multiple   admissions for DKA, most recently from 11/24 - 11/29.  Pt endorsing flu like symptoms 1 week prior to admission and notes.75. EKG showed peaked T-waves in V2-  V4. Pt was bolused 3L NS, 2g Calcium gluconate, Nebulizer x2, Insulin 6units x1, sodium bicarb 50meq x1, and subsequently started on an Insulin gtt @6u/h. Pt was admitted to MICU for management of DKA. 60 y/o F with a PMHx significant for T1DM (on insulin pump), HTN, HLD, former smoker, MI, CAD s/p PCI to RCA and brachytherapy in Aug 2017, dCHF (last TTE in Nov 2017 mild-mod MR, mild AS, mild-mod TR, EF 40-45%), chronic LLE pain and edema in setting of severe PVD s/p L & R fem-pop bypass graft with fem-pop bypass revisions, left subclavian vein stenosis s/p stent, ESRD on HD (Mon/Tu/Thr/Sat) via L AVF with oliguria, CVA with memory deficit, and depression presented with generalized weakness and diffuse abdominal pain/N/V x 1 day. Pt also endorsing flu-like symptoms 1 week prior to admission. Pt was found to be in DKA with VBG pH 7.2, HCO3 11, AG 43, BMP glucose 1049, and B-hydroxy butyrate >8. Pt has multiple   admissions for DKA, most recently from 11/24 - 11/29.  Pt found EKG showed peaked T-waves in V2-  V4. Pt was bolused 3L NS, 2g Calcium gluconate, Nebulizer x2, Insulin 6units x1, sodium bicarb 50meq x1, and subsequently started on an Insulin gtt @6u/h. Pt was admitted to MICU for management of DKA. 60 y/o F with a PMHx significant for T1DM (on insulin pump, with multiple admissions for DKA), HTN, HLD, former smoker, MI, CAD s/p PCI to RCA and brachytherapy in Aug 2017, dCHF (last TTE in Nov 2017 mild-mod MR, mild AS, mild-mod TR, EF 40-45%), chronic LLE pain and edema in setting of severe PVD s/p L & R fem-pop bypass graft with fem-pop bypass revisions, left subclavian vein stenosis s/p stent, ESRD on HD (Mon/Tu/Thr/Sat) via L AVF with oliguria, CVA with memory deficit, and depression presented with generalized weakness and diffuse abdominal pain/N/V x 1 day. Pt also endorsing flu-like symptoms 1 week prior to admission. Pt was found to be in DKA with VBG pH 7.2, HCO3 11, AG 43, BMP glucose 1049, and B-hydroxy butyrate >8. Pt also found to have serum K of 6.9, not hemolyzed, with peaked T waves in V2-V4. Pt was bolused 3L NS and given 2g calcium gluconate, albuterol x2, insulin 6 units x1, sodium bicarb 50meq x1, and subsequently started on an insulin gtt @1u/h. Pt was admitted to MICU for management of DKA. 58 y/o F with a PMHx significant for T1DM (on insulin pump, with multiple admissions for DKA), HTN, HLD, former smoker, MI, CAD s/p PCI to RCA and brachytherapy in Aug 2017, dCHF (last TTE in Nov 2017 mild-mod MR, mild AS, mild-mod TR, EF 40-45%), chronic LLE pain and edema in setting of severe PVD s/p L & R fem-pop bypass graft with fem-pop bypass revisions, left subclavian vein stenosis s/p stent, ESRD on HD (Mon/Tu/Thr/Sat) via L AVF with oliguria, CVA with memory deficit, and depression who presents to the ED w/ a cc of n/v and diffuse abdominal pain of one days duration. Pt also endorsing flu-like symptoms 1 week prior to admission. Pt was found to be in DKA with VBG pH 7.2, HCO3 11, AG 43, BMP glucose 1049, and B-hydroxy butyrate >8. Pt also found to have serum K of 6.9, not hemolyzed, with peaked T waves in V2-V4. Pt was bolused 3L NS and given 2g calcium gluconate, albuterol x2, insulin 6 units x1, sodium bicarb 50meq x1, and subsequently started on an insulin gtt @1u/h. Pt was admitted to MICU for management of DKA.

## 2024-10-29 ENCOUNTER — ANTICOAGULATION THERAPY VISIT (OUTPATIENT)
Dept: ANTICOAGULATION | Facility: CLINIC | Age: 29
End: 2024-10-29
Payer: COMMERCIAL

## 2024-10-29 DIAGNOSIS — I26.99 ACUTE PULMONARY EMBOLISM WITHOUT ACUTE COR PULMONALE, UNSPECIFIED PULMONARY EMBOLISM TYPE (H): ICD-10-CM

## 2024-10-29 DIAGNOSIS — D68.61 ANTIPHOSPHOLIPID SYNDROME (H): ICD-10-CM

## 2024-10-29 DIAGNOSIS — D68.51 FACTOR 5 LEIDEN MUTATION, HETEROZYGOUS (H): Primary | ICD-10-CM

## 2024-10-29 DIAGNOSIS — Z79.01 LONG TERM CURRENT USE OF ANTICOAGULANT THERAPY: ICD-10-CM

## 2024-10-29 LAB — INR HOME MONITORING: 2 (ref 2–3)

## 2024-10-29 NOTE — PROGRESS NOTES
ANTICOAGULATION MANAGEMENT     Rocío Catherine 28 year old female is on warfarin with therapeutic INR result. (Goal INR 2.0-3.0)    Recent labs: (last 7 days)     10/29/24  0000   INR 2.0       ASSESSMENT     Source(s): Chart Review and Patient/Caregiver Call     Warfarin doses taken: Warfarin taken as instructed  Diet: No new diet changes identified  Medication/supplement changes: None noted cipro ended. Starting to feel better  New illness, injury, or hospitalization: No  Signs or symptoms of bleeding or clotting: No  Previous result: Subtherapeutic  Additional findings: None       PLAN     Recommended plan for temporary change(s) affecting INR     Dosing Instructions: Continue your current warfarin dose with next INR in 2 weeks       Summary  As of 10/29/2024      Full warfarin instructions:  5 mg every Sat; 7.5 mg all other days   Next INR check:  11/12/2024               Telephone call with Rocío who verbalizes understanding and agrees to plan    Patient to recheck with home meter    Education provided: Please call back if any changes to your diet, medications or how you've been taking warfarin  Goal range and lab monitoring: goal range and significance of current result, Importance of therapeutic range, and Importance of following up at instructed interval    Plan made per [facility] anticoagulation protocol    Meri Lombardo, RN  10/29/2024  Anticoagulation Clinic  [employer] for routing messages: p ANTICOAG HOME MONITORING  [facility] patient phone line: [phone]        _______________________________________________________________________     Anticoagulation Episode Summary       Current INR goal:  2.0-3.0   TTR:  77.9% (1 y)   Target end date:  Indefinite   Send INR reminders to:  ANTICOAG HOME MONITORING    Indications    Acute pulmonary embolism  unspecified pulmonary embolism type  unspecified whether acute cor pulmonale present (H) (Resolved) [I26.99]  Factor 5 Leiden mutation  heterozygous (H) [D68.51]  Other acute  pulmonary embolism without acute cor pulmonale (H) (Resolved) [I26.99]  Acute pulmonary embolism  unspecified pulmonary embolism type  unspecified whether acute cor pulmonale present (H) (Resolved) [I26.99]  Antiphospholipid syndrome (H) [D68.61]  Long term current use of anticoagulant therapy [Z79.01]  Acute pulmonary embolism without acute cor pulmonale  unspecified pulmonary embolism type (H) (Resolved) [I26.99]  Acute pulmonary embolism without acute cor pulmonale  unspecified pulmonary embolism type (H) [I26.99]  Acute pulmonary embolism  unspecified pulmonary embolism type  unspecified whether acute cor pulmonale present (H) (Resolved) [I26.99]             Comments:  [employer] home meter// manage by exception// Antiphospholipid antibody syndrome (may need CF10 if INR elevated with no known reason)             Anticoagulation Care Providers       Provider Role Specialty Phone number    Jesse Farnsworth MD Referring Family Medicine [phone]    Yessy Lyn, APRN [facility] Referring Family Medicine [phone]    Debi Carrasco MD Referring Cardiovascular Disease [phone]    Dionicio Liang MD Referring Family Medicine [phone]

## 2024-11-08 ENCOUNTER — ANTICOAGULATION THERAPY VISIT (OUTPATIENT)
Dept: ANTICOAGULATION | Facility: CLINIC | Age: 29
End: 2024-11-08

## 2024-11-08 ENCOUNTER — OFFICE VISIT (OUTPATIENT)
Dept: FAMILY MEDICINE | Facility: CLINIC | Age: 29
End: 2024-11-08
Payer: COMMERCIAL

## 2024-11-08 VITALS
OXYGEN SATURATION: 100 % | SYSTOLIC BLOOD PRESSURE: 104 MMHG | HEIGHT: 61 IN | DIASTOLIC BLOOD PRESSURE: 60 MMHG | RESPIRATION RATE: 12 BRPM | HEART RATE: 127 BPM | BODY MASS INDEX: 21.14 KG/M2 | TEMPERATURE: 98.6 F | WEIGHT: 112 LBS

## 2024-11-08 DIAGNOSIS — I26.99 ACUTE PULMONARY EMBOLISM WITHOUT ACUTE COR PULMONALE, UNSPECIFIED PULMONARY EMBOLISM TYPE (H): ICD-10-CM

## 2024-11-08 DIAGNOSIS — R39.9 LOWER URINARY TRACT SYMPTOMS: ICD-10-CM

## 2024-11-08 DIAGNOSIS — N94.9 VAGINAL SYMPTOM: ICD-10-CM

## 2024-11-08 DIAGNOSIS — N36.8 IRRITATION OF URETHRA: Primary | ICD-10-CM

## 2024-11-08 DIAGNOSIS — D68.61 ANTIPHOSPHOLIPID SYNDROME (H): ICD-10-CM

## 2024-11-08 DIAGNOSIS — N36.8 PAIN IN URETHRAL MEATUS: ICD-10-CM

## 2024-11-08 DIAGNOSIS — D68.51 FACTOR 5 LEIDEN MUTATION, HETEROZYGOUS (H): Primary | ICD-10-CM

## 2024-11-08 DIAGNOSIS — Z79.01 LONG TERM CURRENT USE OF ANTICOAGULANT THERAPY: ICD-10-CM

## 2024-11-08 LAB
ALBUMIN UR-MCNC: NEGATIVE MG/DL
APPEARANCE UR: CLEAR
BACTERIA #/AREA URNS HPF: ABNORMAL /HPF
BILIRUB UR QL STRIP: NEGATIVE
CLUE CELLS: NORMAL
COLOR UR AUTO: YELLOW
GLUCOSE UR STRIP-MCNC: NEGATIVE MG/DL
HGB UR QL STRIP: ABNORMAL
KETONES UR STRIP-MCNC: NEGATIVE MG/DL
LEUKOCYTE ESTERASE UR QL STRIP: NEGATIVE
NITRATE UR QL: NEGATIVE
PH UR STRIP: 6 [PH] (ref 5–7)
RBC #/AREA URNS AUTO: ABNORMAL /HPF
SP GR UR STRIP: 1.01 (ref 1–1.03)
SQUAMOUS #/AREA URNS AUTO: ABNORMAL /LPF
TRICHOMONAS, WET PREP: NORMAL
UROBILINOGEN UR STRIP-ACNC: 0.2 E.U./DL
WBC #/AREA URNS AUTO: ABNORMAL /HPF
WBC'S/HIGH POWER FIELD, WET PREP: NORMAL
YEAST, WET PREP: NORMAL

## 2024-11-08 PROCEDURE — 99214 OFFICE O/P EST MOD 30 MIN: CPT | Performed by: NURSE PRACTITIONER

## 2024-11-08 PROCEDURE — 87210 SMEAR WET MOUNT SALINE/INK: CPT | Performed by: NURSE PRACTITIONER

## 2024-11-08 PROCEDURE — 81001 URINALYSIS AUTO W/SCOPE: CPT | Performed by: NURSE PRACTITIONER

## 2024-11-08 RX ORDER — CIPROFLOXACIN 500 MG/1
500 TABLET, FILM COATED ORAL 2 TIMES DAILY
Qty: 10 TABLET | Refills: 0 | Status: SHIPPED | OUTPATIENT
Start: 2024-11-08 | End: 2024-11-13

## 2024-11-08 ASSESSMENT — PAIN SCALES - GENERAL: PAINLEVEL_OUTOF10: MILD PAIN (2)

## 2024-11-08 NOTE — PROGRESS NOTES
Assessment & Plan     Irritation of urethra     - Adult Urology  Referral    Pain in urethral meatus     - Adult Urology  Referral    Lower urinary tract symptoms     - Adult Urology  Referral    Vaginal symptom     - UA Macroscopic with reflex to Microscopic and Culture - Clinic Collect  - Wet prep - Clinic Collect  - UA Microscopic with Reflex to Culture      Patient presents with acute on chronic lower urinary tract symptoms with urethral pain.  She has had multiple visits in the last 6 months for similar symptoms and workup has been negative.  She does report some relief with antibiotics short-term but symptoms quickly returned.    Referral to urology for further workup and evaluation.  Discussed pelvic floor program as she may benefit from that if suffering from  Urethral spasms or pelvic floor dysfunction        CONSULTATION/REFERRAL to Urology  See Patient Instructions    Juan Pablo Alford is a 28 year old, presenting for the following health issues:  Vaginal Problem and Health Maintenance (Patient has declined all vaccines today)        11/8/2024     9:19 AM   Additional Questions   Roomed by Tracy CORTEZ CMA   Accompanied by self         11/8/2024     9:19 AM   Patient Reported Additional Medications   Patient reports taking the following new medications none     History of Present Illness       Reason for visit:  Vaginal pain    She eats 2-3 servings of fruits and vegetables daily.She consumes 2 sweetened beverage(s) daily.She exercises with enough effort to increase her heart rate 10 to 19 minutes per day.  She exercises with enough effort to increase her heart rate 3 or less days per week.   She is taking medications regularly.         Vaginal Symptoms  Onset/Duration: 5-days  Patient reports vaginal pain/burning  In between going to the bathroom  Description:  Vaginal Discharge: none   Itching (Pruritis): No  Burning sensation:  YES  Odor: No  Accompanying Signs &  "Symptoms:  Urinary symptoms: none currently  Abdominal pain: No  Fever: No  History:   Sexually active: YES  New Partner: No  Possibility of Pregnancy:  No  Recent antibiotic use: October 20th ER- given something \"just in case\"  Previous vaginitis issues: No  Precipitating or alleviating factors: patient had a UTI back in June, \"bad\"  10/20 went the ER for urinary frequency- no UTI- had the same burning pain in vagina  Therapies tried and outcome: none    Similar episodes with urinary tract infection and symptoms back in June - required 2 rounds of antibiotics and symptoms eventually fully cleared.    Symptoms recurred in Oct and seen in  10/20/2024 - UA done and was negative.  Given antibiotic to take if it doesn't improve.  Took antibiotic and had some improvement in symptoms.    No vaginal discharge.  No concerns for pregnancy or STD risk/exposures.    Periods are regular/normal.  LMP 10/20/2024    Has had 4+ visits for urinary symptoms/pain since May 2024.  History of recurrent urinary tract infection per patient.      Review of Systems  Constitutional, HEENT, cardiovascular, pulmonary, GI, , musculoskeletal, neuro, skin, endocrine and psych systems are negative, except as otherwise noted.      Objective    /60 (BP Location: Right arm, Patient Position: Sitting, Cuff Size: Adult Regular)   Pulse (!) 127   Temp 98.6  F (37  C) (Tympanic)   Resp 12   Ht 1.549 m (5' 1\")   Wt 50.8 kg (112 lb)   LMP 10/22/2024 (Exact Date)   SpO2 100%   BMI 21.16 kg/m    Body mass index is 21.16 kg/m .  Physical Exam   GENERAL: alert and no distress  NECK: no adenopathy, no asymmetry, masses, or scars  RESP: lungs clear to auscultation - no rales, rhonchi or wheezes  CV: regular rate and rhythm, normal S1 S2, no S3 or S4, no murmur, click or rub, no peripheral edema  ABDOMEN: soft, nontender, no hepatosplenomegaly, no masses and bowel sounds normal   (female): normal female external genitalia, normal urethral " meatus , and normal vaginal mucosa  MS: no gross musculoskeletal defects noted, no edema    Results for orders placed or performed in visit on 11/08/24   Wet prep - Clinic Collect     Status: Normal    Specimen: Vagina; Swab   Result Value Ref Range    Trichomonas Absent Absent    Yeast Absent Absent    Clue Cells Absent Absent    WBCs/high power field None None             Signed Electronically by: Shelby Claros, DNP

## 2024-11-08 NOTE — PATIENT INSTRUCTIONS
Referral to urology for further workup and evaluation.  Discussed pelvic floor program as she may benefit from that if suffering from  Urethral spasms or pelvic floor dysfunction    Shelby Claros, DNP

## 2024-11-08 NOTE — PROGRESS NOTES
"ANTICOAGULATION MANAGEMENT     Rocío Catherine 28 year old female is on warfarin with therapeutic INR result. (Goal INR 2.0-3.0)    No results for input(s): \"INR\" in the last 168 hours.    ASSESSMENT     Source(s): Chart Review  Previous INR was Therapeutic last visit; previously outside of goal range  Medication, diet, health changes since last INR: Starting ABX today for UTI symptoms  Cipro - mychart sent with follow up instructions          PLAN     Recommended plan for temporary change(s) affecting INR     Dosing Instructions: Continue your current warfarin dose with next INR in 3 days       Summary  As of 11/8/2024      Full warfarin instructions:  5 mg every Sat; 7.5 mg all other days   Next INR check:  11/11/2024               Detailed voice message left for Rocío with dosing instructions and follow up date.   Sent MyChart message with dosing and follow up instructions    Patient to recheck with home meter    Education provided: Please call back if any changes to your diet, medications or how you've been taking warfarin  Interaction IS anticipated between warfarin and Cipro  Contact [phone] with any changes, questions or concerns.     Plan made per [facility] anticoagulation protocol    Lucia Mcbride RN  11/8/2024  Anticoagulation Clinic  [employer] for routing messages: p ANTICOAG HOME MONITORING  [facility] patient phone line: [phone]        _______________________________________________________________________     Anticoagulation Episode Summary       Current INR goal:  2.0-3.0   TTR:  78.1% (11.8 mo)   Target end date:  Indefinite   Send INR reminders to:  ANTICOAG HOME MONITORING    Indications    Acute pulmonary embolism  unspecified pulmonary embolism type  unspecified whether acute cor pulmonale present (H) (Resolved) [I26.99]  Factor 5 Leiden mutation  heterozygous (H) [D68.51]  Other acute pulmonary embolism without acute cor pulmonale (H) (Resolved) [I26.99]  Acute pulmonary embolism  unspecified " pulmonary embolism type  unspecified whether acute cor pulmonale present (H) (Resolved) [I26.99]  Antiphospholipid syndrome (H) [D68.61]  Long term current use of anticoagulant therapy [Z79.01]  Acute pulmonary embolism without acute cor pulmonale  unspecified pulmonary embolism type (H) (Resolved) [I26.99]  Acute pulmonary embolism without acute cor pulmonale  unspecified pulmonary embolism type (H) [I26.99]  Acute pulmonary embolism  unspecified pulmonary embolism type  unspecified whether acute cor pulmonale present (H) (Resolved) [I26.99]             Comments:  [employer] home meter// manage by exception// Antiphospholipid antibody syndrome (may need CF10 if INR elevated with no known reason)             Anticoagulation Care Providers       Provider Role Specialty Phone number    Jesse Farnsworth MD Referring Family Medicine [phone]    Yessy Lyn APRN [facility] Referring Family Medicine [phone]    Debi Carrasco MD Referring Cardiovascular Disease [phone]    Dionicio Liang MD Referring Family Medicine [phone]

## 2024-11-12 ENCOUNTER — ANTICOAGULATION THERAPY VISIT (OUTPATIENT)
Dept: ANTICOAGULATION | Facility: CLINIC | Age: 29
End: 2024-11-12
Payer: COMMERCIAL

## 2024-11-12 DIAGNOSIS — I26.99 ACUTE PULMONARY EMBOLISM WITHOUT ACUTE COR PULMONALE, UNSPECIFIED PULMONARY EMBOLISM TYPE (H): ICD-10-CM

## 2024-11-12 DIAGNOSIS — Z79.01 LONG TERM CURRENT USE OF ANTICOAGULANT THERAPY: ICD-10-CM

## 2024-11-12 DIAGNOSIS — D68.61 ANTIPHOSPHOLIPID SYNDROME (H): ICD-10-CM

## 2024-11-12 DIAGNOSIS — D68.51 FACTOR 5 LEIDEN MUTATION, HETEROZYGOUS (H): Primary | ICD-10-CM

## 2024-11-12 LAB — INR HOME MONITORING: 2.7 (ref 2–3)

## 2024-11-12 NOTE — PROGRESS NOTES
ANTICOAGULATION MANAGEMENT     Rocío Catherine 28 year old female is on warfarin with therapeutic INR result. (Goal INR 2.0-3.0)    Recent labs: (last 7 days)     11/12/24  0000   INR 2.7       ASSESSMENT     Source(s): Chart Review  Previous INR was Therapeutic last visit; previously outside of goal range  Medication, diet, health changes since last INR chart reviewed; none identified  Checking in to see if she started CIPRO that she has on hand for UTI   Has urology appointment on 11/26/2024         PLAN     Recommended plan for no diet, medication or health factor changes affecting INR     Dosing Instructions: Continue your current warfarin dose with next INR in 2 weeks       Summary  As of 11/12/2024      Full warfarin instructions:  5 mg every Sat; 7.5 mg all other days   Next INR check:  11/26/2024               Sent [employer] message with dosing and follow up instructions    Patient to recheck with home meter    Education provided: Please call back if any changes to your diet, medications or how you've been taking warfarin    Plan made per [facility] anticoagulation protocol    Lucia Mcbride RN  11/12/2024  Anticoagulation Clinic  [employer] for routing messages: [employer] ANTICOAG HOME MONITORING  [facility] patient phone line: [phone]        _______________________________________________________________________     Anticoagulation Episode Summary       Current INR goal:  2.0-3.0   TTR:  79.8% (1 y)   Target end date:  Indefinite   Send INR reminders to:  ANTICOAG HOME MONITORING    Indications    Acute pulmonary embolism  unspecified pulmonary embolism type  unspecified whether acute cor pulmonale present (H) (Resolved) [I26.99]  Factor 5 Leiden mutation  heterozygous (H) [D68.51]  Other acute pulmonary embolism without acute cor pulmonale (H) (Resolved) [I26.99]  Acute pulmonary embolism  unspecified pulmonary embolism type  unspecified whether acute cor pulmonale present (H) (Resolved) [I26.99]  Antiphospholipid syndrome (H)  [D68.61]  Long term current use of anticoagulant therapy [Z79.01]  Acute pulmonary embolism without acute cor pulmonale  unspecified pulmonary embolism type (H) (Resolved) [I26.99]  Acute pulmonary embolism without acute cor pulmonale  unspecified pulmonary embolism type (H) [I26.99]  Acute pulmonary embolism  unspecified pulmonary embolism type  unspecified whether acute cor pulmonale present (H) (Resolved) [I26.99]             Comments:  [employer] home meter// manage by exception// Antiphospholipid antibody syndrome (may need CF10 if INR elevated with no known reason)             Anticoagulation Care Providers       Provider Role Specialty Phone number    Jesse Farnsworth MD Referring Family Medicine [phone]    Yessy Lyn APRN [facility] Referring Family Medicine [phone]    Debi Carrasco MD Referring Cardiovascular Disease [phone]    Dionicio Liang MD Referring Family Medicine [phone]

## 2024-11-25 ENCOUNTER — ANTICOAGULATION THERAPY VISIT (OUTPATIENT)
Dept: ANTICOAGULATION | Facility: CLINIC | Age: 29
End: 2024-11-25
Payer: COMMERCIAL

## 2024-11-25 LAB — INR HOME MONITORING: 2.5 (ref 2–3)

## 2024-11-25 NOTE — PROGRESS NOTES
ANTICOAGULATION MANAGEMENT     Rocío Catherine 29 year old female is on warfarin with therapeutic INR result. (Goal INR 2.0-3.0)    Recent labs: (last 7 days)     11/25/24  0000   INR 2.5       ASSESSMENT     Source(s): Chart Review  Previous INR was Therapeutic last 2(+) visits  Medication, diet, health changes since last INR chart reviewed; none identified         PLAN     Recommended plan for no diet, medication or health factor changes affecting INR     Dosing Instructions: Continue your current warfarin dose with next INR in 2 weeks       Summary  As of 11/25/2024      Full warfarin instructions:  5 mg every Sat; 7.5 mg all other days   Next INR check:  12/9/2024               Sent [employer] message with dosing and follow up instructions    Patient to recheck with home meter    Education provided: Please call back if any changes to your diet, medications or how you've been taking warfarin  Goal range and lab monitoring: goal range and significance of current result  Resume manage by exception with home monitor. Continue to submit INR results to home monitor company.You will only be called when your result is out of range and at 90 da check in. Please call and notify [facility] if new medication started, dose missed, signs or symptoms of bleeding or clotting, or a surgery/procedure is scheduled. Due for next call no later than: 2/23/25.    Plan made per [facility] anticoagulation protocol    Amanda Valdez RN  11/25/2024  Anticoagulation Clinic  [facility] for routing messages: p ANTICOAG HOME MONITORING  [facility] patient phone line: [phone]        _______________________________________________________________________     Anticoagulation Episode Summary       Current INR goal:  2.0-3.0   TTR:  83.3% (1 y)   Target end date:  Indefinite   Send INR reminders to:  ANTICOAG HOME MONITORING    Indications    Acute pulmonary embolism  unspecified pulmonary embolism type  unspecified whether acute cor pulmonale present (H) (Resolved)  [I26.99]  Factor 5 Leiden mutation  heterozygous (H) [D68.51]  Other acute pulmonary embolism without acute cor pulmonale (H) (Resolved) [I26.99]  Acute pulmonary embolism  unspecified pulmonary embolism type  unspecified whether acute cor pulmonale present (H) (Resolved) [I26.99]  Antiphospholipid syndrome (H) [D68.61]  Long term current use of anticoagulant therapy [Z79.01]  Acute pulmonary embolism without acute cor pulmonale  unspecified pulmonary embolism type (H) (Resolved) [I26.99]  Acute pulmonary embolism without acute cor pulmonale  unspecified pulmonary embolism type (H) [I26.99]  Acute pulmonary embolism  unspecified pulmonary embolism type  unspecified whether acute cor pulmonale present (H) (Resolved) [I26.99]             Comments:  [employer] home meter// manage by exception// Antiphospholipid antibody syndrome (may need CF10 if INR elevated with no known reason)             Anticoagulation Care Providers       Provider Role Specialty Phone number    Jesse Farnsworth MD Referring Family Medicine [phone]    Yessy Lyn, SNOW [facility] Referring Family Medicine [phone]    Debi Carrasco MD Referring Cardiovascular Disease [phone]    Dionicio Liang MD Referring Family Medicine [phone]

## 2024-11-26 ENCOUNTER — OFFICE VISIT (OUTPATIENT)
Dept: UROLOGY | Facility: CLINIC | Age: 29
End: 2024-11-26
Attending: NURSE PRACTITIONER
Payer: COMMERCIAL

## 2024-11-26 VITALS
HEART RATE: 106 BPM | OXYGEN SATURATION: 100 % | BODY MASS INDEX: 21.14 KG/M2 | SYSTOLIC BLOOD PRESSURE: 130 MMHG | WEIGHT: 112 LBS | HEIGHT: 61 IN | DIASTOLIC BLOOD PRESSURE: 86 MMHG | TEMPERATURE: 98.8 F

## 2024-11-26 DIAGNOSIS — N36.8 PAIN IN URETHRAL MEATUS: ICD-10-CM

## 2024-11-26 DIAGNOSIS — R39.9 LOWER URINARY TRACT SYMPTOMS: ICD-10-CM

## 2024-11-26 DIAGNOSIS — R31.29 MICROSCOPIC HEMATURIA: Primary | ICD-10-CM

## 2024-11-26 DIAGNOSIS — N36.8 IRRITATION OF URETHRA: ICD-10-CM

## 2024-11-26 NOTE — PROGRESS NOTES
Chief Complaint:   Microscopic hematuria         History of Present Illness:   Rocío Catherine is a 29 year old female with a history of pulmonary embolism, antiphospholipid syndrome, and Factor V Leiden mutation who presents for evaluation of microscopic hematuria.     She currently denies any dysuria, pyuria, hesitancy, intermittency, feelings of incomplete emptying, or any recent history of urinary tract infections or stones.    She is a never smoker.          Past Medical History:     Past Medical History:   Diagnosis Date    Acute pulmonary embolism, unspecified pulmonary embolism type, unspecified whether acute cor pulmonale present (H) 10/24/2023    Calculus of gallbladder with acute cholecystitis without obstruction 2017    Overview:  Added automatically from request for surgery [id number]    Chickenpox     Depression     Factor 5 Leiden mutation, heterozygous (H) 2018    Factor V Leiden (H)     History of foot fracture     History of frequent urinary tract infections     History of pyelonephritis     History of recurrent UTI (urinary tract infection)     Non-rheumatic mitral regurgitation, trace 2019    Seen on echocardiogram 2019. Recommended for follow up echo in 2-3 years.     Ovarian cyst     Paroxysmal supraventricular tachycardia (H) 2022    Postpartum depression     mild    Pulmonary emboli (H) 2020            Past Surgical History:     Past Surgical History:   Procedure Laterality Date     SECTION N/A 2020    Procedure:  SECTION;  Surgeon: Grace Dee MD;  Location: [facility] OR     SECTION      c section    ENT SURGERY      GYN SURGERY  Csections ()    HEAD & NECK SURGERY      hemangioma on neck at age 2    LAPAROSCOPIC CHOLECYSTECTOMY      OTHER SURGICAL HISTORY      Spinal Cord Surgery    SINUS FRONTAL OPEN OBLITERATION      2015            Medications     Current Outpatient Medications   Medication Sig Dispense Refill  "   sertraline (ZOLOFT) 50 MG tablet Take 1.5 tablets (75 mg) by mouth daily. 135 tablet 3    warfarin ANTICOAGULANT (JANTOVEN ANTICOAGULANT) 5 MG tablet Take 5 mg every Sat; 7.5 mg all other days or As directed by Anticoagulation clinic 130 tablet 3     No current facility-administered medications for this visit.            Allergies:   Patient has no known allergies.         Review of Systems:  From intake questionnaire   Negative 14 system review except as noted on HPI, nurse's note.         Physical Exam:   Patient is a 29 year old  female   Vitals: Blood pressure 130/86, pulse 106, temperature 98.8  F (37.1  C), temperature source Tympanic, height 1.549 m (5' 1\"), weight 50.8 kg (112 lb), last menstrual period 10/22/2024, SpO2 100%, not currently breastfeeding.  General Appearance Adult: Alert, no acute distress, oriented.  Lungs: Non-labored breathing.  Heart: No obvious jugular venous distension present.  Neuro: Alert, oriented, speech and mentation normal      Labs and Pathology:    I personally reviewed all applicable laboratory data and went over findings with patient  Significant for:    CBC RESULTS:  Recent Labs   Lab Test 01/07/23  1011 01/12/22  0302 09/30/21  1524 06/01/21  1745   WBC 7.0 4.0 7.5 7.1   HGB 14.8 13.4 14.3 14.3    159 187 235        BMP RESULTS:  Recent Labs   Lab Test 01/07/23  1011 01/12/22  0302 06/01/21  1745 05/07/21  1411 11/06/20  1426 11/04/20  1526    141 140 138 141 139   POTASSIUM 4.0 4.0 3.9 3.9 3.7 4.1   CHLORIDE 105 108 108 106 110* 108   CO2 18* 26 27 26 24 24   ANIONGAP 13 7 5 6 7 7   GLC 84 101* 106* 115* 111* 87   BUN 13.4 9 11 11 13 10   CR 0.75 0.69 0.66 0.70 0.77 0.74   GFRESTIMATED >90 >90 >90 >90 >90 >90   GFRESTBLACK  --   --  >90 >90 >90 >90   LANA 9.2 8.7 8.7 9.2 9.1 9.3       UA RESULTS:   Recent Labs   Lab Test 11/08/24  0927 10/20/24  1611 07/12/24  1433   SG 1.015 1.010 1.010   URINEPH 6.0 7.0 7.5*   NITRITE Negative Negative Negative   RBCU " 5-10* 4* 0-2   WBCU None Seen <1 0-5            Assessment and Plan:     Assessment: Ms. Rocío Catherine is a pleasant 29 year old female with microscopic hematuria that has been present intermittently since 2020.  The differential diagnosis at this point includes stone disease, infection, vaginal contaminant, urothelial malignancy, renal disorder versus another yet unknown diagnosis.    She has had a few episodes of pelvic pain (urethra and vagina). She was referred to pelvic floor PT by her PCP. I agree with this recommendation.     Plan:  At this time, recommend proceeding with comprehensive hematuria evaluation to include:  - CT urogram for upper tract imaging.  - Cystoscopy to evaluate the interior of the bladder. Follow up for hematuria as recommended by urologist performing cystoscopic evaluation.      JADE DUEÑAS PA-C  Department of Urology

## 2024-12-09 ENCOUNTER — DOCUMENTATION ONLY (OUTPATIENT)
Dept: ANTICOAGULATION | Facility: CLINIC | Age: 29
End: 2024-12-09
Payer: COMMERCIAL

## 2024-12-09 DIAGNOSIS — I26.99 ACUTE PULMONARY EMBOLISM WITHOUT ACUTE COR PULMONALE, UNSPECIFIED PULMONARY EMBOLISM TYPE (H): ICD-10-CM

## 2024-12-09 DIAGNOSIS — Z79.01 LONG TERM CURRENT USE OF ANTICOAGULANT THERAPY: ICD-10-CM

## 2024-12-09 DIAGNOSIS — D68.61 ANTIPHOSPHOLIPID SYNDROME (H): ICD-10-CM

## 2024-12-09 DIAGNOSIS — D68.51 FACTOR 5 LEIDEN MUTATION, HETEROZYGOUS (H): Primary | ICD-10-CM

## 2024-12-09 LAB — INR HOME MONITORING: 2.4 (ref 2–3)

## 2024-12-09 NOTE — PROGRESS NOTES
ANTICOAGULATION  MANAGEMENT-Home Monitor Managed by Exception    Rocío RECINOS Florin 29 year old female is on warfarin with therapeutic INR result. (Goal INR 2.0-3.0)    Recent labs: (last 7 days)     12/09/24  0000   INR 2.4       Previous INR was Therapeutic  Medication, diet, health changes since last INR:chart reviewed; none identified  Contacted within the last 12 weeks by phone on 11/25/2024  Due for next call no later than: 2/23/25.   Last [facility] referral date: 10/02/2024      VINI     Rocío was NOT contacted regarding therapeutic result today per home monitoring policy manage by exception agreement.   Current warfarin dose is to be continued:     Summary  As of 12/9/2024      Full warfarin instructions:  5 mg every Sat; 7.5 mg all other days   Next INR check:  12/23/2024             ?   Shelby Valero RN  Anticoagulation Clinic  12/9/2024    _______________________________________________________________________     Anticoagulation Episode Summary       Current INR goal:  2.0-3.0   TTR:  84.0% (1 y)   Target end date:  Indefinite   Send INR reminders to:  [facility]    Indications    Acute pulmonary embolism  unspecified pulmonary embolism type  unspecified whether acute cor pulmonale present (H) (Resolved) [I26.99]  Factor 5 Leiden mutation  heterozygous (H) [D68.51]  Other acute pulmonary embolism without acute cor pulmonale (H) (Resolved) [I26.99]  Acute pulmonary embolism  unspecified pulmonary embolism type  unspecified whether acute cor pulmonale present (H) (Resolved) [I26.99]  Antiphospholipid syndrome (H) [D68.61]  Long term current use of anticoagulant therapy [Z79.01]  Acute pulmonary embolism without acute cor pulmonale  unspecified pulmonary embolism type (H) (Resolved) [I26.99]  Acute pulmonary embolism without acute cor pulmonale  unspecified pulmonary embolism type (H) [I26.99]  Acute pulmonary embolism  unspecified pulmonary embolism type  unspecified whether acute cor pulmonale  present (H) (Resolved) [I26.99]             Comments:  [employer] home meter// manage by exception// Antiphospholipid antibody syndrome (may need CF10 if INR elevated with no known reason)             Anticoagulation Care Providers       Provider Role Specialty Phone number    Jesse Farnsworth MD Referring Family Medicine [phone]    Yessy Lyn APRN [facility] Referring Family Medicine [phone]    Debi Carrasco MD Referring Cardiovascular Disease [phone]    Dionicio Liang MD Referring Family Medicine [phone]

## 2024-12-24 ENCOUNTER — MYC MEDICAL ADVICE (OUTPATIENT)
Dept: ANTICOAGULATION | Facility: CLINIC | Age: 29
End: 2024-12-24
Payer: COMMERCIAL

## 2024-12-24 ENCOUNTER — ANTICOAGULATION THERAPY VISIT (OUTPATIENT)
Dept: ANTICOAGULATION | Facility: CLINIC | Age: 29
End: 2024-12-24
Payer: COMMERCIAL

## 2024-12-24 DIAGNOSIS — D68.61 ANTIPHOSPHOLIPID SYNDROME (H): ICD-10-CM

## 2024-12-24 DIAGNOSIS — I26.99 ACUTE PULMONARY EMBOLISM WITHOUT ACUTE COR PULMONALE, UNSPECIFIED PULMONARY EMBOLISM TYPE (H): ICD-10-CM

## 2024-12-24 DIAGNOSIS — Z79.01 LONG TERM CURRENT USE OF ANTICOAGULANT THERAPY: ICD-10-CM

## 2024-12-24 DIAGNOSIS — D68.51 FACTOR 5 LEIDEN MUTATION, HETEROZYGOUS (H): Primary | ICD-10-CM

## 2024-12-24 LAB — INR HOME MONITORING: 1.8 (ref 2–3)

## 2024-12-24 NOTE — PROGRESS NOTES
ANTICOAGULATION MANAGEMENT     Rocío Catherine 29 year old female is on warfarin with subtherapeutic INR result. (Goal INR 2.0-3.0)    Recent labs: (last 7 days)     12/24/24  0000   INR 1.8*       ASSESSMENT     Source(s): Chart Review and Patient/Caregiver Call     Warfarin doses taken: Warfarin taken as instructed  Diet: Increased greens/vitamin K in diet; plans to resume previous intake  Medication/supplement changes: None noted  New illness, injury, or hospitalization: No  Signs or symptoms of bleeding or clotting: No  Previous result: Therapeutic last 2(+) visits  Additional findings:  Cold Symptoms last week.       PLAN     Recommended plan for temporary change(s) affecting INR     Dosing Instructions: booster dose then continue your current warfarin dose with next INR in 2 weeks       Summary  As of 12/24/2024      Full warfarin instructions:  12/24: 10 mg; Otherwise 5 mg every Sat; 7.5 mg all other days   Next INR check:  1/7/2025               Sent [employer] message with dosing and follow up instructions    Patient to recheck with home meter    Education provided: Please call back if any changes to your diet, medications or how you've been taking warfarin    Plan made per [facility] anticoagulation protocol    Dayami Mcfarland RN  12/24/2024  Anticoagulation Clinic  [facility] for routing messages: p ANTICOAG HOME MONITORING  [facility] patient phone line: [phone]        _______________________________________________________________________     Anticoagulation Episode Summary       Current INR goal:  2.0-3.0   TTR:  85.4% (1 y)   Target end date:  Indefinite   Send INR reminders to:  ANTICOAG HOME MONITORING    Indications    Acute pulmonary embolism  unspecified pulmonary embolism type  unspecified whether acute cor pulmonale present (H) (Resolved) [I26.99]  Factor 5 Leiden mutation  heterozygous (H) [D68.51]  Other acute pulmonary embolism without acute cor pulmonale (H) (Resolved) [I26.99]  Acute pulmonary  embolism  unspecified pulmonary embolism type  unspecified whether acute cor pulmonale present (H) (Resolved) [I26.99]  Antiphospholipid syndrome (H) [D68.61]  Long term current use of anticoagulant therapy [Z79.01]  Acute pulmonary embolism without acute cor pulmonale  unspecified pulmonary embolism type (H) (Resolved) [I26.99]  Acute pulmonary embolism without acute cor pulmonale  unspecified pulmonary embolism type (H) [I26.99]  Acute pulmonary embolism  unspecified pulmonary embolism type  unspecified whether acute cor pulmonale present (H) (Resolved) [I26.99]             Comments:  [employer] home meter// manage by exception// Antiphospholipid antibody syndrome (may need CF10 if INR elevated with no known reason)             Anticoagulation Care Providers       Provider Role Specialty Phone number    Jesse Farnsworth MD Referring Family Medicine [phone]    Yessy Lyn, SNOW CNP Referring Family Medicine [phone]    Debi Carrasco MD Referring Cardiovascular Disease [phone]    Dionicio Liang MD Referring Family Medicine [phone]

## 2024-12-24 NOTE — TELEPHONE ENCOUNTER
See [facility] encounter for details.  Dayami Mcfarland, RN  Anticoagulation Nurse - [facility], [place]

## 2024-12-24 NOTE — PROGRESS NOTES
Sent [employer] message for assessment.    Dayami Mcfarland, RN  Anticoagulation Nurse - [facility], [place]

## 2025-01-01 ENCOUNTER — E-VISIT (OUTPATIENT)
Dept: URGENT CARE | Facility: CLINIC | Age: 30
End: 2025-01-01
Payer: COMMERCIAL

## 2025-01-01 DIAGNOSIS — J01.90 ACUTE SINUSITIS WITH SYMPTOMS > 10 DAYS: Primary | ICD-10-CM

## 2025-01-01 NOTE — PATIENT INSTRUCTIONS
Acute Sinusitis: Care Instructions  Overview     Acute sinusitis is an inflammation of the mucous membranes inside the nose and sinuses. Sinuses are the hollow spaces in your skull around the eyes and nose. Acute sinusitis often follows a cold. Acute sinusitis causes thick, discolored mucus that drains from the nose or down the back of the throat. It also can cause pain and pressure in your head and face along with a stuffy or blocked nose.  In most cases, sinusitis gets better on its own in 1 to 2 weeks. But some mild symptoms may last for several weeks. Sometimes antibiotics are needed if there is a bacterial infection.  Follow-up care is a key part of your treatment and safety. Be sure to make and go to all appointments, and call your doctor if you are having problems. It's also a good idea to know your test results and keep a list of the medicines you take.  How can you care for yourself at home?  Use saline (saltwater) nasal washes. This can help keep your nasal passages open and wash out mucus and allergens.  You can buy saline nose washes at a grocery store or drugstore. Follow the instructions on the package.  You can make your own at home. Add 1 teaspoon of non-iodized salt and 1 teaspoon of baking soda to 2 cups of distilled or boiled and cooled water. Fill a squeeze bottle or a nasal cleansing pot (such as a neti pot) with the nasal wash. Then put the tip into your nostril, and lean over the sink. With your mouth open, gently squirt the liquid. Repeat on the other side.  Try a decongestant nasal spray like oxymetazoline (Afrin). Do not use it for more than 3 days in a row. Using it for more than 3 days can make your congestion worse.  If needed, take an over-the-counter pain medicine, such as acetaminophen (Tylenol), ibuprofen (Advil, Motrin), or naproxen (Aleve). Read and follow all instructions on the label.  If the doctor prescribed antibiotics, take them as directed. Do not stop taking them just  "because you feel better. You need to take the full course of antibiotics.  Be careful when taking over-the-counter cold or flu medicines and Tylenol at the same time. Many of these medicines have acetaminophen, which is Tylenol. Read the labels to make sure that you are not taking more than the recommended dose. Too much acetaminophen (Tylenol) can be harmful.  Try a steroid nasal spray. It may help with your symptoms.  Breathe warm, moist air. You can use a steamy shower, a hot bath, or a sink filled with hot water. Avoid cold, dry air. Using a humidifier in your home may help. Follow the directions for cleaning the machine.  When should you call for help?   Call your doctor now or seek immediate medical care if:    You have new or worse swelling, redness, or pain in your face or around one or both of your eyes.     You have double vision or a change in your vision.     You have a high fever.     You have a severe headache and a stiff neck.     You have mental changes, such as feeling confused or much less alert.   Watch closely for changes in your health, and be sure to contact your doctor if:    You are not getting better as expected.   Where can you learn more?  Go to https://www.[employer].net/patiented  Enter I933 in the search box to learn more about \"Acute Sinusitis: Care Instructions.\"  Current as of: September 27, 2023  Content Version: 14.3    2024 [employer].   Care instructions adapted under license by your healthcare professional. If you have questions about a medical condition or this instruction, always ask your healthcare professional. [employer] disclaims any warranty or liability for your use of this information.    You may want to try a nasal lavage (also known as nasal irrigation). You can find over-the-counter products, such as Neti-Pot, at retail locations or make your own at home. Instructions for homemade nasal lavage and more information on the process are available " online at http://www.aafp.org/afp/2009/1115/p1121.html.    Dear Rocío Catherine    After reviewing your responses, I've been able to diagnose you with Acute sinusitis with symptoms > 10 days.      Based on your responses and diagnosis, I have prescribed   Orders Placed This Encounter   Medications     amoxicillin-clavulanate (AUGMENTIN) 875-125 MG tablet     Sig: Take 1 tablet by mouth 2 times daily for 7 days.     Dispense:  14 tablet     Refill:  0      to treat your symptoms. I have sent this to your pharmacy.?     It is also important to stay well hydrated, get lots of rest and take over-the-counter decongestants,?tylenol?or ibuprofen if you?are able to?take those medications per your primary care provider to help relieve discomfort.?     It is important that you take?all of?your prescribed medication even if your symptoms are improving after a few doses.? Taking?all of?your medicine helps prevent the symptoms from returning.?     If your symptoms worsen, you develop severe headache, vomiting, high fever (>102), or are not improving in 7 days, please contact your primary care provider for an appointment or visit any of our convenient Walk-in Care or Urgent Care Centers to be seen which can be found on our website?here.?     Thanks again for choosing?us?as your health care partner,?   ?  Jessica Gomez MD?

## 2025-01-03 ENCOUNTER — HOSPITAL ENCOUNTER (OUTPATIENT)
Dept: CT IMAGING | Facility: CLINIC | Age: 30
Discharge: HOME | End: 2025-01-03
Attending: STUDENT IN AN ORGANIZED HEALTH CARE EDUCATION/TRAINING PROGRAM | Admitting: STUDENT IN AN ORGANIZED HEALTH CARE EDUCATION/TRAINING PROGRAM
Payer: COMMERCIAL

## 2025-01-03 DIAGNOSIS — R31.29 MICROSCOPIC HEMATURIA: ICD-10-CM

## 2025-01-03 PROCEDURE — 250N000009 HC RX 250: Performed by: STUDENT IN AN ORGANIZED HEALTH CARE EDUCATION/TRAINING PROGRAM

## 2025-01-03 PROCEDURE — 250N000011 HC RX IP 250 OP 636: Performed by: STUDENT IN AN ORGANIZED HEALTH CARE EDUCATION/TRAINING PROGRAM

## 2025-01-03 PROCEDURE — 74178 CT ABD&PLV WO CNTR FLWD CNTR: CPT

## 2025-01-03 RX ORDER — IOPAMIDOL 755 MG/ML
120 INJECTION, SOLUTION INTRAVASCULAR ONCE
Status: COMPLETED | OUTPATIENT
Start: 2025-01-03 | End: 2025-01-03

## 2025-01-03 RX ADMIN — SODIUM CHLORIDE 100 ML: 9 INJECTION, SOLUTION INTRAVENOUS at 13:26

## 2025-01-03 RX ADMIN — IOPAMIDOL 120 ML: 755 INJECTION, SOLUTION INTRAVENOUS at 13:26

## 2025-01-06 ENCOUNTER — ANTICOAGULATION THERAPY VISIT (OUTPATIENT)
Dept: ANTICOAGULATION | Facility: CLINIC | Age: 30
End: 2025-01-06
Payer: COMMERCIAL

## 2025-01-06 DIAGNOSIS — I26.99 ACUTE PULMONARY EMBOLISM WITHOUT ACUTE COR PULMONALE, UNSPECIFIED PULMONARY EMBOLISM TYPE (H): ICD-10-CM

## 2025-01-06 DIAGNOSIS — D68.51 FACTOR 5 LEIDEN MUTATION, HETEROZYGOUS: Primary | ICD-10-CM

## 2025-01-06 DIAGNOSIS — D68.61 ANTIPHOSPHOLIPID SYNDROME: ICD-10-CM

## 2025-01-06 DIAGNOSIS — Z79.01 LONG TERM CURRENT USE OF ANTICOAGULANT THERAPY: ICD-10-CM

## 2025-01-06 LAB — INR HOME MONITORING: 2.1 (ref 2–3)

## 2025-01-06 NOTE — PROGRESS NOTES
ANTICOAGULATION MANAGEMENT     Rocío Catherine 29 year old female is on warfarin with therapeutic INR result. (Goal INR 2.0-3.0)    Recent labs: (last 7 days)     01/06/25  0000   INR 2.1       ASSESSMENT     Source(s): Chart Review  Previous INR was Subtherapeutic  Medication, diet, health changes since last INR chart reviewed; none identified  Started antibiotic on 1/1/25         PLAN     Recommended plan for temporary change(s) affecting INR     Dosing Instructions: Continue your current warfarin dose with next INR in 2 weeks       Summary  As of 1/6/2025      Full warfarin instructions:  5 mg every Sat; 7.5 mg all other days   Next INR check:  1/20/2025               Detailed voice message left for Rocío with dosing instructions and follow up date.   Sent [employer] message with dosing and follow up instructions    Patient to recheck with home meter    Education provided: Please call back if any changes to your diet, medications or how you've been taking warfarin  Resume manage by exception with home monitor. Continue to submit INR results to home monitor company.You will only be called when your result is out of range and at 90 day check in. Please call and notify [facility] if new medication started, dose missed, signs or symptoms of bleeding or clotting, or a surgery/procedure is scheduled. Due for next call no later than: 4/6/25.  Contact [phone] with any changes, questions or concerns.     Plan made per [facility] anticoagulation protocol    Shelby Valero RN  1/6/2025  Anticoagulation Clinic  [facility] for routing messages: p ANTICOAG HOME MONITORING  [facility] patient phone line: [phone]        _______________________________________________________________________     Anticoagulation Episode Summary       Current INR goal:  2.0-3.0   TTR:  83.1% (1 y)   Target end date:  Indefinite   Send INR reminders to:  ANTICOAG HOME MONITORING    Indications    Acute pulmonary embolism  unspecified pulmonary embolism  type  unspecified whether acute cor pulmonale present (H) (Resolved) [I26.99]  Factor 5 Leiden mutation  heterozygous (H) [D68.51]  Other acute pulmonary embolism without acute cor pulmonale (H) (Resolved) [I26.99]  Acute pulmonary embolism  unspecified pulmonary embolism type  unspecified whether acute cor pulmonale present (H) (Resolved) [I26.99]  Antiphospholipid syndrome (H) [D68.61]  Long term current use of anticoagulant therapy [Z79.01]  Acute pulmonary embolism without acute cor pulmonale  unspecified pulmonary embolism type (H) (Resolved) [I26.99]  Acute pulmonary embolism without acute cor pulmonale  unspecified pulmonary embolism type (H) [I26.99]  Acute pulmonary embolism  unspecified pulmonary embolism type  unspecified whether acute cor pulmonale present (H) (Resolved) [I26.99]             Comments:  [employer] home meter// manage by exception// Antiphospholipid antibody syndrome (may need CF10 if INR elevated with no known reason)             Anticoagulation Care Providers       Provider Role Specialty Phone number    Jesse Farnsworth MD Referring Family Medicine [phone]    Yessy Lyn, APRN [facility] Referring Family Medicine [phone]    Debi Carrasco MD Referring Cardiovascular Disease [phone]    Dionicio Liang MD Referring Family Medicine [phone]

## 2025-01-12 ENCOUNTER — HEALTH MAINTENANCE LETTER (OUTPATIENT)
Age: 30
End: 2025-01-12

## 2025-01-20 ENCOUNTER — ANTICOAGULATION THERAPY VISIT (OUTPATIENT)
Dept: ANTICOAGULATION | Facility: CLINIC | Age: 30
End: 2025-01-20
Payer: COMMERCIAL

## 2025-01-20 DIAGNOSIS — I26.99 ACUTE PULMONARY EMBOLISM WITHOUT ACUTE COR PULMONALE, UNSPECIFIED PULMONARY EMBOLISM TYPE (H): ICD-10-CM

## 2025-01-20 DIAGNOSIS — D68.61 ANTIPHOSPHOLIPID SYNDROME: ICD-10-CM

## 2025-01-20 DIAGNOSIS — D68.51 FACTOR 5 LEIDEN MUTATION, HETEROZYGOUS: Primary | ICD-10-CM

## 2025-01-20 DIAGNOSIS — Z79.01 LONG TERM CURRENT USE OF ANTICOAGULANT THERAPY: ICD-10-CM

## 2025-01-20 LAB — INR HOME MONITORING: 2.2 (ref 2–3)

## 2025-01-20 NOTE — PROGRESS NOTES
ANTICOAGULATION MANAGEMENT     Rocío Catherine 29 year old female is on warfarin with therapeutic INR result. (Goal INR 2.0-3.0)    Recent labs: (last 7 days)     01/20/25  0000   INR 2.2       ASSESSMENT     Source(s): Chart Review  Previous INR was Therapeutic last visit; previously outside of goal range  Medication, diet, health changes since last INR chart reviewed; none identified         PLAN     Recommended plan for no diet, medication or health factor changes affecting INR     Dosing Instructions: Continue your current warfarin dose with next INR in 2 weeks       Summary  As of 1/20/2025      Full warfarin instructions:  5 mg every Sat; 7.5 mg all other days   Next INR check:  2/3/2025               Sent [employer] message with dosing and follow up instructions    Patient to recheck with home meter    Education provided: Please call back if any changes to your diet, medications or how you've been taking warfarin    Plan made per [facility] anticoagulation protocol    Dayami Mcfarland RN  1/20/2025  Anticoagulation Clinic  [employer] for routing messages: p ANTICOAG HOME MONITORING  [facility] patient phone line: [phone]        _______________________________________________________________________     Anticoagulation Episode Summary       Current INR goal:  2.0-3.0   TTR:  83.1% (1 y)   Target end date:  Indefinite   Send INR reminders to:  ANTICOAG HOME MONITORING    Indications    Acute pulmonary embolism  unspecified pulmonary embolism type  unspecified whether acute cor pulmonale present (H) (Resolved) [I26.99]  Factor 5 Leiden mutation  heterozygous [D68.51]  Other acute pulmonary embolism without acute cor pulmonale (H) (Resolved) [I26.99]  Acute pulmonary embolism  unspecified pulmonary embolism type  unspecified whether acute cor pulmonale present (H) (Resolved) [I26.99]  Antiphospholipid syndrome [D68.61]  Long term current use of anticoagulant therapy [Z79.01]  Acute pulmonary embolism without acute cor  pulmonale  unspecified pulmonary embolism type (H) (Resolved) [I26.99]  Acute pulmonary embolism without acute cor pulmonale  unspecified pulmonary embolism type (H) [I26.99]  Acute pulmonary embolism  unspecified pulmonary embolism type  unspecified whether acute cor pulmonale present (H) (Resolved) [I26.99]             Comments:  [employer] home meter// manage by exception// Antiphospholipid antibody syndrome (may need CF10 if INR elevated with no known reason)             Anticoagulation Care Providers       Provider Role Specialty Phone number    Jesse Farnsworth MD Referring Family Medicine [phone]    Yessy Lyn, SNOW [facility] Referring Family Medicine [phone]    Debi Carrasco MD Referring Cardiovascular Disease [phone]    Dionicio Liang MD Referring Family Medicine [phone]

## 2025-01-24 ENCOUNTER — MYC MEDICAL ADVICE (OUTPATIENT)
Dept: FAMILY MEDICINE | Facility: CLINIC | Age: 30
End: 2025-01-24
Payer: COMMERCIAL

## 2025-01-24 DIAGNOSIS — T75.3XXA SEA SICKNESS, INITIAL ENCOUNTER: Primary | ICD-10-CM

## 2025-01-27 RX ORDER — SCOPOLAMINE 1 MG/3D
1 PATCH, EXTENDED RELEASE TRANSDERMAL
Qty: 12 PATCH | Refills: 0 | Status: SHIPPED | OUTPATIENT
Start: 2025-01-27

## 2025-01-27 NOTE — TELEPHONE ENCOUNTER
Dr. Liang,    Please see [employer] request and advise on approval.    Preferred pharmacy: [facility].    Thank you  Miguel RECINOS RN  [facility]

## 2025-02-03 ENCOUNTER — DOCUMENTATION ONLY (OUTPATIENT)
Dept: ANTICOAGULATION | Facility: CLINIC | Age: 30
End: 2025-02-03
Payer: COMMERCIAL

## 2025-02-03 DIAGNOSIS — D68.51 FACTOR 5 LEIDEN MUTATION, HETEROZYGOUS: Primary | ICD-10-CM

## 2025-02-03 DIAGNOSIS — D68.61 ANTIPHOSPHOLIPID SYNDROME: ICD-10-CM

## 2025-02-03 DIAGNOSIS — I26.99 ACUTE PULMONARY EMBOLISM WITHOUT ACUTE COR PULMONALE, UNSPECIFIED PULMONARY EMBOLISM TYPE (H): ICD-10-CM

## 2025-02-03 DIAGNOSIS — Z79.01 LONG TERM CURRENT USE OF ANTICOAGULANT THERAPY: ICD-10-CM

## 2025-02-03 LAB — INR HOME MONITORING: 2.3 (ref 2–3)

## 2025-02-03 NOTE — PROGRESS NOTES
ANTICOAGULATION  MANAGEMENT-Home Monitor Managed by Exception    Rocío Catherine 29 year old female is on warfarin with therapeutic INR result. (Goal INR 2.0-3.0)    Recent labs: (last 7 days)     02/03/25  0000   INR 2.3       Previous INR was Therapeutic  Medication, diet, health changes since last INR:chart reviewed; none identified  Contacted within the last 12 weeks by phone on 12/24/24  Last [facility] referral date: 10/02/2024      VINI     Rocío was NOT contacted regarding therapeutic result today per home monitoring policy manage by exception agreement.   Current warfarin dose is to be continued:       ?   Kim Figueroa, HILARIO  Anticoagulation Clinic  2/3/2025    _______________________________________________________________________     Anticoagulation Episode Summary       Current INR goal:  2.0-3.0   TTR:  84.8% (1 y)   Target end date:  Indefinite   Send INR reminders to:  ANTICO HOME MONITORING    Indications    Acute pulmonary embolism  unspecified pulmonary embolism type  unspecified whether acute cor pulmonale present (H) (Resolved) [I26.99]  Factor 5 Leiden mutation  heterozygous [D68.51]  Other acute pulmonary embolism without acute cor pulmonale (H) (Resolved) [I26.99]  Acute pulmonary embolism  unspecified pulmonary embolism type  unspecified whether acute cor pulmonale present (H) (Resolved) [I26.99]  Antiphospholipid syndrome [D68.61]  Long term current use of anticoagulant therapy [Z79.01]  Acute pulmonary embolism without acute cor pulmonale  unspecified pulmonary embolism type (H) (Resolved) [I26.99]  Acute pulmonary embolism without acute cor pulmonale  unspecified pulmonary embolism type (H) [I26.99]  Acute pulmonary embolism  unspecified pulmonary embolism type  unspecified whether acute cor pulmonale present (H) (Resolved) [I26.99]             Comments:  [employer] home meter// manage by exception// Antiphospholipid antibody syndrome (may need CF10 if INR elevated with no known reason)              Anticoagulation Care Providers       Provider Role Specialty Phone number    Jesse Farnsworth MD Referring Family Medicine [phone]    Yessy Lyn APRN [facility] Referring Family Medicine [phone]    Debi Carrasco MD Referring Cardiovascular Disease [phone]    Dionicio Liang MD Referring Family Medicine [phone]

## 2025-02-17 ENCOUNTER — DOCUMENTATION ONLY (OUTPATIENT)
Dept: ANTICOAGULATION | Facility: CLINIC | Age: 30
End: 2025-02-17
Payer: COMMERCIAL

## 2025-02-17 DIAGNOSIS — D68.61 ANTIPHOSPHOLIPID SYNDROME: ICD-10-CM

## 2025-02-17 DIAGNOSIS — D68.51 FACTOR 5 LEIDEN MUTATION, HETEROZYGOUS: Primary | ICD-10-CM

## 2025-02-17 DIAGNOSIS — I26.99 ACUTE PULMONARY EMBOLISM WITHOUT ACUTE COR PULMONALE, UNSPECIFIED PULMONARY EMBOLISM TYPE (H): ICD-10-CM

## 2025-02-17 DIAGNOSIS — Z79.01 LONG TERM CURRENT USE OF ANTICOAGULANT THERAPY: ICD-10-CM

## 2025-02-17 LAB — INR HOME MONITORING: 2.5 (ref 2–3)

## 2025-02-17 NOTE — PROGRESS NOTES
ANTICOAGULATION  MANAGEMENT-Home Monitor Managed by Exception    Rocío Catherine 29 year old female is on warfarin with therapeutic INR result. (Goal INR 2.0-3.0)    Recent labs: (last 7 days)     02/17/25  0000   INR 2.5       Previous INR was Therapeutic  Medication, diet, health changes since last INR:chart reviewed; none identified  Contacted within the last 12 weeks by phone on 12/24/24  Last [facility] referral date: 10/02/2024      PLAN     Rocío was NOT contacted regarding therapeutic result today per home monitoring policy manage by exception agreement.   Current warfarin dose is to be continued:     Summary  As of 2/17/2025      Full warfarin instructions:  5 mg every Sat; 7.5 mg all other days   Next INR check:  3/3/2025             ?   Matt Dumas RN  Anticoagulation Clinic  2/17/2025    _______________________________________________________________________     Anticoagulation Episode Summary       Current INR goal:  2.0-3.0   TTR:  84.8% (1 y)   Target end date:  Indefinite   Send INR reminders to:  ANTICOCHAITANYA HOME MONITORING    Indications    Factor 5 Leiden mutation  heterozygous [D68.51]  Antiphospholipid syndrome [D68.61]  Long term current use of anticoagulant therapy [Z79.01]  Acute pulmonary embolism without acute cor pulmonale  unspecified pulmonary embolism type (H) [I26.99]             Comments:  [employer] home meter// manage by exception// Antiphospholipid antibody syndrome (may need CF10 if INR elevated with no known reason)             Anticoagulation Care Providers       Provider Role Specialty Phone number    Dionicio Liang MD Referring Family Medicine [phone]

## 2025-03-03 ENCOUNTER — DOCUMENTATION ONLY (OUTPATIENT)
Dept: ANTICOAGULATION | Facility: CLINIC | Age: 30
End: 2025-03-03
Payer: COMMERCIAL

## 2025-03-03 DIAGNOSIS — D68.61 ANTIPHOSPHOLIPID SYNDROME: ICD-10-CM

## 2025-03-03 DIAGNOSIS — D68.51 FACTOR 5 LEIDEN MUTATION, HETEROZYGOUS: Primary | ICD-10-CM

## 2025-03-03 DIAGNOSIS — Z79.01 LONG TERM CURRENT USE OF ANTICOAGULANT THERAPY: ICD-10-CM

## 2025-03-03 DIAGNOSIS — I26.99 ACUTE PULMONARY EMBOLISM WITHOUT ACUTE COR PULMONALE, UNSPECIFIED PULMONARY EMBOLISM TYPE (H): ICD-10-CM

## 2025-03-03 LAB — INR HOME MONITORING: 2.4 (ref 2–3)

## 2025-03-03 NOTE — PROGRESS NOTES
ANTICOAGULATION  MANAGEMENT-Home Monitor Managed by Exception    Rocío Catherine 29 year old female is on warfarin with therapeutic INR result. (Goal INR 2.0-3.0)    Recent labs: (last 7 days)     03/03/25  0000   INR 2.4       Previous INR was Therapeutic  Medication, diet, health changes since last INR:chart reviewed; none identified  Contacted within the last 12 weeks by phone on 01/06/25  Last [facility] referral date: 10/02/2024      PLAN     Rocío was NOT contacted regarding therapeutic result today per home monitoring policy manage by exception agreement.   Current warfarin dose is to be continued:     Summary  As of 3/3/2025      Full warfarin instructions:  5 mg every Sat; 7.5 mg all other days   Next INR check:  3/17/2025             ?   Shelby Valero RN  Anticoagulation Clinic  3/3/2025    _______________________________________________________________________     Anticoagulation Episode Summary       Current INR goal:  2.0-3.0   TTR:  84.8% (1 y)   Target end date:  Indefinite   Send INR reminders to:  ANTICOAG HOME MONITORING    Indications    Factor 5 Leiden mutation  heterozygous [D68.51]  Antiphospholipid syndrome [D68.61]  Long term current use of anticoagulant therapy [Z79.01]  Acute pulmonary embolism without acute cor pulmonale  unspecified pulmonary embolism type (H) [I26.99]             Comments:  [employer] home meter// manage by exception// Antiphospholipid antibody syndrome (may need CF10 if INR elevated with no known reason)             Anticoagulation Care Providers       Provider Role Specialty Phone number    Dionicio Liang MD Referring Family Medicine [phone]

## 2025-03-17 ENCOUNTER — DOCUMENTATION ONLY (OUTPATIENT)
Dept: ANTICOAGULATION | Facility: CLINIC | Age: 30
End: 2025-03-17
Payer: COMMERCIAL

## 2025-03-17 DIAGNOSIS — D68.61 ANTIPHOSPHOLIPID SYNDROME: ICD-10-CM

## 2025-03-17 DIAGNOSIS — D68.51 FACTOR 5 LEIDEN MUTATION, HETEROZYGOUS: Primary | ICD-10-CM

## 2025-03-17 DIAGNOSIS — I26.99 ACUTE PULMONARY EMBOLISM WITHOUT ACUTE COR PULMONALE, UNSPECIFIED PULMONARY EMBOLISM TYPE (H): ICD-10-CM

## 2025-03-17 DIAGNOSIS — Z79.01 LONG TERM CURRENT USE OF ANTICOAGULANT THERAPY: ICD-10-CM

## 2025-03-17 LAB — INR HOME MONITORING: 2.6 (ref 2–3)

## 2025-03-17 NOTE — PROGRESS NOTES
ANTICOAGULATION  MANAGEMENT-Home Monitor Managed by Exception    Rocío Catherine 29 year old female is on warfarin with therapeutic INR result. (Goal INR 2.0-3.0)    Recent labs: (last 7 days)     03/17/25  0000   INR 2.6       Previous INR was Therapeutic  Medication, diet, health changes since last INR:chart reviewed; none identified  Contacted within the last 12 weeks by phone on 1/6/25  Last [facility] referral date: 10/02/2024      PLAN     Rocío was NOT contacted regarding therapeutic result today per home monitoring policy manage by exception agreement.   Current warfarin dose is to be continued:     Summary  As of 3/17/2025      Full warfarin instructions:  5 mg every Sat; 7.5 mg all other days   Next INR check:  3/31/2025             ?   Jo Ann Horner RN  Anticoagulation Clinic  3/17/2025    _______________________________________________________________________     Anticoagulation Episode Summary       Current INR goal:  2.0-3.0   TTR:  84.8% (1 y)   Target end date:  Indefinite   Send INR reminders to:  ANTICOAG HOME MONITORING    Indications    Factor 5 Leiden mutation  heterozygous [D68.51]  Antiphospholipid syndrome [D68.61]  Long term current use of anticoagulant therapy [Z79.01]  Acute pulmonary embolism without acute cor pulmonale  unspecified pulmonary embolism type (H) [I26.99]             Comments:  [employer] home meter// manage by exception// Antiphospholipid antibody syndrome (may need CF10 if INR elevated with no known reason)             Anticoagulation Care Providers       Provider Role Specialty Phone number    Dionicio Liang MD Referring Family Medicine [phone]

## 2025-03-31 ENCOUNTER — DOCUMENTATION ONLY (OUTPATIENT)
Dept: ANTICOAGULATION | Facility: CLINIC | Age: 30
End: 2025-03-31
Payer: COMMERCIAL

## 2025-03-31 DIAGNOSIS — D68.61 ANTIPHOSPHOLIPID SYNDROME: ICD-10-CM

## 2025-03-31 DIAGNOSIS — Z79.01 LONG TERM CURRENT USE OF ANTICOAGULANT THERAPY: ICD-10-CM

## 2025-03-31 DIAGNOSIS — D68.51 FACTOR 5 LEIDEN MUTATION, HETEROZYGOUS: Primary | ICD-10-CM

## 2025-03-31 DIAGNOSIS — I26.99 ACUTE PULMONARY EMBOLISM WITHOUT ACUTE COR PULMONALE, UNSPECIFIED PULMONARY EMBOLISM TYPE (H): ICD-10-CM

## 2025-03-31 LAB — INR HOME MONITORING: 2.5 (ref 2–3)

## 2025-03-31 NOTE — PROGRESS NOTES
ANTICOAGULATION  MANAGEMENT-Home Monitor Managed by Exception    Rocío GRISEL Catherine 29 year old female is on warfarin with therapeutic INR result. (Goal INR 2.0-3.0)    Recent labs: (last 7 days)     03/31/25  0000   INR 2.5       Previous INR was Therapeutic  Medication, diet, health changes since last INR:chart reviewed; none identified  Contacted within the last 12 weeks by phone on 1/6/25  Last [facility] referral date: 10/02/2024  Due for 90-day check in call next encounter.      VINI Alford was NOT contacted regarding therapeutic result today per home monitoring policy manage by exception agreement.   Current warfarin dose is to be continued:     Summary  As of 3/31/2025      Full warfarin instructions:  5 mg every Sat; 7.5 mg all other days   Next INR check:  4/14/2025             ?   Anushka Smith RN  Anticoagulation Clinic  3/31/2025    _______________________________________________________________________     Anticoagulation Episode Summary       Current INR goal:  2.0-3.0   TTR:  84.8% (1 y)   Target end date:  Indefinite   Send INR reminders to:  ANTICOCHAIT[facility] HOME MONITORING    Indications    Factor 5 Leiden mutation  heterozygous [D68.51]  Antiphospholipid syndrome [D68.61]  Long term current use of anticoagulant therapy [Z79.01]  Acute pulmonary embolism without acute cor pulmonale  unspecified pulmonary embolism type (H) [I26.99]             Comments:  [employer] home meter// manage by exception// Antiphospholipid antibody syndrome (may need CF10 if INR elevated with no known reason)             Anticoagulation Care Providers       Provider Role Specialty Phone number    Dionicio Liang MD Referring Family Medicine [phone]

## 2025-04-05 NOTE — PROGRESS NOTES
CYSTOSCOPY      PREOPERATIVE DIAGNOSIS:  Microscopic hematuria    POSTOPERATIVE DIAGNOSIS:  Same    Procedures:  Sequential urethral dilation  Cystourethroscopy     ATTENDING SURGEONS:  Dr. Bar    RESIDENT SURGEONS(S):  None    ANESTHESIA:  Xylocaine jelly    PREPARATION:  Betadine    Antibiotics: x1 Keflex given urethral dilation    INDICATIONS FOR PROCEDURE:  This 29 year old female patient presents with Microscopic hematuria.  The procedure, indications, risks and alternatives were discussed.  The patient wished to proceed.    History Facror V Leiden, APLS on Conmadin anticoagulation.   Has had Salena in the past though none since 2024  Non smoker, no family history kidney bladder urethral cancer    CTU 1.  Punctate nonobstructing left intrarenal calculus.  2.  No evidence of renal or urothelial neoplasm.     Cytology not performed      Most Recent Urinalysis:  Recent Labs   Lab Test 11/08/24  0927   COLOR Yellow   APPEARANCE Clear   URINEGLC Negative   URINEBILI Negative   URINEKETONE Negative   SG 1.015   UBLD Moderate*   URINEPH 6.0   PROTEIN Negative   UROBILINOGEN 0.2   NITRITE Negative   LEUKEST Negative   RBCU 5-10*   WBCU None Seen           PROCEDURE AND FINDINGS:  A time-out was completed verifying correct patient, procedure, site, positioning and implant(s) or special equipment.    After informed consent was obtained, the patient was prepped and draped in the usual manner in the supine frog legged position.  Cystoscopy was carried out using the flexible cystoscope.    The urethra was very tight, this was unable to be passed with flex cystoscope. At this point, the urethra was gently sequentially dilated starting at 14 Fr and up to 18 Fr confirmed by presence of urine. The cystoscope was then able to be advanced to the bladder at this point.   The bladder mucosa was normal.  The ureteral orifices were normal in position and configuration and effluxing clear urine.  Bladder neck with increased  vascularity, some prominent varicosities however no erythematous areas, stones, masses or lesions. The urethra was normal without mass on retrograde analysis.     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 Also possible etiology for infections given urethral stenosis    - PVR today excellent   - Plan for repeat Cysto vs. Uroflow PVR in 3 months to ensure urethral patency   - If that is patent and bladder negative then would recommend follow-up with Cecilia 6-12 mo for repeat UA per guideline protocol     Catarino Bar MD, MS  Department of Urology  Infertility, Sexual Medicine, Reconstruction  [facility]

## 2025-04-07 ENCOUNTER — OFFICE VISIT (OUTPATIENT)
Dept: UROLOGY | Facility: CLINIC | Age: 30
End: 2025-04-07
Payer: COMMERCIAL

## 2025-04-07 VITALS
DIASTOLIC BLOOD PRESSURE: 77 MMHG | HEIGHT: 61 IN | WEIGHT: 121 LBS | BODY MASS INDEX: 22.84 KG/M2 | OXYGEN SATURATION: 100 % | SYSTOLIC BLOOD PRESSURE: 133 MMHG | HEART RATE: 144 BPM | TEMPERATURE: 98.6 F

## 2025-04-07 DIAGNOSIS — R31.29 MICROSCOPIC HEMATURIA: Primary | ICD-10-CM

## 2025-04-07 PROCEDURE — 3075F SYST BP GE 130 - 139MM HG: CPT | Performed by: STUDENT IN AN ORGANIZED HEALTH CARE EDUCATION/TRAINING PROGRAM

## 2025-04-07 PROCEDURE — 3078F DIAST BP <80 MM HG: CPT | Performed by: STUDENT IN AN ORGANIZED HEALTH CARE EDUCATION/TRAINING PROGRAM

## 2025-04-07 PROCEDURE — 52281 CYSTOSCOPY AND TREATMENT: CPT | Performed by: STUDENT IN AN ORGANIZED HEALTH CARE EDUCATION/TRAINING PROGRAM

## 2025-04-07 RX ADMIN — Medication 500 MG: at 10:27

## 2025-04-15 ENCOUNTER — ANTICOAGULATION THERAPY VISIT (OUTPATIENT)
Dept: ANTICOAGULATION | Facility: CLINIC | Age: 30
End: 2025-04-15
Payer: COMMERCIAL

## 2025-04-15 DIAGNOSIS — D68.61 ANTIPHOSPHOLIPID SYNDROME: ICD-10-CM

## 2025-04-15 DIAGNOSIS — D68.51 FACTOR 5 LEIDEN MUTATION, HETEROZYGOUS: Primary | ICD-10-CM

## 2025-04-15 DIAGNOSIS — Z79.01 LONG TERM CURRENT USE OF ANTICOAGULANT THERAPY: ICD-10-CM

## 2025-04-15 DIAGNOSIS — I26.99 ACUTE PULMONARY EMBOLISM WITHOUT ACUTE COR PULMONALE, UNSPECIFIED PULMONARY EMBOLISM TYPE (H): ICD-10-CM

## 2025-04-15 LAB — INR HOME MONITORING: 2.2 (ref 2–3)

## 2025-04-15 NOTE — PROGRESS NOTES
ANTICOAGULATION MANAGEMENT     Rocío Catherine 29 year old female is on warfarin with therapeutic INR result. (Goal INR 2.0-3.0)    Recent labs: (last 7 days)     04/15/25  0000   INR 2.2       ASSESSMENT     Source(s): Chart Review and Patient/Caregiver Call     Warfarin doses taken: Warfarin taken as instructed  Diet: No new diet changes identified  Medication/supplement changes: None noted  New illness, injury, or hospitalization: recent procedure, warfarin not held, no bleeding concerns per chart review, 1 time keflex dose given prior to procedure, no interaction anticipated.   Signs or symptoms of bleeding or clotting: yes, microscopic hematuria, following urology for this.  Previous result: Therapeutic last 2(+) visits  Additional findings:  90 day check in via [employer] as patients preferred communication        PLAN     Recommended plan for no diet, medication or health factor changes affecting INR     Dosing Instructions: Continue your current warfarin dose with next INR in 2 weeks       Summary  As of 4/15/2025      Full warfarin instructions:  5 mg every Sat; 7.5 mg all other days   Next INR check:  4/29/2025               Sent [employer] message with dosing and follow up instructions - patient has responded via [employer] as preferred communication     Patient to recheck with home meter    Education provided: Importance of notifying anticoagulation clinic for: upcoming surgeries and procedures 2 weeks in advance  Resume manage by exception with home monitor. Continue to submit INR results to home monitor company.You will only be called when your result is out of range and at 90 day check in. Please call and notify [facility] if new medication started, dose missed, signs or symptoms of bleeding or clotting, or a surgery/procedure is scheduled. Due for next call no later than: 7/14/25.  Contact [phone] with any changes, questions or concerns.     Plan made per [facility] anticoagulation protocol    Lucia Mcbride  H/O   Prophylaxis   Allopurinol 100 mg by mouth daily   Uric acid level- 4.1 on 6/2/2023     RN  4/15/2025  Anticoagulation Clinic  [facility] for routing messages: p ANTICOAG HOME MONITORING  [facility] patient phone line: [phone]        _______________________________________________________________________     Anticoagulation Episode Summary       Current INR goal:  2.0-3.0   TTR:  87.7% (1 y)   Target end date:  Indefinite   Send INR reminders to:  ANTICOAG HOME MONITORING    Indications    Factor 5 Leiden mutation  heterozygous [D68.51]  Antiphospholipid syndrome [D68.61]  Long term current use of anticoagulant therapy [Z79.01]  Acute pulmonary embolism without acute cor pulmonale  unspecified pulmonary embolism type (H) [I26.99]             Comments:  [employer] home meter// manage by exception// Antiphospholipid antibody syndrome (may need CF10 if INR elevated with no known reason)             Anticoagulation Care Providers       Provider Role Specialty Phone number    Dionicio Liang MD Referring Family Medicine [phone]

## 2025-04-21 ENCOUNTER — LAB (OUTPATIENT)
Dept: FAMILY MEDICINE | Facility: CLINIC | Age: 30
End: 2025-04-21
Attending: NURSE PRACTITIONER
Payer: COMMERCIAL

## 2025-04-21 ENCOUNTER — E-VISIT (OUTPATIENT)
Dept: URGENT CARE | Facility: CLINIC | Age: 30
End: 2025-04-21
Payer: COMMERCIAL

## 2025-04-21 ENCOUNTER — MYC MEDICAL ADVICE (OUTPATIENT)
Dept: FAMILY MEDICINE | Facility: CLINIC | Age: 30
End: 2025-04-21

## 2025-04-21 DIAGNOSIS — J30.2 SEASONAL ALLERGIC RHINITIS, UNSPECIFIED TRIGGER: Primary | ICD-10-CM

## 2025-04-21 DIAGNOSIS — J06.9 ACUTE UPPER RESPIRATORY INFECTION, UNSPECIFIED: Primary | ICD-10-CM

## 2025-04-21 DIAGNOSIS — J06.9 ACUTE UPPER RESPIRATORY INFECTION, UNSPECIFIED: ICD-10-CM

## 2025-04-21 LAB
DEPRECATED S PYO AG THROAT QL EIA: NEGATIVE
S PYO DNA THROAT QL NAA+PROBE: NOT DETECTED
SARS-COV-2 RNA RESP QL NAA+PROBE: NEGATIVE

## 2025-04-21 PROCEDURE — 87651 STREP A DNA AMP PROBE: CPT

## 2025-04-21 PROCEDURE — 87635 SARS-COV-2 COVID-19 AMP PRB: CPT

## 2025-04-21 PROCEDURE — 99207 PR NO CHARGE NURSE ONLY: CPT

## 2025-04-21 PROCEDURE — 99207 PR NON-BILLABLE SERV PER CHARTING: CPT | Performed by: NURSE PRACTITIONER

## 2025-04-21 RX ORDER — CETIRIZINE HYDROCHLORIDE 10 MG/1
10 TABLET ORAL DAILY
Qty: 30 TABLET | Refills: 0 | Status: SHIPPED | OUTPATIENT
Start: 2025-04-21

## 2025-04-21 ASSESSMENT — PATIENT HEALTH QUESTIONNAIRE - PHQ9
SUM OF ALL RESPONSES TO PHQ QUESTIONS 1-9: 4
SUM OF ALL RESPONSES TO PHQ QUESTIONS 1-9: 4
10. IF YOU CHECKED OFF ANY PROBLEMS, HOW DIFFICULT HAVE THESE PROBLEMS MADE IT FOR YOU TO DO YOUR WORK, TAKE CARE OF THINGS AT HOME, OR GET ALONG WITH OTHER PEOPLE: SOMEWHAT DIFFICULT

## 2025-04-21 NOTE — PATIENT INSTRUCTIONS
Dear Rocío,    After reviewing your responses, I would like you to come in for a swab to make sure we treat you correctly. This swab is to evaluate you for possible COVID and Strep Throat, and should be scheduled for today or tomorrow. Please use the Schedule Now button in [employer] to schedule your swab. Otherwise, click this link to schedule a lab only appointment.    Lab appointments are not available at most locations on the weekends. If no Lab Only appointment is available, you should be seen in any of our convenient Urgent Care Centers for an in person visit, which can be found on our website here.    You will receive instructions with your results in [employer] once they are available.     If your symptoms worsen, you develop difficulty breathing, difficulty with drinking enough to stay hydrated, or fevers for more than 5 days, please contact your primary care provider for an appointment or visit an Urgent Care Center to be seen.      Thanks again for choosing us as your health care partner.   SNOW TORRES CNP

## 2025-04-21 NOTE — PROGRESS NOTES
Swabbed patient. Patient tolerated well. Patient informed that she will be notified of results.    Cleo Mcmillan MA

## 2025-04-21 NOTE — TELEPHONE ENCOUNTER
Any of the over the counter allergy medication are okay to take without interacting with the blood thinner and antidepressants.

## 2025-04-28 ENCOUNTER — ANTICOAGULATION THERAPY VISIT (OUTPATIENT)
Dept: ANTICOAGULATION | Facility: CLINIC | Age: 30
End: 2025-04-28
Payer: COMMERCIAL

## 2025-04-28 ENCOUNTER — MYC MEDICAL ADVICE (OUTPATIENT)
Dept: ANTICOAGULATION | Facility: CLINIC | Age: 30
End: 2025-04-28
Payer: COMMERCIAL

## 2025-04-28 DIAGNOSIS — D68.51 FACTOR 5 LEIDEN MUTATION, HETEROZYGOUS: Primary | ICD-10-CM

## 2025-04-28 DIAGNOSIS — I26.99 ACUTE PULMONARY EMBOLISM WITHOUT ACUTE COR PULMONALE, UNSPECIFIED PULMONARY EMBOLISM TYPE (H): ICD-10-CM

## 2025-04-28 DIAGNOSIS — Z79.01 LONG TERM CURRENT USE OF ANTICOAGULANT THERAPY: ICD-10-CM

## 2025-04-28 DIAGNOSIS — D68.61 ANTIPHOSPHOLIPID SYNDROME: ICD-10-CM

## 2025-04-28 LAB — INR HOME MONITORING: 3.3 (ref 2–3)

## 2025-04-28 NOTE — PROGRESS NOTES
ANTICOAGULATION MANAGEMENT     Rocío Catherine 29 year old female is on warfarin with supratherapeutic INR result. (Goal INR 2.0-3.0)    Recent labs: (last 7 days)     04/28/25  0000   INR 3.3*       ASSESSMENT     Source(s): Chart Review and [employer] encounter from 4/28/25      Warfarin doses taken: Warfarin taken as instructed  Diet: No new diet changes identified  Medication/supplement changes: Patient is taking zyrtec daily- this should not effect INR  New illness, injury, or hospitalization: Yes: Patient had a e-visit on 4/21/25 reporting symptoms of a sinus infection. Patient tested negative for Covid and strep throat and was advised to take allergy medication.   Signs or symptoms of bleeding or clotting: No  Previous result: Therapeutic last 2(+) visits  Additional findings: None       PLAN     Recommended plan for temporary change(s) affecting INR     Dosing Instructions: partial hold then continue your current warfarin dose with next INR in 1 week       Summary  As of 4/28/2025      Full warfarin instructions:  4/28: 5 mg; Otherwise 5 mg every Sat; 7.5 mg all other days   Next INR check:  5/12/2025               Sent [employer] message with dosing and follow up instructions    Patient to recheck with home meter    Education provided: Please call back if any changes to your diet, medications or how you've been taking warfarin    Plan made per [facility] anticoagulation protocol    Jo Ann Horner RN  4/28/2025  Anticoagulation Clinic  [facility] for routing messages: p ANTICOAG HOME MONITORING  [facility] patient phone line: [phone]        _______________________________________________________________________     Anticoagulation Episode Summary       Current INR goal:  2.0-3.0   TTR:  86.8% (1 y)   Target end date:  Indefinite   Send INR reminders to:  ANTICOAG HOME MONITORING    Indications    Factor 5 Leiden mutation  heterozygous [D68.51]  Antiphospholipid syndrome [D68.61]  Long term current use of anticoagulant therapy  [Z79.01]  Acute pulmonary embolism without acute cor pulmonale  unspecified pulmonary embolism type (H) [I26.99]             Comments:  [employer] home meter// manage by exception// Antiphospholipid antibody syndrome (may need CF10 if INR elevated with no known reason)             Anticoagulation Care Providers       Provider Role Specialty Phone number    Dionicio Liang MD Referring Family Medicine [phone]

## 2025-05-12 ENCOUNTER — ANTICOAGULATION THERAPY VISIT (OUTPATIENT)
Dept: ANTICOAGULATION | Facility: CLINIC | Age: 30
End: 2025-05-12
Payer: COMMERCIAL

## 2025-05-12 ENCOUNTER — RESULTS FOLLOW-UP (OUTPATIENT)
Dept: ANTICOAGULATION | Facility: CLINIC | Age: 30
End: 2025-05-12

## 2025-05-12 DIAGNOSIS — D68.61 ANTIPHOSPHOLIPID SYNDROME: ICD-10-CM

## 2025-05-12 DIAGNOSIS — I26.99 ACUTE PULMONARY EMBOLISM WITHOUT ACUTE COR PULMONALE, UNSPECIFIED PULMONARY EMBOLISM TYPE (H): ICD-10-CM

## 2025-05-12 DIAGNOSIS — D68.51 FACTOR 5 LEIDEN MUTATION, HETEROZYGOUS: Primary | ICD-10-CM

## 2025-05-12 DIAGNOSIS — Z79.01 LONG TERM CURRENT USE OF ANTICOAGULANT THERAPY: ICD-10-CM

## 2025-05-12 LAB — INR HOME MONITORING: 2.6 (ref 2–3)

## 2025-05-12 NOTE — PROGRESS NOTES
ANTICOAGULATION MANAGEMENT     Rocío Catherine 29 year old female is on warfarin with therapeutic INR result. (Goal INR 2.0-3.0)    Recent labs: (last 7 days)     05/12/25  0000   INR 2.6       ASSESSMENT     Source(s): Chart Review and Patient/Caregiver Call     Warfarin doses taken: Warfarin taken as instructed  Diet: No new diet changes identified  Medication/supplement changes: None noted  New illness, injury, or hospitalization: No. Patient reports sinus pressure symptoms have resolved.   Signs or symptoms of bleeding or clotting: No  Previous result: Supratherapeutic  Additional findings: None       PLAN     Recommended plan for no diet, medication or health factor changes affecting INR     Dosing Instructions: Continue your current warfarin dose with next INR in 2 weeks       Summary  As of 5/12/2025      Full warfarin instructions:  5 mg every Sat; 7.5 mg all other days   Next INR check:  5/27/2025               Telephone call with Rocío who verbalizes understanding and agrees to plan    Patient to recheck with home meter    Education provided: Please call back if any changes to your diet, medications or how you've been taking warfarin  Resume manage by exception with home monitor. Continue to submit INR results to home monitor company.You will only be called when your result is out of range and at 90 day check in. Please call and notify [facility] if new medication started, dose missed, signs or symptoms of bleeding or clotting, or a surgery/procedure is scheduled. Due for next call no later than: 8/10/25.    Plan made per [facility] anticoagulation protocol    Jo Ann Horner RN  5/12/2025  Anticoagulation Clinic  [facility] for routing messages: [employer] [facility] [employer]  [facility] patient phone line: [phone]        _______________________________________________________________________     Anticoagulation Episode Summary       Current INR goal:  2.0-3.0   TTR:  85.1% (1 y)   Target end date:  Indefinite   Send INR reminders  to:  ANT[facility]AG HOME MONITOR[facility]    Indications    Factor 5 Leiden mutation  heterozygous [D68.51]  Antiphospholipid syndrome [D68.61]  Long term current use of anticoagulant therapy [Z79.01]  Acute pulmonary embolism without acute cor pulmonale  unspecified pulmonary embolism type (H) [I26.99]             Comments:  [employer] home meter// manage by exception// Antiphospholipid antibody syndrome (may need CF10 if INR elevated with no known reason)             Anticoagulation Care Providers       Provider Role Specialty Phone number    Dionicio Liang MD Referring Family Medicine [phone]

## 2025-05-27 ENCOUNTER — ANTICOAGULATION THERAPY VISIT (OUTPATIENT)
Dept: ANTICOAGULATION | Facility: CLINIC | Age: 30
End: 2025-05-27
Payer: COMMERCIAL

## 2025-05-27 ENCOUNTER — RESULTS FOLLOW-UP (OUTPATIENT)
Dept: ANTICOAGULATION | Facility: CLINIC | Age: 30
End: 2025-05-27

## 2025-05-27 DIAGNOSIS — Z79.01 LONG TERM CURRENT USE OF ANTICOAGULANT THERAPY: ICD-10-CM

## 2025-05-27 DIAGNOSIS — D68.51 FACTOR 5 LEIDEN MUTATION, HETEROZYGOUS: Primary | ICD-10-CM

## 2025-05-27 DIAGNOSIS — D68.61 ANTIPHOSPHOLIPID SYNDROME: ICD-10-CM

## 2025-05-27 DIAGNOSIS — I26.99 ACUTE PULMONARY EMBOLISM WITHOUT ACUTE COR PULMONALE, UNSPECIFIED PULMONARY EMBOLISM TYPE (H): ICD-10-CM

## 2025-05-27 LAB — INR HOME MONITORING: 2.3 (ref 2–3)

## 2025-05-27 NOTE — PROGRESS NOTES
ANTICOAGULATION MANAGEMENT     Rocío Catherine 29 year old female is on warfarin with therapeutic INR result. (Goal INR 2.0-3.0)    Recent labs: (last 7 days)     05/27/25  0000   INR 2.3         ASSESSMENT     Source(s): Chart Review  Previous INR was Therapeutic last visit; previously outside of goal range  Medication, diet, health changes since last INR chart reviewed; none identified         PLAN     Recommended plan for no diet, medication or health factor changes affecting INR     Dosing Instructions: Continue your current warfarin dose with next INR in 2 weeks       Summary  As of 5/27/2025      Full warfarin instructions:  5 mg every Sat; 7.5 mg all other days   Next INR check:  6/10/2025               Detailed voice message left for Rocío with dosing instructions and follow up date.     Patient to recheck with home meter    Education provided: Please call back if any changes to your diet, medications or how you've been taking warfarin  Taking warfarin: Importance of taking warfarin as instructed    Plan made per [facility] anticoagulation protocol    Leti Garcia RN  5/27/2025  Anticoagulation Clinic  Send the [employer] for routing messages: p [facility]AG HOME [facility]ITOR[facility]  [facility] patient phone line: [phone]        _______________________________________________________________________     Anticoagulation Episode Summary       Current INR goal:  2.0-3.0   TTR:  85.1% (1 y)   Target end date:  Indefinite   Send INR reminders to:  [facility] HOME [facility]IT[facility]    Indications    Factor 5 Leiden mutation  heterozygous [D68.51]  Antiphospholipid syndrome [D68.61]  Long term current use of anticoagulant therapy [Z79.01]  Acute pulmonary embolism without acute cor pulmonale  unspecified pulmonary embolism type (H) [I26.99]             Comments:  [employer] home meter// manage by exception// Antiphospholipid antibody syndrome (may need CF10 if INR elevated with no known reason)             Anticoagulation Care Providers       Provider Role  Specialty Phone number    Dionicio Liang MD Referring Family Medicine [phone]

## 2025-06-09 ENCOUNTER — ANTICOAGULATION THERAPY VISIT (OUTPATIENT)
Dept: ANTICOAGULATION | Facility: CLINIC | Age: 30
End: 2025-06-09
Payer: COMMERCIAL

## 2025-06-09 ENCOUNTER — RESULTS FOLLOW-UP (OUTPATIENT)
Dept: ANTICOAGULATION | Facility: CLINIC | Age: 30
End: 2025-06-09

## 2025-06-09 DIAGNOSIS — D68.61 ANTIPHOSPHOLIPID SYNDROME: ICD-10-CM

## 2025-06-09 DIAGNOSIS — I26.99 ACUTE PULMONARY EMBOLISM WITHOUT ACUTE COR PULMONALE, UNSPECIFIED PULMONARY EMBOLISM TYPE (H): ICD-10-CM

## 2025-06-09 DIAGNOSIS — D68.51 FACTOR 5 LEIDEN MUTATION, HETEROZYGOUS: Primary | ICD-10-CM

## 2025-06-09 DIAGNOSIS — Z79.01 LONG TERM CURRENT USE OF ANTICOAGULANT THERAPY: ICD-10-CM

## 2025-06-09 LAB — INR HOME MONITORING: 1.9 (ref 2–3)

## 2025-06-09 NOTE — PROGRESS NOTES
ANTICOAGULATION MANAGEMENT     Rocío Catherine 29 year old female is on warfarin with subtherapeutic INR result. (Goal INR 2.0-3.0)    Recent labs: (last 7 days)     06/09/25  0000   INR 1.9*       ASSESSMENT     Source(s): Chart Review and Patient/Caregiver Call     Warfarin doses taken: Warfarin taken as instructed  Diet: Increased greens/vitamin K in diet; plans to resume previous intake  Medication/supplement changes: None noted  New illness, injury, or hospitalization: No  Signs or symptoms of bleeding or clotting: No  Previous result: Therapeutic last 2(+) visits  Additional findings: None       PLAN     Recommended plan for temporary change(s) affecting INR     Dosing Instructions: booster dose then continue your current warfarin dose with next INR in 2 weeks       Summary  As of 6/9/2025      Full warfarin instructions:  6/9: 10 mg; Otherwise 5 mg every Sat; 7.5 mg all other days   Next INR check:  6/23/2025               Telephone call with Rocío who verbalizes understanding and agrees to plan and who agrees to plan and repeated back plan correctly    Patient to recheck with home meter    Education provided: Contact [phone] with any changes, questions or concerns.     Plan made per [facility] anticoagulation protocol    Lucia Mcbride RN  6/9/2025  Anticoagulation Clinic  [employer] for routing messages: p ANTICOAG HOME MONITORING  [facility] patient phone line: [phone]        _______________________________________________________________________     Anticoagulation Episode Summary       Current INR goal:  2.0-3.0   TTR:  84.2% (1 y)   Target end date:  Indefinite   Send INR reminders to:  [facility]AG HOME [facility]IT[facility]    Indications    Factor 5 Leiden mutation  heterozygous [D68.51]  Antiphospholipid syndrome [D68.61]  Long term current use of anticoagulant therapy [Z79.01]  Acute pulmonary embolism without acute cor pulmonale  unspecified pulmonary embolism type (H) [I26.99]             Comments:  [employer] home  meter// manage by exception// Antiphospholipid antibody syndrome (may need CF10 if INR elevated with no known reason)             Anticoagulation Care Providers       Provider Role Specialty Phone number    Dionicio Liang MD Referring Family Medicine [phone]

## 2025-06-23 ENCOUNTER — ANTICOAGULATION THERAPY VISIT (OUTPATIENT)
Dept: ANTICOAGULATION | Facility: CLINIC | Age: 30
End: 2025-06-23
Payer: COMMERCIAL

## 2025-06-23 ENCOUNTER — RESULTS FOLLOW-UP (OUTPATIENT)
Dept: ANTICOAGULATION | Facility: CLINIC | Age: 30
End: 2025-06-23
Payer: COMMERCIAL

## 2025-06-23 DIAGNOSIS — D68.51 FACTOR 5 LEIDEN MUTATION, HETEROZYGOUS: Primary | ICD-10-CM

## 2025-06-23 DIAGNOSIS — Z79.01 LONG TERM CURRENT USE OF ANTICOAGULANT THERAPY: ICD-10-CM

## 2025-06-23 DIAGNOSIS — D68.61 ANTIPHOSPHOLIPID SYNDROME: ICD-10-CM

## 2025-06-23 DIAGNOSIS — I26.99 ACUTE PULMONARY EMBOLISM WITHOUT ACUTE COR PULMONALE, UNSPECIFIED PULMONARY EMBOLISM TYPE (H): ICD-10-CM

## 2025-06-23 LAB — INR HOME MONITORING: 2.2 (ref 2–3)

## 2025-06-23 NOTE — PROGRESS NOTES
ANTICOAGULATION MANAGEMENT     Rocío Catherine 29 year old female is on warfarin with therapeutic INR result. (Goal INR 2.0-3.0)    Recent labs: (last 7 days)     06/23/25  0000   INR 2.2       ASSESSMENT     Source(s): Chart Review  Previous INR was Therapeutic last 2(+) visits  Medication, diet, health changes since last INR: chart reviewed; none identified  Prefers MyChart messages         PLAN     Recommended plan for no diet, medication or health factor changes affecting INR     Dosing Instructions: Continue your current warfarin dose with next INR in 2 weeks       Summary  As of 6/23/2025      Full warfarin instructions:  5 mg every Sat; 7.5 mg all other days   Next INR check:  7/7/2025               Sent [employer] message with dosing and follow up instructions    Patient to recheck with home meter    Education provided: Please call back if any changes to your diet, medications or how you've been taking warfarin    Plan made per [facility] anticoagulation protocol    Nathan Murrieta RN  6/23/2025  Anticoagulation Clinic  [facility] [email or web address] for routing messages: p ANTICOAG HOME MONITORING  [facility] patient phone line: [phone]        _______________________________________________________________________     Anticoagulation Episode Summary       Current INR goal:  2.0-3.0   TTR:  83.0% (1 y)   Target end date:  Indefinite   Send INR reminders to:  ANTICOAG HOME MONITORING    Indications    Factor 5 Leiden mutation  heterozygous [D68.51]  Antiphospholipid syndrome [D68.61]  Long term current use of anticoagulant therapy [Z79.01]  Acute pulmonary embolism without acute cor pulmonale  unspecified pulmonary embolism type (H) [I26.99]             Comments:  [employer] home meter// manage by exception// Antiphospholipid antibody syndrome (may need CF10 if INR elevated with no known reason)             Anticoagulation Care Providers       Provider Role Specialty Phone number    Dionicio Liang MD Referring Family Medicine  [phone]

## 2025-07-07 ENCOUNTER — ANTICOAGULATION THERAPY VISIT (OUTPATIENT)
Dept: ANTICOAGULATION | Facility: CLINIC | Age: 30
End: 2025-07-07
Payer: COMMERCIAL

## 2025-07-07 DIAGNOSIS — D68.61 ANTIPHOSPHOLIPID SYNDROME: ICD-10-CM

## 2025-07-07 DIAGNOSIS — I26.99 ACUTE PULMONARY EMBOLISM WITHOUT ACUTE COR PULMONALE, UNSPECIFIED PULMONARY EMBOLISM TYPE (H): ICD-10-CM

## 2025-07-07 DIAGNOSIS — D68.51 FACTOR 5 LEIDEN MUTATION, HETEROZYGOUS: Primary | ICD-10-CM

## 2025-07-07 DIAGNOSIS — Z79.01 LONG TERM CURRENT USE OF ANTICOAGULANT THERAPY: ICD-10-CM

## 2025-07-07 LAB — INR HOME MONITORING: 2.4 (ref 2–3)

## 2025-07-07 NOTE — PROGRESS NOTES
ANTICOAGULATION MANAGEMENT     Rocío Catherine 29 year old female is on warfarin with therapeutic INR result. (Goal INR 2.0-3.0)    Recent labs: (last 7 days)     07/07/25  0000   INR 2.4       ASSESSMENT     Source(s): Chart Review  Previous INR was Therapeutic last visit; previously outside of goal range  Medication, diet, health changes since last INR: chart reviewed; none identified         PLAN     Recommended plan for no diet, medication or health factor changes affecting INR     Dosing Instructions: Continue your current warfarin dose with next INR in 2 weeks       Summary  As of 7/7/2025      Full warfarin instructions:  5 mg every Sat; 7.5 mg all other days   Next INR check:  7/21/2025               Detailed voice message left for Rocío with dosing instructions and follow up date.     Patient to recheck with home meter    Education provided: Please call back if any changes to your diet, medications or how you've been taking warfarin    Plan made per [facility] anticoagulation protocol    Jo Ann Horner RN  7/7/2025  Anticoagulation Clinic  [facility] for routing messages: p ANTICOAG HOME MONITORING  [facility] patient phone line: [phone]        _______________________________________________________________________     Anticoagulation Episode Summary       Current INR goal:  2.0-3.0   TTR:  83.0% (1 y)   Target end date:  Indefinite   Send INR reminders to:  ANTICOAG HOME MONITORING    Indications    Factor 5 Leiden mutation  heterozygous [D68.51]  Antiphospholipid syndrome [D68.61]  Long term current use of anticoagulant therapy [Z79.01]  Acute pulmonary embolism without acute cor pulmonale  unspecified pulmonary embolism type (H) [I26.99]             Comments:  [employer] home meter// manage by exception// Antiphospholipid antibody syndrome (may need CF10 if INR elevated with no known reason)             Anticoagulation Care Providers       Provider Role Specialty Phone number    Dionicio Liang MD Referring  Family Medicine [phone]

## 2025-07-08 ENCOUNTER — TELEPHONE (OUTPATIENT)
Dept: ANTICOAGULATION | Facility: CLINIC | Age: 30
End: 2025-07-08
Payer: COMMERCIAL

## 2025-07-19 LAB — INR HOME MONITORING: 3 (ref 2–3)

## 2025-07-21 ENCOUNTER — DOCUMENTATION ONLY (OUTPATIENT)
Dept: ANTICOAGULATION | Facility: CLINIC | Age: 30
End: 2025-07-21
Payer: COMMERCIAL

## 2025-07-21 DIAGNOSIS — Z79.01 LONG TERM CURRENT USE OF ANTICOAGULANT THERAPY: ICD-10-CM

## 2025-07-21 DIAGNOSIS — D68.51 FACTOR 5 LEIDEN MUTATION, HETEROZYGOUS: Primary | ICD-10-CM

## 2025-07-21 DIAGNOSIS — I26.99 ACUTE PULMONARY EMBOLISM WITHOUT ACUTE COR PULMONALE, UNSPECIFIED PULMONARY EMBOLISM TYPE (H): ICD-10-CM

## 2025-07-21 DIAGNOSIS — D68.61 ANTIPHOSPHOLIPID SYNDROME: ICD-10-CM

## 2025-07-21 NOTE — PROGRESS NOTES
ANTICOAGULATION  MANAGEMENT-Home Monitor Managed by Exception    Rocío GRISEL Florin 29 year old female is on warfarin with therapeutic INR result. (Goal INR 2.0-3.0)    Recent labs: (last 7 days)     07/19/25  0000   INR 3.0       Care Everywhere updated: yes    Previous INR was Therapeutic  Medication, diet, health changes since last INR:chart reviewed; none identified  Contacted within the last 12 weeks by phone on 7/7/25  Due for next call on 10/5/25  Last [facility] referral date: 10/02/2024      VINI     Rocío was NOT contacted regarding therapeutic result today per home monitoring policy manage by exception agreement.   Current warfarin dose is to be continued:     Summary  As of 7/21/2025      Full warfarin instructions:  5 mg every Sat; 7.5 mg all other days   Next INR check:  7/25/2025             ?   Mayra Palma RN  Anticoagulation Clinic  7/21/2025    _______________________________________________________________________     Anticoagulation Episode Summary       Current INR goal:  2.0-3.0   TTR:  82.9% (1 y)   Target end date:  Indefinite   Send INR reminders to:  [facility]    Indications    Factor 5 Leiden mutation  heterozygous [D68.51]  Antiphospholipid syndrome [D68.61]  Long term current use of anticoagulant therapy [Z79.01]  Acute pulmonary embolism without acute cor pulmonale  unspecified pulmonary embolism type (H) [I26.99]             Comments:  [employer] home meter// manage by exception// Antiphospholipid antibody syndrome (may need CF10 if INR elevated with no known reason)             Anticoagulation Care Providers       Provider Role Specialty Phone number    Dionicio Liang MD Referring Family Medicine [phone]

## 2025-08-04 ENCOUNTER — RESULTS FOLLOW-UP (OUTPATIENT)
Dept: ANTICOAGULATION | Facility: CLINIC | Age: 30
End: 2025-08-04
Payer: COMMERCIAL

## 2025-08-04 ENCOUNTER — DOCUMENTATION ONLY (OUTPATIENT)
Dept: ANTICOAGULATION | Facility: CLINIC | Age: 30
End: 2025-08-04
Payer: COMMERCIAL

## 2025-08-04 DIAGNOSIS — D68.61 ANTIPHOSPHOLIPID SYNDROME: ICD-10-CM

## 2025-08-04 DIAGNOSIS — I26.99 ACUTE PULMONARY EMBOLISM WITHOUT ACUTE COR PULMONALE, UNSPECIFIED PULMONARY EMBOLISM TYPE (H): ICD-10-CM

## 2025-08-04 DIAGNOSIS — Z79.01 LONG TERM CURRENT USE OF ANTICOAGULANT THERAPY: ICD-10-CM

## 2025-08-04 DIAGNOSIS — D68.51 FACTOR 5 LEIDEN MUTATION, HETEROZYGOUS: Primary | ICD-10-CM

## 2025-08-04 LAB — INR HOME MONITORING: 2 (ref 2–3)

## 2025-08-18 ENCOUNTER — DOCUMENTATION ONLY (OUTPATIENT)
Dept: ANTICOAGULATION | Facility: CLINIC | Age: 30
End: 2025-08-18
Payer: COMMERCIAL

## 2025-08-18 ENCOUNTER — RESULTS FOLLOW-UP (OUTPATIENT)
Dept: ANTICOAGULATION | Facility: CLINIC | Age: 30
End: 2025-08-18
Payer: COMMERCIAL

## 2025-08-18 DIAGNOSIS — Z79.01 LONG TERM CURRENT USE OF ANTICOAGULANT THERAPY: ICD-10-CM

## 2025-08-18 DIAGNOSIS — I26.99 ACUTE PULMONARY EMBOLISM WITHOUT ACUTE COR PULMONALE, UNSPECIFIED PULMONARY EMBOLISM TYPE (H): ICD-10-CM

## 2025-08-18 DIAGNOSIS — D68.51 FACTOR 5 LEIDEN MUTATION, HETEROZYGOUS: Primary | ICD-10-CM

## 2025-08-18 DIAGNOSIS — D68.61 ANTIPHOSPHOLIPID SYNDROME: ICD-10-CM

## 2025-08-18 LAB — INR HOME MONITORING: 2.4 (ref 2–3)

## 2025-08-28 ENCOUNTER — OFFICE VISIT (OUTPATIENT)
Dept: FAMILY MEDICINE | Facility: CLINIC | Age: 30
End: 2025-08-28
Payer: COMMERCIAL

## 2025-08-28 ENCOUNTER — ANCILLARY PROCEDURE (OUTPATIENT)
Dept: GENERAL RADIOLOGY | Facility: CLINIC | Age: 30
End: 2025-08-28
Attending: FAMILY MEDICINE
Payer: COMMERCIAL

## 2025-08-28 VITALS
DIASTOLIC BLOOD PRESSURE: 82 MMHG | WEIGHT: 121 LBS | BODY MASS INDEX: 22.84 KG/M2 | RESPIRATION RATE: 16 BRPM | TEMPERATURE: 99 F | HEIGHT: 61 IN | HEART RATE: 102 BPM | SYSTOLIC BLOOD PRESSURE: 119 MMHG | OXYGEN SATURATION: 100 %

## 2025-08-28 DIAGNOSIS — M25.572 PAIN IN JOINT, ANKLE AND FOOT, LEFT: Primary | ICD-10-CM

## 2025-08-28 DIAGNOSIS — M25.572 PAIN IN JOINT, ANKLE AND FOOT, LEFT: ICD-10-CM

## 2025-08-28 RX ORDER — ACETAMINOPHEN AND CODEINE PHOSPHATE 300; 30 MG/1; MG/1
1 TABLET ORAL EVERY 8 HOURS PRN
Qty: 10 TABLET | Refills: 0 | Status: SHIPPED | OUTPATIENT
Start: 2025-08-28 | End: 2025-08-31

## 2025-09-01 LAB — INR HOME MONITORING: 1.6 (ref 2–3)

## 2025-09-02 ENCOUNTER — DOCUMENTATION ONLY (OUTPATIENT)
Dept: ANTICOAGULATION | Facility: CLINIC | Age: 30
End: 2025-09-02
Payer: COMMERCIAL

## 2025-09-02 ENCOUNTER — RESULTS FOLLOW-UP (OUTPATIENT)
Dept: ANTICOAGULATION | Facility: CLINIC | Age: 30
End: 2025-09-02
Payer: COMMERCIAL

## 2025-09-02 ENCOUNTER — MYC MEDICAL ADVICE (OUTPATIENT)
Dept: ANTICOAGULATION | Facility: CLINIC | Age: 30
End: 2025-09-02
Payer: COMMERCIAL

## 2025-09-02 ENCOUNTER — ANTICOAGULATION THERAPY VISIT (OUTPATIENT)
Dept: ANTICOAGULATION | Facility: CLINIC | Age: 30
End: 2025-09-02
Payer: COMMERCIAL

## 2025-09-02 DIAGNOSIS — D68.61 ANTIPHOSPHOLIPID SYNDROME: ICD-10-CM

## 2025-09-02 DIAGNOSIS — I26.99 ACUTE PULMONARY EMBOLISM WITHOUT ACUTE COR PULMONALE, UNSPECIFIED PULMONARY EMBOLISM TYPE (H): ICD-10-CM

## 2025-09-02 DIAGNOSIS — Z79.01 LONG TERM CURRENT USE OF ANTICOAGULANT THERAPY: ICD-10-CM

## 2025-09-02 DIAGNOSIS — D68.51 FACTOR 5 LEIDEN MUTATION, HETEROZYGOUS: Primary | ICD-10-CM

## (undated) DEVICE — SU VICRYL 2-0 CT-1 36" UND J945H

## (undated) DEVICE — LINEN BABY BLANKET 5434

## (undated) DEVICE — Device

## (undated) DEVICE — GLOVE PROTEXIS MICRO 5.5  2D73PM55

## (undated) DEVICE — BLADE KNIFE SURG 15 371115

## (undated) DEVICE — LABEL MEDICATION SYSTEM  3304

## (undated) DEVICE — SU PLAIN 3-0 CT 27" 852H

## (undated) DEVICE — PREP CHLORAPREP 26ML TINTED ORANGE  260815

## (undated) DEVICE — ADH SKIN CLOSURE PREMIERPRO EXOFIN 1.0ML 3470

## (undated) DEVICE — GLOVE PROTEXIS BLUE W/NEU-THERA 6.0  2D73EB60

## (undated) DEVICE — SOL WATER IRRIG 1000ML BOTTLE 07139-09

## (undated) DEVICE — PACK C-SECTION LF PL15OTA83B

## (undated) DEVICE — SU WND CLOSURE VLOC 90 ABS 4-0 18" P-12 VLOCM0023

## (undated) DEVICE — SU MONOCRYL 0 CT-1 27" Y340H

## (undated) DEVICE — SU VICRYL 0 CT-1 36" J946H

## (undated) DEVICE — SOL NACL 0.9% IRRIG 1000ML BOTTLE 07138-09

## (undated) DEVICE — STOCKING SLEEVE COMPRESSION CALF MED

## (undated) DEVICE — CATH TRAY FOLEY SURESTEP 16FR W/URINE MTR STATLK LF A303416A

## (undated) RX ORDER — OXYTOCIN/0.9 % SODIUM CHLORIDE 30/500 ML
PLASTIC BAG, INJECTION (ML) INTRAVENOUS
Status: DISPENSED
Start: 2020-02-21

## (undated) RX ORDER — EPHEDRINE SULFATE 50 MG/ML
INJECTION, SOLUTION INTRAMUSCULAR; INTRAVENOUS; SUBCUTANEOUS
Status: DISPENSED
Start: 2020-02-21

## (undated) RX ORDER — MORPHINE SULFATE 1 MG/ML
INJECTION, SOLUTION EPIDURAL; INTRATHECAL; INTRAVENOUS
Status: DISPENSED
Start: 2020-02-21

## (undated) RX ORDER — FENTANYL CITRATE 50 UG/ML
INJECTION, SOLUTION INTRAMUSCULAR; INTRAVENOUS
Status: DISPENSED
Start: 2020-02-21

## (undated) RX ORDER — PHENYLEPHRINE HCL IN 0.9% NACL 1 MG/10 ML
SYRINGE (ML) INTRAVENOUS
Status: DISPENSED
Start: 2020-02-21

## (undated) RX ORDER — REGADENOSON 0.08 MG/ML
INJECTION, SOLUTION INTRAVENOUS
Status: DISPENSED
Start: 2021-06-30

## (undated) RX ORDER — ONDANSETRON 2 MG/ML
INJECTION INTRAMUSCULAR; INTRAVENOUS
Status: DISPENSED
Start: 2020-02-21